# Patient Record
Sex: FEMALE | Race: WHITE | NOT HISPANIC OR LATINO | Employment: UNEMPLOYED | URBAN - METROPOLITAN AREA
[De-identification: names, ages, dates, MRNs, and addresses within clinical notes are randomized per-mention and may not be internally consistent; named-entity substitution may affect disease eponyms.]

---

## 2022-07-26 ENCOUNTER — TELEPHONE (OUTPATIENT)
Dept: HEMATOLOGY/ONCOLOGY | Facility: CLINIC | Age: 47
End: 2022-07-26

## 2022-07-26 NOTE — TELEPHONE ENCOUNTER
TEZM asking pt to return my call.     Spoke with pt.  Introduced myself to her and let her know I am always available if she needs me.  As of now, her plan is to proceed with bilateral mastectomy with Dr. Arevalo.  She saw plastic surgeon on 7/27. Planning for reconstruction.  Pt will come in to see Dr. Peralta on 8/3 as scheduled.

## 2022-08-02 NOTE — PROGRESS NOTES
INITIAL [facility] HEM/ONC CONSULTATION      Subjective:       Patient ID: Karyna Escoto is a 47 y.o. female.    6/9/2022:  Dx Mammo:  1.8cm mass in the left breast towards the left axilla.   Deep to the mass 2 lymph nodes are identified.     7/5/2022:  Left breast core biopsy:  Grade 2 IDCA with DCIS  ER: 0.03%, WV: 8.8%, HER2: 0  TRIPLE NEGATIVE    7/20/2022:  Left axilla LN needle biopsy:  Invasive ductal carcinoma  ER: 0%, WV: 40%, HER2 negative(0)    Chief Complaint: No chief complaint on file.  Breast Cancer    Ms. Escoto is a 46yo female who noticed a small bruise and lump in her upper outer right breast in April this year after working in the yard.  She watched this for a few weeks and went to PCP when it did not go away.  She ordered a mammogram which was done 6/9/2022 which showed a 1.8cm mass in the left breast towards the axilla.  She was referred to general surgeon, Dr. Arevalo in [place] who ordered a needle biopsy which shows triple negative breast cancer.      Pictures 8/3/2022                Past Medical History:   Diagnosis Date    Anxiety        Past Surgical History:   Procedure Laterality Date    eye lid surgery      NOSE SURGERY      TONSILLECTOMY         Social History     Socioeconomic History    Marital status:    Tobacco Use    Smoking status: Former Smoker   Substance and Sexual Activity    Alcohol use: Yes     Comment: occasional       Family History   Problem Relation Age of Onset    Breast cancer Maternal Grandmother     Prostate cancer Maternal Grandfather        Review of patient's allergies indicates:  No Known Allergies    Current Outpatient Medications:     hydrOXYzine HCL (ATARAX) 10 MG Tab, Take 25 mg by mouth 3 (three) times daily as needed., Disp: , Rfl:     omeprazole (PRILOSEC) 10 MG capsule, Take 10 mg by mouth every 12 (twelve) hours as needed., Disp: , Rfl:     sertraline (ZOLOFT) 50 MG tablet, Take 50 mg by mouth once daily., Disp: , Rfl:     All  medications and past history have been reviewed.    Review of Systems   Constitutional: Negative for activity change, appetite change, diaphoresis, fatigue, fever and unexpected weight change.   HENT: Negative for congestion, hearing loss and mouth sores.    Eyes: Negative for visual disturbance.   Respiratory: Negative for cough, chest tightness and shortness of breath.    Cardiovascular: Negative for chest pain and leg swelling.   Gastrointestinal: Negative for abdominal pain, blood in stool, diarrhea, nausea and vomiting.   Endocrine: Negative for cold intolerance and heat intolerance.   Genitourinary: Negative for difficulty urinating, dysuria, frequency and hematuria.   Musculoskeletal: Negative for back pain.   Skin: Negative for rash.   Neurological: Negative for dizziness and headaches.   Hematological: Negative for adenopathy. Does not bruise/bleed easily.   Psychiatric/Behavioral: Negative for behavioral problems. The patient is not nervous/anxious.        Objective:        There were no vitals taken for this visit.    Physical Exam  Constitutional:       Appearance: She is well-developed.   HENT:      Head: Normocephalic and atraumatic.      Right Ear: External ear normal.      Left Ear: External ear normal.   Eyes:      Conjunctiva/sclera: Conjunctivae normal.      Pupils: Pupils are equal, round, and reactive to light.   Neck:      Thyroid: No thyromegaly.      Trachea: No tracheal deviation.   Cardiovascular:      Rate and Rhythm: Normal rate and regular rhythm.      Heart sounds: Normal heart sounds.   Pulmonary:      Effort: Pulmonary effort is normal.      Breath sounds: Normal breath sounds.   Chest:       Abdominal:      General: Bowel sounds are normal. There is no distension.      Palpations: Abdomen is soft. There is no mass.      Tenderness: There is no abdominal tenderness.   Skin:     Findings: No rash.   Neurological:      Comments: Neuro intact througout   Psychiatric:         Behavior:  Behavior normal.         Thought Content: Thought content normal.         Judgment: Judgment normal.           Lab  No results found for this or any previous visit (from the past 336 hour(s)).  CMP  No results found for: NA, K, CL, CO2, GLU, BUN, CREATININE, CALCIUM, PROT, ALBUMIN, BILITOT, ALKPHOS, AST, ALT, ANIONGAP, ESTGFRAFRICA, EGFRNONAA      Specimen (24h ago, onward)            None                All lab results and imaging results have been reviewed and discussed with the patient.     Assessment:       1. Malignant neoplasm of upper-outer quadrant of left breast in female, estrogen receptor negative      Problem List Items Addressed This Visit     Left Breast Cancer-TRIPLE NEGATIVE     I had a long discussion with Ms. Escoto and her  today about this new and aggressive breast cancer.  Note is made that she has low-positive IA however, I feel the best approach is to treat this as triple negative breast cancer as it is certainly as aggressive as such.      She is node positive by biopsy and as such, is stage III and is a candidate for Keynote 522 neoadjuvant chemo/IO therapy.  I discussed the risks and benefits in detail with her and she is prepared to move forward understanding these risks.  She will have PORT tomorrow and I will begin therapy next week.      I will also get chemo education, MUGA and BRCA testing as well as referral to radiation oncology as she will need this therapy as well.  She is interested in bilateral mastectomy and reconstruction and I will arrange for her to see plastic surgery as well.      Lastly,  Due to the aggressive nature of this disease,  I will get a PET scan arranged as well as a breast MRI but will not wait on these to begin therapy.             Relevant Orders    NM PET CT Routine Skull to Mid Thigh    NM Cardiac Blood Pool Single study MUGA    Ambulatory referral/consult to General Surgery    Ambulatory referral/consult to Plastic Surgery    MRI Breast w/wo  Contrast, w/CAD, Bilateral        Cancer Staging  No matching staging information was found for the patient.      Plan:         No follow-ups on file.       The plan was discussed with the patient and all questions/concerns have been answered to the patient's satisfaction.

## 2022-08-03 ENCOUNTER — OFFICE VISIT (OUTPATIENT)
Dept: HEMATOLOGY/ONCOLOGY | Facility: CLINIC | Age: 47
End: 2022-08-03
Payer: COMMERCIAL

## 2022-08-03 VITALS
DIASTOLIC BLOOD PRESSURE: 81 MMHG | WEIGHT: 188 LBS | HEART RATE: 75 BPM | BODY MASS INDEX: 34.6 KG/M2 | TEMPERATURE: 98 F | HEIGHT: 62 IN | SYSTOLIC BLOOD PRESSURE: 132 MMHG

## 2022-08-03 DIAGNOSIS — C50.412 MALIGNANT NEOPLASM OF UPPER-OUTER QUADRANT OF LEFT BREAST IN FEMALE, ESTROGEN RECEPTOR NEGATIVE: ICD-10-CM

## 2022-08-03 DIAGNOSIS — Z17.1 MALIGNANT NEOPLASM OF UPPER-OUTER QUADRANT OF LEFT BREAST IN FEMALE, ESTROGEN RECEPTOR NEGATIVE: ICD-10-CM

## 2022-08-03 PROCEDURE — 3079F DIAST BP 80-89 MM HG: CPT | Mod: CPTII,S$GLB,, | Performed by: INTERNAL MEDICINE

## 2022-08-03 PROCEDURE — 3008F PR BODY MASS INDEX (BMI) DOCUMENTED: ICD-10-PCS | Mod: CPTII,S$GLB,, | Performed by: INTERNAL MEDICINE

## 2022-08-03 PROCEDURE — 1159F MED LIST DOCD IN RCRD: CPT | Mod: CPTII,S$GLB,, | Performed by: INTERNAL MEDICINE

## 2022-08-03 PROCEDURE — 3075F PR MOST RECENT SYSTOLIC BLOOD PRESS GE 130-139MM HG: ICD-10-PCS | Mod: CPTII,S$GLB,, | Performed by: INTERNAL MEDICINE

## 2022-08-03 PROCEDURE — 99205 OFFICE O/P NEW HI 60 MIN: CPT | Mod: S$GLB,,, | Performed by: INTERNAL MEDICINE

## 2022-08-03 PROCEDURE — 3008F BODY MASS INDEX DOCD: CPT | Mod: CPTII,S$GLB,, | Performed by: INTERNAL MEDICINE

## 2022-08-03 PROCEDURE — 99205 PR OFFICE/OUTPT VISIT, NEW, LEVL V, 60-74 MIN: ICD-10-PCS | Mod: S$GLB,,, | Performed by: INTERNAL MEDICINE

## 2022-08-03 PROCEDURE — 3079F PR MOST RECENT DIASTOLIC BLOOD PRESSURE 80-89 MM HG: ICD-10-PCS | Mod: CPTII,S$GLB,, | Performed by: INTERNAL MEDICINE

## 2022-08-03 PROCEDURE — 3075F SYST BP GE 130 - 139MM HG: CPT | Mod: CPTII,S$GLB,, | Performed by: INTERNAL MEDICINE

## 2022-08-03 PROCEDURE — 1159F PR MEDICATION LIST DOCUMENTED IN MEDICAL RECORD: ICD-10-PCS | Mod: CPTII,S$GLB,, | Performed by: INTERNAL MEDICINE

## 2022-08-03 RX ORDER — HYDROXYZINE HYDROCHLORIDE 10 MG/1
25 TABLET, FILM COATED ORAL 3 TIMES DAILY PRN
COMMUNITY
End: 2022-10-26

## 2022-08-03 RX ORDER — SERTRALINE HYDROCHLORIDE 50 MG/1
50 TABLET, FILM COATED ORAL DAILY
COMMUNITY
End: 2022-10-26

## 2022-08-03 RX ORDER — OMEPRAZOLE 10 MG/1
10 CAPSULE, DELAYED RELEASE ORAL EVERY 12 HOURS PRN
COMMUNITY
End: 2022-11-17 | Stop reason: ALTCHOICE

## 2022-08-03 NOTE — ASSESSMENT & PLAN NOTE
I had a long discussion with Ms. Escoto and her  today about this new and aggressive breast cancer.  Note is made that she has low-positive CT however, I feel the best approach is to treat this as triple negative breast cancer as it is certainly as aggressive as such.      She is node positive by biopsy and as such, is stage III and is a candidate for Keynote 522 neoadjuvant chemo/IO therapy.  I discussed the risks and benefits in detail with her and she is prepared to move forward understanding these risks.  She will have PORT tomorrow and I will begin therapy next week.      I will also get chemo education, MUGA and BRCA testing as well as referral to radiation oncology as she will need this therapy as well.  She is interested in bilateral mastectomy and reconstruction and I will arrange for her to see plastic surgery as well.      Lastly,  Due to the aggressive nature of this disease,  I will get a PET scan arranged as well as a breast MRI but will not wait on these to begin therapy.

## 2022-08-04 ENCOUNTER — TELEPHONE (OUTPATIENT)
Dept: SURGERY | Facility: CLINIC | Age: 47
End: 2022-08-04
Payer: COMMERCIAL

## 2022-08-05 ENCOUNTER — OFFICE VISIT (OUTPATIENT)
Dept: RADIATION ONCOLOGY | Facility: CLINIC | Age: 47
End: 2022-08-05
Payer: COMMERCIAL

## 2022-08-05 ENCOUNTER — SOCIAL WORK (OUTPATIENT)
Dept: HEMATOLOGY/ONCOLOGY | Facility: CLINIC | Age: 47
End: 2022-08-05

## 2022-08-05 VITALS
WEIGHT: 190.5 LBS | OXYGEN SATURATION: 97 % | DIASTOLIC BLOOD PRESSURE: 85 MMHG | HEART RATE: 66 BPM | RESPIRATION RATE: 16 BRPM | BODY MASS INDEX: 34.84 KG/M2 | TEMPERATURE: 98 F | SYSTOLIC BLOOD PRESSURE: 132 MMHG

## 2022-08-05 DIAGNOSIS — C50.412 MALIGNANT NEOPLASM OF UPPER-OUTER QUADRANT OF LEFT BREAST IN FEMALE, ESTROGEN RECEPTOR NEGATIVE: ICD-10-CM

## 2022-08-05 DIAGNOSIS — Z17.1 MALIGNANT NEOPLASM OF UPPER-OUTER QUADRANT OF LEFT BREAST IN FEMALE, ESTROGEN RECEPTOR NEGATIVE: ICD-10-CM

## 2022-08-05 PROCEDURE — 3075F SYST BP GE 130 - 139MM HG: CPT | Mod: CPTII,S$GLB,, | Performed by: RADIOLOGY

## 2022-08-05 PROCEDURE — 1160F RVW MEDS BY RX/DR IN RCRD: CPT | Mod: CPTII,S$GLB,, | Performed by: RADIOLOGY

## 2022-08-05 PROCEDURE — 1160F PR REVIEW ALL MEDS BY PRESCRIBER/CLIN PHARMACIST DOCUMENTED: ICD-10-PCS | Mod: CPTII,S$GLB,, | Performed by: RADIOLOGY

## 2022-08-05 PROCEDURE — 3008F PR BODY MASS INDEX (BMI) DOCUMENTED: ICD-10-PCS | Mod: CPTII,S$GLB,, | Performed by: RADIOLOGY

## 2022-08-05 PROCEDURE — 3079F DIAST BP 80-89 MM HG: CPT | Mod: CPTII,S$GLB,, | Performed by: RADIOLOGY

## 2022-08-05 PROCEDURE — 1159F MED LIST DOCD IN RCRD: CPT | Mod: CPTII,S$GLB,, | Performed by: RADIOLOGY

## 2022-08-05 PROCEDURE — 3008F BODY MASS INDEX DOCD: CPT | Mod: CPTII,S$GLB,, | Performed by: RADIOLOGY

## 2022-08-05 PROCEDURE — 1159F PR MEDICATION LIST DOCUMENTED IN MEDICAL RECORD: ICD-10-PCS | Mod: CPTII,S$GLB,, | Performed by: RADIOLOGY

## 2022-08-05 PROCEDURE — 99205 PR OFFICE/OUTPT VISIT, NEW, LEVL V, 60-74 MIN: ICD-10-PCS | Mod: S$GLB,,, | Performed by: RADIOLOGY

## 2022-08-05 PROCEDURE — 3075F PR MOST RECENT SYSTOLIC BLOOD PRESS GE 130-139MM HG: ICD-10-PCS | Mod: CPTII,S$GLB,, | Performed by: RADIOLOGY

## 2022-08-05 PROCEDURE — 99205 OFFICE O/P NEW HI 60 MIN: CPT | Mod: S$GLB,,, | Performed by: RADIOLOGY

## 2022-08-05 PROCEDURE — 3079F PR MOST RECENT DIASTOLIC BLOOD PRESSURE 80-89 MM HG: ICD-10-PCS | Mod: CPTII,S$GLB,, | Performed by: RADIOLOGY

## 2022-08-05 RX ORDER — GABAPENTIN 300 MG/1
300 CAPSULE ORAL 3 TIMES DAILY
COMMUNITY
Start: 2022-08-04 | End: 2022-10-26

## 2022-08-05 RX ORDER — HYDROXYZINE HYDROCHLORIDE 10 MG/1
10 TABLET, FILM COATED ORAL 3 TIMES DAILY PRN
COMMUNITY
Start: 2019-06-01

## 2022-08-05 RX ORDER — ACETAMINOPHEN 325 MG/1
650 TABLET ORAL
COMMUNITY
End: 2022-11-17

## 2022-08-05 RX ORDER — CHOLECALCIFEROL (VITAMIN D3) 125 MCG
CAPSULE ORAL
COMMUNITY
End: 2022-11-17

## 2022-08-05 RX ORDER — OXYCODONE HYDROCHLORIDE 5 MG/1
5 TABLET ORAL
COMMUNITY
Start: 2022-08-04 | End: 2022-08-07

## 2022-08-05 RX ORDER — SERTRALINE HYDROCHLORIDE 50 MG/1
50 TABLET, FILM COATED ORAL
COMMUNITY
Start: 2022-04-01 | End: 2024-02-01

## 2022-08-05 RX ORDER — METHOCARBAMOL 500 MG/1
1000 TABLET, FILM COATED ORAL
COMMUNITY
Start: 2022-08-04 | End: 2022-08-11

## 2022-08-05 NOTE — PROGRESS NOTES
Karyna Escoto is a 47 year old diagnosed with triple negative breast cancer.  Patient is here today, accompanied by her , for a radiation consult with Dr. Bass.  I met with patient to complete new patient orientation and the NCCN Distress Screening; patient indicated a rating of 2.  Patient was provided the the number to access the [facility] financial assistance application.  Patient was provided the [employer] and [employer] applications; she will contact me when she has completed her sections along with required documents.  Patient was provided a $25 gas card from the [employer] transportation [employer].  Patient denied needing addtional psychosocial supports at this time.  I provided patient with the [facility] community events flyer and my contact information in the event additional supportive services are needed in the future.

## 2022-08-05 NOTE — PROGRESS NOTES
Karyna Escoto  [id number]  1975  2022  Derek Patterson Md  [address]  [address]  [place] [address]    REASON FOR CONSULTATION: IIA, [id number] g2 IDC of UOQ L breast, ER(-)/KY(+)/HER2(-)    TREATMENT GOAL: adjuvant    HISTORY OF PRESENT ILLNESS:   Karyna Escoto is a 47 y.o. female with +FHx BCa (GGM) who presented with left axillary bruising and enlarging palpable nodule x 2mos with diagnostic mammogram and ultrasound noting 1.8cm hypoechoic mass in the axilla with extension to the skin and adjacent lymph nodes with small fatty rena.  Biopsy from the left breast returned grade 2 IDC, LVSI(-), PNI(-); ER(-),KY+ 8.9%, HER2(-) and from the L axilla: grade 2 IDC ER(-),KY+ 40%, HER2(-).  She was seen by Lolis Arevalo and Norma and expressed preference for bilateral mastectomy with reconstruction, recommended for tissue expanders.    Patient was seen by Dr. Patterson and is planned for neoadjuvant chemo/immunotherapy.  She was referred to Dr. Murphy for port placement and presents to discuss radiotherapeutic options. PORT placed.    BRCA  MRI  PET    Patient denies fever, chills, chest pain, shortness of breath, cough or hemoptysis.  She has tenderness at port site and reports some tightness in the left axilla the range of motion is preserved without swelling.  She denies pain or discomfort elsewhere within the breast or nipple discharge.    Breast History  Menarche                             12   Menopause                          LMP 7/15/22                  Hysterectomy no   ; 1st at 30   Birth control use                  Yes; 12yrs  Hormone therapy use          no  Family History Breast Ca    no  Prior breast biopsies           Yes (GGM)    Review of systems otherwise negative unless indicated in HPI.    Past Medical History:   Diagnosis Date    Anxiety     Breast cancer      Past Surgical History:   Procedure Laterality Date    eye lid surgery      NOSE SURGERY      PORTACATH  PLACEMENT      TONSILLECTOMY       Social History     Socioeconomic History    Marital status:    Tobacco Use    Smoking status: Former Smoker   Substance and Sexual Activity    Alcohol use: Yes     Comment: occasional    Sexual activity: Yes     Family History   Problem Relation Age of Onset    Breast cancer Maternal Grandmother     Prostate cancer Maternal Grandfather        PRIOR HISTORY OF CHEMOTHERAPY OR RADIOTHERAPY: Please see HPI for patients prior oncologic history.    Medication List with Changes/Refills   Current Medications    ACETAMINOPHEN (TYLENOL) 325 MG TABLET    Take 650 mg by mouth.    GABAPENTIN (NEURONTIN) 300 MG CAPSULE    Take 300 mg by mouth 3 (three) times daily.    HYDROXYZINE HCL (ATARAX) 10 MG TAB    Take 25 mg by mouth 3 (three) times daily as needed.    HYDROXYZINE HCL (ATARAX) 10 MG TAB    Take 10 mg by mouth.    METHOCARBAMOL (ROBAXIN) 500 MG TAB    Take 1,000 mg by mouth.    NAPROXEN SODIUM 220 MG CAP    Take by mouth.    OMEPRAZOLE (PRILOSEC) 10 MG CAPSULE    Take 10 mg by mouth every 12 (twelve) hours as needed.    OXYCODONE (ROXICODONE) 5 MG IMMEDIATE RELEASE TABLET    Take 5 mg by mouth.    SERTRALINE (ZOLOFT) 50 MG TABLET    Take 50 mg by mouth once daily.    SERTRALINE (ZOLOFT) 50 MG TABLET    Take 50 mg by mouth.     Review of patient's allergies indicates:  No Known Allergies    QUALITY OF LIFE: 100%- Normal, No Complaints, No Evidence of Disease    Vitals:    08/05/22 0914   BP: 132/85   Pulse: 66   Resp: 16   Temp: 98.4 °F (36.9 °C)   TempSrc: Oral   SpO2: 97%   Weight: 86.4 kg (190 lb 8 oz)   PainSc:   4   PainLoc: Chest     Body mass index is 34.84 kg/m².    PHYSICAL EXAM:   GENERAL: alert; in no apparent distress.   HEAD: normocephalic, atraumatic.  EYES: pupils are equal, round, reactive to light and accommodation. Sclera anicteric. Conjunctiva not injected.   NOSE/THROAT: no nasal erythema or rhinorrhea. Oropharynx pink, without erythema, ulcerations or  thrush.   NECK: no cervical motion rigidity; supple with no masses.    CHEST: Patient is speaking comfortably on room air with normal work of breathing without using accessory muscles of respiration.  CARDIOVASCULAR: regular rate and rhythm  ABDOMEN: soft, nontender, nondistended.   MUSCULOSKELETAL: no tenderness to palpation along the spine or scapulae. Normal range of motion.  NEUROLOGIC: cranial nerves II-XII intact bilaterally. Strength 5/5 in bilateral upper and lower extremities. No sensory deficits appreciated. Normal gait.  LYMPHATIC: no cervical, supraclavicular or axillary adenopathy appreciated bilaterally.   EXTREMITIES: no clubbing, cyanosis, edema.  SKIN: no erythema, rashes, ulcerations noted.   BREAST:  Palpable 2-3 cm firm nodule in left axillary tail with associated overlying skin changes/erythema; no other palpable nodules.  Mild tenderness to exam.  No cellulitis or fluctuance.    REVIEW OF IMAGING/PATHOLOGY/LABS: Please see HPI. All images reviewed personally by dictating physician.     ASSESSMENT: Karyna Escoto is a 47 y.o. female with stage IIA, [id number] g2 IDC of UOQ L breast, ER(-)/ME(+)/HER2(-).  PLAN:  Karyna Escoto presents with palpable left axillary tail nodule with overlying skin changes with diagnostic mammogram/ultrasound and ultimately biopsy confirming a grade 2 invasive ductal carcinoma with biopsy-proven axillary lymph node involvement.  There was no evidence of lymphovascular space invasion or perineural invasion on biopsy and both sites returned ER(-)/HER2(-) with differing degrees of ME positivity.  She has met with Dr. Patterson and has BRCA, PET and MRI pending.  He is planning aggressive neoadjuvant chemo/immunotherapy.  Her surgical plan is for bilateral mastectomies with reconstruction and she will be meeting with Dr. Pugh to discuss.    Today I outlined timeline of her proposed treatment regimen.  I explained that radiotherapy would follow surgery and  expander placement with saline fill, approximately 4-6 weeks pending healing.  Because of her clinical lymph node positive disease and several high risk features including young age and ER(-), I do recommend adjuvant treatment of the left chest wall and draining lymphatics to include axilla 1-3, supraclavicular fossa, internal mammary lymph node chain with demonstrated survival benefit per EBCTCG meta-analysis and EORTC 98054.  I recommend treatment done with 3D conformal techniques using a deep inspiratory breath protocol to spare the underlying lung and heart.  Pending results of final pathology, tentative prescription will be for 5040 cGy to the above volumes +/- mastectomy scar boost. Expect radiotherapy will be done with concurrent Keytruda. Pending final pathology she may be a candidate for further systemic therapy (xeloda, ie) at completion of radiotherapy.      I carefully explained the process of simulation and treatment delivery with weekly physician visits.      Consent deferred.     The patient has our contact information and understands that they are free to contact us at any time with questions or concerns regarding radiation therapy.     DISPOSITION: RTC FOR CT SIM AFTER CURRENT THERAPY     I have personally seen and evaluated this patient with a high complexity diagnosis.      Greater than 60 minutes were dedicated to reviewing/interpreting pertinent laboratory/imaging/pathology as well as prior consultations; reviewing and performing history and physical; counseling patient on oncologic recommendations; documentation in the electronic medical record including ordering of additional tests and/or radiation treatment protocol; and coordination of care with physicians with referrals placed as appropriate.     COVID-19 precautions discussed. Cancer Center policy for COVID-19 testing described.  Patient will be required to wear a mask when in the Cancer Center.    PHYSICIAN: John Bass Jr, MD    Thank  you for the opportunity to meet and consult with Karyna Escoto.   Please feel free to contact me to discuss the above recommendation further.

## 2022-08-08 ENCOUNTER — OFFICE VISIT (OUTPATIENT)
Dept: HEMATOLOGY/ONCOLOGY | Facility: CLINIC | Age: 47
End: 2022-08-08
Payer: COMMERCIAL

## 2022-08-08 VITALS
DIASTOLIC BLOOD PRESSURE: 85 MMHG | BODY MASS INDEX: 35.48 KG/M2 | WEIGHT: 194 LBS | TEMPERATURE: 98 F | SYSTOLIC BLOOD PRESSURE: 135 MMHG | HEART RATE: 70 BPM

## 2022-08-08 DIAGNOSIS — C50.412 MALIGNANT NEOPLASM OF UPPER-OUTER QUADRANT OF LEFT BREAST IN FEMALE, ESTROGEN RECEPTOR NEGATIVE: ICD-10-CM

## 2022-08-08 DIAGNOSIS — Z17.1 MALIGNANT NEOPLASM OF UPPER-OUTER QUADRANT OF LEFT BREAST IN FEMALE, ESTROGEN RECEPTOR NEGATIVE: ICD-10-CM

## 2022-08-08 DIAGNOSIS — R53.83 FATIGUE, UNSPECIFIED TYPE: Primary | ICD-10-CM

## 2022-08-08 PROCEDURE — 1160F PR REVIEW ALL MEDS BY PRESCRIBER/CLIN PHARMACIST DOCUMENTED: ICD-10-PCS | Mod: CPTII,S$GLB,, | Performed by: NURSE PRACTITIONER

## 2022-08-08 PROCEDURE — 3008F BODY MASS INDEX DOCD: CPT | Mod: CPTII,S$GLB,, | Performed by: NURSE PRACTITIONER

## 2022-08-08 PROCEDURE — 1159F MED LIST DOCD IN RCRD: CPT | Mod: CPTII,S$GLB,, | Performed by: NURSE PRACTITIONER

## 2022-08-08 PROCEDURE — 3008F PR BODY MASS INDEX (BMI) DOCUMENTED: ICD-10-PCS | Mod: CPTII,S$GLB,, | Performed by: NURSE PRACTITIONER

## 2022-08-08 PROCEDURE — 3079F PR MOST RECENT DIASTOLIC BLOOD PRESSURE 80-89 MM HG: ICD-10-PCS | Mod: CPTII,S$GLB,, | Performed by: NURSE PRACTITIONER

## 2022-08-08 PROCEDURE — 99215 PR OFFICE/OUTPT VISIT, EST, LEVL V, 40-54 MIN: ICD-10-PCS | Mod: S$GLB,,, | Performed by: NURSE PRACTITIONER

## 2022-08-08 PROCEDURE — 99215 OFFICE O/P EST HI 40 MIN: CPT | Mod: S$GLB,,, | Performed by: NURSE PRACTITIONER

## 2022-08-08 PROCEDURE — 3075F PR MOST RECENT SYSTOLIC BLOOD PRESS GE 130-139MM HG: ICD-10-PCS | Mod: CPTII,S$GLB,, | Performed by: NURSE PRACTITIONER

## 2022-08-08 PROCEDURE — 3075F SYST BP GE 130 - 139MM HG: CPT | Mod: CPTII,S$GLB,, | Performed by: NURSE PRACTITIONER

## 2022-08-08 PROCEDURE — 3079F DIAST BP 80-89 MM HG: CPT | Mod: CPTII,S$GLB,, | Performed by: NURSE PRACTITIONER

## 2022-08-08 PROCEDURE — 1160F RVW MEDS BY RX/DR IN RCRD: CPT | Mod: CPTII,S$GLB,, | Performed by: NURSE PRACTITIONER

## 2022-08-08 PROCEDURE — 1159F PR MEDICATION LIST DOCUMENTED IN MEDICAL RECORD: ICD-10-PCS | Mod: CPTII,S$GLB,, | Performed by: NURSE PRACTITIONER

## 2022-08-08 RX ORDER — PROMETHAZINE HYDROCHLORIDE 25 MG/1
25 TABLET ORAL
Qty: 30 TABLET | Refills: 5 | Status: SHIPPED | OUTPATIENT
Start: 2022-08-08 | End: 2023-03-13 | Stop reason: SDUPTHER

## 2022-08-08 RX ORDER — ONDANSETRON HYDROCHLORIDE 8 MG/1
8 TABLET, FILM COATED ORAL EVERY 8 HOURS PRN
Qty: 30 TABLET | Refills: 5 | Status: SHIPPED | OUTPATIENT
Start: 2022-08-08 | End: 2023-08-08

## 2022-08-08 NOTE — PROGRESS NOTES
FOLLOW-UP APPOINTMENT    PATIENT:   Karyna Escoto  :    1975  MR#:    [id number]  DATE OF VISIT:  2022      Chief Complaint: Chemo School/ Breast Cancer    HPI:   Ms. Karyna Escoto presents today for chemotherapy education.  She will be starting treatment with Taxol, Carboplatin, and Keytruda for the above diagnosis.     Depression Patient Health Questionnaire 2022 8/3/2022   Over the last two weeks how often have you been bothered by little interest or pleasure in doing things Not at all Not at all   Over the last two weeks how often have you been bothered by feeling down, depressed or hopeless Not at all Not at all   PHQ-2 Total Score 0 0         Review of Systems   Constitutional: Negative for appetite change and unexpected weight change.   HENT:   Negative for mouth sores.    Respiratory: Negative for cough and shortness of breath.    Cardiovascular: Negative for chest pain.   Gastrointestinal: Negative for abdominal pain and diarrhea.   Genitourinary: Negative for frequency.    Musculoskeletal: Negative for back pain.   Skin: Negative for rash.   Neurological: Negative for headaches.   Hematological: Negative for adenopathy.   Psychiatric/Behavioral: The patient is not nervous/anxious.        Oncology History   Left Breast Cancer-TRIPLE NEGATIVE   8/3/2022 Initial Diagnosis    Left Breast Cancer-TRIPLE NEGATIVE     2022 Cancer Staged    Staging form: Breast, AJCC 8th Edition  - Clinical: Stage IIA (cT1c, cN1(f), cM0, G2, ER-, MS+, HER2-)     2022 -  Chemotherapy    Treatment Summary   Plan Name: OP PEMBROLIZUMAB CARBOPLATIN (AUC 5) WITH WEEKLY PACLITAXEL FOLLOWED BY PEMBROLIZUMAB DOXORUBICIN CYCLOPHOSPHAMIDE FOLLOWED BY PEMBROLIZUMAB 200 MG Q3W  Treatment Goal: Curative  Status: Active  Start Date: 2022 (Planned)  End Date: 2023 (Planned)  Provider: Derek Peralta MD  Chemotherapy: dexAMETHasone (DECADRON) 4 MG Tab, 8 mg, Oral, Daily, 0 of 1 cycle, Start date:  --, End date: --  OLANZapine (ZYPREXA) 5 MG tablet, 5 mg, Oral, Nightly, 0 of 1 cycle, Start date: --, End date: --  DOXOrubicin chemo injection 60 mg/m2 (Treatment Plan), 60 mg/m2, Intravenous, Clinic/HOD 1 time, 0 of 4 cycles  CARBOplatin (PARAPLATIN) in sodium chloride 0.9% 250 mL chemo infusion, , Intravenous, Clinic/HOD 1 time, 0 of 4 cycles  cyclophosphamide 600 mg/m2 in sodium chloride 0.9% 250 mL chemo infusion, 600 mg/m2, Intravenous, Clinic/HOD 1 time, 0 of 4 cycles  PACLitaxeL (TAXOL) 80 mg/m2 in sodium chloride 0.9% 250 mL chemo infusion, 80 mg/m2, Intravenous, Clinic/HOD 1 time, 0 of 4 cycles  pembrolizumab (KEYTRUDA) 200 mg in sodium chloride 0.9% 108 mL infusion, 200 mg, Intravenous, Clinic/HOD 1 time, 0 of 17 cycles         Patient Active Problem List   Diagnosis    Left Breast Cancer-TRIPLE NEGATIVE       Past Medical History:   Diagnosis Date    Anxiety     Breast cancer        Past Surgical History:   Procedure Laterality Date    eye lid surgery      NOSE SURGERY      PORTACATH PLACEMENT      TONSILLECTOMY         Social History     Socioeconomic History    Marital status:    Tobacco Use    Smoking status: Former Smoker   Substance and Sexual Activity    Alcohol use: Yes     Comment: occasional    Sexual activity: Yes       Family History   Problem Relation Age of Onset    Breast cancer Maternal Grandmother     Prostate cancer Maternal Grandfather          Current Outpatient Medications:     acetaminophen (TYLENOL) 325 MG tablet, Take 650 mg by mouth., Disp: , Rfl:     gabapentin (NEURONTIN) 300 MG capsule, Take 300 mg by mouth 3 (three) times daily., Disp: , Rfl:     hydrOXYzine HCL (ATARAX) 10 MG Tab, Take 25 mg by mouth 3 (three) times daily as needed., Disp: , Rfl:     hydrOXYzine HCL (ATARAX) 10 MG Tab, Take 10 mg by mouth., Disp: , Rfl:     methocarbamoL (ROBAXIN) 500 MG Tab, Take 1,000 mg by mouth., Disp: , Rfl:     naproxen sodium 220 mg Cap, Take by mouth., Disp:  , Rfl:     omeprazole (PRILOSEC) 10 MG capsule, Take 10 mg by mouth every 12 (twelve) hours as needed., Disp: , Rfl:     ondansetron (ZOFRAN) 8 MG tablet, Take 1 tablet (8 mg total) by mouth every 8 (eight) hours as needed., Disp: 30 tablet, Rfl: 5    promethazine (PHENERGAN) 25 MG tablet, Take 1 tablet (25 mg total) by mouth every 4 to 6 hours as needed., Disp: 30 tablet, Rfl: 5    sertraline (ZOLOFT) 50 MG tablet, Take 50 mg by mouth once daily., Disp: , Rfl:     sertraline (ZOLOFT) 50 MG tablet, Take 50 mg by mouth., Disp: , Rfl:     Review of patient's allergies indicates:  No Known Allergies    Physcial Examination  VITAL SIGNS:    Body surface area is 1.96 meters squared.   Pain Assessment  Vitals:    08/08/22 1358   BP: 135/85   Pulse: 70   Temp: 98 °F (36.7 °C)   Weight: 88 kg (194 lb)   PainSc: 0-No pain          Wt Readings from Last 5 Encounters:   08/08/22 88 kg (194 lb)   08/05/22 86.4 kg (190 lb 8 oz)   08/03/22 85.3 kg (188 lb)       GENERAL:  Karyna Escoto is healthy-appearing 47 y.o. female, in no distress.   EYES:   Pupils equal, round, reactive.  Conjunctivae, sclera and lids normal.  HEENT: Head normocephalic and atraumatic, without alopecia.  Oropharynx is unremarkable.  No icterus, jaundice, stomatitis, mucositis, or ulceration is noted.  Ears are clear and unremarkable.  Nose, nares, and septum are unremarkable.    NECK:   No masses.  Thyroid and trachea are normal.    BREASTS:  Deferred.  RESPIRATORY: Clear to auscultation bilaterally.  Symmetrically effortless expansion.  No wheezing and no stridor.    CV: Heart reveals regular rate and rhythm without murmur, rub, or gallops.  ABDOMEN: Soft, non-tender.  No masses, no hernias, and no rebound or rigidity are noted.  /RECTAL:  Deferred.  LYMPHATICS: No preauricular, submandibular, cervical, supraclavicular, axillary, lymphadenopathy.  MUSCULOSKELETAL:Fair musculature, no atrophy.  No arthritic changes.  No edema or cyanosis. Back  is without gross abnormal curvature.   NEUROLOGICAL: Cranial nerves II-XII grossly intact.  Motor and sensory exam intact.  SKIN:   No lesions, bruises, petechiae or rashes.  Good turgor.    PSYCHIATRIC: Patient is alert and oriented to time, place and person.  Mood and affect are appropriate.         Laboratory and Radiology   No results found for: WBC, RBC, HGB, HCT, MCV, MCH, MCHC, RDW, PLT, MPV, GRAN, LYMPH, MONO, EOS, BASO, EOSINOPHIL, BASOPHIL  BMP  No results found for: NA, K, CL, CO2, BUN, CREATININE, CALCIUM, ANIONGAP, ESTGFRAFRICA, EGFRNONAA  No results found for: ALT, AST, GGT, ALKPHOS, BILITOT  No results found for this or any previous visit (from the past 2160 hour(s)).  No results found for this or any previous visit (from the past 2160 hour(s)).  No results found for this or any previous visit (from the past 2160 hour(s)).    Pathology  Pathology Results  (Last 10 years)    None          TITLE: PLAN OF CARE FOR THE CHEMOTHERAPY PATIENT / TEACHING PROTOCOL    PURPOSE: To involve the patient / significant other in the plan of care and to provide teaching to the significant other & patient receiving chemotherapy.    LEVEL: Independent.    CONTENT: The Plan of Care for the chemotherapy patient is individualized and appropriate to the patients needs, strengths, limitations, & goals.  Education includes information regarding chemotherapy side effects, the treatment itself, and self-care  Activities.    GOAL / OUTCOME STANDARDS    PHYSIOLOGIC: The client will remain free or experience minimal side effects or toxicities throughout the chemotherapy treatment period.     PSYCHOLOGIC: The client/significant others will demonstrate positive coping mechanisms in relation to chemotherapy and its side effects.      COGINITIVE: The client/significant others will verbalize understanding of self-care measure to avoid/minimize side effects of the chemotherapy regimen.    EVALUATION / COMMENT KEY:    RODY  = Audiovisual/Video  S = Successfully meets outcome  N = Needs further instruction  NA = Not applicable to the patient  P = Previous knowledge  U = Unable to comprehend  * = See progress notes          PLAN OF CARE  INFORMATION TO BE DELIVERED / NURSING INTERVENTIONS DATE EVALUATION   1. Assessment of client/caregiver,          knowledge of cancer diagnosis,          and chemotherapy as a treatment.  1a. Evaluate patient/caregiver learning ability     b. Plan teaching sessions with patient/caregiver according to needs and present anxiety level/ability to learn.     c. Provide Chemotherapy Education Packet,         Mouth Care Protocol,          Specific Patient Education Sheets. 08/08/2022  S   2. Individual chemotherapy treatment          plan.  2a. Review of Chemotherapy Education handout from [employer]              08/08/2022     S   3. Knowledge Deficit & Self-Management of general side effects common to all chemotherapy:  a. Nausea/Vomiting   b.   Diarrhea  c. Mouth Care  d. Dental care  e. Constipation  f. Hair Loss  g. Potential for infection  h. Fatigue   3a. Reinforce that the majority of side effects from chemotherapy are reversible and are  controlled both in the hospital and at home        (blood counts recover, hair grows back).   b.  Refer to the following for reinforcement of         information post-treatment:  1. Mouth Care Protocol.  2. Bowel Protocol for constipation or diarrhea.  3.  Drug Specific Chemotherapy Information Sheets for each medication patient receiving.    08/08/2022     S     PLAN OF CARE  INFORMATION TO BE DELIVERED / NURSING INTERVENTIONS DATE EVALUATION   h. Potential for bleeding         i. Potential anemia/fatigue         j. Potential sunburn         k. Birth control measures  l. Safety measures post treatment 4.  Chemotherapy Home Care Instruction  and Safety Information Sheet.  A. patient/caregivers to thoroughly cook shellfish (shrimp, crab, etc) to decrease the chance  of infection.    B.  Use sunscreen and protective clothing while in the sun.   08/08/2022      4. Knowledge deficit & Self Management of Drug Specific  Side Effects.    a. BLADDER EFFECTS         (Hemorrhagic Cystitis)                     Preventable with adequate hydration; occurs 2-3 days or more post treatment.     1.  Instruct patient to:   a.   Void at least every 2 hours; increase intake.   b.   DO NOT hold urine; go when urge is felt.   c.    Empty bladder at bedtime and on          awakening.   d.   Observe for color changes (red to tea            colored), amount and frequency changes.   e.   Notify oncologist of any abnormalities           in urine or voiding or if you cannot               drink adequate fluids.    08/08/2022     S    b.   CHANGES IN URINE        COLOR:         1.   Instruct patient:   a.   Most evident in first 2-3 voidings after            administration.  b. Lasts less than 24 hours.  c. If urine is discolored 2 or more days post- treatment, notify oncologist.      08/08/2022 S   c.    KIDNEY EFFECTS           (Nephrotoxicity)   1.  Instruct patient to:  a.   Drink 8-16 glasses of fluid/day the day   pre-treatment and 3-4 days post-treatment to maintain hydration; the best way to minimize kidney problems.  b.   Notify oncologist immediately if unable to drink fluids or if changes are noted in urinary elimination.      08/08/2022     S   a. PULMONARY TOXICITY    1. Instruct patient to report symptoms such as shortness of breath, chest pain, shallow breathing, or chest wall discomfort to physician.  2. Reinforce preventative measures used by the health care team.  a. Baseline and periodic PFT and chest x-ray.   08/08/2022   S     PLAN OF CARE INFORMATION TO BE DELIVERED / NURSING INTERVENTIONS DATE EVALUATION   b. NERVE & MUSCLE EFFECTS (neurotoxocity; neuropathy, possible visual/hearing changes)        3. Instruct patient to:    a. Report numbness or tingling of the hands/feet, loss of  fine motor movement (buttoning shirt, tying shoelaces), or gait changes to your oncologist.  b. If numbness/tingling are present:   protect feet with shoes at all times.   Use gloves for washing dishes/gardening & potholders in kitchen.       08/08/2022   S   c. CARDIOTOXICITY  Decreased effectiveness of             cardiac function. Effective are                  cumulative and irreversible.                                    CARDIAC ARRYTHMIAS              4   Instruct:  a. Heart function may be tested before treatment and perdiocally during treatment.  b. Notify oncologist of irregular pulse, palpitations, shortness of breath, or swelling in lower extremities/feet.          Taxol can cause arrhythmias on infusion that resolve once infusion discontinued. Instruct nurse if any irregularity felt.    08/08/2022   S   d. EXTRAVASTION  Occurs when vesicants leak outside of vein and cause damage to the skin and underlying tissues.   1. Reinforce preventive measures used to avoid complications.  a. Fresh IV site or central line monitored continuously with vesicant IVP.  b. Continuous infusion via central line site and blood return monitored periodically around the clock.  2. Instruct to:  a. Notify nurse of any discomfort, burning, stinging, etc. at IV site during chemotherapy administration.  b. Notify oncologist of any redness, pain, or swelling at IV site after discharge from hospital.   08/08/2022   S   e. HYPERSENSITIVITY can happen with any medication.   1. Instruct patient:  a. Nurse is with them during the initial part of treatment and will be close by to monitor.  b. Pre-medication ordered by the oncologist must be taken on time. If doses are missed, treatment will need to be re-scheduled.  c. Skin redness, itching, or hives appearing after discharge should be reported to oncologist. 08/08/2022   S       PLAN OF CARE INFORMATION TO BE DELIVERED / NURSING INTERVENTIONS DATE EVALUATION   f. FLU-LIKE SYNDROME       1. Instruct patient symptoms are hard to prevent and may include fever, shaking chills, muscle and body aches.  a. Taking prescribed medications from physician if needed.  b. Adequate fluids are important.    2. Reinforce the need to call if temperature is         elevated to 100.4 or more  08/08/2022   S   g. HAND-FOOT SYNDROME  causes painful, symmetric swelling and redness of palms and soles                  5. Instruct patient to report any numbness or tingling in the hands or feet.  6. Explain prevention techniques, such as     a. Use heavy moisturizers to lessen skin dryness and itching, but to avoid if skin is cracked or broken  b. Bathe in tepid water, use non-perfumed soap, and wash gently. Baths with oatmeal or diluted baking soda may be soothing.  c. Avoid tight fitting shoes and repetitive actions, such as rubbing hands or applying pressure to hands/feet.  7. Review measures to take should syndrome occur:  a. Cold compresses and elevation for          edema  b. Pain medications and other measures as ordered by oncologist.   4.   Syndrome resolves few weeks after therapy. 08/08/2022   S   5. DISCHARGE PLANNING /        EDUCATION 1.    Explain importance of compliance with follow- up  tests (CBC, CMP).  2.    Verify patient/caregiver know:  a.    Oncologists office phone number.  b.    Dates of follow-up appointments.  c.    Prescriptions given for nausea  3.   Review side effects to monitor and notify          oncologist about.  4.   Reinforce the need for patient and caregivers to:  a.    Review information given.  b.    Call oncologists office with questions          or symptoms  5.   Provide [facility] Brochure make referrals if needed for financial or .   08/08/2022   S     PROGRESS NOTES: I met with the patient and her  today for chemotherapy education. she will be starting treatment with Taxol, Carboplatin and Keytruda . We discussed the mechanism of action,  potential side effects of this treatment as well as ways she can manage them at home. Some of these side effects include but or not limited to fever, nausea, vomiting, decreased appetite, fatigue, weakness, cytopenias, myalgia/arthralgia, constipation, diarrhea, bleeding, headache, shortness of breath, nail changes, taste change, hair thinning/loss, mood disturbances, or edema. We also discussed dietary modifications she should make although this will be discussed in more detail with the dietician. she was provided with anti-emetic medication, a copy of all of the information we discussed today as well as our contact information. she will be provided a schedule on his first day of treatment. We will obtain labs on a weekly basis and the patient will follow-up with the physician for toxicity monitoring throughout treatment. All questions were answered and an informed consent was obtained. she was reminded to certainly contact us sooner if needed.  Attached to the patients folder and discussed with the patient the 24 hour/ 7 days a week after hours telephone number for the physician.  Patient notified to call anytime 24/7 because their is a physician on call for any problems that may arise.  Patient also notified to report to [facility] if they can not get in touch with a physician after hours.  Discussed the five wishes booklet with the patient and their family.           Assessment/Plan     ICD-10-CM ICD-9-CM   1. Fatigue, unspecified type  R53.83 780.79   2. Malignant neoplasm of upper-outer quadrant of left breast in female, estrogen receptor negative  C50.412 174.4    Z17.1 V86.1          Medications Ordered:  Zofran 8mg 1 tab PO Q8h prn nausea  Phenergan 25mg PO Q4-6h prn nausea    Standing Labs Ordered:  CBC weekly  CMP weekly  Mag weekly  TSH Monthly    Follow up in about 2 weeks (around 8/22/2022) for with Dr. Peralta and 5 weeks with me.    Total Face to Face Time: 60 minutes face to face with the  patient and their family discussing the chemotherapy side-effects and when to call our office.   Electronically signed by: Ana Quigley, MSN, APRN, AGNP-C, OCN      Answers for HPI/ROS submitted by the patient on 8/5/2022  visual disturbance: No

## 2022-08-10 ENCOUNTER — HOSPITAL ENCOUNTER (OUTPATIENT)
Dept: RADIOLOGY | Facility: HOSPITAL | Age: 47
Discharge: HOME | End: 2022-08-10
Attending: INTERNAL MEDICINE
Payer: COMMERCIAL

## 2022-08-10 DIAGNOSIS — C50.412 MALIGNANT NEOPLASM OF UPPER-OUTER QUADRANT OF LEFT BREAST IN FEMALE, ESTROGEN RECEPTOR NEGATIVE: ICD-10-CM

## 2022-08-10 DIAGNOSIS — C50.412 MALIGNANT NEOPLASM OF UPPER-OUTER QUADRANT OF LEFT BREAST IN FEMALE, ESTROGEN RECEPTOR NEGATIVE: Primary | ICD-10-CM

## 2022-08-10 DIAGNOSIS — Z17.1 MALIGNANT NEOPLASM OF UPPER-OUTER QUADRANT OF LEFT BREAST IN FEMALE, ESTROGEN RECEPTOR NEGATIVE: ICD-10-CM

## 2022-08-10 DIAGNOSIS — Z17.1 MALIGNANT NEOPLASM OF UPPER-OUTER QUADRANT OF LEFT BREAST IN FEMALE, ESTROGEN RECEPTOR NEGATIVE: Primary | ICD-10-CM

## 2022-08-10 PROCEDURE — A9560 TC99M LABELED RBC: HCPCS

## 2022-08-10 RX ORDER — EPINEPHRINE 0.3 MG/.3ML
0.3 INJECTION SUBCUTANEOUS ONCE AS NEEDED
Status: CANCELLED | OUTPATIENT
Start: 2022-08-10

## 2022-08-10 RX ORDER — DIPHENHYDRAMINE HYDROCHLORIDE 50 MG/ML
50 INJECTION INTRAMUSCULAR; INTRAVENOUS ONCE AS NEEDED
Status: CANCELLED | OUTPATIENT
Start: 2022-08-10

## 2022-08-10 RX ORDER — HEPARIN 100 UNIT/ML
500 SYRINGE INTRAVENOUS
Status: CANCELLED | OUTPATIENT
Start: 2022-08-10

## 2022-08-10 RX ORDER — FAMOTIDINE 10 MG/ML
20 INJECTION INTRAVENOUS
Status: CANCELLED | OUTPATIENT
Start: 2022-08-10

## 2022-08-10 RX ORDER — SODIUM CHLORIDE 0.9 % (FLUSH) 0.9 %
10 SYRINGE (ML) INJECTION
Status: CANCELLED | OUTPATIENT
Start: 2022-08-10

## 2022-08-11 ENCOUNTER — DOCUMENTATION ONLY (OUTPATIENT)
Dept: HEMATOLOGY/ONCOLOGY | Facility: CLINIC | Age: 47
End: 2022-08-11

## 2022-08-11 ENCOUNTER — INFUSION (OUTPATIENT)
Dept: INFUSION THERAPY | Facility: HOSPITAL | Age: 47
End: 2022-08-11
Attending: INTERNAL MEDICINE
Payer: COMMERCIAL

## 2022-08-11 ENCOUNTER — PATIENT MESSAGE (OUTPATIENT)
Dept: HEMATOLOGY/ONCOLOGY | Facility: CLINIC | Age: 47
End: 2022-08-11

## 2022-08-11 VITALS
BODY MASS INDEX: 35.77 KG/M2 | HEIGHT: 62 IN | WEIGHT: 194.38 LBS | HEART RATE: 66 BPM | SYSTOLIC BLOOD PRESSURE: 109 MMHG | OXYGEN SATURATION: 100 % | RESPIRATION RATE: 16 BRPM | DIASTOLIC BLOOD PRESSURE: 70 MMHG | TEMPERATURE: 98 F

## 2022-08-11 DIAGNOSIS — Z17.1 MALIGNANT NEOPLASM OF UPPER-OUTER QUADRANT OF LEFT BREAST IN FEMALE, ESTROGEN RECEPTOR NEGATIVE: Primary | ICD-10-CM

## 2022-08-11 DIAGNOSIS — C50.412 MALIGNANT NEOPLASM OF UPPER-OUTER QUADRANT OF LEFT BREAST IN FEMALE, ESTROGEN RECEPTOR NEGATIVE: Primary | ICD-10-CM

## 2022-08-11 PROCEDURE — A4216 STERILE WATER/SALINE, 10 ML: HCPCS | Performed by: NURSE PRACTITIONER

## 2022-08-11 PROCEDURE — 96375 TX/PRO/DX INJ NEW DRUG ADDON: CPT

## 2022-08-11 PROCEDURE — 63600175 PHARM REV CODE 636 W HCPCS: Mod: JG | Performed by: NURSE PRACTITIONER

## 2022-08-11 PROCEDURE — 25000003 PHARM REV CODE 250: Performed by: NURSE PRACTITIONER

## 2022-08-11 PROCEDURE — 96413 CHEMO IV INFUSION 1 HR: CPT

## 2022-08-11 PROCEDURE — 96417 CHEMO IV INFUS EACH ADDL SEQ: CPT

## 2022-08-11 PROCEDURE — 96367 TX/PROPH/DG ADDL SEQ IV INF: CPT

## 2022-08-11 PROCEDURE — 96415 CHEMO IV INFUSION ADDL HR: CPT

## 2022-08-11 RX ORDER — LIDOCAINE AND PRILOCAINE 25; 25 MG/G; MG/G
CREAM TOPICAL
Qty: 30 G | Refills: 5 | Status: SHIPPED | OUTPATIENT
Start: 2022-08-11

## 2022-08-11 RX ORDER — SODIUM CHLORIDE 0.9 % (FLUSH) 0.9 %
10 SYRINGE (ML) INJECTION
Status: DISCONTINUED | OUTPATIENT
Start: 2022-08-11 | End: 2022-08-11 | Stop reason: HOSPADM

## 2022-08-11 RX ORDER — FAMOTIDINE 10 MG/ML
20 INJECTION INTRAVENOUS
Status: COMPLETED | OUTPATIENT
Start: 2022-08-11 | End: 2022-08-11

## 2022-08-11 RX ORDER — EPINEPHRINE 0.3 MG/.3ML
0.3 INJECTION SUBCUTANEOUS ONCE AS NEEDED
Status: DISCONTINUED | OUTPATIENT
Start: 2022-08-11 | End: 2022-08-11 | Stop reason: HOSPADM

## 2022-08-11 RX ORDER — DIPHENHYDRAMINE HYDROCHLORIDE 50 MG/ML
50 INJECTION INTRAMUSCULAR; INTRAVENOUS ONCE AS NEEDED
Status: DISCONTINUED | OUTPATIENT
Start: 2022-08-11 | End: 2022-08-11 | Stop reason: HOSPADM

## 2022-08-11 RX ORDER — HEPARIN 100 UNIT/ML
500 SYRINGE INTRAVENOUS
Status: DISCONTINUED | OUTPATIENT
Start: 2022-08-11 | End: 2022-08-11 | Stop reason: HOSPADM

## 2022-08-11 RX ADMIN — SODIUM CHLORIDE 200 MG: 9 INJECTION, SOLUTION INTRAVENOUS at 08:08

## 2022-08-11 RX ADMIN — APREPITANT 130 MG: 130 INJECTION, EMULSION INTRAVENOUS at 09:08

## 2022-08-11 RX ADMIN — HEPARIN 500 UNITS: 100 SYRINGE at 12:08

## 2022-08-11 RX ADMIN — PACLITAXEL 156 MG: 6 INJECTION, SOLUTION, CONCENTRATE INTRAVENOUS at 10:08

## 2022-08-11 RX ADMIN — CARBOPLATIN 725 MG: 10 INJECTION, SOLUTION INTRAVENOUS at 11:08

## 2022-08-11 RX ADMIN — FAMOTIDINE 20 MG: 10 INJECTION INTRAVENOUS at 09:08

## 2022-08-11 RX ADMIN — SODIUM CHLORIDE, PRESERVATIVE FREE 10 ML: 5 INJECTION INTRAVENOUS at 12:08

## 2022-08-11 RX ADMIN — DEXAMETHASONE SODIUM PHOSPHATE 0.25 MG: 4 INJECTION, SOLUTION INTRA-ARTICULAR; INTRALESIONAL; INTRAMUSCULAR; INTRAVENOUS; SOFT TISSUE at 09:08

## 2022-08-11 RX ADMIN — DIPHENHYDRAMINE HYDROCHLORIDE 50 MG: 50 INJECTION INTRAMUSCULAR; INTRAVENOUS at 09:08

## 2022-08-11 NOTE — PLAN OF CARE
Problem: Fatigue  Goal: Improved Activity Tolerance  Outcome: Ongoing, Progressing  Intervention: Promote Improved Energy  Flowsheets (Taken 8/11/2022 1252)  Fatigue Management: frequent rest breaks encouraged  Sleep/Rest Enhancement:   regular sleep/rest pattern promoted   relaxation techniques promoted  Activity Management: Ambulated -L4

## 2022-08-11 NOTE — PROGRESS NOTES
CHEMO SCHOOL- NUTRITION    Karyna Escoto is a 47 y.o. female with triple negative breast cancer. Pt will be receiving carboplatin, taxol and keytruda. I met with Mrs. Escoto and her  for chemo school today. Pt reports excellent appetite and intake. She denies any recent weight changes. She reports excellent hydration via water daily. She is not currently taking any herbal or antioxidant supplements. She denies N/V/D/C. BMs normal. Denies having any nutrition related questions or concerns at the current time.     CW: 194#.     Plan:   1. Reviewed chemo school packet & provided copy to pt.   2. Discussed importance of maintaining wt & staying hydrated.   3. Reviewed food safety guidelines recommended during treatment.   4. Provided RD contact info & encouraged pt to call with any questions/concerns.   5. Will f/u as needed.       Electronically signed by: Diane Sung MBA, RDN, LDN       Island Pedicle Flap Text: The defect edges were debeveled with a #15 scalpel blade.  Given the location of the defect, shape of the defect and the proximity to free margins an island pedicle advancement flap was deemed most appropriate.  Using a sterile surgical marker, an appropriate advancement flap was drawn incorporating the defect, outlining the appropriate donor tissue and placing the expected incisions within the relaxed skin tension lines where possible.    The area thus outlined was incised deep to adipose tissue with a #15 scalpel blade.  The skin margins were undermined to an appropriate distance in all directions around the primary defect and laterally outward around the island pedicle utilizing iris scissors.  There was minimal undermining beneath the pedicle flap.

## 2022-08-16 ENCOUNTER — LAB VISIT (OUTPATIENT)
Dept: LAB | Facility: HOSPITAL | Age: 47
End: 2022-08-16
Attending: INTERNAL MEDICINE
Payer: COMMERCIAL

## 2022-08-16 ENCOUNTER — OFFICE VISIT (OUTPATIENT)
Dept: SURGERY | Facility: CLINIC | Age: 47
End: 2022-08-16
Payer: COMMERCIAL

## 2022-08-16 VITALS — SYSTOLIC BLOOD PRESSURE: 118 MMHG | HEART RATE: 80 BPM | TEMPERATURE: 97 F | DIASTOLIC BLOOD PRESSURE: 84 MMHG

## 2022-08-16 DIAGNOSIS — C50.412 MALIGNANT NEOPLASM OF UPPER-OUTER QUADRANT OF LEFT BREAST IN FEMALE, ESTROGEN RECEPTOR NEGATIVE: ICD-10-CM

## 2022-08-16 DIAGNOSIS — Z17.1 MALIGNANT NEOPLASM OF UPPER-OUTER QUADRANT OF LEFT BREAST IN FEMALE, ESTROGEN RECEPTOR NEGATIVE: ICD-10-CM

## 2022-08-16 DIAGNOSIS — R53.83 FATIGUE, UNSPECIFIED TYPE: ICD-10-CM

## 2022-08-16 LAB
ALBUMIN SERPL BCP-MCNC: 4.3 G/DL (ref 3.5–5.2)
ALP SERPL-CCNC: 65 U/L (ref 55–135)
ALT SERPL W/O P-5'-P-CCNC: 137 U/L (ref 10–44)
ANION GAP SERPL CALC-SCNC: 7 MMOL/L (ref 8–16)
AST SERPL-CCNC: 95 U/L (ref 10–40)
BASOPHILS # BLD AUTO: 0.04 K/UL (ref 0–0.2)
BASOPHILS NFR BLD: 0.8 % (ref 0–1.9)
BILIRUB SERPL-MCNC: 0.9 MG/DL (ref 0.1–1)
BUN SERPL-MCNC: 18 MG/DL (ref 6–20)
CALCIUM SERPL-MCNC: 9.2 MG/DL (ref 8.7–10.5)
CHLORIDE SERPL-SCNC: 100 MMOL/L (ref 95–110)
CO2 SERPL-SCNC: 27 MMOL/L (ref 23–29)
CREAT SERPL-MCNC: 0.9 MG/DL (ref 0.5–1.4)
DIFFERENTIAL METHOD: ABNORMAL
EOSINOPHIL # BLD AUTO: 0.1 K/UL (ref 0–0.5)
EOSINOPHIL NFR BLD: 2.2 % (ref 0–8)
ERYTHROCYTE [DISTWIDTH] IN BLOOD BY AUTOMATED COUNT: 12.3 % (ref 11.5–14.5)
EST. GFR  (NO RACE VARIABLE): >60 ML/MIN/1.73 M^2
GLUCOSE SERPL-MCNC: 88 MG/DL (ref 70–110)
HCT VFR BLD AUTO: 41.8 % (ref 37–48.5)
HGB BLD-MCNC: 14.4 G/DL (ref 12–16)
IMM GRANULOCYTES # BLD AUTO: 0.02 K/UL (ref 0–0.04)
IMM GRANULOCYTES NFR BLD AUTO: 0.4 % (ref 0–0.5)
LYMPHOCYTES # BLD AUTO: 1.2 K/UL (ref 1–4.8)
LYMPHOCYTES NFR BLD: 23.9 % (ref 18–48)
MAGNESIUM SERPL-MCNC: 2.1 MG/DL (ref 1.6–2.6)
MCH RBC QN AUTO: 32 PG (ref 27–31)
MCHC RBC AUTO-ENTMCNC: 34.4 G/DL (ref 32–36)
MCV RBC AUTO: 93 FL (ref 82–98)
MONOCYTES # BLD AUTO: 0.2 K/UL (ref 0.3–1)
MONOCYTES NFR BLD: 3.8 % (ref 4–15)
NEUTROPHILS # BLD AUTO: 3.4 K/UL (ref 1.8–7.7)
NEUTROPHILS NFR BLD: 68.9 % (ref 38–73)
NRBC BLD-RTO: 0 /100 WBC
PLATELET # BLD AUTO: 192 K/UL (ref 150–450)
PMV BLD AUTO: 10.8 FL (ref 9.2–12.9)
POTASSIUM SERPL-SCNC: 4.3 MMOL/L (ref 3.5–5.1)
PROT SERPL-MCNC: 7.4 G/DL (ref 6–8.4)
RBC # BLD AUTO: 4.5 M/UL (ref 4–5.4)
SODIUM SERPL-SCNC: 134 MMOL/L (ref 136–145)
TSH SERPL DL<=0.005 MIU/L-ACNC: 2.81 UIU/ML (ref 0.34–5.6)
WBC # BLD AUTO: 4.98 K/UL (ref 3.9–12.7)

## 2022-08-16 PROCEDURE — 3079F PR MOST RECENT DIASTOLIC BLOOD PRESSURE 80-89 MM HG: ICD-10-PCS | Mod: CPTII,S$GLB,, | Performed by: SURGERY

## 2022-08-16 PROCEDURE — 1159F MED LIST DOCD IN RCRD: CPT | Mod: CPTII,S$GLB,, | Performed by: SURGERY

## 2022-08-16 PROCEDURE — 1160F PR REVIEW ALL MEDS BY PRESCRIBER/CLIN PHARMACIST DOCUMENTED: ICD-10-PCS | Mod: CPTII,S$GLB,, | Performed by: SURGERY

## 2022-08-16 PROCEDURE — 99204 OFFICE O/P NEW MOD 45 MIN: CPT | Mod: S$GLB,,, | Performed by: SURGERY

## 2022-08-16 PROCEDURE — 80053 COMPREHEN METABOLIC PANEL: CPT | Performed by: NURSE PRACTITIONER

## 2022-08-16 PROCEDURE — 1159F PR MEDICATION LIST DOCUMENTED IN MEDICAL RECORD: ICD-10-PCS | Mod: CPTII,S$GLB,, | Performed by: SURGERY

## 2022-08-16 PROCEDURE — 83735 ASSAY OF MAGNESIUM: CPT | Performed by: INTERNAL MEDICINE

## 2022-08-16 PROCEDURE — 84443 ASSAY THYROID STIM HORMONE: CPT | Performed by: INTERNAL MEDICINE

## 2022-08-16 PROCEDURE — 1160F RVW MEDS BY RX/DR IN RCRD: CPT | Mod: CPTII,S$GLB,, | Performed by: SURGERY

## 2022-08-16 PROCEDURE — 36415 COLL VENOUS BLD VENIPUNCTURE: CPT | Performed by: INTERNAL MEDICINE

## 2022-08-16 PROCEDURE — 3074F SYST BP LT 130 MM HG: CPT | Mod: CPTII,S$GLB,, | Performed by: SURGERY

## 2022-08-16 PROCEDURE — 85025 COMPLETE CBC W/AUTO DIFF WBC: CPT | Performed by: INTERNAL MEDICINE

## 2022-08-16 PROCEDURE — 3074F PR MOST RECENT SYSTOLIC BLOOD PRESSURE < 130 MM HG: ICD-10-PCS | Mod: CPTII,S$GLB,, | Performed by: SURGERY

## 2022-08-16 PROCEDURE — 99204 PR OFFICE/OUTPT VISIT, NEW, LEVL IV, 45-59 MIN: ICD-10-PCS | Mod: S$GLB,,, | Performed by: SURGERY

## 2022-08-16 PROCEDURE — 3079F DIAST BP 80-89 MM HG: CPT | Mod: CPTII,S$GLB,, | Performed by: SURGERY

## 2022-08-17 RX ORDER — FAMOTIDINE 10 MG/ML
20 INJECTION INTRAVENOUS
Status: CANCELLED | OUTPATIENT
Start: 2022-08-17

## 2022-08-17 RX ORDER — HEPARIN 100 UNIT/ML
500 SYRINGE INTRAVENOUS
Status: CANCELLED | OUTPATIENT
Start: 2022-08-17

## 2022-08-17 RX ORDER — SODIUM CHLORIDE 0.9 % (FLUSH) 0.9 %
10 SYRINGE (ML) INJECTION
Status: CANCELLED | OUTPATIENT
Start: 2022-08-17

## 2022-08-17 RX ORDER — EPINEPHRINE 0.3 MG/.3ML
0.3 INJECTION SUBCUTANEOUS ONCE AS NEEDED
Status: CANCELLED | OUTPATIENT
Start: 2022-08-17

## 2022-08-17 RX ORDER — DIPHENHYDRAMINE HYDROCHLORIDE 50 MG/ML
50 INJECTION INTRAMUSCULAR; INTRAVENOUS ONCE AS NEEDED
Status: CANCELLED | OUTPATIENT
Start: 2022-08-17

## 2022-08-17 NOTE — PROGRESS NOTES
Subjective:       Patient ID: Karyna Escoto is a 47 y.o. female.    Chief Complaint: left breast cancer     HPI:  Patient is 47-year-old female recently found to have biopsy-proven left breast intraductal carcinoma estrogen receptor negative, HER2 negative. She presented with palpable left axillary node.  Imaging revealed 1.8 cm mass in the axilla with extension to the skin an adjacent lymph node with abnormal appearance.  Biopsy returned intraductal carcinoma ER negative, AK positive, HER2 negative.  Needle biopsy of the abnormal node was also performed.  It also returned positive for tumor ER negative, AK positive, HER2 negative.  She was recommended for neoadjuvant therapy.  She had Port-A-Cath placed.  She has had 1 round of chemo so far.  She would like for the surgical plan going forward to be bilateral mastectomies with reconstruction.  Genetic testing, MRI and PET scan are pending.     Patient denies fever, chills, chest pain, shortness of breath, cough or hemoptysis.  She has tenderness at port site and reports some tightness in the left axilla the range of motion is preserved without swelling.  She denies pain or discomfort elsewhere within the breast or nipple discharge.    She has no family history of breast cancer.      Past Medical History:   Diagnosis Date    Anxiety     Breast cancer      Past Surgical History:   Procedure Laterality Date    eye lid surgery      NOSE SURGERY      PORTACATH PLACEMENT      TONSILLECTOMY       Review of patient's allergies indicates:  No Known Allergies  Medication List with Changes/Refills   Current Medications    ACETAMINOPHEN (TYLENOL) 325 MG TABLET    Take 650 mg by mouth.    GABAPENTIN (NEURONTIN) 300 MG CAPSULE    Take 300 mg by mouth 3 (three) times daily.    HYDROXYZINE HCL (ATARAX) 10 MG TAB    Take 25 mg by mouth 3 (three) times daily as needed.    HYDROXYZINE HCL (ATARAX) 10 MG TAB    Take 10 mg by mouth.    LIDOCAINE-PRILOCAINE (EMLA) CREAM     Apply topically as needed. Apply 30 mins prior to port access    NAPROXEN SODIUM 220 MG CAP    Take by mouth.    OMEPRAZOLE (PRILOSEC) 10 MG CAPSULE    Take 10 mg by mouth every 12 (twelve) hours as needed.    ONDANSETRON (ZOFRAN) 8 MG TABLET    Take 1 tablet (8 mg total) by mouth every 8 (eight) hours as needed.    PROMETHAZINE (PHENERGAN) 25 MG TABLET    Take 1 tablet (25 mg total) by mouth every 4 to 6 hours as needed.    SERTRALINE (ZOLOFT) 50 MG TABLET    Take 50 mg by mouth once daily.    SERTRALINE (ZOLOFT) 50 MG TABLET    Take 50 mg by mouth.     Family History   Problem Relation Age of Onset    Breast cancer Maternal Grandmother     Prostate cancer Maternal Grandfather      Social History     Socioeconomic History    Marital status:    Tobacco Use    Smoking status: Former Smoker   Substance and Sexual Activity    Alcohol use: Yes     Comment: occasional    Sexual activity: Yes         Review of Systems   Constitutional: Negative for appetite change, chills, fever and unexpected weight change.   HENT: Negative for hearing loss, rhinorrhea, sore throat and voice change.    Eyes: Negative for photophobia and visual disturbance.   Respiratory: Negative for cough, choking and shortness of breath.    Cardiovascular: Negative for chest pain, palpitations and leg swelling.   Gastrointestinal: Negative for abdominal pain, blood in stool, constipation, diarrhea, nausea and vomiting.   Endocrine: Negative for cold intolerance, heat intolerance, polydipsia and polyuria.   Musculoskeletal: Negative for arthralgias, back pain, joint swelling and neck stiffness.   Skin: Negative for color change, pallor and rash.   Neurological: Negative for dizziness, seizures, syncope and headaches.   Hematological: Positive for adenopathy. Does not bruise/bleed easily.   Psychiatric/Behavioral: Negative for agitation, behavioral problems and confusion.       Objective:      Physical Exam  Constitutional:       General:  She is not in acute distress.     Appearance: Normal appearance. She is well-developed. She is not toxic-appearing.   HENT:      Head: Normocephalic and atraumatic. No abrasion or laceration.      Right Ear: External ear normal.      Left Ear: External ear normal.      Nose: Nose normal.   Eyes:      Pupils: Pupils are equal, round, and reactive to light.   Neck:      Trachea: Trachea and phonation normal. No tracheal deviation.   Cardiovascular:      Rate and Rhythm: Normal rate and regular rhythm.   Pulmonary:      Effort: Pulmonary effort is normal. No tachypnea, accessory muscle usage or respiratory distress.   Chest:   Breasts:      Right: No mass or nipple discharge.      Left: Skin change present. No mass or nipple discharge.            Comments: Mass with skin involvement in the left axilla or axillary tail.   Abdominal:      General: There is no distension.      Palpations: Abdomen is soft.      Tenderness: There is no abdominal tenderness.   Musculoskeletal:      Cervical back: Neck supple. Normal range of motion.   Lymphadenopathy:      Cervical: No cervical adenopathy.   Skin:     General: Skin is warm.   Neurological:      Mental Status: She is alert and oriented to person, place, and time.      Coordination: Coordination normal.      Gait: Gait normal.   Psychiatric:         Speech: Speech normal.         Behavior: Behavior normal.         Assessment/Plan:   Malignant neoplasm of upper-outer quadrant of left breast in female, estrogen receptor negative  -     Ambulatory referral/consult to General Surgery      Patient started on neoadjuvant therapy.  Will plan for bilateral mastectomies with reconstruction after neoadjuvant therapy is completed.  Will need to discuss with medical team about how to manage the axilla.  This may also depend on response and future imaging.  Will refer to Plastic surgery.  Will follow up with me near the end of her neoadjuvant course and after her new imaging.

## 2022-08-18 ENCOUNTER — INFUSION (OUTPATIENT)
Dept: INFUSION THERAPY | Facility: HOSPITAL | Age: 47
End: 2022-08-18
Attending: INTERNAL MEDICINE
Payer: COMMERCIAL

## 2022-08-18 VITALS
BODY MASS INDEX: 35.39 KG/M2 | WEIGHT: 192.31 LBS | TEMPERATURE: 98 F | RESPIRATION RATE: 18 BRPM | HEART RATE: 66 BPM | OXYGEN SATURATION: 100 % | HEIGHT: 62 IN | SYSTOLIC BLOOD PRESSURE: 115 MMHG | DIASTOLIC BLOOD PRESSURE: 74 MMHG

## 2022-08-18 DIAGNOSIS — C50.412 MALIGNANT NEOPLASM OF UPPER-OUTER QUADRANT OF LEFT BREAST IN FEMALE, ESTROGEN RECEPTOR NEGATIVE: Primary | ICD-10-CM

## 2022-08-18 DIAGNOSIS — Z17.1 MALIGNANT NEOPLASM OF UPPER-OUTER QUADRANT OF LEFT BREAST IN FEMALE, ESTROGEN RECEPTOR NEGATIVE: Primary | ICD-10-CM

## 2022-08-18 PROCEDURE — 63600175 PHARM REV CODE 636 W HCPCS: Performed by: NURSE PRACTITIONER

## 2022-08-18 PROCEDURE — 25000003 PHARM REV CODE 250: Performed by: NURSE PRACTITIONER

## 2022-08-18 PROCEDURE — 96375 TX/PRO/DX INJ NEW DRUG ADDON: CPT

## 2022-08-18 PROCEDURE — 96413 CHEMO IV INFUSION 1 HR: CPT

## 2022-08-18 PROCEDURE — 96367 TX/PROPH/DG ADDL SEQ IV INF: CPT

## 2022-08-18 RX ORDER — FAMOTIDINE 10 MG/ML
20 INJECTION INTRAVENOUS
Status: COMPLETED | OUTPATIENT
Start: 2022-08-18 | End: 2022-08-18

## 2022-08-18 RX ORDER — EPINEPHRINE 0.3 MG/.3ML
0.3 INJECTION SUBCUTANEOUS ONCE AS NEEDED
Status: DISCONTINUED | OUTPATIENT
Start: 2022-08-18 | End: 2022-08-18 | Stop reason: HOSPADM

## 2022-08-18 RX ORDER — SODIUM CHLORIDE 0.9 % (FLUSH) 0.9 %
10 SYRINGE (ML) INJECTION
Status: DISCONTINUED | OUTPATIENT
Start: 2022-08-18 | End: 2022-08-18 | Stop reason: HOSPADM

## 2022-08-18 RX ORDER — HEPARIN 100 UNIT/ML
500 SYRINGE INTRAVENOUS
Status: DISCONTINUED | OUTPATIENT
Start: 2022-08-18 | End: 2022-08-18 | Stop reason: HOSPADM

## 2022-08-18 RX ADMIN — DIPHENHYDRAMINE HYDROCHLORIDE 50 MG: 50 INJECTION INTRAMUSCULAR; INTRAVENOUS at 09:08

## 2022-08-18 RX ADMIN — DEXAMETHASONE SODIUM PHOSPHATE 10 MG: 4 INJECTION, SOLUTION INTRA-ARTICULAR; INTRALESIONAL; INTRAMUSCULAR; INTRAVENOUS; SOFT TISSUE at 09:08

## 2022-08-18 RX ADMIN — HEPARIN 500 UNITS: 100 SYRINGE at 11:08

## 2022-08-18 RX ADMIN — SODIUM CHLORIDE: 0.9 INJECTION, SOLUTION INTRAVENOUS at 09:08

## 2022-08-18 RX ADMIN — PACLITAXEL 156 MG: 6 INJECTION, SOLUTION, CONCENTRATE INTRAVENOUS at 09:08

## 2022-08-18 RX ADMIN — FAMOTIDINE 20 MG: 10 INJECTION INTRAVENOUS at 09:08

## 2022-08-18 NOTE — PLAN OF CARE
Problem: Fatigue  Goal: Improved Activity Tolerance  Outcome: Ongoing, Progressing  Intervention: Promote Improved Energy  Flowsheets (Taken 8/18/2022 2284)  Fatigue Management:   activity schedule adjusted   fatigue-related activity identified   paced activity encouraged  Sleep/Rest Enhancement:   relaxation techniques promoted   regular sleep/rest pattern promoted   reading promoted  Activity Management: Ambulated -L4

## 2022-08-23 ENCOUNTER — OFFICE VISIT (OUTPATIENT)
Dept: HEMATOLOGY/ONCOLOGY | Facility: CLINIC | Age: 47
End: 2022-08-23
Payer: COMMERCIAL

## 2022-08-23 ENCOUNTER — LAB VISIT (OUTPATIENT)
Dept: LAB | Facility: HOSPITAL | Age: 47
End: 2022-08-23
Attending: INTERNAL MEDICINE
Payer: COMMERCIAL

## 2022-08-23 VITALS
HEART RATE: 79 BPM | BODY MASS INDEX: 35.67 KG/M2 | TEMPERATURE: 98 F | SYSTOLIC BLOOD PRESSURE: 122 MMHG | DIASTOLIC BLOOD PRESSURE: 67 MMHG | WEIGHT: 195 LBS

## 2022-08-23 DIAGNOSIS — R53.83 FATIGUE, UNSPECIFIED TYPE: ICD-10-CM

## 2022-08-23 DIAGNOSIS — R74.01 TRANSAMINITIS: ICD-10-CM

## 2022-08-23 DIAGNOSIS — Z17.1 MALIGNANT NEOPLASM OF UPPER-OUTER QUADRANT OF LEFT BREAST IN FEMALE, ESTROGEN RECEPTOR NEGATIVE: ICD-10-CM

## 2022-08-23 DIAGNOSIS — C50.412 MALIGNANT NEOPLASM OF UPPER-OUTER QUADRANT OF LEFT BREAST IN FEMALE, ESTROGEN RECEPTOR NEGATIVE: ICD-10-CM

## 2022-08-23 LAB
ALBUMIN SERPL BCP-MCNC: 3.9 G/DL (ref 3.5–5.2)
ALP SERPL-CCNC: 73 U/L (ref 55–135)
ALT SERPL W/O P-5'-P-CCNC: 114 U/L (ref 10–44)
ANION GAP SERPL CALC-SCNC: 8 MMOL/L (ref 8–16)
AST SERPL-CCNC: 59 U/L (ref 10–40)
BASOPHILS # BLD AUTO: 0.05 K/UL (ref 0–0.2)
BASOPHILS NFR BLD: 1.5 % (ref 0–1.9)
BILIRUB SERPL-MCNC: 0.4 MG/DL (ref 0.1–1)
BUN SERPL-MCNC: 14 MG/DL (ref 6–20)
CALCIUM SERPL-MCNC: 9.2 MG/DL (ref 8.7–10.5)
CHLORIDE SERPL-SCNC: 103 MMOL/L (ref 95–110)
CO2 SERPL-SCNC: 27 MMOL/L (ref 23–29)
CREAT SERPL-MCNC: 0.8 MG/DL (ref 0.5–1.4)
DIFFERENTIAL METHOD: ABNORMAL
EOSINOPHIL # BLD AUTO: 0.1 K/UL (ref 0–0.5)
EOSINOPHIL NFR BLD: 1.5 % (ref 0–8)
ERYTHROCYTE [DISTWIDTH] IN BLOOD BY AUTOMATED COUNT: 12.3 % (ref 11.5–14.5)
EST. GFR  (NO RACE VARIABLE): >60 ML/MIN/1.73 M^2
GLUCOSE SERPL-MCNC: 101 MG/DL (ref 70–110)
HCT VFR BLD AUTO: 39.9 % (ref 37–48.5)
HGB BLD-MCNC: 13.1 G/DL (ref 12–16)
IMM GRANULOCYTES # BLD AUTO: 0.01 K/UL (ref 0–0.04)
IMM GRANULOCYTES NFR BLD AUTO: 0.3 % (ref 0–0.5)
LYMPHOCYTES # BLD AUTO: 1 K/UL (ref 1–4.8)
LYMPHOCYTES NFR BLD: 29.4 % (ref 18–48)
MAGNESIUM SERPL-MCNC: 1.9 MG/DL (ref 1.6–2.6)
MCH RBC QN AUTO: 31.4 PG (ref 27–31)
MCHC RBC AUTO-ENTMCNC: 32.8 G/DL (ref 32–36)
MCV RBC AUTO: 96 FL (ref 82–98)
MONOCYTES # BLD AUTO: 0.4 K/UL (ref 0.3–1)
MONOCYTES NFR BLD: 10.9 % (ref 4–15)
NEUTROPHILS # BLD AUTO: 1.9 K/UL (ref 1.8–7.7)
NEUTROPHILS NFR BLD: 56.4 % (ref 38–73)
NRBC BLD-RTO: 0 /100 WBC
PLATELET # BLD AUTO: 141 K/UL (ref 150–450)
PMV BLD AUTO: 10.6 FL (ref 9.2–12.9)
POTASSIUM SERPL-SCNC: 4.8 MMOL/L (ref 3.5–5.1)
PROT SERPL-MCNC: 7 G/DL (ref 6–8.4)
RBC # BLD AUTO: 4.17 M/UL (ref 4–5.4)
SODIUM SERPL-SCNC: 138 MMOL/L (ref 136–145)
TSH SERPL DL<=0.005 MIU/L-ACNC: 2.35 UIU/ML (ref 0.34–5.6)
WBC # BLD AUTO: 3.3 K/UL (ref 3.9–12.7)

## 2022-08-23 PROCEDURE — 1160F PR REVIEW ALL MEDS BY PRESCRIBER/CLIN PHARMACIST DOCUMENTED: ICD-10-PCS | Mod: CPTII,S$GLB,, | Performed by: INTERNAL MEDICINE

## 2022-08-23 PROCEDURE — 1160F RVW MEDS BY RX/DR IN RCRD: CPT | Mod: CPTII,S$GLB,, | Performed by: INTERNAL MEDICINE

## 2022-08-23 PROCEDURE — 3074F SYST BP LT 130 MM HG: CPT | Mod: CPTII,S$GLB,, | Performed by: INTERNAL MEDICINE

## 2022-08-23 PROCEDURE — 36415 COLL VENOUS BLD VENIPUNCTURE: CPT | Performed by: INTERNAL MEDICINE

## 2022-08-23 PROCEDURE — 80053 COMPREHEN METABOLIC PANEL: CPT | Performed by: INTERNAL MEDICINE

## 2022-08-23 PROCEDURE — 3078F PR MOST RECENT DIASTOLIC BLOOD PRESSURE < 80 MM HG: ICD-10-PCS | Mod: CPTII,S$GLB,, | Performed by: INTERNAL MEDICINE

## 2022-08-23 PROCEDURE — 3008F BODY MASS INDEX DOCD: CPT | Mod: CPTII,S$GLB,, | Performed by: INTERNAL MEDICINE

## 2022-08-23 PROCEDURE — 83735 ASSAY OF MAGNESIUM: CPT | Performed by: INTERNAL MEDICINE

## 2022-08-23 PROCEDURE — 3074F PR MOST RECENT SYSTOLIC BLOOD PRESSURE < 130 MM HG: ICD-10-PCS | Mod: CPTII,S$GLB,, | Performed by: INTERNAL MEDICINE

## 2022-08-23 PROCEDURE — 85025 COMPLETE CBC W/AUTO DIFF WBC: CPT | Performed by: INTERNAL MEDICINE

## 2022-08-23 PROCEDURE — 99214 PR OFFICE/OUTPT VISIT, EST, LEVL IV, 30-39 MIN: ICD-10-PCS | Mod: S$GLB,,, | Performed by: INTERNAL MEDICINE

## 2022-08-23 PROCEDURE — 1159F MED LIST DOCD IN RCRD: CPT | Mod: CPTII,S$GLB,, | Performed by: INTERNAL MEDICINE

## 2022-08-23 PROCEDURE — 99214 OFFICE O/P EST MOD 30 MIN: CPT | Mod: S$GLB,,, | Performed by: INTERNAL MEDICINE

## 2022-08-23 PROCEDURE — 1159F PR MEDICATION LIST DOCUMENTED IN MEDICAL RECORD: ICD-10-PCS | Mod: CPTII,S$GLB,, | Performed by: INTERNAL MEDICINE

## 2022-08-23 PROCEDURE — 3078F DIAST BP <80 MM HG: CPT | Mod: CPTII,S$GLB,, | Performed by: INTERNAL MEDICINE

## 2022-08-23 PROCEDURE — 3008F PR BODY MASS INDEX (BMI) DOCUMENTED: ICD-10-PCS | Mod: CPTII,S$GLB,, | Performed by: INTERNAL MEDICINE

## 2022-08-23 PROCEDURE — 84443 ASSAY THYROID STIM HORMONE: CPT | Performed by: INTERNAL MEDICINE

## 2022-08-23 NOTE — ASSESSMENT & PLAN NOTE
Patient is doing ok on the chemotherapy and she does seem to be getting a response with her first cycle.  Will continue to monitor this very closely and will have her see NP again in three weeks.        Need to watch transaminases closely.

## 2022-08-23 NOTE — PROGRESS NOTES
PROGRESS NOTE    Subjective:       Patient ID: Karyna Escoto is a 47 y.o. female.    6/9/2022:  Dx Mammo:  1.8cm mass in the left breast towards the left axilla.   Deep to the mass 2 lymph nodes are identified.     7/5/2022:  Left breast core biopsy:  Grade 2 IDCA with DCIS  ER: 0.03%, VT: 8.8%, HER2: 0  TRIPLE NEGATIVE    7/20/2022:  Left axilla LN needle biopsy:  Invasive ductal carcinoma  ER: 0%, VT: 40%, HER2 negative(0)    PLAN:  Neoadjuvant chemo/IO(Keynote 522)-surgery-radiation.     Pem/Tax/Carb:  Cycle 1: 8/11/2022  Cycle 2: 8/23/2022-due    8/3/2022 Photos:          8/23/2022 Photo:          Chief Complaint:  No chief complaint on file.  Triple negative breast cancer follow up.     History of Present Illness:   Karyna Escoto is a 47 y.o. female who presents for follow up of breast cancer.      Patient is on cycle one of chemotherapy and doing well.  Tolerating well.  No sob.  Mild fatigue noted.      Family and Social history reviewed and is unchanged from 8/3/2022      ROS:  Review of Systems   Constitutional: Negative for appetite change, fever and unexpected weight change.   HENT: Negative for mouth sores.    Eyes: Negative for visual disturbance.   Respiratory: Negative for cough and shortness of breath.    Cardiovascular: Negative for chest pain and leg swelling.   Gastrointestinal: Positive for diarrhea. Negative for abdominal pain and blood in stool.   Genitourinary: Positive for frequency. Negative for hematuria.   Musculoskeletal: Negative for back pain.   Skin: Negative for rash.   Neurological: Positive for headaches.   Hematological: Positive for adenopathy.   Psychiatric/Behavioral: The patient is not nervous/anxious.           Current Outpatient Medications:     acetaminophen (TYLENOL) 325 MG tablet, Take 650 mg by mouth., Disp: , Rfl:     gabapentin (NEURONTIN) 300 MG capsule, Take 300 mg by mouth 3 (three) times daily.,  Disp: , Rfl:     hydrOXYzine HCL (ATARAX) 10 MG Tab, Take 25 mg by mouth 3 (three) times daily as needed., Disp: , Rfl:     hydrOXYzine HCL (ATARAX) 10 MG Tab, Take 10 mg by mouth., Disp: , Rfl:     LIDOcaine-prilocaine (EMLA) cream, Apply topically as needed. Apply 30 mins prior to port access, Disp: 30 g, Rfl: 5    naproxen sodium 220 mg Cap, Take by mouth., Disp: , Rfl:     omeprazole (PRILOSEC) 10 MG capsule, Take 10 mg by mouth every 12 (twelve) hours as needed., Disp: , Rfl:     ondansetron (ZOFRAN) 8 MG tablet, Take 1 tablet (8 mg total) by mouth every 8 (eight) hours as needed., Disp: 30 tablet, Rfl: 5    promethazine (PHENERGAN) 25 MG tablet, Take 1 tablet (25 mg total) by mouth every 4 to 6 hours as needed., Disp: 30 tablet, Rfl: 5    sertraline (ZOLOFT) 50 MG tablet, Take 50 mg by mouth once daily., Disp: , Rfl:     sertraline (ZOLOFT) 50 MG tablet, Take 50 mg by mouth., Disp: , Rfl:         Objective:       Physical Examination:     /67   Pulse 79   Temp 98.3 °F (36.8 °C)   Wt 88.5 kg (195 lb)   BMI 35.67 kg/m²     Physical Exam  Constitutional:       Appearance: She is well-developed.   HENT:      Head: Normocephalic and atraumatic.      Right Ear: External ear normal.      Left Ear: External ear normal.   Eyes:      Conjunctiva/sclera: Conjunctivae normal.      Pupils: Pupils are equal, round, and reactive to light.   Neck:      Thyroid: No thyromegaly.      Trachea: No tracheal deviation.   Cardiovascular:      Rate and Rhythm: Normal rate and regular rhythm.      Heart sounds: Normal heart sounds.   Pulmonary:      Effort: Pulmonary effort is normal.      Breath sounds: Normal breath sounds.   Abdominal:      General: Bowel sounds are normal. There is no distension.      Palpations: Abdomen is soft. There is no mass.      Tenderness: There is no abdominal tenderness.   Skin:     Findings: No rash.   Neurological:      Comments: Neuro intact througout   Psychiatric:          Behavior: Behavior normal.         Thought Content: Thought content normal.         Judgment: Judgment normal.         Labs:   Recent Results (from the past 336 hour(s))   CBC Auto Differential    Collection Time: 08/23/22  7:46 AM   Result Value Ref Range    WBC 3.30 (L) 3.90 - 12.70 K/uL    Hemoglobin 13.1 12.0 - 16.0 g/dL    Hematocrit 39.9 37.0 - 48.5 %    Platelets 141 (L) 150 - 450 K/uL   CBC Auto Differential    Collection Time: 08/16/22  8:01 AM   Result Value Ref Range    WBC 4.98 3.90 - 12.70 K/uL    Hemoglobin 14.4 12.0 - 16.0 g/dL    Hematocrit 41.8 37.0 - 48.5 %    Platelets 192 150 - 450 K/uL     CMP  Sodium   Date Value Ref Range Status   08/23/2022 138 136 - 145 mmol/L Final     Potassium   Date Value Ref Range Status   08/23/2022 4.8 3.5 - 5.1 mmol/L Final     Chloride   Date Value Ref Range Status   08/23/2022 103 95 - 110 mmol/L Final     CO2   Date Value Ref Range Status   08/23/2022 27 23 - 29 mmol/L Final     Glucose   Date Value Ref Range Status   08/23/2022 101 70 - 110 mg/dL Final     BUN   Date Value Ref Range Status   08/23/2022 14 6 - 20 mg/dL Final     Creatinine   Date Value Ref Range Status   08/23/2022 0.8 0.5 - 1.4 mg/dL Final     Calcium   Date Value Ref Range Status   08/23/2022 9.2 8.7 - 10.5 mg/dL Final     Total Protein   Date Value Ref Range Status   08/23/2022 7.0 6.0 - 8.4 g/dL Final     Albumin   Date Value Ref Range Status   08/23/2022 3.9 3.5 - 5.2 g/dL Final     Total Bilirubin   Date Value Ref Range Status   08/23/2022 0.4 0.1 - 1.0 mg/dL Final     Comment:     For infants and newborns, interpretation of results should be based  on gestational age, weight and in agreement with clinical  observations.    Premature Infant recommended reference ranges:  Up to 24 hours.............<8.0 mg/dL  Up to 48 hours............<12.0 mg/dL  3-5 days..................<15.0 mg/dL  6-29 days.................<15.0 mg/dL       Alkaline Phosphatase   Date Value Ref Range Status   08/23/2022  73 55 - 135 U/L Final     AST   Date Value Ref Range Status   08/23/2022 59 (H) 10 - 40 U/L Final     ALT   Date Value Ref Range Status   08/23/2022 114 (H) 10 - 44 U/L Final     Anion Gap   Date Value Ref Range Status   08/23/2022 8 8 - 16 mmol/L Final     No results found for: CEA  No results found for: PSA        Assessment/Plan:     Problem List Items Addressed This Visit     Transaminitis     This was seen on both weeks labs since starting therapy.  Will watch this closely for possible autoimmune issues.            Left Breast Cancer-TRIPLE NEGATIVE     Patient is doing ok on the chemotherapy and she does seem to be getting a response with her first cycle.  Will continue to monitor this very closely and will have her see NP again in three weeks.        Need to watch transaminases closely.                  Discussion:     Follow up in about 3 weeks (around 9/13/2022).      Electronically signed by Derek Patterson

## 2022-08-23 NOTE — ASSESSMENT & PLAN NOTE
This was seen on both weeks labs since starting therapy.  Will watch this closely for possible autoimmune issues.

## 2022-08-24 ENCOUNTER — PATIENT MESSAGE (OUTPATIENT)
Dept: HEMATOLOGY/ONCOLOGY | Facility: CLINIC | Age: 47
End: 2022-08-24

## 2022-08-24 ENCOUNTER — TELEPHONE (OUTPATIENT)
Dept: HEMATOLOGY/ONCOLOGY | Facility: CLINIC | Age: 47
End: 2022-08-24

## 2022-08-24 DIAGNOSIS — R51.9 NONINTRACTABLE HEADACHE, UNSPECIFIED CHRONICITY PATTERN, UNSPECIFIED HEADACHE TYPE: Primary | ICD-10-CM

## 2022-08-24 DIAGNOSIS — R21 RASH: ICD-10-CM

## 2022-08-24 RX ORDER — METHYLPREDNISOLONE 4 MG/1
TABLET ORAL
Qty: 21 EACH | Refills: 0 | Status: SHIPPED | OUTPATIENT
Start: 2022-08-24 | End: 2022-09-02 | Stop reason: SDUPTHER

## 2022-08-24 RX ORDER — HYDROCODONE BITARTRATE AND ACETAMINOPHEN 5; 325 MG/1; MG/1
1 TABLET ORAL EVERY 6 HOURS PRN
Qty: 10 TABLET | Refills: 0 | Status: SHIPPED | OUTPATIENT
Start: 2022-08-24 | End: 2022-10-04 | Stop reason: SDUPTHER

## 2022-08-24 NOTE — TELEPHONE ENCOUNTER
Patient states she started having a rash all over last night. Small red bumbs. She denies any new foods, meds soaps detergent etc. She also has body aches and a headache. Denies any fevers or cough, SOB. She is taking Tylenol with no relief.     Will send Medrol dose pack and Norco for the headaches. Will start pepcid and zyrtec BID.

## 2022-08-24 NOTE — TELEPHONE ENCOUNTER
----- Message from Jocelyne Ponce RN sent at 8/24/2022  3:27 PM CDT -----    ----- Message -----  From: Jocelyne Voss  Sent: 8/24/2022   2:48 PM CDT  To: [facility] Staff    PT. CALLED HAS A RASH FROM HEAD TO TOE , HORRIBLE HEADACHE AND BODY ACHES, SAID SHE LEFT A MESSAGE THIS MORNING AND NO ONE HAS CALLED HER BACK .     #[phone]

## 2022-08-25 ENCOUNTER — OFFICE VISIT (OUTPATIENT)
Dept: HEMATOLOGY/ONCOLOGY | Facility: CLINIC | Age: 47
End: 2022-08-25
Payer: COMMERCIAL

## 2022-08-25 VITALS
DIASTOLIC BLOOD PRESSURE: 75 MMHG | BODY MASS INDEX: 35.3 KG/M2 | WEIGHT: 193 LBS | SYSTOLIC BLOOD PRESSURE: 124 MMHG | HEART RATE: 88 BPM | TEMPERATURE: 99 F

## 2022-08-25 DIAGNOSIS — C50.412 MALIGNANT NEOPLASM OF UPPER-OUTER QUADRANT OF LEFT BREAST IN FEMALE, ESTROGEN RECEPTOR NEGATIVE: Primary | ICD-10-CM

## 2022-08-25 DIAGNOSIS — Z17.1 MALIGNANT NEOPLASM OF UPPER-OUTER QUADRANT OF LEFT BREAST IN FEMALE, ESTROGEN RECEPTOR NEGATIVE: Primary | ICD-10-CM

## 2022-08-25 DIAGNOSIS — R21 RASH: ICD-10-CM

## 2022-08-25 PROCEDURE — 1159F PR MEDICATION LIST DOCUMENTED IN MEDICAL RECORD: ICD-10-PCS | Mod: CPTII,S$GLB,, | Performed by: NURSE PRACTITIONER

## 2022-08-25 PROCEDURE — 3074F PR MOST RECENT SYSTOLIC BLOOD PRESSURE < 130 MM HG: ICD-10-PCS | Mod: CPTII,S$GLB,, | Performed by: NURSE PRACTITIONER

## 2022-08-25 PROCEDURE — 99213 PR OFFICE/OUTPT VISIT, EST, LEVL III, 20-29 MIN: ICD-10-PCS | Mod: S$GLB,,, | Performed by: NURSE PRACTITIONER

## 2022-08-25 PROCEDURE — 3078F DIAST BP <80 MM HG: CPT | Mod: CPTII,S$GLB,, | Performed by: NURSE PRACTITIONER

## 2022-08-25 PROCEDURE — 99213 OFFICE O/P EST LOW 20 MIN: CPT | Mod: S$GLB,,, | Performed by: NURSE PRACTITIONER

## 2022-08-25 PROCEDURE — 3074F SYST BP LT 130 MM HG: CPT | Mod: CPTII,S$GLB,, | Performed by: NURSE PRACTITIONER

## 2022-08-25 PROCEDURE — 3078F PR MOST RECENT DIASTOLIC BLOOD PRESSURE < 80 MM HG: ICD-10-PCS | Mod: CPTII,S$GLB,, | Performed by: NURSE PRACTITIONER

## 2022-08-25 PROCEDURE — 3008F BODY MASS INDEX DOCD: CPT | Mod: CPTII,S$GLB,, | Performed by: NURSE PRACTITIONER

## 2022-08-25 PROCEDURE — 1159F MED LIST DOCD IN RCRD: CPT | Mod: CPTII,S$GLB,, | Performed by: NURSE PRACTITIONER

## 2022-08-25 PROCEDURE — 3008F PR BODY MASS INDEX (BMI) DOCUMENTED: ICD-10-PCS | Mod: CPTII,S$GLB,, | Performed by: NURSE PRACTITIONER

## 2022-08-25 NOTE — PROGRESS NOTES
PROGRESS NOTE    Subjective:       Patient ID: Karyna Escoto is a 47 y.o. female.    6/9/2022:  Dx Mammo:  1.8cm mass in the left breast towards the left axilla.   Deep to the mass 2 lymph nodes are identified.     7/5/2022:  Left breast core biopsy:  Grade 2 IDCA with DCIS  ER: 0.03%, FL: 8.8%, HER2: 0  TRIPLE NEGATIVE    7/20/2022:  Left axilla LN needle biopsy:  Invasive ductal carcinoma  ER: 0%, FL: 40%, HER2 negative(0)    PLAN:  Neoadjuvant chemo/IO(Keynote 522)-surgery-radiation.     Pem/Tax/Carb:  Cycle 1: 8/11/2022  Cycle 2: 8/23/2022-due    8/3/2022 Photos:          8/23/2022 Photo:          Chief Complaint:  Triple negative breast cancer follow up.     History of Present Illness:   Karyna Escoto is a 47 y.o. female who presents for follow up of breast cancer.      Patient is on cycle one of chemotherapy and doing well.  She developed a diffuse rash on Tuesday this week. This has since gotten darker red with some itching.    Family and Social history reviewed and is unchanged from 8/3/2022                  ROS:  Review of Systems   Constitutional: Negative for appetite change, fever and unexpected weight change.   HENT: Negative for mouth sores.    Eyes: Negative for visual disturbance.   Respiratory: Negative for cough and shortness of breath.    Cardiovascular: Negative for chest pain and leg swelling.   Gastrointestinal: Positive for diarrhea. Negative for abdominal pain and blood in stool.   Genitourinary: Positive for frequency. Negative for hematuria.   Musculoskeletal: Negative for back pain.   Skin: Positive for rash. Negative for pallor.   Neurological: Positive for headaches.   Hematological: Positive for adenopathy.   Psychiatric/Behavioral: The patient is not nervous/anxious.           Current Outpatient Medications:     acetaminophen (TYLENOL) 325 MG tablet, Take 650 mg by mouth., Disp: , Rfl:     gabapentin (NEURONTIN) 300  MG capsule, Take 300 mg by mouth 3 (three) times daily., Disp: , Rfl:     HYDROcodone-acetaminophen (NORCO) 5-325 mg per tablet, Take 1 tablet by mouth every 6 (six) hours as needed for Pain., Disp: 10 tablet, Rfl: 0    hydrOXYzine HCL (ATARAX) 10 MG Tab, Take 25 mg by mouth 3 (three) times daily as needed., Disp: , Rfl:     hydrOXYzine HCL (ATARAX) 10 MG Tab, Take 10 mg by mouth., Disp: , Rfl:     LIDOcaine-prilocaine (EMLA) cream, Apply topically as needed. Apply 30 mins prior to port access, Disp: 30 g, Rfl: 5    methylPREDNISolone (MEDROL DOSEPACK) 4 mg tablet, use as directed, Disp: 21 each, Rfl: 0    naproxen sodium 220 mg Cap, Take by mouth., Disp: , Rfl:     omeprazole (PRILOSEC) 10 MG capsule, Take 10 mg by mouth every 12 (twelve) hours as needed., Disp: , Rfl:     ondansetron (ZOFRAN) 8 MG tablet, Take 1 tablet (8 mg total) by mouth every 8 (eight) hours as needed., Disp: 30 tablet, Rfl: 5    promethazine (PHENERGAN) 25 MG tablet, Take 1 tablet (25 mg total) by mouth every 4 to 6 hours as needed., Disp: 30 tablet, Rfl: 5    sertraline (ZOLOFT) 50 MG tablet, Take 50 mg by mouth once daily., Disp: , Rfl:     sertraline (ZOLOFT) 50 MG tablet, Take 50 mg by mouth., Disp: , Rfl:         Objective:       Physical Examination:     /75   Pulse 88   Temp 98.7 °F (37.1 °C)   Wt 87.5 kg (193 lb)   BMI 35.30 kg/m²     Physical Exam  Constitutional:       Appearance: Normal appearance. She is well-developed.   HENT:      Head: Normocephalic and atraumatic.      Right Ear: External ear normal.      Left Ear: External ear normal.   Eyes:      Conjunctiva/sclera: Conjunctivae normal.      Pupils: Pupils are equal, round, and reactive to light.   Neck:      Thyroid: No thyromegaly.      Trachea: No tracheal deviation.   Cardiovascular:      Rate and Rhythm: Normal rate and regular rhythm.      Heart sounds: Normal heart sounds.   Pulmonary:      Effort: Pulmonary effort is normal.      Breath sounds:  Normal breath sounds.   Abdominal:      General: Bowel sounds are normal. There is no distension.      Palpations: Abdomen is soft. There is no mass.      Tenderness: There is no abdominal tenderness.   Skin:     Findings: Rash present.   Neurological:      Mental Status: She is alert.      Comments: Neuro intact througout   Psychiatric:         Behavior: Behavior normal.         Thought Content: Thought content normal.         Judgment: Judgment normal.         Labs:   Recent Results (from the past 336 hour(s))   CBC Auto Differential    Collection Time: 08/23/22  7:46 AM   Result Value Ref Range    WBC 3.30 (L) 3.90 - 12.70 K/uL    Hemoglobin 13.1 12.0 - 16.0 g/dL    Hematocrit 39.9 37.0 - 48.5 %    Platelets 141 (L) 150 - 450 K/uL   CBC Auto Differential    Collection Time: 08/16/22  8:01 AM   Result Value Ref Range    WBC 4.98 3.90 - 12.70 K/uL    Hemoglobin 14.4 12.0 - 16.0 g/dL    Hematocrit 41.8 37.0 - 48.5 %    Platelets 192 150 - 450 K/uL     CMP  Sodium   Date Value Ref Range Status   08/23/2022 138 136 - 145 mmol/L Final     Potassium   Date Value Ref Range Status   08/23/2022 4.8 3.5 - 5.1 mmol/L Final     Chloride   Date Value Ref Range Status   08/23/2022 103 95 - 110 mmol/L Final     CO2   Date Value Ref Range Status   08/23/2022 27 23 - 29 mmol/L Final     Glucose   Date Value Ref Range Status   08/23/2022 101 70 - 110 mg/dL Final     BUN   Date Value Ref Range Status   08/23/2022 14 6 - 20 mg/dL Final     Creatinine   Date Value Ref Range Status   08/23/2022 0.8 0.5 - 1.4 mg/dL Final     Calcium   Date Value Ref Range Status   08/23/2022 9.2 8.7 - 10.5 mg/dL Final     Total Protein   Date Value Ref Range Status   08/23/2022 7.0 6.0 - 8.4 g/dL Final     Albumin   Date Value Ref Range Status   08/23/2022 3.9 3.5 - 5.2 g/dL Final     Total Bilirubin   Date Value Ref Range Status   08/23/2022 0.4 0.1 - 1.0 mg/dL Final     Comment:     For infants and newborns, interpretation of results should be  based  on gestational age, weight and in agreement with clinical  observations.    Premature Infant recommended reference ranges:  Up to 24 hours.............<8.0 mg/dL  Up to 48 hours............<12.0 mg/dL  3-5 days..................<15.0 mg/dL  6-29 days.................<15.0 mg/dL       Alkaline Phosphatase   Date Value Ref Range Status   08/23/2022 73 55 - 135 U/L Final     AST   Date Value Ref Range Status   08/23/2022 59 (H) 10 - 40 U/L Final     ALT   Date Value Ref Range Status   08/23/2022 114 (H) 10 - 44 U/L Final     Anion Gap   Date Value Ref Range Status   08/23/2022 8 8 - 16 mmol/L Final     No results found for: CEA  No results found for: PSA        Assessment/Plan:     Problem List Items Addressed This Visit        Oncology    Left Breast Cancer-TRIPLE NEGATIVE - Primary      Other Visit Diagnoses     Rash            1. Triple Negative breast cancer- Hold treatment 1 week.  2. Rash- Medrol Dose Pack, Pepcid and Zyrtec    Discussion:     Follow up in about 5 days (around 8/30/2022).      Electronically signed by Ana Quigley, MSN, APRN, AGNP-C, OCN

## 2022-08-30 ENCOUNTER — OFFICE VISIT (OUTPATIENT)
Dept: HEMATOLOGY/ONCOLOGY | Facility: CLINIC | Age: 47
End: 2022-08-30
Payer: COMMERCIAL

## 2022-08-30 ENCOUNTER — LAB VISIT (OUTPATIENT)
Dept: LAB | Facility: HOSPITAL | Age: 47
End: 2022-08-30
Attending: INTERNAL MEDICINE
Payer: COMMERCIAL

## 2022-08-30 VITALS
DIASTOLIC BLOOD PRESSURE: 58 MMHG | TEMPERATURE: 99 F | BODY MASS INDEX: 35.81 KG/M2 | HEART RATE: 79 BPM | WEIGHT: 195.81 LBS | SYSTOLIC BLOOD PRESSURE: 123 MMHG

## 2022-08-30 DIAGNOSIS — R51.9 NONINTRACTABLE HEADACHE, UNSPECIFIED CHRONICITY PATTERN, UNSPECIFIED HEADACHE TYPE: ICD-10-CM

## 2022-08-30 DIAGNOSIS — R21 RASH: ICD-10-CM

## 2022-08-30 DIAGNOSIS — Z17.1 MALIGNANT NEOPLASM OF UPPER-OUTER QUADRANT OF LEFT BREAST IN FEMALE, ESTROGEN RECEPTOR NEGATIVE: Primary | ICD-10-CM

## 2022-08-30 DIAGNOSIS — C50.412 MALIGNANT NEOPLASM OF UPPER-OUTER QUADRANT OF LEFT BREAST IN FEMALE, ESTROGEN RECEPTOR NEGATIVE: Primary | ICD-10-CM

## 2022-08-30 DIAGNOSIS — R74.01 TRANSAMINITIS: ICD-10-CM

## 2022-08-30 DIAGNOSIS — Z17.1 MALIGNANT NEOPLASM OF UPPER-OUTER QUADRANT OF LEFT BREAST IN FEMALE, ESTROGEN RECEPTOR NEGATIVE: ICD-10-CM

## 2022-08-30 DIAGNOSIS — J30.2 SEASONAL ALLERGIC RHINITIS, UNSPECIFIED TRIGGER: ICD-10-CM

## 2022-08-30 DIAGNOSIS — R53.83 FATIGUE, UNSPECIFIED TYPE: ICD-10-CM

## 2022-08-30 DIAGNOSIS — C50.412 MALIGNANT NEOPLASM OF UPPER-OUTER QUADRANT OF LEFT BREAST IN FEMALE, ESTROGEN RECEPTOR NEGATIVE: ICD-10-CM

## 2022-08-30 LAB
ALBUMIN SERPL BCP-MCNC: 4 G/DL (ref 3.5–5.2)
ALP SERPL-CCNC: 84 U/L (ref 55–135)
ALT SERPL W/O P-5'-P-CCNC: 52 U/L (ref 10–44)
ANION GAP SERPL CALC-SCNC: 9 MMOL/L (ref 8–16)
AST SERPL-CCNC: 18 U/L (ref 10–40)
BASOPHILS # BLD AUTO: 0.04 K/UL (ref 0–0.2)
BASOPHILS NFR BLD: 0.7 % (ref 0–1.9)
BILIRUB SERPL-MCNC: 0.6 MG/DL (ref 0.1–1)
BUN SERPL-MCNC: 15 MG/DL (ref 6–20)
CALCIUM SERPL-MCNC: 8.9 MG/DL (ref 8.7–10.5)
CHLORIDE SERPL-SCNC: 102 MMOL/L (ref 95–110)
CO2 SERPL-SCNC: 26 MMOL/L (ref 23–29)
CREAT SERPL-MCNC: 0.8 MG/DL (ref 0.5–1.4)
DIFFERENTIAL METHOD: ABNORMAL
EOSINOPHIL # BLD AUTO: 0.1 K/UL (ref 0–0.5)
EOSINOPHIL NFR BLD: 0.9 % (ref 0–8)
ERYTHROCYTE [DISTWIDTH] IN BLOOD BY AUTOMATED COUNT: 13.3 % (ref 11.5–14.5)
EST. GFR  (NO RACE VARIABLE): >60 ML/MIN/1.73 M^2
GLUCOSE SERPL-MCNC: 106 MG/DL (ref 70–110)
HCT VFR BLD AUTO: 39 % (ref 37–48.5)
HGB BLD-MCNC: 13.1 G/DL (ref 12–16)
IMM GRANULOCYTES # BLD AUTO: 0.03 K/UL (ref 0–0.04)
IMM GRANULOCYTES NFR BLD AUTO: 0.5 % (ref 0–0.5)
LYMPHOCYTES # BLD AUTO: 2.3 K/UL (ref 1–4.8)
LYMPHOCYTES NFR BLD: 39.6 % (ref 18–48)
MAGNESIUM SERPL-MCNC: 2.3 MG/DL (ref 1.6–2.6)
MCH RBC QN AUTO: 31.5 PG (ref 27–31)
MCHC RBC AUTO-ENTMCNC: 33.6 G/DL (ref 32–36)
MCV RBC AUTO: 94 FL (ref 82–98)
MONOCYTES # BLD AUTO: 0.7 K/UL (ref 0.3–1)
MONOCYTES NFR BLD: 11.3 % (ref 4–15)
NEUTROPHILS # BLD AUTO: 2.7 K/UL (ref 1.8–7.7)
NEUTROPHILS NFR BLD: 47 % (ref 38–73)
NRBC BLD-RTO: 0 /100 WBC
PLATELET # BLD AUTO: 175 K/UL (ref 150–450)
PMV BLD AUTO: 9.7 FL (ref 9.2–12.9)
POTASSIUM SERPL-SCNC: 4.1 MMOL/L (ref 3.5–5.1)
PROT SERPL-MCNC: 7.4 G/DL (ref 6–8.4)
RBC # BLD AUTO: 4.16 M/UL (ref 4–5.4)
SODIUM SERPL-SCNC: 137 MMOL/L (ref 136–145)
WBC # BLD AUTO: 5.76 K/UL (ref 3.9–12.7)

## 2022-08-30 PROCEDURE — 3074F PR MOST RECENT SYSTOLIC BLOOD PRESSURE < 130 MM HG: ICD-10-PCS | Mod: CPTII,S$GLB,, | Performed by: NURSE PRACTITIONER

## 2022-08-30 PROCEDURE — 83735 ASSAY OF MAGNESIUM: CPT | Performed by: INTERNAL MEDICINE

## 2022-08-30 PROCEDURE — 3008F PR BODY MASS INDEX (BMI) DOCUMENTED: ICD-10-PCS | Mod: CPTII,S$GLB,, | Performed by: NURSE PRACTITIONER

## 2022-08-30 PROCEDURE — 3078F PR MOST RECENT DIASTOLIC BLOOD PRESSURE < 80 MM HG: ICD-10-PCS | Mod: CPTII,S$GLB,, | Performed by: NURSE PRACTITIONER

## 2022-08-30 PROCEDURE — 99214 PR OFFICE/OUTPT VISIT, EST, LEVL IV, 30-39 MIN: ICD-10-PCS | Mod: S$GLB,,, | Performed by: NURSE PRACTITIONER

## 2022-08-30 PROCEDURE — 85025 COMPLETE CBC W/AUTO DIFF WBC: CPT | Performed by: INTERNAL MEDICINE

## 2022-08-30 PROCEDURE — 80053 COMPREHEN METABOLIC PANEL: CPT | Performed by: NURSE PRACTITIONER

## 2022-08-30 PROCEDURE — 3074F SYST BP LT 130 MM HG: CPT | Mod: CPTII,S$GLB,, | Performed by: NURSE PRACTITIONER

## 2022-08-30 PROCEDURE — 3078F DIAST BP <80 MM HG: CPT | Mod: CPTII,S$GLB,, | Performed by: NURSE PRACTITIONER

## 2022-08-30 PROCEDURE — 1160F PR REVIEW ALL MEDS BY PRESCRIBER/CLIN PHARMACIST DOCUMENTED: ICD-10-PCS | Mod: CPTII,S$GLB,, | Performed by: NURSE PRACTITIONER

## 2022-08-30 PROCEDURE — 99214 OFFICE O/P EST MOD 30 MIN: CPT | Mod: S$GLB,,, | Performed by: NURSE PRACTITIONER

## 2022-08-30 PROCEDURE — 3008F BODY MASS INDEX DOCD: CPT | Mod: CPTII,S$GLB,, | Performed by: NURSE PRACTITIONER

## 2022-08-30 PROCEDURE — 36415 COLL VENOUS BLD VENIPUNCTURE: CPT | Performed by: INTERNAL MEDICINE

## 2022-08-30 PROCEDURE — 1159F MED LIST DOCD IN RCRD: CPT | Mod: CPTII,S$GLB,, | Performed by: NURSE PRACTITIONER

## 2022-08-30 PROCEDURE — 1160F RVW MEDS BY RX/DR IN RCRD: CPT | Mod: CPTII,S$GLB,, | Performed by: NURSE PRACTITIONER

## 2022-08-30 PROCEDURE — 1159F PR MEDICATION LIST DOCUMENTED IN MEDICAL RECORD: ICD-10-PCS | Mod: CPTII,S$GLB,, | Performed by: NURSE PRACTITIONER

## 2022-08-30 RX ORDER — DIPHENHYDRAMINE HYDROCHLORIDE 50 MG/ML
50 INJECTION INTRAMUSCULAR; INTRAVENOUS ONCE AS NEEDED
Status: CANCELLED | OUTPATIENT
Start: 2022-09-01

## 2022-08-30 RX ORDER — FLUTICASONE PROPIONATE 50 MCG
1 SPRAY, SUSPENSION (ML) NASAL DAILY
Qty: 15.8 ML | Refills: 5 | Status: SHIPPED | OUTPATIENT
Start: 2022-08-30 | End: 2023-12-06 | Stop reason: SDUPTHER

## 2022-08-30 RX ORDER — FAMOTIDINE 10 MG/ML
20 INJECTION INTRAVENOUS
Status: CANCELLED | OUTPATIENT
Start: 2022-09-01

## 2022-08-30 RX ORDER — SODIUM CHLORIDE 0.9 % (FLUSH) 0.9 %
10 SYRINGE (ML) INJECTION
Status: CANCELLED | OUTPATIENT
Start: 2022-09-01

## 2022-08-30 RX ORDER — EPINEPHRINE 0.3 MG/.3ML
0.3 INJECTION SUBCUTANEOUS ONCE AS NEEDED
Status: CANCELLED | OUTPATIENT
Start: 2022-09-01

## 2022-08-30 RX ORDER — HEPARIN 100 UNIT/ML
500 SYRINGE INTRAVENOUS
Status: CANCELLED | OUTPATIENT
Start: 2022-09-01

## 2022-08-30 NOTE — PROGRESS NOTES
PROGRESS NOTE    Subjective:       Patient ID: Karyna Escoto is a 47 y.o. female.    6/9/2022:  Dx Mammo:  1.8cm mass in the left breast towards the left axilla.   Deep to the mass 2 lymph nodes are identified.     7/5/2022:  Left breast core biopsy:  Grade 2 IDCA with DCIS  ER: 0.03%, MA: 8.8%, HER2: 0  TRIPLE NEGATIVE    7/20/2022:  Left axilla LN needle biopsy:  Invasive ductal carcinoma  ER: 0%, MA: 40%, HER2 negative(0)    PLAN:  Neoadjuvant chemo/IO(Keynote 522)-surgery-radiation.     Pem/Tax/Carb:  Cycle 1: 8/11/2022  Cycle 2: 8/23/2022    8/3/2022 Photos:          8/23/2022 Photo:          Chief Complaint:  Triple negative breast cancer follow up.     History of Present Illness:   Karyna Escoto is a 47 y.o. female who presents for follow up of breast cancer.      Patient is on cycle one of chemotherapy and doing well.  She developed a diffuse rash on Tuesday last week.She has completed Medrol dose pack, but continues to have headache which she contributes to allergies. The rash is almost all the way resolved no itching or discomfort.    She continues to have headaches thinks this is from her sinuses. No relief with her steroids.    Family and Social history reviewed and is unchanged from 8/3/2022    ROS:  Review of Systems   Constitutional:  Negative for appetite change, fever and unexpected weight change.   HENT:  Negative for mouth sores.    Eyes:  Negative for visual disturbance.   Respiratory:  Negative for cough and shortness of breath.    Cardiovascular:  Negative for chest pain and leg swelling.   Gastrointestinal:  Positive for diarrhea. Negative for abdominal pain and blood in stool.   Genitourinary:  Positive for frequency. Negative for hematuria.   Musculoskeletal:  Negative for back pain.   Skin:  Positive for rash. Negative for pallor.   Neurological:  Positive for headaches.   Hematological:  Positive for adenopathy.  Rox is postop day 2 s/p RLTCS and doing ok.  Vaginal bleeding is small.  Pain is controlled when using meds.      Vitals noted  Fundus: firm, mod tender, is below umbilicus  Incision clean/dry/intact  Ext: non-tender, edema 1+ bilaterally    Labs reviewed    ASSESSMENT:  Postop day 2 s/p LTCS    PLAN:  Routine care  Discharge tomorrow     Psychiatric/Behavioral:  The patient is not nervous/anxious.         Current Outpatient Medications:     acetaminophen (TYLENOL) 325 MG tablet, Take 650 mg by mouth., Disp: , Rfl:     fluticasone propionate (FLONASE) 50 mcg/actuation nasal spray, 1 spray (50 mcg total) by Each Nostril route once daily., Disp: 15.8 mL, Rfl: 5    gabapentin (NEURONTIN) 300 MG capsule, Take 300 mg by mouth 3 (three) times daily., Disp: , Rfl:     HYDROcodone-acetaminophen (NORCO) 5-325 mg per tablet, Take 1 tablet by mouth every 6 (six) hours as needed for Pain., Disp: 10 tablet, Rfl: 0    hydrOXYzine HCL (ATARAX) 10 MG Tab, Take 25 mg by mouth 3 (three) times daily as needed., Disp: , Rfl:     hydrOXYzine HCL (ATARAX) 10 MG Tab, Take 10 mg by mouth., Disp: , Rfl:     LIDOcaine-prilocaine (EMLA) cream, Apply topically as needed. Apply 30 mins prior to port access, Disp: 30 g, Rfl: 5    methylPREDNISolone (MEDROL DOSEPACK) 4 mg tablet, use as directed, Disp: 21 each, Rfl: 0    naproxen sodium 220 mg Cap, Take by mouth., Disp: , Rfl:     omeprazole (PRILOSEC) 10 MG capsule, Take 10 mg by mouth every 12 (twelve) hours as needed., Disp: , Rfl:     ondansetron (ZOFRAN) 8 MG tablet, Take 1 tablet (8 mg total) by mouth every 8 (eight) hours as needed., Disp: 30 tablet, Rfl: 5    promethazine (PHENERGAN) 25 MG tablet, Take 1 tablet (25 mg total) by mouth every 4 to 6 hours as needed., Disp: 30 tablet, Rfl: 5    sertraline (ZOLOFT) 50 MG tablet, Take 50 mg by mouth once daily., Disp: , Rfl:     sertraline (ZOLOFT) 50 MG tablet, Take 50 mg by mouth., Disp: , Rfl:         Objective:       Physical Examination:     BP (!) 123/58   Pulse 79   Temp 98.7 °F (37.1 °C)   Wt 88.8 kg (195 lb 12.8 oz)   BMI 35.81 kg/m²     Physical Exam  Constitutional:       Appearance: Normal appearance. She is well-developed.   HENT:      Head: Normocephalic and atraumatic.      Right Ear: External ear normal.      Left Ear: External ear normal.       Mouth/Throat:      Mouth: Mucous membranes are moist.   Eyes:      Conjunctiva/sclera: Conjunctivae normal.      Pupils: Pupils are equal, round, and reactive to light.   Neck:      Thyroid: No thyromegaly.      Trachea: No tracheal deviation.   Cardiovascular:      Rate and Rhythm: Normal rate and regular rhythm.      Heart sounds: Normal heart sounds.   Pulmonary:      Effort: Pulmonary effort is normal.      Breath sounds: Normal breath sounds.   Abdominal:      General: Bowel sounds are normal. There is no distension.      Palpations: Abdomen is soft. There is no mass.      Tenderness: There is no abdominal tenderness.   Musculoskeletal:         General: Normal range of motion.      Right lower leg: No edema.      Left lower leg: No edema.   Skin:     Findings: No rash.   Neurological:      General: No focal deficit present.      Mental Status: She is alert and oriented to person, place, and time.      Comments: Neuro intact througout   Psychiatric:         Mood and Affect: Mood normal.         Behavior: Behavior normal.         Thought Content: Thought content normal.         Judgment: Judgment normal.       Labs:   Recent Results (from the past 336 hour(s))   CBC Auto Differential    Collection Time: 08/30/22  7:41 AM   Result Value Ref Range    WBC 5.76 3.90 - 12.70 K/uL    Hemoglobin 13.1 12.0 - 16.0 g/dL    Hematocrit 39.0 37.0 - 48.5 %    Platelets 175 150 - 450 K/uL   CBC Auto Differential    Collection Time: 08/23/22  7:46 AM   Result Value Ref Range    WBC 3.30 (L) 3.90 - 12.70 K/uL    Hemoglobin 13.1 12.0 - 16.0 g/dL    Hematocrit 39.9 37.0 - 48.5 %    Platelets 141 (L) 150 - 450 K/uL     CMP  Sodium   Date Value Ref Range Status   08/30/2022 137 136 - 145 mmol/L Final     Potassium   Date Value Ref Range Status   08/30/2022 4.1 3.5 - 5.1 mmol/L Final     Chloride   Date Value Ref Range Status   08/30/2022 102 95 - 110 mmol/L Final     CO2   Date Value Ref Range Status   08/30/2022 26 23 - 29 mmol/L  Final     Glucose   Date Value Ref Range Status   08/30/2022 106 70 - 110 mg/dL Final     BUN   Date Value Ref Range Status   08/30/2022 15 6 - 20 mg/dL Final     Creatinine   Date Value Ref Range Status   08/30/2022 0.8 0.5 - 1.4 mg/dL Final     Calcium   Date Value Ref Range Status   08/30/2022 8.9 8.7 - 10.5 mg/dL Final     Total Protein   Date Value Ref Range Status   08/30/2022 7.4 6.0 - 8.4 g/dL Final     Albumin   Date Value Ref Range Status   08/30/2022 4.0 3.5 - 5.2 g/dL Final     Total Bilirubin   Date Value Ref Range Status   08/30/2022 0.6 0.1 - 1.0 mg/dL Final     Comment:     For infants and newborns, interpretation of results should be based  on gestational age, weight and in agreement with clinical  observations.    Premature Infant recommended reference ranges:  Up to 24 hours.............<8.0 mg/dL  Up to 48 hours............<12.0 mg/dL  3-5 days..................<15.0 mg/dL  6-29 days.................<15.0 mg/dL       Alkaline Phosphatase   Date Value Ref Range Status   08/30/2022 84 55 - 135 U/L Final     AST   Date Value Ref Range Status   08/30/2022 18 10 - 40 U/L Final     ALT   Date Value Ref Range Status   08/30/2022 52 (H) 10 - 44 U/L Final     Anion Gap   Date Value Ref Range Status   08/30/2022 9 8 - 16 mmol/L Final     No results found for: CEA  No results found for: PSA        Assessment/Plan:     Problem List Items Addressed This Visit          Oncology    Left Breast Cancer-TRIPLE NEGATIVE - Primary       GI    Transaminitis     Other Visit Diagnoses       Rash        Seasonal allergic rhinitis, unspecified trigger        Relevant Medications    fluticasone propionate (FLONASE) 50 mcg/actuation nasal spray    Nonintractable headache, unspecified chronicity pattern, unspecified headache type              1. Triple Negative breast cancer- Restart Taxol Cycle 2 Day 15 Thursday 9/1/2022  2. Rash- resolved  3. Transaminitis- Resolved; Continue weekly labs  4. Allergic Rhinitis- Zyrtec and  Flonase  5. Headaches- Zyrtec and Ibuprofen PRN    Discussion:     Follow up in about 2 weeks (around 9/13/2022) for with me and 5 weeks with Dr. Peralta.    Electronically signed by Ana Quigley, MSN, APRN, AGNP-C, OCN

## 2022-08-31 ENCOUNTER — TELEPHONE (OUTPATIENT)
Dept: HEMATOLOGY/ONCOLOGY | Facility: CLINIC | Age: 47
End: 2022-08-31

## 2022-08-31 ENCOUNTER — PATIENT MESSAGE (OUTPATIENT)
Dept: HEMATOLOGY/ONCOLOGY | Facility: CLINIC | Age: 47
End: 2022-08-31

## 2022-08-31 NOTE — TELEPHONE ENCOUNTER
Spoke with the patient and she has been accepted at the [facility]. She will be travelling there for eval and treatment recs and possible surgery. She inquired whether we would work with them for her treatment. Notified her we do work with other [facility] [employer] and would be happy to assist in any way we can. She will keep us up to date on appts etc.    This is something that her husbands job will also pay for lodging and travel expenses at well as surgeries or treatment.

## 2022-09-01 ENCOUNTER — INFUSION (OUTPATIENT)
Dept: INFUSION THERAPY | Facility: HOSPITAL | Age: 47
End: 2022-09-01
Attending: INTERNAL MEDICINE
Payer: COMMERCIAL

## 2022-09-01 VITALS
HEIGHT: 62 IN | RESPIRATION RATE: 17 BRPM | BODY MASS INDEX: 35.31 KG/M2 | TEMPERATURE: 97 F | HEART RATE: 72 BPM | OXYGEN SATURATION: 97 % | DIASTOLIC BLOOD PRESSURE: 68 MMHG | WEIGHT: 191.88 LBS | SYSTOLIC BLOOD PRESSURE: 102 MMHG

## 2022-09-01 DIAGNOSIS — Z17.1 MALIGNANT NEOPLASM OF UPPER-OUTER QUADRANT OF LEFT BREAST IN FEMALE, ESTROGEN RECEPTOR NEGATIVE: Primary | ICD-10-CM

## 2022-09-01 DIAGNOSIS — C50.412 MALIGNANT NEOPLASM OF UPPER-OUTER QUADRANT OF LEFT BREAST IN FEMALE, ESTROGEN RECEPTOR NEGATIVE: Primary | ICD-10-CM

## 2022-09-01 PROCEDURE — 96413 CHEMO IV INFUSION 1 HR: CPT

## 2022-09-01 PROCEDURE — 96367 TX/PROPH/DG ADDL SEQ IV INF: CPT

## 2022-09-01 PROCEDURE — 96375 TX/PRO/DX INJ NEW DRUG ADDON: CPT

## 2022-09-01 PROCEDURE — 63600175 PHARM REV CODE 636 W HCPCS: Performed by: NURSE PRACTITIONER

## 2022-09-01 PROCEDURE — 25000003 PHARM REV CODE 250: Performed by: NURSE PRACTITIONER

## 2022-09-01 RX ORDER — HEPARIN 100 UNIT/ML
500 SYRINGE INTRAVENOUS
Status: DISCONTINUED | OUTPATIENT
Start: 2022-09-01 | End: 2022-09-01 | Stop reason: HOSPADM

## 2022-09-01 RX ORDER — ONDANSETRON HCL IN 0.9 % NACL 8 MG/50 ML
8 INTRAVENOUS SOLUTION, PIGGYBACK (ML) INTRAVENOUS
Status: COMPLETED | OUTPATIENT
Start: 2022-09-01 | End: 2022-09-01

## 2022-09-01 RX ORDER — EPINEPHRINE 0.3 MG/.3ML
0.3 INJECTION SUBCUTANEOUS ONCE AS NEEDED
Status: DISCONTINUED | OUTPATIENT
Start: 2022-09-01 | End: 2022-09-01 | Stop reason: HOSPADM

## 2022-09-01 RX ORDER — SODIUM CHLORIDE 0.9 % (FLUSH) 0.9 %
10 SYRINGE (ML) INJECTION
Status: DISCONTINUED | OUTPATIENT
Start: 2022-09-01 | End: 2022-09-01 | Stop reason: HOSPADM

## 2022-09-01 RX ORDER — FAMOTIDINE 10 MG/ML
20 INJECTION INTRAVENOUS
Status: COMPLETED | OUTPATIENT
Start: 2022-09-01 | End: 2022-09-01

## 2022-09-01 RX ADMIN — ONDANSETRON 8 MG: 2 INJECTION INTRAMUSCULAR; INTRAVENOUS at 09:09

## 2022-09-01 RX ADMIN — SODIUM CHLORIDE: 0.9 INJECTION, SOLUTION INTRAVENOUS at 08:09

## 2022-09-01 RX ADMIN — DIPHENHYDRAMINE HYDROCHLORIDE 50 MG: 50 INJECTION INTRAMUSCULAR; INTRAVENOUS at 08:09

## 2022-09-01 RX ADMIN — FAMOTIDINE 20 MG: 10 INJECTION INTRAVENOUS at 08:09

## 2022-09-01 RX ADMIN — DEXAMETHASONE SODIUM PHOSPHATE 10 MG: 4 INJECTION, SOLUTION INTRA-ARTICULAR; INTRALESIONAL; INTRAMUSCULAR; INTRAVENOUS; SOFT TISSUE at 09:09

## 2022-09-01 RX ADMIN — PACLITAXEL 156 MG: 6 INJECTION, SOLUTION, CONCENTRATE INTRAVENOUS at 09:09

## 2022-09-01 RX ADMIN — HEPARIN 500 UNITS: 100 SYRINGE at 11:09

## 2022-09-02 ENCOUNTER — PATIENT MESSAGE (OUTPATIENT)
Dept: HEMATOLOGY/ONCOLOGY | Facility: CLINIC | Age: 47
End: 2022-09-02

## 2022-09-02 DIAGNOSIS — R51.9 NONINTRACTABLE HEADACHE, UNSPECIFIED CHRONICITY PATTERN, UNSPECIFIED HEADACHE TYPE: ICD-10-CM

## 2022-09-02 DIAGNOSIS — R21 RASH: ICD-10-CM

## 2022-09-02 RX ORDER — METHYLPREDNISOLONE 4 MG/1
TABLET ORAL
Qty: 21 EACH | Refills: 0 | Status: SHIPPED | OUTPATIENT
Start: 2022-09-02 | End: 2022-09-23

## 2022-09-06 ENCOUNTER — HOSPITAL ENCOUNTER (OUTPATIENT)
Dept: RADIOLOGY | Facility: HOSPITAL | Age: 47
Discharge: HOME | End: 2022-09-06
Attending: INTERNAL MEDICINE
Payer: COMMERCIAL

## 2022-09-06 DIAGNOSIS — C50.412 MALIGNANT NEOPLASM OF UPPER-OUTER QUADRANT OF LEFT BREAST IN FEMALE, ESTROGEN RECEPTOR NEGATIVE: ICD-10-CM

## 2022-09-06 DIAGNOSIS — Z17.1 MALIGNANT NEOPLASM OF UPPER-OUTER QUADRANT OF LEFT BREAST IN FEMALE, ESTROGEN RECEPTOR NEGATIVE: ICD-10-CM

## 2022-09-06 PROCEDURE — 77049 MRI BREAST C-+ W/CAD BI: CPT | Mod: TC,PO

## 2022-09-06 PROCEDURE — A9585 GADOBUTROL INJECTION: HCPCS | Mod: PO | Performed by: INTERNAL MEDICINE

## 2022-09-06 PROCEDURE — 25500020 PHARM REV CODE 255: Mod: PO | Performed by: INTERNAL MEDICINE

## 2022-09-06 RX ORDER — GADOBUTROL 604.72 MG/ML
10 INJECTION INTRAVENOUS
Status: COMPLETED | OUTPATIENT
Start: 2022-09-06 | End: 2022-09-06

## 2022-09-06 RX ADMIN — GADOBUTROL 10 ML: 604.72 INJECTION INTRAVENOUS at 11:09

## 2022-09-07 ENCOUNTER — PATIENT MESSAGE (OUTPATIENT)
Dept: HEMATOLOGY/ONCOLOGY | Facility: CLINIC | Age: 47
End: 2022-09-07

## 2022-09-07 RX ORDER — SODIUM CHLORIDE 0.9 % (FLUSH) 0.9 %
10 SYRINGE (ML) INJECTION
Status: CANCELLED | OUTPATIENT
Start: 2022-09-08

## 2022-09-07 RX ORDER — FAMOTIDINE 10 MG/ML
20 INJECTION INTRAVENOUS
Status: CANCELLED | OUTPATIENT
Start: 2022-09-08

## 2022-09-07 RX ORDER — DIPHENHYDRAMINE HYDROCHLORIDE 50 MG/ML
50 INJECTION INTRAMUSCULAR; INTRAVENOUS ONCE AS NEEDED
Status: CANCELLED | OUTPATIENT
Start: 2022-09-08

## 2022-09-07 RX ORDER — HEPARIN 100 UNIT/ML
500 SYRINGE INTRAVENOUS
Status: CANCELLED | OUTPATIENT
Start: 2022-09-08

## 2022-09-07 RX ORDER — EPINEPHRINE 0.3 MG/.3ML
0.3 INJECTION SUBCUTANEOUS ONCE AS NEEDED
Status: CANCELLED | OUTPATIENT
Start: 2022-09-08

## 2022-09-08 ENCOUNTER — INFUSION (OUTPATIENT)
Dept: INFUSION THERAPY | Facility: HOSPITAL | Age: 47
End: 2022-09-08
Attending: INTERNAL MEDICINE
Payer: COMMERCIAL

## 2022-09-08 VITALS
OXYGEN SATURATION: 97 % | BODY MASS INDEX: 36.22 KG/M2 | DIASTOLIC BLOOD PRESSURE: 65 MMHG | SYSTOLIC BLOOD PRESSURE: 104 MMHG | WEIGHT: 196.81 LBS | HEIGHT: 62 IN | TEMPERATURE: 97 F | RESPIRATION RATE: 17 BRPM | HEART RATE: 65 BPM

## 2022-09-08 DIAGNOSIS — Z17.1 MALIGNANT NEOPLASM OF UPPER-OUTER QUADRANT OF LEFT BREAST IN FEMALE, ESTROGEN RECEPTOR NEGATIVE: Primary | ICD-10-CM

## 2022-09-08 DIAGNOSIS — C50.412 MALIGNANT NEOPLASM OF UPPER-OUTER QUADRANT OF LEFT BREAST IN FEMALE, ESTROGEN RECEPTOR NEGATIVE: Primary | ICD-10-CM

## 2022-09-08 PROCEDURE — 63600175 PHARM REV CODE 636 W HCPCS: Performed by: NURSE PRACTITIONER

## 2022-09-08 PROCEDURE — 96375 TX/PRO/DX INJ NEW DRUG ADDON: CPT

## 2022-09-08 PROCEDURE — 96367 TX/PROPH/DG ADDL SEQ IV INF: CPT

## 2022-09-08 PROCEDURE — 25000003 PHARM REV CODE 250: Performed by: NURSE PRACTITIONER

## 2022-09-08 PROCEDURE — A4216 STERILE WATER/SALINE, 10 ML: HCPCS | Performed by: NURSE PRACTITIONER

## 2022-09-08 PROCEDURE — 96417 CHEMO IV INFUS EACH ADDL SEQ: CPT

## 2022-09-08 PROCEDURE — 96413 CHEMO IV INFUSION 1 HR: CPT

## 2022-09-08 RX ORDER — EPINEPHRINE 0.3 MG/.3ML
0.3 INJECTION SUBCUTANEOUS ONCE AS NEEDED
Status: DISCONTINUED | OUTPATIENT
Start: 2022-09-08 | End: 2022-09-08 | Stop reason: HOSPADM

## 2022-09-08 RX ORDER — HEPARIN 100 UNIT/ML
500 SYRINGE INTRAVENOUS
Status: DISCONTINUED | OUTPATIENT
Start: 2022-09-08 | End: 2022-09-08 | Stop reason: HOSPADM

## 2022-09-08 RX ORDER — SODIUM CHLORIDE 0.9 % (FLUSH) 0.9 %
10 SYRINGE (ML) INJECTION
Status: DISCONTINUED | OUTPATIENT
Start: 2022-09-08 | End: 2022-09-08 | Stop reason: HOSPADM

## 2022-09-08 RX ORDER — FAMOTIDINE 10 MG/ML
20 INJECTION INTRAVENOUS
Status: COMPLETED | OUTPATIENT
Start: 2022-09-08 | End: 2022-09-08

## 2022-09-08 RX ADMIN — FAMOTIDINE 20 MG: 10 INJECTION INTRAVENOUS at 09:09

## 2022-09-08 RX ADMIN — PACLITAXEL 200 MG: 100 INJECTION, POWDER, LYOPHILIZED, FOR SUSPENSION INTRAVENOUS at 10:09

## 2022-09-08 RX ADMIN — DIPHENHYDRAMINE HYDROCHLORIDE 50 MG: 50 INJECTION INTRAMUSCULAR; INTRAVENOUS at 10:09

## 2022-09-08 RX ADMIN — APREPITANT 130 MG: 130 INJECTION, EMULSION INTRAVENOUS at 10:09

## 2022-09-08 RX ADMIN — SODIUM CHLORIDE, PRESERVATIVE FREE 10 ML: 5 INJECTION INTRAVENOUS at 12:09

## 2022-09-08 RX ADMIN — SODIUM CHLORIDE 200 MG: 9 INJECTION, SOLUTION INTRAVENOUS at 09:09

## 2022-09-08 RX ADMIN — CARBOPLATIN 730 MG: 10 INJECTION, SOLUTION INTRAVENOUS at 11:09

## 2022-09-08 RX ADMIN — HEPARIN 500 UNITS: 100 SYRINGE at 12:09

## 2022-09-08 RX ADMIN — SODIUM CHLORIDE: 9 INJECTION, SOLUTION INTRAVENOUS at 09:09

## 2022-09-08 RX ADMIN — DEXAMETHASONE SODIUM PHOSPHATE 0.25 MG: 4 INJECTION, SOLUTION INTRA-ARTICULAR; INTRALESIONAL; INTRAMUSCULAR; INTRAVENOUS; SOFT TISSUE at 09:09

## 2022-09-13 ENCOUNTER — OFFICE VISIT (OUTPATIENT)
Dept: HEMATOLOGY/ONCOLOGY | Facility: CLINIC | Age: 47
End: 2022-09-13
Payer: COMMERCIAL

## 2022-09-13 ENCOUNTER — LAB VISIT (OUTPATIENT)
Dept: LAB | Facility: HOSPITAL | Age: 47
End: 2022-09-13
Attending: INTERNAL MEDICINE
Payer: COMMERCIAL

## 2022-09-13 VITALS
BODY MASS INDEX: 35.61 KG/M2 | SYSTOLIC BLOOD PRESSURE: 124 MMHG | TEMPERATURE: 98 F | DIASTOLIC BLOOD PRESSURE: 80 MMHG | HEART RATE: 65 BPM | WEIGHT: 194.69 LBS

## 2022-09-13 DIAGNOSIS — R92.8 ABNORMAL MRI, BREAST: ICD-10-CM

## 2022-09-13 DIAGNOSIS — R11.0 NAUSEA: ICD-10-CM

## 2022-09-13 DIAGNOSIS — R53.83 FATIGUE, UNSPECIFIED TYPE: ICD-10-CM

## 2022-09-13 DIAGNOSIS — Z17.1 MALIGNANT NEOPLASM OF UPPER-OUTER QUADRANT OF LEFT BREAST IN FEMALE, ESTROGEN RECEPTOR NEGATIVE: Primary | ICD-10-CM

## 2022-09-13 DIAGNOSIS — R51.9 NONINTRACTABLE HEADACHE, UNSPECIFIED CHRONICITY PATTERN, UNSPECIFIED HEADACHE TYPE: ICD-10-CM

## 2022-09-13 DIAGNOSIS — C50.412 MALIGNANT NEOPLASM OF UPPER-OUTER QUADRANT OF LEFT BREAST IN FEMALE, ESTROGEN RECEPTOR NEGATIVE: ICD-10-CM

## 2022-09-13 DIAGNOSIS — C50.412 MALIGNANT NEOPLASM OF UPPER-OUTER QUADRANT OF LEFT BREAST IN FEMALE, ESTROGEN RECEPTOR NEGATIVE: Primary | ICD-10-CM

## 2022-09-13 DIAGNOSIS — Z17.1 MALIGNANT NEOPLASM OF UPPER-OUTER QUADRANT OF LEFT BREAST IN FEMALE, ESTROGEN RECEPTOR NEGATIVE: ICD-10-CM

## 2022-09-13 DIAGNOSIS — J30.1 SEASONAL ALLERGIC RHINITIS DUE TO POLLEN: ICD-10-CM

## 2022-09-13 DIAGNOSIS — R74.01 TRANSAMINITIS: ICD-10-CM

## 2022-09-13 LAB
ALBUMIN SERPL BCP-MCNC: 4.2 G/DL (ref 3.5–5.2)
ALP SERPL-CCNC: 69 U/L (ref 55–135)
ALT SERPL W/O P-5'-P-CCNC: 33 U/L (ref 10–44)
ANION GAP SERPL CALC-SCNC: 7 MMOL/L (ref 8–16)
AST SERPL-CCNC: 23 U/L (ref 10–40)
BASOPHILS # BLD AUTO: 0.02 K/UL (ref 0–0.2)
BASOPHILS NFR BLD: 0.7 % (ref 0–1.9)
BILIRUB SERPL-MCNC: 1.1 MG/DL (ref 0.1–1)
BUN SERPL-MCNC: 16 MG/DL (ref 6–20)
CALCIUM SERPL-MCNC: 9.1 MG/DL (ref 8.7–10.5)
CHLORIDE SERPL-SCNC: 102 MMOL/L (ref 95–110)
CO2 SERPL-SCNC: 29 MMOL/L (ref 23–29)
CREAT SERPL-MCNC: 0.8 MG/DL (ref 0.5–1.4)
DIFFERENTIAL METHOD: ABNORMAL
EOSINOPHIL # BLD AUTO: 0.1 K/UL (ref 0–0.5)
EOSINOPHIL NFR BLD: 2.9 % (ref 0–8)
ERYTHROCYTE [DISTWIDTH] IN BLOOD BY AUTOMATED COUNT: 13 % (ref 11.5–14.5)
EST. GFR  (NO RACE VARIABLE): >60 ML/MIN/1.73 M^2
GLUCOSE SERPL-MCNC: 101 MG/DL (ref 70–110)
HCT VFR BLD AUTO: 38 % (ref 37–48.5)
HGB BLD-MCNC: 12.9 G/DL (ref 12–16)
IMM GRANULOCYTES # BLD AUTO: 0.01 K/UL (ref 0–0.04)
IMM GRANULOCYTES NFR BLD AUTO: 0.4 % (ref 0–0.5)
LYMPHOCYTES # BLD AUTO: 1 K/UL (ref 1–4.8)
LYMPHOCYTES NFR BLD: 35.5 % (ref 18–48)
MAGNESIUM SERPL-MCNC: 2.2 MG/DL (ref 1.6–2.6)
MCH RBC QN AUTO: 32.2 PG (ref 27–31)
MCHC RBC AUTO-ENTMCNC: 33.9 G/DL (ref 32–36)
MCV RBC AUTO: 95 FL (ref 82–98)
MONOCYTES # BLD AUTO: 0.1 K/UL (ref 0.3–1)
MONOCYTES NFR BLD: 1.8 % (ref 4–15)
NEUTROPHILS # BLD AUTO: 1.6 K/UL (ref 1.8–7.7)
NEUTROPHILS NFR BLD: 58.7 % (ref 38–73)
NRBC BLD-RTO: 0 /100 WBC
PLATELET # BLD AUTO: 179 K/UL (ref 150–450)
PMV BLD AUTO: 9.7 FL (ref 9.2–12.9)
POTASSIUM SERPL-SCNC: 4.6 MMOL/L (ref 3.5–5.1)
PROT SERPL-MCNC: 7.5 G/DL (ref 6–8.4)
RBC # BLD AUTO: 4.01 M/UL (ref 4–5.4)
SODIUM SERPL-SCNC: 138 MMOL/L (ref 136–145)
WBC # BLD AUTO: 2.79 K/UL (ref 3.9–12.7)

## 2022-09-13 PROCEDURE — 3079F PR MOST RECENT DIASTOLIC BLOOD PRESSURE 80-89 MM HG: ICD-10-PCS | Mod: CPTII,S$GLB,, | Performed by: NURSE PRACTITIONER

## 2022-09-13 PROCEDURE — 99214 PR OFFICE/OUTPT VISIT, EST, LEVL IV, 30-39 MIN: ICD-10-PCS | Mod: S$GLB,,, | Performed by: NURSE PRACTITIONER

## 2022-09-13 PROCEDURE — 3008F BODY MASS INDEX DOCD: CPT | Mod: CPTII,S$GLB,, | Performed by: NURSE PRACTITIONER

## 2022-09-13 PROCEDURE — 85025 COMPLETE CBC W/AUTO DIFF WBC: CPT | Performed by: INTERNAL MEDICINE

## 2022-09-13 PROCEDURE — 80053 COMPREHEN METABOLIC PANEL: CPT | Performed by: NURSE PRACTITIONER

## 2022-09-13 PROCEDURE — 1160F RVW MEDS BY RX/DR IN RCRD: CPT | Mod: CPTII,S$GLB,, | Performed by: NURSE PRACTITIONER

## 2022-09-13 PROCEDURE — 3008F PR BODY MASS INDEX (BMI) DOCUMENTED: ICD-10-PCS | Mod: CPTII,S$GLB,, | Performed by: NURSE PRACTITIONER

## 2022-09-13 PROCEDURE — 1160F PR REVIEW ALL MEDS BY PRESCRIBER/CLIN PHARMACIST DOCUMENTED: ICD-10-PCS | Mod: CPTII,S$GLB,, | Performed by: NURSE PRACTITIONER

## 2022-09-13 PROCEDURE — 3074F PR MOST RECENT SYSTOLIC BLOOD PRESSURE < 130 MM HG: ICD-10-PCS | Mod: CPTII,S$GLB,, | Performed by: NURSE PRACTITIONER

## 2022-09-13 PROCEDURE — 83735 ASSAY OF MAGNESIUM: CPT | Performed by: INTERNAL MEDICINE

## 2022-09-13 PROCEDURE — 1159F PR MEDICATION LIST DOCUMENTED IN MEDICAL RECORD: ICD-10-PCS | Mod: CPTII,S$GLB,, | Performed by: NURSE PRACTITIONER

## 2022-09-13 PROCEDURE — 99214 OFFICE O/P EST MOD 30 MIN: CPT | Mod: S$GLB,,, | Performed by: NURSE PRACTITIONER

## 2022-09-13 PROCEDURE — 36415 COLL VENOUS BLD VENIPUNCTURE: CPT | Performed by: INTERNAL MEDICINE

## 2022-09-13 PROCEDURE — 3074F SYST BP LT 130 MM HG: CPT | Mod: CPTII,S$GLB,, | Performed by: NURSE PRACTITIONER

## 2022-09-13 PROCEDURE — 3079F DIAST BP 80-89 MM HG: CPT | Mod: CPTII,S$GLB,, | Performed by: NURSE PRACTITIONER

## 2022-09-13 PROCEDURE — 1159F MED LIST DOCD IN RCRD: CPT | Mod: CPTII,S$GLB,, | Performed by: NURSE PRACTITIONER

## 2022-09-14 RX ORDER — HEPARIN 100 UNIT/ML
500 SYRINGE INTRAVENOUS
Status: CANCELLED | OUTPATIENT
Start: 2022-09-15

## 2022-09-14 RX ORDER — FAMOTIDINE 10 MG/ML
20 INJECTION INTRAVENOUS
Status: CANCELLED | OUTPATIENT
Start: 2022-09-15

## 2022-09-14 RX ORDER — ONDANSETRON HCL IN 0.9 % NACL 8 MG/50 ML
8 INTRAVENOUS SOLUTION, PIGGYBACK (ML) INTRAVENOUS
Status: CANCELLED
Start: 2022-09-15 | End: 2022-09-15

## 2022-09-14 RX ORDER — DIPHENHYDRAMINE HYDROCHLORIDE 50 MG/ML
50 INJECTION INTRAMUSCULAR; INTRAVENOUS ONCE AS NEEDED
Status: CANCELLED | OUTPATIENT
Start: 2022-09-15

## 2022-09-14 RX ORDER — SODIUM CHLORIDE 0.9 % (FLUSH) 0.9 %
10 SYRINGE (ML) INJECTION
Status: CANCELLED | OUTPATIENT
Start: 2022-09-15

## 2022-09-14 RX ORDER — EPINEPHRINE 0.3 MG/.3ML
0.3 INJECTION SUBCUTANEOUS ONCE AS NEEDED
Status: CANCELLED | OUTPATIENT
Start: 2022-09-15

## 2022-09-15 ENCOUNTER — INFUSION (OUTPATIENT)
Dept: INFUSION THERAPY | Facility: HOSPITAL | Age: 47
End: 2022-09-15
Attending: INTERNAL MEDICINE
Payer: COMMERCIAL

## 2022-09-15 VITALS
WEIGHT: 195.13 LBS | TEMPERATURE: 98 F | SYSTOLIC BLOOD PRESSURE: 116 MMHG | HEART RATE: 77 BPM | HEIGHT: 62 IN | RESPIRATION RATE: 18 BRPM | DIASTOLIC BLOOD PRESSURE: 82 MMHG | OXYGEN SATURATION: 99 % | BODY MASS INDEX: 35.91 KG/M2

## 2022-09-15 DIAGNOSIS — Z17.1 MALIGNANT NEOPLASM OF UPPER-OUTER QUADRANT OF LEFT BREAST IN FEMALE, ESTROGEN RECEPTOR NEGATIVE: Primary | ICD-10-CM

## 2022-09-15 DIAGNOSIS — C50.412 MALIGNANT NEOPLASM OF UPPER-OUTER QUADRANT OF LEFT BREAST IN FEMALE, ESTROGEN RECEPTOR NEGATIVE: Primary | ICD-10-CM

## 2022-09-15 PROCEDURE — 63600175 PHARM REV CODE 636 W HCPCS: Performed by: NURSE PRACTITIONER

## 2022-09-15 PROCEDURE — 96375 TX/PRO/DX INJ NEW DRUG ADDON: CPT

## 2022-09-15 PROCEDURE — 25000003 PHARM REV CODE 250: Performed by: NURSE PRACTITIONER

## 2022-09-15 PROCEDURE — A4216 STERILE WATER/SALINE, 10 ML: HCPCS | Performed by: NURSE PRACTITIONER

## 2022-09-15 PROCEDURE — 96413 CHEMO IV INFUSION 1 HR: CPT

## 2022-09-15 PROCEDURE — 96367 TX/PROPH/DG ADDL SEQ IV INF: CPT

## 2022-09-15 RX ORDER — HEPARIN 100 UNIT/ML
500 SYRINGE INTRAVENOUS
Status: DISCONTINUED | OUTPATIENT
Start: 2022-09-15 | End: 2022-09-15 | Stop reason: HOSPADM

## 2022-09-15 RX ORDER — SODIUM CHLORIDE 0.9 % (FLUSH) 0.9 %
10 SYRINGE (ML) INJECTION
Status: DISCONTINUED | OUTPATIENT
Start: 2022-09-15 | End: 2022-09-15 | Stop reason: HOSPADM

## 2022-09-15 RX ORDER — EPINEPHRINE 0.3 MG/.3ML
0.3 INJECTION SUBCUTANEOUS ONCE AS NEEDED
Status: DISCONTINUED | OUTPATIENT
Start: 2022-09-15 | End: 2022-09-15 | Stop reason: HOSPADM

## 2022-09-15 RX ORDER — FAMOTIDINE 10 MG/ML
20 INJECTION INTRAVENOUS
Status: COMPLETED | OUTPATIENT
Start: 2022-09-15 | End: 2022-09-15

## 2022-09-15 RX ORDER — ONDANSETRON HCL IN 0.9 % NACL 8 MG/50 ML
8 INTRAVENOUS SOLUTION, PIGGYBACK (ML) INTRAVENOUS
Status: COMPLETED | OUTPATIENT
Start: 2022-09-15 | End: 2022-09-15

## 2022-09-15 RX ADMIN — DEXAMETHASONE SODIUM PHOSPHATE 10 MG: 4 INJECTION, SOLUTION INTRA-ARTICULAR; INTRALESIONAL; INTRAMUSCULAR; INTRAVENOUS; SOFT TISSUE at 09:09

## 2022-09-15 RX ADMIN — ONDANSETRON 8 MG: 2 INJECTION INTRAMUSCULAR; INTRAVENOUS at 09:09

## 2022-09-15 RX ADMIN — SODIUM CHLORIDE, PRESERVATIVE FREE 10 ML: 5 INJECTION INTRAVENOUS at 11:09

## 2022-09-15 RX ADMIN — PACLITAXEL 200 MG: 100 INJECTION, POWDER, LYOPHILIZED, FOR SUSPENSION INTRAVENOUS at 10:09

## 2022-09-15 RX ADMIN — DIPHENHYDRAMINE HYDROCHLORIDE 50 MG: 50 INJECTION INTRAMUSCULAR; INTRAVENOUS at 10:09

## 2022-09-15 RX ADMIN — SODIUM CHLORIDE: 0.9 INJECTION, SOLUTION INTRAVENOUS at 09:09

## 2022-09-15 RX ADMIN — FAMOTIDINE 20 MG: 10 INJECTION INTRAVENOUS at 09:09

## 2022-09-15 RX ADMIN — HEPARIN 500 UNITS: 100 SYRINGE at 11:09

## 2022-09-15 NOTE — PLAN OF CARE
Problem: Activity Intolerance  Goal: Enhanced Capacity and Energy  Outcome: Ongoing, Progressing  Intervention: Optimize Activity Tolerance  Flowsheets (Taken 9/15/2022 0900)  Self-Care Promotion: independence encouraged  Activity Management:   Ambulated -L4   Up in chair - L3  Environmental Support:   calm environment promoted   rest periods encouraged

## 2022-09-20 ENCOUNTER — TELEPHONE (OUTPATIENT)
Dept: HEMATOLOGY/ONCOLOGY | Facility: CLINIC | Age: 47
End: 2022-09-20

## 2022-09-20 ENCOUNTER — LAB VISIT (OUTPATIENT)
Dept: LAB | Facility: HOSPITAL | Age: 47
End: 2022-09-20
Attending: INTERNAL MEDICINE
Payer: COMMERCIAL

## 2022-09-20 ENCOUNTER — INFUSION (OUTPATIENT)
Dept: INFUSION THERAPY | Facility: HOSPITAL | Age: 47
End: 2022-09-20
Attending: INTERNAL MEDICINE
Payer: COMMERCIAL

## 2022-09-20 VITALS
HEART RATE: 75 BPM | HEIGHT: 62 IN | BODY MASS INDEX: 36.42 KG/M2 | RESPIRATION RATE: 17 BRPM | TEMPERATURE: 98 F | SYSTOLIC BLOOD PRESSURE: 127 MMHG | WEIGHT: 197.88 LBS | OXYGEN SATURATION: 100 % | DIASTOLIC BLOOD PRESSURE: 81 MMHG

## 2022-09-20 DIAGNOSIS — T45.1X5A CHEMOTHERAPY-INDUCED NEUTROPENIA: Primary | ICD-10-CM

## 2022-09-20 DIAGNOSIS — Z17.1 MALIGNANT NEOPLASM OF UPPER-OUTER QUADRANT OF LEFT BREAST IN FEMALE, ESTROGEN RECEPTOR NEGATIVE: ICD-10-CM

## 2022-09-20 DIAGNOSIS — R53.83 FATIGUE, UNSPECIFIED TYPE: ICD-10-CM

## 2022-09-20 DIAGNOSIS — D70.1 CHEMOTHERAPY-INDUCED NEUTROPENIA: Primary | ICD-10-CM

## 2022-09-20 DIAGNOSIS — C50.412 MALIGNANT NEOPLASM OF UPPER-OUTER QUADRANT OF LEFT BREAST IN FEMALE, ESTROGEN RECEPTOR NEGATIVE: ICD-10-CM

## 2022-09-20 LAB
ALBUMIN SERPL BCP-MCNC: 3.8 G/DL (ref 3.5–5.2)
ALP SERPL-CCNC: 73 U/L (ref 55–135)
ALT SERPL W/O P-5'-P-CCNC: 36 U/L (ref 10–44)
ANION GAP SERPL CALC-SCNC: 8 MMOL/L (ref 8–16)
AST SERPL-CCNC: 22 U/L (ref 10–40)
BASOPHILS # BLD AUTO: 0.01 K/UL (ref 0–0.2)
BASOPHILS NFR BLD: 0.5 % (ref 0–1.9)
BILIRUB SERPL-MCNC: 0.4 MG/DL (ref 0.1–1)
BUN SERPL-MCNC: 12 MG/DL (ref 6–20)
CALCIUM SERPL-MCNC: 9.1 MG/DL (ref 8.7–10.5)
CHLORIDE SERPL-SCNC: 107 MMOL/L (ref 95–110)
CO2 SERPL-SCNC: 26 MMOL/L (ref 23–29)
CREAT SERPL-MCNC: 0.7 MG/DL (ref 0.5–1.4)
DIFFERENTIAL METHOD: ABNORMAL
EOSINOPHIL # BLD AUTO: 0.1 K/UL (ref 0–0.5)
EOSINOPHIL NFR BLD: 2.3 % (ref 0–8)
ERYTHROCYTE [DISTWIDTH] IN BLOOD BY AUTOMATED COUNT: 12.8 % (ref 11.5–14.5)
EST. GFR  (NO RACE VARIABLE): >60 ML/MIN/1.73 M^2
GLUCOSE SERPL-MCNC: 99 MG/DL (ref 70–110)
HCT VFR BLD AUTO: 31 % (ref 37–48.5)
HGB BLD-MCNC: 10.8 G/DL (ref 12–16)
IMM GRANULOCYTES # BLD AUTO: 0 K/UL (ref 0–0.04)
IMM GRANULOCYTES NFR BLD AUTO: 0 % (ref 0–0.5)
LYMPHOCYTES # BLD AUTO: 1.3 K/UL (ref 1–4.8)
LYMPHOCYTES NFR BLD: 57.3 % (ref 18–48)
MAGNESIUM SERPL-MCNC: 1.9 MG/DL (ref 1.6–2.6)
MCH RBC QN AUTO: 32.8 PG (ref 27–31)
MCHC RBC AUTO-ENTMCNC: 34.8 G/DL (ref 32–36)
MCV RBC AUTO: 94 FL (ref 82–98)
MONOCYTES # BLD AUTO: 0.2 K/UL (ref 0.3–1)
MONOCYTES NFR BLD: 6.9 % (ref 4–15)
NEUTROPHILS # BLD AUTO: 0.7 K/UL (ref 1.8–7.7)
NEUTROPHILS NFR BLD: 33 % (ref 38–73)
NRBC BLD-RTO: 0 /100 WBC
PLATELET # BLD AUTO: 147 K/UL (ref 150–450)
PMV BLD AUTO: 9.4 FL (ref 9.2–12.9)
POTASSIUM SERPL-SCNC: 4.2 MMOL/L (ref 3.5–5.1)
PROT SERPL-MCNC: 6.8 G/DL (ref 6–8.4)
RBC # BLD AUTO: 3.29 M/UL (ref 4–5.4)
SODIUM SERPL-SCNC: 141 MMOL/L (ref 136–145)
TSH SERPL DL<=0.005 MIU/L-ACNC: 1.39 UIU/ML (ref 0.34–5.6)
WBC # BLD AUTO: 2.18 K/UL (ref 3.9–12.7)

## 2022-09-20 PROCEDURE — 36415 COLL VENOUS BLD VENIPUNCTURE: CPT | Performed by: INTERNAL MEDICINE

## 2022-09-20 PROCEDURE — 83735 ASSAY OF MAGNESIUM: CPT | Performed by: INTERNAL MEDICINE

## 2022-09-20 PROCEDURE — 84443 ASSAY THYROID STIM HORMONE: CPT | Performed by: INTERNAL MEDICINE

## 2022-09-20 PROCEDURE — 63600175 PHARM REV CODE 636 W HCPCS: Mod: JG | Performed by: NURSE PRACTITIONER

## 2022-09-20 PROCEDURE — 85025 COMPLETE CBC W/AUTO DIFF WBC: CPT | Performed by: INTERNAL MEDICINE

## 2022-09-20 PROCEDURE — 96372 THER/PROPH/DIAG INJ SC/IM: CPT

## 2022-09-20 PROCEDURE — 80053 COMPREHEN METABOLIC PANEL: CPT | Performed by: NURSE PRACTITIONER

## 2022-09-20 RX ADMIN — FILGRASTIM-SNDZ 480 MCG: 480 INJECTION, SOLUTION INTRAVENOUS; SUBCUTANEOUS at 02:09

## 2022-09-20 NOTE — PLAN OF CARE
Problem: Fatigue  Goal: Improved Activity Tolerance  Intervention: Promote Improved Energy  Flowsheets (Taken 9/20/2022 1412)  Fatigue Management:   frequent rest breaks encouraged   paced activity encouraged

## 2022-09-20 NOTE — TELEPHONE ENCOUNTER
----- Message from JEAN PAUL Andrea sent at 9/20/2022  8:38 AM CDT -----  ANC is only 700 give Neupogen today and tomorrow and repeat cbc on Thursday prior to chemo

## 2022-09-20 NOTE — TELEPHONE ENCOUNTER
Zarxio orders x 2 placed for this pt. She is to also re-check her labs before treatment on Thursday. Scheduling will call to set up a time once we receive auth. Pt was notified and verbalized understanding.

## 2022-09-21 ENCOUNTER — INFUSION (OUTPATIENT)
Dept: INFUSION THERAPY | Facility: HOSPITAL | Age: 47
End: 2022-09-21
Attending: INTERNAL MEDICINE
Payer: COMMERCIAL

## 2022-09-21 VITALS
WEIGHT: 196.13 LBS | RESPIRATION RATE: 16 BRPM | BODY MASS INDEX: 36.09 KG/M2 | DIASTOLIC BLOOD PRESSURE: 81 MMHG | HEART RATE: 78 BPM | OXYGEN SATURATION: 98 % | HEIGHT: 62 IN | SYSTOLIC BLOOD PRESSURE: 117 MMHG | TEMPERATURE: 98 F

## 2022-09-21 DIAGNOSIS — T45.1X5A CHEMOTHERAPY-INDUCED NEUTROPENIA: Primary | ICD-10-CM

## 2022-09-21 DIAGNOSIS — D70.1 CHEMOTHERAPY-INDUCED NEUTROPENIA: Primary | ICD-10-CM

## 2022-09-21 PROCEDURE — 96372 THER/PROPH/DIAG INJ SC/IM: CPT

## 2022-09-21 PROCEDURE — 63600175 PHARM REV CODE 636 W HCPCS: Mod: JG | Performed by: NURSE PRACTITIONER

## 2022-09-21 RX ADMIN — FILGRASTIM-SNDZ 480 MCG: 480 INJECTION, SOLUTION INTRAVENOUS; SUBCUTANEOUS at 02:09

## 2022-09-21 NOTE — PLAN OF CARE
Problem: Fatigue  Goal: Improved Activity Tolerance  Outcome: Ongoing, Progressing  Intervention: Promote Improved Energy  Flowsheets (Taken 9/21/2022 3465)  Fatigue Management: frequent rest breaks encouraged  Sleep/Rest Enhancement:   regular sleep/rest pattern promoted   relaxation techniques promoted  Activity Management: Ambulated -L4

## 2022-09-22 ENCOUNTER — INFUSION (OUTPATIENT)
Dept: INFUSION THERAPY | Facility: HOSPITAL | Age: 47
End: 2022-09-22
Attending: INTERNAL MEDICINE
Payer: COMMERCIAL

## 2022-09-22 ENCOUNTER — LAB VISIT (OUTPATIENT)
Dept: LAB | Facility: HOSPITAL | Age: 47
End: 2022-09-22
Attending: INTERNAL MEDICINE
Payer: COMMERCIAL

## 2022-09-22 VITALS
RESPIRATION RATE: 18 BRPM | DIASTOLIC BLOOD PRESSURE: 62 MMHG | SYSTOLIC BLOOD PRESSURE: 110 MMHG | OXYGEN SATURATION: 100 % | HEART RATE: 54 BPM | BODY MASS INDEX: 36.37 KG/M2 | HEIGHT: 62 IN | TEMPERATURE: 98 F | WEIGHT: 197.63 LBS

## 2022-09-22 DIAGNOSIS — C50.412 MALIGNANT NEOPLASM OF UPPER-OUTER QUADRANT OF LEFT BREAST IN FEMALE, ESTROGEN RECEPTOR NEGATIVE: ICD-10-CM

## 2022-09-22 DIAGNOSIS — R53.83 FATIGUE, UNSPECIFIED TYPE: ICD-10-CM

## 2022-09-22 DIAGNOSIS — Z17.1 MALIGNANT NEOPLASM OF UPPER-OUTER QUADRANT OF LEFT BREAST IN FEMALE, ESTROGEN RECEPTOR NEGATIVE: ICD-10-CM

## 2022-09-22 DIAGNOSIS — T45.1X5A CHEMOTHERAPY-INDUCED NEUTROPENIA: Primary | ICD-10-CM

## 2022-09-22 DIAGNOSIS — D70.1 CHEMOTHERAPY-INDUCED NEUTROPENIA: Primary | ICD-10-CM

## 2022-09-22 LAB
ANISOCYTOSIS BLD QL SMEAR: SLIGHT
BASOPHILS NFR BLD: 0 % (ref 0–1.9)
DIFFERENTIAL METHOD: ABNORMAL
EOSINOPHIL NFR BLD: 2 % (ref 0–8)
ERYTHROCYTE [DISTWIDTH] IN BLOOD BY AUTOMATED COUNT: 13 % (ref 11.5–14.5)
HCT VFR BLD AUTO: 32.1 % (ref 37–48.5)
HGB BLD-MCNC: 10.9 G/DL (ref 12–16)
IMM GRANULOCYTES # BLD AUTO: ABNORMAL K/UL (ref 0–0.04)
IMM GRANULOCYTES NFR BLD AUTO: ABNORMAL % (ref 0–0.5)
LYMPHOCYTES NFR BLD: 38 % (ref 18–48)
MCH RBC QN AUTO: 32.2 PG (ref 27–31)
MCHC RBC AUTO-ENTMCNC: 34 G/DL (ref 32–36)
MCV RBC AUTO: 95 FL (ref 82–98)
MONOCYTES NFR BLD: 14 % (ref 4–15)
NEUTROPHILS NFR BLD: 46 % (ref 38–73)
NRBC BLD-RTO: 0 /100 WBC
PLATELET # BLD AUTO: 131 K/UL (ref 150–450)
PLATELET BLD QL SMEAR: ABNORMAL
PMV BLD AUTO: 9.8 FL (ref 9.2–12.9)
POIKILOCYTOSIS BLD QL SMEAR: SLIGHT
RBC # BLD AUTO: 3.39 M/UL (ref 4–5.4)
WBC # BLD AUTO: 7.18 K/UL (ref 3.9–12.7)

## 2022-09-22 PROCEDURE — 85007 BL SMEAR W/DIFF WBC COUNT: CPT | Performed by: INTERNAL MEDICINE

## 2022-09-22 PROCEDURE — 25000003 PHARM REV CODE 250: Performed by: NURSE PRACTITIONER

## 2022-09-22 PROCEDURE — 85027 COMPLETE CBC AUTOMATED: CPT | Performed by: INTERNAL MEDICINE

## 2022-09-22 PROCEDURE — 96413 CHEMO IV INFUSION 1 HR: CPT

## 2022-09-22 PROCEDURE — A4216 STERILE WATER/SALINE, 10 ML: HCPCS | Performed by: NURSE PRACTITIONER

## 2022-09-22 PROCEDURE — 96375 TX/PRO/DX INJ NEW DRUG ADDON: CPT

## 2022-09-22 PROCEDURE — 96367 TX/PROPH/DG ADDL SEQ IV INF: CPT

## 2022-09-22 PROCEDURE — 63600175 PHARM REV CODE 636 W HCPCS: Performed by: NURSE PRACTITIONER

## 2022-09-22 PROCEDURE — 36415 COLL VENOUS BLD VENIPUNCTURE: CPT | Performed by: INTERNAL MEDICINE

## 2022-09-22 RX ORDER — ONDANSETRON HCL IN 0.9 % NACL 8 MG/50 ML
8 INTRAVENOUS SOLUTION, PIGGYBACK (ML) INTRAVENOUS
Status: CANCELLED
Start: 2022-09-22 | End: 2022-09-22

## 2022-09-22 RX ORDER — DIPHENHYDRAMINE HYDROCHLORIDE 50 MG/ML
50 INJECTION INTRAMUSCULAR; INTRAVENOUS ONCE AS NEEDED
Status: CANCELLED | OUTPATIENT
Start: 2022-09-22

## 2022-09-22 RX ORDER — FAMOTIDINE 10 MG/ML
20 INJECTION INTRAVENOUS
Status: CANCELLED | OUTPATIENT
Start: 2022-09-22

## 2022-09-22 RX ORDER — SODIUM CHLORIDE 0.9 % (FLUSH) 0.9 %
10 SYRINGE (ML) INJECTION
Status: CANCELLED | OUTPATIENT
Start: 2022-09-22

## 2022-09-22 RX ORDER — HEPARIN 100 UNIT/ML
500 SYRINGE INTRAVENOUS
Status: DISCONTINUED | OUTPATIENT
Start: 2022-09-22 | End: 2022-09-22 | Stop reason: HOSPADM

## 2022-09-22 RX ORDER — ONDANSETRON HCL IN 0.9 % NACL 8 MG/50 ML
8 INTRAVENOUS SOLUTION, PIGGYBACK (ML) INTRAVENOUS
Status: COMPLETED | OUTPATIENT
Start: 2022-09-22 | End: 2022-09-22

## 2022-09-22 RX ORDER — EPINEPHRINE 0.3 MG/.3ML
0.3 INJECTION SUBCUTANEOUS ONCE AS NEEDED
Status: CANCELLED | OUTPATIENT
Start: 2022-09-22

## 2022-09-22 RX ORDER — FAMOTIDINE 10 MG/ML
20 INJECTION INTRAVENOUS
Status: COMPLETED | OUTPATIENT
Start: 2022-09-22 | End: 2022-09-22

## 2022-09-22 RX ORDER — EPINEPHRINE 0.3 MG/.3ML
0.3 INJECTION SUBCUTANEOUS ONCE AS NEEDED
Status: DISCONTINUED | OUTPATIENT
Start: 2022-09-22 | End: 2022-09-22 | Stop reason: HOSPADM

## 2022-09-22 RX ORDER — SODIUM CHLORIDE 0.9 % (FLUSH) 0.9 %
10 SYRINGE (ML) INJECTION
Status: DISCONTINUED | OUTPATIENT
Start: 2022-09-22 | End: 2022-09-22 | Stop reason: HOSPADM

## 2022-09-22 RX ORDER — HEPARIN 100 UNIT/ML
500 SYRINGE INTRAVENOUS
Status: CANCELLED | OUTPATIENT
Start: 2022-09-22

## 2022-09-22 RX ADMIN — SODIUM CHLORIDE, PRESERVATIVE FREE 10 ML: 5 INJECTION INTRAVENOUS at 11:09

## 2022-09-22 RX ADMIN — SODIUM CHLORIDE: 9 INJECTION, SOLUTION INTRAVENOUS at 09:09

## 2022-09-22 RX ADMIN — DEXAMETHASONE SODIUM PHOSPHATE 10 MG: 4 INJECTION, SOLUTION INTRA-ARTICULAR; INTRALESIONAL; INTRAMUSCULAR; INTRAVENOUS; SOFT TISSUE at 09:09

## 2022-09-22 RX ADMIN — ONDANSETRON 8 MG: 2 INJECTION INTRAMUSCULAR; INTRAVENOUS at 10:09

## 2022-09-22 RX ADMIN — FAMOTIDINE 20 MG: 10 INJECTION INTRAVENOUS at 09:09

## 2022-09-22 RX ADMIN — PACLITAXEL 200 MG: 100 INJECTION, POWDER, LYOPHILIZED, FOR SUSPENSION INTRAVENOUS at 10:09

## 2022-09-22 RX ADMIN — DIPHENHYDRAMINE HYDROCHLORIDE 50 MG: 50 INJECTION INTRAMUSCULAR; INTRAVENOUS at 09:09

## 2022-09-22 RX ADMIN — HEPARIN 500 UNITS: 100 SYRINGE at 11:09

## 2022-09-25 ENCOUNTER — HOSPITAL ENCOUNTER (EMERGENCY)
Facility: HOSPITAL | Age: 47
Discharge: HOME | End: 2022-09-25
Attending: EMERGENCY MEDICINE
Payer: COMMERCIAL

## 2022-09-25 VITALS
TEMPERATURE: 98 F | HEIGHT: 62 IN | BODY MASS INDEX: 36.07 KG/M2 | SYSTOLIC BLOOD PRESSURE: 134 MMHG | OXYGEN SATURATION: 100 % | HEART RATE: 90 BPM | DIASTOLIC BLOOD PRESSURE: 86 MMHG | RESPIRATION RATE: 16 BRPM | WEIGHT: 196 LBS

## 2022-09-25 DIAGNOSIS — L03.221 CELLULITIS OF NECK: Primary | ICD-10-CM

## 2022-09-25 LAB
ALBUMIN SERPL BCP-MCNC: 4 G/DL (ref 3.5–5.2)
ALP SERPL-CCNC: 89 U/L (ref 55–135)
ALT SERPL W/O P-5'-P-CCNC: 41 U/L (ref 10–44)
ANION GAP SERPL CALC-SCNC: 6 MMOL/L (ref 8–16)
AST SERPL-CCNC: 21 U/L (ref 10–40)
BASOPHILS # BLD AUTO: 0.02 K/UL (ref 0–0.2)
BASOPHILS NFR BLD: 1.2 % (ref 0–1.9)
BILIRUB SERPL-MCNC: 0.6 MG/DL (ref 0.1–1)
BUN SERPL-MCNC: 11 MG/DL (ref 6–20)
CALCIUM SERPL-MCNC: 9.6 MG/DL (ref 8.7–10.5)
CHLORIDE SERPL-SCNC: 102 MMOL/L (ref 95–110)
CO2 SERPL-SCNC: 29 MMOL/L (ref 23–29)
CREAT SERPL-MCNC: 0.8 MG/DL (ref 0.5–1.4)
CRP SERPL-MCNC: 0.62 MG/DL
DIFFERENTIAL METHOD: ABNORMAL
EOSINOPHIL # BLD AUTO: 0 K/UL (ref 0–0.5)
EOSINOPHIL NFR BLD: 0.6 % (ref 0–8)
ERYTHROCYTE [DISTWIDTH] IN BLOOD BY AUTOMATED COUNT: 13.2 % (ref 11.5–14.5)
ERYTHROCYTE [SEDIMENTATION RATE] IN BLOOD BY WESTERGREN METHOD: 15 MM/HR (ref 0–20)
EST. GFR  (NO RACE VARIABLE): >60 ML/MIN/1.73 M^2
GLUCOSE SERPL-MCNC: 107 MG/DL (ref 70–110)
HCT VFR BLD AUTO: 32.7 % (ref 37–48.5)
HGB BLD-MCNC: 11.2 G/DL (ref 12–16)
IMM GRANULOCYTES # BLD AUTO: 0.01 K/UL (ref 0–0.04)
IMM GRANULOCYTES NFR BLD AUTO: 0.6 % (ref 0–0.5)
LACTATE SERPL-SCNC: 1.4 MMOL/L (ref 0.5–1.9)
LYMPHOCYTES # BLD AUTO: 1.1 K/UL (ref 1–4.8)
LYMPHOCYTES NFR BLD: 65.3 % (ref 18–48)
MCH RBC QN AUTO: 32.6 PG (ref 27–31)
MCHC RBC AUTO-ENTMCNC: 34.3 G/DL (ref 32–36)
MCV RBC AUTO: 95 FL (ref 82–98)
MONOCYTES # BLD AUTO: 0.1 K/UL (ref 0.3–1)
MONOCYTES NFR BLD: 4.6 % (ref 4–15)
NEUTROPHILS # BLD AUTO: 0.5 K/UL (ref 1.8–7.7)
NEUTROPHILS NFR BLD: 27.7 % (ref 38–73)
NRBC BLD-RTO: 0 /100 WBC
PLATELET # BLD AUTO: 118 K/UL (ref 150–450)
PMV BLD AUTO: 10.9 FL (ref 9.2–12.9)
POTASSIUM SERPL-SCNC: 4.4 MMOL/L (ref 3.5–5.1)
PROT SERPL-MCNC: 7.1 G/DL (ref 6–8.4)
RBC # BLD AUTO: 3.44 M/UL (ref 4–5.4)
SODIUM SERPL-SCNC: 137 MMOL/L (ref 136–145)
WBC # BLD AUTO: 2.01 K/UL (ref 3.9–12.7)

## 2022-09-25 PROCEDURE — 80053 COMPREHEN METABOLIC PANEL: CPT | Performed by: STUDENT IN AN ORGANIZED HEALTH CARE EDUCATION/TRAINING PROGRAM

## 2022-09-25 PROCEDURE — 86140 C-REACTIVE PROTEIN: CPT | Performed by: STUDENT IN AN ORGANIZED HEALTH CARE EDUCATION/TRAINING PROGRAM

## 2022-09-25 PROCEDURE — 85651 RBC SED RATE NONAUTOMATED: CPT | Performed by: STUDENT IN AN ORGANIZED HEALTH CARE EDUCATION/TRAINING PROGRAM

## 2022-09-25 PROCEDURE — 83605 ASSAY OF LACTIC ACID: CPT | Performed by: STUDENT IN AN ORGANIZED HEALTH CARE EDUCATION/TRAINING PROGRAM

## 2022-09-25 PROCEDURE — 85025 COMPLETE CBC W/AUTO DIFF WBC: CPT | Performed by: STUDENT IN AN ORGANIZED HEALTH CARE EDUCATION/TRAINING PROGRAM

## 2022-09-25 PROCEDURE — 25000003 PHARM REV CODE 250: Performed by: EMERGENCY MEDICINE

## 2022-09-25 PROCEDURE — 87040 BLOOD CULTURE FOR BACTERIA: CPT | Mod: 59 | Performed by: STUDENT IN AN ORGANIZED HEALTH CARE EDUCATION/TRAINING PROGRAM

## 2022-09-25 PROCEDURE — 99284 EMERGENCY DEPT VISIT MOD MDM: CPT | Mod: 25

## 2022-09-25 RX ORDER — CETIRIZINE HYDROCHLORIDE 10 MG/1
1 CAPSULE, LIQUID FILLED ORAL DAILY
COMMUNITY
Start: 2022-08-24 | End: 2024-02-01

## 2022-09-25 RX ORDER — DOXYCYCLINE 100 MG/1
100 CAPSULE ORAL 2 TIMES DAILY
Qty: 20 CAPSULE | Refills: 0 | Status: SHIPPED | OUTPATIENT
Start: 2022-09-25 | End: 2022-10-05

## 2022-09-25 RX ORDER — DOXYCYCLINE 100 MG/1
100 CAPSULE ORAL
Status: COMPLETED | OUTPATIENT
Start: 2022-09-25 | End: 2022-09-25

## 2022-09-25 RX ADMIN — DOXYCYCLINE HYCLATE 100 MG: 100 CAPSULE ORAL at 10:09

## 2022-09-26 NOTE — FIRST PROVIDER EVALUATION
"Medical screening examination initiated.  I have conducted a focused provider triage encounter, findings are as follows:    Brief history of present illness:  39-year-old female history of breast cancer presents emergency room for evaluation of what she thinks is a an infected chemo port.  Last accessed 4 days ago, awoke this morning with redness, swelling and pain over the site.    Vitals:    09/25/22 2021   BP: 139/82   BP Location: Left arm   Patient Position: Sitting   Pulse: 94   Resp: 16   Temp: 97.7 °F (36.5 °C)   TempSrc: Oral   SpO2: 100%   Weight: 88.9 kg (196 lb)   Height: 5' 2" (1.575 m)       Pertinent physical exam:  Red, warm area overlying port    Brief workup plan:  Labs    Preliminary workup initiated; this workup will be continued and followed by the physician or advanced practice provider that is assigned to the patient when roomed.  "

## 2022-09-26 NOTE — ED PROVIDER NOTES
Encounter Date: 9/25/2022       History     Chief Complaint   Patient presents with    Vascular Access Problem     Pt port was placed august 2022. She woke up today noticing redness and swelling to the site along with site being very painful.      Patient presents complaining of red area to the anterior neck.  Patient has a port and is concerned about infection.  This was the area they accessed to put the port in.  Patient denies any pain redness or discomfort around the port itself.  No fever.    Review of patient's allergies indicates:   Allergen Reactions    Taxol [paclitaxel] Rash     Past Medical History:   Diagnosis Date    Anxiety     Breast cancer      Past Surgical History:   Procedure Laterality Date    BREAST BIOPSY      eye lid surgery      NOSE SURGERY      PORTACATH PLACEMENT      TONSILLECTOMY       Family History   Problem Relation Age of Onset    Breast cancer Maternal Grandmother     Prostate cancer Maternal Grandfather      Social History     Tobacco Use    Smoking status: Former   Substance Use Topics    Alcohol use: Yes     Comment: occasional     Review of Systems   All other systems reviewed and are negative.    Physical Exam     Initial Vitals [09/25/22 2021]   BP Pulse Resp Temp SpO2   139/82 94 16 97.7 °F (36.5 °C) 100 %      MAP       --         Physical Exam    Nursing note and vitals reviewed.  Constitutional: She appears well-developed and well-nourished.   Pleasant, polite   HENT:   Head: Normocephalic and atraumatic.   Patient has a small quarter-sized area of redness to the anterior neck.  The port has no erythema warmth or cellulitis around it.   Eyes: EOM are normal.   Neck: Neck supple.   Normal range of motion.  Pulmonary/Chest: No respiratory distress.   Musculoskeletal:      Cervical back: Normal range of motion and neck supple.     Neurological: She is alert and oriented to person, place, and time.   Skin: Skin is warm and dry. Capillary refill takes less than 2 seconds.    Psychiatric: She has a normal mood and affect. Her behavior is normal. Judgment and thought content normal.       ED Course   Procedures  Labs Reviewed   CBC W/ AUTO DIFFERENTIAL - Abnormal; Notable for the following components:       Result Value    WBC 2.01 (*)     RBC 3.44 (*)     Hemoglobin 11.2 (*)     Hematocrit 32.7 (*)     MCH 32.6 (*)     Platelets 118 (*)     Immature Granulocytes 0.6 (*)     Gran # (ANC) 0.5 (*)     Mono # 0.1 (*)     Gran % 27.7 (*)     Lymph % 65.3 (*)     All other components within normal limits   COMPREHENSIVE METABOLIC PANEL - Abnormal; Notable for the following components:    Anion Gap 6 (*)     All other components within normal limits   CULTURE, BLOOD   CULTURE, BLOOD   LACTIC ACID, PLASMA   SEDIMENTATION RATE   C-REACTIVE PROTEIN   URINALYSIS, REFLEX TO URINE CULTURE          Imaging Results              US Upper Extremity Veins Right (Final result)  Result time 09/25/22 22:34:44   Procedure changed from US Guided Vascular Access     Final result by Vincenzo Hyatt MD (09/25/22 22:34:44)                   Narrative:      PROCEDURE: US UPPER EXTREMITY VEINS LIMITED FOLLOW-UP UNILATERAL, 9/25/2022 9:23 PM CDT    COMPARISON: None    CLINICAL INDICATION:    pain, redness at access port.    TECHNIQUE:  Multiple gray-scale, color Doppler, and spectral waveform sonographic images were obtained of portions of right upper extremity.    FINDINGS:    No fluid collection is identified at the access site for the port. The right internal jugular vein, right innominate vein, right subclavian vein appear patent without evidence of thrombus. Remainder of the right upper extremity veins were not assessed on this examination.    IMPRESSION:  No fluid collection is identified at the access site for the port.    The right internal jugular vein, right innominate vein, right subclavian vein appear patent without evidence of thrombus.    Remainder of the right upper extremity veins were not assessed  on this examination.    Electronically signed by:  Vincenzo Hyatt MD  9/25/2022 10:34 PM CDT Workstation: [id number]                                     Medications   doxycycline capsule 100 mg (100 mg Oral Given 9/25/22 6358)     Medical Decision Making:   Initial Assessment:   No apparent distress  Differential Diagnosis:   Infection, DVT  Clinical Tests:   Lab Tests: Reviewed and Ordered  Radiological Study: Ordered and Reviewed  Medical Tests: Reviewed and Ordered  ED Management:  Ultrasound shows no evidence of DVT or thrombophlebitis.  Patient likely having some localized cellulitis to the anterior neck.  It does not cross the midline.  Patient has no distress and is protecting her airway.  Patient good candidate for outpatient therapy.  She was given doxycycline from the emergency department.  Blood work was reviewed and is within normal limits.  Patient be discharged stable condition.  Detailed return precautions discussed.                        Clinical Impression:   Final diagnoses:  [L03.221] Cellulitis of neck (Primary)        ED Disposition Condition    Discharge Stable          ED Prescriptions       Medication Sig Dispense Start Date End Date Auth. Provider    doxycycline (VIBRAMYCIN) 100 MG Cap Take 1 capsule (100 mg total) by mouth 2 (two) times daily. for 10 days 20 capsule 9/25/2022 10/5/2022 Misha Carter MD          Follow-up Information       Follow up With Specialties Details Why Contact Info    WOLF Lamb Nurse Practitioner In 1 week  [address] [facility] [address]  [phone]               Misha Carter MD  09/25/22 [id number]

## 2022-09-27 ENCOUNTER — PATIENT MESSAGE (OUTPATIENT)
Dept: HEMATOLOGY/ONCOLOGY | Facility: CLINIC | Age: 47
End: 2022-09-27

## 2022-09-27 ENCOUNTER — LAB VISIT (OUTPATIENT)
Dept: LAB | Facility: HOSPITAL | Age: 47
End: 2022-09-27
Attending: INTERNAL MEDICINE
Payer: COMMERCIAL

## 2022-09-27 ENCOUNTER — INFUSION (OUTPATIENT)
Dept: INFUSION THERAPY | Facility: HOSPITAL | Age: 47
End: 2022-09-27
Attending: INTERNAL MEDICINE
Payer: COMMERCIAL

## 2022-09-27 ENCOUNTER — TELEPHONE (OUTPATIENT)
Dept: HEMATOLOGY/ONCOLOGY | Facility: CLINIC | Age: 47
End: 2022-09-27

## 2022-09-27 VITALS
HEART RATE: 87 BPM | RESPIRATION RATE: 17 BRPM | TEMPERATURE: 97 F | DIASTOLIC BLOOD PRESSURE: 68 MMHG | SYSTOLIC BLOOD PRESSURE: 107 MMHG

## 2022-09-27 DIAGNOSIS — E86.0 DEHYDRATION: ICD-10-CM

## 2022-09-27 DIAGNOSIS — E86.0 DEHYDRATION: Primary | ICD-10-CM

## 2022-09-27 DIAGNOSIS — C50.412 MALIGNANT NEOPLASM OF UPPER-OUTER QUADRANT OF LEFT BREAST IN FEMALE, ESTROGEN RECEPTOR NEGATIVE: ICD-10-CM

## 2022-09-27 DIAGNOSIS — R53.83 FATIGUE, UNSPECIFIED TYPE: ICD-10-CM

## 2022-09-27 DIAGNOSIS — Z17.1 MALIGNANT NEOPLASM OF UPPER-OUTER QUADRANT OF LEFT BREAST IN FEMALE, ESTROGEN RECEPTOR NEGATIVE: ICD-10-CM

## 2022-09-27 LAB
ALBUMIN SERPL BCP-MCNC: 3.9 G/DL (ref 3.5–5.2)
ALP SERPL-CCNC: 94 U/L (ref 55–135)
ALT SERPL W/O P-5'-P-CCNC: 54 U/L (ref 10–44)
ANION GAP SERPL CALC-SCNC: 9 MMOL/L (ref 8–16)
ANISOCYTOSIS BLD QL SMEAR: SLIGHT
AST SERPL-CCNC: 31 U/L (ref 10–40)
BASOPHILS NFR BLD: 0 % (ref 0–1.9)
BILIRUB SERPL-MCNC: 0.9 MG/DL (ref 0.1–1)
BUN SERPL-MCNC: 15 MG/DL (ref 6–20)
CALCIUM SERPL-MCNC: 9.3 MG/DL (ref 8.7–10.5)
CHLORIDE SERPL-SCNC: 104 MMOL/L (ref 95–110)
CO2 SERPL-SCNC: 24 MMOL/L (ref 23–29)
CREAT SERPL-MCNC: 0.9 MG/DL (ref 0.5–1.4)
DIFFERENTIAL METHOD: ABNORMAL
EOSINOPHIL NFR BLD: 0 % (ref 0–8)
ERYTHROCYTE [DISTWIDTH] IN BLOOD BY AUTOMATED COUNT: 13.3 % (ref 11.5–14.5)
EST. GFR  (NO RACE VARIABLE): >60 ML/MIN/1.73 M^2
GLUCOSE SERPL-MCNC: 115 MG/DL (ref 70–110)
HCT VFR BLD AUTO: 29.4 % (ref 37–48.5)
HGB BLD-MCNC: 10.1 G/DL (ref 12–16)
IMM GRANULOCYTES # BLD AUTO: ABNORMAL K/UL (ref 0–0.04)
IMM GRANULOCYTES NFR BLD AUTO: ABNORMAL % (ref 0–0.5)
LYMPHOCYTES NFR BLD: 28 % (ref 18–48)
MAGNESIUM SERPL-MCNC: 2 MG/DL (ref 1.6–2.6)
MCH RBC QN AUTO: 32.2 PG (ref 27–31)
MCHC RBC AUTO-ENTMCNC: 34.4 G/DL (ref 32–36)
MCV RBC AUTO: 94 FL (ref 82–98)
MONOCYTES NFR BLD: 4 % (ref 4–15)
NEUTROPHILS NFR BLD: 68 % (ref 38–73)
NRBC BLD-RTO: 0 /100 WBC
PLATELET # BLD AUTO: 50 K/UL (ref 150–450)
PLATELET BLD QL SMEAR: ABNORMAL
PMV BLD AUTO: 9.3 FL (ref 9.2–12.9)
POIKILOCYTOSIS BLD QL SMEAR: SLIGHT
POTASSIUM SERPL-SCNC: 4.4 MMOL/L (ref 3.5–5.1)
PROT SERPL-MCNC: 7.2 G/DL (ref 6–8.4)
RBC # BLD AUTO: 3.14 M/UL (ref 4–5.4)
SODIUM SERPL-SCNC: 137 MMOL/L (ref 136–145)
TSH SERPL DL<=0.005 MIU/L-ACNC: 0.54 UIU/ML (ref 0.34–5.6)
WBC # BLD AUTO: 1.09 K/UL (ref 3.9–12.7)

## 2022-09-27 PROCEDURE — 96365 THER/PROPH/DIAG IV INF INIT: CPT

## 2022-09-27 PROCEDURE — 96361 HYDRATE IV INFUSION ADD-ON: CPT

## 2022-09-27 PROCEDURE — 84443 ASSAY THYROID STIM HORMONE: CPT | Performed by: INTERNAL MEDICINE

## 2022-09-27 PROCEDURE — 25000003 PHARM REV CODE 250: Performed by: NURSE PRACTITIONER

## 2022-09-27 PROCEDURE — 85027 COMPLETE CBC AUTOMATED: CPT | Performed by: INTERNAL MEDICINE

## 2022-09-27 PROCEDURE — 80053 COMPREHEN METABOLIC PANEL: CPT | Performed by: NURSE PRACTITIONER

## 2022-09-27 PROCEDURE — 96372 THER/PROPH/DIAG INJ SC/IM: CPT | Mod: 59

## 2022-09-27 PROCEDURE — 83735 ASSAY OF MAGNESIUM: CPT | Performed by: INTERNAL MEDICINE

## 2022-09-27 PROCEDURE — 36415 COLL VENOUS BLD VENIPUNCTURE: CPT | Performed by: INTERNAL MEDICINE

## 2022-09-27 PROCEDURE — 85007 BL SMEAR W/DIFF WBC COUNT: CPT | Performed by: INTERNAL MEDICINE

## 2022-09-27 PROCEDURE — 63600175 PHARM REV CODE 636 W HCPCS: Performed by: NURSE PRACTITIONER

## 2022-09-27 RX ORDER — HEPARIN 100 UNIT/ML
500 SYRINGE INTRAVENOUS
Status: CANCELLED
Start: 2022-09-27

## 2022-09-27 RX ORDER — ONDANSETRON HCL IN 0.9 % NACL 8 MG/50 ML
8 INTRAVENOUS SOLUTION, PIGGYBACK (ML) INTRAVENOUS
Status: CANCELLED
Start: 2022-09-27

## 2022-09-27 RX ORDER — ONDANSETRON HCL IN 0.9 % NACL 8 MG/50 ML
8 INTRAVENOUS SOLUTION, PIGGYBACK (ML) INTRAVENOUS
Status: COMPLETED | OUTPATIENT
Start: 2022-09-27 | End: 2022-09-27

## 2022-09-27 RX ORDER — HEPARIN 100 UNIT/ML
500 SYRINGE INTRAVENOUS
Status: COMPLETED | OUTPATIENT
Start: 2022-09-27 | End: 2022-09-27

## 2022-09-27 RX ADMIN — SODIUM CHLORIDE 1000 ML: 0.9 INJECTION, SOLUTION INTRAVENOUS at 02:09

## 2022-09-27 RX ADMIN — ONDANSETRON 8 MG: 2 INJECTION INTRAMUSCULAR; INTRAVENOUS at 03:09

## 2022-09-27 RX ADMIN — HEPARIN 500 UNITS: 100 SYRINGE at 04:09

## 2022-09-27 NOTE — PLAN OF CARE
Problem: Fatigue  Goal: Improved Activity Tolerance  Intervention: Promote Improved Energy  Flowsheets (Taken 9/27/2022 1603)  Fatigue Management: frequent rest breaks encouraged  Activity Management: Ambulated -L4

## 2022-09-27 NOTE — TELEPHONE ENCOUNTER
Critical labs  WBC 1.09    Per Aide Perez x 2 ordered. Scheduling will call pt with dates and times once we receive authorization. Re-check labs on Thursday before treatment. Pt was notified and verbalized understanding.

## 2022-09-28 ENCOUNTER — INFUSION (OUTPATIENT)
Dept: INFUSION THERAPY | Facility: HOSPITAL | Age: 47
End: 2022-09-28
Attending: INTERNAL MEDICINE
Payer: COMMERCIAL

## 2022-09-28 VITALS
SYSTOLIC BLOOD PRESSURE: 101 MMHG | TEMPERATURE: 98 F | WEIGHT: 194.81 LBS | OXYGEN SATURATION: 97 % | BODY MASS INDEX: 35.85 KG/M2 | RESPIRATION RATE: 18 BRPM | DIASTOLIC BLOOD PRESSURE: 67 MMHG | HEART RATE: 97 BPM | HEIGHT: 62 IN

## 2022-09-28 DIAGNOSIS — T45.1X5A CHEMOTHERAPY-INDUCED NEUTROPENIA: ICD-10-CM

## 2022-09-28 DIAGNOSIS — E86.0 DEHYDRATION: Primary | ICD-10-CM

## 2022-09-28 DIAGNOSIS — D70.1 CHEMOTHERAPY-INDUCED NEUTROPENIA: ICD-10-CM

## 2022-09-28 PROCEDURE — 63600175 PHARM REV CODE 636 W HCPCS: Mod: JG | Performed by: NURSE PRACTITIONER

## 2022-09-28 PROCEDURE — 96372 THER/PROPH/DIAG INJ SC/IM: CPT

## 2022-09-28 RX ADMIN — FILGRASTIM-SNDZ 480 MCG: 480 INJECTION, SOLUTION INTRAVENOUS; SUBCUTANEOUS at 11:09

## 2022-09-28 NOTE — PLAN OF CARE
Problem: Fatigue  Goal: Improved Activity Tolerance  Intervention: Promote Improved Energy  Flowsheets (Taken 9/28/2022 1106)  Fatigue Management: frequent rest breaks encouraged  Activity Management: Ambulated -L4

## 2022-09-29 ENCOUNTER — INFUSION (OUTPATIENT)
Dept: INFUSION THERAPY | Facility: HOSPITAL | Age: 47
End: 2022-09-29
Attending: INTERNAL MEDICINE
Payer: COMMERCIAL

## 2022-09-29 VITALS
SYSTOLIC BLOOD PRESSURE: 108 MMHG | HEIGHT: 62 IN | WEIGHT: 194.19 LBS | RESPIRATION RATE: 18 BRPM | OXYGEN SATURATION: 97 % | BODY MASS INDEX: 35.73 KG/M2 | DIASTOLIC BLOOD PRESSURE: 75 MMHG | HEART RATE: 112 BPM | TEMPERATURE: 98 F

## 2022-09-29 DIAGNOSIS — Z17.1 MALIGNANT NEOPLASM OF UPPER-OUTER QUADRANT OF LEFT BREAST IN FEMALE, ESTROGEN RECEPTOR NEGATIVE: ICD-10-CM

## 2022-09-29 DIAGNOSIS — D70.1 CHEMOTHERAPY-INDUCED NEUTROPENIA: ICD-10-CM

## 2022-09-29 DIAGNOSIS — C50.412 MALIGNANT NEOPLASM OF UPPER-OUTER QUADRANT OF LEFT BREAST IN FEMALE, ESTROGEN RECEPTOR NEGATIVE: Primary | ICD-10-CM

## 2022-09-29 DIAGNOSIS — T45.1X5A CHEMOTHERAPY-INDUCED NEUTROPENIA: ICD-10-CM

## 2022-09-29 DIAGNOSIS — Z17.1 MALIGNANT NEOPLASM OF UPPER-OUTER QUADRANT OF LEFT BREAST IN FEMALE, ESTROGEN RECEPTOR NEGATIVE: Primary | ICD-10-CM

## 2022-09-29 DIAGNOSIS — E86.0 DEHYDRATION: ICD-10-CM

## 2022-09-29 DIAGNOSIS — C50.412 MALIGNANT NEOPLASM OF UPPER-OUTER QUADRANT OF LEFT BREAST IN FEMALE, ESTROGEN RECEPTOR NEGATIVE: ICD-10-CM

## 2022-09-29 LAB
ANISOCYTOSIS BLD QL SMEAR: SLIGHT
BASOPHILS NFR BLD: 0 % (ref 0–1.9)
DIFFERENTIAL METHOD: ABNORMAL
EOSINOPHIL NFR BLD: 0 % (ref 0–8)
ERYTHROCYTE [DISTWIDTH] IN BLOOD BY AUTOMATED COUNT: 13.3 % (ref 11.5–14.5)
HCT VFR BLD AUTO: 25.9 % (ref 37–48.5)
HGB BLD-MCNC: 9.3 G/DL (ref 12–16)
IMM GRANULOCYTES # BLD AUTO: ABNORMAL K/UL (ref 0–0.04)
IMM GRANULOCYTES NFR BLD AUTO: ABNORMAL % (ref 0–0.5)
LYMPHOCYTES NFR BLD: 52 % (ref 18–48)
MCH RBC QN AUTO: 32.6 PG (ref 27–31)
MCHC RBC AUTO-ENTMCNC: 35.9 G/DL (ref 32–36)
MCV RBC AUTO: 91 FL (ref 82–98)
MONOCYTES NFR BLD: 16 % (ref 4–15)
NEUTROPHILS NFR BLD: 32 % (ref 38–73)
NRBC BLD-RTO: 0 /100 WBC
PLATELET # BLD AUTO: 48 K/UL (ref 150–450)
PLATELET BLD QL SMEAR: ABNORMAL
PMV BLD AUTO: 10.7 FL (ref 9.2–12.9)
POIKILOCYTOSIS BLD QL SMEAR: SLIGHT
RBC # BLD AUTO: 2.85 M/UL (ref 4–5.4)
WBC # BLD AUTO: 0.57 K/UL (ref 3.9–12.7)

## 2022-09-29 PROCEDURE — 85027 COMPLETE CBC AUTOMATED: CPT | Performed by: INTERNAL MEDICINE

## 2022-09-29 PROCEDURE — 36591 DRAW BLOOD OFF VENOUS DEVICE: CPT

## 2022-09-29 PROCEDURE — 63600175 PHARM REV CODE 636 W HCPCS: Performed by: INTERNAL MEDICINE

## 2022-09-29 PROCEDURE — 96372 THER/PROPH/DIAG INJ SC/IM: CPT

## 2022-09-29 PROCEDURE — 63600175 PHARM REV CODE 636 W HCPCS: Mod: JG | Performed by: NURSE PRACTITIONER

## 2022-09-29 PROCEDURE — 85007 BL SMEAR W/DIFF WBC COUNT: CPT | Performed by: INTERNAL MEDICINE

## 2022-09-29 RX ORDER — HEPARIN 100 UNIT/ML
500 SYRINGE INTRAVENOUS
Status: CANCELLED
Start: 2022-09-29

## 2022-09-29 RX ORDER — ONDANSETRON HCL IN 0.9 % NACL 8 MG/50 ML
8 INTRAVENOUS SOLUTION, PIGGYBACK (ML) INTRAVENOUS
Status: CANCELLED
Start: 2022-09-29

## 2022-09-29 RX ORDER — HEPARIN 100 UNIT/ML
500 SYRINGE INTRAVENOUS
Status: COMPLETED | OUTPATIENT
Start: 2022-09-29 | End: 2022-09-29

## 2022-09-29 RX ADMIN — FILGRASTIM-SNDZ 480 MCG: 480 INJECTION, SOLUTION INTRAVENOUS; SUBCUTANEOUS at 10:09

## 2022-09-29 RX ADMIN — HEPARIN 500 UNITS: 100 SYRINGE at 10:09

## 2022-09-29 NOTE — NURSING
Per Ana Quigley NP Chemotherapy treatment (cycle3 Day1) held due to low CBC, zarxio given today and scheduled for tomorrow 9/30/2022. Pt aware and verbalized understanding.

## 2022-09-30 ENCOUNTER — INFUSION (OUTPATIENT)
Dept: INFUSION THERAPY | Facility: HOSPITAL | Age: 47
End: 2022-09-30
Attending: INTERNAL MEDICINE
Payer: COMMERCIAL

## 2022-09-30 VITALS
OXYGEN SATURATION: 100 % | HEIGHT: 62 IN | BODY MASS INDEX: 36.29 KG/M2 | SYSTOLIC BLOOD PRESSURE: 101 MMHG | WEIGHT: 197.19 LBS | HEART RATE: 80 BPM | TEMPERATURE: 98 F | RESPIRATION RATE: 17 BRPM | DIASTOLIC BLOOD PRESSURE: 68 MMHG

## 2022-09-30 DIAGNOSIS — D70.1 CHEMOTHERAPY-INDUCED NEUTROPENIA: ICD-10-CM

## 2022-09-30 DIAGNOSIS — T45.1X5A CHEMOTHERAPY-INDUCED NEUTROPENIA: ICD-10-CM

## 2022-09-30 DIAGNOSIS — E86.0 DEHYDRATION: Primary | ICD-10-CM

## 2022-09-30 LAB — BACTERIA BLD CULT: NORMAL

## 2022-09-30 PROCEDURE — 96372 THER/PROPH/DIAG INJ SC/IM: CPT

## 2022-09-30 PROCEDURE — 63600175 PHARM REV CODE 636 W HCPCS: Mod: JG | Performed by: NURSE PRACTITIONER

## 2022-09-30 RX ADMIN — FILGRASTIM-SNDZ 480 MCG: 480 INJECTION, SOLUTION INTRAVENOUS; SUBCUTANEOUS at 02:09

## 2022-09-30 NOTE — PLAN OF CARE
Problem: Neutropenia  Goal: Absence of Infection  Outcome: Ongoing, Progressing  Intervention: Prevent Infection and Maximize Resistance  Flowsheets (Taken 9/30/2022 1446)  Infection Prevention:   cohorting utilized   environmental surveillance performed   equipment surfaces disinfected

## 2022-10-01 LAB — BACTERIA BLD CULT: NORMAL

## 2022-10-03 NOTE — PROGRESS NOTES
PROGRESS NOTE    Subjective:       Patient ID: Karyna Escoto is a 47 y.o. female.    6/9/2022:  Dx Mammo:  1.8cm mass in the left breast towards the left axilla.   Deep to the mass 2 lymph nodes are identified.     7/5/2022:  Left breast core biopsy:  Grade 2 IDCA with DCIS  ER: 0.03%, NE: 8.8%, HER2: 0  TRIPLE NEGATIVE    7/20/2022:  Left axilla LN needle biopsy:  Invasive ductal carcinoma  ER: 0%, NE: 40%, HER2 negative(0)    PLAN:  Neoadjuvant chemo/IO(Keynote 522)-surgery-radiation.     Pem/Tax/Carb:  Cycle 1: 8/11/2022  Cycle 2: 9/8/2022  Cycle 3: 10/6/2022-due    8/3/2022 Photos:          8/23/2022 Photo:      10/4/2022 Photo:    Lesion is no longer palpable on 10/4/2022.       Chief Complaint:  No chief complaint on file.  Triple negative breast cancer follow up.     History of Present Illness:   Karyna Escoto is a 47 y.o. female who presents for follow up of breast cancer.      Ms. Escoto is doing well today.  She is due for cycle 3 this week.      Family and Social history reviewed and is unchanged from 8/3/2022      ROS:  Review of Systems   Constitutional:  Negative for appetite change, fever and unexpected weight change.   HENT:  Negative for mouth sores.    Eyes:  Negative for visual disturbance.   Respiratory:  Negative for cough and shortness of breath.    Cardiovascular:  Negative for chest pain and leg swelling.   Gastrointestinal:  Positive for diarrhea. Negative for abdominal pain and blood in stool.   Genitourinary:  Positive for frequency. Negative for hematuria.   Musculoskeletal:  Negative for back pain.   Skin:  Negative for rash.   Neurological:  Positive for headaches.   Hematological:  Positive for adenopathy.   Psychiatric/Behavioral:  The patient is not nervous/anxious.         Current Outpatient Medications:     acetaminophen (TYLENOL) 325 MG tablet, Take 650 mg by mouth., Disp: , Rfl:     cetirizine (ZYRTEC) 10 mg Cap,  , Disp: , Rfl:     doxycycline (VIBRAMYCIN) 100 MG Cap, Take 1 capsule (100 mg total) by mouth 2 (two) times daily. for 10 days, Disp: 20 capsule, Rfl: 0    fluticasone propionate (FLONASE) 50 mcg/actuation nasal spray, 1 spray (50 mcg total) by Each Nostril route once daily., Disp: 15.8 mL, Rfl: 5    gabapentin (NEURONTIN) 300 MG capsule, Take 300 mg by mouth 3 (three) times daily., Disp: , Rfl:     HYDROcodone-acetaminophen (NORCO) 5-325 mg per tablet, Take 1 tablet by mouth every 6 (six) hours as needed for Pain., Disp: 30 tablet, Rfl: 0    hydrOXYzine HCL (ATARAX) 10 MG Tab, Take 25 mg by mouth 3 (three) times daily as needed., Disp: , Rfl:     hydrOXYzine HCL (ATARAX) 10 MG Tab, Take 10 mg by mouth., Disp: , Rfl:     LIDOcaine-prilocaine (EMLA) cream, Apply topically as needed. Apply 30 mins prior to port access, Disp: 30 g, Rfl: 5    naproxen sodium 220 mg Cap, Take by mouth., Disp: , Rfl:     omeprazole (PRILOSEC) 10 MG capsule, Take 10 mg by mouth every 12 (twelve) hours as needed., Disp: , Rfl:     ondansetron (ZOFRAN) 8 MG tablet, Take 1 tablet (8 mg total) by mouth every 8 (eight) hours as needed., Disp: 30 tablet, Rfl: 5    promethazine (PHENERGAN) 25 MG tablet, Take 1 tablet (25 mg total) by mouth every 4 to 6 hours as needed., Disp: 30 tablet, Rfl: 5    sertraline (ZOLOFT) 50 MG tablet, Take 50 mg by mouth once daily., Disp: , Rfl:     sertraline (ZOLOFT) 50 MG tablet, Take 50 mg by mouth., Disp: , Rfl:         Objective:       Physical Examination:     /83   Pulse 68   Temp 98.3 °F (36.8 °C)   Wt 89.3 kg (196 lb 14.4 oz)   BMI 36.01 kg/m²     Physical Exam  Constitutional:       Appearance: She is well-developed.   HENT:      Head: Normocephalic and atraumatic.      Right Ear: External ear normal.      Left Ear: External ear normal.   Eyes:      Conjunctiva/sclera: Conjunctivae normal.      Pupils: Pupils are equal, round, and reactive to light.   Neck:      Thyroid: No thyromegaly.       Trachea: No tracheal deviation.   Cardiovascular:      Rate and Rhythm: Normal rate and regular rhythm.      Heart sounds: Normal heart sounds.   Pulmonary:      Effort: Pulmonary effort is normal.      Breath sounds: Normal breath sounds.   Abdominal:      General: Bowel sounds are normal. There is no distension.      Palpations: Abdomen is soft. There is no mass.      Tenderness: There is no abdominal tenderness.   Skin:     Findings: No rash.   Neurological:      Comments: Neuro intact througout   Psychiatric:         Behavior: Behavior normal.         Thought Content: Thought content normal.         Judgment: Judgment normal.       Labs:   Recent Results (from the past 336 hour(s))   CBC Auto Differential    Collection Time: 10/04/22  1:16 PM   Result Value Ref Range    WBC 7.02 3.90 - 12.70 K/uL    Hemoglobin 9.8 (L) 12.0 - 16.0 g/dL    Hematocrit 28.9 (L) 37.0 - 48.5 %    Platelets 217 150 - 450 K/uL   CBC auto differential    Collection Time: 09/29/22  9:40 AM   Result Value Ref Range    WBC 0.57 (LL) 3.90 - 12.70 K/uL    Hemoglobin 9.3 (L) 12.0 - 16.0 g/dL    Hematocrit 25.9 (L) 37.0 - 48.5 %    Platelets 48 (LL) 150 - 450 K/uL   CBC Auto Differential    Collection Time: 09/27/22  8:47 AM   Result Value Ref Range    WBC 1.09 (LL) 3.90 - 12.70 K/uL    Hemoglobin 10.1 (L) 12.0 - 16.0 g/dL    Hematocrit 29.4 (L) 37.0 - 48.5 %    Platelets 50 (L) 150 - 450 K/uL     CMP  Sodium   Date Value Ref Range Status   09/27/2022 137 136 - 145 mmol/L Final     Potassium   Date Value Ref Range Status   09/27/2022 4.4 3.5 - 5.1 mmol/L Final     Chloride   Date Value Ref Range Status   09/27/2022 104 95 - 110 mmol/L Final     CO2   Date Value Ref Range Status   09/27/2022 24 23 - 29 mmol/L Final     Glucose   Date Value Ref Range Status   09/27/2022 115 (H) 70 - 110 mg/dL Final     BUN   Date Value Ref Range Status   09/27/2022 15 6 - 20 mg/dL Final     Creatinine   Date Value Ref Range Status   09/27/2022 0.9 0.5 - 1.4 mg/dL  Final     Calcium   Date Value Ref Range Status   09/27/2022 9.3 8.7 - 10.5 mg/dL Final     Total Protein   Date Value Ref Range Status   09/27/2022 7.2 6.0 - 8.4 g/dL Final     Albumin   Date Value Ref Range Status   09/27/2022 3.9 3.5 - 5.2 g/dL Final     Total Bilirubin   Date Value Ref Range Status   09/27/2022 0.9 0.1 - 1.0 mg/dL Final     Comment:     For infants and newborns, interpretation of results should be based  on gestational age, weight and in agreement with clinical  observations.    Premature Infant recommended reference ranges:  Up to 24 hours.............<8.0 mg/dL  Up to 48 hours............<12.0 mg/dL  3-5 days..................<15.0 mg/dL  6-29 days.................<15.0 mg/dL       Alkaline Phosphatase   Date Value Ref Range Status   09/27/2022 94 55 - 135 U/L Final     AST   Date Value Ref Range Status   09/27/2022 31 10 - 40 U/L Final     ALT   Date Value Ref Range Status   09/27/2022 54 (H) 10 - 44 U/L Final     Anion Gap   Date Value Ref Range Status   09/27/2022 9 8 - 16 mmol/L Final     No results found for: CEA  No results found for: PSA        Assessment/Plan:     Problem List Items Addressed This Visit       Left Breast Cancer-TRIPLE NEGATIVE     Patient is doing ok on therapy with expected toxicity.  She has had significant platelet and WBC fall but this has improved.  If CMP is good today then will proceed with cycle three on 10/6.           Cancer associated pain     Patient is doing ok.  Will continue current care approach for now.           Other Visit Diagnoses       Nonintractable headache, unspecified chronicity pattern, unspecified headache type        Relevant Medications    HYDROcodone-acetaminophen (NORCO) 5-325 mg per tablet    Rash        Relevant Medications    HYDROcodone-acetaminophen (NORCO) 5-325 mg per tablet            Discussion:     Follow up in about 3 weeks (around 10/25/2022) for for NP visit, 6 with me.      Electronically signed by Derek WILLS  Leonardo

## 2022-10-04 ENCOUNTER — OFFICE VISIT (OUTPATIENT)
Dept: HEMATOLOGY/ONCOLOGY | Facility: CLINIC | Age: 47
End: 2022-10-04
Payer: COMMERCIAL

## 2022-10-04 ENCOUNTER — LAB VISIT (OUTPATIENT)
Dept: LAB | Facility: HOSPITAL | Age: 47
End: 2022-10-04
Attending: INTERNAL MEDICINE
Payer: COMMERCIAL

## 2022-10-04 VITALS
BODY MASS INDEX: 36.01 KG/M2 | SYSTOLIC BLOOD PRESSURE: 125 MMHG | TEMPERATURE: 98 F | DIASTOLIC BLOOD PRESSURE: 83 MMHG | HEART RATE: 68 BPM | WEIGHT: 196.88 LBS

## 2022-10-04 DIAGNOSIS — C50.412 MALIGNANT NEOPLASM OF UPPER-OUTER QUADRANT OF LEFT BREAST IN FEMALE, ESTROGEN RECEPTOR NEGATIVE: ICD-10-CM

## 2022-10-04 DIAGNOSIS — Z17.1 MALIGNANT NEOPLASM OF UPPER-OUTER QUADRANT OF LEFT BREAST IN FEMALE, ESTROGEN RECEPTOR NEGATIVE: ICD-10-CM

## 2022-10-04 DIAGNOSIS — G89.3 CANCER ASSOCIATED PAIN: ICD-10-CM

## 2022-10-04 DIAGNOSIS — R53.83 FATIGUE, UNSPECIFIED TYPE: ICD-10-CM

## 2022-10-04 DIAGNOSIS — R51.9 NONINTRACTABLE HEADACHE, UNSPECIFIED CHRONICITY PATTERN, UNSPECIFIED HEADACHE TYPE: ICD-10-CM

## 2022-10-04 DIAGNOSIS — R21 RASH: ICD-10-CM

## 2022-10-04 LAB
ALBUMIN SERPL BCP-MCNC: 3.4 G/DL (ref 3.5–5.2)
ALP SERPL-CCNC: 111 U/L (ref 55–135)
ALT SERPL W/O P-5'-P-CCNC: 33 U/L (ref 10–44)
ANION GAP SERPL CALC-SCNC: 5 MMOL/L (ref 8–16)
ANISOCYTOSIS BLD QL SMEAR: SLIGHT
AST SERPL-CCNC: 29 U/L (ref 10–40)
BASOPHILS NFR BLD: 0 % (ref 0–1.9)
BILIRUB SERPL-MCNC: 0.5 MG/DL (ref 0.1–1)
BUN SERPL-MCNC: 8 MG/DL (ref 6–20)
CALCIUM SERPL-MCNC: 8.8 MG/DL (ref 8.7–10.5)
CHLORIDE SERPL-SCNC: 106 MMOL/L (ref 95–110)
CO2 SERPL-SCNC: 29 MMOL/L (ref 23–29)
CREAT SERPL-MCNC: 1.1 MG/DL (ref 0.5–1.4)
DIFFERENTIAL METHOD: ABNORMAL
EOSINOPHIL NFR BLD: 0 % (ref 0–8)
ERYTHROCYTE [DISTWIDTH] IN BLOOD BY AUTOMATED COUNT: 15.6 % (ref 11.5–14.5)
EST. GFR  (NO RACE VARIABLE): >60 ML/MIN/1.73 M^2
GLUCOSE SERPL-MCNC: 91 MG/DL (ref 70–110)
HCT VFR BLD AUTO: 28.9 % (ref 37–48.5)
HGB BLD-MCNC: 9.8 G/DL (ref 12–16)
IMM GRANULOCYTES # BLD AUTO: ABNORMAL K/UL (ref 0–0.04)
IMM GRANULOCYTES NFR BLD AUTO: ABNORMAL % (ref 0–0.5)
LYMPHOCYTES NFR BLD: 54 % (ref 18–48)
MAGNESIUM SERPL-MCNC: 2.1 MG/DL (ref 1.6–2.6)
MCH RBC QN AUTO: 32.2 PG (ref 27–31)
MCHC RBC AUTO-ENTMCNC: 33.9 G/DL (ref 32–36)
MCV RBC AUTO: 95 FL (ref 82–98)
MONOCYTES NFR BLD: 14 % (ref 4–15)
NEUTROPHILS NFR BLD: 32 % (ref 38–73)
NRBC BLD-RTO: 1 /100 WBC
PLATELET # BLD AUTO: 217 K/UL (ref 150–450)
PLATELET BLD QL SMEAR: ABNORMAL
PMV BLD AUTO: 9.7 FL (ref 9.2–12.9)
POIKILOCYTOSIS BLD QL SMEAR: SLIGHT
POLYCHROMASIA BLD QL SMEAR: ABNORMAL
POTASSIUM SERPL-SCNC: 4.2 MMOL/L (ref 3.5–5.1)
PROT SERPL-MCNC: 6.9 G/DL (ref 6–8.4)
RBC # BLD AUTO: 3.04 M/UL (ref 4–5.4)
SODIUM SERPL-SCNC: 140 MMOL/L (ref 136–145)
WBC # BLD AUTO: 7.02 K/UL (ref 3.9–12.7)

## 2022-10-04 PROCEDURE — 80053 COMPREHEN METABOLIC PANEL: CPT | Performed by: NURSE PRACTITIONER

## 2022-10-04 PROCEDURE — 3074F PR MOST RECENT SYSTOLIC BLOOD PRESSURE < 130 MM HG: ICD-10-PCS | Mod: CPTII,S$GLB,, | Performed by: INTERNAL MEDICINE

## 2022-10-04 PROCEDURE — 3008F PR BODY MASS INDEX (BMI) DOCUMENTED: ICD-10-PCS | Mod: CPTII,S$GLB,, | Performed by: INTERNAL MEDICINE

## 2022-10-04 PROCEDURE — 83735 ASSAY OF MAGNESIUM: CPT | Performed by: INTERNAL MEDICINE

## 2022-10-04 PROCEDURE — 3008F BODY MASS INDEX DOCD: CPT | Mod: CPTII,S$GLB,, | Performed by: INTERNAL MEDICINE

## 2022-10-04 PROCEDURE — 3074F SYST BP LT 130 MM HG: CPT | Mod: CPTII,S$GLB,, | Performed by: INTERNAL MEDICINE

## 2022-10-04 PROCEDURE — 3079F DIAST BP 80-89 MM HG: CPT | Mod: CPTII,S$GLB,, | Performed by: INTERNAL MEDICINE

## 2022-10-04 PROCEDURE — 36415 COLL VENOUS BLD VENIPUNCTURE: CPT | Performed by: INTERNAL MEDICINE

## 2022-10-04 PROCEDURE — 1159F MED LIST DOCD IN RCRD: CPT | Mod: CPTII,S$GLB,, | Performed by: INTERNAL MEDICINE

## 2022-10-04 PROCEDURE — 1159F PR MEDICATION LIST DOCUMENTED IN MEDICAL RECORD: ICD-10-PCS | Mod: CPTII,S$GLB,, | Performed by: INTERNAL MEDICINE

## 2022-10-04 PROCEDURE — 85007 BL SMEAR W/DIFF WBC COUNT: CPT | Performed by: INTERNAL MEDICINE

## 2022-10-04 PROCEDURE — 3079F PR MOST RECENT DIASTOLIC BLOOD PRESSURE 80-89 MM HG: ICD-10-PCS | Mod: CPTII,S$GLB,, | Performed by: INTERNAL MEDICINE

## 2022-10-04 PROCEDURE — 99214 PR OFFICE/OUTPT VISIT, EST, LEVL IV, 30-39 MIN: ICD-10-PCS | Mod: S$GLB,,, | Performed by: INTERNAL MEDICINE

## 2022-10-04 PROCEDURE — 85027 COMPLETE CBC AUTOMATED: CPT | Performed by: INTERNAL MEDICINE

## 2022-10-04 PROCEDURE — 99214 OFFICE O/P EST MOD 30 MIN: CPT | Mod: S$GLB,,, | Performed by: INTERNAL MEDICINE

## 2022-10-04 RX ORDER — SODIUM CHLORIDE 0.9 % (FLUSH) 0.9 %
10 SYRINGE (ML) INJECTION
Status: CANCELLED | OUTPATIENT
Start: 2022-10-06

## 2022-10-04 RX ORDER — EPINEPHRINE 0.3 MG/.3ML
0.3 INJECTION SUBCUTANEOUS ONCE AS NEEDED
Status: CANCELLED | OUTPATIENT
Start: 2022-10-06

## 2022-10-04 RX ORDER — HYDROCODONE BITARTRATE AND ACETAMINOPHEN 5; 325 MG/1; MG/1
1 TABLET ORAL EVERY 6 HOURS PRN
Qty: 30 TABLET | Refills: 0 | Status: SHIPPED | OUTPATIENT
Start: 2022-10-04 | End: 2022-11-21 | Stop reason: SDUPTHER

## 2022-10-04 RX ORDER — DIPHENHYDRAMINE HYDROCHLORIDE 50 MG/ML
50 INJECTION INTRAMUSCULAR; INTRAVENOUS ONCE AS NEEDED
Status: CANCELLED | OUTPATIENT
Start: 2022-10-06

## 2022-10-04 RX ORDER — FAMOTIDINE 10 MG/ML
20 INJECTION INTRAVENOUS
Status: CANCELLED | OUTPATIENT
Start: 2022-10-06

## 2022-10-04 RX ORDER — HEPARIN 100 UNIT/ML
500 SYRINGE INTRAVENOUS
Status: CANCELLED | OUTPATIENT
Start: 2022-10-06

## 2022-10-04 NOTE — ASSESSMENT & PLAN NOTE
Patient is doing ok on therapy with expected toxicity.  She has had significant platelet and WBC fall but this has improved.  If CMP is good today then will proceed with cycle three on 10/6.

## 2022-10-04 NOTE — PROGRESS NOTES
Patient neutropenia and thrombocytopenia. Dose held last week. Continue at current dose per Dr. Peralta

## 2022-10-06 ENCOUNTER — INFUSION (OUTPATIENT)
Dept: INFUSION THERAPY | Facility: HOSPITAL | Age: 47
End: 2022-10-06
Attending: INTERNAL MEDICINE
Payer: COMMERCIAL

## 2022-10-06 VITALS
DIASTOLIC BLOOD PRESSURE: 66 MMHG | HEIGHT: 62 IN | TEMPERATURE: 98 F | RESPIRATION RATE: 16 BRPM | HEART RATE: 54 BPM | SYSTOLIC BLOOD PRESSURE: 111 MMHG | WEIGHT: 195.81 LBS | OXYGEN SATURATION: 100 % | BODY MASS INDEX: 36.03 KG/M2

## 2022-10-06 DIAGNOSIS — T45.1X5A CHEMOTHERAPY-INDUCED NEUTROPENIA: Primary | ICD-10-CM

## 2022-10-06 DIAGNOSIS — Z17.1 MALIGNANT NEOPLASM OF UPPER-OUTER QUADRANT OF LEFT BREAST IN FEMALE, ESTROGEN RECEPTOR NEGATIVE: ICD-10-CM

## 2022-10-06 DIAGNOSIS — D70.1 CHEMOTHERAPY-INDUCED NEUTROPENIA: Primary | ICD-10-CM

## 2022-10-06 DIAGNOSIS — C50.412 MALIGNANT NEOPLASM OF UPPER-OUTER QUADRANT OF LEFT BREAST IN FEMALE, ESTROGEN RECEPTOR NEGATIVE: ICD-10-CM

## 2022-10-06 PROCEDURE — 96375 TX/PRO/DX INJ NEW DRUG ADDON: CPT

## 2022-10-06 PROCEDURE — 63600175 PHARM REV CODE 636 W HCPCS: Mod: JG | Performed by: NURSE PRACTITIONER

## 2022-10-06 PROCEDURE — 96417 CHEMO IV INFUS EACH ADDL SEQ: CPT

## 2022-10-06 PROCEDURE — 96367 TX/PROPH/DG ADDL SEQ IV INF: CPT

## 2022-10-06 PROCEDURE — A4216 STERILE WATER/SALINE, 10 ML: HCPCS | Performed by: NURSE PRACTITIONER

## 2022-10-06 PROCEDURE — 96413 CHEMO IV INFUSION 1 HR: CPT

## 2022-10-06 PROCEDURE — 25000003 PHARM REV CODE 250: Performed by: NURSE PRACTITIONER

## 2022-10-06 RX ORDER — EPINEPHRINE 0.3 MG/.3ML
0.3 INJECTION SUBCUTANEOUS ONCE AS NEEDED
Status: DISCONTINUED | OUTPATIENT
Start: 2022-10-06 | End: 2022-10-06 | Stop reason: HOSPADM

## 2022-10-06 RX ORDER — HEPARIN 100 UNIT/ML
500 SYRINGE INTRAVENOUS
Status: DISCONTINUED | OUTPATIENT
Start: 2022-10-06 | End: 2022-10-06 | Stop reason: HOSPADM

## 2022-10-06 RX ORDER — FAMOTIDINE 10 MG/ML
20 INJECTION INTRAVENOUS
Status: COMPLETED | OUTPATIENT
Start: 2022-10-06 | End: 2022-10-06

## 2022-10-06 RX ORDER — SODIUM CHLORIDE 0.9 % (FLUSH) 0.9 %
10 SYRINGE (ML) INJECTION
Status: DISCONTINUED | OUTPATIENT
Start: 2022-10-06 | End: 2022-10-06 | Stop reason: HOSPADM

## 2022-10-06 RX ADMIN — SODIUM CHLORIDE: 0.9 INJECTION, SOLUTION INTRAVENOUS at 09:10

## 2022-10-06 RX ADMIN — CARBOPLATIN 565 MG: 10 INJECTION, SOLUTION INTRAVENOUS at 11:10

## 2022-10-06 RX ADMIN — APREPITANT 130 MG: 130 INJECTION, EMULSION INTRAVENOUS at 09:10

## 2022-10-06 RX ADMIN — FAMOTIDINE 20 MG: 10 INJECTION INTRAVENOUS at 09:10

## 2022-10-06 RX ADMIN — HEPARIN 500 UNITS: 100 SYRINGE at 12:10

## 2022-10-06 RX ADMIN — PACLITAXEL 200 MG: 100 INJECTION, POWDER, LYOPHILIZED, FOR SUSPENSION INTRAVENOUS at 11:10

## 2022-10-06 RX ADMIN — SODIUM CHLORIDE 200 MG: 9 INJECTION, SOLUTION INTRAVENOUS at 09:10

## 2022-10-06 RX ADMIN — DIPHENHYDRAMINE HYDROCHLORIDE 50 MG: 50 INJECTION INTRAMUSCULAR; INTRAVENOUS at 10:10

## 2022-10-06 RX ADMIN — DEXAMETHASONE SODIUM PHOSPHATE 0.25 MG: 4 INJECTION, SOLUTION INTRA-ARTICULAR; INTRALESIONAL; INTRAMUSCULAR; INTRAVENOUS; SOFT TISSUE at 09:10

## 2022-10-06 RX ADMIN — SODIUM CHLORIDE, PRESERVATIVE FREE 10 ML: 5 INJECTION INTRAVENOUS at 12:10

## 2022-10-06 NOTE — PLAN OF CARE
Problem: Fatigue  Goal: Improved Activity Tolerance  Outcome: Ongoing, Progressing  Intervention: Promote Improved Energy  Flowsheets (Taken 10/6/2022 9227)  Fatigue Management: frequent rest breaks encouraged  Sleep/Rest Enhancement:   regular sleep/rest pattern promoted   relaxation techniques promoted  Activity Management: Ambulated -L4

## 2022-10-11 ENCOUNTER — LAB VISIT (OUTPATIENT)
Dept: LAB | Facility: HOSPITAL | Age: 47
End: 2022-10-11
Attending: INTERNAL MEDICINE
Payer: COMMERCIAL

## 2022-10-11 DIAGNOSIS — Z17.1 MALIGNANT NEOPLASM OF UPPER-OUTER QUADRANT OF LEFT BREAST IN FEMALE, ESTROGEN RECEPTOR NEGATIVE: ICD-10-CM

## 2022-10-11 DIAGNOSIS — C50.412 MALIGNANT NEOPLASM OF UPPER-OUTER QUADRANT OF LEFT BREAST IN FEMALE, ESTROGEN RECEPTOR NEGATIVE: ICD-10-CM

## 2022-10-11 DIAGNOSIS — R53.83 FATIGUE, UNSPECIFIED TYPE: ICD-10-CM

## 2022-10-11 LAB
ALBUMIN SERPL BCP-MCNC: 3.9 G/DL (ref 3.5–5.2)
ALP SERPL-CCNC: 85 U/L (ref 55–135)
ALT SERPL W/O P-5'-P-CCNC: 30 U/L (ref 10–44)
ANION GAP SERPL CALC-SCNC: 5 MMOL/L (ref 8–16)
AST SERPL-CCNC: 25 U/L (ref 10–40)
BASOPHILS # BLD AUTO: 0.02 K/UL (ref 0–0.2)
BASOPHILS NFR BLD: 0.6 % (ref 0–1.9)
BILIRUB SERPL-MCNC: 0.8 MG/DL (ref 0.1–1)
BUN SERPL-MCNC: 12 MG/DL (ref 6–20)
CALCIUM SERPL-MCNC: 9 MG/DL (ref 8.7–10.5)
CHLORIDE SERPL-SCNC: 102 MMOL/L (ref 95–110)
CO2 SERPL-SCNC: 28 MMOL/L (ref 23–29)
CREAT SERPL-MCNC: 0.8 MG/DL (ref 0.5–1.4)
DIFFERENTIAL METHOD: ABNORMAL
EOSINOPHIL # BLD AUTO: 0 K/UL (ref 0–0.5)
EOSINOPHIL NFR BLD: 0.9 % (ref 0–8)
ERYTHROCYTE [DISTWIDTH] IN BLOOD BY AUTOMATED COUNT: 15.5 % (ref 11.5–14.5)
EST. GFR  (NO RACE VARIABLE): >60 ML/MIN/1.73 M^2
GLUCOSE SERPL-MCNC: 95 MG/DL (ref 70–110)
HCT VFR BLD AUTO: 32.7 % (ref 37–48.5)
HGB BLD-MCNC: 10.9 G/DL (ref 12–16)
IMM GRANULOCYTES # BLD AUTO: 0.01 K/UL (ref 0–0.04)
IMM GRANULOCYTES NFR BLD AUTO: 0.3 % (ref 0–0.5)
LYMPHOCYTES # BLD AUTO: 1.1 K/UL (ref 1–4.8)
LYMPHOCYTES NFR BLD: 34.1 % (ref 18–48)
MAGNESIUM SERPL-MCNC: 2.1 MG/DL (ref 1.6–2.6)
MCH RBC QN AUTO: 32.2 PG (ref 27–31)
MCHC RBC AUTO-ENTMCNC: 33.3 G/DL (ref 32–36)
MCV RBC AUTO: 97 FL (ref 82–98)
MONOCYTES # BLD AUTO: 0.2 K/UL (ref 0.3–1)
MONOCYTES NFR BLD: 5.2 % (ref 4–15)
NEUTROPHILS # BLD AUTO: 1.9 K/UL (ref 1.8–7.7)
NEUTROPHILS NFR BLD: 58.9 % (ref 38–73)
NRBC BLD-RTO: 0 /100 WBC
PLATELET # BLD AUTO: 388 K/UL (ref 150–450)
PLATELET BLD QL SMEAR: ABNORMAL
PMV BLD AUTO: 10.2 FL (ref 9.2–12.9)
POTASSIUM SERPL-SCNC: 4.4 MMOL/L (ref 3.5–5.1)
PROT SERPL-MCNC: 7.2 G/DL (ref 6–8.4)
RBC # BLD AUTO: 3.38 M/UL (ref 4–5.4)
SODIUM SERPL-SCNC: 135 MMOL/L (ref 136–145)
WBC # BLD AUTO: 3.28 K/UL (ref 3.9–12.7)

## 2022-10-11 PROCEDURE — 80053 COMPREHEN METABOLIC PANEL: CPT | Performed by: NURSE PRACTITIONER

## 2022-10-11 PROCEDURE — 85025 COMPLETE CBC W/AUTO DIFF WBC: CPT | Performed by: INTERNAL MEDICINE

## 2022-10-11 PROCEDURE — 36415 COLL VENOUS BLD VENIPUNCTURE: CPT | Performed by: INTERNAL MEDICINE

## 2022-10-11 PROCEDURE — 83735 ASSAY OF MAGNESIUM: CPT | Performed by: INTERNAL MEDICINE

## 2022-10-12 RX ORDER — FAMOTIDINE 10 MG/ML
20 INJECTION INTRAVENOUS
Status: CANCELLED | OUTPATIENT
Start: 2022-10-13

## 2022-10-12 RX ORDER — DIPHENHYDRAMINE HYDROCHLORIDE 50 MG/ML
50 INJECTION INTRAMUSCULAR; INTRAVENOUS ONCE AS NEEDED
Status: CANCELLED | OUTPATIENT
Start: 2022-10-13

## 2022-10-12 RX ORDER — EPINEPHRINE 0.3 MG/.3ML
0.3 INJECTION SUBCUTANEOUS ONCE AS NEEDED
Status: CANCELLED | OUTPATIENT
Start: 2022-10-13

## 2022-10-12 RX ORDER — HEPARIN 100 UNIT/ML
500 SYRINGE INTRAVENOUS
Status: CANCELLED | OUTPATIENT
Start: 2022-10-13

## 2022-10-12 RX ORDER — ONDANSETRON HCL IN 0.9 % NACL 8 MG/50 ML
8 INTRAVENOUS SOLUTION, PIGGYBACK (ML) INTRAVENOUS
Status: CANCELLED
Start: 2022-10-13 | End: 2022-10-13

## 2022-10-12 RX ORDER — SODIUM CHLORIDE 0.9 % (FLUSH) 0.9 %
10 SYRINGE (ML) INJECTION
Status: CANCELLED | OUTPATIENT
Start: 2022-10-13

## 2022-10-13 ENCOUNTER — OFFICE VISIT (OUTPATIENT)
Dept: HEMATOLOGY/ONCOLOGY | Facility: CLINIC | Age: 47
End: 2022-10-13
Payer: COMMERCIAL

## 2022-10-13 ENCOUNTER — INFUSION (OUTPATIENT)
Dept: INFUSION THERAPY | Facility: HOSPITAL | Age: 47
End: 2022-10-13
Attending: INTERNAL MEDICINE
Payer: COMMERCIAL

## 2022-10-13 VITALS
WEIGHT: 193 LBS | BODY MASS INDEX: 35.51 KG/M2 | HEIGHT: 62 IN | OXYGEN SATURATION: 100 % | DIASTOLIC BLOOD PRESSURE: 69 MMHG | SYSTOLIC BLOOD PRESSURE: 109 MMHG | HEART RATE: 69 BPM | RESPIRATION RATE: 16 BRPM | TEMPERATURE: 98 F

## 2022-10-13 VITALS
BODY MASS INDEX: 35.3 KG/M2 | SYSTOLIC BLOOD PRESSURE: 124 MMHG | TEMPERATURE: 98 F | HEART RATE: 84 BPM | WEIGHT: 193 LBS | DIASTOLIC BLOOD PRESSURE: 78 MMHG

## 2022-10-13 DIAGNOSIS — C50.412 MALIGNANT NEOPLASM OF UPPER-OUTER QUADRANT OF LEFT BREAST IN FEMALE, ESTROGEN RECEPTOR NEGATIVE: ICD-10-CM

## 2022-10-13 DIAGNOSIS — L03.90 CELLULITIS, UNSPECIFIED CELLULITIS SITE: Primary | ICD-10-CM

## 2022-10-13 DIAGNOSIS — D70.1 CHEMOTHERAPY-INDUCED NEUTROPENIA: Primary | ICD-10-CM

## 2022-10-13 DIAGNOSIS — T45.1X5A CHEMOTHERAPY-INDUCED NEUTROPENIA: Primary | ICD-10-CM

## 2022-10-13 DIAGNOSIS — Z17.1 MALIGNANT NEOPLASM OF UPPER-OUTER QUADRANT OF LEFT BREAST IN FEMALE, ESTROGEN RECEPTOR NEGATIVE: ICD-10-CM

## 2022-10-13 PROCEDURE — 96375 TX/PRO/DX INJ NEW DRUG ADDON: CPT

## 2022-10-13 PROCEDURE — 3078F PR MOST RECENT DIASTOLIC BLOOD PRESSURE < 80 MM HG: ICD-10-PCS | Mod: CPTII,S$GLB,, | Performed by: NURSE PRACTITIONER

## 2022-10-13 PROCEDURE — 99213 OFFICE O/P EST LOW 20 MIN: CPT | Mod: S$GLB,,, | Performed by: NURSE PRACTITIONER

## 2022-10-13 PROCEDURE — 25000003 PHARM REV CODE 250: Performed by: NURSE PRACTITIONER

## 2022-10-13 PROCEDURE — 63600175 PHARM REV CODE 636 W HCPCS: Performed by: NURSE PRACTITIONER

## 2022-10-13 PROCEDURE — 96367 TX/PROPH/DG ADDL SEQ IV INF: CPT

## 2022-10-13 PROCEDURE — 99213 PR OFFICE/OUTPT VISIT, EST, LEVL III, 20-29 MIN: ICD-10-PCS | Mod: S$GLB,,, | Performed by: NURSE PRACTITIONER

## 2022-10-13 PROCEDURE — 3074F PR MOST RECENT SYSTOLIC BLOOD PRESSURE < 130 MM HG: ICD-10-PCS | Mod: CPTII,S$GLB,, | Performed by: NURSE PRACTITIONER

## 2022-10-13 PROCEDURE — 3078F DIAST BP <80 MM HG: CPT | Mod: CPTII,S$GLB,, | Performed by: NURSE PRACTITIONER

## 2022-10-13 PROCEDURE — 96413 CHEMO IV INFUSION 1 HR: CPT

## 2022-10-13 PROCEDURE — 3074F SYST BP LT 130 MM HG: CPT | Mod: CPTII,S$GLB,, | Performed by: NURSE PRACTITIONER

## 2022-10-13 RX ORDER — SODIUM CHLORIDE 0.9 % (FLUSH) 0.9 %
10 SYRINGE (ML) INJECTION
Status: DISCONTINUED | OUTPATIENT
Start: 2022-10-13 | End: 2022-10-13 | Stop reason: HOSPADM

## 2022-10-13 RX ORDER — AMOXICILLIN AND CLAVULANATE POTASSIUM 875; 125 MG/1; MG/1
1 TABLET, FILM COATED ORAL 2 TIMES DAILY
Qty: 20 TABLET | Refills: 0 | Status: SHIPPED | OUTPATIENT
Start: 2022-10-13 | End: 2022-10-23

## 2022-10-13 RX ORDER — FAMOTIDINE 10 MG/ML
20 INJECTION INTRAVENOUS
Status: COMPLETED | OUTPATIENT
Start: 2022-10-13 | End: 2022-10-13

## 2022-10-13 RX ORDER — EPINEPHRINE 0.3 MG/.3ML
0.3 INJECTION SUBCUTANEOUS ONCE AS NEEDED
Status: DISCONTINUED | OUTPATIENT
Start: 2022-10-13 | End: 2022-10-13 | Stop reason: HOSPADM

## 2022-10-13 RX ORDER — ONDANSETRON HCL IN 0.9 % NACL 8 MG/50 ML
8 INTRAVENOUS SOLUTION, PIGGYBACK (ML) INTRAVENOUS
Status: COMPLETED | OUTPATIENT
Start: 2022-10-13 | End: 2022-10-13

## 2022-10-13 RX ADMIN — DIPHENHYDRAMINE HYDROCHLORIDE 50 MG: 50 INJECTION INTRAMUSCULAR; INTRAVENOUS at 09:10

## 2022-10-13 RX ADMIN — DEXAMETHASONE SODIUM PHOSPHATE 10 MG: 4 INJECTION, SOLUTION INTRA-ARTICULAR; INTRALESIONAL; INTRAMUSCULAR; INTRAVENOUS; SOFT TISSUE at 09:10

## 2022-10-13 RX ADMIN — FAMOTIDINE 20 MG: 10 INJECTION INTRAVENOUS at 08:10

## 2022-10-13 RX ADMIN — SODIUM CHLORIDE: 0.9 INJECTION, SOLUTION INTRAVENOUS at 08:10

## 2022-10-13 RX ADMIN — PACLITAXEL 200 MG: 100 INJECTION, POWDER, LYOPHILIZED, FOR SUSPENSION INTRAVENOUS at 10:10

## 2022-10-13 RX ADMIN — ONDANSETRON 8 MG: 2 INJECTION INTRAMUSCULAR; INTRAVENOUS at 08:10

## 2022-10-13 NOTE — PLAN OF CARE
Problem: Fatigue  Goal: Improved Activity Tolerance  Outcome: Ongoing, Progressing  Intervention: Promote Improved Energy  Flowsheets (Taken 10/13/2022 0818)  Fatigue Management: frequent rest breaks encouraged  Sleep/Rest Enhancement:   regular sleep/rest pattern promoted   relaxation techniques promoted  Activity Management: Ambulated -L4

## 2022-10-13 NOTE — NURSING
0800 Patient arrived for C3D8 Abraxane treatment. Pt c/o port a cath site as well as insertion site being tender, swollen and warm to touch. Reports no fevers at this time. Recently treated for soft tissue/skin infection with doxycycline and completed abx course. Charge nurse SAHRA Garzon RN notified who notified MD Peralta.     0830 New orders received per MD Peralta to proceed with treatment today but insert PIV. VIC Quigley NP to come to chairside to access port.     0900 VIC Quigley NP at chairside to assess port and speak with patient. Patient to take abx per Ana.

## 2022-10-13 NOTE — PROGRESS NOTES
PROGRESS NOTE    Subjective:       Patient ID: Karyna Escoto is a 47 y.o. female.    6/9/2022:  Dx Mammo:  1.8cm mass in the left breast towards the left axilla.   Deep to the mass 2 lymph nodes are identified.     7/5/2022:  Left breast core biopsy:  Grade 2 IDCA with DCIS  ER: 0.03%, [place]: 8.8%, HER2: 0  TRIPLE NEGATIVE    7/20/2022:  Left axilla LN needle biopsy:  Invasive ductal carcinoma  ER: 0%, [place]: 40%, HER2 negative(0)    PLAN:  Neoadjuvant chemo/IO(Keynote 522)-surgery-radiation.     Pem/Tax/Carb:  Cycle 1: 8/11/2022  Cycle 2: 9/8/2022  Cycle 3: 10/6/2022-due    8/3/2022 Photos:          8/23/2022 Photo:      10/4/2022 Photo:    Lesion is no longer palpable on 10/4/2022.       Chief Complaint:  Triple negative breast cancer follow up.     History of Present Illness:   Karyna Escoto is a 47 y.o. female who presents for follow up of breast cancer.      [facility] was recently seen in the ER for right neck port site infection and treated with Doxycycline. Right chest port site warm red. She denies any fever, pain or drainage.    Family and Social history reviewed and is unchanged from 8/3/2022        ROS:  Review of Systems   Constitutional:  Negative for appetite change, fever and unexpected weight change.   HENT:  Negative for mouth sores.    Eyes:  Negative for visual disturbance.   Respiratory:  Negative for cough and shortness of breath.    Cardiovascular:  Negative for chest pain and leg swelling.   Gastrointestinal:  Positive for diarrhea. Negative for abdominal pain and blood in stool.   Genitourinary:  Positive for frequency. Negative for hematuria.   Musculoskeletal:  Negative for back pain.   Skin:  Negative for rash.   Neurological:  Positive for headaches.   Hematological:  Positive for adenopathy.   Psychiatric/Behavioral:  The patient is not nervous/anxious.         Current Outpatient Medications:      acetaminophen (TYLENOL) 325 MG tablet, Take 650 mg by mouth., Disp: , Rfl:     amoxicillin-clavulanate 875-125mg (AUGMENTIN) 875-125 mg per tablet, Take 1 tablet by mouth 2 (two) times daily. for 10 days, Disp: 20 tablet, Rfl: 0    cetirizine (ZYRTEC) 10 mg Cap, , Disp: , Rfl:     fluticasone propionate (FLONASE) 50 mcg/actuation nasal spray, 1 spray (50 mcg total) by Each Nostril route once daily., Disp: 15.8 mL, Rfl: 5    gabapentin (NEURONTIN) 300 MG capsule, Take 300 mg by mouth 3 (three) times daily., Disp: , Rfl:     HYDROcodone-acetaminophen (NORCO) 5-325 mg per tablet, Take 1 tablet by mouth every 6 (six) hours as needed for Pain., Disp: 30 tablet, Rfl: 0    hydrOXYzine HCL (ATARAX) 10 MG Tab, Take 25 mg by mouth 3 (three) times daily as needed., Disp: , Rfl:     hydrOXYzine HCL (ATARAX) 10 MG Tab, Take 10 mg by mouth., Disp: , Rfl:     LIDOcaine-prilocaine (EMLA) cream, Apply topically as needed. Apply 30 mins prior to port access, Disp: 30 g, Rfl: 5    naproxen sodium 220 mg Cap, Take by mouth., Disp: , Rfl:     omeprazole (PRILOSEC) 10 MG capsule, Take 10 mg by mouth every 12 (twelve) hours as needed., Disp: , Rfl:     ondansetron (ZOFRAN) 8 MG tablet, Take 1 tablet (8 mg total) by mouth every 8 (eight) hours as needed., Disp: 30 tablet, Rfl: 5    promethazine (PHENERGAN) 25 MG tablet, Take 1 tablet (25 mg total) by mouth every 4 to 6 hours as needed., Disp: 30 tablet, Rfl: 5    sertraline (ZOLOFT) 50 MG tablet, Take 50 mg by mouth once daily., Disp: , Rfl:     sertraline (ZOLOFT) 50 MG tablet, Take 50 mg by mouth., Disp: , Rfl:   No current facility-administered medications for this visit.        Objective:       Physical Examination:     /78   Pulse 84   Temp 98 °F (36.7 °C)   Wt 87.5 kg (193 lb)   BMI 35.30 kg/m²     Physical Exam  Constitutional:       Appearance: Normal appearance. She is well-developed.   HENT:      Head: Normocephalic and atraumatic.      Right Ear: External ear normal.       Left Ear: External ear normal.   Eyes:      Conjunctiva/sclera: Conjunctivae normal.      Pupils: Pupils are equal, round, and reactive to light.   Neck:      Thyroid: No thyromegaly.      Trachea: No tracheal deviation.   Cardiovascular:      Rate and Rhythm: Normal rate and regular rhythm.      Heart sounds: Normal heart sounds.   Pulmonary:      Effort: Pulmonary effort is normal.      Breath sounds: Normal breath sounds.   Abdominal:      General: Bowel sounds are normal. There is no distension.      Palpations: Abdomen is soft. There is no mass.      Tenderness: There is no abdominal tenderness.   Skin:     Findings: Erythema (port site) present. No rash.   Neurological:      Mental Status: She is alert.      Comments: Neuro intact througout   Psychiatric:         Mood and Affect: Mood normal.         Behavior: Behavior normal.         Thought Content: Thought content normal.         Judgment: Judgment normal.       Labs:   Recent Results (from the past 336 hour(s))   CBC Auto Differential    Collection Time: 10/11/22  9:35 AM   Result Value Ref Range    WBC 3.28 (L) 3.90 - 12.70 K/uL    Hemoglobin 10.9 (L) 12.0 - 16.0 g/dL    Hematocrit 32.7 (L) 37.0 - 48.5 %    Platelets 388 150 - 450 K/uL   CBC Auto Differential    Collection Time: 10/04/22  1:16 PM   Result Value Ref Range    WBC 7.02 3.90 - 12.70 K/uL    Hemoglobin 9.8 (L) 12.0 - 16.0 g/dL    Hematocrit 28.9 (L) 37.0 - 48.5 %    Platelets 217 150 - 450 K/uL     CMP  Sodium   Date Value Ref Range Status   10/11/2022 135 (L) 136 - 145 mmol/L Final     Potassium   Date Value Ref Range Status   10/11/2022 4.4 3.5 - 5.1 mmol/L Final     Chloride   Date Value Ref Range Status   10/11/2022 102 95 - 110 mmol/L Final     CO2   Date Value Ref Range Status   10/11/2022 28 23 - 29 mmol/L Final     Glucose   Date Value Ref Range Status   10/11/2022 95 70 - 110 mg/dL Final     BUN   Date Value Ref Range Status   10/11/2022 12 6 - 20 mg/dL Final     Creatinine    Date Value Ref Range Status   10/11/2022 0.8 0.5 - 1.4 mg/dL Final     Calcium   Date Value Ref Range Status   10/11/2022 9.0 8.7 - 10.5 mg/dL Final     Total Protein   Date Value Ref Range Status   10/11/2022 7.2 6.0 - 8.4 g/dL Final     Albumin   Date Value Ref Range Status   10/11/2022 3.9 3.5 - 5.2 g/dL Final     Total Bilirubin   Date Value Ref Range Status   10/11/2022 0.8 0.1 - 1.0 mg/dL Final     Comment:     For infants and newborns, interpretation of results should be based  on gestational age, weight and in agreement with clinical  observations.    Premature Infant recommended reference ranges:  Up to 24 hours.............<8.0 mg/dL  Up to 48 hours............<12.0 mg/dL  3-5 days..................<15.0 mg/dL  6-29 days.................<15.0 mg/dL       Alkaline Phosphatase   Date Value Ref Range Status   10/11/2022 85 55 - 135 U/L Final     AST   Date Value Ref Range Status   10/11/2022 25 10 - 40 U/L Final     ALT   Date Value Ref Range Status   10/11/2022 30 10 - 44 U/L Final     Anion Gap   Date Value Ref Range Status   10/11/2022 5 (L) 8 - 16 mmol/L Final     No results found for: CEA  No results found for: PSA        Assessment/Plan:     Problem List Items Addressed This Visit    None  Visit Diagnoses       Cellulitis, unspecified cellulitis site    -  Primary    Relevant Medications    amoxicillin-clavulanate 875-125mg (AUGMENTIN) 875-125 mg per tablet          Triple Negative breast cancer- Continue with Cycle 3 Day Abraxane Peripherally  Cellulitis- Start Augmentin BID    Discussion:     Follow up in about 12 days (around 10/25/2022) for with me and 5 weeks with Dr. Peralta.    Electronically signed by Ana Quigley, MSN, APRN, AGNP-C, OCN

## 2022-10-14 ENCOUNTER — HOSPITAL ENCOUNTER (OUTPATIENT)
Dept: RADIOLOGY | Facility: HOSPITAL | Age: 47
Discharge: HOME | End: 2022-10-14
Attending: NURSE PRACTITIONER
Payer: COMMERCIAL

## 2022-10-14 VITALS — BODY MASS INDEX: 35.51 KG/M2 | HEIGHT: 62 IN | WEIGHT: 193 LBS

## 2022-10-14 DIAGNOSIS — R92.8 ABNORMAL MRI, BREAST: ICD-10-CM

## 2022-10-14 DIAGNOSIS — Z17.1 MALIGNANT NEOPLASM OF UPPER-OUTER QUADRANT OF LEFT BREAST IN FEMALE, ESTROGEN RECEPTOR NEGATIVE: ICD-10-CM

## 2022-10-14 DIAGNOSIS — C50.412 MALIGNANT NEOPLASM OF UPPER-OUTER QUADRANT OF LEFT BREAST IN FEMALE, ESTROGEN RECEPTOR NEGATIVE: ICD-10-CM

## 2022-10-14 LAB — GLUCOSE SERPL-MCNC: 96 MG/DL (ref 70–110)

## 2022-10-14 PROCEDURE — 78815 PET IMAGE W/CT SKULL-THIGH: CPT | Mod: TC,PO

## 2022-10-14 NOTE — PROGRESS NOTES
Please call and notify patient PET result does not show any area of abnormality in the sternum. Continue current treatment plan and follow up as scheduled.

## 2022-10-18 ENCOUNTER — DOCUMENTATION ONLY (OUTPATIENT)
Dept: INFUSION THERAPY | Facility: HOSPITAL | Age: 47
End: 2022-10-18

## 2022-10-18 ENCOUNTER — PATIENT MESSAGE (OUTPATIENT)
Dept: HEMATOLOGY/ONCOLOGY | Facility: CLINIC | Age: 47
End: 2022-10-18

## 2022-10-18 ENCOUNTER — LAB VISIT (OUTPATIENT)
Dept: LAB | Facility: HOSPITAL | Age: 47
End: 2022-10-18
Attending: INTERNAL MEDICINE
Payer: COMMERCIAL

## 2022-10-18 DIAGNOSIS — Z17.1 MALIGNANT NEOPLASM OF UPPER-OUTER QUADRANT OF LEFT BREAST IN FEMALE, ESTROGEN RECEPTOR NEGATIVE: ICD-10-CM

## 2022-10-18 DIAGNOSIS — R53.83 FATIGUE, UNSPECIFIED TYPE: ICD-10-CM

## 2022-10-18 DIAGNOSIS — C50.412 MALIGNANT NEOPLASM OF UPPER-OUTER QUADRANT OF LEFT BREAST IN FEMALE, ESTROGEN RECEPTOR NEGATIVE: ICD-10-CM

## 2022-10-18 LAB
ALBUMIN SERPL BCP-MCNC: 3.8 G/DL (ref 3.5–5.2)
ALP SERPL-CCNC: 79 U/L (ref 55–135)
ALT SERPL W/O P-5'-P-CCNC: 23 U/L (ref 10–44)
ANION GAP SERPL CALC-SCNC: 6 MMOL/L (ref 8–16)
AST SERPL-CCNC: 20 U/L (ref 10–40)
BASOPHILS # BLD AUTO: 0.04 K/UL (ref 0–0.2)
BASOPHILS NFR BLD: 1.5 % (ref 0–1.9)
BILIRUB SERPL-MCNC: 0.4 MG/DL (ref 0.1–1)
BUN SERPL-MCNC: 10 MG/DL (ref 6–20)
CALCIUM SERPL-MCNC: 9.3 MG/DL (ref 8.7–10.5)
CHLORIDE SERPL-SCNC: 107 MMOL/L (ref 95–110)
CO2 SERPL-SCNC: 28 MMOL/L (ref 23–29)
CREAT SERPL-MCNC: 0.7 MG/DL (ref 0.5–1.4)
DIFFERENTIAL METHOD: ABNORMAL
EOSINOPHIL # BLD AUTO: 0 K/UL (ref 0–0.5)
EOSINOPHIL NFR BLD: 0.4 % (ref 0–8)
ERYTHROCYTE [DISTWIDTH] IN BLOOD BY AUTOMATED COUNT: 16.5 % (ref 11.5–14.5)
EST. GFR  (NO RACE VARIABLE): >60 ML/MIN/1.73 M^2
GLUCOSE SERPL-MCNC: 87 MG/DL (ref 70–110)
HCT VFR BLD AUTO: 29.8 % (ref 37–48.5)
HGB BLD-MCNC: 9.7 G/DL (ref 12–16)
IMM GRANULOCYTES # BLD AUTO: 0 K/UL (ref 0–0.04)
IMM GRANULOCYTES NFR BLD AUTO: 0 % (ref 0–0.5)
LYMPHOCYTES # BLD AUTO: 1.4 K/UL (ref 1–4.8)
LYMPHOCYTES NFR BLD: 52.9 % (ref 18–48)
MAGNESIUM SERPL-MCNC: 1.9 MG/DL (ref 1.6–2.6)
MCH RBC QN AUTO: 32.3 PG (ref 27–31)
MCHC RBC AUTO-ENTMCNC: 32.6 G/DL (ref 32–36)
MCV RBC AUTO: 99 FL (ref 82–98)
MONOCYTES # BLD AUTO: 0.2 K/UL (ref 0.3–1)
MONOCYTES NFR BLD: 9.1 % (ref 4–15)
NEUTROPHILS # BLD AUTO: 1 K/UL (ref 1.8–7.7)
NEUTROPHILS NFR BLD: 36.1 % (ref 38–73)
NRBC BLD-RTO: 0 /100 WBC
PLATELET # BLD AUTO: 230 K/UL (ref 150–450)
PLATELET BLD QL SMEAR: ABNORMAL
PMV BLD AUTO: 10.7 FL (ref 9.2–12.9)
POTASSIUM SERPL-SCNC: 4.2 MMOL/L (ref 3.5–5.1)
PROT SERPL-MCNC: 7.1 G/DL (ref 6–8.4)
RBC # BLD AUTO: 3 M/UL (ref 4–5.4)
SODIUM SERPL-SCNC: 141 MMOL/L (ref 136–145)
WBC # BLD AUTO: 2.63 K/UL (ref 3.9–12.7)

## 2022-10-18 PROCEDURE — 36415 COLL VENOUS BLD VENIPUNCTURE: CPT | Performed by: INTERNAL MEDICINE

## 2022-10-18 PROCEDURE — 83735 ASSAY OF MAGNESIUM: CPT | Performed by: INTERNAL MEDICINE

## 2022-10-18 PROCEDURE — 85025 COMPLETE CBC W/AUTO DIFF WBC: CPT | Performed by: INTERNAL MEDICINE

## 2022-10-18 PROCEDURE — 80053 COMPREHEN METABOLIC PANEL: CPT | Performed by: NURSE PRACTITIONER

## 2022-10-19 RX ORDER — HEPARIN 100 UNIT/ML
500 SYRINGE INTRAVENOUS
Status: CANCELLED | OUTPATIENT
Start: 2022-10-20

## 2022-10-19 RX ORDER — SODIUM CHLORIDE 0.9 % (FLUSH) 0.9 %
10 SYRINGE (ML) INJECTION
Status: CANCELLED | OUTPATIENT
Start: 2022-10-20

## 2022-10-19 RX ORDER — DIPHENHYDRAMINE HYDROCHLORIDE 50 MG/ML
50 INJECTION INTRAMUSCULAR; INTRAVENOUS ONCE AS NEEDED
Status: CANCELLED | OUTPATIENT
Start: 2022-10-20

## 2022-10-19 RX ORDER — ONDANSETRON HCL IN 0.9 % NACL 8 MG/50 ML
8 INTRAVENOUS SOLUTION, PIGGYBACK (ML) INTRAVENOUS
Status: CANCELLED
Start: 2022-10-20 | End: 2022-10-20

## 2022-10-19 RX ORDER — FAMOTIDINE 10 MG/ML
20 INJECTION INTRAVENOUS
Status: CANCELLED | OUTPATIENT
Start: 2022-10-20

## 2022-10-19 RX ORDER — EPINEPHRINE 0.3 MG/.3ML
0.3 INJECTION SUBCUTANEOUS ONCE AS NEEDED
Status: CANCELLED | OUTPATIENT
Start: 2022-10-20

## 2022-10-20 ENCOUNTER — INFUSION (OUTPATIENT)
Dept: INFUSION THERAPY | Facility: HOSPITAL | Age: 47
End: 2022-10-20
Attending: INTERNAL MEDICINE
Payer: COMMERCIAL

## 2022-10-20 VITALS
WEIGHT: 196.81 LBS | TEMPERATURE: 97 F | OXYGEN SATURATION: 100 % | DIASTOLIC BLOOD PRESSURE: 69 MMHG | HEIGHT: 62 IN | HEART RATE: 59 BPM | RESPIRATION RATE: 18 BRPM | BODY MASS INDEX: 36.22 KG/M2 | SYSTOLIC BLOOD PRESSURE: 97 MMHG

## 2022-10-20 DIAGNOSIS — C50.412 MALIGNANT NEOPLASM OF UPPER-OUTER QUADRANT OF LEFT BREAST IN FEMALE, ESTROGEN RECEPTOR NEGATIVE: ICD-10-CM

## 2022-10-20 DIAGNOSIS — T45.1X5A CHEMOTHERAPY-INDUCED NEUTROPENIA: Primary | ICD-10-CM

## 2022-10-20 DIAGNOSIS — D70.1 CHEMOTHERAPY-INDUCED NEUTROPENIA: Primary | ICD-10-CM

## 2022-10-20 DIAGNOSIS — Z17.1 MALIGNANT NEOPLASM OF UPPER-OUTER QUADRANT OF LEFT BREAST IN FEMALE, ESTROGEN RECEPTOR NEGATIVE: ICD-10-CM

## 2022-10-20 PROCEDURE — 96413 CHEMO IV INFUSION 1 HR: CPT

## 2022-10-20 PROCEDURE — 63600175 PHARM REV CODE 636 W HCPCS: Performed by: NURSE PRACTITIONER

## 2022-10-20 PROCEDURE — 96367 TX/PROPH/DG ADDL SEQ IV INF: CPT

## 2022-10-20 PROCEDURE — 25000003 PHARM REV CODE 250: Performed by: NURSE PRACTITIONER

## 2022-10-20 PROCEDURE — 96375 TX/PRO/DX INJ NEW DRUG ADDON: CPT

## 2022-10-20 RX ORDER — HEPARIN 100 UNIT/ML
500 SYRINGE INTRAVENOUS
Status: DISCONTINUED | OUTPATIENT
Start: 2022-10-20 | End: 2022-10-20 | Stop reason: HOSPADM

## 2022-10-20 RX ORDER — SODIUM CHLORIDE 0.9 % (FLUSH) 0.9 %
10 SYRINGE (ML) INJECTION
Status: DISCONTINUED | OUTPATIENT
Start: 2022-10-20 | End: 2022-10-20 | Stop reason: HOSPADM

## 2022-10-20 RX ORDER — EPINEPHRINE 0.3 MG/.3ML
0.3 INJECTION SUBCUTANEOUS ONCE AS NEEDED
Status: DISCONTINUED | OUTPATIENT
Start: 2022-10-20 | End: 2022-10-20 | Stop reason: HOSPADM

## 2022-10-20 RX ORDER — ONDANSETRON HCL IN 0.9 % NACL 8 MG/50 ML
8 INTRAVENOUS SOLUTION, PIGGYBACK (ML) INTRAVENOUS
Status: COMPLETED | OUTPATIENT
Start: 2022-10-20 | End: 2022-10-20

## 2022-10-20 RX ORDER — FAMOTIDINE 10 MG/ML
20 INJECTION INTRAVENOUS
Status: COMPLETED | OUTPATIENT
Start: 2022-10-20 | End: 2022-10-20

## 2022-10-20 RX ADMIN — ONDANSETRON 8 MG: 2 INJECTION INTRAMUSCULAR; INTRAVENOUS at 10:10

## 2022-10-20 RX ADMIN — PACLITAXEL 200 MG: 100 INJECTION, POWDER, LYOPHILIZED, FOR SUSPENSION INTRAVENOUS at 10:10

## 2022-10-20 RX ADMIN — SODIUM CHLORIDE: 0.9 INJECTION, SOLUTION INTRAVENOUS at 09:10

## 2022-10-20 RX ADMIN — DEXAMETHASONE SODIUM PHOSPHATE 10 MG: 4 INJECTION, SOLUTION INTRA-ARTICULAR; INTRALESIONAL; INTRAMUSCULAR; INTRAVENOUS; SOFT TISSUE at 09:10

## 2022-10-20 RX ADMIN — DIPHENHYDRAMINE HYDROCHLORIDE 50 MG: 50 INJECTION INTRAMUSCULAR; INTRAVENOUS at 09:10

## 2022-10-20 RX ADMIN — FAMOTIDINE 20 MG: 10 INJECTION INTRAVENOUS at 09:10

## 2022-10-20 NOTE — PLAN OF CARE
Problem: Infection  Goal: Absence of Infection Signs and Symptoms  Outcome: Ongoing, Progressing  Intervention: Prevent or Manage Infection  Flowsheets (Taken 10/20/2022 1528)  Isolation Precautions: precautions maintained

## 2022-10-25 ENCOUNTER — LAB VISIT (OUTPATIENT)
Dept: LAB | Facility: HOSPITAL | Age: 47
End: 2022-10-25
Attending: INTERNAL MEDICINE
Payer: COMMERCIAL

## 2022-10-25 ENCOUNTER — OFFICE VISIT (OUTPATIENT)
Dept: HEMATOLOGY/ONCOLOGY | Facility: CLINIC | Age: 47
End: 2022-10-25
Payer: COMMERCIAL

## 2022-10-25 VITALS
SYSTOLIC BLOOD PRESSURE: 125 MMHG | WEIGHT: 197.13 LBS | DIASTOLIC BLOOD PRESSURE: 84 MMHG | BODY MASS INDEX: 36.05 KG/M2 | HEART RATE: 75 BPM | TEMPERATURE: 99 F

## 2022-10-25 DIAGNOSIS — C50.412 MALIGNANT NEOPLASM OF UPPER-OUTER QUADRANT OF LEFT BREAST IN FEMALE, ESTROGEN RECEPTOR NEGATIVE: ICD-10-CM

## 2022-10-25 DIAGNOSIS — Z17.1 MALIGNANT NEOPLASM OF UPPER-OUTER QUADRANT OF LEFT BREAST IN FEMALE, ESTROGEN RECEPTOR NEGATIVE: Primary | ICD-10-CM

## 2022-10-25 DIAGNOSIS — C50.412 MALIGNANT NEOPLASM OF UPPER-OUTER QUADRANT OF LEFT BREAST IN FEMALE, ESTROGEN RECEPTOR NEGATIVE: Primary | ICD-10-CM

## 2022-10-25 DIAGNOSIS — G89.3 CANCER ASSOCIATED PAIN: ICD-10-CM

## 2022-10-25 DIAGNOSIS — R53.83 FATIGUE, UNSPECIFIED TYPE: ICD-10-CM

## 2022-10-25 DIAGNOSIS — Z17.1 MALIGNANT NEOPLASM OF UPPER-OUTER QUADRANT OF LEFT BREAST IN FEMALE, ESTROGEN RECEPTOR NEGATIVE: ICD-10-CM

## 2022-10-25 LAB
ALBUMIN SERPL BCP-MCNC: 4.1 G/DL (ref 3.5–5.2)
ALP SERPL-CCNC: 82 U/L (ref 55–135)
ALT SERPL W/O P-5'-P-CCNC: 38 U/L (ref 10–44)
ANION GAP SERPL CALC-SCNC: 6 MMOL/L (ref 8–16)
AST SERPL-CCNC: 26 U/L (ref 10–40)
BASOPHILS # BLD AUTO: 0.02 K/UL (ref 0–0.2)
BASOPHILS NFR BLD: 0.7 % (ref 0–1.9)
BILIRUB SERPL-MCNC: 0.9 MG/DL (ref 0.1–1)
BUN SERPL-MCNC: 14 MG/DL (ref 6–20)
CALCIUM SERPL-MCNC: 9.5 MG/DL (ref 8.7–10.5)
CHLORIDE SERPL-SCNC: 104 MMOL/L (ref 95–110)
CO2 SERPL-SCNC: 28 MMOL/L (ref 23–29)
CREAT SERPL-MCNC: 0.8 MG/DL (ref 0.5–1.4)
DIFFERENTIAL METHOD: ABNORMAL
EOSINOPHIL # BLD AUTO: 0.1 K/UL (ref 0–0.5)
EOSINOPHIL NFR BLD: 1.7 % (ref 0–8)
ERYTHROCYTE [DISTWIDTH] IN BLOOD BY AUTOMATED COUNT: 17.2 % (ref 11.5–14.5)
EST. GFR  (NO RACE VARIABLE): >60 ML/MIN/1.73 M^2
GLUCOSE SERPL-MCNC: 108 MG/DL (ref 70–110)
HCT VFR BLD AUTO: 33.4 % (ref 37–48.5)
HGB BLD-MCNC: 11.2 G/DL (ref 12–16)
IMM GRANULOCYTES # BLD AUTO: 0.01 K/UL (ref 0–0.04)
IMM GRANULOCYTES NFR BLD AUTO: 0.3 % (ref 0–0.5)
LYMPHOCYTES # BLD AUTO: 1.4 K/UL (ref 1–4.8)
LYMPHOCYTES NFR BLD: 48.1 % (ref 18–48)
MAGNESIUM SERPL-MCNC: 1.9 MG/DL (ref 1.6–2.6)
MCH RBC QN AUTO: 33 PG (ref 27–31)
MCHC RBC AUTO-ENTMCNC: 33.5 G/DL (ref 32–36)
MCV RBC AUTO: 99 FL (ref 82–98)
MONOCYTES # BLD AUTO: 0.2 K/UL (ref 0.3–1)
MONOCYTES NFR BLD: 6.3 % (ref 4–15)
NEUTROPHILS # BLD AUTO: 1.2 K/UL (ref 1.8–7.7)
NEUTROPHILS NFR BLD: 42.9 % (ref 38–73)
NRBC BLD-RTO: 0 /100 WBC
PLATELET # BLD AUTO: 122 K/UL (ref 150–450)
PMV BLD AUTO: 9.4 FL (ref 9.2–12.9)
POTASSIUM SERPL-SCNC: 3.9 MMOL/L (ref 3.5–5.1)
PROT SERPL-MCNC: 7.4 G/DL (ref 6–8.4)
RBC # BLD AUTO: 3.39 M/UL (ref 4–5.4)
SODIUM SERPL-SCNC: 138 MMOL/L (ref 136–145)
TSH SERPL DL<=0.005 MIU/L-ACNC: 1.72 UIU/ML (ref 0.34–5.6)
WBC # BLD AUTO: 2.87 K/UL (ref 3.9–12.7)

## 2022-10-25 PROCEDURE — 3074F SYST BP LT 130 MM HG: CPT | Mod: CPTII,S$GLB,, | Performed by: NURSE PRACTITIONER

## 2022-10-25 PROCEDURE — 3079F DIAST BP 80-89 MM HG: CPT | Mod: CPTII,S$GLB,, | Performed by: NURSE PRACTITIONER

## 2022-10-25 PROCEDURE — 84443 ASSAY THYROID STIM HORMONE: CPT | Performed by: NURSE PRACTITIONER

## 2022-10-25 PROCEDURE — 99213 PR OFFICE/OUTPT VISIT, EST, LEVL III, 20-29 MIN: ICD-10-PCS | Mod: S$GLB,,, | Performed by: NURSE PRACTITIONER

## 2022-10-25 PROCEDURE — 1159F PR MEDICATION LIST DOCUMENTED IN MEDICAL RECORD: ICD-10-PCS | Mod: CPTII,S$GLB,, | Performed by: NURSE PRACTITIONER

## 2022-10-25 PROCEDURE — 36415 COLL VENOUS BLD VENIPUNCTURE: CPT | Performed by: INTERNAL MEDICINE

## 2022-10-25 PROCEDURE — 80053 COMPREHEN METABOLIC PANEL: CPT | Performed by: NURSE PRACTITIONER

## 2022-10-25 PROCEDURE — 1159F MED LIST DOCD IN RCRD: CPT | Mod: CPTII,S$GLB,, | Performed by: NURSE PRACTITIONER

## 2022-10-25 PROCEDURE — 85025 COMPLETE CBC W/AUTO DIFF WBC: CPT | Performed by: INTERNAL MEDICINE

## 2022-10-25 PROCEDURE — 3074F PR MOST RECENT SYSTOLIC BLOOD PRESSURE < 130 MM HG: ICD-10-PCS | Mod: CPTII,S$GLB,, | Performed by: NURSE PRACTITIONER

## 2022-10-25 PROCEDURE — 3079F PR MOST RECENT DIASTOLIC BLOOD PRESSURE 80-89 MM HG: ICD-10-PCS | Mod: CPTII,S$GLB,, | Performed by: NURSE PRACTITIONER

## 2022-10-25 PROCEDURE — 83735 ASSAY OF MAGNESIUM: CPT | Performed by: INTERNAL MEDICINE

## 2022-10-25 PROCEDURE — 99213 OFFICE O/P EST LOW 20 MIN: CPT | Mod: S$GLB,,, | Performed by: NURSE PRACTITIONER

## 2022-10-25 RX ORDER — HEPARIN 100 UNIT/ML
500 SYRINGE INTRAVENOUS
Status: CANCELLED | OUTPATIENT
Start: 2022-10-27

## 2022-10-25 RX ORDER — EPINEPHRINE 0.3 MG/.3ML
0.3 INJECTION SUBCUTANEOUS ONCE AS NEEDED
Status: CANCELLED | OUTPATIENT
Start: 2022-10-27

## 2022-10-25 RX ORDER — SODIUM CHLORIDE 0.9 % (FLUSH) 0.9 %
10 SYRINGE (ML) INJECTION
Status: CANCELLED | OUTPATIENT
Start: 2022-10-27

## 2022-10-25 RX ORDER — FAMOTIDINE 10 MG/ML
20 INJECTION INTRAVENOUS
Status: CANCELLED | OUTPATIENT
Start: 2022-10-27

## 2022-10-25 RX ORDER — DIPHENHYDRAMINE HYDROCHLORIDE 50 MG/ML
50 INJECTION INTRAMUSCULAR; INTRAVENOUS ONCE AS NEEDED
Status: CANCELLED | OUTPATIENT
Start: 2022-10-27

## 2022-10-25 NOTE — PROGRESS NOTES
PROGRESS NOTE    Subjective:       Patient ID: Karyna Escoto is a 47 y.o. female.    6/9/2022:  Dx Mammo:  1.8cm mass in the left breast towards the left axilla.   Deep to the mass 2 lymph nodes are identified.     7/5/2022:  Left breast core biopsy:  Grade 2 IDCA with DCIS  ER: 0.03%, DC: 8.8%, HER2: 0  TRIPLE NEGATIVE    7/20/2022:  Left axilla LN needle biopsy:  Invasive ductal carcinoma  ER: 0%, DC: 40%, HER2 negative(0)    PLAN:  Neoadjuvant chemo/IO(Keynote 522)-surgery-radiation.     Pem/Tax/Carb:  Cycle 1: 8/11/2022  Cycle 2: 9/8/2022  Cycle 3: 10/6/2022  Cycle 4: 10/27/2022- Due    8/3/2022 Photos:          8/23/2022 Photo:      10/4/2022 Photo:    Lesion is no longer palpable on 10/4/2022.       Chief Complaint:  Triple negative breast cancer follow up.     History of Present Illness:   Karyna Escoto is a 47 y.o. female who presents for follow up of breast cancer.      Ms. Escoto is s/p Cycle Carboplatin, Keytruda and Asbraxane. She is tolerating the treatment well. Port site is well healed. Some discoloration noted.    Family and Social history reviewed and is unchanged from 8/3/2022        ROS:  Review of Systems   Constitutional:  Positive for fatigue. Negative for appetite change, fever and unexpected weight change.   HENT:  Negative for mouth sores.    Eyes:  Negative for visual disturbance.   Respiratory:  Negative for cough and shortness of breath.    Cardiovascular:  Negative for chest pain and leg swelling.   Gastrointestinal:  Positive for diarrhea. Negative for abdominal pain and blood in stool.   Genitourinary:  Positive for frequency. Negative for hematuria.   Musculoskeletal:  Negative for back pain.   Skin:  Negative for rash.   Neurological:  Positive for headaches.   Hematological:  Positive for adenopathy.   Psychiatric/Behavioral:  The patient is not nervous/anxious.         Current Outpatient Medications:      acetaminophen (TYLENOL) 325 MG tablet, Take 650 mg by mouth., Disp: , Rfl:     cetirizine (ZYRTEC) 10 mg Cap, , Disp: , Rfl:     fluticasone propionate (FLONASE) 50 mcg/actuation nasal spray, 1 spray (50 mcg total) by Each Nostril route once daily., Disp: 15.8 mL, Rfl: 5    gabapentin (NEURONTIN) 300 MG capsule, Take 300 mg by mouth 3 (three) times daily., Disp: , Rfl:     HYDROcodone-acetaminophen (NORCO) 5-325 mg per tablet, Take 1 tablet by mouth every 6 (six) hours as needed for Pain., Disp: 30 tablet, Rfl: 0    hydrOXYzine HCL (ATARAX) 10 MG Tab, Take 25 mg by mouth 3 (three) times daily as needed., Disp: , Rfl:     hydrOXYzine HCL (ATARAX) 10 MG Tab, Take 10 mg by mouth., Disp: , Rfl:     LIDOcaine-prilocaine (EMLA) cream, Apply topically as needed. Apply 30 mins prior to port access, Disp: 30 g, Rfl: 5    naproxen sodium 220 mg Cap, Take by mouth., Disp: , Rfl:     omeprazole (PRILOSEC) 10 MG capsule, Take 10 mg by mouth every 12 (twelve) hours as needed., Disp: , Rfl:     ondansetron (ZOFRAN) 8 MG tablet, Take 1 tablet (8 mg total) by mouth every 8 (eight) hours as needed., Disp: 30 tablet, Rfl: 5    promethazine (PHENERGAN) 25 MG tablet, Take 1 tablet (25 mg total) by mouth every 4 to 6 hours as needed., Disp: 30 tablet, Rfl: 5    sertraline (ZOLOFT) 50 MG tablet, Take 50 mg by mouth once daily., Disp: , Rfl:     sertraline (ZOLOFT) 50 MG tablet, Take 50 mg by mouth., Disp: , Rfl:         Objective:       Physical Examination:     /84   Pulse 75   Temp 98.7 °F (37.1 °C)   Wt 89.4 kg (197 lb 1.6 oz)   BMI 36.05 kg/m²     Physical Exam  Constitutional:       Appearance: Normal appearance. She is well-developed.   HENT:      Head: Normocephalic and atraumatic.      Right Ear: External ear normal.      Left Ear: External ear normal.   Eyes:      Conjunctiva/sclera: Conjunctivae normal.      Pupils: Pupils are equal, round, and reactive to light.   Neck:      Thyroid: No thyromegaly.      Trachea: No  tracheal deviation.   Cardiovascular:      Rate and Rhythm: Normal rate and regular rhythm.      Heart sounds: Normal heart sounds.   Pulmonary:      Effort: Pulmonary effort is normal.      Breath sounds: Normal breath sounds.   Abdominal:      General: Bowel sounds are normal. There is no distension.      Palpations: Abdomen is soft. There is no mass.      Tenderness: There is no abdominal tenderness.   Skin:     Findings: No erythema (port site) or rash.   Neurological:      Mental Status: She is alert.      Comments: Neuro intact througout   Psychiatric:         Mood and Affect: Mood normal.         Behavior: Behavior normal.         Thought Content: Thought content normal.         Judgment: Judgment normal.       Labs:   Recent Results (from the past 336 hour(s))   CBC Auto Differential    Collection Time: 10/25/22  8:37 AM   Result Value Ref Range    WBC 2.87 (L) 3.90 - 12.70 K/uL    Hemoglobin 11.2 (L) 12.0 - 16.0 g/dL    Hematocrit 33.4 (L) 37.0 - 48.5 %    Platelets 122 (L) 150 - 450 K/uL   CBC Auto Differential    Collection Time: 10/18/22  9:24 AM   Result Value Ref Range    WBC 2.63 (L) 3.90 - 12.70 K/uL    Hemoglobin 9.7 (L) 12.0 - 16.0 g/dL    Hematocrit 29.8 (L) 37.0 - 48.5 %    Platelets 230 150 - 450 K/uL     CMP  Sodium   Date Value Ref Range Status   10/25/2022 138 136 - 145 mmol/L Final     Potassium   Date Value Ref Range Status   10/25/2022 3.9 3.5 - 5.1 mmol/L Final     Chloride   Date Value Ref Range Status   10/25/2022 104 95 - 110 mmol/L Final     CO2   Date Value Ref Range Status   10/25/2022 28 23 - 29 mmol/L Final     Glucose   Date Value Ref Range Status   10/25/2022 108 70 - 110 mg/dL Final     BUN   Date Value Ref Range Status   10/25/2022 14 6 - 20 mg/dL Final     Creatinine   Date Value Ref Range Status   10/25/2022 0.8 0.5 - 1.4 mg/dL Final     Calcium   Date Value Ref Range Status   10/25/2022 9.5 8.7 - 10.5 mg/dL Final     Total Protein   Date Value Ref Range Status   10/25/2022  7.4 6.0 - 8.4 g/dL Final     Albumin   Date Value Ref Range Status   10/25/2022 4.1 3.5 - 5.2 g/dL Final     Total Bilirubin   Date Value Ref Range Status   10/25/2022 0.9 0.1 - 1.0 mg/dL Final     Comment:     For infants and newborns, interpretation of results should be based  on gestational age, weight and in agreement with clinical  observations.    Premature Infant recommended reference ranges:  Up to 24 hours.............<8.0 mg/dL  Up to 48 hours............<12.0 mg/dL  3-5 days..................<15.0 mg/dL  6-29 days.................<15.0 mg/dL       Alkaline Phosphatase   Date Value Ref Range Status   10/25/2022 82 55 - 135 U/L Final     AST   Date Value Ref Range Status   10/25/2022 26 10 - 40 U/L Final     ALT   Date Value Ref Range Status   10/25/2022 38 10 - 44 U/L Final     Anion Gap   Date Value Ref Range Status   10/25/2022 6 (L) 8 - 16 mmol/L Final     No results found for: CEA  No results found for: PSA        Assessment/Plan:     Problem List Items Addressed This Visit          Oncology    Left Breast Cancer-TRIPLE NEGATIVE - Primary    Cancer associated pain     Other Visit Diagnoses       Fatigue, unspecified type              Triple Negative breast cancer- Continue with Cycle 4 Day 1  US breast after cycle 4  Port discoloration- Ok to use port for infusion.  Fatigue- Encouraged activity  Joint and muscle aches and pains- Norco PRN    Discussion:     Follow up in about 3 weeks (around 11/15/2022) for with Dr. Peralta.    Electronically signed by Ana Quigley, MSN, APRN, AGNP-C, OCN

## 2022-10-26 ENCOUNTER — DOCUMENTATION ONLY (OUTPATIENT)
Dept: INFUSION THERAPY | Facility: HOSPITAL | Age: 47
End: 2022-10-26

## 2022-10-26 ENCOUNTER — PATIENT MESSAGE (OUTPATIENT)
Dept: HEMATOLOGY/ONCOLOGY | Facility: CLINIC | Age: 47
End: 2022-10-26

## 2022-10-27 ENCOUNTER — INFUSION (OUTPATIENT)
Dept: INFUSION THERAPY | Facility: HOSPITAL | Age: 47
End: 2022-10-27
Attending: INTERNAL MEDICINE
Payer: COMMERCIAL

## 2022-10-27 VITALS
HEART RATE: 74 BPM | OXYGEN SATURATION: 100 % | WEIGHT: 198.38 LBS | RESPIRATION RATE: 18 BRPM | DIASTOLIC BLOOD PRESSURE: 64 MMHG | BODY MASS INDEX: 36.5 KG/M2 | TEMPERATURE: 98 F | HEIGHT: 62 IN | SYSTOLIC BLOOD PRESSURE: 111 MMHG

## 2022-10-27 DIAGNOSIS — T45.1X5A CHEMOTHERAPY-INDUCED NEUTROPENIA: Primary | ICD-10-CM

## 2022-10-27 DIAGNOSIS — Z17.1 MALIGNANT NEOPLASM OF UPPER-OUTER QUADRANT OF LEFT BREAST IN FEMALE, ESTROGEN RECEPTOR NEGATIVE: ICD-10-CM

## 2022-10-27 DIAGNOSIS — D70.1 CHEMOTHERAPY-INDUCED NEUTROPENIA: Primary | ICD-10-CM

## 2022-10-27 DIAGNOSIS — C50.412 MALIGNANT NEOPLASM OF UPPER-OUTER QUADRANT OF LEFT BREAST IN FEMALE, ESTROGEN RECEPTOR NEGATIVE: ICD-10-CM

## 2022-10-27 PROCEDURE — 63600175 PHARM REV CODE 636 W HCPCS: Mod: JG | Performed by: NURSE PRACTITIONER

## 2022-10-27 PROCEDURE — 96375 TX/PRO/DX INJ NEW DRUG ADDON: CPT

## 2022-10-27 PROCEDURE — 25000003 PHARM REV CODE 250: Performed by: NURSE PRACTITIONER

## 2022-10-27 PROCEDURE — A4216 STERILE WATER/SALINE, 10 ML: HCPCS | Performed by: NURSE PRACTITIONER

## 2022-10-27 PROCEDURE — 96367 TX/PROPH/DG ADDL SEQ IV INF: CPT

## 2022-10-27 PROCEDURE — 96417 CHEMO IV INFUS EACH ADDL SEQ: CPT

## 2022-10-27 PROCEDURE — 96413 CHEMO IV INFUSION 1 HR: CPT

## 2022-10-27 RX ORDER — FAMOTIDINE 10 MG/ML
20 INJECTION INTRAVENOUS
Status: COMPLETED | OUTPATIENT
Start: 2022-10-27 | End: 2022-10-27

## 2022-10-27 RX ORDER — HEPARIN 100 UNIT/ML
500 SYRINGE INTRAVENOUS
Status: DISCONTINUED | OUTPATIENT
Start: 2022-10-27 | End: 2022-10-27 | Stop reason: HOSPADM

## 2022-10-27 RX ORDER — EPINEPHRINE 0.3 MG/.3ML
0.3 INJECTION SUBCUTANEOUS ONCE AS NEEDED
Status: DISCONTINUED | OUTPATIENT
Start: 2022-10-27 | End: 2022-10-27 | Stop reason: HOSPADM

## 2022-10-27 RX ORDER — SODIUM CHLORIDE 0.9 % (FLUSH) 0.9 %
10 SYRINGE (ML) INJECTION
Status: DISCONTINUED | OUTPATIENT
Start: 2022-10-27 | End: 2022-10-27 | Stop reason: HOSPADM

## 2022-10-27 RX ADMIN — DIPHENHYDRAMINE HYDROCHLORIDE 50 MG: 50 INJECTION INTRAMUSCULAR; INTRAVENOUS at 10:10

## 2022-10-27 RX ADMIN — DEXAMETHASONE SODIUM PHOSPHATE 0.25 MG: 4 INJECTION, SOLUTION INTRA-ARTICULAR; INTRALESIONAL; INTRAMUSCULAR; INTRAVENOUS; SOFT TISSUE at 10:10

## 2022-10-27 RX ADMIN — SODIUM CHLORIDE, PRESERVATIVE FREE 10 ML: 5 INJECTION INTRAVENOUS at 01:10

## 2022-10-27 RX ADMIN — CARBOPLATIN 725 MG: 10 INJECTION, SOLUTION INTRAVENOUS at 11:10

## 2022-10-27 RX ADMIN — FAMOTIDINE 20 MG: 10 INJECTION INTRAVENOUS at 09:10

## 2022-10-27 RX ADMIN — APREPITANT 130 MG: 130 INJECTION, EMULSION INTRAVENOUS at 09:10

## 2022-10-27 RX ADMIN — SODIUM CHLORIDE 200 MG: 9 INJECTION, SOLUTION INTRAVENOUS at 09:10

## 2022-10-27 RX ADMIN — HEPARIN 500 UNITS: 100 SYRINGE at 01:10

## 2022-10-27 RX ADMIN — PACLITAXEL 200 MG: 100 INJECTION, POWDER, LYOPHILIZED, FOR SUSPENSION INTRAVENOUS at 11:10

## 2022-10-27 NOTE — PLAN OF CARE
Problem: Fatigue  Goal: Improved Activity Tolerance  Outcome: Ongoing, Progressing  Intervention: Promote Improved Energy  Flowsheets (Taken 10/27/2022 0905)  Fatigue Management:   frequent rest breaks encouraged   fatigue-related activity identified   paced activity encouraged  Sleep/Rest Enhancement:   regular sleep/rest pattern promoted   relaxation techniques promoted

## 2022-10-31 ENCOUNTER — PATIENT MESSAGE (OUTPATIENT)
Dept: HEMATOLOGY/ONCOLOGY | Facility: CLINIC | Age: 47
End: 2022-10-31

## 2022-11-01 ENCOUNTER — LAB VISIT (OUTPATIENT)
Dept: LAB | Facility: HOSPITAL | Age: 47
End: 2022-11-01
Attending: INTERNAL MEDICINE
Payer: COMMERCIAL

## 2022-11-01 DIAGNOSIS — C50.412 MALIGNANT NEOPLASM OF UPPER-OUTER QUADRANT OF LEFT BREAST IN FEMALE, ESTROGEN RECEPTOR NEGATIVE: ICD-10-CM

## 2022-11-01 DIAGNOSIS — Z17.1 MALIGNANT NEOPLASM OF UPPER-OUTER QUADRANT OF LEFT BREAST IN FEMALE, ESTROGEN RECEPTOR NEGATIVE: ICD-10-CM

## 2022-11-01 DIAGNOSIS — L03.90 CELLULITIS, UNSPECIFIED CELLULITIS SITE: Primary | ICD-10-CM

## 2022-11-01 DIAGNOSIS — R53.83 FATIGUE, UNSPECIFIED TYPE: ICD-10-CM

## 2022-11-01 LAB
ALBUMIN SERPL BCP-MCNC: 4 G/DL (ref 3.5–5.2)
ALP SERPL-CCNC: 91 U/L (ref 55–135)
ALT SERPL W/O P-5'-P-CCNC: 30 U/L (ref 10–44)
ANION GAP SERPL CALC-SCNC: 6 MMOL/L (ref 8–16)
AST SERPL-CCNC: 20 U/L (ref 10–40)
BASOPHILS # BLD AUTO: 0.01 K/UL (ref 0–0.2)
BASOPHILS NFR BLD: 0.2 % (ref 0–1.9)
BILIRUB SERPL-MCNC: 1.1 MG/DL (ref 0.1–1)
BUN SERPL-MCNC: 12 MG/DL (ref 6–20)
CALCIUM SERPL-MCNC: 9 MG/DL (ref 8.7–10.5)
CHLORIDE SERPL-SCNC: 101 MMOL/L (ref 95–110)
CO2 SERPL-SCNC: 25 MMOL/L (ref 23–29)
CREAT SERPL-MCNC: 0.8 MG/DL (ref 0.5–1.4)
DIFFERENTIAL METHOD: ABNORMAL
EOSINOPHIL # BLD AUTO: 0 K/UL (ref 0–0.5)
EOSINOPHIL NFR BLD: 0 % (ref 0–8)
ERYTHROCYTE [DISTWIDTH] IN BLOOD BY AUTOMATED COUNT: 16.2 % (ref 11.5–14.5)
EST. GFR  (NO RACE VARIABLE): >60 ML/MIN/1.73 M^2
GLUCOSE SERPL-MCNC: 113 MG/DL (ref 70–110)
HCT VFR BLD AUTO: 28.7 % (ref 37–48.5)
HGB BLD-MCNC: 9.8 G/DL (ref 12–16)
IMM GRANULOCYTES # BLD AUTO: 0.02 K/UL (ref 0–0.04)
IMM GRANULOCYTES NFR BLD AUTO: 0.4 % (ref 0–0.5)
LYMPHOCYTES # BLD AUTO: 0.6 K/UL (ref 1–4.8)
LYMPHOCYTES NFR BLD: 12 % (ref 18–48)
MAGNESIUM SERPL-MCNC: 1.9 MG/DL (ref 1.6–2.6)
MCH RBC QN AUTO: 33.9 PG (ref 27–31)
MCHC RBC AUTO-ENTMCNC: 34.1 G/DL (ref 32–36)
MCV RBC AUTO: 99 FL (ref 82–98)
MONOCYTES # BLD AUTO: 0.1 K/UL (ref 0.3–1)
MONOCYTES NFR BLD: 2.6 % (ref 4–15)
NEUTROPHILS # BLD AUTO: 4 K/UL (ref 1.8–7.7)
NEUTROPHILS NFR BLD: 84.8 % (ref 38–73)
NRBC BLD-RTO: 0 /100 WBC
PLATELET # BLD AUTO: 134 K/UL (ref 150–450)
PMV BLD AUTO: 8.9 FL (ref 9.2–12.9)
POTASSIUM SERPL-SCNC: 4.2 MMOL/L (ref 3.5–5.1)
PROT SERPL-MCNC: 7.1 G/DL (ref 6–8.4)
RBC # BLD AUTO: 2.89 M/UL (ref 4–5.4)
SODIUM SERPL-SCNC: 132 MMOL/L (ref 136–145)
WBC # BLD AUTO: 4.67 K/UL (ref 3.9–12.7)

## 2022-11-01 PROCEDURE — 80053 COMPREHEN METABOLIC PANEL: CPT | Performed by: NURSE PRACTITIONER

## 2022-11-01 PROCEDURE — 83735 ASSAY OF MAGNESIUM: CPT | Performed by: INTERNAL MEDICINE

## 2022-11-01 PROCEDURE — 36415 COLL VENOUS BLD VENIPUNCTURE: CPT | Performed by: INTERNAL MEDICINE

## 2022-11-01 PROCEDURE — 85025 COMPLETE CBC W/AUTO DIFF WBC: CPT | Performed by: INTERNAL MEDICINE

## 2022-11-01 RX ORDER — CLINDAMYCIN HYDROCHLORIDE 300 MG/1
300 CAPSULE ORAL 3 TIMES DAILY
Qty: 30 CAPSULE | Refills: 0 | Status: SHIPPED | OUTPATIENT
Start: 2022-11-01 | End: 2022-11-11

## 2022-11-01 NOTE — PROGRESS NOTES
Patient stopped in clinic for port site eval. More redness and irritation pain, swelling since yesterday. Discussed with Dr. Peralta start Clindamycin and send to surgery for eval. Port was placed by =surgeon in [place] but she is no longer there. She was previously seen by Dr. Murphy will send her for eval.

## 2022-11-02 ENCOUNTER — PATIENT MESSAGE (OUTPATIENT)
Dept: HEMATOLOGY/ONCOLOGY | Facility: CLINIC | Age: 47
End: 2022-11-02

## 2022-11-03 ENCOUNTER — INFUSION (OUTPATIENT)
Dept: INFUSION THERAPY | Facility: HOSPITAL | Age: 47
End: 2022-11-03
Attending: INTERNAL MEDICINE
Payer: COMMERCIAL

## 2022-11-03 ENCOUNTER — PATIENT MESSAGE (OUTPATIENT)
Dept: HEMATOLOGY/ONCOLOGY | Facility: CLINIC | Age: 47
End: 2022-11-03

## 2022-11-03 VITALS
WEIGHT: 196.19 LBS | HEIGHT: 62 IN | DIASTOLIC BLOOD PRESSURE: 69 MMHG | BODY MASS INDEX: 36.1 KG/M2 | HEART RATE: 62 BPM | SYSTOLIC BLOOD PRESSURE: 105 MMHG | OXYGEN SATURATION: 98 % | RESPIRATION RATE: 17 BRPM | TEMPERATURE: 98 F

## 2022-11-03 DIAGNOSIS — T45.1X5A CHEMOTHERAPY-INDUCED NEUTROPENIA: Primary | ICD-10-CM

## 2022-11-03 DIAGNOSIS — Z17.1 MALIGNANT NEOPLASM OF UPPER-OUTER QUADRANT OF LEFT BREAST IN FEMALE, ESTROGEN RECEPTOR NEGATIVE: ICD-10-CM

## 2022-11-03 DIAGNOSIS — D70.1 CHEMOTHERAPY-INDUCED NEUTROPENIA: Primary | ICD-10-CM

## 2022-11-03 DIAGNOSIS — C50.412 MALIGNANT NEOPLASM OF UPPER-OUTER QUADRANT OF LEFT BREAST IN FEMALE, ESTROGEN RECEPTOR NEGATIVE: ICD-10-CM

## 2022-11-03 PROCEDURE — 25000003 PHARM REV CODE 250: Performed by: NURSE PRACTITIONER

## 2022-11-03 PROCEDURE — 96375 TX/PRO/DX INJ NEW DRUG ADDON: CPT

## 2022-11-03 PROCEDURE — 96367 TX/PROPH/DG ADDL SEQ IV INF: CPT

## 2022-11-03 PROCEDURE — 63600175 PHARM REV CODE 636 W HCPCS: Performed by: NURSE PRACTITIONER

## 2022-11-03 PROCEDURE — 96413 CHEMO IV INFUSION 1 HR: CPT

## 2022-11-03 RX ORDER — HEPARIN 100 UNIT/ML
500 SYRINGE INTRAVENOUS
Status: CANCELLED | OUTPATIENT
Start: 2022-11-03

## 2022-11-03 RX ORDER — ONDANSETRON HCL IN 0.9 % NACL 8 MG/50 ML
8 INTRAVENOUS SOLUTION, PIGGYBACK (ML) INTRAVENOUS
Status: COMPLETED | OUTPATIENT
Start: 2022-11-03 | End: 2022-11-03

## 2022-11-03 RX ORDER — DIPHENHYDRAMINE HYDROCHLORIDE 50 MG/ML
50 INJECTION INTRAMUSCULAR; INTRAVENOUS ONCE AS NEEDED
Status: DISCONTINUED | OUTPATIENT
Start: 2022-11-03 | End: 2022-11-03 | Stop reason: HOSPADM

## 2022-11-03 RX ORDER — ONDANSETRON HCL IN 0.9 % NACL 8 MG/50 ML
8 INTRAVENOUS SOLUTION, PIGGYBACK (ML) INTRAVENOUS
Status: CANCELLED
Start: 2022-11-03 | End: 2022-11-03

## 2022-11-03 RX ORDER — SODIUM CHLORIDE 0.9 % (FLUSH) 0.9 %
10 SYRINGE (ML) INJECTION
Status: CANCELLED | OUTPATIENT
Start: 2022-11-03

## 2022-11-03 RX ORDER — FAMOTIDINE 10 MG/ML
20 INJECTION INTRAVENOUS
Status: CANCELLED | OUTPATIENT
Start: 2022-11-03

## 2022-11-03 RX ORDER — FAMOTIDINE 10 MG/ML
20 INJECTION INTRAVENOUS
Status: COMPLETED | OUTPATIENT
Start: 2022-11-03 | End: 2022-11-03

## 2022-11-03 RX ORDER — EPINEPHRINE 0.3 MG/.3ML
0.3 INJECTION SUBCUTANEOUS ONCE AS NEEDED
Status: CANCELLED | OUTPATIENT
Start: 2022-11-03

## 2022-11-03 RX ORDER — DIPHENHYDRAMINE HYDROCHLORIDE 50 MG/ML
50 INJECTION INTRAMUSCULAR; INTRAVENOUS ONCE AS NEEDED
Status: CANCELLED | OUTPATIENT
Start: 2022-11-03

## 2022-11-03 RX ORDER — EPINEPHRINE 0.3 MG/.3ML
0.3 INJECTION SUBCUTANEOUS ONCE AS NEEDED
Status: DISCONTINUED | OUTPATIENT
Start: 2022-11-03 | End: 2022-11-03 | Stop reason: HOSPADM

## 2022-11-03 RX ORDER — SODIUM CHLORIDE 0.9 % (FLUSH) 0.9 %
10 SYRINGE (ML) INJECTION
Status: DISCONTINUED | OUTPATIENT
Start: 2022-11-03 | End: 2022-11-03 | Stop reason: HOSPADM

## 2022-11-03 RX ADMIN — ONDANSETRON 8 MG: 2 INJECTION INTRAMUSCULAR; INTRAVENOUS at 12:11

## 2022-11-03 RX ADMIN — DIPHENHYDRAMINE HYDROCHLORIDE 50 MG: 50 INJECTION INTRAMUSCULAR; INTRAVENOUS at 11:11

## 2022-11-03 RX ADMIN — SODIUM CHLORIDE: 0.9 INJECTION, SOLUTION INTRAVENOUS at 11:11

## 2022-11-03 RX ADMIN — PACLITAXEL 200 MG: 100 INJECTION, POWDER, LYOPHILIZED, FOR SUSPENSION INTRAVENOUS at 12:11

## 2022-11-03 RX ADMIN — DEXAMETHASONE SODIUM PHOSPHATE 10 MG: 4 INJECTION, SOLUTION INTRA-ARTICULAR; INTRALESIONAL; INTRAMUSCULAR; INTRAVENOUS; SOFT TISSUE at 12:11

## 2022-11-03 RX ADMIN — FAMOTIDINE 20 MG: 10 INJECTION INTRAVENOUS at 11:11

## 2022-11-03 NOTE — PLAN OF CARE
Problem: Fatigue  Goal: Improved Activity Tolerance  Outcome: Met     Problem: Nausea and Vomiting  Goal: Fluid and Electrolyte Balance  Outcome: Met     Problem: Constipation  Goal: Effective Bowel Elimination  Outcome: Met

## 2022-11-04 DIAGNOSIS — B37.0 THRUSH: Primary | ICD-10-CM

## 2022-11-04 RX ORDER — NYSTATIN 100000 [USP'U]/ML
4 SUSPENSION ORAL 4 TIMES DAILY
Qty: 160 ML | Refills: 0 | Status: SHIPPED | OUTPATIENT
Start: 2022-11-04 | End: 2022-11-14

## 2022-11-08 ENCOUNTER — HOSPITAL ENCOUNTER (OUTPATIENT)
Dept: RADIOLOGY | Facility: HOSPITAL | Age: 47
Discharge: HOME | End: 2022-11-08
Attending: NURSE PRACTITIONER
Payer: COMMERCIAL

## 2022-11-08 ENCOUNTER — TELEPHONE (OUTPATIENT)
Dept: SURGERY | Facility: CLINIC | Age: 47
End: 2022-11-08
Payer: COMMERCIAL

## 2022-11-08 ENCOUNTER — INFUSION (OUTPATIENT)
Dept: INFUSION THERAPY | Facility: HOSPITAL | Age: 47
End: 2022-11-08
Attending: INTERNAL MEDICINE
Payer: COMMERCIAL

## 2022-11-08 ENCOUNTER — TELEPHONE (OUTPATIENT)
Dept: HEMATOLOGY/ONCOLOGY | Facility: CLINIC | Age: 47
End: 2022-11-08

## 2022-11-08 VITALS
RESPIRATION RATE: 16 BRPM | HEART RATE: 60 BPM | DIASTOLIC BLOOD PRESSURE: 82 MMHG | TEMPERATURE: 98 F | WEIGHT: 200.38 LBS | OXYGEN SATURATION: 99 % | SYSTOLIC BLOOD PRESSURE: 129 MMHG | BODY MASS INDEX: 36.65 KG/M2

## 2022-11-08 DIAGNOSIS — D70.1 CHEMOTHERAPY-INDUCED NEUTROPENIA: Primary | ICD-10-CM

## 2022-11-08 DIAGNOSIS — Z17.1 MALIGNANT NEOPLASM OF UPPER-OUTER QUADRANT OF LEFT BREAST IN FEMALE, ESTROGEN RECEPTOR NEGATIVE: ICD-10-CM

## 2022-11-08 DIAGNOSIS — C50.412 MALIGNANT NEOPLASM OF UPPER-OUTER QUADRANT OF LEFT BREAST IN FEMALE, ESTROGEN RECEPTOR NEGATIVE: ICD-10-CM

## 2022-11-08 DIAGNOSIS — T45.1X5A CHEMOTHERAPY-INDUCED NEUTROPENIA: Primary | ICD-10-CM

## 2022-11-08 PROCEDURE — 63600175 PHARM REV CODE 636 W HCPCS: Mod: JG | Performed by: NURSE PRACTITIONER

## 2022-11-08 PROCEDURE — 76642 ULTRASOUND BREAST LIMITED: CPT | Mod: TC,PO,LT

## 2022-11-08 PROCEDURE — 96372 THER/PROPH/DIAG INJ SC/IM: CPT

## 2022-11-08 RX ADMIN — FILGRASTIM-SNDZ 480 MCG: 480 INJECTION, SOLUTION INTRAVENOUS; SUBCUTANEOUS at 03:11

## 2022-11-08 NOTE — TELEPHONE ENCOUNTER
----- Message from JEAN PAUL Andrea sent at 11/8/2022 11:32 AM CST -----  Please notify the patient that their WBC low. Neupogen today and tomorrow and repeat labs Thursday morning prior to treatment.

## 2022-11-08 NOTE — PROGRESS NOTES
Please call and notify patient her US shows good response to treatment. Continue current treatment plan and follow up as scheduled.

## 2022-11-08 NOTE — TELEPHONE ENCOUNTER
Dr. Murphy received a message that Ms. Escoto's port was infected and needed to be seen asap.  I contacted the patient to advise that Dr. Murphy is in surgery tomorrow but I could call her to come in between cases to be seen in the clinic to remove her port.  Patient lives in [place] and stated that she would be happy to come in.  She also indicated that she will still need a port placed on the other side as well.       I advised that we would discuss this when she came in to see Dr. Murphy.   Patient appreciated that phone call and said she was thankful that Dr. Murphy was working her in.

## 2022-11-08 NOTE — PLAN OF CARE
Problem: Fatigue  Goal: Improved Activity Tolerance  Outcome: Ongoing, Progressing  Intervention: Promote Improved Energy  Flowsheets (Taken 11/8/2022 0434)  Fatigue Management:   fatigue-related activity identified   frequent rest breaks encouraged   paced activity encouraged  Sleep/Rest Enhancement: relaxation techniques promoted  Activity Management: Ambulated -L4

## 2022-11-08 NOTE — TELEPHONE ENCOUNTER
Spoke to pt and informed her of low WBC. Per Danielle Perez x 2 ordered. Repeat labs before treatment on Thursday. Pt verbalized understanding.

## 2022-11-09 ENCOUNTER — TELEPHONE (OUTPATIENT)
Dept: HEMATOLOGY/ONCOLOGY | Facility: CLINIC | Age: 47
End: 2022-11-09

## 2022-11-09 ENCOUNTER — OFFICE VISIT (OUTPATIENT)
Dept: SURGERY | Facility: CLINIC | Age: 47
End: 2022-11-09
Payer: COMMERCIAL

## 2022-11-09 ENCOUNTER — TELEPHONE (OUTPATIENT)
Dept: SURGERY | Facility: CLINIC | Age: 47
End: 2022-11-09

## 2022-11-09 ENCOUNTER — INFUSION (OUTPATIENT)
Dept: INFUSION THERAPY | Facility: HOSPITAL | Age: 47
End: 2022-11-09
Attending: INTERNAL MEDICINE
Payer: COMMERCIAL

## 2022-11-09 VITALS
OXYGEN SATURATION: 100 % | RESPIRATION RATE: 15 BRPM | HEIGHT: 62 IN | SYSTOLIC BLOOD PRESSURE: 127 MMHG | HEART RATE: 89 BPM | BODY MASS INDEX: 36.5 KG/M2 | DIASTOLIC BLOOD PRESSURE: 80 MMHG | WEIGHT: 198.38 LBS | TEMPERATURE: 98 F

## 2022-11-09 DIAGNOSIS — D70.1 CHEMOTHERAPY-INDUCED NEUTROPENIA: Primary | ICD-10-CM

## 2022-11-09 DIAGNOSIS — T45.1X5A CHEMOTHERAPY-INDUCED NEUTROPENIA: Primary | ICD-10-CM

## 2022-11-09 DIAGNOSIS — Z17.1 MALIGNANT NEOPLASM OF UPPER-OUTER QUADRANT OF LEFT BREAST IN FEMALE, ESTROGEN RECEPTOR NEGATIVE: ICD-10-CM

## 2022-11-09 DIAGNOSIS — T80.219A INFECTION OF VENOUS ACCESS PORT, INITIAL ENCOUNTER: Primary | ICD-10-CM

## 2022-11-09 DIAGNOSIS — C50.412 MALIGNANT NEOPLASM OF UPPER-OUTER QUADRANT OF LEFT BREAST IN FEMALE, ESTROGEN RECEPTOR NEGATIVE: ICD-10-CM

## 2022-11-09 PROCEDURE — 87186 SC STD MICRODIL/AGAR DIL: CPT | Performed by: SURGERY

## 2022-11-09 PROCEDURE — 36590 REMOVAL, PORTACATH: ICD-10-PCS | Mod: S$GLB,,, | Performed by: SURGERY

## 2022-11-09 PROCEDURE — 1159F PR MEDICATION LIST DOCUMENTED IN MEDICAL RECORD: ICD-10-PCS | Mod: CPTII,S$GLB,, | Performed by: SURGERY

## 2022-11-09 PROCEDURE — 1160F PR REVIEW ALL MEDS BY PRESCRIBER/CLIN PHARMACIST DOCUMENTED: ICD-10-PCS | Mod: CPTII,S$GLB,, | Performed by: SURGERY

## 2022-11-09 PROCEDURE — 96372 THER/PROPH/DIAG INJ SC/IM: CPT

## 2022-11-09 PROCEDURE — 1159F MED LIST DOCD IN RCRD: CPT | Mod: CPTII,S$GLB,, | Performed by: SURGERY

## 2022-11-09 PROCEDURE — 63600175 PHARM REV CODE 636 W HCPCS: Mod: JG | Performed by: NURSE PRACTITIONER

## 2022-11-09 PROCEDURE — 87077 CULTURE AEROBIC IDENTIFY: CPT | Performed by: SURGERY

## 2022-11-09 PROCEDURE — 99214 PR OFFICE/OUTPT VISIT, EST, LEVL IV, 30-39 MIN: ICD-10-PCS | Mod: 25,S$GLB,, | Performed by: SURGERY

## 2022-11-09 PROCEDURE — 87070 CULTURE OTHR SPECIMN AEROBIC: CPT | Performed by: SURGERY

## 2022-11-09 PROCEDURE — 87147 CULTURE TYPE IMMUNOLOGIC: CPT | Performed by: SURGERY

## 2022-11-09 PROCEDURE — 36590 REMOVAL TUNNELED CV CATH: CPT | Mod: S$GLB,,, | Performed by: SURGERY

## 2022-11-09 PROCEDURE — 1160F RVW MEDS BY RX/DR IN RCRD: CPT | Mod: CPTII,S$GLB,, | Performed by: SURGERY

## 2022-11-09 PROCEDURE — 99214 OFFICE O/P EST MOD 30 MIN: CPT | Mod: 25,S$GLB,, | Performed by: SURGERY

## 2022-11-09 RX ADMIN — FILGRASTIM-SNDZ 480 MCG: 480 INJECTION, SOLUTION INTRAVENOUS; SUBCUTANEOUS at 10:11

## 2022-11-09 NOTE — PROCEDURES
Karyna Escoto is a 47 y.o. female patient.            Removal, Portacath    Date/Time: 11/9/2022 10:30 AM  Performed by: Oscar Murphy III, MD  Authorized by: Oscar Murphy III, MD     Prep: patient was prepped and draped in usual sterile fashion    Local anesthesia used?: Yes    Anesthesia:  Local infiltration  Local anesthetic:  Lidocaine 1% with epinephrine  Anesthetic total (ml):  10  : infected port.  Body area:  Chest (right IJ)  Laterality:  Right  Anesthesia:  Local infiltration  Local anesthetic:  Lidocaine 1% with epinephrine  Excision type:  Skin  Malignancy:  Benign  Excision size (cm):  3  Scalpel size:  15  Incision type:  Single straight  Specimens?: Yes     Hemostasis was obtained.  Estimated blood loss (cc):  5  Wound closure:  Simple  Wound repair size (cm):  3  Sutures:  Other (comments) (3-0 chromic)  Sterile dressings:  Tegaderm and gauze  Post-op diagnosis:  Same as pre-op diagnosis.   Needle, instrument, and sponge counts were correct.   Patient tolerated the procedure well with no immediate complications.   Post-operative instructions were provided for the patient.   Patient was discharged and will follow up for wound check and pathology results.  Comments:      Port removed intact. No pus encountered. The catheter was sent for culture.    11/9/2022

## 2022-11-09 NOTE — TELEPHONE ENCOUNTER
Attempted to call patient with results of her US. No answer and number provided. Left voicemail with instructions to call back.

## 2022-11-09 NOTE — TELEPHONE ENCOUNTER
Patient advised I will call her between surgical cases as to when Dr. Murphy will see her in the office.      ----- Message from Love Escoto sent at 11/9/2022  9:04 AM CST -----  Contact: Self  Type:  Patient Returning Call    Who Called:  Patient  Who Left Message for Patient:  N/A-requesting a callback from Lydia  Does the patient know what this is regarding?:  her appt today, she wasn't sure if she is supposed to come in for 10:30 or is she waiting for a phone call to say when to get there.  Best Call Back Number:  [phone]  Additional Information:  Please call pt back to advise, stated when she saw the appt for 10:30 in her chart she got confused and just needs clarification. Thank you.

## 2022-11-09 NOTE — PLAN OF CARE
Problem: Fatigue  Goal: Improved Activity Tolerance  Outcome: Ongoing, Progressing  Intervention: Promote Improved Energy  Flowsheets (Taken 11/9/2022 1026)  Fatigue Management:   fatigue-related activity identified   frequent rest breaks encouraged   paced activity encouraged  Sleep/Rest Enhancement: relaxation techniques promoted  Activity Management: Ambulated -L4

## 2022-11-09 NOTE — TELEPHONE ENCOUNTER
Called patient back to advise that I will be calling her when Dr. Murphy is ready to see her between surgical cases.

## 2022-11-09 NOTE — PROGRESS NOTES
Subjective:       Patient ID: Karyna Escoto is a 47 y.o. female.    Chief Complaint:     HPI:    Patient is 47-year-old female with left breast intraductal carcinoma estrogen receptor negative, HER2 negative. She is currently on neoadjuvant chemo. Her port a cath is now infected. She is referred to the office to have evaluation and removal. She denies has had subjective fever and chills. She has been on antibiotics for two weeks. Skin very erythematous around the port and the right IJ stick site.     She presented with palpable left axillary node.  Imaging revealed 1.8 cm mass in the axilla with extension to the skin an adjacent lymph node with abnormal appearance.  Biopsy returned intraductal carcinoma ER negative, AR positive, HER2 negative.  Needle biopsy of the abnormal node was also performed.  It also returned positive for tumor ER negative, AR positive, HER2 negative.  She was recommended for neoadjuvant therapy.        She has no family history of breast cancer.    Past Medical History:   Diagnosis Date    Anxiety     Breast cancer      Past Surgical History:   Procedure Laterality Date    BREAST BIOPSY      eye lid surgery      NOSE SURGERY      PORTACATH PLACEMENT      TONSILLECTOMY       Review of patient's allergies indicates:   Allergen Reactions    Taxol [paclitaxel] Rash     Medication List with Changes/Refills   Current Medications    ACETAMINOPHEN (TYLENOL) 325 MG TABLET    Take 650 mg by mouth.    CETIRIZINE (ZYRTEC) 10 MG CAP        CLINDAMYCIN (CLEOCIN) 300 MG CAPSULE    Take 1 capsule (300 mg total) by mouth 3 (three) times daily. for 10 days    FLUTICASONE PROPIONATE (FLONASE) 50 MCG/ACTUATION NASAL SPRAY    1 spray (50 mcg total) by Each Nostril route once daily.    HYDROCODONE-ACETAMINOPHEN (NORCO) 5-325 MG PER TABLET    Take 1 tablet by mouth every 6 (six) hours as needed for Pain.    HYDROXYZINE HCL (ATARAX) 10 MG TAB    Take 10 mg by mouth.    LIDOCAINE-PRILOCAINE (EMLA) CREAM     Apply topically as needed. Apply 30 mins prior to port access    NAPROXEN SODIUM 220 MG CAP    Take by mouth.    NYSTATIN (MYCOSTATIN) 100,000 UNIT/ML SUSPENSION    Take 4 mLs (400,000 Units total) by mouth 4 (four) times daily. for 10 days    OMEPRAZOLE (PRILOSEC) 10 MG CAPSULE    Take 10 mg by mouth every 12 (twelve) hours as needed.    ONDANSETRON (ZOFRAN) 8 MG TABLET    Take 1 tablet (8 mg total) by mouth every 8 (eight) hours as needed.    PROMETHAZINE (PHENERGAN) 25 MG TABLET    Take 1 tablet (25 mg total) by mouth every 4 to 6 hours as needed.    SERTRALINE (ZOLOFT) 50 MG TABLET    Take 50 mg by mouth.     Family History   Problem Relation Age of Onset    Breast cancer Maternal Grandmother     Prostate cancer Maternal Grandfather      Social History     Socioeconomic History    Marital status:    Tobacco Use    Smoking status: Former   Substance and Sexual Activity    Alcohol use: Yes     Comment: occasional    Sexual activity: Yes         Review of Systems   Constitutional:  Positive for chills and fever.   Skin:  Positive for color change and wound.     Objective:      Physical Exam  Constitutional:       General: She is not in acute distress.  Pulmonary:      Effort: Pulmonary effort is normal. No respiratory distress.   Chest:          Comments: Erythematous skin. No drainage. Very tender   Abdominal:      General: There is no distension.      Palpations: Abdomen is soft.      Tenderness: There is no abdominal tenderness.   Musculoskeletal:      Cervical back: Neck supple.   Neurological:      Mental Status: She is alert and oriented to person, place, and time.       Assessment/Plan:   Diagnoses and all orders for this visit:    Infection of venous access port, initial encounter  -     CULTURE, CATHETER TIP  (AEROBIC)  -     Removal, Portacath    Malignant neoplasm of upper-outer quadrant of left breast in female, estrogen receptor negative  Diagnoses and all orders for this visit:    Infection of  venous access port, initial encounter  -     CULTURE, CATHETER TIP  (AEROBIC)  -     Removal, Portacath    Malignant neoplasm of upper-outer quadrant of left breast in female, estrogen receptor negative  -     Cancel: Full code; Standing  -     Vital signs; Standing  -     Cancel: Insert peripheral IV; Standing  -     Diet NPO; Standing  -     Pulse Oximetry Q4H; Standing  -     Case Request Operating Room: [id number]  -     Place in Outpatient; Standing  -     Place sequential compression device; Standing  -     Basic metabolic panel; Future  -     CBC auto differential; Future  -     EKG 12-lead; Future  -     X-Ray Chest PA And Lateral; Future  -     Cancel: Full code  -     Cancel: Insert peripheral IV    Other orders  -     ceFAZolin (ANCEF) 2 g in dextrose 5 % 50 mL IVPB          Port-A-Cath removed at bedside today in office.  No pus encountered.  Procedure performed without any immediate complication.  Catheter sent for culture.  Will plan for Port-A-Cath replacement in 2 weeks to allow for infection to clear.    I discussed the proposed procedures with the patient including risks, benefits, indications, alternatives and special concerns.  The patient appears to understand and agrees to go ahead with surgery.  I have made no promises, warranties or verbal agreements beyond what was discussed above.    No follow-ups on file.

## 2022-11-09 NOTE — TELEPHONE ENCOUNTER
Called patient to advise  that Dr. Murphy has to move her appt to this afternoon due to his surgical schedule.  It will be most likely in the afternoon.between 2-3.  Patient appreciated the phone call.

## 2022-11-09 NOTE — H&P (VIEW-ONLY)
Subjective:       Patient ID: Karyna Escoto is a 47 y.o. female.    Chief Complaint:     HPI:    Patient is 47-year-old female with left breast intraductal carcinoma estrogen receptor negative, HER2 negative. She is currently on neoadjuvant chemo. Her port a cath is now infected. She is referred to the office to have evaluation and removal. She denies has had subjective fever and chills. She has been on antibiotics for two weeks. Skin very erythematous around the port and the right IJ stick site.     She presented with palpable left axillary node.  Imaging revealed 1.8 cm mass in the axilla with extension to the skin an adjacent lymph node with abnormal appearance.  Biopsy returned intraductal carcinoma ER negative, CA positive, HER2 negative.  Needle biopsy of the abnormal node was also performed.  It also returned positive for tumor ER negative, CA positive, HER2 negative.  She was recommended for neoadjuvant therapy.        She has no family history of breast cancer.    Past Medical History:   Diagnosis Date    Anxiety     Breast cancer      Past Surgical History:   Procedure Laterality Date    BREAST BIOPSY      eye lid surgery      NOSE SURGERY      PORTACATH PLACEMENT      TONSILLECTOMY       Review of patient's allergies indicates:   Allergen Reactions    Taxol [paclitaxel] Rash     Medication List with Changes/Refills   Current Medications    ACETAMINOPHEN (TYLENOL) 325 MG TABLET    Take 650 mg by mouth.    CETIRIZINE (ZYRTEC) 10 MG CAP        CLINDAMYCIN (CLEOCIN) 300 MG CAPSULE    Take 1 capsule (300 mg total) by mouth 3 (three) times daily. for 10 days    FLUTICASONE PROPIONATE (FLONASE) 50 MCG/ACTUATION NASAL SPRAY    1 spray (50 mcg total) by Each Nostril route once daily.    HYDROCODONE-ACETAMINOPHEN (NORCO) 5-325 MG PER TABLET    Take 1 tablet by mouth every 6 (six) hours as needed for Pain.    HYDROXYZINE HCL (ATARAX) 10 MG TAB    Take 10 mg by mouth.    LIDOCAINE-PRILOCAINE (EMLA) CREAM     Apply topically as needed. Apply 30 mins prior to port access    NAPROXEN SODIUM 220 MG CAP    Take by mouth.    NYSTATIN (MYCOSTATIN) 100,000 UNIT/ML SUSPENSION    Take 4 mLs (400,000 Units total) by mouth 4 (four) times daily. for 10 days    OMEPRAZOLE (PRILOSEC) 10 MG CAPSULE    Take 10 mg by mouth every 12 (twelve) hours as needed.    ONDANSETRON (ZOFRAN) 8 MG TABLET    Take 1 tablet (8 mg total) by mouth every 8 (eight) hours as needed.    PROMETHAZINE (PHENERGAN) 25 MG TABLET    Take 1 tablet (25 mg total) by mouth every 4 to 6 hours as needed.    SERTRALINE (ZOLOFT) 50 MG TABLET    Take 50 mg by mouth.     Family History   Problem Relation Age of Onset    Breast cancer Maternal Grandmother     Prostate cancer Maternal Grandfather      Social History     Socioeconomic History    Marital status:    Tobacco Use    Smoking status: Former   Substance and Sexual Activity    Alcohol use: Yes     Comment: occasional    Sexual activity: Yes         Review of Systems   Constitutional:  Positive for chills and fever.   Skin:  Positive for color change and wound.     Objective:      Physical Exam  Constitutional:       General: She is not in acute distress.  Pulmonary:      Effort: Pulmonary effort is normal. No respiratory distress.   Chest:          Comments: Erythematous skin. No drainage. Very tender   Abdominal:      General: There is no distension.      Palpations: Abdomen is soft.      Tenderness: There is no abdominal tenderness.   Musculoskeletal:      Cervical back: Neck supple.   Neurological:      Mental Status: She is alert and oriented to person, place, and time.       Assessment/Plan:   Diagnoses and all orders for this visit:    Infection of venous access port, initial encounter  -     CULTURE, CATHETER TIP  (AEROBIC)  -     Removal, Portacath    Malignant neoplasm of upper-outer quadrant of left breast in female, estrogen receptor negative  Diagnoses and all orders for this visit:    Infection of  venous access port, initial encounter  -     CULTURE, CATHETER TIP  (AEROBIC)  -     Removal, Portacath    Malignant neoplasm of upper-outer quadrant of left breast in female, estrogen receptor negative  -     Cancel: Full code; Standing  -     Vital signs; Standing  -     Cancel: Insert peripheral IV; Standing  -     Diet NPO; Standing  -     Pulse Oximetry Q4H; Standing  -     Case Request Operating Room: [id number]  -     Place in Outpatient; Standing  -     Place sequential compression device; Standing  -     Basic metabolic panel; Future  -     CBC auto differential; Future  -     EKG 12-lead; Future  -     X-Ray Chest PA And Lateral; Future  -     Cancel: Full code  -     Cancel: Insert peripheral IV    Other orders  -     ceFAZolin (ANCEF) 2 g in dextrose 5 % 50 mL IVPB          Port-A-Cath removed at bedside today in office.  No pus encountered.  Procedure performed without any immediate complication.  Catheter sent for culture.  Will plan for Port-A-Cath replacement in 2 weeks to allow for infection to clear.    I discussed the proposed procedures with the patient including risks, benefits, indications, alternatives and special concerns.  The patient appears to understand and agrees to go ahead with surgery.  I have made no promises, warranties or verbal agreements beyond what was discussed above.    No follow-ups on file.

## 2022-11-09 NOTE — TELEPHONE ENCOUNTER
Patient returned call  - US results reviewed with patient per MARIO Andrea. Reviewed patient's next appointments with her. Patient aware of above and verbalized understanding.

## 2022-11-10 ENCOUNTER — INFUSION (OUTPATIENT)
Dept: INFUSION THERAPY | Facility: HOSPITAL | Age: 47
End: 2022-11-10
Attending: INTERNAL MEDICINE
Payer: COMMERCIAL

## 2022-11-10 VITALS
RESPIRATION RATE: 16 BRPM | TEMPERATURE: 98 F | WEIGHT: 199.5 LBS | HEART RATE: 68 BPM | BODY MASS INDEX: 36.71 KG/M2 | OXYGEN SATURATION: 100 % | DIASTOLIC BLOOD PRESSURE: 73 MMHG | HEIGHT: 62 IN | SYSTOLIC BLOOD PRESSURE: 121 MMHG

## 2022-11-10 DIAGNOSIS — Z17.1 MALIGNANT NEOPLASM OF UPPER-OUTER QUADRANT OF LEFT BREAST IN FEMALE, ESTROGEN RECEPTOR NEGATIVE: Primary | ICD-10-CM

## 2022-11-10 DIAGNOSIS — D70.1 CHEMOTHERAPY-INDUCED NEUTROPENIA: ICD-10-CM

## 2022-11-10 DIAGNOSIS — C50.412 MALIGNANT NEOPLASM OF UPPER-OUTER QUADRANT OF LEFT BREAST IN FEMALE, ESTROGEN RECEPTOR NEGATIVE: Primary | ICD-10-CM

## 2022-11-10 DIAGNOSIS — C50.412 MALIGNANT NEOPLASM OF UPPER-OUTER QUADRANT OF LEFT BREAST IN FEMALE, ESTROGEN RECEPTOR NEGATIVE: ICD-10-CM

## 2022-11-10 DIAGNOSIS — Z17.1 MALIGNANT NEOPLASM OF UPPER-OUTER QUADRANT OF LEFT BREAST IN FEMALE, ESTROGEN RECEPTOR NEGATIVE: ICD-10-CM

## 2022-11-10 DIAGNOSIS — T45.1X5A CHEMOTHERAPY-INDUCED NEUTROPENIA: ICD-10-CM

## 2022-11-10 DIAGNOSIS — R53.83 FATIGUE, UNSPECIFIED TYPE: ICD-10-CM

## 2022-11-10 LAB
ANISOCYTOSIS BLD QL SMEAR: ABNORMAL
BASOPHILS NFR BLD: 0 % (ref 0–1.9)
DIFFERENTIAL METHOD: ABNORMAL
EOSINOPHIL NFR BLD: 0 % (ref 0–8)
ERYTHROCYTE [DISTWIDTH] IN BLOOD BY AUTOMATED COUNT: 18 % (ref 11.5–14.5)
HCT VFR BLD AUTO: 30.7 % (ref 37–48.5)
HGB BLD-MCNC: 10.3 G/DL (ref 12–16)
IMM GRANULOCYTES # BLD AUTO: ABNORMAL K/UL (ref 0–0.04)
IMM GRANULOCYTES NFR BLD AUTO: ABNORMAL % (ref 0–0.5)
LYMPHOCYTES NFR BLD: 18 % (ref 18–48)
MCH RBC QN AUTO: 34.4 PG (ref 27–31)
MCHC RBC AUTO-ENTMCNC: 33.6 G/DL (ref 32–36)
MCV RBC AUTO: 103 FL (ref 82–98)
MONOCYTES NFR BLD: 7 % (ref 4–15)
NEUTROPHILS NFR BLD: 75 % (ref 38–73)
NRBC BLD-RTO: 0 /100 WBC
PLATELET # BLD AUTO: 187 K/UL (ref 150–450)
PLATELET BLD QL SMEAR: ABNORMAL
PMV BLD AUTO: 10 FL (ref 9.2–12.9)
POIKILOCYTOSIS BLD QL SMEAR: SLIGHT
RBC # BLD AUTO: 2.99 M/UL (ref 4–5.4)
WBC # BLD AUTO: 16.67 K/UL (ref 3.9–12.7)

## 2022-11-10 PROCEDURE — A4216 STERILE WATER/SALINE, 10 ML: HCPCS | Performed by: INTERNAL MEDICINE

## 2022-11-10 PROCEDURE — 85007 BL SMEAR W/DIFF WBC COUNT: CPT | Performed by: INTERNAL MEDICINE

## 2022-11-10 PROCEDURE — 85027 COMPLETE CBC AUTOMATED: CPT | Performed by: INTERNAL MEDICINE

## 2022-11-10 PROCEDURE — 63600175 PHARM REV CODE 636 W HCPCS: Mod: JG | Performed by: INTERNAL MEDICINE

## 2022-11-10 PROCEDURE — 25000003 PHARM REV CODE 250: Performed by: INTERNAL MEDICINE

## 2022-11-10 PROCEDURE — 96367 TX/PROPH/DG ADDL SEQ IV INF: CPT

## 2022-11-10 PROCEDURE — 96413 CHEMO IV INFUSION 1 HR: CPT

## 2022-11-10 PROCEDURE — 96375 TX/PRO/DX INJ NEW DRUG ADDON: CPT

## 2022-11-10 RX ORDER — HEPARIN 100 UNIT/ML
500 SYRINGE INTRAVENOUS
Status: CANCELLED | OUTPATIENT
Start: 2022-11-10

## 2022-11-10 RX ORDER — ONDANSETRON HCL IN 0.9 % NACL 8 MG/50 ML
8 INTRAVENOUS SOLUTION, PIGGYBACK (ML) INTRAVENOUS
Status: CANCELLED
Start: 2022-11-10 | End: 2022-11-10

## 2022-11-10 RX ORDER — SODIUM CHLORIDE 0.9 % (FLUSH) 0.9 %
10 SYRINGE (ML) INJECTION
Status: DISCONTINUED | OUTPATIENT
Start: 2022-11-10 | End: 2022-11-10 | Stop reason: HOSPADM

## 2022-11-10 RX ORDER — ONDANSETRON HCL IN 0.9 % NACL 8 MG/50 ML
8 INTRAVENOUS SOLUTION, PIGGYBACK (ML) INTRAVENOUS
Status: COMPLETED | OUTPATIENT
Start: 2022-11-10 | End: 2022-11-10

## 2022-11-10 RX ORDER — SODIUM CHLORIDE 0.9 % (FLUSH) 0.9 %
10 SYRINGE (ML) INJECTION
Status: CANCELLED | OUTPATIENT
Start: 2022-11-10

## 2022-11-10 RX ORDER — FAMOTIDINE 10 MG/ML
20 INJECTION INTRAVENOUS
Status: COMPLETED | OUTPATIENT
Start: 2022-11-10 | End: 2022-11-10

## 2022-11-10 RX ORDER — EPINEPHRINE 0.3 MG/.3ML
0.3 INJECTION SUBCUTANEOUS ONCE AS NEEDED
Status: CANCELLED | OUTPATIENT
Start: 2022-11-10

## 2022-11-10 RX ORDER — DIPHENHYDRAMINE HYDROCHLORIDE 50 MG/ML
50 INJECTION INTRAMUSCULAR; INTRAVENOUS ONCE AS NEEDED
Status: CANCELLED | OUTPATIENT
Start: 2022-11-10

## 2022-11-10 RX ORDER — FAMOTIDINE 10 MG/ML
20 INJECTION INTRAVENOUS
Status: CANCELLED | OUTPATIENT
Start: 2022-11-10

## 2022-11-10 RX ORDER — EPINEPHRINE 0.3 MG/.3ML
0.3 INJECTION SUBCUTANEOUS ONCE AS NEEDED
Status: DISCONTINUED | OUTPATIENT
Start: 2022-11-10 | End: 2022-11-10 | Stop reason: HOSPADM

## 2022-11-10 RX ADMIN — FAMOTIDINE 20 MG: 10 INJECTION INTRAVENOUS at 10:11

## 2022-11-10 RX ADMIN — SODIUM CHLORIDE, PRESERVATIVE FREE 10 ML: 5 INJECTION INTRAVENOUS at 11:11

## 2022-11-10 RX ADMIN — DEXAMETHASONE SODIUM PHOSPHATE 10 MG: 4 INJECTION, SOLUTION INTRA-ARTICULAR; INTRALESIONAL; INTRAMUSCULAR; INTRAVENOUS; SOFT TISSUE at 10:11

## 2022-11-10 RX ADMIN — SODIUM CHLORIDE: 9 INJECTION, SOLUTION INTRAVENOUS at 10:11

## 2022-11-10 RX ADMIN — PACLITAXEL 200 MG: 100 INJECTION, POWDER, LYOPHILIZED, FOR SUSPENSION INTRAVENOUS at 11:11

## 2022-11-10 RX ADMIN — ONDANSETRON 8 MG: 2 INJECTION INTRAMUSCULAR; INTRAVENOUS at 09:11

## 2022-11-10 RX ADMIN — DIPHENHYDRAMINE HYDROCHLORIDE 50 MG: 50 INJECTION INTRAMUSCULAR; INTRAVENOUS at 10:11

## 2022-11-10 NOTE — PLAN OF CARE
Problem: Fatigue  Goal: Improved Activity Tolerance  Outcome: Ongoing, Progressing  Intervention: Promote Improved Energy  Flowsheets (Taken 11/10/2022 0903)  Fatigue Management: frequent rest breaks encouraged  Sleep/Rest Enhancement:   regular sleep/rest pattern promoted   relaxation techniques promoted  Activity Management: Ambulated -L4

## 2022-11-11 ENCOUNTER — TELEPHONE (OUTPATIENT)
Dept: SURGERY | Facility: CLINIC | Age: 47
End: 2022-11-11
Payer: COMMERCIAL

## 2022-11-11 NOTE — TELEPHONE ENCOUNTER
Called patient back to advise that her preadmit appt is for Thursday 11/17/22 for 8:00 a.m.   Her last CXR and EKG was back in August she said so will likely need to repeat that!

## 2022-11-13 LAB — BACTERIA CATH TIP CULT: ABNORMAL

## 2022-11-14 NOTE — PROGRESS NOTES
PROGRESS NOTE    Subjective:       Patient ID: Karyna Escoto is a 47 y.o. female.    6/9/2022:  Dx Mammo:  1.8cm mass in the left breast towards the left axilla.   Deep to the mass 2 lymph nodes are identified.     7/5/2022:  Left breast core biopsy:  Grade 2 IDCA with DCIS  ER: 0.03%, VT: 8.8%, HER2: 0  TRIPLE NEGATIVE    7/20/2022:  Left axilla LN needle biopsy:  Invasive ductal carcinoma  ER: 0%, VT: 40%, HER2 negative(0)    PLAN:  Neoadjuvant chemo/IO(Keynote 522)-surgery-radiation.     Pem/Tax/Carb:  Cycle 1: 8/11/2022  Cycle 2: 9/8/2022  Cycle 3: 10/6/2022  Cycle 4: 10/27/2022  Begin Shukri/Cytox/Pem  Cycle 5: 11/17/2022-due-----MUGA 8/10/2022-EF: 54%    8/3/2022 Photos:          8/23/2022 Photo:      10/4/2022 Photo:    Lesion is no longer palpable on 10/4/2022.     11/15/2022          Chief Complaint:  No chief complaint on file.  Triple negative breast cancer follow up.     History of Present Illness:   Karyna Escoto is a 47 y.o. female who presents for follow up of breast cancer.      Ms. Escoto is doing well today.  She is due for cycle 3 this week.      Family and Social history reviewed and is unchanged from 8/3/2022      ROS:  Review of Systems   Constitutional:  Negative for appetite change, fever and unexpected weight change.   HENT:  Negative for mouth sores.    Eyes:  Negative for visual disturbance.   Respiratory:  Negative for cough and shortness of breath.    Cardiovascular:  Negative for chest pain and leg swelling.   Gastrointestinal:  Positive for diarrhea. Negative for abdominal pain and blood in stool.   Genitourinary:  Positive for frequency. Negative for hematuria.   Musculoskeletal:  Negative for back pain.   Skin:  Negative for rash.   Neurological:  Positive for headaches.   Hematological:  Positive for adenopathy.   Psychiatric/Behavioral:  The patient is not nervous/anxious.         Current Outpatient Medications:      acetaminophen (TYLENOL) 325 MG tablet, Take 650 mg by mouth., Disp: , Rfl:     cetirizine (ZYRTEC) 10 mg Cap, , Disp: , Rfl:     fluticasone propionate (FLONASE) 50 mcg/actuation nasal spray, 1 spray (50 mcg total) by Each Nostril route once daily., Disp: 15.8 mL, Rfl: 5    HYDROcodone-acetaminophen (NORCO) 5-325 mg per tablet, Take 1 tablet by mouth every 6 (six) hours as needed for Pain., Disp: 30 tablet, Rfl: 0    hydrOXYzine HCL (ATARAX) 10 MG Tab, Take 10 mg by mouth., Disp: , Rfl:     LIDOcaine-prilocaine (EMLA) cream, Apply topically as needed. Apply 30 mins prior to port access, Disp: 30 g, Rfl: 5    naproxen sodium 220 mg Cap, Take by mouth., Disp: , Rfl:     omeprazole (PRILOSEC) 10 MG capsule, Take 10 mg by mouth every 12 (twelve) hours as needed., Disp: , Rfl:     ondansetron (ZOFRAN) 8 MG tablet, Take 1 tablet (8 mg total) by mouth every 8 (eight) hours as needed., Disp: 30 tablet, Rfl: 5    promethazine (PHENERGAN) 25 MG tablet, Take 1 tablet (25 mg total) by mouth every 4 to 6 hours as needed., Disp: 30 tablet, Rfl: 5    sertraline (ZOLOFT) 50 MG tablet, Take 50 mg by mouth., Disp: , Rfl:         Objective:       Physical Examination:     /69   Pulse 89   Temp 98.6 °F (37 °C)   Wt 91.5 kg (201 lb 12.8 oz)   BMI 36.91 kg/m²     Physical Exam  Constitutional:       Appearance: She is well-developed.   HENT:      Head: Normocephalic and atraumatic.      Right Ear: External ear normal.      Left Ear: External ear normal.   Eyes:      Conjunctiva/sclera: Conjunctivae normal.      Pupils: Pupils are equal, round, and reactive to light.   Neck:      Thyroid: No thyromegaly.      Trachea: No tracheal deviation.   Cardiovascular:      Rate and Rhythm: Normal rate and regular rhythm.      Heart sounds: Normal heart sounds.   Pulmonary:      Effort: Pulmonary effort is normal.      Breath sounds: Normal breath sounds.   Abdominal:      General: Bowel sounds are normal. There is no distension.       Palpations: Abdomen is soft. There is no mass.      Tenderness: There is no abdominal tenderness.   Skin:     Findings: No rash.   Neurological:      Comments: Neuro intact througout   Psychiatric:         Behavior: Behavior normal.         Thought Content: Thought content normal.         Judgment: Judgment normal.       Labs:   Recent Results (from the past 336 hour(s))   CBC Auto Differential    Collection Time: 11/10/22  8:57 AM   Result Value Ref Range    WBC 16.67 (H) 3.90 - 12.70 K/uL    Hemoglobin 10.3 (L) 12.0 - 16.0 g/dL    Hematocrit 30.7 (L) 37.0 - 48.5 %    Platelets 187 150 - 450 K/uL   CBC Auto Differential    Collection Time: 11/08/22  7:56 AM   Result Value Ref Range    WBC 2.69 (L) 3.90 - 12.70 K/uL    Hemoglobin 9.0 (L) 12.0 - 16.0 g/dL    Hematocrit 26.9 (L) 37.0 - 48.5 %    Platelets 163 150 - 450 K/uL     CMP  Sodium   Date Value Ref Range Status   11/01/2022 132 (L) 136 - 145 mmol/L Final     Potassium   Date Value Ref Range Status   11/01/2022 4.2 3.5 - 5.1 mmol/L Final     Chloride   Date Value Ref Range Status   11/01/2022 101 95 - 110 mmol/L Final     CO2   Date Value Ref Range Status   11/01/2022 25 23 - 29 mmol/L Final     Glucose   Date Value Ref Range Status   11/01/2022 113 (H) 70 - 110 mg/dL Final     BUN   Date Value Ref Range Status   11/01/2022 12 6 - 20 mg/dL Final     Creatinine   Date Value Ref Range Status   11/01/2022 0.8 0.5 - 1.4 mg/dL Final     Calcium   Date Value Ref Range Status   11/01/2022 9.0 8.7 - 10.5 mg/dL Final     Total Protein   Date Value Ref Range Status   11/01/2022 7.1 6.0 - 8.4 g/dL Final     Albumin   Date Value Ref Range Status   11/01/2022 4.0 3.5 - 5.2 g/dL Final     Total Bilirubin   Date Value Ref Range Status   11/01/2022 1.1 (H) 0.1 - 1.0 mg/dL Final     Comment:     For infants and newborns, interpretation of results should be based  on gestational age, weight and in agreement with clinical  observations.    Premature Infant recommended reference  ranges:  Up to 24 hours.............<8.0 mg/dL  Up to 48 hours............<12.0 mg/dL  3-5 days..................<15.0 mg/dL  6-29 days.................<15.0 mg/dL       Alkaline Phosphatase   Date Value Ref Range Status   11/01/2022 91 55 - 135 U/L Final     AST   Date Value Ref Range Status   11/01/2022 20 10 - 40 U/L Final     ALT   Date Value Ref Range Status   11/01/2022 30 10 - 44 U/L Final     Anion Gap   Date Value Ref Range Status   11/01/2022 6 (L) 8 - 16 mmol/L Final     No results found for: CEA  No results found for: PSA        Assessment/Plan:     Problem List Items Addressed This Visit       Left Breast Cancer-TRIPLE NEGATIVE     Patient is doing well and is about to switch to Pem/AC.  I discussed this today and reviewed her labs.  Will proceed with therapy, labs allowing.  She has having a very good response both on physical exam and on u/s and will continue therapy.               Cancer associated pain     She is doing ok from this standpoint.  Continue current care approach,  Intermittent pain medication.             Discussion:     Follow up in about 3 weeks (around 12/6/2022) for for NP visit, 8 with me.      Electronically signed by Derek Patterson

## 2022-11-15 ENCOUNTER — OFFICE VISIT (OUTPATIENT)
Dept: HEMATOLOGY/ONCOLOGY | Facility: CLINIC | Age: 47
End: 2022-11-15
Payer: COMMERCIAL

## 2022-11-15 VITALS
WEIGHT: 201.81 LBS | TEMPERATURE: 99 F | DIASTOLIC BLOOD PRESSURE: 69 MMHG | BODY MASS INDEX: 36.91 KG/M2 | SYSTOLIC BLOOD PRESSURE: 123 MMHG | HEART RATE: 89 BPM

## 2022-11-15 DIAGNOSIS — Z17.1 MALIGNANT NEOPLASM OF UPPER-OUTER QUADRANT OF LEFT BREAST IN FEMALE, ESTROGEN RECEPTOR NEGATIVE: ICD-10-CM

## 2022-11-15 DIAGNOSIS — C50.412 MALIGNANT NEOPLASM OF UPPER-OUTER QUADRANT OF LEFT BREAST IN FEMALE, ESTROGEN RECEPTOR NEGATIVE: ICD-10-CM

## 2022-11-15 DIAGNOSIS — G89.3 CANCER ASSOCIATED PAIN: ICD-10-CM

## 2022-11-15 PROCEDURE — 1160F RVW MEDS BY RX/DR IN RCRD: CPT | Mod: CPTII,S$GLB,, | Performed by: INTERNAL MEDICINE

## 2022-11-15 PROCEDURE — 3074F PR MOST RECENT SYSTOLIC BLOOD PRESSURE < 130 MM HG: ICD-10-PCS | Mod: CPTII,S$GLB,, | Performed by: INTERNAL MEDICINE

## 2022-11-15 PROCEDURE — 99214 PR OFFICE/OUTPT VISIT, EST, LEVL IV, 30-39 MIN: ICD-10-PCS | Mod: S$GLB,,, | Performed by: INTERNAL MEDICINE

## 2022-11-15 PROCEDURE — 1160F PR REVIEW ALL MEDS BY PRESCRIBER/CLIN PHARMACIST DOCUMENTED: ICD-10-PCS | Mod: CPTII,S$GLB,, | Performed by: INTERNAL MEDICINE

## 2022-11-15 PROCEDURE — 99214 OFFICE O/P EST MOD 30 MIN: CPT | Mod: S$GLB,,, | Performed by: INTERNAL MEDICINE

## 2022-11-15 PROCEDURE — 3008F BODY MASS INDEX DOCD: CPT | Mod: CPTII,S$GLB,, | Performed by: INTERNAL MEDICINE

## 2022-11-15 PROCEDURE — 3008F PR BODY MASS INDEX (BMI) DOCUMENTED: ICD-10-PCS | Mod: CPTII,S$GLB,, | Performed by: INTERNAL MEDICINE

## 2022-11-15 PROCEDURE — 3078F PR MOST RECENT DIASTOLIC BLOOD PRESSURE < 80 MM HG: ICD-10-PCS | Mod: CPTII,S$GLB,, | Performed by: INTERNAL MEDICINE

## 2022-11-15 PROCEDURE — 1159F PR MEDICATION LIST DOCUMENTED IN MEDICAL RECORD: ICD-10-PCS | Mod: CPTII,S$GLB,, | Performed by: INTERNAL MEDICINE

## 2022-11-15 PROCEDURE — 3078F DIAST BP <80 MM HG: CPT | Mod: CPTII,S$GLB,, | Performed by: INTERNAL MEDICINE

## 2022-11-15 PROCEDURE — 3074F SYST BP LT 130 MM HG: CPT | Mod: CPTII,S$GLB,, | Performed by: INTERNAL MEDICINE

## 2022-11-15 PROCEDURE — 1159F MED LIST DOCD IN RCRD: CPT | Mod: CPTII,S$GLB,, | Performed by: INTERNAL MEDICINE

## 2022-11-15 NOTE — ASSESSMENT & PLAN NOTE
Patient is doing well and is about to switch to Pem/AC.  I discussed this today and reviewed her labs.  Will proceed with therapy, labs allowing.  She has having a very good response both on physical exam and on u/s and will continue therapy.

## 2022-11-15 NOTE — ASSESSMENT & PLAN NOTE
She is doing ok from this standpoint.  Continue current care approach,  Intermittent pain medication.

## 2022-11-17 ENCOUNTER — HOSPITAL ENCOUNTER (OUTPATIENT)
Dept: PREADMISSION TESTING | Facility: HOSPITAL | Age: 47
Discharge: HOME | End: 2022-11-17
Attending: SURGERY
Payer: COMMERCIAL

## 2022-11-17 VITALS
HEIGHT: 62 IN | SYSTOLIC BLOOD PRESSURE: 133 MMHG | TEMPERATURE: 98 F | DIASTOLIC BLOOD PRESSURE: 91 MMHG | RESPIRATION RATE: 18 BRPM | HEART RATE: 72 BPM | BODY MASS INDEX: 36.99 KG/M2 | WEIGHT: 201 LBS | OXYGEN SATURATION: 99 %

## 2022-11-17 DIAGNOSIS — Z17.1 MALIGNANT NEOPLASM OF UPPER-OUTER QUADRANT OF LEFT BREAST IN FEMALE, ESTROGEN RECEPTOR NEGATIVE: ICD-10-CM

## 2022-11-17 DIAGNOSIS — C50.412 MALIGNANT NEOPLASM OF UPPER-OUTER QUADRANT OF LEFT BREAST IN FEMALE, ESTROGEN RECEPTOR NEGATIVE: ICD-10-CM

## 2022-11-17 LAB
ANION GAP SERPL CALC-SCNC: 6 MMOL/L (ref 8–16)
BASOPHILS # BLD AUTO: 0.04 K/UL (ref 0–0.2)
BASOPHILS NFR BLD: 1.4 % (ref 0–1.9)
BUN SERPL-MCNC: 9 MG/DL (ref 6–20)
CALCIUM SERPL-MCNC: 9.1 MG/DL (ref 8.7–10.5)
CHLORIDE SERPL-SCNC: 103 MMOL/L (ref 95–110)
CO2 SERPL-SCNC: 27 MMOL/L (ref 23–29)
CREAT SERPL-MCNC: 0.8 MG/DL (ref 0.5–1.4)
DIFFERENTIAL METHOD: ABNORMAL
EOSINOPHIL # BLD AUTO: 0 K/UL (ref 0–0.5)
EOSINOPHIL NFR BLD: 0.4 % (ref 0–8)
ERYTHROCYTE [DISTWIDTH] IN BLOOD BY AUTOMATED COUNT: 17.1 % (ref 11.5–14.5)
EST. GFR  (NO RACE VARIABLE): >60 ML/MIN/1.73 M^2
GLUCOSE SERPL-MCNC: 95 MG/DL (ref 70–110)
HCT VFR BLD AUTO: 31.7 % (ref 37–48.5)
HGB BLD-MCNC: 10.4 G/DL (ref 12–16)
IMM GRANULOCYTES # BLD AUTO: 0.01 K/UL (ref 0–0.04)
IMM GRANULOCYTES NFR BLD AUTO: 0.4 % (ref 0–0.5)
LYMPHOCYTES # BLD AUTO: 1.5 K/UL (ref 1–4.8)
LYMPHOCYTES NFR BLD: 53 % (ref 18–48)
MCH RBC QN AUTO: 35.1 PG (ref 27–31)
MCHC RBC AUTO-ENTMCNC: 32.8 G/DL (ref 32–36)
MCV RBC AUTO: 107 FL (ref 82–98)
MONOCYTES # BLD AUTO: 0.1 K/UL (ref 0.3–1)
MONOCYTES NFR BLD: 4.7 % (ref 4–15)
NEUTROPHILS # BLD AUTO: 1.1 K/UL (ref 1.8–7.7)
NEUTROPHILS NFR BLD: 40.1 % (ref 38–73)
NRBC BLD-RTO: 0 /100 WBC
PLATELET # BLD AUTO: 127 K/UL (ref 150–450)
PMV BLD AUTO: 9.8 FL (ref 9.2–12.9)
POTASSIUM SERPL-SCNC: 4.2 MMOL/L (ref 3.5–5.1)
RBC # BLD AUTO: 2.96 M/UL (ref 4–5.4)
SODIUM SERPL-SCNC: 136 MMOL/L (ref 136–145)
WBC # BLD AUTO: 2.79 K/UL (ref 3.9–12.7)

## 2022-11-17 PROCEDURE — 36415 COLL VENOUS BLD VENIPUNCTURE: CPT | Performed by: SURGERY

## 2022-11-17 PROCEDURE — 80048 BASIC METABOLIC PNL TOTAL CA: CPT | Performed by: SURGERY

## 2022-11-17 PROCEDURE — 93010 EKG 12-LEAD: ICD-10-PCS | Mod: ,,, | Performed by: INTERNAL MEDICINE

## 2022-11-17 PROCEDURE — 85025 COMPLETE CBC W/AUTO DIFF WBC: CPT | Performed by: SURGERY

## 2022-11-17 PROCEDURE — 93005 ELECTROCARDIOGRAM TRACING: CPT | Performed by: INTERNAL MEDICINE

## 2022-11-17 PROCEDURE — 93010 ELECTROCARDIOGRAM REPORT: CPT | Mod: ,,, | Performed by: INTERNAL MEDICINE

## 2022-11-17 RX ORDER — FAMOTIDINE 10 MG/1
10 TABLET ORAL 2 TIMES DAILY
COMMUNITY

## 2022-11-17 RX ORDER — BISACODYL 5 MG
5 TABLET, DELAYED RELEASE (ENTERIC COATED) ORAL DAILY PRN
COMMUNITY

## 2022-11-17 NOTE — DISCHARGE INSTRUCTIONS
To confirm, Your doctor has instructed you that surgery is scheduled for:   Monday, November 21, 2022    Pre-Op will call the afternoon prior to surgery between 4:00 and 6:00 PM with the final arrival time.    Friday, November 18, 2022    Please report to [facility] via [facility] entrance. Check in at registration desk.    Do not eat or drink anything after midnight the night before your surgery - THIS INCLUDES  WATER, GUM, MINTS AND CANDY.  YOU MAY BRUSH YOUR TEETH BUT DO NOT SWALLOW     TAKE ONLY THESE MEDICATIONS WITH A SMALL SIP OF WATER THE MORNING OF YOUR PROCEDURE:  PEPCID/OK TO TAKE PAIN PILL      PLEASE NOTE:  The surgery schedule has many variables which may affect the time of your surgery case.  Family members should be available if your surgery time changes.  Plan to be here the day of your procedure between 4-6 hours.      DO NOT TAKE THESE MEDICATIONS 5-7 DAYS PRIOR to your procedure or per your surgeon's request: ASPIRIN, ALEVE, ADVIL, IBUPROFEN,  ML SELTZER, BC , FISH OIL , VITAMIN E, HERBALS  (May take Tylenol)                                                        IMPORTANT INSTRUCTIONS    Shower the night before AND the morning of your procedure with a Chlorhexidine wash such as Hibiclens or Dial antibacterial soap from the neck down. Do not apply any deodorants, lotions or powders after each shower.  Do not get it on your face or in your eyes.  You may use your own shampoo and face wash. This helps your skin to be as bacteria free as possible.    Sleep in a bed with clean sheets.  Do not sleep with a pet in the bed.      Please leave all jewelry, piercing's and valuables at home.     Make arrangements in advance for transportation home by a responsible adult.    You must make arrangements for transportation, TAXI'S, UBER'S OR LYFTS ARE NOT ALLOWED.      If you have any questions about these instructions, call Pre-Op Admit  Nursing at [phone] or the Pre-Op Day Surgery Unit at  [phone].

## 2022-11-21 ENCOUNTER — ANESTHESIA (OUTPATIENT)
Dept: SURGERY | Facility: HOSPITAL | Age: 47
End: 2022-11-21
Payer: COMMERCIAL

## 2022-11-21 ENCOUNTER — ANESTHESIA EVENT (OUTPATIENT)
Dept: SURGERY | Facility: HOSPITAL | Age: 47
End: 2022-11-21
Payer: COMMERCIAL

## 2022-11-21 ENCOUNTER — HOSPITAL ENCOUNTER (OUTPATIENT)
Facility: HOSPITAL | Age: 47
Discharge: HOME | End: 2022-11-21
Attending: SURGERY | Admitting: SURGERY
Payer: COMMERCIAL

## 2022-11-21 VITALS
WEIGHT: 201 LBS | SYSTOLIC BLOOD PRESSURE: 120 MMHG | BODY MASS INDEX: 36.99 KG/M2 | OXYGEN SATURATION: 100 % | HEART RATE: 58 BPM | HEIGHT: 62 IN | TEMPERATURE: 98 F | DIASTOLIC BLOOD PRESSURE: 77 MMHG | RESPIRATION RATE: 15 BRPM

## 2022-11-21 DIAGNOSIS — T80.219A INFECTION OF VENOUS ACCESS PORT, INITIAL ENCOUNTER: ICD-10-CM

## 2022-11-21 DIAGNOSIS — C50.412 MALIGNANT NEOPLASM OF UPPER-OUTER QUADRANT OF LEFT BREAST IN FEMALE, ESTROGEN RECEPTOR NEGATIVE: ICD-10-CM

## 2022-11-21 DIAGNOSIS — Z17.1 MALIGNANT NEOPLASM OF UPPER-OUTER QUADRANT OF LEFT BREAST IN FEMALE, ESTROGEN RECEPTOR NEGATIVE: ICD-10-CM

## 2022-11-21 DIAGNOSIS — T80.219D INFECTION OF VENOUS ACCESS PORT, SUBSEQUENT ENCOUNTER: Primary | ICD-10-CM

## 2022-11-21 PROCEDURE — 63600175 PHARM REV CODE 636 W HCPCS: Performed by: NURSE ANESTHETIST, CERTIFIED REGISTERED

## 2022-11-21 PROCEDURE — 37000008 HC ANESTHESIA 1ST 15 MINUTES: Performed by: SURGERY

## 2022-11-21 PROCEDURE — 37000009 HC ANESTHESIA EA ADD 15 MINS: Performed by: SURGERY

## 2022-11-21 PROCEDURE — C9290 INJ, BUPIVACAINE LIPOSOME: HCPCS | Performed by: SURGERY

## 2022-11-21 PROCEDURE — 25000003 PHARM REV CODE 250: Performed by: SURGERY

## 2022-11-21 PROCEDURE — C1788 PORT, INDWELLING, IMP: HCPCS | Performed by: SURGERY

## 2022-11-21 PROCEDURE — 77001 FLUOROGUIDE FOR VEIN DEVICE: CPT | Mod: 26,,, | Performed by: SURGERY

## 2022-11-21 PROCEDURE — 77001 CHG FLUOROGUIDE CNTRL VEN ACCESS,PLACE,REPLACE,REMOVE: ICD-10-PCS | Mod: 26,,, | Performed by: SURGERY

## 2022-11-21 PROCEDURE — 71000015 HC POSTOP RECOV 1ST HR: Performed by: SURGERY

## 2022-11-21 PROCEDURE — 71000033 HC RECOVERY, INTIAL HOUR: Performed by: SURGERY

## 2022-11-21 PROCEDURE — 25000003 PHARM REV CODE 250: Performed by: NURSE ANESTHETIST, CERTIFIED REGISTERED

## 2022-11-21 PROCEDURE — 36561 INSERT TUNNELED CV CATH: CPT | Mod: LT,,, | Performed by: SURGERY

## 2022-11-21 PROCEDURE — 36000706: Performed by: SURGERY

## 2022-11-21 PROCEDURE — 36000707: Performed by: SURGERY

## 2022-11-21 PROCEDURE — 36561 PR INSERT TUNNELED CV CATH WITH PORT: ICD-10-PCS | Mod: LT,,, | Performed by: SURGERY

## 2022-11-21 PROCEDURE — 63600175 PHARM REV CODE 636 W HCPCS: Performed by: SURGERY

## 2022-11-21 PROCEDURE — 27201423 OPTIME MED/SURG SUP & DEVICES STERILE SUPPLY: Performed by: SURGERY

## 2022-11-21 DEVICE — PORT POWER MRI 8FR 1808000: Type: IMPLANTABLE DEVICE | Site: CHEST | Status: FUNCTIONAL

## 2022-11-21 RX ORDER — MEPERIDINE HYDROCHLORIDE 50 MG/ML
12.5 INJECTION INTRAMUSCULAR; INTRAVENOUS; SUBCUTANEOUS EVERY 10 MIN PRN
Status: DISCONTINUED | OUTPATIENT
Start: 2022-11-21 | End: 2022-11-21 | Stop reason: HOSPADM

## 2022-11-21 RX ORDER — ACETAMINOPHEN 10 MG/ML
INJECTION, SOLUTION INTRAVENOUS
Status: DISCONTINUED | OUTPATIENT
Start: 2022-11-21 | End: 2022-11-21

## 2022-11-21 RX ORDER — HYDROMORPHONE HYDROCHLORIDE 1 MG/ML
0.2 INJECTION, SOLUTION INTRAMUSCULAR; INTRAVENOUS; SUBCUTANEOUS EVERY 5 MIN PRN
Status: DISCONTINUED | OUTPATIENT
Start: 2022-11-21 | End: 2022-11-21 | Stop reason: HOSPADM

## 2022-11-21 RX ORDER — MIDAZOLAM HYDROCHLORIDE 1 MG/ML
INJECTION INTRAMUSCULAR; INTRAVENOUS
Status: DISCONTINUED | OUTPATIENT
Start: 2022-11-21 | End: 2022-11-21

## 2022-11-21 RX ORDER — CEFAZOLIN SODIUM 1 G/3ML
INJECTION, POWDER, FOR SOLUTION INTRAMUSCULAR; INTRAVENOUS
Status: DISCONTINUED | OUTPATIENT
Start: 2022-11-21 | End: 2022-11-21

## 2022-11-21 RX ORDER — HEPARIN SODIUM 1000 [USP'U]/ML
INJECTION, SOLUTION INTRAVENOUS; SUBCUTANEOUS
Status: DISCONTINUED | OUTPATIENT
Start: 2022-11-21 | End: 2022-11-21 | Stop reason: HOSPADM

## 2022-11-21 RX ORDER — CEFAZOLIN SODIUM 2 G/50ML
2 SOLUTION INTRAVENOUS
Status: DISCONTINUED | OUTPATIENT
Start: 2022-11-21 | End: 2022-11-21 | Stop reason: HOSPADM

## 2022-11-21 RX ORDER — FAMOTIDINE 10 MG/ML
INJECTION INTRAVENOUS
Status: DISCONTINUED | OUTPATIENT
Start: 2022-11-21 | End: 2022-11-21

## 2022-11-21 RX ORDER — PROPOFOL 10 MG/ML
VIAL (ML) INTRAVENOUS
Status: DISCONTINUED | OUTPATIENT
Start: 2022-11-21 | End: 2022-11-21

## 2022-11-21 RX ORDER — FENTANYL CITRATE 50 UG/ML
INJECTION, SOLUTION INTRAMUSCULAR; INTRAVENOUS
Status: DISCONTINUED | OUTPATIENT
Start: 2022-11-21 | End: 2022-11-21

## 2022-11-21 RX ORDER — DEXAMETHASONE SODIUM PHOSPHATE 4 MG/ML
INJECTION, SOLUTION INTRA-ARTICULAR; INTRALESIONAL; INTRAMUSCULAR; INTRAVENOUS; SOFT TISSUE
Status: DISCONTINUED | OUTPATIENT
Start: 2022-11-21 | End: 2022-11-21

## 2022-11-21 RX ORDER — LIDOCAINE HYDROCHLORIDE 20 MG/ML
INJECTION, SOLUTION EPIDURAL; INFILTRATION; INTRACAUDAL; PERINEURAL
Status: DISCONTINUED | OUTPATIENT
Start: 2022-11-21 | End: 2022-11-21

## 2022-11-21 RX ORDER — LIDOCAINE HYDROCHLORIDE 20 MG/ML
JELLY TOPICAL
Status: DISCONTINUED | OUTPATIENT
Start: 2022-11-21 | End: 2022-11-21

## 2022-11-21 RX ORDER — ONDANSETRON 2 MG/ML
4 INJECTION INTRAMUSCULAR; INTRAVENOUS DAILY PRN
Status: DISCONTINUED | OUTPATIENT
Start: 2022-11-21 | End: 2022-11-21 | Stop reason: HOSPADM

## 2022-11-21 RX ORDER — DIPHENHYDRAMINE HYDROCHLORIDE 50 MG/ML
12.5 INJECTION INTRAMUSCULAR; INTRAVENOUS
Status: DISCONTINUED | OUTPATIENT
Start: 2022-11-21 | End: 2022-11-21 | Stop reason: HOSPADM

## 2022-11-21 RX ORDER — OXYCODONE HYDROCHLORIDE 5 MG/1
5 TABLET ORAL
Status: DISCONTINUED | OUTPATIENT
Start: 2022-11-21 | End: 2022-11-21 | Stop reason: HOSPADM

## 2022-11-21 RX ORDER — BUPIVACAINE HYDROCHLORIDE AND EPINEPHRINE 2.5; 5 MG/ML; UG/ML
INJECTION, SOLUTION EPIDURAL; INFILTRATION; INTRACAUDAL; PERINEURAL
Status: DISCONTINUED | OUTPATIENT
Start: 2022-11-21 | End: 2022-11-21 | Stop reason: HOSPADM

## 2022-11-21 RX ORDER — ONDANSETRON 2 MG/ML
INJECTION INTRAMUSCULAR; INTRAVENOUS
Status: DISCONTINUED | OUTPATIENT
Start: 2022-11-21 | End: 2022-11-21

## 2022-11-21 RX ORDER — EPHEDRINE SULFATE 50 MG/ML
INJECTION, SOLUTION INTRAVENOUS
Status: DISCONTINUED | OUTPATIENT
Start: 2022-11-21 | End: 2022-11-21

## 2022-11-21 RX ADMIN — DEXAMETHASONE SODIUM PHOSPHATE 4 MG: 4 INJECTION, SOLUTION INTRAMUSCULAR; INTRAVENOUS at 07:11

## 2022-11-21 RX ADMIN — SODIUM CHLORIDE, SODIUM LACTATE, POTASSIUM CHLORIDE, AND CALCIUM CHLORIDE: .6; .31; .03; .02 INJECTION, SOLUTION INTRAVENOUS at 07:11

## 2022-11-21 RX ADMIN — LIDOCAINE HYDROCHLORIDE 50 MG: 20 INJECTION, SOLUTION EPIDURAL; INFILTRATION; INTRACAUDAL; PERINEURAL at 07:11

## 2022-11-21 RX ADMIN — PROPOFOL 200 MG: 10 INJECTION, EMULSION INTRAVENOUS at 07:11

## 2022-11-21 RX ADMIN — FENTANYL CITRATE 50 MCG: 50 INJECTION INTRAMUSCULAR; INTRAVENOUS at 07:11

## 2022-11-21 RX ADMIN — CEFAZOLIN 2 G: 330 INJECTION, POWDER, FOR SOLUTION INTRAMUSCULAR; INTRAVENOUS at 07:11

## 2022-11-21 RX ADMIN — MIDAZOLAM HYDROCHLORIDE 2 MG: 1 INJECTION, SOLUTION INTRAMUSCULAR; INTRAVENOUS at 07:11

## 2022-11-21 RX ADMIN — ACETAMINOPHEN 1000 MG: 10 INJECTION, SOLUTION INTRAVENOUS at 07:11

## 2022-11-21 RX ADMIN — FAMOTIDINE 20 MG: 10 INJECTION, SOLUTION INTRAVENOUS at 07:11

## 2022-11-21 RX ADMIN — EPHEDRINE SULFATE 10 MG: 50 INJECTION INTRAVENOUS at 07:11

## 2022-11-21 RX ADMIN — ONDANSETRON 4 MG: 2 INJECTION INTRAMUSCULAR; INTRAVENOUS at 07:11

## 2022-11-21 RX ADMIN — LIDOCAINE HYDROCHLORIDE 1 EACH: 20 JELLY TOPICAL at 07:11

## 2022-11-21 NOTE — ANESTHESIA PREPROCEDURE EVALUATION
11/21/2022  Karyna Escoto is a 47 y.o., female.      Patient Active Problem List   Diagnosis    Left Breast Cancer-TRIPLE NEGATIVE    Transaminitis    Chemotherapy-induced neutropenia    Dehydration    Cancer associated pain       Past Surgical History:   Procedure Laterality Date    BREAST BIOPSY Left 07/2022    eye lid surgery Bilateral 1990    NOSE SURGERY  1990    [facility]    TONSILLECTOMY  1990    TUBAL LIGATION  2013        Tobacco Use:  The patient  reports that she has quit smoking. She does not have any smokeless tobacco history on file.     Results for orders placed or performed during the hospital encounter of 11/17/22   EKG 12-lead    Collection Time: 11/17/22  8:51 AM    Narrative    Test Reason : C50.412,Z17.1,    Vent. Rate : 059 BPM     Atrial Rate : 059 BPM     P-R Int : 106 ms          QRS Dur : 070 ms      QT Int : 420 ms       P-R-T Axes : 054 036 042 degrees     QTc Int : 415 ms    Sinus bradycardia with sinus arrhythmia with short VT  Low voltage QRS  Borderline Abnormal ECG  No previous ECGs available  Confirmed by Sukhi Pro MD ([id number]) on 11/18/2022 5:27:52 PM    Referred By:             Confirmed By:Sukhi Pro MD             Lab Results   Component Value Date    WBC 2.79 (L) 11/17/2022    HGB 10.4 (L) 11/17/2022    HCT 31.7 (L) 11/17/2022     (H) 11/17/2022     (L) 11/17/2022     BMP  Lab Results   Component Value Date     11/17/2022    K 4.2 11/17/2022     11/17/2022    CO2 27 11/17/2022    BUN 9 11/17/2022    CREATININE 0.8 11/17/2022    CALCIUM 9.1 11/17/2022    ANIONGAP 6 (L) 11/17/2022    GLU 95 11/17/2022     (H) 11/01/2022     10/25/2022       No results found for this or any previous visit.              Pre-op Assessment    I have reviewed the Patient Summary Reports.      I have reviewed the Nursing Notes.    I have reviewed the Medications.     Review of Systems  Anesthesia Hx:  No problems with previous Anesthesia  Denies Family Hx of Anesthesia complications.   Denies Personal Hx of Anesthesia complications.   Social:  Former Smoker    Hematology/Oncology:  Hematology Normal      Current/Recent Cancer. (left breast CA) Breast left   Cardiovascular:  Cardiovascular Normal     Pulmonary:  Pulmonary Normal    Hepatic/GI:   GERD, well controlled    Musculoskeletal:  Musculoskeletal Normal    Neurological:  Neurology Normal    Endocrine:  Endocrine Normal    Psych:   anxiety          Physical Exam  General: Well nourished, Cooperative, Alert and Oriented    Airway:  Mallampati: III / II  Mouth Opening: Normal  TM Distance: Normal  Tongue: Normal  Neck ROM: Normal ROM    Dental:  Intact    Chest/Lungs:  Clear to auscultation    Heart:  Rate: Normal  Rhythm: Regular Rhythm  Sounds: Normal    Abdomen:  Normal, Soft, Nontender        Anesthesia Plan  Type of Anesthesia, risks & benefits discussed:    Anesthesia Type: Gen Supraglottic Airway  Intra-op Monitoring Plan: Standard ASA Monitors  Post Op Pain Control Plan: multimodal analgesia and IV/PO Opioids PRN  Induction:  IV  Airway Plan: Video, Post-Induction  Informed Consent: Informed consent signed with the Patient and all parties understand the risks and agree with anesthesia plan.  All questions answered.   ASA Score: 3  Anesthesia Plan Notes: General LMA OK (pt prefers General)  IV tylenol  Zofran Decadron Pepcid    Ready For Surgery From Anesthesia Perspective.     .

## 2022-11-21 NOTE — TRANSFER OF CARE
"Anesthesia Transfer of Care Note    Patient: Karyna Escoto    Procedure(s) Performed: Procedure(s) (LRB):  [id number] (Left)    Patient location: PACU    Anesthesia Type: general    Transport from OR: Transported from OR on room air with adequate spontaneous ventilation    Post pain: adequate analgesia    Post assessment: no apparent anesthetic complications    Post vital signs: stable    Level of consciousness: awake and alert    Nausea/Vomiting: no nausea/vomiting    Complications: none    Transfer of care protocol was followedComments: SV, exchanging well. VSS, to PACU.  Report to RN      Last vitals:   Visit Vitals  BP (!) 143/86 (BP Location: Right arm, Patient Position: Sitting)   Pulse 69   Temp 36.6 °C (97.9 °F) (Oral)   Resp 16   Ht 5' 2" (1.575 m)   Wt 91.2 kg (201 lb)   LMP 08/11/2022   SpO2 100%   Breastfeeding No   BMI 36.76 kg/m²     "

## 2022-11-21 NOTE — PLAN OF CARE
0920- pt is alert and oriented breathing even unlabored with no complaints of pain at this time. Lung sounds are clear and equal bilaterally. Surgical site is clean and dry with no redness or swelling noted.  is at the bedside. 1000- pt tolerated po intake with no n/v. Pt is alert and oriented breathing even unlabored. Surgical site is clean and dry with no redness or swelling noted. All discharge instructions given to pt and her . Pt wheeled to her vehicle with no incident.

## 2022-11-21 NOTE — OP NOTE
Surgery Date: 11/21/2022     Surgeon(s) and Role:     * Oscar Murphy III, MD - Primary    Assisting Surgeon: None    Pre-op Diagnosis:  Malignant neoplasm of upper-outer quadrant of left breast in female, estrogen receptor negative [C50.412, Z17.1], First port was infected and was removed.     Post-op Diagnosis:  Post-Op Diagnosis Codes:     * Malignant neoplasm of upper-outer quadrant of left breast in female, estrogen receptor negative [C50.412, Z17.1]  First port was infected and was removed.    Procedure(s) (LRB):  [id number] (Left) - left subclavian percutaneous access with intraoperative fluoroscopy to position the catheter tip into the SVC     8F power port    Anesthesia: General    Operative Findings: The patient was taken to operating room and transferred to the operating room table in the supine position.  Patient was given general anesthesia and LMA placed. Bilateral neck and chest were sterilely prepped and draped.  The patient was put in Trendelenburg position.  Large gauge needle was used to percutaneously access the left subclavian vein.   A guide wire was placed through the needle and into the venous system without any issue.  Under fluoroscopy, the guide wire was seen in the venous system.  The patient was put back in the flat position.   An incision was made at the percutaneous stick site and a subcutaneous pocket was made inferiorly.  Under Seldinger technique a venous sheath was placed in into the venous system over the guide wire.  The catheter was then placed into the venous system through the sheath.   The sheath was then peeled away and discarded.   Under fluoroscopy, the catheter tip was positioned into the superior vena cava.  The catheter was cut to size.   The port was attached to the catheter.  The port was accessed and it flushed and aspirated freely.  The port was then placed into the subcutaneous pocket and sutured to the chest wall.  The incision was then closed in 2  layers, first with a deep dermal layer of Vicryl followed by a subcuticular 4-0 Monocryl to close the skin.  After that, procedure was finished.  Patient was extubated and transferred to the recovery room in stable condition.  Chest x-ray will be performed in the recovery room.         Estimated Blood Loss:  10 mL  Estimated Blood Loss has been documented.         Specimens:   Specimen (24h ago, onward)      None            [id number]

## 2022-11-21 NOTE — TRANSFER OF CARE
"Anesthesia Transfer of Care Note    Patient: Karyna Escoto    Procedure(s) Performed: Procedure(s) (LRB):  [id number] (Left)    Anesthesia PACU Handoff    Last vitals:   Visit Vitals  BP (!) 143/86 (BP Location: Right arm, Patient Position: Sitting)   Pulse 69   Temp 36.6 °C (97.9 °F) (Oral)   Resp 16   Ht 5' 2" (1.575 m)   Wt 91.2 kg (201 lb)   LMP 08/11/2022   SpO2 100%   Breastfeeding No   BMI 36.76 kg/m²     "

## 2022-11-21 NOTE — BRIEF OP NOTE
[facility]  Brief Operative Note    SUMMARY     Surgery Date: 11/21/2022     Surgeon(s) and Role:     * Oscar Murphy III, MD - Primary    Assisting Surgeon: None    Pre-op Diagnosis:  Malignant neoplasm of upper-outer quadrant of left breast in female, estrogen receptor negative [C50.412, Z17.1], First port was infected and was removed.     Post-op Diagnosis:  Post-Op Diagnosis Codes:     * Malignant neoplasm of upper-outer quadrant of left breast in female, estrogen receptor negative [C50.412, Z17.1]  First port was infected and was removed.    Procedure(s) (LRB):  [id number] (Left) - left subclavian percutaneous access with intraoperative fluoroscopy to position the catheter tip into the SVC     8F power port    Anesthesia: General    Operative Findings: The patient was taken to operating room and transferred to the operating room table in the supine position.  Patient was given general anesthesia and LMA placed. Bilateral neck and chest were sterilely prepped and draped.  The patient was put in Trendelenburg position.  Large gauge needle was used to percutaneously access the left subclavian vein.   A guide wire was placed through the needle and into the venous system without any issue.  Under fluoroscopy, the guide wire was seen in the venous system.  The patient was put back in the flat position.   An incision was made at the percutaneous stick site and a subcutaneous pocket was made inferiorly.  Under Seldinger technique a venous sheath was placed in into the venous system over the guide wire.  The catheter was then placed into the venous system through the sheath.   The sheath was then peeled away and discarded.   Under fluoroscopy, the catheter tip was positioned into the superior vena cava.  The catheter was cut to size.   The port was attached to the catheter.  The port was accessed and it flushed and aspirated freely.  The port was then placed into the subcutaneous pocket  and sutured to the chest wall.  The incision was then closed in 2 layers, first with a deep dermal layer of Vicryl followed by a subcuticular 4-0 Monocryl to close the skin.  After that, procedure was finished.  Patient was extubated and transferred to the recovery room in stable condition.  Chest x-ray will be performed in the recovery room.         Estimated Blood Loss:  10 mL  Estimated Blood Loss has been documented.         Specimens:   Specimen (24h ago, onward)      None            [id number]

## 2022-11-21 NOTE — ANESTHESIA POSTPROCEDURE EVALUATION
Anesthesia Post Evaluation    Patient: Karyna Escoto    Procedure(s) Performed: Procedure(s) (LRB):  [id number] (Left)    Final Anesthesia Type: general      Patient location during evaluation: PACU  Patient participation: Yes- Able to Participate  Level of consciousness: awake and alert  Post-procedure vital signs: reviewed and stable  Pain management: adequate  Airway patency: patent    PONV status at discharge: No PONV  Anesthetic complications: no      Cardiovascular status: hemodynamically stable  Respiratory status: unassisted, spontaneous ventilation and room air  Hydration status: euvolemic  Follow-up not needed.          Vitals Value Taken Time   /75 11/21/22 0900   Temp 36.5 °C (97.7 °F) 11/21/22 0900   Pulse 57 11/21/22 0908   Resp 11 11/21/22 0908   SpO2 98 % 11/21/22 0908   Vitals shown include unvalidated device data.      No case tracking events are documented in the log.      Pain/Guy Score: Guy Score: 10 (11/21/2022  9:00 AM)

## 2022-11-22 ENCOUNTER — LAB VISIT (OUTPATIENT)
Dept: LAB | Facility: HOSPITAL | Age: 47
End: 2022-11-22
Attending: INTERNAL MEDICINE
Payer: COMMERCIAL

## 2022-11-22 DIAGNOSIS — Z17.1 MALIGNANT NEOPLASM OF UPPER-OUTER QUADRANT OF LEFT BREAST IN FEMALE, ESTROGEN RECEPTOR NEGATIVE: ICD-10-CM

## 2022-11-22 DIAGNOSIS — R53.83 FATIGUE, UNSPECIFIED TYPE: ICD-10-CM

## 2022-11-22 DIAGNOSIS — C50.412 MALIGNANT NEOPLASM OF UPPER-OUTER QUADRANT OF LEFT BREAST IN FEMALE, ESTROGEN RECEPTOR NEGATIVE: ICD-10-CM

## 2022-11-22 LAB
ALBUMIN SERPL BCP-MCNC: 4 G/DL (ref 3.5–5.2)
ALP SERPL-CCNC: 95 U/L (ref 55–135)
ALT SERPL W/O P-5'-P-CCNC: 43 U/L (ref 10–44)
ANION GAP SERPL CALC-SCNC: 8 MMOL/L (ref 8–16)
AST SERPL-CCNC: 25 U/L (ref 10–40)
BASOPHILS # BLD AUTO: 0.03 K/UL (ref 0–0.2)
BASOPHILS NFR BLD: 0.8 % (ref 0–1.9)
BILIRUB SERPL-MCNC: 0.6 MG/DL (ref 0.1–1)
BUN SERPL-MCNC: 14 MG/DL (ref 6–20)
CALCIUM SERPL-MCNC: 9.2 MG/DL (ref 8.7–10.5)
CHLORIDE SERPL-SCNC: 100 MMOL/L (ref 95–110)
CO2 SERPL-SCNC: 29 MMOL/L (ref 23–29)
CREAT SERPL-MCNC: 1 MG/DL (ref 0.5–1.4)
DIFFERENTIAL METHOD: ABNORMAL
EOSINOPHIL # BLD AUTO: 0 K/UL (ref 0–0.5)
EOSINOPHIL NFR BLD: 0.8 % (ref 0–8)
ERYTHROCYTE [DISTWIDTH] IN BLOOD BY AUTOMATED COUNT: 16.7 % (ref 11.5–14.5)
EST. GFR  (NO RACE VARIABLE): >60 ML/MIN/1.73 M^2
GLUCOSE SERPL-MCNC: 92 MG/DL (ref 70–110)
HCT VFR BLD AUTO: 34.1 % (ref 37–48.5)
HGB BLD-MCNC: 11.2 G/DL (ref 12–16)
IMM GRANULOCYTES # BLD AUTO: 0.01 K/UL (ref 0–0.04)
IMM GRANULOCYTES NFR BLD AUTO: 0.3 % (ref 0–0.5)
LYMPHOCYTES # BLD AUTO: 2.3 K/UL (ref 1–4.8)
LYMPHOCYTES NFR BLD: 59.2 % (ref 18–48)
MAGNESIUM SERPL-MCNC: 2 MG/DL (ref 1.6–2.6)
MCH RBC QN AUTO: 34.7 PG (ref 27–31)
MCHC RBC AUTO-ENTMCNC: 32.8 G/DL (ref 32–36)
MCV RBC AUTO: 106 FL (ref 82–98)
MONOCYTES # BLD AUTO: 0.5 K/UL (ref 0.3–1)
MONOCYTES NFR BLD: 12.3 % (ref 4–15)
NEUTROPHILS # BLD AUTO: 1 K/UL (ref 1.8–7.7)
NEUTROPHILS NFR BLD: 26.6 % (ref 38–73)
NRBC BLD-RTO: 0 /100 WBC
PLATELET # BLD AUTO: 204 K/UL (ref 150–450)
PLATELET BLD QL SMEAR: ABNORMAL
PMV BLD AUTO: 9.4 FL (ref 9.2–12.9)
POTASSIUM SERPL-SCNC: 3.8 MMOL/L (ref 3.5–5.1)
PROT SERPL-MCNC: 7.1 G/DL (ref 6–8.4)
RBC # BLD AUTO: 3.23 M/UL (ref 4–5.4)
SODIUM SERPL-SCNC: 137 MMOL/L (ref 136–145)
WBC # BLD AUTO: 3.82 K/UL (ref 3.9–12.7)

## 2022-11-22 PROCEDURE — 36415 COLL VENOUS BLD VENIPUNCTURE: CPT | Performed by: INTERNAL MEDICINE

## 2022-11-22 PROCEDURE — 80053 COMPREHEN METABOLIC PANEL: CPT | Performed by: NURSE PRACTITIONER

## 2022-11-22 PROCEDURE — 85025 COMPLETE CBC W/AUTO DIFF WBC: CPT | Performed by: INTERNAL MEDICINE

## 2022-11-22 PROCEDURE — 83735 ASSAY OF MAGNESIUM: CPT | Performed by: INTERNAL MEDICINE

## 2022-11-28 ENCOUNTER — PATIENT MESSAGE (OUTPATIENT)
Dept: SURGERY | Facility: CLINIC | Age: 47
End: 2022-11-28
Payer: COMMERCIAL

## 2022-11-29 ENCOUNTER — LAB VISIT (OUTPATIENT)
Dept: LAB | Facility: HOSPITAL | Age: 47
End: 2022-11-29
Attending: INTERNAL MEDICINE
Payer: COMMERCIAL

## 2022-11-29 DIAGNOSIS — Z17.1 MALIGNANT NEOPLASM OF UPPER-OUTER QUADRANT OF LEFT BREAST IN FEMALE, ESTROGEN RECEPTOR NEGATIVE: ICD-10-CM

## 2022-11-29 DIAGNOSIS — C50.412 MALIGNANT NEOPLASM OF UPPER-OUTER QUADRANT OF LEFT BREAST IN FEMALE, ESTROGEN RECEPTOR NEGATIVE: ICD-10-CM

## 2022-11-29 DIAGNOSIS — R53.83 FATIGUE, UNSPECIFIED TYPE: ICD-10-CM

## 2022-11-29 LAB
ALBUMIN SERPL BCP-MCNC: 4 G/DL (ref 3.5–5.2)
ALP SERPL-CCNC: 93 U/L (ref 55–135)
ALT SERPL W/O P-5'-P-CCNC: 46 U/L (ref 10–44)
ANION GAP SERPL CALC-SCNC: 5 MMOL/L (ref 8–16)
AST SERPL-CCNC: 32 U/L (ref 10–40)
BASOPHILS # BLD AUTO: 0.04 K/UL (ref 0–0.2)
BASOPHILS NFR BLD: 0.8 % (ref 0–1.9)
BILIRUB SERPL-MCNC: 0.5 MG/DL (ref 0.1–1)
BUN SERPL-MCNC: 12 MG/DL (ref 6–20)
CALCIUM SERPL-MCNC: 9.3 MG/DL (ref 8.7–10.5)
CHLORIDE SERPL-SCNC: 104 MMOL/L (ref 95–110)
CO2 SERPL-SCNC: 27 MMOL/L (ref 23–29)
CREAT SERPL-MCNC: 0.9 MG/DL (ref 0.5–1.4)
DIFFERENTIAL METHOD: ABNORMAL
EOSINOPHIL # BLD AUTO: 0.1 K/UL (ref 0–0.5)
EOSINOPHIL NFR BLD: 1.7 % (ref 0–8)
ERYTHROCYTE [DISTWIDTH] IN BLOOD BY AUTOMATED COUNT: 14.9 % (ref 11.5–14.5)
EST. GFR  (NO RACE VARIABLE): >60 ML/MIN/1.73 M^2
GLUCOSE SERPL-MCNC: 102 MG/DL (ref 70–110)
HCT VFR BLD AUTO: 36.9 % (ref 37–48.5)
HGB BLD-MCNC: 12.7 G/DL (ref 12–16)
IMM GRANULOCYTES # BLD AUTO: 0.01 K/UL (ref 0–0.04)
IMM GRANULOCYTES NFR BLD AUTO: 0.2 % (ref 0–0.5)
LYMPHOCYTES # BLD AUTO: 2.3 K/UL (ref 1–4.8)
LYMPHOCYTES NFR BLD: 48.3 % (ref 18–48)
MAGNESIUM SERPL-MCNC: 2 MG/DL (ref 1.6–2.6)
MCH RBC QN AUTO: 35.3 PG (ref 27–31)
MCHC RBC AUTO-ENTMCNC: 34.4 G/DL (ref 32–36)
MCV RBC AUTO: 103 FL (ref 82–98)
MONOCYTES # BLD AUTO: 0.5 K/UL (ref 0.3–1)
MONOCYTES NFR BLD: 11.3 % (ref 4–15)
NEUTROPHILS # BLD AUTO: 1.8 K/UL (ref 1.8–7.7)
NEUTROPHILS NFR BLD: 37.7 % (ref 38–73)
NRBC BLD-RTO: 0 /100 WBC
PLATELET # BLD AUTO: 262 K/UL (ref 150–450)
PMV BLD AUTO: 9 FL (ref 9.2–12.9)
POTASSIUM SERPL-SCNC: 4.1 MMOL/L (ref 3.5–5.1)
PROT SERPL-MCNC: 7.2 G/DL (ref 6–8.4)
RBC # BLD AUTO: 3.6 M/UL (ref 4–5.4)
SODIUM SERPL-SCNC: 136 MMOL/L (ref 136–145)
WBC # BLD AUTO: 4.8 K/UL (ref 3.9–12.7)

## 2022-11-29 PROCEDURE — 80053 COMPREHEN METABOLIC PANEL: CPT | Performed by: NURSE PRACTITIONER

## 2022-11-29 PROCEDURE — 83735 ASSAY OF MAGNESIUM: CPT | Performed by: INTERNAL MEDICINE

## 2022-11-29 PROCEDURE — 85025 COMPLETE CBC W/AUTO DIFF WBC: CPT | Performed by: INTERNAL MEDICINE

## 2022-11-29 PROCEDURE — 36415 COLL VENOUS BLD VENIPUNCTURE: CPT | Performed by: INTERNAL MEDICINE

## 2022-11-30 RX ORDER — DIPHENHYDRAMINE HYDROCHLORIDE 50 MG/ML
50 INJECTION INTRAMUSCULAR; INTRAVENOUS ONCE AS NEEDED
Status: CANCELLED | OUTPATIENT
Start: 2022-11-30

## 2022-11-30 RX ORDER — EPINEPHRINE 0.3 MG/.3ML
0.3 INJECTION SUBCUTANEOUS ONCE AS NEEDED
Status: CANCELLED | OUTPATIENT
Start: 2022-11-30

## 2022-11-30 RX ORDER — HEPARIN 100 UNIT/ML
500 SYRINGE INTRAVENOUS
Status: CANCELLED | OUTPATIENT
Start: 2022-11-30

## 2022-11-30 RX ORDER — SODIUM CHLORIDE 0.9 % (FLUSH) 0.9 %
10 SYRINGE (ML) INJECTION
Status: CANCELLED | OUTPATIENT
Start: 2022-11-30

## 2022-11-30 RX ORDER — DOXORUBICIN HYDROCHLORIDE 2 MG/ML
60 INJECTION, SOLUTION INTRAVENOUS
Status: CANCELLED | OUTPATIENT
Start: 2022-11-30

## 2022-11-30 NOTE — PROGRESS NOTES
FOLLOW-UP APPOINTMENT    PATIENT:   Karyna Escoto  :    1975  MR#:    [id number]  DATE OF VISIT:  2022      Chief Complaint: Breast Cancer/[employer]o [employer]    HPI:   Ms. Karyna Escoto presents today for chemotherapy education.  She will be starting treatment with Adriamycin, Cytoxan and Keytruda for the above diagnosis.     Port to right chest has had some serous drainage. New left chest port functioning well.     Depression Patient Health Questionnaire 11/15/2022 10/25/2022 10/4/2022 2022 2022 2022 2022   Over the last two weeks how often have you been bothered by little interest or pleasure in doing things Not at all Not at all Not at all Not at all Not at all Not at all Not at all   Over the last two weeks how often have you been bothered by feeling down, depressed or hopeless Not at all Not at all Not at all Not at all Not at all Not at all Not at all   PHQ-2 Total Score 0 0 0 0 0 0 0       Review of Systems   Constitutional:  Negative for appetite change and unexpected weight change.   HENT:   Negative for mouth sores.    Respiratory:  Negative for cough and shortness of breath.    Cardiovascular:  Negative for chest pain.   Gastrointestinal:  Negative for abdominal pain and diarrhea.   Genitourinary:  Negative for frequency.    Musculoskeletal:  Negative for back pain.   Skin:  Negative for rash.   Neurological:  Negative for headaches.   Hematological:  Negative for adenopathy.   Psychiatric/Behavioral:  The patient is not nervous/anxious.      Oncology History   Left Breast Cancer-TRIPLE NEGATIVE   8/3/2022 Initial Diagnosis    Left Breast Cancer-TRIPLE NEGATIVE     2022 Cancer Staged    Staging form: Breast, AJCC 8th Edition  - Clinical: Stage IIA (cT1c, cN1(f), cM0, G2, ER-, OK+, HER2-)       2022 -  Chemotherapy    Treatment Summary   Plan Name: OP PEMBROLIZUMAB CARBOPLATIN (AUC 5) WITH WEEKLY ABRAXANE FOLLOWED BY PEMBROLIZUMAB DOXORUBICIN CYCLOPHOSPHAMIDE  FOLLOWED BY PEMBROLIZUMAB 200 MG Q3W  Treatment Goal: Curative  Status: Active  Start Date: 8/11/2022  End Date: 7/27/2023 (Planned)  Provider: Derek Peralta MD  Chemotherapy: DOXOrubicin chemo injection 120 mg, 60 mg/m2 = 120 mg, Intravenous, Clinic/HOD 1 time, 1 of 4 cycles  Administration: 120 mg (12/1/2022)  CARBOplatin (PARAPLATIN) 725 mg in sodium chloride 0.9% 322.5 mL chemo infusion, 725 mg (100.1 % of original dose 722.5 mg), Intravenous, Clinic/HOD 1 time, 4 of 4 cycles  Dose modification:   (original dose 722.5 mg, Cycle 1, Reason: MD Discretion),   (original dose 722.5 mg, Cycle 1), 541.875 mg (75 % of original dose 722.5 mg, Cycle 3, Reason: Dose Not Tolerated), 722.5 mg (100 % of original dose 722.5 mg, Cycle 3, Reason: Other (see comments))  Administration: 725 mg (8/11/2022), 730 mg (9/8/2022), 565 mg (10/6/2022), 725 mg (10/27/2022)  cyclophosphamide 600 mg/m2 = 1,200 mg in sodium chloride 0.9% 256 mL chemo infusion, 600 mg/m2 = 1,200 mg, Intravenous, Clinic/HOD 1 time, 1 of 4 cycles  Administration: 1,200 mg (12/1/2022)  PACLitaxeL (TAXOL) 80 mg/m2 = 156 mg in sodium chloride 0.9% 276 mL chemo infusion, 80 mg/m2 = 156 mg, Intravenous, Clinic/HOD 1 time, 1 of 1 cycle  Administration: 156 mg (8/11/2022), 156 mg (8/18/2022), 156 mg (9/1/2022)           Patient Active Problem List   Diagnosis    Left Breast Cancer-TRIPLE NEGATIVE    Transaminitis    Chemotherapy-induced neutropenia    Dehydration    Cancer associated pain    Infection of venous access port       Past Medical History:   Diagnosis Date    Anxiety     Breast cancer 07/2022       Past Surgical History:   Procedure Laterality Date    BREAST BIOPSY Left 07/2022    eye lid surgery Bilateral 1990    INSERTION OF TUNNELED CENTRAL VENOUS CATHETER (CVC) WITH SUBCUTANEOUS PORT Left 11/21/2022    Procedure: [id number];  Surgeon: Oscar Murphy III, MD;  Location: [facility];  Service: General;  Laterality: Left;    NOSE SURGERY   1990    PORTACATH PLACEMENT  08/2022    [facility]    TONSILLECTOMY  1990    TUBAL LIGATION  2013       Social History     Socioeconomic History    Marital status:    Tobacco Use    Smoking status: Former    Tobacco comments:     Quit 2005-13 year history -1 daily   Substance and Sexual Activity    Alcohol use: Not Currently     Comment: occasional    Sexual activity: Yes       Family History   Problem Relation Age of Onset    Breast cancer Maternal Grandmother     Prostate cancer Maternal Grandfather          Current Outpatient Medications:     amoxicillin-clavulanate 875-125mg (AUGMENTIN) 875-125 mg per tablet, Take 1 tablet by mouth 2 (two) times daily. for 10 days, Disp: 20 tablet, Rfl: 0    bisacodyL (DULCOLAX) 5 mg EC tablet, Take 5 mg by mouth daily as needed for Constipation., Disp: , Rfl:     cetirizine (ZYRTEC) 10 mg Cap, Take 1 tablet by mouth once daily., Disp: , Rfl:     famotidine (PEPCID) 10 MG tablet, Take 10 mg by mouth 2 (two) times daily., Disp: , Rfl:     fluticasone propionate (FLONASE) 50 mcg/actuation nasal spray, 1 spray (50 mcg total) by Each Nostril route once daily., Disp: 15.8 mL, Rfl: 5    HYDROcodone-acetaminophen (NORCO) 5-325 mg per tablet, Take 1 tablet by mouth every 6 (six) hours as needed for Pain., Disp: 30 tablet, Rfl: 0    hydrOXYzine HCL (ATARAX) 10 MG Tab, Take 10 mg by mouth 3 (three) times daily as needed., Disp: , Rfl:     Lactobacillus rhamnosus GG (CULTURELLE) 10 billion cell capsule, Take 1 capsule by mouth once daily., Disp: , Rfl:     LIDOcaine-prilocaine (EMLA) cream, Apply topically as needed. Apply 30 mins prior to port access, Disp: 30 g, Rfl: 5    loperamide HCl (IMODIUM A-D ORAL), Take 1 tablet by mouth daily as needed., Disp: , Rfl:     ondansetron (ZOFRAN) 8 MG tablet, Take 1 tablet (8 mg total) by mouth every 8 (eight) hours as needed., Disp: 30 tablet, Rfl: 5    promethazine (PHENERGAN) 25 MG tablet, Take 1 tablet (25 mg total) by mouth every 4  to 6 hours as needed., Disp: 30 tablet, Rfl: 5    sertraline (ZOLOFT) 50 MG tablet, Take 50 mg by mouth., Disp: , Rfl:   No current facility-administered medications for this visit.    Review of patient's allergies indicates:   Allergen Reactions    Taxol [paclitaxel] Rash       Physcial Examination  VITAL SIGNS:    There is no height or weight on file to calculate BSA.      Wt Readings from Last 5 Encounters:   12/01/22 92.8 kg (204 lb 9.4 oz)   11/21/22 91.2 kg (201 lb)   11/17/22 91.2 kg (201 lb)   11/15/22 91.5 kg (201 lb 12.8 oz)   11/10/22 90.5 kg (199 lb 8 oz)       GENERAL:  Karyna Escoto is healthy-appearing 47 y.o. female, in no distress.   EYES:   Pupils equal, round, reactive.  Conjunctivae, sclera and lids normal.  HEENT: Head normocephalic and atraumatic, + alopecia.  Oropharynx is unremarkable.  No icterus, jaundice, stomatitis, mucositis, or ulceration is noted.  Ears are clear and unremarkable.  Nose, nares, and septum are unremarkable.    NECK:   No masses.  Thyroid and trachea are normal.    BREASTS:  Deferred.  RESPIRATORY: Clear to auscultation bilaterally.  Symmetrically effortless expansion.  No wheezing and no stridor.    CV: Heart reveals regular rate and rhythm without murmur, rub, or gallops.  ABDOMEN: Soft, non-tender.  No masses, no hernias, and no rebound or rigidity are noted.  /RECTAL:  Deferred.  LYMPHATICS: No preauricular, submandibular, cervical, supraclavicular, axillary, lymphadenopathy.  MUSCULOSKELETAL:Fair musculature, no atrophy.  No arthritic changes.  No edema or cyanosis. Back is without gross abnormal curvature.   NEUROLOGICAL: Cranial nerves II-XII grossly intact.  Motor and sensory exam intact.  SKIN:   No lesions, bruises, petechiae or rashes.  Good turgor.    PSYCHIATRIC: Patient is alert and oriented to time, place and person.  Mood and affect are appropriate.         Laboratory and Radiology   Lab Results   Component Value Date    WBC 4.80 11/29/2022    RBC  3.60 (L) 11/29/2022    HGB 12.7 11/29/2022    HCT 36.9 (L) 11/29/2022     (H) 11/29/2022    MCH 35.3 (H) 11/29/2022    MCHC 34.4 11/29/2022    RDW 14.9 (H) 11/29/2022     11/29/2022    MPV 9.0 (L) 11/29/2022    GRAN 1.8 11/29/2022    GRAN 37.7 (L) 11/29/2022    LYMPH 2.3 11/29/2022    LYMPH 48.3 (H) 11/29/2022    MONO 0.5 11/29/2022    MONO 11.3 11/29/2022    EOS 0.1 11/29/2022    BASO 0.04 11/29/2022    EOSINOPHIL 1.7 11/29/2022    BASOPHIL 0.8 11/29/2022     BMP  Lab Results   Component Value Date     11/29/2022    K 4.1 11/29/2022     11/29/2022    CO2 27 11/29/2022    BUN 12 11/29/2022    CREATININE 0.9 11/29/2022    CALCIUM 9.3 11/29/2022    ANIONGAP 5 (L) 11/29/2022     Lab Results   Component Value Date    ALT 46 (H) 11/29/2022    AST 32 11/29/2022    ALKPHOS 93 11/29/2022    BILITOT 0.5 11/29/2022     No results found for this or any previous visit (from the past 2160 hour(s)).  No results found for this or any previous visit (from the past 2160 hour(s)).  Results for orders placed or performed during the hospital encounter of 09/06/22 (from the past 2160 hour(s))   MRI Breast w/wo Contrast, w/CAD, Bilateral    Impression    1. Irregular enhancing 12 mm mass in axillary tail of left breast, in   subdermal location, has associated biopsy clip and correlates with   presumed biopsy-proven breast malignancy.  2. Abnormal left level I axillary lymph node with focal lobular cortical   thickening, suspicious for regional axillary lymph node metastasis.  3. Negative for contralateral, right, breast malignancy.  4. Nonspecific bone marrow signal alteration in sternum.  Correlation with   PET-CT or whole-body bone scan is recommended.  Recommendation: Patient is currently under surgical and medical oncology   care    BI-RADS CATEGORY 6: KNOWN MALIGNANCY.      Electronically signed by: Dre Ag MD  Date:    09/06/2022  Time:    13:05         Pathology  Pathology Results  (Last 10 years)       None            TITLE: PLAN OF CARE FOR THE CHEMOTHERAPY PATIENT / TEACHING PROTOCOL    PURPOSE: To involve the patient / significant other in the plan of care and to provide teaching to the significant other & patient receiving chemotherapy.    LEVEL: Independent.    CONTENT: The Plan of Care for the chemotherapy patient is individualized and appropriate to the patients needs, strengths, limitations, & goals.  Education includes information regarding chemotherapy side effects, the treatment itself, and self-care  Activities.    GOAL / OUTCOME STANDARDS    PHYSIOLOGIC: The client will remain free or experience minimal side effects or toxicities throughout the chemotherapy treatment period.     PSYCHOLOGIC: The client/significant others will demonstrate positive coping mechanisms in relation to chemotherapy and its side effects.      COGINITIVE: The client/significant others will verbalize understanding of self-care measure to avoid/minimize side effects of the chemotherapy regimen.    EVALUATION / COMMENT KEY:    V = Audiovisual/Video  S = Successfully meets outcome  N = Needs further instruction  NA = Not applicable to the patient  P = Previous knowledge  U = Unable to comprehend  * = See progress notes          PLAN OF CARE  INFORMATION TO BE DELIVERED / NURSING INTERVENTIONS DATE EVALUATION   Assessment of client/caregiver,         knowledge of cancer diagnosis,         and chemotherapy as a treatment. 1a. Evaluate patient/caregiver learning ability    b. Plan teaching sessions with patient/caregiver according to needs and present anxiety level/ability to learn.    c. Provide Chemotherapy Education Packet,        Mouth Care Protocol,         Specific Patient Education Sheets. 12/01/2022 S   Individual chemotherapy treatment         plan. 2a. Review of Chemotherapy Education handout from [employer]            12/01/2022   S   Knowledge Deficit & Self-Management of general side effects common to all  chemotherapy:  Nausea/Vomiting  b.   Diarrhea  Mouth Care  Dental care  Constipation  Hair Loss  Potential for infection  Fatigue   3a. Reinforce that the majority of side effects from chemotherapy are reversible and are  controlled both in the hospital and at home        (blood counts recover, hair grows back).   b.  Refer to the following for reinforcement of         information post-treatment:  Mouth Care Protocol.  Bowel Protocol for constipation or diarrhea.  3.  Drug Specific Chemotherapy Information Sheets for each medication patient receiving.    12/01/2022     S     PLAN OF CARE  INFORMATION TO BE DELIVERED / NURSING INTERVENTIONS DATE EVALUATION   h. Potential for bleeding         i. Potential anemia/fatigue         j. Potential sunburn         k. Birth control measures  l. Safety measures post treatment 4.  Chemotherapy Home Care Instruction  and Safety Information Sheet.  A. patient/caregivers to thoroughly cook shellfish (shrimp, crab, etc) to decrease the chance of infection.    B.  Use sunscreen and protective clothing while in the sun.   12/01/2022      Knowledge deficit & Self Management of Drug Specific  Side Effects.    BLADDER EFFECTS        (Hemorrhagic Cystitis)                  Preventable with adequate hydration; occurs 2-3 days or more post treatment.   1.  Instruct patient to:  a.   Void at least every 2 hours; increase intake.  b.   DO NOT hold urine; go when urge is felt.  c.    Empty bladder at bedtime and on         awakening.  d.   Observe for color changes (red to tea           colored), amount and frequency changes.  e.   Notify oncologist of any abnormalities           in urine or voiding or if you cannot               drink adequate fluids.   12/01/2022   S   b.   CHANGES IN URINE        COLOR:      1.   Instruct patient:  a.   Most evident in first 2-3 voidings after           administration.  Lasts less than 24 hours.  If urine is discolored 2 or more days post- treatment, notify  oncologist.      12/01/2022 S   c.    KIDNEY EFFECTS           (Nephrotoxicity)   1.  Instruct patient to:  a.   Drink 8-16 glasses of fluid/day the day   pre-treatment and 3-4 days post-treatment to maintain hydration; the best way to minimize kidney problems.  b.   Notify oncologist immediately if unable to drink fluids or if changes are noted in urinary elimination.     12/01/2022   S   PULMONARY TOXICITY    Instruct patient to report symptoms such as shortness of breath, chest pain, shallow breathing, or chest wall discomfort to physician.  Reinforce preventative measures used by the health care team.  Baseline and periodic PFT and chest x-ray.   12/01/2022   S     PLAN OF CARE INFORMATION TO BE DELIVERED / NURSING INTERVENTIONS DATE EVALUATION   NERVE & MUSCLE EFFECTS (neurotoxocity; neuropathy, possible visual/hearing changes)        Instruct patient to:    Report numbness or tingling of the hands/feet, loss of fine motor movement (buttoning shirt, tying shoelaces), or gait changes to your oncologist.  If numbness/tingling are present:  protect feet with shoes at all times.  Use gloves for washing dishes/gardening & potholders in kitchen.       12/01/2022   S   CARDIOTOXICITY  Decreased effectiveness of             cardiac function. Effective are                  cumulative and irreversible.                                    CARDIAC ARRYTHMIAS              4   Instruct:  Heart function may be tested before treatment and perdiocally during treatment.  Notify oncologist of irregular pulse, palpitations, shortness of breath, or swelling in lower extremities/feet.          Can cause arrhythmias on infusion that resolve once infusion discontinued. Instruct nurse if any irregularity felt.    12/01/2022   S   EXTRAVASTION  Occurs when vesicants leak outside of vein and cause damage to the skin and underlying tissues.   Reinforce preventive measures used to avoid complications.  Fresh IV site or central line  monitored continuously with vesicant IVP.  Continuous infusion via central line site and blood return monitored periodically around the clock.  Instruct to:  Notify nurse of any discomfort, burning, stinging, etc. at IV site during chemotherapy administration.  Notify oncologist of any redness, pain, or swelling at IV site after discharge from hospital.   12/01/2022   S   HYPERSENSITIVITY can happen with any medication.   Instruct patient:  Nurse is with them during the initial part of treatment and will be close by to monitor.  Pre-medication ordered by the oncologist must be taken on time. If doses are missed, treatment will need to be re-scheduled.  Skin redness, itching, or hives appearing after discharge should be reported to oncologist. 12/01/2022   S       PLAN OF CARE INFORMATION TO BE DELIVERED / NURSING INTERVENTIONS DATE EVALUATION   FLU-LIKE SYNDROME      Instruct patient symptoms are hard to prevent and may include fever, shaking chills, muscle and body aches.  Taking prescribed medications from physician if needed.  Adequate fluids are important.    Reinforce the need to call if temperature is         elevated to 100.4 or more  12/01/2022   S   HAND-FOOT SYNDROME  causes painful, symmetric swelling and redness of palms and soles                  Instruct patient to report any numbness or tingling in the hands or feet.  Explain prevention techniques, such as     Use heavy moisturizers to lessen skin dryness and itching, but to avoid if skin is cracked or broken  Bathe in tepid water, use non-perfumed soap, and wash gently. Baths with oatmeal or diluted baking soda may be soothing.  Avoid tight fitting shoes and repetitive actions, such as rubbing hands or applying pressure to hands/feet.  Review measures to take should syndrome occur:  Cold compresses and elevation for          edema  Pain medications and other measures as ordered by oncologist.   4.   Syndrome resolves few weeks after therapy.  12/01/2022   S   5. DISCHARGE PLANNING /        EDUCATION 1.    Explain importance of compliance with follow- up  tests (CBC, CMP).  2.    Verify patient/caregiver know:  a.    Oncologists office phone number.  b.    Dates of follow-up appointments.  c.    Prescriptions given for nausea  3.   Review side effects to monitor and notify          oncologist about.  4.   Reinforce the need for patient and caregivers to:  a.    Review information given.  b.    Call oncologists office with questions          or symptoms  5.   Provide [facility] [place] [employer]chure make referrals if needed for financial or .   12/01/2022   S     PROGRESS NOTES: I met with the patient and her  today for chemotherapy education. she will be starting treatment with Adriamycin, Cytoxan and Keytruda . We discussed the mechanism of action, potential side effects of this treatment as well as ways she can manage them at home. Some of these side effects include but or not limited to fever, nausea, vomiting, decreased appetite, fatigue, weakness, cytopenias, myalgia/arthralgia, constipation, diarrhea, bleeding, headache, shortness of breath, nail changes, taste change, hair thinning/loss, mood disturbances, or edema. We also discussed dietary modifications she should make although this will be discussed in more detail with the dietician. she was provided with anti-emetic medication, a copy of all of the information we discussed today as well as our contact information. she will be provided a schedule on his first day of treatment. We will obtain labs on a weekly basis and the patient will follow-up with the physician for toxicity monitoring throughout treatment. All questions were answered and an informed consent was obtained. she was reminded to certainly contact us sooner if needed.  Attached to the patients folder and discussed with the patient the 24 hour/ 7 days a week after hours telephone number for the physician.   Patient notified to call anytime 24/7 because their is a physician on call for any problems that may arise.  Patient also notified to report to [facility] if they can not get in touch with a physician after hours.  Discussed the five wishes booklet with the patient and their family.           Assessment/Plan     ICD-10-CM ICD-9-CM   1. Port or reservoir infection, sequela  T80.212S 909.3   2. Left Breast Cancer-TRIPLE NEGATIVE  C50.412 174.4    Z17.1 V86.1   3. Chemotherapy-induced neutropenia  D70.1 288.03    T45.1X5A E933.1   4. Infection of venous access port, sequela  T80.219S 909.3          Medications Ordered:  Zofran 8mg 1 tab PO Q8h prn nausea  Phenergan 25mg PO Q4-6h prn nausea  Neulasta OnPro    Standing Labs Ordered:  CBC weekly  CMP weekly    No follow-ups on file.      Total Face to Face Time: 30 minutes face to face with the patient and their family discussing the chemotherapy side-effects and when to call our office.   Electronically signed by: Ana Quigley, MSN, APRN, AGNP-C, OCN

## 2022-12-01 ENCOUNTER — OFFICE VISIT (OUTPATIENT)
Dept: HEMATOLOGY/ONCOLOGY | Facility: CLINIC | Age: 47
End: 2022-12-01
Payer: COMMERCIAL

## 2022-12-01 ENCOUNTER — INFUSION (OUTPATIENT)
Dept: INFUSION THERAPY | Facility: HOSPITAL | Age: 47
End: 2022-12-01
Attending: INTERNAL MEDICINE
Payer: COMMERCIAL

## 2022-12-01 VITALS
RESPIRATION RATE: 18 BRPM | HEART RATE: 83 BPM | DIASTOLIC BLOOD PRESSURE: 71 MMHG | BODY MASS INDEX: 37.64 KG/M2 | SYSTOLIC BLOOD PRESSURE: 104 MMHG | TEMPERATURE: 98 F | HEIGHT: 62 IN | WEIGHT: 204.56 LBS

## 2022-12-01 DIAGNOSIS — D70.1 CHEMOTHERAPY-INDUCED NEUTROPENIA: Primary | ICD-10-CM

## 2022-12-01 DIAGNOSIS — T45.1X5A CHEMOTHERAPY-INDUCED NEUTROPENIA: ICD-10-CM

## 2022-12-01 DIAGNOSIS — T45.1X5A CHEMOTHERAPY-INDUCED NEUTROPENIA: Primary | ICD-10-CM

## 2022-12-01 DIAGNOSIS — C50.412 MALIGNANT NEOPLASM OF UPPER-OUTER QUADRANT OF LEFT BREAST IN FEMALE, ESTROGEN RECEPTOR NEGATIVE: ICD-10-CM

## 2022-12-01 DIAGNOSIS — D70.1 CHEMOTHERAPY-INDUCED NEUTROPENIA: ICD-10-CM

## 2022-12-01 DIAGNOSIS — Z17.1 MALIGNANT NEOPLASM OF UPPER-OUTER QUADRANT OF LEFT BREAST IN FEMALE, ESTROGEN RECEPTOR NEGATIVE: ICD-10-CM

## 2022-12-01 DIAGNOSIS — T80.219S INFECTION OF VENOUS ACCESS PORT, SEQUELA: ICD-10-CM

## 2022-12-01 DIAGNOSIS — T80.212S PORT OR RESERVOIR INFECTION, SEQUELA: Primary | ICD-10-CM

## 2022-12-01 PROCEDURE — 96367 TX/PROPH/DG ADDL SEQ IV INF: CPT

## 2022-12-01 PROCEDURE — 96411 CHEMO IV PUSH ADDL DRUG: CPT

## 2022-12-01 PROCEDURE — 63600175 PHARM REV CODE 636 W HCPCS: Mod: JG | Performed by: NURSE PRACTITIONER

## 2022-12-01 PROCEDURE — 99214 OFFICE O/P EST MOD 30 MIN: CPT | Mod: S$GLB,,, | Performed by: NURSE PRACTITIONER

## 2022-12-01 PROCEDURE — 96413 CHEMO IV INFUSION 1 HR: CPT

## 2022-12-01 PROCEDURE — 1160F RVW MEDS BY RX/DR IN RCRD: CPT | Mod: CPTII,S$GLB,, | Performed by: NURSE PRACTITIONER

## 2022-12-01 PROCEDURE — 25000003 PHARM REV CODE 250: Performed by: NURSE PRACTITIONER

## 2022-12-01 PROCEDURE — 96375 TX/PRO/DX INJ NEW DRUG ADDON: CPT

## 2022-12-01 PROCEDURE — 1160F PR REVIEW ALL MEDS BY PRESCRIBER/CLIN PHARMACIST DOCUMENTED: ICD-10-PCS | Mod: CPTII,S$GLB,, | Performed by: NURSE PRACTITIONER

## 2022-12-01 PROCEDURE — 96377 APPLICATON ON-BODY INJECTOR: CPT

## 2022-12-01 PROCEDURE — 1159F MED LIST DOCD IN RCRD: CPT | Mod: CPTII,S$GLB,, | Performed by: NURSE PRACTITIONER

## 2022-12-01 PROCEDURE — 99214 PR OFFICE/OUTPT VISIT, EST, LEVL IV, 30-39 MIN: ICD-10-PCS | Mod: S$GLB,,, | Performed by: NURSE PRACTITIONER

## 2022-12-01 PROCEDURE — A4216 STERILE WATER/SALINE, 10 ML: HCPCS | Performed by: NURSE PRACTITIONER

## 2022-12-01 PROCEDURE — 1159F PR MEDICATION LIST DOCUMENTED IN MEDICAL RECORD: ICD-10-PCS | Mod: CPTII,S$GLB,, | Performed by: NURSE PRACTITIONER

## 2022-12-01 PROCEDURE — 96417 CHEMO IV INFUS EACH ADDL SEQ: CPT

## 2022-12-01 RX ORDER — HEPARIN 100 UNIT/ML
500 SYRINGE INTRAVENOUS
Status: DISCONTINUED | OUTPATIENT
Start: 2022-12-01 | End: 2022-12-01 | Stop reason: HOSPADM

## 2022-12-01 RX ORDER — DOXORUBICIN HYDROCHLORIDE 2 MG/ML
60 INJECTION, SOLUTION INTRAVENOUS
Status: COMPLETED | OUTPATIENT
Start: 2022-12-01 | End: 2022-12-01

## 2022-12-01 RX ORDER — EPINEPHRINE 0.3 MG/.3ML
0.3 INJECTION SUBCUTANEOUS ONCE AS NEEDED
Status: DISCONTINUED | OUTPATIENT
Start: 2022-12-01 | End: 2022-12-01 | Stop reason: HOSPADM

## 2022-12-01 RX ORDER — AMOXICILLIN AND CLAVULANATE POTASSIUM 875; 125 MG/1; MG/1
1 TABLET, FILM COATED ORAL 2 TIMES DAILY
Qty: 20 TABLET | Refills: 0 | Status: SHIPPED | OUTPATIENT
Start: 2022-12-01 | End: 2022-12-11

## 2022-12-01 RX ORDER — SODIUM CHLORIDE 0.9 % (FLUSH) 0.9 %
10 SYRINGE (ML) INJECTION
Status: DISCONTINUED | OUTPATIENT
Start: 2022-12-01 | End: 2022-12-01 | Stop reason: HOSPADM

## 2022-12-01 RX ADMIN — SODIUM CHLORIDE, PRESERVATIVE FREE 10 ML: 5 INJECTION INTRAVENOUS at 10:12

## 2022-12-01 RX ADMIN — APREPITANT 130 MG: 130 INJECTION, EMULSION INTRAVENOUS at 08:12

## 2022-12-01 RX ADMIN — CYCLOPHOSPHAMIDE 1200 MG: 200 INJECTION, SOLUTION INTRAVENOUS at 09:12

## 2022-12-01 RX ADMIN — SODIUM CHLORIDE 200 MG: 9 INJECTION, SOLUTION INTRAVENOUS at 08:12

## 2022-12-01 RX ADMIN — HEPARIN 500 UNITS: 100 SYRINGE at 10:12

## 2022-12-01 RX ADMIN — DEXAMETHASONE SODIUM PHOSPHATE 0.25 MG: 4 INJECTION, SOLUTION INTRA-ARTICULAR; INTRALESIONAL; INTRAMUSCULAR; INTRAVENOUS; SOFT TISSUE at 09:12

## 2022-12-01 RX ADMIN — PEGFILGRASTIM 6 MG: KIT SUBCUTANEOUS at 10:12

## 2022-12-01 RX ADMIN — DOXORUBICIN HYDROCHLORIDE 120 MG: 2 INJECTION, SOLUTION INTRAVENOUS at 09:12

## 2022-12-01 NOTE — PLAN OF CARE
Problem: Fatigue  Goal: Improved Activity Tolerance  12/1/2022 0952 by Lela Garzon RN  Outcome: Met  12/1/2022 0952 by Lela Garzon RN  Outcome: Ongoing, Progressing

## 2022-12-06 ENCOUNTER — LAB VISIT (OUTPATIENT)
Dept: LAB | Facility: HOSPITAL | Age: 47
End: 2022-12-06
Attending: INTERNAL MEDICINE
Payer: COMMERCIAL

## 2022-12-06 DIAGNOSIS — R53.83 FATIGUE, UNSPECIFIED TYPE: ICD-10-CM

## 2022-12-06 DIAGNOSIS — C50.412 MALIGNANT NEOPLASM OF UPPER-OUTER QUADRANT OF LEFT BREAST IN FEMALE, ESTROGEN RECEPTOR NEGATIVE: ICD-10-CM

## 2022-12-06 DIAGNOSIS — Z17.1 MALIGNANT NEOPLASM OF UPPER-OUTER QUADRANT OF LEFT BREAST IN FEMALE, ESTROGEN RECEPTOR NEGATIVE: ICD-10-CM

## 2022-12-06 LAB
ALBUMIN SERPL BCP-MCNC: 4 G/DL (ref 3.5–5.2)
ALP SERPL-CCNC: 105 U/L (ref 55–135)
ALT SERPL W/O P-5'-P-CCNC: 29 U/L (ref 10–44)
ANION GAP SERPL CALC-SCNC: 7 MMOL/L (ref 8–16)
ANISOCYTOSIS BLD QL SMEAR: SLIGHT
AST SERPL-CCNC: 15 U/L (ref 10–40)
BASOPHILS NFR BLD: 0 % (ref 0–1.9)
BILIRUB SERPL-MCNC: 0.9 MG/DL (ref 0.1–1)
BUN SERPL-MCNC: 17 MG/DL (ref 6–20)
CALCIUM SERPL-MCNC: 9.2 MG/DL (ref 8.7–10.5)
CHLORIDE SERPL-SCNC: 100 MMOL/L (ref 95–110)
CO2 SERPL-SCNC: 28 MMOL/L (ref 23–29)
CREAT SERPL-MCNC: 0.9 MG/DL (ref 0.5–1.4)
DIFFERENTIAL METHOD: ABNORMAL
EOSINOPHIL NFR BLD: 1 % (ref 0–8)
ERYTHROCYTE [DISTWIDTH] IN BLOOD BY AUTOMATED COUNT: 13.8 % (ref 11.5–14.5)
EST. GFR  (NO RACE VARIABLE): >60 ML/MIN/1.73 M^2
GLUCOSE SERPL-MCNC: 98 MG/DL (ref 70–110)
HCT VFR BLD AUTO: 35.7 % (ref 37–48.5)
HGB BLD-MCNC: 12.1 G/DL (ref 12–16)
IMM GRANULOCYTES # BLD AUTO: ABNORMAL K/UL (ref 0–0.04)
IMM GRANULOCYTES NFR BLD AUTO: ABNORMAL % (ref 0–0.5)
LYMPHOCYTES NFR BLD: 23 % (ref 18–48)
MAGNESIUM SERPL-MCNC: 2.1 MG/DL (ref 1.6–2.6)
MCH RBC QN AUTO: 34.9 PG (ref 27–31)
MCHC RBC AUTO-ENTMCNC: 33.9 G/DL (ref 32–36)
MCV RBC AUTO: 103 FL (ref 82–98)
MONOCYTES NFR BLD: 4 % (ref 4–15)
NEUTROPHILS NFR BLD: 72 % (ref 38–73)
NRBC BLD-RTO: 0 /100 WBC
PLATELET # BLD AUTO: 123 K/UL (ref 150–450)
PLATELET BLD QL SMEAR: ABNORMAL
PMV BLD AUTO: 10.7 FL (ref 9.2–12.9)
POIKILOCYTOSIS BLD QL SMEAR: SLIGHT
POTASSIUM SERPL-SCNC: 4.1 MMOL/L (ref 3.5–5.1)
PROT SERPL-MCNC: 7 G/DL (ref 6–8.4)
RBC # BLD AUTO: 3.47 M/UL (ref 4–5.4)
SODIUM SERPL-SCNC: 135 MMOL/L (ref 136–145)
WBC # BLD AUTO: 5.34 K/UL (ref 3.9–12.7)

## 2022-12-06 PROCEDURE — 36415 COLL VENOUS BLD VENIPUNCTURE: CPT | Performed by: INTERNAL MEDICINE

## 2022-12-06 PROCEDURE — 85027 COMPLETE CBC AUTOMATED: CPT | Performed by: INTERNAL MEDICINE

## 2022-12-06 PROCEDURE — 83735 ASSAY OF MAGNESIUM: CPT | Performed by: INTERNAL MEDICINE

## 2022-12-06 PROCEDURE — 85007 BL SMEAR W/DIFF WBC COUNT: CPT | Performed by: INTERNAL MEDICINE

## 2022-12-06 PROCEDURE — 80053 COMPREHEN METABOLIC PANEL: CPT | Performed by: NURSE PRACTITIONER

## 2022-12-13 ENCOUNTER — LAB VISIT (OUTPATIENT)
Dept: LAB | Facility: HOSPITAL | Age: 47
End: 2022-12-13
Attending: INTERNAL MEDICINE
Payer: COMMERCIAL

## 2022-12-13 DIAGNOSIS — Z17.1 MALIGNANT NEOPLASM OF UPPER-OUTER QUADRANT OF LEFT BREAST IN FEMALE, ESTROGEN RECEPTOR NEGATIVE: ICD-10-CM

## 2022-12-13 DIAGNOSIS — R53.83 FATIGUE, UNSPECIFIED TYPE: ICD-10-CM

## 2022-12-13 DIAGNOSIS — C50.412 MALIGNANT NEOPLASM OF UPPER-OUTER QUADRANT OF LEFT BREAST IN FEMALE, ESTROGEN RECEPTOR NEGATIVE: ICD-10-CM

## 2022-12-13 LAB
ALBUMIN SERPL BCP-MCNC: 3.7 G/DL (ref 3.5–5.2)
ALP SERPL-CCNC: 89 U/L (ref 55–135)
ALT SERPL W/O P-5'-P-CCNC: 18 U/L (ref 10–44)
ANION GAP SERPL CALC-SCNC: 7 MMOL/L (ref 8–16)
ANISOCYTOSIS BLD QL SMEAR: SLIGHT
AST SERPL-CCNC: 15 U/L (ref 10–40)
BASOPHILS NFR BLD: 0 % (ref 0–1.9)
BILIRUB SERPL-MCNC: 0.3 MG/DL (ref 0.1–1)
BUN SERPL-MCNC: 6 MG/DL (ref 6–20)
CALCIUM SERPL-MCNC: 9 MG/DL (ref 8.7–10.5)
CHLORIDE SERPL-SCNC: 104 MMOL/L (ref 95–110)
CO2 SERPL-SCNC: 27 MMOL/L (ref 23–29)
CREAT SERPL-MCNC: 0.9 MG/DL (ref 0.5–1.4)
DIFFERENTIAL METHOD: ABNORMAL
EOSINOPHIL NFR BLD: 1 % (ref 0–8)
ERYTHROCYTE [DISTWIDTH] IN BLOOD BY AUTOMATED COUNT: 13.9 % (ref 11.5–14.5)
EST. GFR  (NO RACE VARIABLE): >60 ML/MIN/1.73 M^2
GLUCOSE SERPL-MCNC: 71 MG/DL (ref 70–110)
HCT VFR BLD AUTO: 33.7 % (ref 37–48.5)
HGB BLD-MCNC: 11.3 G/DL (ref 12–16)
IMM GRANULOCYTES # BLD AUTO: ABNORMAL K/UL (ref 0–0.04)
IMM GRANULOCYTES NFR BLD AUTO: ABNORMAL % (ref 0–0.5)
LYMPHOCYTES NFR BLD: 44 % (ref 18–48)
MAGNESIUM SERPL-MCNC: 1.9 MG/DL (ref 1.6–2.6)
MCH RBC QN AUTO: 34.6 PG (ref 27–31)
MCHC RBC AUTO-ENTMCNC: 33.5 G/DL (ref 32–36)
MCV RBC AUTO: 103 FL (ref 82–98)
MONOCYTES NFR BLD: 15 % (ref 4–15)
NEUTROPHILS NFR BLD: 40 % (ref 38–73)
NRBC BLD-RTO: 1 /100 WBC
PLATELET # BLD AUTO: 201 K/UL (ref 150–450)
PLATELET BLD QL SMEAR: ABNORMAL
PMV BLD AUTO: 10.3 FL (ref 9.2–12.9)
POIKILOCYTOSIS BLD QL SMEAR: SLIGHT
POLYCHROMASIA BLD QL SMEAR: ABNORMAL
POTASSIUM SERPL-SCNC: 3.5 MMOL/L (ref 3.5–5.1)
PROT SERPL-MCNC: 6.5 G/DL (ref 6–8.4)
RBC # BLD AUTO: 3.27 M/UL (ref 4–5.4)
SODIUM SERPL-SCNC: 138 MMOL/L (ref 136–145)
WBC # BLD AUTO: 5.72 K/UL (ref 3.9–12.7)

## 2022-12-13 PROCEDURE — 85007 BL SMEAR W/DIFF WBC COUNT: CPT | Performed by: INTERNAL MEDICINE

## 2022-12-13 PROCEDURE — 36415 COLL VENOUS BLD VENIPUNCTURE: CPT | Performed by: INTERNAL MEDICINE

## 2022-12-13 PROCEDURE — 80053 COMPREHEN METABOLIC PANEL: CPT | Performed by: NURSE PRACTITIONER

## 2022-12-13 PROCEDURE — 85027 COMPLETE CBC AUTOMATED: CPT | Performed by: INTERNAL MEDICINE

## 2022-12-13 PROCEDURE — 83735 ASSAY OF MAGNESIUM: CPT | Performed by: INTERNAL MEDICINE

## 2022-12-20 ENCOUNTER — LAB VISIT (OUTPATIENT)
Dept: LAB | Facility: HOSPITAL | Age: 47
End: 2022-12-20
Attending: INTERNAL MEDICINE
Payer: COMMERCIAL

## 2022-12-20 DIAGNOSIS — Z17.1 MALIGNANT NEOPLASM OF UPPER-OUTER QUADRANT OF LEFT BREAST IN FEMALE, ESTROGEN RECEPTOR NEGATIVE: ICD-10-CM

## 2022-12-20 DIAGNOSIS — C50.412 MALIGNANT NEOPLASM OF UPPER-OUTER QUADRANT OF LEFT BREAST IN FEMALE, ESTROGEN RECEPTOR NEGATIVE: ICD-10-CM

## 2022-12-20 DIAGNOSIS — R53.83 FATIGUE, UNSPECIFIED TYPE: ICD-10-CM

## 2022-12-20 LAB
ALBUMIN SERPL BCP-MCNC: 3.7 G/DL (ref 3.5–5.2)
ALP SERPL-CCNC: 67 U/L (ref 55–135)
ALT SERPL W/O P-5'-P-CCNC: 20 U/L (ref 10–44)
ANION GAP SERPL CALC-SCNC: 6 MMOL/L (ref 8–16)
AST SERPL-CCNC: 20 U/L (ref 10–40)
BASOPHILS # BLD AUTO: 0.09 K/UL (ref 0–0.2)
BASOPHILS NFR BLD: 1.6 % (ref 0–1.9)
BILIRUB SERPL-MCNC: 0.5 MG/DL (ref 0.1–1)
BUN SERPL-MCNC: 8 MG/DL (ref 6–20)
CALCIUM SERPL-MCNC: 9.2 MG/DL (ref 8.7–10.5)
CHLORIDE SERPL-SCNC: 104 MMOL/L (ref 95–110)
CO2 SERPL-SCNC: 26 MMOL/L (ref 23–29)
CREAT SERPL-MCNC: 0.9 MG/DL (ref 0.5–1.4)
DIFFERENTIAL METHOD: ABNORMAL
EOSINOPHIL # BLD AUTO: 0.1 K/UL (ref 0–0.5)
EOSINOPHIL NFR BLD: 1.3 % (ref 0–8)
ERYTHROCYTE [DISTWIDTH] IN BLOOD BY AUTOMATED COUNT: 13.7 % (ref 11.5–14.5)
EST. GFR  (NO RACE VARIABLE): >60 ML/MIN/1.73 M^2
GLUCOSE SERPL-MCNC: 97 MG/DL (ref 70–110)
HCT VFR BLD AUTO: 36.7 % (ref 37–48.5)
HGB BLD-MCNC: 12.4 G/DL (ref 12–16)
IMM GRANULOCYTES # BLD AUTO: 0.03 K/UL (ref 0–0.04)
IMM GRANULOCYTES NFR BLD AUTO: 0.5 % (ref 0–0.5)
LYMPHOCYTES # BLD AUTO: 2.3 K/UL (ref 1–4.8)
LYMPHOCYTES NFR BLD: 41.4 % (ref 18–48)
MAGNESIUM SERPL-MCNC: 1.8 MG/DL (ref 1.6–2.6)
MCH RBC QN AUTO: 34.4 PG (ref 27–31)
MCHC RBC AUTO-ENTMCNC: 33.8 G/DL (ref 32–36)
MCV RBC AUTO: 102 FL (ref 82–98)
MONOCYTES # BLD AUTO: 0.9 K/UL (ref 0.3–1)
MONOCYTES NFR BLD: 16.3 % (ref 4–15)
NEUTROPHILS # BLD AUTO: 2.1 K/UL (ref 1.8–7.7)
NEUTROPHILS NFR BLD: 38.9 % (ref 38–73)
NRBC BLD-RTO: 0 /100 WBC
PLATELET # BLD AUTO: 316 K/UL (ref 150–450)
PLATELET BLD QL SMEAR: ABNORMAL
PMV BLD AUTO: 9.1 FL (ref 9.2–12.9)
POTASSIUM SERPL-SCNC: 4 MMOL/L (ref 3.5–5.1)
PROT SERPL-MCNC: 6.3 G/DL (ref 6–8.4)
RBC # BLD AUTO: 3.6 M/UL (ref 4–5.4)
SODIUM SERPL-SCNC: 136 MMOL/L (ref 136–145)
WBC # BLD AUTO: 5.46 K/UL (ref 3.9–12.7)

## 2022-12-20 PROCEDURE — 80053 COMPREHEN METABOLIC PANEL: CPT | Performed by: NURSE PRACTITIONER

## 2022-12-20 PROCEDURE — 36415 COLL VENOUS BLD VENIPUNCTURE: CPT | Performed by: INTERNAL MEDICINE

## 2022-12-20 PROCEDURE — 85025 COMPLETE CBC W/AUTO DIFF WBC: CPT | Performed by: INTERNAL MEDICINE

## 2022-12-20 PROCEDURE — 83735 ASSAY OF MAGNESIUM: CPT | Performed by: INTERNAL MEDICINE

## 2022-12-22 ENCOUNTER — INFUSION (OUTPATIENT)
Dept: INFUSION THERAPY | Facility: HOSPITAL | Age: 47
End: 2022-12-22
Attending: INTERNAL MEDICINE
Payer: COMMERCIAL

## 2022-12-22 VITALS
SYSTOLIC BLOOD PRESSURE: 107 MMHG | HEART RATE: 82 BPM | DIASTOLIC BLOOD PRESSURE: 73 MMHG | RESPIRATION RATE: 17 BRPM | TEMPERATURE: 98 F | WEIGHT: 205.81 LBS | OXYGEN SATURATION: 95 % | HEIGHT: 62 IN | BODY MASS INDEX: 37.87 KG/M2

## 2022-12-22 DIAGNOSIS — C50.412 MALIGNANT NEOPLASM OF UPPER-OUTER QUADRANT OF LEFT BREAST IN FEMALE, ESTROGEN RECEPTOR NEGATIVE: ICD-10-CM

## 2022-12-22 DIAGNOSIS — Z17.1 MALIGNANT NEOPLASM OF UPPER-OUTER QUADRANT OF LEFT BREAST IN FEMALE, ESTROGEN RECEPTOR NEGATIVE: ICD-10-CM

## 2022-12-22 DIAGNOSIS — D70.1 CHEMOTHERAPY-INDUCED NEUTROPENIA: Primary | ICD-10-CM

## 2022-12-22 DIAGNOSIS — T45.1X5A CHEMOTHERAPY-INDUCED NEUTROPENIA: Primary | ICD-10-CM

## 2022-12-22 PROCEDURE — 96411 CHEMO IV PUSH ADDL DRUG: CPT

## 2022-12-22 PROCEDURE — 96413 CHEMO IV INFUSION 1 HR: CPT

## 2022-12-22 PROCEDURE — 96375 TX/PRO/DX INJ NEW DRUG ADDON: CPT

## 2022-12-22 PROCEDURE — 63600175 PHARM REV CODE 636 W HCPCS: Mod: JG | Performed by: NURSE PRACTITIONER

## 2022-12-22 PROCEDURE — 96377 APPLICATON ON-BODY INJECTOR: CPT | Mod: 59

## 2022-12-22 PROCEDURE — 96367 TX/PROPH/DG ADDL SEQ IV INF: CPT

## 2022-12-22 PROCEDURE — 96417 CHEMO IV INFUS EACH ADDL SEQ: CPT

## 2022-12-22 PROCEDURE — 25000003 PHARM REV CODE 250: Performed by: NURSE PRACTITIONER

## 2022-12-22 RX ORDER — SODIUM CHLORIDE 0.9 % (FLUSH) 0.9 %
10 SYRINGE (ML) INJECTION
Status: DISCONTINUED | OUTPATIENT
Start: 2022-12-22 | End: 2022-12-22 | Stop reason: HOSPADM

## 2022-12-22 RX ORDER — DIPHENHYDRAMINE HYDROCHLORIDE 50 MG/ML
50 INJECTION INTRAMUSCULAR; INTRAVENOUS ONCE AS NEEDED
Status: CANCELLED | OUTPATIENT
Start: 2022-12-22

## 2022-12-22 RX ORDER — SODIUM CHLORIDE 0.9 % (FLUSH) 0.9 %
10 SYRINGE (ML) INJECTION
Status: CANCELLED | OUTPATIENT
Start: 2022-12-22

## 2022-12-22 RX ORDER — HEPARIN 100 UNIT/ML
500 SYRINGE INTRAVENOUS
Status: CANCELLED | OUTPATIENT
Start: 2022-12-22

## 2022-12-22 RX ORDER — DOXORUBICIN HYDROCHLORIDE 2 MG/ML
60 INJECTION, SOLUTION INTRAVENOUS
Status: CANCELLED | OUTPATIENT
Start: 2022-12-22

## 2022-12-22 RX ORDER — HEPARIN 100 UNIT/ML
500 SYRINGE INTRAVENOUS
Status: DISCONTINUED | OUTPATIENT
Start: 2022-12-22 | End: 2022-12-22 | Stop reason: HOSPADM

## 2022-12-22 RX ORDER — DOXORUBICIN HYDROCHLORIDE 2 MG/ML
60 INJECTION, SOLUTION INTRAVENOUS
Status: COMPLETED | OUTPATIENT
Start: 2022-12-22 | End: 2022-12-22

## 2022-12-22 RX ORDER — EPINEPHRINE 0.3 MG/.3ML
0.3 INJECTION SUBCUTANEOUS ONCE AS NEEDED
Status: CANCELLED | OUTPATIENT
Start: 2022-12-22

## 2022-12-22 RX ADMIN — CYCLOPHOSPHAMIDE 1220 MG: 200 INJECTION, SOLUTION INTRAVENOUS at 11:12

## 2022-12-22 RX ADMIN — APREPITANT 130 MG: 130 INJECTION, EMULSION INTRAVENOUS at 10:12

## 2022-12-22 RX ADMIN — PALONOSETRON HYDROCHLORIDE 0.25 MG: 0.25 INJECTION, SOLUTION INTRAVENOUS at 10:12

## 2022-12-22 RX ADMIN — DOXORUBICIN HYDROCHLORIDE 122 MG: 2 INJECTION, SOLUTION INTRAVENOUS at 10:12

## 2022-12-22 RX ADMIN — PEGFILGRASTIM 6 MG: KIT SUBCUTANEOUS at 11:12

## 2022-12-22 RX ADMIN — SODIUM CHLORIDE 200 MG: 9 INJECTION, SOLUTION INTRAVENOUS at 09:12

## 2022-12-22 RX ADMIN — HEPARIN 500 UNITS: 100 SYRINGE at 11:12

## 2022-12-22 NOTE — PLAN OF CARE
Problem: Fatigue  Goal: Improved Activity Tolerance  Outcome: Ongoing, Progressing  Intervention: Promote Improved Energy  Flowsheets (Taken 12/22/2022 0841)  Fatigue Management:   fatigue-related activity identified   activity assistance provided   activity schedule adjusted   frequent rest breaks encouraged   paced activity encouraged  Sleep/Rest Enhancement:   noise level reduced   relaxation techniques promoted   regular sleep/rest pattern promoted   consistent schedule promoted  Activity Management:   Ambulated -L4   Up in chair - L3

## 2022-12-28 ENCOUNTER — LAB VISIT (OUTPATIENT)
Dept: LAB | Facility: HOSPITAL | Age: 47
End: 2022-12-28
Attending: INTERNAL MEDICINE
Payer: COMMERCIAL

## 2022-12-28 DIAGNOSIS — C50.412 MALIGNANT NEOPLASM OF UPPER-OUTER QUADRANT OF LEFT BREAST IN FEMALE, ESTROGEN RECEPTOR NEGATIVE: ICD-10-CM

## 2022-12-28 DIAGNOSIS — R53.83 FATIGUE, UNSPECIFIED TYPE: ICD-10-CM

## 2022-12-28 DIAGNOSIS — Z17.1 MALIGNANT NEOPLASM OF UPPER-OUTER QUADRANT OF LEFT BREAST IN FEMALE, ESTROGEN RECEPTOR NEGATIVE: ICD-10-CM

## 2022-12-28 LAB
ALBUMIN SERPL BCP-MCNC: 3.9 G/DL (ref 3.5–5.2)
ALP SERPL-CCNC: 77 U/L (ref 55–135)
ALT SERPL W/O P-5'-P-CCNC: 16 U/L (ref 10–44)
ANION GAP SERPL CALC-SCNC: 8 MMOL/L (ref 8–16)
AST SERPL-CCNC: 17 U/L (ref 10–40)
BASOPHILS # BLD AUTO: 0.04 K/UL (ref 0–0.2)
BASOPHILS NFR BLD: 1.9 % (ref 0–1.9)
BILIRUB SERPL-MCNC: 0.9 MG/DL (ref 0.1–1)
BUN SERPL-MCNC: 13 MG/DL (ref 6–20)
CALCIUM SERPL-MCNC: 9.1 MG/DL (ref 8.7–10.5)
CHLORIDE SERPL-SCNC: 99 MMOL/L (ref 95–110)
CO2 SERPL-SCNC: 26 MMOL/L (ref 23–29)
CREAT SERPL-MCNC: 0.9 MG/DL (ref 0.5–1.4)
DIFFERENTIAL METHOD: ABNORMAL
EOSINOPHIL # BLD AUTO: 0.1 K/UL (ref 0–0.5)
EOSINOPHIL NFR BLD: 5.7 % (ref 0–8)
ERYTHROCYTE [DISTWIDTH] IN BLOOD BY AUTOMATED COUNT: 12.7 % (ref 11.5–14.5)
EST. GFR  (NO RACE VARIABLE): >60 ML/MIN/1.73 M^2
GLUCOSE SERPL-MCNC: 115 MG/DL (ref 70–110)
HCT VFR BLD AUTO: 35.8 % (ref 37–48.5)
HGB BLD-MCNC: 12.2 G/DL (ref 12–16)
IMM GRANULOCYTES # BLD AUTO: 0.02 K/UL (ref 0–0.04)
IMM GRANULOCYTES NFR BLD AUTO: 1 % (ref 0–0.5)
LYMPHOCYTES # BLD AUTO: 0.8 K/UL (ref 1–4.8)
LYMPHOCYTES NFR BLD: 39.2 % (ref 18–48)
MAGNESIUM SERPL-MCNC: 1.9 MG/DL (ref 1.6–2.6)
MCH RBC QN AUTO: 34.2 PG (ref 27–31)
MCHC RBC AUTO-ENTMCNC: 34.1 G/DL (ref 32–36)
MCV RBC AUTO: 100 FL (ref 82–98)
MONOCYTES # BLD AUTO: 0.1 K/UL (ref 0.3–1)
MONOCYTES NFR BLD: 3.8 % (ref 4–15)
NEUTROPHILS # BLD AUTO: 1 K/UL (ref 1.8–7.7)
NEUTROPHILS NFR BLD: 48.4 % (ref 38–73)
NRBC BLD-RTO: 0 /100 WBC
PLATELET # BLD AUTO: 95 K/UL (ref 150–450)
PMV BLD AUTO: 10.6 FL (ref 9.2–12.9)
POTASSIUM SERPL-SCNC: 3.6 MMOL/L (ref 3.5–5.1)
PROT SERPL-MCNC: 6.9 G/DL (ref 6–8.4)
RBC # BLD AUTO: 3.57 M/UL (ref 4–5.4)
SODIUM SERPL-SCNC: 133 MMOL/L (ref 136–145)
WBC # BLD AUTO: 2.09 K/UL (ref 3.9–12.7)

## 2022-12-28 PROCEDURE — 85025 COMPLETE CBC W/AUTO DIFF WBC: CPT | Performed by: INTERNAL MEDICINE

## 2022-12-28 PROCEDURE — 80053 COMPREHEN METABOLIC PANEL: CPT | Performed by: NURSE PRACTITIONER

## 2022-12-28 PROCEDURE — 36415 COLL VENOUS BLD VENIPUNCTURE: CPT | Performed by: INTERNAL MEDICINE

## 2022-12-28 PROCEDURE — 83735 ASSAY OF MAGNESIUM: CPT | Performed by: INTERNAL MEDICINE

## 2022-12-29 ENCOUNTER — OFFICE VISIT (OUTPATIENT)
Dept: HEMATOLOGY/ONCOLOGY | Facility: CLINIC | Age: 47
End: 2022-12-29
Payer: COMMERCIAL

## 2022-12-29 VITALS
SYSTOLIC BLOOD PRESSURE: 108 MMHG | WEIGHT: 197.13 LBS | TEMPERATURE: 98 F | BODY MASS INDEX: 36.05 KG/M2 | HEART RATE: 113 BPM | DIASTOLIC BLOOD PRESSURE: 78 MMHG

## 2022-12-29 DIAGNOSIS — Z17.1 MALIGNANT NEOPLASM OF UPPER-OUTER QUADRANT OF LEFT BREAST IN FEMALE, ESTROGEN RECEPTOR NEGATIVE: ICD-10-CM

## 2022-12-29 DIAGNOSIS — E86.0 DEHYDRATION: ICD-10-CM

## 2022-12-29 DIAGNOSIS — C50.412 MALIGNANT NEOPLASM OF UPPER-OUTER QUADRANT OF LEFT BREAST IN FEMALE, ESTROGEN RECEPTOR NEGATIVE: ICD-10-CM

## 2022-12-29 PROCEDURE — 3078F PR MOST RECENT DIASTOLIC BLOOD PRESSURE < 80 MM HG: ICD-10-PCS | Mod: CPTII,S$GLB,, | Performed by: INTERNAL MEDICINE

## 2022-12-29 PROCEDURE — 3074F PR MOST RECENT SYSTOLIC BLOOD PRESSURE < 130 MM HG: ICD-10-PCS | Mod: CPTII,S$GLB,, | Performed by: INTERNAL MEDICINE

## 2022-12-29 PROCEDURE — 3008F BODY MASS INDEX DOCD: CPT | Mod: CPTII,S$GLB,, | Performed by: INTERNAL MEDICINE

## 2022-12-29 PROCEDURE — 99214 OFFICE O/P EST MOD 30 MIN: CPT | Mod: S$GLB,,, | Performed by: INTERNAL MEDICINE

## 2022-12-29 PROCEDURE — 1159F MED LIST DOCD IN RCRD: CPT | Mod: CPTII,S$GLB,, | Performed by: INTERNAL MEDICINE

## 2022-12-29 PROCEDURE — 1159F PR MEDICATION LIST DOCUMENTED IN MEDICAL RECORD: ICD-10-PCS | Mod: CPTII,S$GLB,, | Performed by: INTERNAL MEDICINE

## 2022-12-29 PROCEDURE — 3078F DIAST BP <80 MM HG: CPT | Mod: CPTII,S$GLB,, | Performed by: INTERNAL MEDICINE

## 2022-12-29 PROCEDURE — 1160F PR REVIEW ALL MEDS BY PRESCRIBER/CLIN PHARMACIST DOCUMENTED: ICD-10-PCS | Mod: CPTII,S$GLB,, | Performed by: INTERNAL MEDICINE

## 2022-12-29 PROCEDURE — 3008F PR BODY MASS INDEX (BMI) DOCUMENTED: ICD-10-PCS | Mod: CPTII,S$GLB,, | Performed by: INTERNAL MEDICINE

## 2022-12-29 PROCEDURE — 99214 PR OFFICE/OUTPT VISIT, EST, LEVL IV, 30-39 MIN: ICD-10-PCS | Mod: S$GLB,,, | Performed by: INTERNAL MEDICINE

## 2022-12-29 PROCEDURE — 1160F RVW MEDS BY RX/DR IN RCRD: CPT | Mod: CPTII,S$GLB,, | Performed by: INTERNAL MEDICINE

## 2022-12-29 PROCEDURE — 3074F SYST BP LT 130 MM HG: CPT | Mod: CPTII,S$GLB,, | Performed by: INTERNAL MEDICINE

## 2022-12-29 NOTE — ASSESSMENT & PLAN NOTE
Patient is doing well and she is s/p 2 cycles of AC.  She is having more tox with this regimen and I discussed this with her today.  Can give IV fluids and zofran if needed and will continue phenergan and oral zofran.  Will try to continue with chemo on schedule and note is made that her tumor is nearly in CR.

## 2022-12-29 NOTE — PROGRESS NOTES
PROGRESS NOTE    Subjective:       Patient ID: Karyna Escoto is a 47 y.o. female.    6/9/2022:  Dx Mammo:  1.8cm mass in the left breast towards the left axilla.   Deep to the mass 2 lymph nodes are identified.     7/5/2022:  Left breast core biopsy:  Grade 2 IDCA with DCIS  ER: 0.03%, MA: 8.8%, HER2: 0  TRIPLE NEGATIVE    7/20/2022:  Left axilla LN needle biopsy:  Invasive ductal carcinoma  ER: 0%, MA: 40%, HER2 negative(0)    PLAN:  Neoadjuvant chemo/IO(Keynote 522)-surgery-radiation.     Pem/Tax/Carb:  Cycle 1: 8/11/2022  Cycle 2: 9/8/2022  Cycle 3: 10/6/2022  Cycle 4: 10/27/2022  Begin Shukri/Cytox/Pem  Cycle 5: 12/1/2022-----MUGA 8/10/2022-EF: 54%  Cycle 6: 12/22/2022  Cycle 7:     8/3/2022 Photos:          8/23/2022 Photo:      10/4/2022 Photo:    Lesion is no longer palpable on 10/4/2022.     11/15/2022          Chief Complaint:  No chief complaint on file.    Triple negative breast cancer follow up.     History of Present Illness:   Karyna Ecsoto is a 47 y.o. female who presents for follow up of breast cancer.      Patient had more side effects with this most recent therapy.  Has significant nausea.      Family and Social history reviewed and is unchanged from 8/3/2022      ROS:  Review of Systems   Constitutional:  Negative for appetite change, fever and unexpected weight change.   HENT:  Negative for mouth sores.    Eyes:  Negative for visual disturbance.   Respiratory:  Negative for cough and shortness of breath.    Cardiovascular:  Negative for chest pain and leg swelling.   Gastrointestinal:  Positive for diarrhea. Negative for abdominal pain and blood in stool.   Genitourinary:  Positive for frequency. Negative for hematuria.   Musculoskeletal:  Negative for back pain.   Skin:  Negative for rash.   Neurological:  Positive for headaches.   Hematological:  Positive for adenopathy.   Psychiatric/Behavioral:  The patient is not  nervous/anxious.         Current Outpatient Medications:     bisacodyL (DULCOLAX) 5 mg EC tablet, Take 5 mg by mouth daily as needed for Constipation., Disp: , Rfl:     cetirizine (ZYRTEC) 10 mg Cap, Take 1 tablet by mouth once daily., Disp: , Rfl:     famotidine (PEPCID) 10 MG tablet, Take 10 mg by mouth 2 (two) times daily., Disp: , Rfl:     fluticasone propionate (FLONASE) 50 mcg/actuation nasal spray, 1 spray (50 mcg total) by Each Nostril route once daily., Disp: 15.8 mL, Rfl: 5    HYDROcodone-acetaminophen (NORCO) 5-325 mg per tablet, Take 1 tablet by mouth every 6 (six) hours as needed for Pain., Disp: 30 tablet, Rfl: 0    hydrOXYzine HCL (ATARAX) 10 MG Tab, Take 10 mg by mouth 3 (three) times daily as needed., Disp: , Rfl:     Lactobacillus rhamnosus GG (CULTURELLE) 10 billion cell capsule, Take 1 capsule by mouth once daily., Disp: , Rfl:     LIDOcaine-prilocaine (EMLA) cream, Apply topically as needed. Apply 30 mins prior to port access, Disp: 30 g, Rfl: 5    loperamide HCl (IMODIUM A-D ORAL), Take 1 tablet by mouth daily as needed., Disp: , Rfl:     ondansetron (ZOFRAN) 8 MG tablet, Take 1 tablet (8 mg total) by mouth every 8 (eight) hours as needed., Disp: 30 tablet, Rfl: 5    promethazine (PHENERGAN) 25 MG tablet, Take 1 tablet (25 mg total) by mouth every 4 to 6 hours as needed., Disp: 30 tablet, Rfl: 5    sertraline (ZOLOFT) 50 MG tablet, Take 50 mg by mouth., Disp: , Rfl:         Objective:       Physical Examination:     /78   Pulse (!) 113   Temp 97.5 °F (36.4 °C)   Wt 89.4 kg (197 lb 1.6 oz)   BMI 36.05 kg/m²     Physical Exam  Constitutional:       Appearance: She is well-developed.   HENT:      Head: Normocephalic and atraumatic.      Right Ear: External ear normal.      Left Ear: External ear normal.   Eyes:      Conjunctiva/sclera: Conjunctivae normal.      Pupils: Pupils are equal, round, and reactive to light.   Neck:      Thyroid: No thyromegaly.      Trachea: No tracheal  deviation.   Cardiovascular:      Rate and Rhythm: Normal rate and regular rhythm.      Heart sounds: Normal heart sounds.   Pulmonary:      Effort: Pulmonary effort is normal.      Breath sounds: Normal breath sounds.   Abdominal:      General: Bowel sounds are normal. There is no distension.      Palpations: Abdomen is soft. There is no mass.      Tenderness: There is no abdominal tenderness.   Skin:     Findings: No rash.   Neurological:      Comments: Neuro intact througout   Psychiatric:         Behavior: Behavior normal.         Thought Content: Thought content normal.         Judgment: Judgment normal.       Labs:   Recent Results (from the past 336 hour(s))   CBC Auto Differential    Collection Time: 12/28/22  9:05 AM   Result Value Ref Range    WBC 2.09 (L) 3.90 - 12.70 K/uL    Hemoglobin 12.2 12.0 - 16.0 g/dL    Hematocrit 35.8 (L) 37.0 - 48.5 %    Platelets 95 (L) 150 - 450 K/uL   CBC Auto Differential    Collection Time: 12/20/22  8:28 AM   Result Value Ref Range    WBC 5.46 3.90 - 12.70 K/uL    Hemoglobin 12.4 12.0 - 16.0 g/dL    Hematocrit 36.7 (L) 37.0 - 48.5 %    Platelets 316 150 - 450 K/uL     CMP  Sodium   Date Value Ref Range Status   12/28/2022 133 (L) 136 - 145 mmol/L Final     Potassium   Date Value Ref Range Status   12/28/2022 3.6 3.5 - 5.1 mmol/L Final     Chloride   Date Value Ref Range Status   12/28/2022 99 95 - 110 mmol/L Final     CO2   Date Value Ref Range Status   12/28/2022 26 23 - 29 mmol/L Final     Glucose   Date Value Ref Range Status   12/28/2022 115 (H) 70 - 110 mg/dL Final     BUN   Date Value Ref Range Status   12/28/2022 13 6 - 20 mg/dL Final     Creatinine   Date Value Ref Range Status   12/28/2022 0.9 0.5 - 1.4 mg/dL Final     Calcium   Date Value Ref Range Status   12/28/2022 9.1 8.7 - 10.5 mg/dL Final     Total Protein   Date Value Ref Range Status   12/28/2022 6.9 6.0 - 8.4 g/dL Final     Albumin   Date Value Ref Range Status   12/28/2022 3.9 3.5 - 5.2 g/dL Final      Total Bilirubin   Date Value Ref Range Status   12/28/2022 0.9 0.1 - 1.0 mg/dL Final     Comment:     For infants and newborns, interpretation of results should be based  on gestational age, weight and in agreement with clinical  observations.    Premature Infant recommended reference ranges:  Up to 24 hours.............<8.0 mg/dL  Up to 48 hours............<12.0 mg/dL  3-5 days..................<15.0 mg/dL  6-29 days.................<15.0 mg/dL       Alkaline Phosphatase   Date Value Ref Range Status   12/28/2022 77 55 - 135 U/L Final     AST   Date Value Ref Range Status   12/28/2022 17 10 - 40 U/L Final     ALT   Date Value Ref Range Status   12/28/2022 16 10 - 44 U/L Final     Anion Gap   Date Value Ref Range Status   12/28/2022 8 8 - 16 mmol/L Final     No results found for: CEA  No results found for: PSA        Assessment/Plan:     Problem List Items Addressed This Visit       Left Breast Cancer-TRIPLE NEGATIVE     Patient is doing well and she is s/p 2 cycles of AC.  She is having more tox with this regimen and I discussed this with her today.  Can give IV fluids and zofran if needed and will continue phenergan and oral zofran.  Will try to continue with chemo on schedule and note is made that her tumor is nearly in CR.           Dehydration     Will continue to watch this closely and give IVFs if needed.               Discussion:     Follow up in about 3 weeks (around 1/19/2023).      Electronically signed by Derek Patterson

## 2023-01-03 ENCOUNTER — LAB VISIT (OUTPATIENT)
Dept: LAB | Facility: HOSPITAL | Age: 48
End: 2023-01-03
Attending: INTERNAL MEDICINE
Payer: COMMERCIAL

## 2023-01-03 DIAGNOSIS — C50.412 MALIGNANT NEOPLASM OF UPPER-OUTER QUADRANT OF LEFT BREAST IN FEMALE, ESTROGEN RECEPTOR NEGATIVE: ICD-10-CM

## 2023-01-03 DIAGNOSIS — Z17.1 MALIGNANT NEOPLASM OF UPPER-OUTER QUADRANT OF LEFT BREAST IN FEMALE, ESTROGEN RECEPTOR NEGATIVE: ICD-10-CM

## 2023-01-03 DIAGNOSIS — R53.83 FATIGUE, UNSPECIFIED TYPE: ICD-10-CM

## 2023-01-03 LAB
ALBUMIN SERPL BCP-MCNC: 3.6 G/DL (ref 3.5–5.2)
ALP SERPL-CCNC: 64 U/L (ref 55–135)
ALT SERPL W/O P-5'-P-CCNC: 23 U/L (ref 10–44)
ANION GAP SERPL CALC-SCNC: 6 MMOL/L (ref 8–16)
AST SERPL-CCNC: 22 U/L (ref 10–40)
BASOPHILS # BLD AUTO: 0.06 K/UL (ref 0–0.2)
BASOPHILS NFR BLD: 1.2 % (ref 0–1.9)
BILIRUB SERPL-MCNC: 0.6 MG/DL (ref 0.1–1)
BUN SERPL-MCNC: <5 MG/DL (ref 6–20)
CALCIUM SERPL-MCNC: 9.1 MG/DL (ref 8.7–10.5)
CHLORIDE SERPL-SCNC: 103 MMOL/L (ref 95–110)
CO2 SERPL-SCNC: 27 MMOL/L (ref 23–29)
CREAT SERPL-MCNC: 0.9 MG/DL (ref 0.5–1.4)
DIFFERENTIAL METHOD: ABNORMAL
EOSINOPHIL # BLD AUTO: 0.1 K/UL (ref 0–0.5)
EOSINOPHIL NFR BLD: 1.4 % (ref 0–8)
ERYTHROCYTE [DISTWIDTH] IN BLOOD BY AUTOMATED COUNT: 13.1 % (ref 11.5–14.5)
EST. GFR  (NO RACE VARIABLE): >60 ML/MIN/1.73 M^2
GLUCOSE SERPL-MCNC: 93 MG/DL (ref 70–110)
HCT VFR BLD AUTO: 34.2 % (ref 37–48.5)
HGB BLD-MCNC: 11.7 G/DL (ref 12–16)
IMM GRANULOCYTES # BLD AUTO: 0.16 K/UL (ref 0–0.04)
IMM GRANULOCYTES NFR BLD AUTO: 3.2 % (ref 0–0.5)
LYMPHOCYTES # BLD AUTO: 1.5 K/UL (ref 1–4.8)
LYMPHOCYTES NFR BLD: 29.1 % (ref 18–48)
MAGNESIUM SERPL-MCNC: 1.9 MG/DL (ref 1.6–2.6)
MCH RBC QN AUTO: 34.2 PG (ref 27–31)
MCHC RBC AUTO-ENTMCNC: 34.2 G/DL (ref 32–36)
MCV RBC AUTO: 100 FL (ref 82–98)
MONOCYTES # BLD AUTO: 1 K/UL (ref 0.3–1)
MONOCYTES NFR BLD: 19.6 % (ref 4–15)
NEUTROPHILS # BLD AUTO: 2.3 K/UL (ref 1.8–7.7)
NEUTROPHILS NFR BLD: 45.5 % (ref 38–73)
NRBC BLD-RTO: 0 /100 WBC
PLATELET # BLD AUTO: 132 K/UL (ref 150–450)
PLATELET BLD QL SMEAR: ABNORMAL
PMV BLD AUTO: 9.7 FL (ref 9.2–12.9)
POTASSIUM SERPL-SCNC: 3.5 MMOL/L (ref 3.5–5.1)
PROT SERPL-MCNC: 6.1 G/DL (ref 6–8.4)
RBC # BLD AUTO: 3.42 M/UL (ref 4–5.4)
SODIUM SERPL-SCNC: 136 MMOL/L (ref 136–145)
WBC # BLD AUTO: 4.99 K/UL (ref 3.9–12.7)

## 2023-01-03 PROCEDURE — 85025 COMPLETE CBC W/AUTO DIFF WBC: CPT | Performed by: INTERNAL MEDICINE

## 2023-01-03 PROCEDURE — 80053 COMPREHEN METABOLIC PANEL: CPT | Performed by: NURSE PRACTITIONER

## 2023-01-03 PROCEDURE — 83735 ASSAY OF MAGNESIUM: CPT | Performed by: INTERNAL MEDICINE

## 2023-01-03 PROCEDURE — 36415 COLL VENOUS BLD VENIPUNCTURE: CPT | Performed by: INTERNAL MEDICINE

## 2023-01-10 ENCOUNTER — INFUSION (OUTPATIENT)
Dept: INFUSION THERAPY | Facility: HOSPITAL | Age: 48
End: 2023-01-10
Attending: INTERNAL MEDICINE
Payer: COMMERCIAL

## 2023-01-10 ENCOUNTER — TELEPHONE (OUTPATIENT)
Dept: HEMATOLOGY/ONCOLOGY | Facility: CLINIC | Age: 48
End: 2023-01-10

## 2023-01-10 VITALS
HEART RATE: 108 BPM | WEIGHT: 192.88 LBS | OXYGEN SATURATION: 95 % | DIASTOLIC BLOOD PRESSURE: 64 MMHG | RESPIRATION RATE: 16 BRPM | BODY MASS INDEX: 35.49 KG/M2 | TEMPERATURE: 97 F | HEIGHT: 62 IN | SYSTOLIC BLOOD PRESSURE: 101 MMHG

## 2023-01-10 DIAGNOSIS — R11.0 NAUSEA: Primary | ICD-10-CM

## 2023-01-10 DIAGNOSIS — R53.83 FATIGUE, UNSPECIFIED TYPE: ICD-10-CM

## 2023-01-10 DIAGNOSIS — Z17.1 MALIGNANT NEOPLASM OF UPPER-OUTER QUADRANT OF LEFT BREAST IN FEMALE, ESTROGEN RECEPTOR NEGATIVE: ICD-10-CM

## 2023-01-10 DIAGNOSIS — E86.0 DEHYDRATION: ICD-10-CM

## 2023-01-10 DIAGNOSIS — C50.412 MALIGNANT NEOPLASM OF UPPER-OUTER QUADRANT OF LEFT BREAST IN FEMALE, ESTROGEN RECEPTOR NEGATIVE: ICD-10-CM

## 2023-01-10 DIAGNOSIS — R11.2 NAUSEA & VOMITING: Primary | ICD-10-CM

## 2023-01-10 LAB
ALBUMIN SERPL BCP-MCNC: 3.2 G/DL (ref 3.5–5.2)
ALP SERPL-CCNC: 50 U/L (ref 55–135)
ALT SERPL W/O P-5'-P-CCNC: 14 U/L (ref 10–44)
ANION GAP SERPL CALC-SCNC: 9 MMOL/L (ref 8–16)
AST SERPL-CCNC: 21 U/L (ref 10–40)
BASOPHILS # BLD AUTO: 0.05 K/UL (ref 0–0.2)
BASOPHILS NFR BLD: 0.5 % (ref 0–1.9)
BILIRUB SERPL-MCNC: 1.2 MG/DL (ref 0.1–1)
BUN SERPL-MCNC: 6 MG/DL (ref 6–20)
CALCIUM SERPL-MCNC: 8.3 MG/DL (ref 8.7–10.5)
CHLORIDE SERPL-SCNC: 105 MMOL/L (ref 95–110)
CO2 SERPL-SCNC: 20 MMOL/L (ref 23–29)
CREAT SERPL-MCNC: 0.7 MG/DL (ref 0.5–1.4)
DIFFERENTIAL METHOD: ABNORMAL
EOSINOPHIL # BLD AUTO: 0.1 K/UL (ref 0–0.5)
EOSINOPHIL NFR BLD: 1.2 % (ref 0–8)
ERYTHROCYTE [DISTWIDTH] IN BLOOD BY AUTOMATED COUNT: 13.7 % (ref 11.5–14.5)
EST. GFR  (NO RACE VARIABLE): >60 ML/MIN/1.73 M^2
GLUCOSE SERPL-MCNC: 70 MG/DL (ref 70–110)
HCT VFR BLD AUTO: 31.4 % (ref 37–48.5)
HGB BLD-MCNC: 10.7 G/DL (ref 12–16)
IMM GRANULOCYTES # BLD AUTO: 0.04 K/UL (ref 0–0.04)
IMM GRANULOCYTES NFR BLD AUTO: 0.4 % (ref 0–0.5)
LYMPHOCYTES # BLD AUTO: 1 K/UL (ref 1–4.8)
LYMPHOCYTES NFR BLD: 11.3 % (ref 18–48)
MAGNESIUM SERPL-MCNC: 1.6 MG/DL (ref 1.6–2.6)
MCH RBC QN AUTO: 33.6 PG (ref 27–31)
MCHC RBC AUTO-ENTMCNC: 34.1 G/DL (ref 32–36)
MCV RBC AUTO: 99 FL (ref 82–98)
MONOCYTES # BLD AUTO: 1.7 K/UL (ref 0.3–1)
MONOCYTES NFR BLD: 19 % (ref 4–15)
NEUTROPHILS # BLD AUTO: 6.1 K/UL (ref 1.8–7.7)
NEUTROPHILS NFR BLD: 67.6 % (ref 38–73)
NRBC BLD-RTO: 0 /100 WBC
PLATELET # BLD AUTO: 277 K/UL (ref 150–450)
PMV BLD AUTO: 10 FL (ref 9.2–12.9)
POTASSIUM SERPL-SCNC: 3.3 MMOL/L (ref 3.5–5.1)
PROT SERPL-MCNC: 5.7 G/DL (ref 6–8.4)
RBC # BLD AUTO: 3.18 M/UL (ref 4–5.4)
SODIUM SERPL-SCNC: 134 MMOL/L (ref 136–145)
WBC # BLD AUTO: 9.1 K/UL (ref 3.9–12.7)

## 2023-01-10 PROCEDURE — 25000003 PHARM REV CODE 250: Performed by: NURSE PRACTITIONER

## 2023-01-10 PROCEDURE — 84443 ASSAY THYROID STIM HORMONE: CPT | Performed by: INTERNAL MEDICINE

## 2023-01-10 PROCEDURE — 96361 HYDRATE IV INFUSION ADD-ON: CPT

## 2023-01-10 PROCEDURE — 96365 THER/PROPH/DIAG IV INF INIT: CPT

## 2023-01-10 PROCEDURE — 63600175 PHARM REV CODE 636 W HCPCS: Performed by: INTERNAL MEDICINE

## 2023-01-10 PROCEDURE — 80053 COMPREHEN METABOLIC PANEL: CPT | Performed by: NURSE PRACTITIONER

## 2023-01-10 PROCEDURE — 96360 HYDRATION IV INFUSION INIT: CPT | Mod: 59

## 2023-01-10 PROCEDURE — 83735 ASSAY OF MAGNESIUM: CPT | Performed by: INTERNAL MEDICINE

## 2023-01-10 PROCEDURE — 36415 COLL VENOUS BLD VENIPUNCTURE: CPT | Performed by: INTERNAL MEDICINE

## 2023-01-10 PROCEDURE — 63600175 PHARM REV CODE 636 W HCPCS: Performed by: NURSE PRACTITIONER

## 2023-01-10 PROCEDURE — 85025 COMPLETE CBC W/AUTO DIFF WBC: CPT | Performed by: INTERNAL MEDICINE

## 2023-01-10 RX ORDER — SODIUM CHLORIDE 0.9 % (FLUSH) 0.9 %
10 SYRINGE (ML) INJECTION
Status: CANCELLED | OUTPATIENT
Start: 2023-01-10

## 2023-01-10 RX ORDER — ONDANSETRON HCL IN 0.9 % NACL 8 MG/50 ML
8 INTRAVENOUS SOLUTION, PIGGYBACK (ML) INTRAVENOUS
Status: CANCELLED
Start: 2023-01-10

## 2023-01-10 RX ORDER — HEPARIN 100 UNIT/ML
500 SYRINGE INTRAVENOUS
Status: CANCELLED
Start: 2023-01-10

## 2023-01-10 RX ORDER — HEPARIN 100 UNIT/ML
500 SYRINGE INTRAVENOUS
Status: CANCELLED | OUTPATIENT
Start: 2023-01-10

## 2023-01-10 RX ORDER — HEPARIN 100 UNIT/ML
500 SYRINGE INTRAVENOUS
Status: COMPLETED | OUTPATIENT
Start: 2023-01-10 | End: 2023-01-10

## 2023-01-10 RX ORDER — PROMETHAZINE HYDROCHLORIDE 25 MG/1
25 SUPPOSITORY RECTAL EVERY 6 HOURS PRN
Qty: 12 SUPPOSITORY | Refills: 1 | Status: ON HOLD | OUTPATIENT
Start: 2023-01-10 | End: 2023-03-06 | Stop reason: HOSPADM

## 2023-01-10 RX ORDER — ONDANSETRON HCL IN 0.9 % NACL 8 MG/50 ML
8 INTRAVENOUS SOLUTION, PIGGYBACK (ML) INTRAVENOUS
Status: COMPLETED | OUTPATIENT
Start: 2023-01-10 | End: 2023-01-10

## 2023-01-10 RX ORDER — ONDANSETRON 2 MG/ML
8 INJECTION INTRAMUSCULAR; INTRAVENOUS ONCE
Status: CANCELLED
Start: 2023-01-10 | End: 2023-01-10

## 2023-01-10 RX ADMIN — SODIUM CHLORIDE 1000 ML: 0.9 INJECTION, SOLUTION INTRAVENOUS at 10:01

## 2023-01-10 RX ADMIN — HEPARIN 500 UNITS: 100 SYRINGE at 12:01

## 2023-01-10 RX ADMIN — ONDANSETRON 8 MG: 2 INJECTION INTRAMUSCULAR; INTRAVENOUS at 10:01

## 2023-01-10 NOTE — TELEPHONE ENCOUNTER
Pt is currently in waiting room with nausea and vomiting. Per Ana, IV fluid and IV nausea med orders placed. Will try to see if we can get pt in today. Pt was notified and verbalized understanding.

## 2023-01-10 NOTE — PLAN OF CARE
Problem: Fatigue  Goal: Improved Activity Tolerance  Outcome: Ongoing, Progressing  Intervention: Promote Improved Energy  Flowsheets (Taken 1/10/2023 1026)  Fatigue Management:   fatigue-related activity identified   frequent rest breaks encouraged   paced activity encouraged  Sleep/Rest Enhancement: relaxation techniques promoted  Activity Management: Ambulated -L4

## 2023-01-11 ENCOUNTER — PATIENT MESSAGE (OUTPATIENT)
Dept: HEMATOLOGY/ONCOLOGY | Facility: CLINIC | Age: 48
End: 2023-01-11

## 2023-01-11 ENCOUNTER — TELEPHONE (OUTPATIENT)
Dept: HEMATOLOGY/ONCOLOGY | Facility: CLINIC | Age: 48
End: 2023-01-11

## 2023-01-11 DIAGNOSIS — R53.83 FATIGUE, UNSPECIFIED TYPE: Primary | ICD-10-CM

## 2023-01-11 LAB — TSH SERPL DL<=0.005 MIU/L-ACNC: 2.53 UIU/ML (ref 0.34–5.6)

## 2023-01-11 RX ORDER — SODIUM CHLORIDE 0.9 % (FLUSH) 0.9 %
10 SYRINGE (ML) INJECTION
Status: CANCELLED | OUTPATIENT
Start: 2023-01-11

## 2023-01-11 RX ORDER — EPINEPHRINE 0.3 MG/.3ML
0.3 INJECTION SUBCUTANEOUS ONCE AS NEEDED
Status: CANCELLED | OUTPATIENT
Start: 2023-01-11

## 2023-01-11 RX ORDER — HEPARIN 100 UNIT/ML
500 SYRINGE INTRAVENOUS
Status: CANCELLED | OUTPATIENT
Start: 2023-01-11

## 2023-01-11 RX ORDER — DIPHENHYDRAMINE HYDROCHLORIDE 50 MG/ML
50 INJECTION INTRAMUSCULAR; INTRAVENOUS ONCE AS NEEDED
Status: CANCELLED | OUTPATIENT
Start: 2023-01-11

## 2023-01-11 RX ORDER — DOXORUBICIN HYDROCHLORIDE 2 MG/ML
60 INJECTION, SOLUTION INTRAVENOUS
Status: CANCELLED | OUTPATIENT
Start: 2023-01-11

## 2023-01-12 ENCOUNTER — INFUSION (OUTPATIENT)
Dept: INFUSION THERAPY | Facility: HOSPITAL | Age: 48
End: 2023-01-12
Attending: INTERNAL MEDICINE
Payer: COMMERCIAL

## 2023-01-12 ENCOUNTER — DOCUMENTATION ONLY (OUTPATIENT)
Dept: HEMATOLOGY/ONCOLOGY | Facility: CLINIC | Age: 48
End: 2023-01-12

## 2023-01-12 VITALS
HEIGHT: 62 IN | OXYGEN SATURATION: 100 % | TEMPERATURE: 98 F | SYSTOLIC BLOOD PRESSURE: 103 MMHG | HEART RATE: 86 BPM | RESPIRATION RATE: 17 BRPM | WEIGHT: 193.63 LBS | BODY MASS INDEX: 35.63 KG/M2 | DIASTOLIC BLOOD PRESSURE: 63 MMHG

## 2023-01-12 DIAGNOSIS — Z17.1 MALIGNANT NEOPLASM OF UPPER-OUTER QUADRANT OF LEFT BREAST IN FEMALE, ESTROGEN RECEPTOR NEGATIVE: ICD-10-CM

## 2023-01-12 DIAGNOSIS — T45.1X5A CHEMOTHERAPY-INDUCED NEUTROPENIA: Primary | ICD-10-CM

## 2023-01-12 DIAGNOSIS — C50.412 MALIGNANT NEOPLASM OF UPPER-OUTER QUADRANT OF LEFT BREAST IN FEMALE, ESTROGEN RECEPTOR NEGATIVE: ICD-10-CM

## 2023-01-12 DIAGNOSIS — D70.1 CHEMOTHERAPY-INDUCED NEUTROPENIA: Primary | ICD-10-CM

## 2023-01-12 PROCEDURE — A4216 STERILE WATER/SALINE, 10 ML: HCPCS | Performed by: NURSE PRACTITIONER

## 2023-01-12 PROCEDURE — 96367 TX/PROPH/DG ADDL SEQ IV INF: CPT

## 2023-01-12 PROCEDURE — 63600175 PHARM REV CODE 636 W HCPCS: Mod: JG | Performed by: NURSE PRACTITIONER

## 2023-01-12 PROCEDURE — 25000003 PHARM REV CODE 250: Performed by: NURSE PRACTITIONER

## 2023-01-12 PROCEDURE — 96411 CHEMO IV PUSH ADDL DRUG: CPT

## 2023-01-12 PROCEDURE — 96377 APPLICATON ON-BODY INJECTOR: CPT

## 2023-01-12 PROCEDURE — 96375 TX/PRO/DX INJ NEW DRUG ADDON: CPT

## 2023-01-12 PROCEDURE — 96417 CHEMO IV INFUS EACH ADDL SEQ: CPT

## 2023-01-12 PROCEDURE — 96413 CHEMO IV INFUSION 1 HR: CPT

## 2023-01-12 RX ORDER — DIPHENHYDRAMINE HYDROCHLORIDE 50 MG/ML
50 INJECTION INTRAMUSCULAR; INTRAVENOUS ONCE AS NEEDED
Status: DISCONTINUED | OUTPATIENT
Start: 2023-01-12 | End: 2023-01-12 | Stop reason: HOSPADM

## 2023-01-12 RX ORDER — EPINEPHRINE 0.3 MG/.3ML
0.3 INJECTION SUBCUTANEOUS ONCE AS NEEDED
Status: DISCONTINUED | OUTPATIENT
Start: 2023-01-12 | End: 2023-01-12 | Stop reason: HOSPADM

## 2023-01-12 RX ORDER — SODIUM CHLORIDE 0.9 % (FLUSH) 0.9 %
10 SYRINGE (ML) INJECTION
Status: DISCONTINUED | OUTPATIENT
Start: 2023-01-12 | End: 2023-01-12 | Stop reason: HOSPADM

## 2023-01-12 RX ORDER — DOXORUBICIN HYDROCHLORIDE 2 MG/ML
60 INJECTION, SOLUTION INTRAVENOUS
Status: COMPLETED | OUTPATIENT
Start: 2023-01-12 | End: 2023-01-12

## 2023-01-12 RX ORDER — HEPARIN 100 UNIT/ML
500 SYRINGE INTRAVENOUS
Status: DISCONTINUED | OUTPATIENT
Start: 2023-01-12 | End: 2023-01-12 | Stop reason: HOSPADM

## 2023-01-12 RX ADMIN — HEPARIN 500 UNITS: 100 SYRINGE at 11:01

## 2023-01-12 RX ADMIN — DOXORUBICIN HYDROCHLORIDE 118 MG: 2 INJECTION, SOLUTION INTRAVENOUS at 11:01

## 2023-01-12 RX ADMIN — PALONOSETRON HYDROCHLORIDE 0.25 MG: 0.25 INJECTION INTRAVENOUS at 10:01

## 2023-01-12 RX ADMIN — CYCLOPHOSPHAMIDE 1180 MG: 200 INJECTION, SOLUTION INTRAVENOUS at 11:01

## 2023-01-12 RX ADMIN — SODIUM CHLORIDE: 0.9 INJECTION, SOLUTION INTRAVENOUS at 09:01

## 2023-01-12 RX ADMIN — PEGFILGRASTIM 6 MG: KIT SUBCUTANEOUS at 09:01

## 2023-01-12 RX ADMIN — SODIUM CHLORIDE, PRESERVATIVE FREE 10 ML: 5 INJECTION INTRAVENOUS at 11:01

## 2023-01-12 RX ADMIN — SODIUM CHLORIDE 200 MG: 9 INJECTION, SOLUTION INTRAVENOUS at 09:01

## 2023-01-12 RX ADMIN — APREPITANT 130 MG: 130 INJECTION, EMULSION INTRAVENOUS at 10:01

## 2023-01-12 NOTE — PLAN OF CARE
Problem: Nausea and Vomiting  Goal: Fluid and Electrolyte Balance  Outcome: Met     Problem: Diarrhea  Goal: Fluid and Electrolyte Balance  Outcome: Met     Problem: Fatigue  Goal: Improved Activity Tolerance  Outcome: Met

## 2023-01-12 NOTE — PROGRESS NOTES
Medical Nutrition Therapy Oncology Progress Note      Patient's PCP:WOLF Iraheta  Referring Provider: No ref. provider found  Subjective:        Patient ID: Karyna Escoto is a 47 y.o. female.    Chief Complaint: N/V    Past Medical History:   Diagnosis Date    Anxiety     Breast cancer 07/2022       Past Surgical History:   Procedure Laterality Date    BREAST BIOPSY Left 07/2022    eye lid surgery Bilateral 1990    INSERTION OF TUNNELED CENTRAL VENOUS CATHETER (CVC) WITH SUBCUTANEOUS PORT Left 11/21/2022    Procedure: [id number];  Surgeon: Oscar Murphy III, MD;  Location: [facility] OR;  Service: General;  Laterality: Left;    NOSE SURGERY  1990    PORTACATH PLACEMENT  08/2022        TONSILLECTOMY  1990    TUBAL LIGATION  2013       Social History     Socioeconomic History    Marital status:    Tobacco Use    Smoking status: Former    Tobacco comments:     Quit 2005-13 year history -1 daily   Substance and Sexual Activity    Alcohol use: Not Currently     Comment: occasional    Sexual activity: Yes       Family History   Problem Relation Age of Onset    Breast cancer Maternal Grandmother     Prostate cancer Maternal Grandfather        Review of patient's allergies indicates:   Allergen Reactions    Taxol [paclitaxel] Rash       Current Outpatient Medications:     bisacodyL (DULCOLAX) 5 mg EC tablet, Take 5 mg by mouth daily as needed for Constipation., Disp: , Rfl:     cetirizine (ZYRTEC) 10 mg Cap, Take 1 tablet by mouth once daily., Disp: , Rfl:     famotidine (PEPCID) 10 MG tablet, Take 10 mg by mouth 2 (two) times daily., Disp: , Rfl:     fluticasone propionate (FLONASE) 50 mcg/actuation nasal spray, 1 spray (50 mcg total) by Each Nostril route once daily., Disp: 15.8 mL, Rfl: 5    HYDROcodone-acetaminophen (NORCO) 5-325 mg per tablet, Take 1 tablet by mouth every 6 (six) hours as needed for Pain., Disp: 30 tablet, Rfl: 0     hydrOXYzine HCL (ATARAX) 10 MG Tab, Take 10 mg by mouth 3 (three) times daily as needed., Disp: , Rfl:     Lactobacillus rhamnosus GG (CULTURELLE) 10 billion cell capsule, Take 1 capsule by mouth once daily., Disp: , Rfl:     LIDOcaine-prilocaine (EMLA) cream, Apply topically as needed. Apply 30 mins prior to port access, Disp: 30 g, Rfl: 5    loperamide HCl (IMODIUM A-D ORAL), Take 1 tablet by mouth daily as needed., Disp: , Rfl:     ondansetron (ZOFRAN) 8 MG tablet, Take 1 tablet (8 mg total) by mouth every 8 (eight) hours as needed., Disp: 30 tablet, Rfl: 5    promethazine (PHENERGAN) 25 MG suppository, Place 1 suppository (25 mg total) rectally every 6 (six) hours as needed for Nausea., Disp: 12 suppository, Rfl: 1    promethazine (PHENERGAN) 25 MG tablet, Take 1 tablet (25 mg total) by mouth every 4 to 6 hours as needed., Disp: 30 tablet, Rfl: 5    sertraline (ZOLOFT) 50 MG tablet, Take 50 mg by mouth., Disp: , Rfl:   No current facility-administered medications for this visit.    Facility-Administered Medications Ordered in Other Visits:     diphenhydrAMINE injection 50 mg, 50 mg, Intravenous, Once PRN, JEAN PAUL Andrea    EPINEPHrine (EPIPEN) 0.3 mg/0.3 mL pen injection 0.3 mg, 0.3 mg, Intramuscular, Once PRN, JEAN PAUL Andrea    heparin, porcine (PF) 100 unit/mL injection flush 500 Units, 500 Units, Intravenous, PRN, Ana Quigley NP-C, 500 Units at 01/12/23 1157    hydrocortisone sodium succinate injection 100 mg, 100 mg, Intravenous, Once PRN, JEAN PAUL Andrea    sodium chloride 0.9% flush 10 mL, 10 mL, Intravenous, PRN, Ana Quigley NP-C, 10 mL at 01/12/23 1156    All medications and past history have been reviewed.    OP PEMBROLIZUMAB CARBOPLATIN (AUC 5) WITH WEEKLY ABRAXANE FOLLOWED BY PEMBROLIZUMAB DOXORUBICIN CYCLOPHOSPHAMIDE FOLLOWED BY PEMBROLIZUMAB 200 MG Q3W      Treatment Goal:   Curative      Status:   Active      Start Date:   8/11/2022      End Date:   7/27/2023  (Planned)      Provider:   Derek Peralta MD      Chemotherapy:   DOXOrubicin chemo injection 120 mg, 60 mg/m2 = 120 mg, Intravenous, Clinic/HOD 1 time, 3 of 4 cycles    Administration: 120 mg (12/1/2022), 122 mg (12/22/2022), 118 mg (1/12/2023)        CARBOplatin (PARAPLATIN) 725 mg in sodium chloride 0.9% 322.5 mL chemo infusion, 725 mg (100.1 % of original dose 722.5 mg), Intravenous, Clinic/HOD 1 time, 4 of 4 cycles    Dose modification:   (original dose 722.5 mg, Cycle 1, Reason: MD Discretion),   (original dose 722.5 mg, Cycle 1), 541.875 mg (75 % of original dose 722.5 mg, Cycle 3, Reason: Dose Not Tolerated), 722.5 mg (100 % of original dose 722.5 mg, Cycle 3, Reason: Other (see comments))    Administration: 725 mg (8/11/2022), 730 mg (9/8/2022), 565 mg (10/6/2022), 725 mg (10/27/2022)        cyclophosphamide 600 mg/m2 = 1,200 mg in sodium chloride 0.9% 256 mL chemo infusion, 600 mg/m2 = 1,200 mg, Intravenous, Clinic/HOD 1 time, 3 of 4 cycles    Administration: 1,200 mg (12/1/2022), 1,220 mg (12/22/2022), 1,180 mg (1/12/2023)        PACLitaxeL (TAXOL) 80 mg/m2 = 156 mg in sodium chloride 0.9% 276 mL chemo infusion, 80 mg/m2 = 156 mg, Intravenous, Clinic/HOD 1 time, 1 of 1 cycle    Administration: 156 mg (8/11/2022), 156 mg (8/18/2022), 156 mg (9/1/2022)      Objective:      Wt Readings from Last 20 Encounters:   01/12/23 87.8 kg (193 lb 9.6 oz)   01/10/23 87.5 kg (192 lb 14.4 oz)   12/29/22 89.4 kg (197 lb 1.6 oz)   12/22/22 93.4 kg (205 lb 12.8 oz)   12/01/22 92.8 kg (204 lb 9.4 oz)   11/21/22 91.2 kg (201 lb)   11/17/22 91.2 kg (201 lb)   11/15/22 91.5 kg (201 lb 12.8 oz)   11/10/22 90.5 kg (199 lb 8 oz)   11/09/22 90 kg (198 lb 6.4 oz)   11/08/22 90.9 kg (200 lb 6.4 oz)   11/03/22 89 kg (196 lb 3.2 oz)   10/27/22 90 kg (198 lb 6.4 oz)   10/25/22 89.4 kg (197 lb 1.6 oz)   10/20/22 89.3 kg (196 lb 12.8 oz)   10/14/22 87.5 kg (193 lb)   10/13/22 87.5 kg (193 lb)   10/13/22 87.5 kg (193 lb)   10/06/22  88.8 kg (195 lb 12.8 oz)   10/04/22 89.3 kg (196 lb 14.4 oz)       Last Labs:  Last Labs:  Glucose   Date Value Ref Range Status   01/10/2023 70 70 - 110 mg/dL Final   01/03/2023 93 70 - 110 mg/dL Final     BUN   Date Value Ref Range Status   01/10/2023 6 6 - 20 mg/dL Final   01/03/2023 <5 (L) 6 - 20 mg/dL Final     Creatinine   Date Value Ref Range Status   01/10/2023 0.7 0.5 - 1.4 mg/dL Final   01/03/2023 0.9 0.5 - 1.4 mg/dL Final     Sodium   Date Value Ref Range Status   01/10/2023 134 (L) 136 - 145 mmol/L Final   01/03/2023 136 136 - 145 mmol/L Final     Potassium   Date Value Ref Range Status   01/10/2023 3.3 (L) 3.5 - 5.1 mmol/L Final   01/03/2023 3.5 3.5 - 5.1 mmol/L Final     No results found for: PHOS  Calcium   Date Value Ref Range Status   01/10/2023 8.3 (L) 8.7 - 10.5 mg/dL Final   01/03/2023 9.1 8.7 - 10.5 mg/dL Final     No results found for: PREALBUMIN  Total Protein   Date Value Ref Range Status   01/10/2023 5.7 (L) 6.0 - 8.4 g/dL Final   01/03/2023 6.1 6.0 - 8.4 g/dL Final     No results found for: CHOL  No results found for: HGBA1C  Hemoglobin   Date Value Ref Range Status   01/10/2023 10.7 (L) 12.0 - 16.0 g/dL Final   01/03/2023 11.7 (L) 12.0 - 16.0 g/dL Final     Hematocrit   Date Value Ref Range Status   01/10/2023 31.4 (L) 37.0 - 48.5 % Final   01/03/2023 34.2 (L) 37.0 - 48.5 % Final     No results found for: IRON  No components found for: FROLATE  No results found for: [id number]  WBC   Date Value Ref Range Status   01/10/2023 9.10 3.90 - 12.70 K/uL Final   01/03/2023 4.99 3.90 - 12.70 K/uL Final       Assessment:     Nutrition/Diet History     Patient Reported Diet/Restrictions/Preferences: bland diet  Food Allergies: NKFA  Factors Affecting Nutritional Intake: N/V    Estimated/Assessed Needs     Weight Used For Calorie Calculations: 88 kg (193 lb)  Energy Calorie Requirements (kcal): 8288-6493 kcal/day   Energy Need Method: 25-30 Kcal/kg  Protein Requirements: 106-132 g/day   Protein Need  Method: 1.2-1.5 g/kg  Fluid Requirements: 2000 ml/day  Estimated Fluid Requirement Method: 1ml/kcal      Nutrition Support  N/A    Evaluation of Received Nutrient/Fluid Intake     Calorie Intake: not meeting needs  Protein Intake: not meeting needs  Fluid Intake: meeting needs  Tolerance: tolerating  % Intake of Estimated Energy Needs: 50 %      Nutrition Diagnosis Related to (Etiology) As Evidenced By (Signs/Symptoms)   Inadequate energy intake Decreased ability to consume sufficient energy N/V, unintentional weight loss     RD Notes  Mrs. Flori Escoto is a 48y/o female with breast cancer currently receiving AC. Pt reports intractable nausea and vomiting after her last cycle resulting in minimal po intake. She reports the nausea has been better controlled over the last few days and she has been eating more bland foods to prevent nausea. She is using ginger tea with some benefit. She does have some taste changes but reports they are not effecting intake. CW: 193# (LW: 205# on 12/22)    Nutrition Intervention:      Nutrition Intervention Schedule of food/fluids   Goals/Expected Outcomes Initiate small, frequent meals and snacks with plain/bland food choices q 2-3hrs to prevent nausea with next cycle   Progress Initial     Nutrition Intervention Fluid-modified diet   Goals/Expected Outcomes Initiate aggressive hydration via water and electrolyte rich fluids to prevent dehydration   Progress Initial     Plan  Provided nausea packet and reviewed with patient  Discussed nutrition strategies to prevent and treat nausea  Recommended small meals q 2-3 hrs with plain food choices to prevent nausea  Discussed importance of aggressive hydration with next cycle to prevent dehydration  Provided RD contact info. Encouraged pt to call with questions or concerns    Monitoring/Evaluation:     Monitor: po intake, weight, BMs, N/V    Next Visit: f/u as needed      I have explained and the patient understands all of  the current  recommendation(s). I have answered all of their questions to the best of my ability and to their complete satisfaction.   The patient is to continue with the current management plan.    Electronically signed by: Diane Sung MBA, CALINN, LDN

## 2023-01-17 ENCOUNTER — LAB VISIT (OUTPATIENT)
Dept: LAB | Facility: HOSPITAL | Age: 48
End: 2023-01-17
Attending: INTERNAL MEDICINE
Payer: COMMERCIAL

## 2023-01-17 ENCOUNTER — TELEPHONE (OUTPATIENT)
Dept: HEMATOLOGY/ONCOLOGY | Facility: CLINIC | Age: 48
End: 2023-01-17

## 2023-01-17 DIAGNOSIS — R53.83 FATIGUE, UNSPECIFIED TYPE: ICD-10-CM

## 2023-01-17 DIAGNOSIS — Z17.1 MALIGNANT NEOPLASM OF UPPER-OUTER QUADRANT OF LEFT BREAST IN FEMALE, ESTROGEN RECEPTOR NEGATIVE: Primary | ICD-10-CM

## 2023-01-17 DIAGNOSIS — C50.412 MALIGNANT NEOPLASM OF UPPER-OUTER QUADRANT OF LEFT BREAST IN FEMALE, ESTROGEN RECEPTOR NEGATIVE: Primary | ICD-10-CM

## 2023-01-17 DIAGNOSIS — Z17.1 MALIGNANT NEOPLASM OF UPPER-OUTER QUADRANT OF LEFT BREAST IN FEMALE, ESTROGEN RECEPTOR NEGATIVE: ICD-10-CM

## 2023-01-17 DIAGNOSIS — C50.412 MALIGNANT NEOPLASM OF UPPER-OUTER QUADRANT OF LEFT BREAST IN FEMALE, ESTROGEN RECEPTOR NEGATIVE: ICD-10-CM

## 2023-01-17 LAB
ALBUMIN SERPL BCP-MCNC: 3.5 G/DL (ref 3.5–5.2)
ALP SERPL-CCNC: 74 U/L (ref 55–135)
ALT SERPL W/O P-5'-P-CCNC: 13 U/L (ref 10–44)
ANION GAP SERPL CALC-SCNC: 2 MMOL/L (ref 8–16)
ANISOCYTOSIS BLD QL SMEAR: SLIGHT
AST SERPL-CCNC: 18 U/L (ref 10–40)
BASOPHILS NFR BLD: 0 % (ref 0–1.9)
BILIRUB SERPL-MCNC: 0.8 MG/DL (ref 0.1–1)
BUN SERPL-MCNC: 9 MG/DL (ref 6–20)
CALCIUM SERPL-MCNC: 9 MG/DL (ref 8.7–10.5)
CHLORIDE SERPL-SCNC: 104 MMOL/L (ref 95–110)
CO2 SERPL-SCNC: 30 MMOL/L (ref 23–29)
CREAT SERPL-MCNC: 0.8 MG/DL (ref 0.5–1.4)
DIFFERENTIAL METHOD: ABNORMAL
EOSINOPHIL NFR BLD: 0 % (ref 0–8)
ERYTHROCYTE [DISTWIDTH] IN BLOOD BY AUTOMATED COUNT: 13.3 % (ref 11.5–14.5)
EST. GFR  (NO RACE VARIABLE): >60 ML/MIN/1.73 M^2
GLUCOSE SERPL-MCNC: 101 MG/DL (ref 70–110)
HCT VFR BLD AUTO: 30.6 % (ref 37–48.5)
HGB BLD-MCNC: 10.5 G/DL (ref 12–16)
IMM GRANULOCYTES # BLD AUTO: ABNORMAL K/UL (ref 0–0.04)
IMM GRANULOCYTES NFR BLD AUTO: ABNORMAL % (ref 0–0.5)
LYMPHOCYTES NFR BLD: 28 % (ref 18–48)
MAGNESIUM SERPL-MCNC: 1.9 MG/DL (ref 1.6–2.6)
MCH RBC QN AUTO: 34.1 PG (ref 27–31)
MCHC RBC AUTO-ENTMCNC: 34.3 G/DL (ref 32–36)
MCV RBC AUTO: 99 FL (ref 82–98)
MONOCYTES NFR BLD: 2 % (ref 4–15)
NEUTROPHILS NFR BLD: 70 % (ref 38–73)
NRBC BLD-RTO: 0 /100 WBC
PLATELET # BLD AUTO: 77 K/UL (ref 150–450)
PLATELET BLD QL SMEAR: ABNORMAL
PMV BLD AUTO: 11.3 FL (ref 9.2–12.9)
POIKILOCYTOSIS BLD QL SMEAR: SLIGHT
POTASSIUM SERPL-SCNC: 3.6 MMOL/L (ref 3.5–5.1)
PROT SERPL-MCNC: 6 G/DL (ref 6–8.4)
RBC # BLD AUTO: 3.08 M/UL (ref 4–5.4)
SODIUM SERPL-SCNC: 136 MMOL/L (ref 136–145)
WBC # BLD AUTO: 3.42 K/UL (ref 3.9–12.7)

## 2023-01-17 PROCEDURE — 83735 ASSAY OF MAGNESIUM: CPT | Performed by: INTERNAL MEDICINE

## 2023-01-17 PROCEDURE — 85027 COMPLETE CBC AUTOMATED: CPT | Performed by: INTERNAL MEDICINE

## 2023-01-17 PROCEDURE — 85007 BL SMEAR W/DIFF WBC COUNT: CPT | Performed by: INTERNAL MEDICINE

## 2023-01-17 PROCEDURE — 80053 COMPREHEN METABOLIC PANEL: CPT | Performed by: NURSE PRACTITIONER

## 2023-01-17 PROCEDURE — 36415 COLL VENOUS BLD VENIPUNCTURE: CPT | Performed by: INTERNAL MEDICINE

## 2023-01-19 ENCOUNTER — INFUSION (OUTPATIENT)
Dept: INFUSION THERAPY | Facility: HOSPITAL | Age: 48
End: 2023-01-19
Attending: INTERNAL MEDICINE
Payer: COMMERCIAL

## 2023-01-19 ENCOUNTER — TELEPHONE (OUTPATIENT)
Dept: HEMATOLOGY/ONCOLOGY | Facility: CLINIC | Age: 48
End: 2023-01-19

## 2023-01-19 ENCOUNTER — PATIENT MESSAGE (OUTPATIENT)
Dept: HEMATOLOGY/ONCOLOGY | Facility: CLINIC | Age: 48
End: 2023-01-19

## 2023-01-19 VITALS
BODY MASS INDEX: 35.41 KG/M2 | RESPIRATION RATE: 18 BRPM | HEART RATE: 133 BPM | TEMPERATURE: 99 F | HEIGHT: 62 IN | OXYGEN SATURATION: 97 % | DIASTOLIC BLOOD PRESSURE: 74 MMHG | SYSTOLIC BLOOD PRESSURE: 133 MMHG

## 2023-01-19 DIAGNOSIS — R11.2 NAUSEA AND VOMITING, UNSPECIFIED VOMITING TYPE: ICD-10-CM

## 2023-01-19 DIAGNOSIS — E86.0 DEHYDRATION: ICD-10-CM

## 2023-01-19 DIAGNOSIS — R11.2 NAUSEA & VOMITING: Primary | ICD-10-CM

## 2023-01-19 DIAGNOSIS — C50.412 MALIGNANT NEOPLASM OF UPPER-OUTER QUADRANT OF LEFT BREAST IN FEMALE, ESTROGEN RECEPTOR NEGATIVE: ICD-10-CM

## 2023-01-19 DIAGNOSIS — Z17.1 MALIGNANT NEOPLASM OF UPPER-OUTER QUADRANT OF LEFT BREAST IN FEMALE, ESTROGEN RECEPTOR NEGATIVE: ICD-10-CM

## 2023-01-19 PROCEDURE — 96361 HYDRATE IV INFUSION ADD-ON: CPT

## 2023-01-19 PROCEDURE — 25000003 PHARM REV CODE 250: Performed by: NURSE PRACTITIONER

## 2023-01-19 PROCEDURE — 96367 TX/PROPH/DG ADDL SEQ IV INF: CPT

## 2023-01-19 PROCEDURE — 96365 THER/PROPH/DIAG IV INF INIT: CPT

## 2023-01-19 PROCEDURE — 63600175 PHARM REV CODE 636 W HCPCS: Performed by: NURSE PRACTITIONER

## 2023-01-19 PROCEDURE — 63600175 PHARM REV CODE 636 W HCPCS: Performed by: INTERNAL MEDICINE

## 2023-01-19 RX ORDER — ONDANSETRON HCL IN 0.9 % NACL 8 MG/50 ML
8 INTRAVENOUS SOLUTION, PIGGYBACK (ML) INTRAVENOUS
Status: COMPLETED | OUTPATIENT
Start: 2023-01-19 | End: 2023-01-19

## 2023-01-19 RX ORDER — SODIUM CHLORIDE 0.9 % (FLUSH) 0.9 %
10 SYRINGE (ML) INJECTION
Status: CANCELLED | OUTPATIENT
Start: 2023-01-19

## 2023-01-19 RX ORDER — ONDANSETRON HCL IN 0.9 % NACL 8 MG/50 ML
8 INTRAVENOUS SOLUTION, PIGGYBACK (ML) INTRAVENOUS
Status: CANCELLED
Start: 2023-01-19

## 2023-01-19 RX ORDER — HEPARIN 100 UNIT/ML
500 SYRINGE INTRAVENOUS
Status: COMPLETED | OUTPATIENT
Start: 2023-01-19 | End: 2023-01-19

## 2023-01-19 RX ORDER — ONDANSETRON 2 MG/ML
8 INJECTION INTRAMUSCULAR; INTRAVENOUS ONCE
Status: CANCELLED
Start: 2023-01-19 | End: 2023-01-19

## 2023-01-19 RX ORDER — HEPARIN 100 UNIT/ML
500 SYRINGE INTRAVENOUS
Status: CANCELLED
Start: 2023-01-19

## 2023-01-19 RX ORDER — HEPARIN 100 UNIT/ML
500 SYRINGE INTRAVENOUS
Status: CANCELLED | OUTPATIENT
Start: 2023-01-19

## 2023-01-19 RX ADMIN — ONDANSETRON 8 MG: 2 INJECTION INTRAMUSCULAR; INTRAVENOUS at 10:01

## 2023-01-19 RX ADMIN — HEPARIN 500 UNITS: 100 SYRINGE at 12:01

## 2023-01-19 RX ADMIN — SODIUM CHLORIDE 1000 ML: 0.9 INJECTION, SOLUTION INTRAVENOUS at 10:01

## 2023-01-19 RX ADMIN — PROMETHAZINE HYDROCHLORIDE 25 MG: 25 INJECTION INTRAMUSCULAR; INTRAVENOUS at 10:01

## 2023-01-19 NOTE — TELEPHONE ENCOUNTER
Pt messaged complaining of severe nausea and vomiting this morning. Per Josselin, orders placed for IV fluids and IV nausea meds. Pt was notified and verbalized understanding.

## 2023-01-21 ENCOUNTER — PATIENT MESSAGE (OUTPATIENT)
Dept: HEMATOLOGY/ONCOLOGY | Facility: CLINIC | Age: 48
End: 2023-01-21

## 2023-01-22 ENCOUNTER — PATIENT MESSAGE (OUTPATIENT)
Dept: HEMATOLOGY/ONCOLOGY | Facility: CLINIC | Age: 48
End: 2023-01-22

## 2023-01-24 ENCOUNTER — LAB VISIT (OUTPATIENT)
Dept: LAB | Facility: HOSPITAL | Age: 48
End: 2023-01-24
Attending: INTERNAL MEDICINE
Payer: COMMERCIAL

## 2023-01-24 DIAGNOSIS — Z17.1 MALIGNANT NEOPLASM OF UPPER-OUTER QUADRANT OF LEFT BREAST IN FEMALE, ESTROGEN RECEPTOR NEGATIVE: ICD-10-CM

## 2023-01-24 DIAGNOSIS — C50.412 MALIGNANT NEOPLASM OF UPPER-OUTER QUADRANT OF LEFT BREAST IN FEMALE, ESTROGEN RECEPTOR NEGATIVE: ICD-10-CM

## 2023-01-24 DIAGNOSIS — R53.83 FATIGUE, UNSPECIFIED TYPE: ICD-10-CM

## 2023-01-24 LAB
ALBUMIN SERPL BCP-MCNC: 3.4 G/DL (ref 3.5–5.2)
ALP SERPL-CCNC: 56 U/L (ref 55–135)
ALT SERPL W/O P-5'-P-CCNC: 17 U/L (ref 10–44)
ANION GAP SERPL CALC-SCNC: 16 MMOL/L (ref 8–16)
AST SERPL-CCNC: 23 U/L (ref 10–40)
BASOPHILS # BLD AUTO: 0.04 K/UL (ref 0–0.2)
BASOPHILS NFR BLD: 1.3 % (ref 0–1.9)
BILIRUB SERPL-MCNC: 1.1 MG/DL (ref 0.1–1)
BUN SERPL-MCNC: 5 MG/DL (ref 6–20)
CALCIUM SERPL-MCNC: 8.8 MG/DL (ref 8.7–10.5)
CHLORIDE SERPL-SCNC: 94 MMOL/L (ref 95–110)
CO2 SERPL-SCNC: 22 MMOL/L (ref 23–29)
CREAT SERPL-MCNC: 0.7 MG/DL (ref 0.5–1.4)
DIFFERENTIAL METHOD: ABNORMAL
EOSINOPHIL # BLD AUTO: 0.2 K/UL (ref 0–0.5)
EOSINOPHIL NFR BLD: 7 % (ref 0–8)
ERYTHROCYTE [DISTWIDTH] IN BLOOD BY AUTOMATED COUNT: 13.5 % (ref 11.5–14.5)
EST. GFR  (NO RACE VARIABLE): >60 ML/MIN/1.73 M^2
GLUCOSE SERPL-MCNC: 117 MG/DL (ref 70–110)
HCT VFR BLD AUTO: 29.3 % (ref 37–48.5)
HGB BLD-MCNC: 10.2 G/DL (ref 12–16)
IMM GRANULOCYTES # BLD AUTO: 0.03 K/UL (ref 0–0.04)
IMM GRANULOCYTES NFR BLD AUTO: 1 % (ref 0–0.5)
LYMPHOCYTES # BLD AUTO: 0.7 K/UL (ref 1–4.8)
LYMPHOCYTES NFR BLD: 23.6 % (ref 18–48)
MAGNESIUM SERPL-MCNC: 1.8 MG/DL (ref 1.6–2.6)
MCH RBC QN AUTO: 32.9 PG (ref 27–31)
MCHC RBC AUTO-ENTMCNC: 34.8 G/DL (ref 32–36)
MCV RBC AUTO: 95 FL (ref 82–98)
MONOCYTES # BLD AUTO: 0.5 K/UL (ref 0.3–1)
MONOCYTES NFR BLD: 15 % (ref 4–15)
NEUTROPHILS # BLD AUTO: 1.6 K/UL (ref 1.8–7.7)
NEUTROPHILS NFR BLD: 52.1 % (ref 38–73)
NRBC BLD-RTO: 0 /100 WBC
PLATELET # BLD AUTO: 65 K/UL (ref 150–450)
PMV BLD AUTO: 12.9 FL (ref 9.2–12.9)
POTASSIUM SERPL-SCNC: 3 MMOL/L (ref 3.5–5.1)
PROT SERPL-MCNC: 6.6 G/DL (ref 6–8.4)
RBC # BLD AUTO: 3.1 M/UL (ref 4–5.4)
SODIUM SERPL-SCNC: 132 MMOL/L (ref 136–145)
TSH SERPL DL<=0.005 MIU/L-ACNC: 2.05 UIU/ML (ref 0.34–5.6)
WBC # BLD AUTO: 3.01 K/UL (ref 3.9–12.7)

## 2023-01-24 PROCEDURE — 80053 COMPREHEN METABOLIC PANEL: CPT | Performed by: INTERNAL MEDICINE

## 2023-01-24 PROCEDURE — 85025 COMPLETE CBC W/AUTO DIFF WBC: CPT | Performed by: INTERNAL MEDICINE

## 2023-01-24 PROCEDURE — 36415 COLL VENOUS BLD VENIPUNCTURE: CPT | Performed by: INTERNAL MEDICINE

## 2023-01-24 PROCEDURE — 83735 ASSAY OF MAGNESIUM: CPT | Performed by: INTERNAL MEDICINE

## 2023-01-24 PROCEDURE — 84443 ASSAY THYROID STIM HORMONE: CPT | Performed by: INTERNAL MEDICINE

## 2023-01-27 ENCOUNTER — PATIENT MESSAGE (OUTPATIENT)
Dept: HEMATOLOGY/ONCOLOGY | Facility: CLINIC | Age: 48
End: 2023-01-27

## 2023-01-27 ENCOUNTER — OFFICE VISIT (OUTPATIENT)
Dept: HEMATOLOGY/ONCOLOGY | Facility: CLINIC | Age: 48
End: 2023-01-27
Payer: COMMERCIAL

## 2023-01-27 VITALS
WEIGHT: 178 LBS | SYSTOLIC BLOOD PRESSURE: 107 MMHG | BODY MASS INDEX: 32.76 KG/M2 | RESPIRATION RATE: 18 BRPM | HEIGHT: 62 IN | HEART RATE: 125 BPM | TEMPERATURE: 97 F | DIASTOLIC BLOOD PRESSURE: 64 MMHG

## 2023-01-27 DIAGNOSIS — R11.0 NAUSEA: Primary | ICD-10-CM

## 2023-01-27 DIAGNOSIS — R11.2 NAUSEA AND VOMITING, UNSPECIFIED VOMITING TYPE: ICD-10-CM

## 2023-01-27 DIAGNOSIS — Z17.1 MALIGNANT NEOPLASM OF UPPER-OUTER QUADRANT OF LEFT BREAST IN FEMALE, ESTROGEN RECEPTOR NEGATIVE: Primary | ICD-10-CM

## 2023-01-27 DIAGNOSIS — R05.9 COUGH, UNSPECIFIED TYPE: ICD-10-CM

## 2023-01-27 DIAGNOSIS — R51.9 NONINTRACTABLE HEADACHE, UNSPECIFIED CHRONICITY PATTERN, UNSPECIFIED HEADACHE TYPE: ICD-10-CM

## 2023-01-27 DIAGNOSIS — R21 RASH: ICD-10-CM

## 2023-01-27 DIAGNOSIS — C50.412 MALIGNANT NEOPLASM OF UPPER-OUTER QUADRANT OF LEFT BREAST IN FEMALE, ESTROGEN RECEPTOR NEGATIVE: Primary | ICD-10-CM

## 2023-01-27 DIAGNOSIS — G89.3 CANCER ASSOCIATED PAIN: ICD-10-CM

## 2023-01-27 PROCEDURE — 1159F MED LIST DOCD IN RCRD: CPT | Mod: CPTII,S$GLB,, | Performed by: NURSE PRACTITIONER

## 2023-01-27 PROCEDURE — 99214 PR OFFICE/OUTPT VISIT, EST, LEVL IV, 30-39 MIN: ICD-10-PCS | Mod: S$GLB,,, | Performed by: NURSE PRACTITIONER

## 2023-01-27 PROCEDURE — 3008F BODY MASS INDEX DOCD: CPT | Mod: CPTII,S$GLB,, | Performed by: NURSE PRACTITIONER

## 2023-01-27 PROCEDURE — 1160F RVW MEDS BY RX/DR IN RCRD: CPT | Mod: CPTII,S$GLB,, | Performed by: NURSE PRACTITIONER

## 2023-01-27 PROCEDURE — 3008F PR BODY MASS INDEX (BMI) DOCUMENTED: ICD-10-PCS | Mod: CPTII,S$GLB,, | Performed by: NURSE PRACTITIONER

## 2023-01-27 PROCEDURE — 3078F PR MOST RECENT DIASTOLIC BLOOD PRESSURE < 80 MM HG: ICD-10-PCS | Mod: CPTII,S$GLB,, | Performed by: NURSE PRACTITIONER

## 2023-01-27 PROCEDURE — 3074F SYST BP LT 130 MM HG: CPT | Mod: CPTII,S$GLB,, | Performed by: NURSE PRACTITIONER

## 2023-01-27 PROCEDURE — 1160F PR REVIEW ALL MEDS BY PRESCRIBER/CLIN PHARMACIST DOCUMENTED: ICD-10-PCS | Mod: CPTII,S$GLB,, | Performed by: NURSE PRACTITIONER

## 2023-01-27 PROCEDURE — 3074F PR MOST RECENT SYSTOLIC BLOOD PRESSURE < 130 MM HG: ICD-10-PCS | Mod: CPTII,S$GLB,, | Performed by: NURSE PRACTITIONER

## 2023-01-27 PROCEDURE — 1159F PR MEDICATION LIST DOCUMENTED IN MEDICAL RECORD: ICD-10-PCS | Mod: CPTII,S$GLB,, | Performed by: NURSE PRACTITIONER

## 2023-01-27 PROCEDURE — 99214 OFFICE O/P EST MOD 30 MIN: CPT | Mod: S$GLB,,, | Performed by: NURSE PRACTITIONER

## 2023-01-27 PROCEDURE — 3078F DIAST BP <80 MM HG: CPT | Mod: CPTII,S$GLB,, | Performed by: NURSE PRACTITIONER

## 2023-01-27 RX ORDER — HYDROCODONE BITARTRATE AND ACETAMINOPHEN 5; 325 MG/1; MG/1
1 TABLET ORAL EVERY 6 HOURS PRN
Qty: 30 TABLET | Refills: 0 | Status: SHIPPED | OUTPATIENT
Start: 2023-01-27 | End: 2023-03-09

## 2023-01-27 RX ORDER — CODEINE PHOSPHATE AND GUAIFENESIN 10; 100 MG/5ML; MG/5ML
5 SOLUTION ORAL 3 TIMES DAILY PRN
Qty: 120 ML | Refills: 0 | Status: SHIPPED | OUTPATIENT
Start: 2023-01-27 | End: 2023-02-06

## 2023-01-27 RX ORDER — BENZONATATE 100 MG/1
100 CAPSULE ORAL 3 TIMES DAILY PRN
Qty: 30 CAPSULE | Refills: 1 | Status: SHIPPED | OUTPATIENT
Start: 2023-01-27 | End: 2023-02-06

## 2023-01-27 NOTE — PROGRESS NOTES
PROGRESS NOTE    Subjective:       Patient ID: Karyna Escoto is a 47 y.o. female.    6/9/2022:  Dx Mammo:  1.8cm mass in the left breast towards the left axilla.   Deep to the mass 2 lymph nodes are identified.     7/5/2022:  Left breast core biopsy:  Grade 2 IDCA with DCIS  ER: 0.03%, OR: 8.8%, HER2: 0  TRIPLE NEGATIVE    7/20/2022:  Left axilla LN needle biopsy:  Invasive ductal carcinoma  ER: 0%, OR: 40%, HER2 negative(0)    PLAN:  Neoadjuvant chemo/IO(Keynote 522)-surgery-radiation.     Pem/Tax/Carb:  Cycle 1: 8/11/2022  Cycle 2: 9/8/2022  Cycle 3: 10/6/2022  Cycle 4: 10/27/2022  Begin Shukri/Cytox/Pem  Cycle 5: 12/1/2022-----MUGA 8/10/2022-EF: 54%  Cycle 6: 12/22/2022  Cycle 7:     8/3/2022 Photos:          8/23/2022 Photo:      10/4/2022 Photo:    Lesion is no longer palpable on 10/4/2022.     11/15/2022          Chief Complaint:  No chief complaint on file.    Triple negative breast cancer follow up.     History of Present Illness:   Karyna Escoto is a 47 y.o. female who presents for follow up of breast cancer.      Patient had more side effects with this most recent therapy.  Has significant nausea and vomiting. Patient has decided she does not want to proceed with Cycle 8 AC/Keytruda. Notified her we do not recommend she stop treatment.   Tentatively scheduled for bilateral mast on 3/17/23 at [place].     Dr. Jonatan Raza at [place] breast surgeon at [facility] Dr. Brandon Barrios- Plastic Surgeon [facility]    Family and Social history reviewed and is unchanged from 8/3/2022      ROS:  Review of Systems   Constitutional:  Positive for fatigue. Negative for appetite change, fever and unexpected weight change.   HENT:  Negative for mouth sores.    Eyes:  Negative for visual disturbance.   Respiratory:  Positive for cough. Negative for shortness of breath.    Cardiovascular:  Negative for chest pain and leg swelling.   Gastrointestinal:  Positive for  diarrhea and nausea. Negative for abdominal pain and blood in stool.   Genitourinary:  Negative for frequency and hematuria.   Musculoskeletal:  Negative for back pain.   Skin:  Negative for rash.   Neurological:  Positive for headaches.   Hematological:  Negative for adenopathy.   Psychiatric/Behavioral:  The patient is not nervous/anxious.         Current Outpatient Medications:     bisacodyL (DULCOLAX) 5 mg EC tablet, Take 5 mg by mouth daily as needed for Constipation., Disp: , Rfl:     cetirizine (ZYRTEC) 10 mg Cap, Take 1 tablet by mouth once daily., Disp: , Rfl:     famotidine (PEPCID) 10 MG tablet, Take 10 mg by mouth 2 (two) times daily., Disp: , Rfl:     fluticasone propionate (FLONASE) 50 mcg/actuation nasal spray, 1 spray (50 mcg total) by Each Nostril route once daily., Disp: 15.8 mL, Rfl: 5    hydrOXYzine HCL (ATARAX) 10 MG Tab, Take 10 mg by mouth 3 (three) times daily as needed., Disp: , Rfl:     Lactobacillus rhamnosus GG (CULTURELLE) 10 billion cell capsule, Take 1 capsule by mouth once daily., Disp: , Rfl:     LIDOcaine-prilocaine (EMLA) cream, Apply topically as needed. Apply 30 mins prior to port access, Disp: 30 g, Rfl: 5    loperamide HCl (IMODIUM A-D ORAL), Take 1 tablet by mouth daily as needed., Disp: , Rfl:     ondansetron (ZOFRAN) 8 MG tablet, Take 1 tablet (8 mg total) by mouth every 8 (eight) hours as needed., Disp: 30 tablet, Rfl: 5    promethazine (PHENERGAN) 25 MG suppository, Place 1 suppository (25 mg total) rectally every 6 (six) hours as needed for Nausea., Disp: 12 suppository, Rfl: 1    promethazine (PHENERGAN) 25 MG tablet, Take 1 tablet (25 mg total) by mouth every 4 to 6 hours as needed., Disp: 30 tablet, Rfl: 5    sertraline (ZOLOFT) 50 MG tablet, Take 50 mg by mouth., Disp: , Rfl:     benzonatate (TESSALON) 100 MG capsule, Take 1 capsule (100 mg total) by mouth 3 (three) times daily as needed for Cough., Disp: 30 capsule, Rfl: 1    granisetron (SANCUSO) 3.1 mg/24 hour,  "Apply patch to skin 24hours prior to chemotherapy and wear for 7 days, Disp: 2 patch, Rfl: 0    guaiFENesin-codeine 100-10 mg/5 ml (TUSSI-ORGANIDIN NR)  mg/5 mL syrup, Take 5 mLs by mouth 3 (three) times daily as needed for Cough., Disp: 120 mL, Rfl: 0    HYDROcodone-acetaminophen (NORCO) 5-325 mg per tablet, Take 1 tablet by mouth every 6 (six) hours as needed for Pain., Disp: 30 tablet, Rfl: 0        Objective:       Physical Examination:     /64   Pulse (!) 125   Temp 97.3 °F (36.3 °C)   Resp 18   Ht 5' 2" (1.575 m)   Wt 80.7 kg (178 lb)   BMI 32.56 kg/m²     Physical Exam  Constitutional:       Appearance: Normal appearance. She is well-developed.   HENT:      Head: Normocephalic and atraumatic.      Right Ear: External ear normal.      Left Ear: External ear normal.   Eyes:      Conjunctiva/sclera: Conjunctivae normal.      Pupils: Pupils are equal, round, and reactive to light.   Neck:      Thyroid: No thyromegaly.      Trachea: No tracheal deviation.   Cardiovascular:      Rate and Rhythm: Normal rate and regular rhythm.      Heart sounds: Normal heart sounds.   Pulmonary:      Effort: Pulmonary effort is normal.      Breath sounds: Normal breath sounds.   Abdominal:      General: Bowel sounds are normal. There is no distension.      Palpations: Abdomen is soft. There is no mass.      Tenderness: There is no abdominal tenderness.   Musculoskeletal:      Right lower leg: No edema.      Left lower leg: No edema.   Skin:     General: Skin is warm and dry.      Findings: No rash.   Neurological:      General: No focal deficit present.      Mental Status: She is alert and oriented to person, place, and time.      Comments: Neuro intact througout   Psychiatric:         Mood and Affect: Mood normal.         Behavior: Behavior normal.         Thought Content: Thought content normal.         Judgment: Judgment normal.       Labs:   Recent Results (from the past 336 hour(s))   CBC Auto Differential    " Collection Time: 01/24/23  1:17 PM   Result Value Ref Range    WBC 3.01 (L) 3.90 - 12.70 K/uL    Hemoglobin 10.2 (L) 12.0 - 16.0 g/dL    Hematocrit 29.3 (L) 37.0 - 48.5 %    Platelets 65 (L) 150 - 450 K/uL   CBC Auto Differential    Collection Time: 01/17/23  9:32 AM   Result Value Ref Range    WBC 3.42 (L) 3.90 - 12.70 K/uL    Hemoglobin 10.5 (L) 12.0 - 16.0 g/dL    Hematocrit 30.6 (L) 37.0 - 48.5 %    Platelets 77 (L) 150 - 450 K/uL     CMP  Sodium   Date Value Ref Range Status   01/24/2023 132 (L) 136 - 145 mmol/L Final     Potassium   Date Value Ref Range Status   01/24/2023 3.0 (L) 3.5 - 5.1 mmol/L Final     Chloride   Date Value Ref Range Status   01/24/2023 94 (L) 95 - 110 mmol/L Final     CO2   Date Value Ref Range Status   01/24/2023 22 (L) 23 - 29 mmol/L Final     Glucose   Date Value Ref Range Status   01/24/2023 117 (H) 70 - 110 mg/dL Final     BUN   Date Value Ref Range Status   01/24/2023 5 (L) 6 - 20 mg/dL Final     Creatinine   Date Value Ref Range Status   01/24/2023 0.7 0.5 - 1.4 mg/dL Final     Calcium   Date Value Ref Range Status   01/24/2023 8.8 8.7 - 10.5 mg/dL Final     Total Protein   Date Value Ref Range Status   01/24/2023 6.6 6.0 - 8.4 g/dL Final     Albumin   Date Value Ref Range Status   01/24/2023 3.4 (L) 3.5 - 5.2 g/dL Final     Total Bilirubin   Date Value Ref Range Status   01/24/2023 1.1 (H) 0.1 - 1.0 mg/dL Final     Comment:     For infants and newborns, interpretation of results should be based  on gestational age, weight and in agreement with clinical  observations.    Premature Infant recommended reference ranges:  Up to 24 hours.............<8.0 mg/dL  Up to 48 hours............<12.0 mg/dL  3-5 days..................<15.0 mg/dL  6-29 days.................<15.0 mg/dL       Alkaline Phosphatase   Date Value Ref Range Status   01/24/2023 56 55 - 135 U/L Final     AST   Date Value Ref Range Status   01/24/2023 23 10 - 40 U/L Final     ALT   Date Value Ref Range Status   01/24/2023  17 10 - 44 U/L Final     Anion Gap   Date Value Ref Range Status   01/24/2023 16 8 - 16 mmol/L Final     No results found for: CEA  No results found for: PSA        Assessment/Plan:     Problem List Items Addressed This Visit          Oncology    Left Breast Cancer-TRIPLE NEGATIVE - Primary    Cancer associated pain       GI    Nausea & vomiting    Relevant Medications    granisetron (SANCUSO) 3.1 mg/24 hour     Other Visit Diagnoses       Cough, unspecified type        Relevant Medications    benzonatate (TESSALON) 100 MG capsule    guaiFENesin-codeine 100-10 mg/5 ml (TUSSI-ORGANIDIN NR)  mg/5 mL syrup    Other Relevant Orders    MRI Breast w/wo Contrast, w/CAD, Bilateral    Nonintractable headache, unspecified chronicity pattern, unspecified headache type        Relevant Medications    HYDROcodone-acetaminophen (NORCO) 5-325 mg per tablet    Rash        Relevant Medications    HYDROcodone-acetaminophen (NORCO) 5-325 mg per tablet          ADDENDUM:    Triple Negative breast cancer- Patient has decided to proceed with adriamycin and Cytoxan from Cycle 8 25% dose reduction; Bilateral Breast MRI prior to surgery  Cancer related pain- Refill Norco today  Neuropathy- Continue to monitor  Nausea and vomiting- Will add sancuso; Continue Zofran and Phenergan PRN Phenergan Suppositories  5. Cough- Tessalon Perles and Chertussin PRN  6. Diarrhea- Imodium PRN    Discussion:     Follow up in about 24 days (around 2/20/2023) for with Dr. Peralta.      Electronically signed by Ana Quigley, MSN, APRN, AGNP-C, OCN

## 2023-01-30 ENCOUNTER — PATIENT MESSAGE (OUTPATIENT)
Dept: HEMATOLOGY/ONCOLOGY | Facility: CLINIC | Age: 48
End: 2023-01-30

## 2023-01-30 RX ORDER — GRANISETRON 3.1 MG/24H
PATCH TRANSDERMAL
Qty: 2 PATCH | Refills: 0 | Status: SHIPPED | OUTPATIENT
Start: 2023-01-30 | End: 2023-02-01 | Stop reason: SDUPTHER

## 2023-01-31 ENCOUNTER — LAB VISIT (OUTPATIENT)
Dept: LAB | Facility: HOSPITAL | Age: 48
End: 2023-01-31
Attending: INTERNAL MEDICINE
Payer: COMMERCIAL

## 2023-01-31 DIAGNOSIS — C50.412 MALIGNANT NEOPLASM OF UPPER-OUTER QUADRANT OF LEFT BREAST IN FEMALE, ESTROGEN RECEPTOR NEGATIVE: ICD-10-CM

## 2023-01-31 DIAGNOSIS — Z17.1 MALIGNANT NEOPLASM OF UPPER-OUTER QUADRANT OF LEFT BREAST IN FEMALE, ESTROGEN RECEPTOR NEGATIVE: ICD-10-CM

## 2023-01-31 LAB
ALBUMIN SERPL BCP-MCNC: 3.8 G/DL (ref 3.5–5.2)
ALP SERPL-CCNC: 51 U/L (ref 55–135)
ALT SERPL W/O P-5'-P-CCNC: 17 U/L (ref 10–44)
ANION GAP SERPL CALC-SCNC: 16 MMOL/L (ref 8–16)
AST SERPL-CCNC: 30 U/L (ref 10–40)
BASOPHILS # BLD AUTO: 0.05 K/UL (ref 0–0.2)
BASOPHILS NFR BLD: 0.9 % (ref 0–1.9)
BILIRUB SERPL-MCNC: 1.1 MG/DL (ref 0.1–1)
BUN SERPL-MCNC: 6 MG/DL (ref 6–20)
CALCIUM SERPL-MCNC: 9.4 MG/DL (ref 8.7–10.5)
CHLORIDE SERPL-SCNC: 97 MMOL/L (ref 95–110)
CO2 SERPL-SCNC: 21 MMOL/L (ref 23–29)
CREAT SERPL-MCNC: 1 MG/DL (ref 0.5–1.4)
DIFFERENTIAL METHOD: ABNORMAL
EOSINOPHIL # BLD AUTO: 0 K/UL (ref 0–0.5)
EOSINOPHIL NFR BLD: 0.2 % (ref 0–8)
ERYTHROCYTE [DISTWIDTH] IN BLOOD BY AUTOMATED COUNT: 14.6 % (ref 11.5–14.5)
EST. GFR  (NO RACE VARIABLE): >60 ML/MIN/1.73 M^2
GLUCOSE SERPL-MCNC: 113 MG/DL (ref 70–110)
HCT VFR BLD AUTO: 34.6 % (ref 37–48.5)
HGB BLD-MCNC: 11.9 G/DL (ref 12–16)
IMM GRANULOCYTES # BLD AUTO: 0.05 K/UL (ref 0–0.04)
IMM GRANULOCYTES NFR BLD AUTO: 0.9 % (ref 0–0.5)
LYMPHOCYTES # BLD AUTO: 1.2 K/UL (ref 1–4.8)
LYMPHOCYTES NFR BLD: 21.8 % (ref 18–48)
MAGNESIUM SERPL-MCNC: 1.7 MG/DL (ref 1.6–2.6)
MCH RBC QN AUTO: 32.8 PG (ref 27–31)
MCHC RBC AUTO-ENTMCNC: 34.4 G/DL (ref 32–36)
MCV RBC AUTO: 95 FL (ref 82–98)
MONOCYTES # BLD AUTO: 1.4 K/UL (ref 0.3–1)
MONOCYTES NFR BLD: 26.6 % (ref 4–15)
NEUTROPHILS # BLD AUTO: 2.7 K/UL (ref 1.8–7.7)
NEUTROPHILS NFR BLD: 49.6 % (ref 38–73)
NRBC BLD-RTO: 0 /100 WBC
PLATELET # BLD AUTO: 367 K/UL (ref 150–450)
PLATELET BLD QL SMEAR: ABNORMAL
PMV BLD AUTO: 9.7 FL (ref 9.2–12.9)
POTASSIUM SERPL-SCNC: 3.1 MMOL/L (ref 3.5–5.1)
PROT SERPL-MCNC: 6.9 G/DL (ref 6–8.4)
RBC # BLD AUTO: 3.63 M/UL (ref 4–5.4)
SODIUM SERPL-SCNC: 134 MMOL/L (ref 136–145)
WBC # BLD AUTO: 5.37 K/UL (ref 3.9–12.7)

## 2023-01-31 PROCEDURE — 36415 COLL VENOUS BLD VENIPUNCTURE: CPT | Performed by: INTERNAL MEDICINE

## 2023-01-31 PROCEDURE — 80053 COMPREHEN METABOLIC PANEL: CPT | Performed by: INTERNAL MEDICINE

## 2023-01-31 PROCEDURE — 85025 COMPLETE CBC W/AUTO DIFF WBC: CPT | Performed by: INTERNAL MEDICINE

## 2023-01-31 PROCEDURE — 83735 ASSAY OF MAGNESIUM: CPT | Performed by: INTERNAL MEDICINE

## 2023-02-01 ENCOUNTER — PATIENT MESSAGE (OUTPATIENT)
Dept: HEMATOLOGY/ONCOLOGY | Facility: CLINIC | Age: 48
End: 2023-02-01

## 2023-02-01 DIAGNOSIS — R11.2 NAUSEA AND VOMITING, UNSPECIFIED VOMITING TYPE: ICD-10-CM

## 2023-02-01 RX ORDER — HEPARIN 100 UNIT/ML
500 SYRINGE INTRAVENOUS
Status: CANCELLED | OUTPATIENT
Start: 2023-02-01

## 2023-02-01 RX ORDER — DIPHENHYDRAMINE HYDROCHLORIDE 50 MG/ML
50 INJECTION INTRAMUSCULAR; INTRAVENOUS ONCE AS NEEDED
Status: CANCELLED | OUTPATIENT
Start: 2023-02-01

## 2023-02-01 RX ORDER — SODIUM CHLORIDE 0.9 % (FLUSH) 0.9 %
10 SYRINGE (ML) INJECTION
Status: CANCELLED | OUTPATIENT
Start: 2023-02-01

## 2023-02-01 RX ORDER — GRANISETRON 3.1 MG/24H
PATCH TRANSDERMAL
Qty: 2 PATCH | Refills: 0 | Status: SHIPPED | OUTPATIENT
Start: 2023-02-01 | End: 2023-02-20

## 2023-02-01 RX ORDER — DOXORUBICIN HYDROCHLORIDE 2 MG/ML
45 INJECTION, SOLUTION INTRAVENOUS
Status: CANCELLED | OUTPATIENT
Start: 2023-02-01

## 2023-02-01 RX ORDER — EPINEPHRINE 0.3 MG/.3ML
0.3 INJECTION SUBCUTANEOUS ONCE AS NEEDED
Status: CANCELLED | OUTPATIENT
Start: 2023-02-01

## 2023-02-01 RX ORDER — ONDANSETRON 2 MG/ML
8 INJECTION INTRAMUSCULAR; INTRAVENOUS ONCE
Status: CANCELLED
Start: 2023-02-06 | End: 2023-02-06

## 2023-02-02 ENCOUNTER — INFUSION (OUTPATIENT)
Dept: INFUSION THERAPY | Facility: HOSPITAL | Age: 48
End: 2023-02-02
Attending: INTERNAL MEDICINE
Payer: COMMERCIAL

## 2023-02-02 VITALS
DIASTOLIC BLOOD PRESSURE: 63 MMHG | BODY MASS INDEX: 32.17 KG/M2 | RESPIRATION RATE: 18 BRPM | HEIGHT: 62 IN | SYSTOLIC BLOOD PRESSURE: 93 MMHG | HEART RATE: 112 BPM | WEIGHT: 174.81 LBS | TEMPERATURE: 98 F

## 2023-02-02 DIAGNOSIS — C50.412 MALIGNANT NEOPLASM OF UPPER-OUTER QUADRANT OF LEFT BREAST IN FEMALE, ESTROGEN RECEPTOR NEGATIVE: ICD-10-CM

## 2023-02-02 DIAGNOSIS — T45.1X5A CHEMOTHERAPY-INDUCED NEUTROPENIA: Primary | ICD-10-CM

## 2023-02-02 DIAGNOSIS — Z17.1 MALIGNANT NEOPLASM OF UPPER-OUTER QUADRANT OF LEFT BREAST IN FEMALE, ESTROGEN RECEPTOR NEGATIVE: ICD-10-CM

## 2023-02-02 DIAGNOSIS — D70.1 CHEMOTHERAPY-INDUCED NEUTROPENIA: Primary | ICD-10-CM

## 2023-02-02 PROCEDURE — 96375 TX/PRO/DX INJ NEW DRUG ADDON: CPT

## 2023-02-02 PROCEDURE — 96367 TX/PROPH/DG ADDL SEQ IV INF: CPT

## 2023-02-02 PROCEDURE — 25000003 PHARM REV CODE 250: Performed by: NURSE PRACTITIONER

## 2023-02-02 PROCEDURE — 96411 CHEMO IV PUSH ADDL DRUG: CPT

## 2023-02-02 PROCEDURE — 63600175 PHARM REV CODE 636 W HCPCS: Mod: JG | Performed by: NURSE PRACTITIONER

## 2023-02-02 PROCEDURE — 96377 APPLICATON ON-BODY INJECTOR: CPT | Mod: 59

## 2023-02-02 PROCEDURE — A4216 STERILE WATER/SALINE, 10 ML: HCPCS | Performed by: NURSE PRACTITIONER

## 2023-02-02 PROCEDURE — 96413 CHEMO IV INFUSION 1 HR: CPT

## 2023-02-02 RX ORDER — SODIUM CHLORIDE 0.9 % (FLUSH) 0.9 %
10 SYRINGE (ML) INJECTION
Status: DISCONTINUED | OUTPATIENT
Start: 2023-02-02 | End: 2023-02-02 | Stop reason: HOSPADM

## 2023-02-02 RX ORDER — HEPARIN 100 UNIT/ML
500 SYRINGE INTRAVENOUS
Status: DISCONTINUED | OUTPATIENT
Start: 2023-02-02 | End: 2023-02-02 | Stop reason: HOSPADM

## 2023-02-02 RX ORDER — DOXORUBICIN HYDROCHLORIDE 2 MG/ML
45 INJECTION, SOLUTION INTRAVENOUS
Status: COMPLETED | OUTPATIENT
Start: 2023-02-02 | End: 2023-02-02

## 2023-02-02 RX ORDER — DIPHENHYDRAMINE HYDROCHLORIDE 50 MG/ML
50 INJECTION INTRAMUSCULAR; INTRAVENOUS ONCE AS NEEDED
Status: DISCONTINUED | OUTPATIENT
Start: 2023-02-02 | End: 2023-02-02 | Stop reason: HOSPADM

## 2023-02-02 RX ORDER — EPINEPHRINE 0.3 MG/.3ML
0.3 INJECTION SUBCUTANEOUS ONCE AS NEEDED
Status: DISCONTINUED | OUTPATIENT
Start: 2023-02-02 | End: 2023-02-02 | Stop reason: HOSPADM

## 2023-02-02 RX ADMIN — CYCLOPHOSPHAMIDE 840 MG: 200 INJECTION, SOLUTION INTRAVENOUS at 10:02

## 2023-02-02 RX ADMIN — SODIUM CHLORIDE, PRESERVATIVE FREE 10 ML: 5 INJECTION INTRAVENOUS at 11:02

## 2023-02-02 RX ADMIN — PALONOSETRON HYDROCHLORIDE 0.25 MG: 0.25 INJECTION INTRAVENOUS at 09:02

## 2023-02-02 RX ADMIN — DOXORUBICIN HYDROCHLORIDE 84 MG: 2 INJECTION, SOLUTION INTRAVENOUS at 09:02

## 2023-02-02 RX ADMIN — HEPARIN 500 UNITS: 100 SYRINGE at 11:02

## 2023-02-02 RX ADMIN — APREPITANT 130 MG: 130 INJECTION, EMULSION INTRAVENOUS at 09:02

## 2023-02-02 RX ADMIN — PEGFILGRASTIM 6 MG: KIT SUBCUTANEOUS at 11:02

## 2023-02-02 NOTE — PLAN OF CARE
Problem: Nausea and Vomiting  Goal: Fluid and Electrolyte Balance  2/2/2023 0951 by Lela Garzon RN  Outcome: Met  2/2/2023 0951 by Lela Garzon RN  Outcome: Ongoing, Progressing

## 2023-02-03 ENCOUNTER — PATIENT MESSAGE (OUTPATIENT)
Dept: HEMATOLOGY/ONCOLOGY | Facility: CLINIC | Age: 48
End: 2023-02-03

## 2023-02-06 ENCOUNTER — TELEPHONE (OUTPATIENT)
Dept: PHARMACY | Facility: CLINIC | Age: 48
End: 2023-02-06
Payer: COMMERCIAL

## 2023-02-06 ENCOUNTER — INFUSION (OUTPATIENT)
Dept: INFUSION THERAPY | Facility: HOSPITAL | Age: 48
End: 2023-02-06
Attending: INTERNAL MEDICINE
Payer: COMMERCIAL

## 2023-02-06 VITALS
WEIGHT: 181.69 LBS | DIASTOLIC BLOOD PRESSURE: 62 MMHG | SYSTOLIC BLOOD PRESSURE: 96 MMHG | OXYGEN SATURATION: 99 % | HEIGHT: 62 IN | RESPIRATION RATE: 18 BRPM | HEART RATE: 97 BPM | TEMPERATURE: 98 F | BODY MASS INDEX: 33.43 KG/M2

## 2023-02-06 DIAGNOSIS — E86.0 DEHYDRATION: ICD-10-CM

## 2023-02-06 DIAGNOSIS — C50.412 MALIGNANT NEOPLASM OF UPPER-OUTER QUADRANT OF LEFT BREAST IN FEMALE, ESTROGEN RECEPTOR NEGATIVE: ICD-10-CM

## 2023-02-06 DIAGNOSIS — Z17.1 MALIGNANT NEOPLASM OF UPPER-OUTER QUADRANT OF LEFT BREAST IN FEMALE, ESTROGEN RECEPTOR NEGATIVE: ICD-10-CM

## 2023-02-06 DIAGNOSIS — R11.2 NAUSEA & VOMITING: Primary | ICD-10-CM

## 2023-02-06 LAB
ALBUMIN SERPL BCP-MCNC: 3.1 G/DL (ref 3.5–5.2)
ALP SERPL-CCNC: 57 U/L (ref 55–135)
ALT SERPL W/O P-5'-P-CCNC: 12 U/L (ref 10–44)
ANION GAP SERPL CALC-SCNC: 6 MMOL/L (ref 8–16)
ANISOCYTOSIS BLD QL SMEAR: SLIGHT
AST SERPL-CCNC: 17 U/L (ref 10–40)
BASOPHILS NFR BLD: 0 % (ref 0–1.9)
BILIRUB SERPL-MCNC: 0.7 MG/DL (ref 0.1–1)
BUN SERPL-MCNC: 12 MG/DL (ref 6–20)
CALCIUM SERPL-MCNC: 8.5 MG/DL (ref 8.7–10.5)
CHLORIDE SERPL-SCNC: 101 MMOL/L (ref 95–110)
CO2 SERPL-SCNC: 28 MMOL/L (ref 23–29)
CREAT SERPL-MCNC: 0.7 MG/DL (ref 0.5–1.4)
DIFFERENTIAL METHOD: ABNORMAL
EOSINOPHIL NFR BLD: 0 % (ref 0–8)
ERYTHROCYTE [DISTWIDTH] IN BLOOD BY AUTOMATED COUNT: 14.4 % (ref 11.5–14.5)
EST. GFR  (NO RACE VARIABLE): >60 ML/MIN/1.73 M^2
GLUCOSE SERPL-MCNC: 102 MG/DL (ref 70–110)
HCT VFR BLD AUTO: 27.6 % (ref 37–48.5)
HGB BLD-MCNC: 9.1 G/DL (ref 12–16)
HYPOCHROMIA BLD QL SMEAR: ABNORMAL
IMM GRANULOCYTES # BLD AUTO: ABNORMAL K/UL (ref 0–0.04)
IMM GRANULOCYTES NFR BLD AUTO: ABNORMAL % (ref 0–0.5)
LYMPHOCYTES NFR BLD: 6 % (ref 18–48)
MCH RBC QN AUTO: 32.6 PG (ref 27–31)
MCHC RBC AUTO-ENTMCNC: 33 G/DL (ref 32–36)
MCV RBC AUTO: 99 FL (ref 82–98)
MONOCYTES NFR BLD: 0 % (ref 4–15)
NEUTROPHILS NFR BLD: 94 % (ref 38–73)
NRBC BLD-RTO: 0 /100 WBC
PLATELET # BLD AUTO: 86 K/UL (ref 150–450)
PLATELET BLD QL SMEAR: ABNORMAL
PMV BLD AUTO: 12.3 FL (ref 9.2–12.9)
POIKILOCYTOSIS BLD QL SMEAR: SLIGHT
POTASSIUM SERPL-SCNC: 3.3 MMOL/L (ref 3.5–5.1)
PROT SERPL-MCNC: 5.7 G/DL (ref 6–8.4)
RBC # BLD AUTO: 2.79 M/UL (ref 4–5.4)
SODIUM SERPL-SCNC: 135 MMOL/L (ref 136–145)
WBC # BLD AUTO: 14.54 K/UL (ref 3.9–12.7)

## 2023-02-06 PROCEDURE — 63600175 PHARM REV CODE 636 W HCPCS: Performed by: NURSE PRACTITIONER

## 2023-02-06 PROCEDURE — 96365 THER/PROPH/DIAG IV INF INIT: CPT

## 2023-02-06 PROCEDURE — 25000003 PHARM REV CODE 250: Performed by: NURSE PRACTITIONER

## 2023-02-06 PROCEDURE — 63600175 PHARM REV CODE 636 W HCPCS: Performed by: INTERNAL MEDICINE

## 2023-02-06 PROCEDURE — 96361 HYDRATE IV INFUSION ADD-ON: CPT

## 2023-02-06 PROCEDURE — 80053 COMPREHEN METABOLIC PANEL: CPT | Performed by: INTERNAL MEDICINE

## 2023-02-06 PROCEDURE — 85027 COMPLETE CBC AUTOMATED: CPT | Performed by: INTERNAL MEDICINE

## 2023-02-06 PROCEDURE — 85007 BL SMEAR W/DIFF WBC COUNT: CPT | Performed by: INTERNAL MEDICINE

## 2023-02-06 RX ORDER — HEPARIN 100 UNIT/ML
500 SYRINGE INTRAVENOUS
Status: COMPLETED | OUTPATIENT
Start: 2023-02-06 | End: 2023-02-06

## 2023-02-06 RX ORDER — HEPARIN 100 UNIT/ML
500 SYRINGE INTRAVENOUS
Status: CANCELLED
Start: 2023-02-06

## 2023-02-06 RX ORDER — ONDANSETRON HCL IN 0.9 % NACL 8 MG/50 ML
8 INTRAVENOUS SOLUTION, PIGGYBACK (ML) INTRAVENOUS
Status: CANCELLED
Start: 2023-02-06

## 2023-02-06 RX ORDER — ONDANSETRON HCL IN 0.9 % NACL 8 MG/50 ML
8 INTRAVENOUS SOLUTION, PIGGYBACK (ML) INTRAVENOUS
Status: COMPLETED | OUTPATIENT
Start: 2023-02-06 | End: 2023-02-06

## 2023-02-06 RX ADMIN — HEPARIN 500 UNITS: 100 SYRINGE at 10:02

## 2023-02-06 RX ADMIN — SODIUM CHLORIDE 1000 ML: 0.9 INJECTION, SOLUTION INTRAVENOUS at 08:02

## 2023-02-06 RX ADMIN — ONDANSETRON 8 MG: 2 INJECTION INTRAMUSCULAR; INTRAVENOUS at 08:02

## 2023-02-06 NOTE — PLAN OF CARE
Problem: Nausea and Vomiting  Goal: Fluid and Electrolyte Balance  Outcome: Met     Problem: Fatigue  Goal: Improved Activity Tolerance  Outcome: Met

## 2023-02-07 ENCOUNTER — PATIENT MESSAGE (OUTPATIENT)
Dept: HEMATOLOGY/ONCOLOGY | Facility: CLINIC | Age: 48
End: 2023-02-07

## 2023-02-08 ENCOUNTER — TELEPHONE (OUTPATIENT)
Dept: HEMATOLOGY/ONCOLOGY | Facility: CLINIC | Age: 48
End: 2023-02-08

## 2023-02-08 ENCOUNTER — HOSPITAL ENCOUNTER (OUTPATIENT)
Dept: RADIOLOGY | Facility: HOSPITAL | Age: 48
Discharge: HOME | End: 2023-02-08
Attending: NURSE PRACTITIONER
Payer: COMMERCIAL

## 2023-02-08 DIAGNOSIS — R05.9 COUGH, UNSPECIFIED TYPE: ICD-10-CM

## 2023-02-08 PROCEDURE — A9585 GADOBUTROL INJECTION: HCPCS | Mod: PO | Performed by: NURSE PRACTITIONER

## 2023-02-08 PROCEDURE — 77049 MRI BREAST C-+ W/CAD BI: CPT | Mod: TC,PO

## 2023-02-08 PROCEDURE — 25500020 PHARM REV CODE 255: Mod: PO | Performed by: NURSE PRACTITIONER

## 2023-02-08 RX ORDER — GADOBUTROL 604.72 MG/ML
8.5 INJECTION INTRAVENOUS
Status: COMPLETED | OUTPATIENT
Start: 2023-02-08 | End: 2023-02-08

## 2023-02-08 RX ADMIN — GADOBUTROL 8.5 ML: 604.72 INJECTION INTRAVENOUS at 01:02

## 2023-02-14 ENCOUNTER — LAB VISIT (OUTPATIENT)
Dept: LAB | Facility: HOSPITAL | Age: 48
End: 2023-02-14
Attending: INTERNAL MEDICINE
Payer: COMMERCIAL

## 2023-02-14 DIAGNOSIS — C50.412 MALIGNANT NEOPLASM OF UPPER-OUTER QUADRANT OF LEFT BREAST IN FEMALE, ESTROGEN RECEPTOR NEGATIVE: ICD-10-CM

## 2023-02-14 DIAGNOSIS — Z17.1 MALIGNANT NEOPLASM OF UPPER-OUTER QUADRANT OF LEFT BREAST IN FEMALE, ESTROGEN RECEPTOR NEGATIVE: ICD-10-CM

## 2023-02-14 LAB
ALBUMIN SERPL BCP-MCNC: 3.6 G/DL (ref 3.5–5.2)
ALP SERPL-CCNC: 49 U/L (ref 55–135)
ALT SERPL W/O P-5'-P-CCNC: 10 U/L (ref 10–44)
ANION GAP SERPL CALC-SCNC: 11 MMOL/L (ref 8–16)
AST SERPL-CCNC: 19 U/L (ref 10–40)
BASOPHILS # BLD AUTO: 0.06 K/UL (ref 0–0.2)
BASOPHILS NFR BLD: 2.7 % (ref 0–1.9)
BILIRUB SERPL-MCNC: 0.5 MG/DL (ref 0.1–1)
BUN SERPL-MCNC: <5 MG/DL (ref 6–20)
CALCIUM SERPL-MCNC: 9.2 MG/DL (ref 8.7–10.5)
CHLORIDE SERPL-SCNC: 103 MMOL/L (ref 95–110)
CO2 SERPL-SCNC: 21 MMOL/L (ref 23–29)
CREAT SERPL-MCNC: 0.7 MG/DL (ref 0.5–1.4)
DIFFERENTIAL METHOD: ABNORMAL
EOSINOPHIL # BLD AUTO: 0 K/UL (ref 0–0.5)
EOSINOPHIL NFR BLD: 1.4 % (ref 0–8)
ERYTHROCYTE [DISTWIDTH] IN BLOOD BY AUTOMATED COUNT: 13.2 % (ref 11.5–14.5)
EST. GFR  (NO RACE VARIABLE): >60 ML/MIN/1.73 M^2
GLUCOSE SERPL-MCNC: 105 MG/DL (ref 70–110)
HCT VFR BLD AUTO: 27.5 % (ref 37–48.5)
HGB BLD-MCNC: 9.3 G/DL (ref 12–16)
IMM GRANULOCYTES # BLD AUTO: 0.01 K/UL (ref 0–0.04)
IMM GRANULOCYTES NFR BLD AUTO: 0.5 % (ref 0–0.5)
LYMPHOCYTES # BLD AUTO: 0.8 K/UL (ref 1–4.8)
LYMPHOCYTES NFR BLD: 38.2 % (ref 18–48)
MAGNESIUM SERPL-MCNC: 1.6 MG/DL (ref 1.6–2.6)
MCH RBC QN AUTO: 32.5 PG (ref 27–31)
MCHC RBC AUTO-ENTMCNC: 33.8 G/DL (ref 32–36)
MCV RBC AUTO: 96 FL (ref 82–98)
MONOCYTES # BLD AUTO: 0.3 K/UL (ref 0.3–1)
MONOCYTES NFR BLD: 14.1 % (ref 4–15)
NEUTROPHILS # BLD AUTO: 1 K/UL (ref 1.8–7.7)
NEUTROPHILS NFR BLD: 43.1 % (ref 38–73)
NRBC BLD-RTO: 0 /100 WBC
PLATELET # BLD AUTO: 74 K/UL (ref 150–450)
PMV BLD AUTO: 12.3 FL (ref 9.2–12.9)
POTASSIUM SERPL-SCNC: 3.4 MMOL/L (ref 3.5–5.1)
PROT SERPL-MCNC: 6.6 G/DL (ref 6–8.4)
RBC # BLD AUTO: 2.86 M/UL (ref 4–5.4)
SODIUM SERPL-SCNC: 135 MMOL/L (ref 136–145)
WBC # BLD AUTO: 2.22 K/UL (ref 3.9–12.7)

## 2023-02-14 PROCEDURE — 85025 COMPLETE CBC W/AUTO DIFF WBC: CPT | Performed by: INTERNAL MEDICINE

## 2023-02-14 PROCEDURE — 36415 COLL VENOUS BLD VENIPUNCTURE: CPT | Performed by: INTERNAL MEDICINE

## 2023-02-14 PROCEDURE — 80053 COMPREHEN METABOLIC PANEL: CPT | Performed by: INTERNAL MEDICINE

## 2023-02-14 PROCEDURE — 83735 ASSAY OF MAGNESIUM: CPT | Performed by: INTERNAL MEDICINE

## 2023-02-19 NOTE — PROGRESS NOTES
PROGRESS NOTE    Subjective:       Patient ID: Karyna Escoto is a 47 y.o. female.    6/9/2022:  Dx Mammo:  1.8cm mass in the left breast towards the left axilla.   Deep to the mass 2 lymph nodes are identified.     7/5/2022:  Left breast core biopsy:  Grade 2 IDCA with DCIS  ER: 0.03%, WA: 8.8%, HER2: 0  TRIPLE NEGATIVE    7/20/2022:  Left axilla LN needle biopsy:  Invasive ductal carcinoma  ER: 0%, WA: 40%, HER2 negative(0)    PLAN:  Neoadjuvant chemo/IO(Keynote 522)-surgery-radiation.     Pem/Tax/Carb:  Cycle 1: 8/11/2022  Cycle 2: 9/8/2022  Cycle 3: 10/6/2022  Cycle 4: 10/27/2022  Begin Shukri/Cytox/Pem  Cycle 5: 12/1/2022-----MUGA 8/10/2022-EF: 54%  Cycle 6: 12/22/2022  Cycle 7: 1/12/2023  Cycle 8: 2/2/2023 2/8/2023:  MRI:   Resolution of previous left breast mass and previous enlarged left axillary lymph nodes, compatible with favorable response to therapy.  No new disease.    Surgery planned 3/17/2023      8/3/2022 Photos:          8/23/2022 Photo:      10/4/2022 Photo:    Lesion is no longer palpable on 10/4/2022.     11/15/2022          Chief Complaint:  No chief complaint on file.    Triple negative breast cancer follow up.     History of Present Illness:   Karyna Escoto is a 47 y.o. female who presents for follow up of breast cancer.      Patient has had severe nausea/vomiting over the last week and appears dehydrated currently.  Last chemo was 18 days ago. No fever or sick contacts.  She does have diarrhea.      Family and Social history reviewed and is unchanged from 8/3/2022      ROS:  Review of Systems   Constitutional:  Negative for appetite change, fever and unexpected weight change.   HENT:  Negative for mouth sores.    Eyes:  Negative for visual disturbance.   Respiratory:  Negative for cough and shortness of breath.    Cardiovascular:  Negative for chest pain and leg swelling.   Gastrointestinal:  Positive for diarrhea. Negative  for abdominal pain and blood in stool.   Genitourinary:  Positive for frequency. Negative for hematuria.   Musculoskeletal:  Negative for back pain.   Skin:  Negative for rash.   Neurological:  Positive for headaches.   Hematological:  Positive for adenopathy.   Psychiatric/Behavioral:  The patient is not nervous/anxious.         Current Outpatient Medications:     bisacodyL (DULCOLAX) 5 mg EC tablet, Take 5 mg by mouth daily as needed for Constipation., Disp: , Rfl:     cetirizine (ZYRTEC) 10 mg Cap, Take 1 tablet by mouth once daily., Disp: , Rfl:     famotidine (PEPCID) 10 MG tablet, Take 10 mg by mouth 2 (two) times daily., Disp: , Rfl:     fluticasone propionate (FLONASE) 50 mcg/actuation nasal spray, 1 spray (50 mcg total) by Each Nostril route once daily., Disp: 15.8 mL, Rfl: 5    HYDROcodone-acetaminophen (NORCO) 5-325 mg per tablet, Take 1 tablet by mouth every 6 (six) hours as needed for Pain., Disp: 30 tablet, Rfl: 0    hydrOXYzine HCL (ATARAX) 10 MG Tab, Take 10 mg by mouth 3 (three) times daily as needed., Disp: , Rfl:     Lactobacillus rhamnosus GG (CULTURELLE) 10 billion cell capsule, Take 1 capsule by mouth once daily., Disp: , Rfl:     LIDOcaine-prilocaine (EMLA) cream, Apply topically as needed. Apply 30 mins prior to port access, Disp: 30 g, Rfl: 5    loperamide HCl (IMODIUM A-D ORAL), Take 1 tablet by mouth daily as needed., Disp: , Rfl:     ondansetron (ZOFRAN) 8 MG tablet, Take 1 tablet (8 mg total) by mouth every 8 (eight) hours as needed., Disp: 30 tablet, Rfl: 5    promethazine (PHENERGAN) 25 MG suppository, Place 1 suppository (25 mg total) rectally every 6 (six) hours as needed for Nausea., Disp: 12 suppository, Rfl: 1    promethazine (PHENERGAN) 25 MG tablet, Take 1 tablet (25 mg total) by mouth every 4 to 6 hours as needed., Disp: 30 tablet, Rfl: 5    sertraline (ZOLOFT) 50 MG tablet, Take 50 mg by mouth., Disp: , Rfl:         Objective:       Physical Examination:     BP (!) 83/62    "Pulse (!) 137   Temp 97.7 °F (36.5 °C)   Resp 18   Ht 5' 2" (1.575 m)   Wt 74.4 kg (164 lb)   SpO2 100%   BMI 30.00 kg/m²     Physical Exam  Constitutional:       Appearance: She is well-developed.   HENT:      Head: Normocephalic and atraumatic.      Right Ear: External ear normal.      Left Ear: External ear normal.   Eyes:      Conjunctiva/sclera: Conjunctivae normal.      Pupils: Pupils are equal, round, and reactive to light.   Neck:      Thyroid: No thyromegaly.      Trachea: No tracheal deviation.   Cardiovascular:      Rate and Rhythm: Normal rate and regular rhythm.      Heart sounds: Normal heart sounds.   Pulmonary:      Effort: Pulmonary effort is normal.      Breath sounds: Normal breath sounds.   Abdominal:      General: Bowel sounds are normal. There is no distension.      Palpations: Abdomen is soft. There is no mass.      Tenderness: There is no abdominal tenderness.   Skin:     Findings: No rash.   Neurological:      Comments: Neuro intact througout   Psychiatric:         Behavior: Behavior normal.         Thought Content: Thought content normal.         Judgment: Judgment normal.       Labs:   Recent Results (from the past 336 hour(s))   CBC Auto Differential    Collection Time: 02/14/23 10:29 AM   Result Value Ref Range    WBC 2.22 (L) 3.90 - 12.70 K/uL    Hemoglobin 9.3 (L) 12.0 - 16.0 g/dL    Hematocrit 27.5 (L) 37.0 - 48.5 %    Platelets 74 (L) 150 - 450 K/uL     CMP  Sodium   Date Value Ref Range Status   02/14/2023 135 (L) 136 - 145 mmol/L Final     Potassium   Date Value Ref Range Status   02/14/2023 3.4 (L) 3.5 - 5.1 mmol/L Final     Chloride   Date Value Ref Range Status   02/14/2023 103 95 - 110 mmol/L Final     CO2   Date Value Ref Range Status   02/14/2023 21 (L) 23 - 29 mmol/L Final     Glucose   Date Value Ref Range Status   02/14/2023 105 70 - 110 mg/dL Final     BUN   Date Value Ref Range Status   02/14/2023 <5 (L) 6 - 20 mg/dL Final     Creatinine   Date Value Ref Range " Status   02/14/2023 0.7 0.5 - 1.4 mg/dL Final     Calcium   Date Value Ref Range Status   02/14/2023 9.2 8.7 - 10.5 mg/dL Final     Total Protein   Date Value Ref Range Status   02/14/2023 6.6 6.0 - 8.4 g/dL Final     Albumin   Date Value Ref Range Status   02/14/2023 3.6 3.5 - 5.2 g/dL Final     Total Bilirubin   Date Value Ref Range Status   02/14/2023 0.5 0.1 - 1.0 mg/dL Final     Comment:     For infants and newborns, interpretation of results should be based  on gestational age, weight and in agreement with clinical  observations.    Premature Infant recommended reference ranges:  Up to 24 hours.............<8.0 mg/dL  Up to 48 hours............<12.0 mg/dL  3-5 days..................<15.0 mg/dL  6-29 days.................<15.0 mg/dL       Alkaline Phosphatase   Date Value Ref Range Status   02/14/2023 49 (L) 55 - 135 U/L Final     AST   Date Value Ref Range Status   02/14/2023 19 10 - 40 U/L Final     ALT   Date Value Ref Range Status   02/14/2023 10 10 - 44 U/L Final     Anion Gap   Date Value Ref Range Status   02/14/2023 11 8 - 16 mmol/L Final     No results found for: CEA  No results found for: PSA        Assessment/Plan:     Problem List Items Addressed This Visit       Nausea & vomiting     Patient is quite dehydrated and nauseous.  Will arrange fluids for today and add zofran to this.  Hopefully this will break this cycle.           Left Breast Cancer-TRIPLE NEGATIVE     MRI done shows CR and I discussed this today.  Await path on surgery which will be done 3/17.  Will have her back with me after this is complete.             Discussion:     Follow up in about 6 weeks (around 4/4/2023).      Electronically signed by Derek Patterson

## 2023-02-20 ENCOUNTER — INFUSION (OUTPATIENT)
Dept: INFUSION THERAPY | Facility: HOSPITAL | Age: 48
End: 2023-02-20
Attending: INTERNAL MEDICINE
Payer: COMMERCIAL

## 2023-02-20 ENCOUNTER — LAB VISIT (OUTPATIENT)
Dept: LAB | Facility: HOSPITAL | Age: 48
End: 2023-02-20
Attending: INTERNAL MEDICINE
Payer: COMMERCIAL

## 2023-02-20 ENCOUNTER — OFFICE VISIT (OUTPATIENT)
Dept: HEMATOLOGY/ONCOLOGY | Facility: CLINIC | Age: 48
End: 2023-02-20
Payer: COMMERCIAL

## 2023-02-20 VITALS
OXYGEN SATURATION: 100 % | RESPIRATION RATE: 18 BRPM | SYSTOLIC BLOOD PRESSURE: 123 MMHG | HEART RATE: 111 BPM | TEMPERATURE: 98 F | DIASTOLIC BLOOD PRESSURE: 64 MMHG

## 2023-02-20 VITALS
SYSTOLIC BLOOD PRESSURE: 83 MMHG | DIASTOLIC BLOOD PRESSURE: 62 MMHG | WEIGHT: 164 LBS | HEART RATE: 137 BPM | HEIGHT: 62 IN | OXYGEN SATURATION: 100 % | RESPIRATION RATE: 18 BRPM | BODY MASS INDEX: 30.18 KG/M2 | TEMPERATURE: 98 F

## 2023-02-20 DIAGNOSIS — Z17.1 MALIGNANT NEOPLASM OF UPPER-OUTER QUADRANT OF LEFT BREAST IN FEMALE, ESTROGEN RECEPTOR NEGATIVE: ICD-10-CM

## 2023-02-20 DIAGNOSIS — R11.2 NAUSEA AND VOMITING, UNSPECIFIED VOMITING TYPE: ICD-10-CM

## 2023-02-20 DIAGNOSIS — C50.412 MALIGNANT NEOPLASM OF UPPER-OUTER QUADRANT OF LEFT BREAST IN FEMALE, ESTROGEN RECEPTOR NEGATIVE: ICD-10-CM

## 2023-02-20 DIAGNOSIS — E86.0 DEHYDRATION: ICD-10-CM

## 2023-02-20 DIAGNOSIS — R53.83 FATIGUE, UNSPECIFIED TYPE: ICD-10-CM

## 2023-02-20 DIAGNOSIS — R11.2 NAUSEA AND VOMITING, UNSPECIFIED VOMITING TYPE: Primary | ICD-10-CM

## 2023-02-20 LAB
ALBUMIN SERPL BCP-MCNC: 3.7 G/DL (ref 3.5–5.2)
ALP SERPL-CCNC: 45 U/L (ref 55–135)
ALT SERPL W/O P-5'-P-CCNC: 12 U/L (ref 10–44)
ANION GAP SERPL CALC-SCNC: 19 MMOL/L (ref 8–16)
AST SERPL-CCNC: 24 U/L (ref 10–40)
BASOPHILS # BLD AUTO: 0.02 K/UL (ref 0–0.2)
BASOPHILS NFR BLD: 0.5 % (ref 0–1.9)
BILIRUB SERPL-MCNC: 0.8 MG/DL (ref 0.1–1)
BUN SERPL-MCNC: <5 MG/DL (ref 6–20)
CALCIUM SERPL-MCNC: 9.3 MG/DL (ref 8.7–10.5)
CHLORIDE SERPL-SCNC: 102 MMOL/L (ref 95–110)
CO2 SERPL-SCNC: 14 MMOL/L (ref 23–29)
CREAT SERPL-MCNC: 0.7 MG/DL (ref 0.5–1.4)
DIFFERENTIAL METHOD: ABNORMAL
EOSINOPHIL # BLD AUTO: 0 K/UL (ref 0–0.5)
EOSINOPHIL NFR BLD: 0.5 % (ref 0–8)
ERYTHROCYTE [DISTWIDTH] IN BLOOD BY AUTOMATED COUNT: 16.6 % (ref 11.5–14.5)
EST. GFR  (NO RACE VARIABLE): >60 ML/MIN/1.73 M^2
GLUCOSE SERPL-MCNC: 98 MG/DL (ref 70–110)
HCT VFR BLD AUTO: 29 % (ref 37–48.5)
HGB BLD-MCNC: 9.9 G/DL (ref 12–16)
IMM GRANULOCYTES # BLD AUTO: 0.02 K/UL (ref 0–0.04)
IMM GRANULOCYTES NFR BLD AUTO: 0.5 % (ref 0–0.5)
LYMPHOCYTES # BLD AUTO: 1 K/UL (ref 1–4.8)
LYMPHOCYTES NFR BLD: 24.6 % (ref 18–48)
MAGNESIUM SERPL-MCNC: 1.6 MG/DL (ref 1.6–2.6)
MCH RBC QN AUTO: 33 PG (ref 27–31)
MCHC RBC AUTO-ENTMCNC: 34.1 G/DL (ref 32–36)
MCV RBC AUTO: 97 FL (ref 82–98)
MONOCYTES # BLD AUTO: 0.8 K/UL (ref 0.3–1)
MONOCYTES NFR BLD: 21.1 % (ref 4–15)
NEUTROPHILS # BLD AUTO: 2.1 K/UL (ref 1.8–7.7)
NEUTROPHILS NFR BLD: 52.8 % (ref 38–73)
NRBC BLD-RTO: 0 /100 WBC
PLATELET # BLD AUTO: 285 K/UL (ref 150–450)
PLATELET BLD QL SMEAR: ABNORMAL
PMV BLD AUTO: 10.7 FL (ref 9.2–12.9)
POTASSIUM SERPL-SCNC: 3.1 MMOL/L (ref 3.5–5.1)
PROT SERPL-MCNC: 6.4 G/DL (ref 6–8.4)
RBC # BLD AUTO: 3 M/UL (ref 4–5.4)
SODIUM SERPL-SCNC: 135 MMOL/L (ref 136–145)
TSH SERPL DL<=0.005 MIU/L-ACNC: 3.25 UIU/ML (ref 0.34–5.6)
WBC # BLD AUTO: 3.99 K/UL (ref 3.9–12.7)

## 2023-02-20 PROCEDURE — 96361 HYDRATE IV INFUSION ADD-ON: CPT

## 2023-02-20 PROCEDURE — 3074F PR MOST RECENT SYSTOLIC BLOOD PRESSURE < 130 MM HG: ICD-10-PCS | Mod: CPTII,S$GLB,, | Performed by: INTERNAL MEDICINE

## 2023-02-20 PROCEDURE — 85025 COMPLETE CBC W/AUTO DIFF WBC: CPT | Performed by: INTERNAL MEDICINE

## 2023-02-20 PROCEDURE — 96374 THER/PROPH/DIAG INJ IV PUSH: CPT | Mod: 59

## 2023-02-20 PROCEDURE — 96360 HYDRATION IV INFUSION INIT: CPT

## 2023-02-20 PROCEDURE — 25000003 PHARM REV CODE 250: Performed by: INTERNAL MEDICINE

## 2023-02-20 PROCEDURE — 99214 OFFICE O/P EST MOD 30 MIN: CPT | Mod: S$GLB,,, | Performed by: INTERNAL MEDICINE

## 2023-02-20 PROCEDURE — 36415 COLL VENOUS BLD VENIPUNCTURE: CPT | Performed by: INTERNAL MEDICINE

## 2023-02-20 PROCEDURE — 80053 COMPREHEN METABOLIC PANEL: CPT | Performed by: INTERNAL MEDICINE

## 2023-02-20 PROCEDURE — 99214 PR OFFICE/OUTPT VISIT, EST, LEVL IV, 30-39 MIN: ICD-10-PCS | Mod: S$GLB,,, | Performed by: INTERNAL MEDICINE

## 2023-02-20 PROCEDURE — 83735 ASSAY OF MAGNESIUM: CPT | Performed by: INTERNAL MEDICINE

## 2023-02-20 PROCEDURE — 3008F BODY MASS INDEX DOCD: CPT | Mod: CPTII,S$GLB,, | Performed by: INTERNAL MEDICINE

## 2023-02-20 PROCEDURE — 3078F DIAST BP <80 MM HG: CPT | Mod: CPTII,S$GLB,, | Performed by: INTERNAL MEDICINE

## 2023-02-20 PROCEDURE — 3074F SYST BP LT 130 MM HG: CPT | Mod: CPTII,S$GLB,, | Performed by: INTERNAL MEDICINE

## 2023-02-20 PROCEDURE — 84443 ASSAY THYROID STIM HORMONE: CPT | Performed by: INTERNAL MEDICINE

## 2023-02-20 PROCEDURE — 3078F PR MOST RECENT DIASTOLIC BLOOD PRESSURE < 80 MM HG: ICD-10-PCS | Mod: CPTII,S$GLB,, | Performed by: INTERNAL MEDICINE

## 2023-02-20 PROCEDURE — 63600175 PHARM REV CODE 636 W HCPCS: Performed by: INTERNAL MEDICINE

## 2023-02-20 PROCEDURE — 3008F PR BODY MASS INDEX (BMI) DOCUMENTED: ICD-10-PCS | Mod: CPTII,S$GLB,, | Performed by: INTERNAL MEDICINE

## 2023-02-20 RX ORDER — HEPARIN 100 UNIT/ML
500 SYRINGE INTRAVENOUS
Status: DISCONTINUED | OUTPATIENT
Start: 2023-02-20 | End: 2023-02-20 | Stop reason: HOSPADM

## 2023-02-20 RX ORDER — ONDANSETRON 2 MG/ML
8 INJECTION INTRAMUSCULAR; INTRAVENOUS ONCE
Status: CANCELLED
Start: 2023-02-20 | End: 2023-02-20

## 2023-02-20 RX ORDER — ONDANSETRON 2 MG/ML
16 INJECTION INTRAMUSCULAR; INTRAVENOUS ONCE
Status: COMPLETED | OUTPATIENT
Start: 2023-02-20 | End: 2023-02-20

## 2023-02-20 RX ORDER — SODIUM CHLORIDE 0.9 % (FLUSH) 0.9 %
10 SYRINGE (ML) INJECTION
Status: DISCONTINUED | OUTPATIENT
Start: 2023-02-20 | End: 2023-02-20 | Stop reason: HOSPADM

## 2023-02-20 RX ORDER — SODIUM CHLORIDE 0.9 % (FLUSH) 0.9 %
10 SYRINGE (ML) INJECTION
Status: CANCELLED | OUTPATIENT
Start: 2023-02-20

## 2023-02-20 RX ORDER — HEPARIN 100 UNIT/ML
500 SYRINGE INTRAVENOUS
Status: CANCELLED | OUTPATIENT
Start: 2023-02-20

## 2023-02-20 RX ORDER — ONDANSETRON 2 MG/ML
16 INJECTION INTRAMUSCULAR; INTRAVENOUS ONCE
Status: CANCELLED
Start: 2023-02-20 | End: 2023-02-20

## 2023-02-20 RX ADMIN — ONDANSETRON 16 MG: 2 INJECTION INTRAMUSCULAR; INTRAVENOUS at 10:02

## 2023-02-20 RX ADMIN — SODIUM CHLORIDE 1000 ML: 0.9 INJECTION, SOLUTION INTRAVENOUS at 10:02

## 2023-02-20 NOTE — ASSESSMENT & PLAN NOTE
Patient is quite dehydrated and nauseous.  Will arrange fluids for today and add zofran to this.  Hopefully this will break this cycle.

## 2023-02-20 NOTE — ASSESSMENT & PLAN NOTE
MRI done shows CR and I discussed this today.  Await path on surgery which will be done 3/17.  Will have her back with me after this is complete.

## 2023-03-02 ENCOUNTER — HOSPITAL ENCOUNTER (INPATIENT)
Facility: HOSPITAL | Age: 48
LOS: 3 days | Discharge: HOME | DRG: 641 | End: 2023-03-07
Attending: EMERGENCY MEDICINE | Admitting: STUDENT IN AN ORGANIZED HEALTH CARE EDUCATION/TRAINING PROGRAM
Payer: COMMERCIAL

## 2023-03-02 ENCOUNTER — TELEPHONE (OUTPATIENT)
Dept: HEMATOLOGY/ONCOLOGY | Facility: CLINIC | Age: 48
End: 2023-03-02

## 2023-03-02 DIAGNOSIS — R11.10 VOMITING: ICD-10-CM

## 2023-03-02 DIAGNOSIS — T45.1X5A CHEMOTHERAPY-INDUCED NEUTROPENIA: ICD-10-CM

## 2023-03-02 DIAGNOSIS — C50.919 PRIMARY MALIGNANT NEOPLASM OF BREAST, UNSPECIFIED LATERALITY: ICD-10-CM

## 2023-03-02 DIAGNOSIS — R11.2 INTRACTABLE VOMITING WITH NAUSEA: Primary | ICD-10-CM

## 2023-03-02 DIAGNOSIS — E86.0 DEHYDRATION: ICD-10-CM

## 2023-03-02 DIAGNOSIS — R11.2 INTRACTABLE NAUSEA AND VOMITING: ICD-10-CM

## 2023-03-02 DIAGNOSIS — D70.1 CHEMOTHERAPY-INDUCED NEUTROPENIA: ICD-10-CM

## 2023-03-02 LAB
ALBUMIN SERPL BCP-MCNC: 3.6 G/DL (ref 3.5–5.2)
ALP SERPL-CCNC: 44 U/L (ref 55–135)
ALT SERPL W/O P-5'-P-CCNC: 13 U/L (ref 10–44)
AMPHET+METHAMPHET UR QL: NEGATIVE
ANION GAP SERPL CALC-SCNC: 17 MMOL/L (ref 8–16)
AST SERPL-CCNC: 33 U/L (ref 10–40)
BACTERIA #/AREA URNS HPF: NEGATIVE /HPF
BARBITURATES UR QL SCN>200 NG/ML: NEGATIVE
BASOPHILS # BLD AUTO: 0.07 K/UL (ref 0–0.2)
BASOPHILS NFR BLD: 1.9 % (ref 0–1.9)
BENZODIAZ UR QL SCN>200 NG/ML: NEGATIVE
BILIRUB SERPL-MCNC: 1 MG/DL (ref 0.1–1)
BILIRUB UR QL STRIP: ABNORMAL
BNP SERPL-MCNC: 8 PG/ML (ref 0–99)
BUN SERPL-MCNC: <5 MG/DL (ref 6–20)
BZE UR QL SCN: NEGATIVE
CALCIUM SERPL-MCNC: 9.5 MG/DL (ref 8.7–10.5)
CANNABINOIDS UR QL SCN: NEGATIVE
CHLORIDE SERPL-SCNC: 100 MMOL/L (ref 95–110)
CK SERPL-CCNC: 49 U/L (ref 20–180)
CLARITY UR: CLEAR
CO2 SERPL-SCNC: 17 MMOL/L (ref 23–29)
COLOR UR: YELLOW
CREAT SERPL-MCNC: 0.7 MG/DL (ref 0.5–1.4)
CREAT UR-MCNC: 145 MG/DL (ref 15–325)
DIFFERENTIAL METHOD: ABNORMAL
EOSINOPHIL # BLD AUTO: 0.1 K/UL (ref 0–0.5)
EOSINOPHIL NFR BLD: 1.3 % (ref 0–8)
ERYTHROCYTE [DISTWIDTH] IN BLOOD BY AUTOMATED COUNT: 16 % (ref 11.5–14.5)
EST. GFR  (NO RACE VARIABLE): >60 ML/MIN/1.73 M^2
GLUCOSE SERPL-MCNC: 81 MG/DL (ref 70–110)
GLUCOSE UR QL STRIP: NEGATIVE
HCG INTACT+B SERPL-ACNC: 0.7 MIU/ML
HCT VFR BLD AUTO: 30.7 % (ref 37–48.5)
HGB BLD-MCNC: 10.4 G/DL (ref 12–16)
HGB UR QL STRIP: NEGATIVE
HYALINE CASTS #/AREA URNS LPF: 2 /LPF
IMM GRANULOCYTES # BLD AUTO: 0.01 K/UL (ref 0–0.04)
IMM GRANULOCYTES NFR BLD AUTO: 0.3 % (ref 0–0.5)
KETONES UR QL STRIP: ABNORMAL
LACTATE SERPL-SCNC: 1.5 MMOL/L (ref 0.5–1.9)
LEUKOCYTE ESTERASE UR QL STRIP: NEGATIVE
LIPASE SERPL-CCNC: 30 U/L (ref 4–60)
LYMPHOCYTES # BLD AUTO: 0.8 K/UL (ref 1–4.8)
LYMPHOCYTES NFR BLD: 20.9 % (ref 18–48)
MAGNESIUM SERPL-MCNC: 1.4 MG/DL (ref 1.6–2.6)
MCH RBC QN AUTO: 33.5 PG (ref 27–31)
MCHC RBC AUTO-ENTMCNC: 33.9 G/DL (ref 32–36)
MCV RBC AUTO: 99 FL (ref 82–98)
MICROSCOPIC COMMENT: ABNORMAL
MONOCYTES # BLD AUTO: 0.9 K/UL (ref 0.3–1)
MONOCYTES NFR BLD: 23 % (ref 4–15)
NEUTROPHILS # BLD AUTO: 2 K/UL (ref 1.8–7.7)
NEUTROPHILS NFR BLD: 52.6 % (ref 38–73)
NITRITE UR QL STRIP: NEGATIVE
NRBC BLD-RTO: 0 /100 WBC
OPIATES UR QL SCN: NEGATIVE
PCP UR QL SCN>25 NG/ML: NEGATIVE
PH UR STRIP: 6 [PH] (ref 5–8)
PLATELET # BLD AUTO: 191 K/UL (ref 150–450)
PMV BLD AUTO: 11.2 FL (ref 9.2–12.9)
POTASSIUM SERPL-SCNC: 3.1 MMOL/L (ref 3.5–5.1)
PROT SERPL-MCNC: 6.3 G/DL (ref 6–8.4)
PROT UR QL STRIP: ABNORMAL
RBC # BLD AUTO: 3.1 M/UL (ref 4–5.4)
RBC #/AREA URNS HPF: 2 /HPF (ref 0–4)
SODIUM SERPL-SCNC: 134 MMOL/L (ref 136–145)
SP GR UR STRIP: 1.02 (ref 1–1.03)
SQUAMOUS #/AREA URNS HPF: 2 /HPF
TOXICOLOGY INFORMATION: NORMAL
TROPONIN I SERPL HS-MCNC: 29.6 PG/ML (ref 0–14.9)
TROPONIN I SERPL HS-MCNC: 30.5 PG/ML (ref 0–14.9)
TSH SERPL DL<=0.005 MIU/L-ACNC: 3.12 UIU/ML (ref 0.34–5.6)
URN SPEC COLLECT METH UR: ABNORMAL
UROBILINOGEN UR STRIP-ACNC: ABNORMAL EU/DL
WBC # BLD AUTO: 3.78 K/UL (ref 3.9–12.7)
WBC #/AREA URNS HPF: 2 /HPF (ref 0–5)

## 2023-03-02 PROCEDURE — 82550 ASSAY OF CK (CPK): CPT | Performed by: EMERGENCY MEDICINE

## 2023-03-02 PROCEDURE — 51701 INSERT BLADDER CATHETER: CPT

## 2023-03-02 PROCEDURE — 36415 COLL VENOUS BLD VENIPUNCTURE: CPT | Performed by: EMERGENCY MEDICINE

## 2023-03-02 PROCEDURE — 96366 THER/PROPH/DIAG IV INF ADDON: CPT

## 2023-03-02 PROCEDURE — 83605 ASSAY OF LACTIC ACID: CPT | Performed by: EMERGENCY MEDICINE

## 2023-03-02 PROCEDURE — 85025 COMPLETE CBC W/AUTO DIFF WBC: CPT | Performed by: EMERGENCY MEDICINE

## 2023-03-02 PROCEDURE — G0378 HOSPITAL OBSERVATION PER HR: HCPCS

## 2023-03-02 PROCEDURE — 93010 EKG 12-LEAD: ICD-10-PCS | Mod: ,,, | Performed by: INTERNAL MEDICINE

## 2023-03-02 PROCEDURE — 87040 BLOOD CULTURE FOR BACTERIA: CPT | Performed by: EMERGENCY MEDICINE

## 2023-03-02 PROCEDURE — 93010 ELECTROCARDIOGRAM REPORT: CPT | Mod: ,,, | Performed by: INTERNAL MEDICINE

## 2023-03-02 PROCEDURE — 83735 ASSAY OF MAGNESIUM: CPT | Performed by: EMERGENCY MEDICINE

## 2023-03-02 PROCEDURE — 63600175 PHARM REV CODE 636 W HCPCS

## 2023-03-02 PROCEDURE — 25000003 PHARM REV CODE 250: Performed by: EMERGENCY MEDICINE

## 2023-03-02 PROCEDURE — 84702 CHORIONIC GONADOTROPIN TEST: CPT | Performed by: EMERGENCY MEDICINE

## 2023-03-02 PROCEDURE — 81001 URINALYSIS AUTO W/SCOPE: CPT | Performed by: EMERGENCY MEDICINE

## 2023-03-02 PROCEDURE — 84484 ASSAY OF TROPONIN QUANT: CPT | Performed by: EMERGENCY MEDICINE

## 2023-03-02 PROCEDURE — 96361 HYDRATE IV INFUSION ADD-ON: CPT

## 2023-03-02 PROCEDURE — 25500020 PHARM REV CODE 255: Performed by: EMERGENCY MEDICINE

## 2023-03-02 PROCEDURE — 63600175 PHARM REV CODE 636 W HCPCS: Performed by: EMERGENCY MEDICINE

## 2023-03-02 PROCEDURE — 84443 ASSAY THYROID STIM HORMONE: CPT | Performed by: EMERGENCY MEDICINE

## 2023-03-02 PROCEDURE — 93005 ELECTROCARDIOGRAM TRACING: CPT | Performed by: INTERNAL MEDICINE

## 2023-03-02 PROCEDURE — 96365 THER/PROPH/DIAG IV INF INIT: CPT

## 2023-03-02 PROCEDURE — 99285 EMERGENCY DEPT VISIT HI MDM: CPT | Mod: 25

## 2023-03-02 PROCEDURE — 83880 ASSAY OF NATRIURETIC PEPTIDE: CPT | Performed by: EMERGENCY MEDICINE

## 2023-03-02 PROCEDURE — 80307 DRUG TEST PRSMV CHEM ANLYZR: CPT | Performed by: EMERGENCY MEDICINE

## 2023-03-02 PROCEDURE — 83690 ASSAY OF LIPASE: CPT | Performed by: EMERGENCY MEDICINE

## 2023-03-02 PROCEDURE — 80053 COMPREHEN METABOLIC PANEL: CPT | Performed by: EMERGENCY MEDICINE

## 2023-03-02 RX ORDER — PANTOPRAZOLE SODIUM 40 MG/10ML
40 INJECTION, POWDER, LYOPHILIZED, FOR SOLUTION INTRAVENOUS DAILY
Status: DISCONTINUED | OUTPATIENT
Start: 2023-03-03 | End: 2023-03-07 | Stop reason: HOSPADM

## 2023-03-02 RX ORDER — ENOXAPARIN SODIUM 100 MG/ML
40 INJECTION SUBCUTANEOUS EVERY 24 HOURS
Status: DISCONTINUED | OUTPATIENT
Start: 2023-03-03 | End: 2023-03-07 | Stop reason: HOSPADM

## 2023-03-02 RX ORDER — ONDANSETRON 4 MG/1
8 TABLET, ORALLY DISINTEGRATING ORAL EVERY 6 HOURS PRN
Status: DISCONTINUED | OUTPATIENT
Start: 2023-03-03 | End: 2023-03-03

## 2023-03-02 RX ORDER — SODIUM CHLORIDE, SODIUM LACTATE, POTASSIUM CHLORIDE, CALCIUM CHLORIDE 600; 310; 30; 20 MG/100ML; MG/100ML; MG/100ML; MG/100ML
INJECTION, SOLUTION INTRAVENOUS CONTINUOUS
Status: DISCONTINUED | OUTPATIENT
Start: 2023-03-02 | End: 2023-03-04

## 2023-03-02 RX ORDER — TALC
6 POWDER (GRAM) TOPICAL NIGHTLY PRN
Status: DISCONTINUED | OUTPATIENT
Start: 2023-03-02 | End: 2023-03-07 | Stop reason: HOSPADM

## 2023-03-02 RX ORDER — SODIUM CHLORIDE 0.9 % (FLUSH) 0.9 %
10 SYRINGE (ML) INJECTION
Status: DISCONTINUED | OUTPATIENT
Start: 2023-03-02 | End: 2023-03-07 | Stop reason: HOSPADM

## 2023-03-02 RX ORDER — FAMOTIDINE 10 MG/1
10 TABLET ORAL 2 TIMES DAILY
Status: DISCONTINUED | OUTPATIENT
Start: 2023-03-03 | End: 2023-03-02

## 2023-03-02 RX ORDER — HYDROXYZINE HYDROCHLORIDE 10 MG/1
10 TABLET, FILM COATED ORAL 3 TIMES DAILY PRN
Status: DISCONTINUED | OUTPATIENT
Start: 2023-03-02 | End: 2023-03-07 | Stop reason: HOSPADM

## 2023-03-02 RX ORDER — ONDANSETRON 2 MG/ML
8 INJECTION INTRAMUSCULAR; INTRAVENOUS EVERY 8 HOURS PRN
Status: DISCONTINUED | OUTPATIENT
Start: 2023-03-03 | End: 2023-03-03

## 2023-03-02 RX ORDER — SERTRALINE HYDROCHLORIDE 50 MG/1
50 TABLET, FILM COATED ORAL DAILY
Status: DISCONTINUED | OUTPATIENT
Start: 2023-03-03 | End: 2023-03-07 | Stop reason: HOSPADM

## 2023-03-02 RX ORDER — ACETAMINOPHEN 325 MG/1
650 TABLET ORAL EVERY 8 HOURS PRN
Status: DISCONTINUED | OUTPATIENT
Start: 2023-03-03 | End: 2023-03-07 | Stop reason: HOSPADM

## 2023-03-02 RX ORDER — MAGNESIUM SULFATE HEPTAHYDRATE 40 MG/ML
2 INJECTION, SOLUTION INTRAVENOUS ONCE
Status: COMPLETED | OUTPATIENT
Start: 2023-03-02 | End: 2023-03-02

## 2023-03-02 RX ORDER — FLUTICASONE PROPIONATE 50 MCG
1 SPRAY, SUSPENSION (ML) NASAL DAILY
Status: DISCONTINUED | OUTPATIENT
Start: 2023-03-03 | End: 2023-03-07 | Stop reason: HOSPADM

## 2023-03-02 RX ORDER — HYDROCODONE BITARTRATE AND ACETAMINOPHEN 5; 325 MG/1; MG/1
1 TABLET ORAL EVERY 6 HOURS PRN
Status: DISCONTINUED | OUTPATIENT
Start: 2023-03-02 | End: 2023-03-07 | Stop reason: HOSPADM

## 2023-03-02 RX ORDER — LOPERAMIDE HYDROCHLORIDE 2 MG/1
2 CAPSULE ORAL DAILY PRN
Status: DISCONTINUED | OUTPATIENT
Start: 2023-03-02 | End: 2023-03-07 | Stop reason: HOSPADM

## 2023-03-02 RX ORDER — CETIRIZINE HYDROCHLORIDE 10 MG/1
10 TABLET ORAL DAILY
Status: DISCONTINUED | OUTPATIENT
Start: 2023-03-03 | End: 2023-03-07 | Stop reason: HOSPADM

## 2023-03-02 RX ORDER — ONDANSETRON 2 MG/ML
INJECTION INTRAMUSCULAR; INTRAVENOUS
Status: COMPLETED
Start: 2023-03-02 | End: 2023-03-02

## 2023-03-02 RX ADMIN — SODIUM CHLORIDE, SODIUM LACTATE, POTASSIUM CHLORIDE, AND CALCIUM CHLORIDE: .6; .31; .03; .02 INJECTION, SOLUTION INTRAVENOUS at 08:03

## 2023-03-02 RX ADMIN — IOHEXOL 100 ML: 350 INJECTION, SOLUTION INTRAVENOUS at 07:03

## 2023-03-02 RX ADMIN — SODIUM CHLORIDE, SODIUM LACTATE, POTASSIUM CHLORIDE, AND CALCIUM CHLORIDE 2200 ML: .6; .31; .03; .02 INJECTION, SOLUTION INTRAVENOUS at 03:03

## 2023-03-02 RX ADMIN — MAGNESIUM SULFATE 2 G: 2 INJECTION INTRAVENOUS at 06:03

## 2023-03-02 RX ADMIN — ONDANSETRON 4 MG: 2 INJECTION INTRAMUSCULAR; INTRAVENOUS at 06:03

## 2023-03-02 RX ADMIN — POTASSIUM BICARBONATE 50 MEQ: 977.5 TABLET, EFFERVESCENT ORAL at 06:03

## 2023-03-02 NOTE — TELEPHONE ENCOUNTER
----- Message from Jocelyne Ponce RN sent at 3/2/2023  9:25 AM CST -----    ----- Message -----  From: Ann Colvin  Sent: 3/2/2023   9:17 AM CST  To: [facility] Staff    Pts  Chi is calling out of concern that he cannot get her to eat anything. It has been a month since her last trtmt. What do you suggest?     [phone]

## 2023-03-02 NOTE — ED PROVIDER NOTES
Encounter Date: 3/2/2023       History     Chief Complaint   Patient presents with    Vomiting    Nausea     N/V x 4 weeks unrelieved with IV fluids and antiemetics at the infusion center. Breast cancer stage 3 on chemo last feb 2.      47-year-old female presents with intractable nausea and vomiting with associated generalized weakness fatigue and lightheadedness.  The patient received her last dose of chemotherapy about a month ago.  She has had daily multiple episodes of emesis since then.  She is been receiving outpatient IV fluids several times weekly for the symptoms.  The symptoms have not improved and have worsened.  She states that she is occasionally able to keep food down for 1 or 2 small meals and then she will start vomiting again.  Any and all foods can induce the nausea and vomiting.  She is also having intermittent loose watery stool which is partially controlled with Imodium.  She denies fever.  She has had weight loss.  She denies chest pain or shortness of breath.  She has felt lightheaded but has not had any syncope.  She denies any focal weakness numbness tingling or paresthesias.  She denies any gastrointestinal bleeding.  Symptoms are moderate to severe in intensity with no exacerbating or alleviating factors.  She called to discuss this with her physician was told to come to the emergency room.  She denies any other problems or complaints.      Review of patient's allergies indicates:   Allergen Reactions    Taxol [paclitaxel] Rash     Past Medical History:   Diagnosis Date    Anxiety     Breast cancer 07/2022     Past Surgical History:   Procedure Laterality Date    BREAST BIOPSY Left 07/2022    eye lid surgery Bilateral 1990    INSERTION OF TUNNELED CENTRAL VENOUS CATHETER (CVC) WITH SUBCUTANEOUS PORT Left 11/21/2022    Procedure: [id number];  Surgeon: Oscar Murphy III, MD;  Location: [facility];  Service: General;  Laterality: Left;    NOSE SURGERY  1990    PORTACAT  PLACEMENT  08/2022    [facility]    TONSILLECTOMY  1990    TUBAL LIGATION  2013     Family History   Problem Relation Age of Onset    Breast cancer Maternal Grandmother     Prostate cancer Maternal Grandfather      Social History     Tobacco Use    Smoking status: Former    Tobacco comments:     Quit 2005-13 year history -1 daily   Substance Use Topics    Alcohol use: Not Currently     Comment: occasional     Review of Systems   Constitutional:  Positive for activity change, appetite change and fatigue. Negative for chills and fever.   HENT: Negative.  Negative for congestion, ear pain, rhinorrhea, sore throat and trouble swallowing.    Eyes: Negative.  Negative for photophobia, pain, redness and visual disturbance.   Respiratory: Negative.  Negative for cough, chest tightness, shortness of breath and wheezing.    Cardiovascular: Negative.  Negative for chest pain, palpitations and leg swelling.   Gastrointestinal:  Positive for diarrhea, nausea and vomiting. Negative for abdominal distention, abdominal pain, blood in stool and constipation.   Endocrine: Negative.    Genitourinary: Negative.  Negative for decreased urine volume, difficulty urinating, dysuria, flank pain, frequency, pelvic pain and urgency.   Musculoskeletal: Negative.  Negative for arthralgias, back pain, gait problem, myalgias, neck pain and neck stiffness.   Skin: Negative.  Negative for rash.   Neurological:  Positive for light-headedness. Negative for dizziness, syncope, facial asymmetry, speech difficulty, weakness, numbness and headaches.   Hematological:  Does not bruise/bleed easily.   Psychiatric/Behavioral: Negative.  Negative for confusion.    All other systems reviewed and are negative.    Physical Exam     Initial Vitals [03/02/23 1354]   BP Pulse Resp Temp SpO2   (!) 82/72 (!) 145 20 98.1 °F (36.7 °C) 98 %      MAP       --         Physical Exam    Nursing note and vitals reviewed.  Constitutional: She is active and cooperative.   Non-toxic appearance. She has a sickly appearance.   HENT:   Head: Normocephalic and atraumatic.   Right Ear: Tympanic membrane normal.   Left Ear: Tympanic membrane normal.   Nose: Nose normal.   Mouth/Throat: Uvula is midline and oropharynx is clear and moist. Mucous membranes are dry. No oral lesions. No uvula swelling. No oropharyngeal exudate, posterior oropharyngeal edema or posterior oropharyngeal erythema.   Eyes: Conjunctivae, EOM and lids are normal. Pupils are equal, round, and reactive to light. No scleral icterus.   Neck: Trachea normal and phonation normal. Neck supple. No thyroid mass and no thyromegaly present. No stridor present. No JVD present.   Normal range of motion.   Full passive range of motion without pain.     Cardiovascular:  Normal rate, regular rhythm, normal heart sounds, intact distal pulses and normal pulses.     Exam reveals no gallop, no distant heart sounds and no friction rub.       No murmur heard.  Pulmonary/Chest: Effort normal and breath sounds normal. No accessory muscle usage or stridor. No tachypnea. No respiratory distress. She has no wheezes. She has no rhonchi. She has no rales.   Abdominal: Abdomen is soft. Bowel sounds are normal. She exhibits no distension, no pulsatile midline mass and no mass. There is no abdominal tenderness. There is no rigidity and no guarding.   Musculoskeletal:         General: No tenderness or edema. Normal range of motion.      Right hand: Normal. Normal range of motion. Normal strength. Normal sensation. Normal capillary refill. Normal pulse.      Left hand: Normal. Normal range of motion. Normal strength. Normal sensation. Normal capillary refill. Normal pulse.      Cervical back: Normal, full passive range of motion without pain, normal range of motion and neck supple. No edema, erythema, rigidity or bony tenderness. No spinous process tenderness or muscular tenderness. Normal range of motion.      Thoracic back: Normal. No bony  tenderness. Normal range of motion.      Lumbar back: Normal. No bony tenderness. Normal range of motion.      Right foot: Normal. Normal range of motion and normal capillary refill. No tenderness or bony tenderness. Normal pulse.      Left foot: Normal. Normal range of motion and normal capillary refill. No tenderness or bony tenderness. Normal pulse.      Comments: Pulses are 2+ throughout, cap refill is less than 2 sec throughout, extremities are nontender throughout with full range of motion. There is no spinal tenderness to palpation.     Neurological: She is alert and oriented to person, place, and time. She has normal strength. She displays normal reflexes. No cranial nerve deficit or sensory deficit. Gait normal.   No focal deficits.   Skin: Skin is warm, dry and intact. Capillary refill takes 2 to 3 seconds. No ecchymosis, no petechiae and no rash noted. No erythema. No pallor.   Poor skin turgor.   Psychiatric: She has a normal mood and affect. Her speech is normal and behavior is normal. Judgment and thought content normal. Cognition and memory are normal.       ED Course   Procedures  Labs Reviewed   URINALYSIS, REFLEX TO URINE CULTURE - Abnormal; Notable for the following components:       Result Value    Protein, UA 1+ (*)     Ketones, UA 3+ (*)     Bilirubin (UA) 1+ (*)     Urobilinogen, UA 2.0-3.0 (*)     All other components within normal limits    Narrative:     Specimen Source->Urine   MAGNESIUM - Abnormal; Notable for the following components:    Magnesium 1.4 (*)     All other components within normal limits   CBC W/ AUTO DIFFERENTIAL - Abnormal; Notable for the following components:    WBC 3.78 (*)     RBC 3.10 (*)     Hemoglobin 10.4 (*)     Hematocrit 30.7 (*)     MCV 99 (*)     MCH 33.5 (*)     RDW 16.0 (*)     Lymph # 0.8 (*)     Mono % 23.0 (*)     All other components within normal limits   COMPREHENSIVE METABOLIC PANEL - Abnormal; Notable for the following components:    Sodium 134 (*)      Potassium 3.1 (*)     CO2 17 (*)     BUN <5 (*)     Alkaline Phosphatase 44 (*)     Anion Gap 17 (*)     All other components within normal limits   TROPONIN I HIGH SENSITIVITY - Abnormal; Notable for the following components:    Troponin I High Sensitivity 30.5 (*)     All other components within normal limits   TROPONIN I HIGH SENSITIVITY - Abnormal; Notable for the following components:    Troponin I High Sensitivity 29.6 (*)     All other components within normal limits   URINALYSIS MICROSCOPIC - Abnormal; Notable for the following components:    Hyaline Casts, UA 2 (*)     All other components within normal limits    Narrative:     Specimen Source->Urine   CULTURE, BLOOD   CULTURE, BLOOD   LIPASE   DRUG SCREEN PANEL, URINE EMERGENCY    Narrative:     Specimen Source->Urine   CK   TSH   B-TYPE NATRIURETIC PEPTIDE   LACTIC ACID, PLASMA   HCG, QUANTITATIVE   HCG, QUANTITATIVE        ECG Results              EKG 12-lead (Final result)  Result time 03/02/23 17:07:17      Final result by Interface, Lab In [facility] (03/02/23 17:07:17)                   Narrative:    Test Reason : R11.10,    Vent. Rate : 118 BPM     Atrial Rate : 118 BPM     P-R Int : 112 ms          QRS Dur : 072 ms      QT Int : 382 ms       P-R-T Axes : 058 057 049 degrees     QTc Int : 535 ms    Sinus tachycardia  Prolonged QT  Abnormal ECG  When compared with ECG of 17-NOV-2022 08:51,  Vent. rate has increased BY  59 BPM  Confirmed by Sukhi Pro MD ([id number]) on 3/2/2023 5:07:07 PM    Referred By: [email or web address]   SELF           Confirmed By:Sukhi Pro MD                                  Imaging Results              CT Abdomen Pelvis With Contrast (Final result)  Result time 03/02/23 20:58:43      Final result by Saskia Coffey MD (03/02/23 20:58:43)                   Narrative:    EXAM:  CT Abdomen and Pelvis With Intravenous Contrast    CLINICAL HISTORY:  The patient is 47 years old and is Female; Bowel obstruction  suspected    TECHNIQUE:  Axial computed tomography images of the abdomen and pelvis with intravenous contrast.  Sagittal and coronal reformatted images were created and reviewed.  This CT exam was performed using one or more of the following dose reduction techniques:  automated exposure control, adjustment of the mA and/or kV according to patient size, and/or use of iterative reconstruction technique.    COMPARISON:  No relevant prior studies available.    FINDINGS:  LUNG BASES:  Unremarkable.  No mass.  No consolidation.    ABDOMEN:  LIVER:  Low attenuation at the level of the falciform ligament is present suggesting fatty infiltration. A small cyst within the left hepatic lobe measuring approximately 1.1 cm is present. The liver is otherwise homogeneous.  GALLBLADDER AND BILE DUCTS:  The gallbladder is distended. No calcified gallstones or ductal dilatation is seen.  PANCREAS:  No ductal dilation.  No mass.  SPLEEN:  Unremarkable.  ADRENALS:  Unremarkable.  No mass.  KIDNEYS AND URETERS:  Unremarkable. The kidneys enhance symmetrically. No obstructing renal or ureteral calculus is seen. No hydronephrosis or hydroureter. No perinephric fluid or stranding.  STOMACH AND BOWEL:  The stomach is distended with fluid and food contents. The small bowel is relatively normal in caliber. Stool is present throughout colon. A few scattered colonic diverticula are noted without surrounding inflammation. There is no mucosal thickening or evidence of obstruction.    PELVIS:  APPENDIX:  The appendix is normal in caliber without surrounding inflammation.  BLADDER:  The bladder is well distended.  REPRODUCTIVE:  Suggestion of uterine fibroids are noted. The ovaries are grossly unremarkable.    ABDOMEN and PELVIS:  INTRAPERITONEAL SPACE:  Unremarkable.  No free air.  No significant fluid collection.  BONES/JOINTS:  No acute fracture.  SOFT TISSUES:  The soft tissues are normal.  VASCULATURE:  Unremarkable.  No abdominal aortic  aneurysm.  LYMPH NODES:  Unremarkable. No enlarged lymph nodes.    IMPRESSION:  1.  No evidence of bowel obstruction.  2.  Mild colonic diverticulosis.    Electronically signed by:  Saskia Coffey MD  3/2/2023 8:58 PM CST Workstation: [id number]                                     CTA Chest Non-Coronary (PE Studies) (Final result)  Result time 03/02/23 20:35:09      Final result by Cyrus Chamorro MD (03/02/23 20:35:09)                   Narrative:    EXAM:  CT Angiography Chest With Intravenous Contrast    CLINICAL HISTORY:  The patient is 47 years old and is Female; Pulmonary embolism (PE) suspected, high prob    TECHNIQUE:  Axial computed tomographic angiography images of the chest with intravenous contrast.  Sagittal and coronal reformatted images were created and reviewed.  This CT exam was performed using one or more of the following dose reduction techniques:  automated exposure control, adjustment of the mA and/or kV according to patient size, and/or use of iterative reconstruction technique.  MIP reconstructed images were created and reviewed.    COMPARISON:  No relevant prior studies available.    FINDINGS:  PULMONARY ARTERIES:  No pulmonary embolism.  AORTA:  No acute findings.  No thoracic aortic aneurysm.  LUNGS:  Unremarkable.  No mass.  No consolidation.  PLEURAL SPACE:  Unremarkable.  No significant effusion.  No pneumothorax.  HEART:  Unremarkable.  No cardiomegaly.  No significant pericardial effusion.  No evidence of RV dysfunction.  BONES/JOINTS:  No acute fracture.  No dislocation.  SOFT TISSUES:  Unremarkable.  LYMPH NODES:  Unremarkable.  No enlarged lymph nodes.    IMPRESSION:  No pulmonary embolism or other acute finding in the chest.    Electronically signed by:  Cyrus Chamorro MD  3/2/2023 8:35 PM CST Workstation: [id number]                                     X-Ray Chest AP Portable (Final result)  Result time 03/02/23 15:48:27      Final result by Cyrus Larios MD (03/02/23  15:48:27)                   Narrative:    Reason: Vomiting    FINDINGS:    Portable chest with comparison chest x-ray November 21, 2022 show normal cardiomediastinal silhouette. Left subclavian Port-A-Cath noted.  Lungs are clear. Pulmonary vasculature is normal. No acute osseous abnormality.    IMPRESSION:    No acute cardiopulmonary abnormality.    Electronically signed by:  Cyrus Larios DO  3/2/2023 3:48 PM CST Workstation: [id number]                                     Medications   lactated ringers infusion ( Intravenous New Bag 3/2/23 2030)   lactated ringers bolus 2,200 mL (0 mLs Intravenous Stopped 3/2/23 1830)   potassium bicarbonate disintegrating tablet 50 mEq (50 mEq Oral Given 3/2/23 1829)   magnesium sulfate 2g in water 50mL IVPB (premix) (0 g Intravenous Stopped 3/2/23 2026)   ondansetron 4 mg/2 mL injection (4 mg  Given 3/2/23 1824)   iohexoL (OMNIPAQUE 350) injection 100 mL (100 mLs Intravenous Given 3/2/23 1912)     Medical Decision Making:   Clinical Tests:   Lab Tests: Ordered and Reviewed  Radiological Study: Ordered and Reviewed  Medical Tests: Ordered and Reviewed  ED Management:  Emergent evaluation of a patient with intractable nausea and vomiting.  Has required several doses of antiemetics in the emergency room.  Has still had vomiting after antiemetics as well as IV fluids.  She is not exhibiting evidence of shock or hypoperfusion.  She has been mildly hypotensive, tachycardia has improved with IV fluids however she still does have baseline mild tachycardia.  She is not febrile.  Symptoms have been going on over the past month.  CT scan with no evidence of colitis.  Pulmonary embolism ruled out by CT scan.  Patient is still dehydrated, requiring further IV fluids and symptomatic supportive care, will discuss with hospitalist for admission.                        Clinical Impression:   Final diagnoses:  [R11.10] Vomiting  [R11.2] Intractable vomiting with nausea (Primary)  [E86.0]  Dehydration  [C50.919] Primary malignant neoplasm of breast, unspecified laterality        ED Disposition Condition    Admit Stable                Kelin Umaña MD  03/02/23 2122       Kelin Umaña MD  03/02/23 2203

## 2023-03-03 PROBLEM — C50.919 PRIMARY MALIGNANT NEOPLASM OF BREAST: Status: ACTIVE | Noted: 2023-03-03

## 2023-03-03 PROBLEM — E87.6 HYPOKALEMIA: Status: ACTIVE | Noted: 2023-03-03

## 2023-03-03 LAB
ALBUMIN SERPL BCP-MCNC: 2.9 G/DL (ref 3.5–5.2)
ALP SERPL-CCNC: 35 U/L (ref 55–135)
ALT SERPL W/O P-5'-P-CCNC: 12 U/L (ref 10–44)
ANION GAP SERPL CALC-SCNC: 8 MMOL/L (ref 8–16)
ANION GAP SERPL CALC-SCNC: 9 MMOL/L (ref 8–16)
AST SERPL-CCNC: 21 U/L (ref 10–40)
BILIRUB SERPL-MCNC: 1.3 MG/DL (ref 0.1–1)
BUN SERPL-MCNC: 7 MG/DL (ref 6–20)
BUN SERPL-MCNC: <5 MG/DL (ref 6–20)
CALCIUM SERPL-MCNC: 8.4 MG/DL (ref 8.7–10.5)
CALCIUM SERPL-MCNC: 8.5 MG/DL (ref 8.7–10.5)
CHLORIDE SERPL-SCNC: 103 MMOL/L (ref 95–110)
CHLORIDE SERPL-SCNC: 99 MMOL/L (ref 95–110)
CO2 SERPL-SCNC: 22 MMOL/L (ref 23–29)
CO2 SERPL-SCNC: 23 MMOL/L (ref 23–29)
CREAT SERPL-MCNC: 0.6 MG/DL (ref 0.5–1.4)
CREAT SERPL-MCNC: 0.6 MG/DL (ref 0.5–1.4)
EST. GFR  (NO RACE VARIABLE): >60 ML/MIN/1.73 M^2
EST. GFR  (NO RACE VARIABLE): >60 ML/MIN/1.73 M^2
GLUCOSE SERPL-MCNC: 119 MG/DL (ref 70–110)
GLUCOSE SERPL-MCNC: 126 MG/DL (ref 70–110)
MAGNESIUM SERPL-MCNC: 1.3 MG/DL (ref 1.6–2.6)
MAGNESIUM SERPL-MCNC: 1.5 MG/DL (ref 1.6–2.6)
PHOSPHATE SERPL-MCNC: 3.2 MG/DL (ref 2.7–4.5)
POTASSIUM SERPL-SCNC: 2.7 MMOL/L (ref 3.5–5.1)
POTASSIUM SERPL-SCNC: 2.9 MMOL/L (ref 3.5–5.1)
PREALB SERPL-MCNC: 3 MG/DL (ref 20–43)
PROT SERPL-MCNC: 5.1 G/DL (ref 6–8.4)
SODIUM SERPL-SCNC: 131 MMOL/L (ref 136–145)
SODIUM SERPL-SCNC: 133 MMOL/L (ref 136–145)

## 2023-03-03 PROCEDURE — 84100 ASSAY OF PHOSPHORUS: CPT | Performed by: STUDENT IN AN ORGANIZED HEALTH CARE EDUCATION/TRAINING PROGRAM

## 2023-03-03 PROCEDURE — 96361 HYDRATE IV INFUSION ADD-ON: CPT

## 2023-03-03 PROCEDURE — 99222 PR INITIAL HOSPITAL CARE,LEVL II: ICD-10-PCS | Mod: ,,, | Performed by: INTERNAL MEDICINE

## 2023-03-03 PROCEDURE — 63600175 PHARM REV CODE 636 W HCPCS: Performed by: INTERNAL MEDICINE

## 2023-03-03 PROCEDURE — 63600175 PHARM REV CODE 636 W HCPCS: Performed by: STUDENT IN AN ORGANIZED HEALTH CARE EDUCATION/TRAINING PROGRAM

## 2023-03-03 PROCEDURE — 83735 ASSAY OF MAGNESIUM: CPT | Mod: 91 | Performed by: STUDENT IN AN ORGANIZED HEALTH CARE EDUCATION/TRAINING PROGRAM

## 2023-03-03 PROCEDURE — 94761 N-INVAS EAR/PLS OXIMETRY MLT: CPT

## 2023-03-03 PROCEDURE — 83735 ASSAY OF MAGNESIUM: CPT | Performed by: STUDENT IN AN ORGANIZED HEALTH CARE EDUCATION/TRAINING PROGRAM

## 2023-03-03 PROCEDURE — 80048 BASIC METABOLIC PNL TOTAL CA: CPT | Performed by: STUDENT IN AN ORGANIZED HEALTH CARE EDUCATION/TRAINING PROGRAM

## 2023-03-03 PROCEDURE — 63600175 PHARM REV CODE 636 W HCPCS: Performed by: NURSE PRACTITIONER

## 2023-03-03 PROCEDURE — 96376 TX/PRO/DX INJ SAME DRUG ADON: CPT

## 2023-03-03 PROCEDURE — 84134 ASSAY OF PREALBUMIN: CPT | Performed by: STUDENT IN AN ORGANIZED HEALTH CARE EDUCATION/TRAINING PROGRAM

## 2023-03-03 PROCEDURE — 36415 COLL VENOUS BLD VENIPUNCTURE: CPT | Performed by: STUDENT IN AN ORGANIZED HEALTH CARE EDUCATION/TRAINING PROGRAM

## 2023-03-03 PROCEDURE — 25000003 PHARM REV CODE 250: Performed by: STUDENT IN AN ORGANIZED HEALTH CARE EDUCATION/TRAINING PROGRAM

## 2023-03-03 PROCEDURE — 99222 1ST HOSP IP/OBS MODERATE 55: CPT | Mod: ,,, | Performed by: INTERNAL MEDICINE

## 2023-03-03 PROCEDURE — G0378 HOSPITAL OBSERVATION PER HR: HCPCS

## 2023-03-03 PROCEDURE — 96375 TX/PRO/DX INJ NEW DRUG ADDON: CPT

## 2023-03-03 PROCEDURE — 96372 THER/PROPH/DIAG INJ SC/IM: CPT | Performed by: STUDENT IN AN ORGANIZED HEALTH CARE EDUCATION/TRAINING PROGRAM

## 2023-03-03 PROCEDURE — 25000003 PHARM REV CODE 250: Performed by: INTERNAL MEDICINE

## 2023-03-03 PROCEDURE — 96366 THER/PROPH/DIAG IV INF ADDON: CPT

## 2023-03-03 PROCEDURE — C9113 INJ PANTOPRAZOLE SODIUM, VIA: HCPCS | Performed by: STUDENT IN AN ORGANIZED HEALTH CARE EDUCATION/TRAINING PROGRAM

## 2023-03-03 PROCEDURE — 80053 COMPREHEN METABOLIC PANEL: CPT | Performed by: STUDENT IN AN ORGANIZED HEALTH CARE EDUCATION/TRAINING PROGRAM

## 2023-03-03 PROCEDURE — 25000242 PHARM REV CODE 250 ALT 637 W/ HCPCS: Performed by: STUDENT IN AN ORGANIZED HEALTH CARE EDUCATION/TRAINING PROGRAM

## 2023-03-03 PROCEDURE — 96367 TX/PROPH/DG ADDL SEQ IV INF: CPT

## 2023-03-03 RX ORDER — POTASSIUM CHLORIDE 7.45 MG/ML
20 INJECTION INTRAVENOUS
Status: DISPENSED | OUTPATIENT
Start: 2023-03-03 | End: 2023-03-03

## 2023-03-03 RX ORDER — POTASSIUM CHLORIDE 7.45 MG/ML
40 INJECTION INTRAVENOUS
Status: DISCONTINUED | OUTPATIENT
Start: 2023-03-03 | End: 2023-03-07 | Stop reason: HOSPADM

## 2023-03-03 RX ORDER — PROCHLORPERAZINE EDISYLATE 5 MG/ML
5 INJECTION INTRAMUSCULAR; INTRAVENOUS EVERY 4 HOURS PRN
Status: DISCONTINUED | OUTPATIENT
Start: 2023-03-03 | End: 2023-03-07 | Stop reason: HOSPADM

## 2023-03-03 RX ORDER — METOCLOPRAMIDE HYDROCHLORIDE 5 MG/ML
5 INJECTION INTRAMUSCULAR; INTRAVENOUS EVERY 8 HOURS
Status: COMPLETED | OUTPATIENT
Start: 2023-03-03 | End: 2023-03-05

## 2023-03-03 RX ORDER — POTASSIUM CHLORIDE 7.45 MG/ML
80 INJECTION INTRAVENOUS
Status: DISCONTINUED | OUTPATIENT
Start: 2023-03-03 | End: 2023-03-07 | Stop reason: HOSPADM

## 2023-03-03 RX ORDER — CALCIUM GLUCONATE 20 MG/ML
1 INJECTION, SOLUTION INTRAVENOUS
Status: DISCONTINUED | OUTPATIENT
Start: 2023-03-03 | End: 2023-03-07 | Stop reason: HOSPADM

## 2023-03-03 RX ORDER — MAGNESIUM SULFATE HEPTAHYDRATE 40 MG/ML
2 INJECTION, SOLUTION INTRAVENOUS ONCE
Status: COMPLETED | OUTPATIENT
Start: 2023-03-03 | End: 2023-03-03

## 2023-03-03 RX ORDER — MAGNESIUM SULFATE HEPTAHYDRATE 40 MG/ML
2 INJECTION, SOLUTION INTRAVENOUS
Status: DISCONTINUED | OUTPATIENT
Start: 2023-03-03 | End: 2023-03-07 | Stop reason: HOSPADM

## 2023-03-03 RX ORDER — ONDANSETRON 2 MG/ML
16 INJECTION INTRAMUSCULAR; INTRAVENOUS EVERY 12 HOURS
Status: DISCONTINUED | OUTPATIENT
Start: 2023-03-03 | End: 2023-03-03

## 2023-03-03 RX ORDER — CALCIUM GLUCONATE 20 MG/ML
2 INJECTION, SOLUTION INTRAVENOUS
Status: DISCONTINUED | OUTPATIENT
Start: 2023-03-03 | End: 2023-03-07 | Stop reason: HOSPADM

## 2023-03-03 RX ORDER — POTASSIUM CHLORIDE 7.45 MG/ML
10 INJECTION INTRAVENOUS
Status: DISPENSED | OUTPATIENT
Start: 2023-03-03 | End: 2023-03-04

## 2023-03-03 RX ORDER — POTASSIUM CHLORIDE 7.45 MG/ML
60 INJECTION INTRAVENOUS
Status: DISCONTINUED | OUTPATIENT
Start: 2023-03-03 | End: 2023-03-07 | Stop reason: HOSPADM

## 2023-03-03 RX ORDER — MAGNESIUM SULFATE HEPTAHYDRATE 40 MG/ML
4 INJECTION, SOLUTION INTRAVENOUS
Status: DISCONTINUED | OUTPATIENT
Start: 2023-03-03 | End: 2023-03-07 | Stop reason: HOSPADM

## 2023-03-03 RX ORDER — CALCIUM GLUCONATE 20 MG/ML
3 INJECTION, SOLUTION INTRAVENOUS
Status: DISCONTINUED | OUTPATIENT
Start: 2023-03-03 | End: 2023-03-07 | Stop reason: HOSPADM

## 2023-03-03 RX ORDER — DEXAMETHASONE SODIUM PHOSPHATE 100 MG/10ML
10 INJECTION INTRAMUSCULAR; INTRAVENOUS EVERY 12 HOURS
Status: DISCONTINUED | OUTPATIENT
Start: 2023-03-03 | End: 2023-03-07 | Stop reason: HOSPADM

## 2023-03-03 RX ADMIN — SODIUM CHLORIDE, SODIUM LACTATE, POTASSIUM CHLORIDE, AND CALCIUM CHLORIDE: .6; .31; .03; .02 INJECTION, SOLUTION INTRAVENOUS at 11:03

## 2023-03-03 RX ADMIN — ONDANSETRON 8 MG: 2 INJECTION INTRAMUSCULAR; INTRAVENOUS at 01:03

## 2023-03-03 RX ADMIN — SERTRALINE HYDROCHLORIDE 50 MG: 50 TABLET ORAL at 08:03

## 2023-03-03 RX ADMIN — POTASSIUM CHLORIDE 10 MEQ: 10 INJECTION, SOLUTION INTRAVENOUS at 10:03

## 2023-03-03 RX ADMIN — LEUCINE, PHENYLALANINE, LYSINE, METHIONINE, ISOLEUCINE, VALINE, HISTIDINE, THREONINE, TRYPTOPHAN, ALANINE, GLYCINE, ARGININE, PROLINE, SERINE, TYROSINE, DEXTROSE 2000 ML: 311; 238; 247; 170; 255; 247; 204; 179; 77; 880; 438; 489; 289; 213; 17; 5 INJECTION INTRAVENOUS at 01:03

## 2023-03-03 RX ADMIN — PROMETHAZINE HYDROCHLORIDE 6.25 MG: 25 INJECTION INTRAMUSCULAR; INTRAVENOUS at 08:03

## 2023-03-03 RX ADMIN — CETIRIZINE HYDROCHLORIDE 10 MG: 10 TABLET, FILM COATED ORAL at 08:03

## 2023-03-03 RX ADMIN — FLUTICASONE PROPIONATE 50 MCG: 50 SPRAY, METERED NASAL at 08:03

## 2023-03-03 RX ADMIN — ONDANSETRON 16 MG: 2 INJECTION INTRAMUSCULAR; INTRAVENOUS at 05:03

## 2023-03-03 RX ADMIN — LEUCINE, PHENYLALANINE, LYSINE, METHIONINE, ISOLEUCINE, VALINE, HISTIDINE, THREONINE, TRYPTOPHAN, ALANINE, GLYCINE, ARGININE, PROLINE, SERINE, TYROSINE, DEXTROSE 1000 ML: 311; 238; 247; 170; 255; 247; 204; 179; 77; 880; 438; 489; 289; 213; 17; 5 INJECTION INTRAVENOUS at 12:03

## 2023-03-03 RX ADMIN — METOCLOPRAMIDE 5 MG: 5 INJECTION, SOLUTION INTRAMUSCULAR; INTRAVENOUS at 10:03

## 2023-03-03 RX ADMIN — ENOXAPARIN SODIUM 40 MG: 100 INJECTION SUBCUTANEOUS at 05:03

## 2023-03-03 RX ADMIN — PANTOPRAZOLE SODIUM 40 MG: 40 INJECTION, POWDER, LYOPHILIZED, FOR SOLUTION INTRAVENOUS at 08:03

## 2023-03-03 RX ADMIN — ONDANSETRON 8 MG: 4 TABLET, ORALLY DISINTEGRATING ORAL at 06:03

## 2023-03-03 RX ADMIN — POTASSIUM CHLORIDE 10 MEQ: 10 INJECTION, SOLUTION INTRAVENOUS at 11:03

## 2023-03-03 RX ADMIN — MAGNESIUM SULFATE HEPTAHYDRATE 2 G: 40 INJECTION, SOLUTION INTRAVENOUS at 08:03

## 2023-03-03 RX ADMIN — DEXAMETHASONE SODIUM PHOSPHATE 10 MG: 10 INJECTION INTRAMUSCULAR; INTRAVENOUS at 10:03

## 2023-03-03 RX ADMIN — POTASSIUM CHLORIDE 20 MEQ: 7.46 INJECTION, SOLUTION INTRAVENOUS at 05:03

## 2023-03-03 NOTE — HPI
Ms. Escoto is a 46yo female with a PMH of left triple negative breast cancer who completed neoadjuvant chemotherapy on 2/2/2023 with very good local response.  Through the last several weeks she has developed worsening and intractable nausea and vomiting and has not been able to eat a full meal in several days.  She has been refractory to oral antiemetics and was sent to the ED for worsening malnutrition and dehydration.  She has no abdominal pain, diarrhea, constipation.  No fever or night sweats.

## 2023-03-03 NOTE — SUBJECTIVE & OBJECTIVE
Interval History:   03/03/23:  Still complains of nausea but improved seen by hematologist currently on Clindamax will consult nutritionist for further recommendations.  Afebrile potassium replaced via K rider.  Tolerate liquids NPO after midnight for possible GI procedure in a.m.  Review of Systems  Objective:     Vital Signs (Most Recent):  Temp: 98.3 °F (36.8 °C) (03/03/23 1200)  Pulse: (!) 122 (03/03/23 1200)  Resp: 18 (03/03/23 1200)  BP: 99/68 (03/03/23 1200)  SpO2: 98 % (03/03/23 1200)   Vital Signs (24h Range):  Temp:  [98.3 °F (36.8 °C)-98.7 °F (37.1 °C)] 98.3 °F (36.8 °C)  Pulse:  [109-122] 122  Resp:  [17-18] 18  SpO2:  [97 %-98 %] 98 %  BP: ()/(58-69) 99/68     Weight: 72.6 kg (160 lb)  Body mass index is 29.26 kg/m².    Intake/Output Summary (Last 24 hours) at 3/3/2023 1626  Last data filed at 3/2/2023 2026  Gross per 24 hour   Intake 2250 ml   Output --   Net 2250 ml      Physical Exam  Constitutional:       Appearance: She is ill-appearing.   HENT:      Head: Normocephalic and atraumatic.      Comments: Alopecia     Nose: Nose normal.      Mouth/Throat:      Mouth: Mucous membranes are dry.   Eyes:      Pupils: Pupils are equal, round, and reactive to light.   Cardiovascular:      Rate and Rhythm: Normal rate and regular rhythm.      Pulses: Normal pulses.      Heart sounds: Normal heart sounds.   Pulmonary:      Effort: Pulmonary effort is normal.      Breath sounds: Normal breath sounds.   Abdominal:      General: Abdomen is flat. Bowel sounds are normal.      Palpations: Abdomen is soft.   Musculoskeletal:         General: Normal range of motion.      Cervical back: Neck supple.   Skin:     General: Skin is dry.   Neurological:      General: No focal deficit present.      Mental Status: She is alert and oriented to person, place, and time.   Psychiatric:         Mood and Affect: Mood normal.       Significant Labs: All pertinent labs within the past 24 hours have been reviewed.  A1C: No  results for input(s): HGBA1C in the last 4320 hours.  ABGs: No results for input(s): PH, PCO2, HCO3, POCSATURATED, BE, TOTALHB, COHB, METHB, O2HB, POCFIO2, PO2 in the last 48 hours.  Bilirubin:   Recent Labs   Lab 02/06/23  0830 02/14/23  1029 02/20/23  0923 03/02/23  1528 03/03/23  0525   BILITOT 0.7 0.5 0.8 1.0 1.3*     Blood Culture:   Recent Labs   Lab 03/02/23  1527 03/02/23  1709   LABBLOO No Growth to date No Growth to date     BMP:   Recent Labs   Lab 03/03/23  0525   *   *   K 2.9*      CO2 22*   BUN <5*   CREATININE 0.6   CALCIUM 8.5*   MG 1.5*     CBC:   Recent Labs   Lab 03/02/23  1528   WBC 3.78*   HGB 10.4*   HCT 30.7*        CMP:   Recent Labs   Lab 03/02/23  1528 03/03/23  0525   * 133*   K 3.1* 2.9*    103   CO2 17* 22*   GLU 81 119*   BUN <5* <5*   CREATININE 0.7 0.6   CALCIUM 9.5 8.5*   PROT 6.3 5.1*   ALBUMIN 3.6 2.9*   BILITOT 1.0 1.3*   ALKPHOS 44* 35*   AST 33 21   ALT 13 12   ANIONGAP 17* 8     Cardiac Markers:   Recent Labs   Lab 03/02/23  1528   BNP 8     Coagulation: No results for input(s): PT, INR, APTT in the last 48 hours.  Lactic Acid:   Recent Labs   Lab 03/02/23  1528   LACTATE 1.5     Lipase:   Recent Labs   Lab 03/02/23  1528   LIPASE 30     Lipid Panel: No results for input(s): CHOL, HDL, LDLCALC, TRIG, CHOLHDL in the last 48 hours.  Magnesium:   Recent Labs   Lab 03/02/23  1528 03/03/23  0525   MG 1.4* 1.5*     Pathology Results  (Last 10 years)      None          POCT Glucose: No results for input(s): POCTGLUCOSE in the last 48 hours.  Prealbumin:   Recent Labs   Lab 03/03/23  0525   PREALBUMIN 3*     Respiratory Culture: No results for input(s): GSRESP, RESPIRATORYC in the last 48 hours.  Troponin:   Recent Labs   Lab 03/02/23  1528 03/02/23  1709   TROPONINIHS 30.5* 29.6*     TSH:   Recent Labs   Lab 03/02/23  1528   TSH 3.120     Urine Culture: No results for input(s): LABURIN in the last 48 hours.  Urine Studies:   Recent Labs   Lab  03/02/23  1835   COLORU Yellow   APPEARANCEUA Clear   PHUR 6.0   SPECGRAV 1.020   PROTEINUA 1+*   GLUCUA Negative   KETONESU 3+*   BILIRUBINUA 1+*   OCCULTUA Negative   NITRITE Negative   UROBILINOGEN 2.0-3.0*   LEUKOCYTESUR Negative   RBCUA 2   WBCUA 2   BACTERIA Negative   SQUAMEPITHEL 2   HYALINECASTS 2*   CTA Chest Non-Coronary (PE Studies)    Result Date: 3/2/2023  EXAM: CT Angiography Chest With Intravenous Contrast CLINICAL HISTORY: The patient is 47 years old and is Female; Pulmonary embolism (PE) suspected, high prob TECHNIQUE: Axial computed tomographic angiography images of the chest with intravenous contrast.  Sagittal and coronal reformatted images were created and reviewed.  This CT exam was performed using one or more of the following dose reduction techniques:  automated exposure control, adjustment of the mA and/or kV according to patient size, and/or use of iterative reconstruction technique. MIP reconstructed images were created and reviewed. COMPARISON: No relevant prior studies available. FINDINGS: PULMONARY ARTERIES:  No pulmonary embolism. AORTA:  No acute findings.  No thoracic aortic aneurysm. LUNGS:  Unremarkable.  No mass.  No consolidation. PLEURAL SPACE:  Unremarkable.  No significant effusion.  No pneumothorax. HEART:  Unremarkable.  No cardiomegaly.  No significant pericardial effusion.  No evidence of RV dysfunction. BONES/JOINTS:  No acute fracture.  No dislocation. SOFT TISSUES:  Unremarkable. LYMPH NODES:  Unremarkable.  No enlarged lymph nodes. IMPRESSION: No pulmonary embolism or other acute finding in the chest. Electronically signed by:  Cyrus Chamorro MD  3/2/2023 8:35 PM CST Workstation: [id number]    CT Abdomen Pelvis With Contrast    Result Date: 3/2/2023  EXAM: CT Abdomen and Pelvis With Intravenous Contrast CLINICAL HISTORY: The patient is 47 years old and is Female; Bowel obstruction suspected TECHNIQUE: Axial computed tomography images of the abdomen and pelvis  with intravenous contrast.  Sagittal and coronal reformatted images were created and reviewed.  This CT exam was performed using one or more of the following dose reduction techniques:  automated exposure control, adjustment of the mA and/or kV according to patient size, and/or use of iterative reconstruction technique. COMPARISON: No relevant prior studies available. FINDINGS: LUNG BASES:  Unremarkable.  No mass.  No consolidation. ABDOMEN: LIVER:  Low attenuation at the level of the falciform ligament is present suggesting fatty infiltration. A small cyst within the left hepatic lobe measuring approximately 1.1 cm is present. The liver is otherwise homogeneous. GALLBLADDER AND BILE DUCTS:  The gallbladder is distended. No calcified gallstones or ductal dilatation is seen. PANCREAS:  No ductal dilation.  No mass. SPLEEN:  Unremarkable. ADRENALS:  Unremarkable.  No mass. KIDNEYS AND URETERS:  Unremarkable. The kidneys enhance symmetrically. No obstructing renal or ureteral calculus is seen. No hydronephrosis or hydroureter. No perinephric fluid or stranding. STOMACH AND BOWEL:  The stomach is distended with fluid and food contents. The small bowel is relatively normal in caliber. Stool is present throughout colon. A few scattered colonic diverticula are noted without surrounding inflammation. There is no mucosal thickening or evidence of obstruction. PELVIS: APPENDIX:  The appendix is normal in caliber without surrounding inflammation. BLADDER:  The bladder is well distended. REPRODUCTIVE:  Suggestion of uterine fibroids are noted. The ovaries are grossly unremarkable. ABDOMEN and PELVIS: INTRAPERITONEAL SPACE:  Unremarkable.  No free air.  No significant fluid collection. BONES/JOINTS:  No acute fracture. SOFT TISSUES:  The soft tissues are normal. VASCULATURE:  Unremarkable.  No abdominal aortic aneurysm. LYMPH NODES:  Unremarkable. No enlarged lymph nodes. IMPRESSION: 1.  No evidence of bowel obstruction. 2.   Mild colonic diverticulosis. Electronically signed by:  Saskia Coffey MD  3/2/2023 8:58 PM CST Workstation: [id number]    X-Ray Chest AP Portable    Result Date: 3/2/2023  Reason: Vomiting FINDINGS: Portable chest with comparison chest x-ray November 21, 2022 show normal cardiomediastinal silhouette. Left subclavian Port-A-Cath noted. Lungs are clear. Pulmonary vasculature is normal. No acute osseous abnormality. IMPRESSION: No acute cardiopulmonary abnormality. Electronically signed by:  Cyrus Larios DO  3/2/2023 3:48 PM CST Workstation: [id number]    MRI Breast w/wo Contrast, w/CAD, Bilateral    Result Date: 2/8/2023  EXAMINATION: MRI BREAST W/WO CONTRAST, W/CAD, BILATERAL CLINICAL HISTORY: Left breast cancer, left axillary tail mass, evaluate post chemotherapy. TECHNIQUE: CMS MANDATED QUALITY DATA - MAMMOGRAPHY - 225 Bilateral breast MRI was performed with a dedicated breast coil. The patient was placed prone in the breast immobilizer with light compression. The following localization sequences were obtained: Axial inversion recovery, axial 3-D non-fat-suppressed, axial 3-D fat suppressed pre-contrast, followed by axial 3-D fat-suppressed post-contrast dynamic images with 8.5 mL Gadavist IV contrast. Post-processing including subtraction imaging, reconstruction in the sagittal and coronal planes, and MIP of both breasts was performed on an independent workstation. The interpretation was assisted by Computer Aided Detection. FINDINGS: Comparison to prior exams, including ultrasound of 11/08/2022 and MRI of 09/06/2022.  The breasts are composed of heterogeneous fibroglandular tissue.  Background parenchymal enhancement is mild. There are no suspicious enhancing masses or areas of suspicious non masslike enhancement in either breast.  Previous enhancing subdermal mass in the left breast axillary tail region has resolved, with resolution of previous enlarged left axillary lymph nodes.  No enlarged axillary lymph  nodes or enlarged internal mammary chain lymph nodes. There is no breast skin thickening.  No focal signal abnormality or abnormal enhancement of the chest wall.     Resolution of previous left breast mass and previous enlarged left axillary lymph nodes, compatible with favorable response to therapy.  No new disease. BI-RADS CATEGORY 6: KNOWN MALIGNANCY. [facility] utilizes a reminder system to ensure that patients receive reminder letters for appointments. This includes reminders for routine screening mammograms, diagnostic mammograms, or other breast imaging interventions as appropriate. This patient will be placed in the appropriate reminder system. Electronically signed by: Franklin Rachel MD Date:    02/08/2023 Time:    14:03       Significant Imaging: I have reviewed all pertinent imaging results/findings within the past 24 hours.  I have reviewed and interpreted all pertinent imaging results/findings within the past 24 hours.

## 2023-03-03 NOTE — ASSESSMENT & PLAN NOTE
- Follow Dr. Peralta Hem/Onc  - plans is for surgery in around 2 weeks for breast mastectomy on March 17, 2023 follows Dr. Jonatan Raza at [place] breast surgeon at [facility] Dr. Brandon Barrios- Plastic Surgeon [place]

## 2023-03-03 NOTE — H&P
[facility] History & Physical Examination   Patient Name: Karyna Escoto  MRN: [id number]  Patient Class: OP- Observation   Admission Date: 3/2/2023  1:50 PM  Length of Stay: 0  Attending Physician: Rc Woods MD  Primary Care Provider: WOLF Iraheta  Face-to-Face encounter date: 03/02/2023  Code Status: Full Code  MPOA:  Chief Complaint: Vomiting and Nausea (N/V x 4 weeks unrelieved with IV fluids and antiemetics at the infusion center. Breast cancer stage 3 on chemo last feb 2. )        HISTORY OF PRESENT ILLNESS:   Karyna Escoto is a 47 y.o. female with known history of triple negative breast cancer who is receiving chemotherapy, last dose was about a month ago.  Since that time she has had intractable nausea and vomiting.  She has been able to keep down very little PO.  She denies any syncopal episodes, but she is very weak and becomes short of breath with minimal exertion.  She denies any lower extremity edema or orthopnea.  Denies chest pain.  She has been taking zofran and phenergan at home with no relief. History was obtained from the patient and collateral obtained from the family present at the bedside and ER physician Sign-out. Patient denies change in vision, hearing, headache, fever, cough, congestion, runny nose, chest pain,  palpitations, constipation, dysuria, hematuria, joint pain and back pain.     REVIEW OF SYSTEMS:   10 Point Review of System was performed and was found to be negative except for that mentioned already in the HPI above.     PAST MEDICAL HISTORY:     Past Medical History:   Diagnosis Date    Anxiety     Breast cancer 07/2022       PAST SURGICAL HISTORY:     Past Surgical History:   Procedure Laterality Date    BREAST BIOPSY Left 07/2022    eye lid surgery Bilateral 1990    INSERTION OF TUNNELED CENTRAL VENOUS CATHETER (CVC) WITH SUBCUTANEOUS PORT Left 11/21/2022    Procedure: [id number];  Surgeon: Oscar OH  Katherine NGUYEN MD;  Location: [facility] OR;  Service: General;  Laterality: Left;    NOSE SURGERY  1990    PORTACATH PLACEMENT  08/2022    [facility]    TONSILLECTOMY  1990    TUBAL LIGATION  2013       ALLERGIES:   Taxol [paclitaxel]    FAMILY HISTORY:     Family History   Problem Relation Age of Onset    Breast cancer Maternal Grandmother     Prostate cancer Maternal Grandfather        SOCIAL HISTORY:     Social History     Tobacco Use    Smoking status: Former    Smokeless tobacco: Not on file    Tobacco comments:     Quit 2005-13 year history -1 daily   Substance Use Topics    Alcohol use: Not Currently     Comment: occasional        Social History     Substance and Sexual Activity   Sexual Activity Yes        HOME MEDICATIONS:     Prior to Admission medications    Medication Sig Start Date End Date Taking? Authorizing Provider   famotidine (PEPCID) 10 MG tablet Take 10 mg by mouth 2 (two) times daily.   Yes Historical Provider   HYDROcodone-acetaminophen (NORCO) 5-325 mg per tablet Take 1 tablet by mouth every 6 (six) hours as needed for Pain. 1/27/23  Yes JEAN PAUL Andrea   loperamide HCl (IMODIUM A-D ORAL) Take 1 tablet by mouth daily as needed.   Yes Historical Provider   promethazine (PHENERGAN) 25 MG tablet Take 1 tablet (25 mg total) by mouth every 4 to 6 hours as needed. 8/8/22  Yes Derek Peralta MD   bisacodyL (DULCOLAX) 5 mg EC tablet Take 5 mg by mouth daily as needed for Constipation.    Historical Provider   cetirizine (ZYRTEC) 10 mg Cap Take 1 tablet by mouth once daily. 8/24/22   Historical Provider   fluticasone propionate (FLONASE) 50 mcg/actuation nasal spray 1 spray (50 mcg total) by Each Nostril route once daily. 8/30/22   JEAN PAUL Andrea   hydrOXYzine HCL (ATARAX) 10 MG Tab Take 10 mg by mouth 3 (three) times daily as needed. 6/1/19   Historical Provider   Lactobacillus rhamnosus GG (CULTURELLE) 10 billion cell capsule Take 1 capsule by mouth once daily.    Historical  "Provider   LIDOcaine-prilocaine (EMLA) cream Apply topically as needed. Apply 30 mins prior to port access 8/11/22   JEAN PAUL Andrea   ondansetron (ZOFRAN) 8 MG tablet Take 1 tablet (8 mg total) by mouth every 8 (eight) hours as needed. 8/8/22 8/8/23  Derek Peralta MD   promethazine (PHENERGAN) 25 MG suppository Place 1 suppository (25 mg total) rectally every 6 (six) hours as needed for Nausea. 1/10/23 1/10/24  JEAN PAUL Andrea   sertraline (ZOLOFT) 50 MG tablet Take 50 mg by mouth. 4/1/22   Historical Provider         PHYSICAL EXAM:   /76   Pulse 103   Temp 98.1 °F (36.7 °C) (Oral)   Resp 20   Ht 5' 2" (1.575 m)   Wt 72.6 kg (160 lb)   SpO2 100%   BMI 29.26 kg/m²   Vitals Reviewed  General appearance: no apparent distress.  Skin: No Rash.   Neuro: Motor and sensory exams grossly intact. Good tone. Power in all 4 extremities 5/5.   HENT: Atraumatic head. Dry mucous membranes of oral cavity.  Eyes: Normal extraocular movements.   Lungs: Clear to auscultation bilaterally. No wheezing present.   Heart: Regular rate and rhythm. S1 and S2 present with no murmurs/gallop/rub. No pedal edema. No JVD present.   Abdomen: Soft, non-distended, non-tender. No rebound tenderness/guarding. No masses or organomegaly. Bowel sounds are normal. Bladder is not palpable.   Extremities: No cyanosis, clubbing.  Psych/mental status: Alert and oriented. Cooperative. Responds appropriately to questions.       EMERGENCY DEPARTMENT LABS AND IMAGING:     Labs Reviewed   URINALYSIS, REFLEX TO URINE CULTURE - Abnormal; Notable for the following components:       Result Value    Protein, UA 1+ (*)     Ketones, UA 3+ (*)     Bilirubin (UA) 1+ (*)     Urobilinogen, UA 2.0-3.0 (*)     All other components within normal limits    Narrative:     Specimen Source->Urine   MAGNESIUM - Abnormal; Notable for the following components:    Magnesium 1.4 (*)     All other components within normal limits   CBC W/ AUTO DIFFERENTIAL - " Abnormal; Notable for the following components:    WBC 3.78 (*)     RBC 3.10 (*)     Hemoglobin 10.4 (*)     Hematocrit 30.7 (*)     MCV 99 (*)     MCH 33.5 (*)     RDW 16.0 (*)     Lymph # 0.8 (*)     Mono % 23.0 (*)     All other components within normal limits   COMPREHENSIVE METABOLIC PANEL - Abnormal; Notable for the following components:    Sodium 134 (*)     Potassium 3.1 (*)     CO2 17 (*)     BUN <5 (*)     Alkaline Phosphatase 44 (*)     Anion Gap 17 (*)     All other components within normal limits   TROPONIN I HIGH SENSITIVITY - Abnormal; Notable for the following components:    Troponin I High Sensitivity 30.5 (*)     All other components within normal limits   TROPONIN I HIGH SENSITIVITY - Abnormal; Notable for the following components:    Troponin I High Sensitivity 29.6 (*)     All other components within normal limits   URINALYSIS MICROSCOPIC - Abnormal; Notable for the following components:    Hyaline Casts, UA 2 (*)     All other components within normal limits    Narrative:     Specimen Source->Urine   CULTURE, BLOOD   CULTURE, BLOOD   LIPASE   DRUG SCREEN PANEL, URINE EMERGENCY    Narrative:     Specimen Source->Urine   CK   TSH   B-TYPE NATRIURETIC PEPTIDE   LACTIC ACID, PLASMA   HCG, QUANTITATIVE   HCG, QUANTITATIVE       CT Abdomen Pelvis With Contrast   Final Result      CTA Chest Non-Coronary (PE Studies)   Final Result      X-Ray Chest AP Portable   Final Result          ASSESSMENT & PLAN:   Karyna Escoto is a 47 y.o. female admitted for intractable nausea and vomiting    Active Hospital Problems    Diagnosis    *Intractable nausea and vomiting        Plan    Nausea and vomiting  On arrival her heart rate is 140 and sbp 80s  Improved IVF boluses  She is feeling better after zofran IV  Admit to med-surg  Continue IVF and start clinimax  Nutrition consult  Full liquid diet and advance as tolerated  Replace electrolytes as needed  Cmp, mag, phos, prealbumin, tsh in am  For her next prn  dose of Zofran we will try disintegrating tab  Seems that she may have been vomiting up her home zofran tablets before they were able to absorb  Zofran disintegrating tabs at discharge if effective        Diet: Full Liquid    DVT Prophylaxis: low molecular weight heparin and Encourage ambulation. OOB as tolerated.     Discharge Planning and Disposition:  Home with assistance from family    Expected LOS: 1    ________________________________________________________________________________    INPATIENT LIST OF MEDICATIONS     Current Facility-Administered Medications:     [START ON 3/3/2023] acetaminophen tablet 650 mg, 650 mg, Oral, Q8H PRN, Luciano Woods MD    [START ON 3/3/2023] acetaminophen tablet 650 mg, 650 mg, Oral, Q8H PRN, Luciano Woods MD    [START ON 3/3/2023] Amino acid 4.25% - dextrose 5% (CLINIMIX) solution, 2,000 mL, Intravenous, Continuous, Luciano Woods MD    [START ON 3/3/2023] cetirizine tablet 10 mg, 10 mg, Oral, Daily, Luciano Woods MD    [START ON 3/3/2023] enoxaparin injection 40 mg, 40 mg, Subcutaneous, Daily, Luciano Woods MD    [START ON 3/3/2023] fluticasone propionate 50 mcg/actuation nasal spray 50 mcg, 1 spray, Each Nostril, Daily, Luciano Woods MD    HYDROcodone-acetaminophen 5-325 mg per tablet 1 tablet, 1 tablet, Oral, Q6H PRN, Luciano Woods MD    hydrOXYzine HCL tablet 10 mg, 10 mg, Oral, TID PRN, Luciano Woods MD    lactated ringers infusion, , Intravenous, Continuous, Kelin Umaña MD, Last Rate: 125 mL/hr at 03/02/23 2030, New Bag at 03/02/23 2030    loperamide capsule 2 mg, 2 mg, Oral, Daily PRN, Luciano Woods MD    melatonin tablet 6 mg, 6 mg, Oral, Nightly PRN, Luciano Woods MD    [START ON 3/3/2023] ondansetron disintegrating tablet 8 mg, 8 mg, Oral, Q6H PRN, Luciano Woods MD    [START ON 3/3/2023] ondansetron injection 8 mg, 8 mg, Intravenous, Q8H PRN, Luciano  TOMÁS Woods MD    [START ON 3/3/2023] pantoprazole injection 40 mg, 40 mg, Intravenous, Daily, Luciano Woods MD    [START ON 3/3/2023] promethazine (PHENERGAN) 6.25 mg in dextrose 5 % (D5W) 50 mL IVPB, 6.25 mg, Intravenous, Q6H PRN, Luciano Woods MD    [START ON 3/3/2023] sertraline tablet 50 mg, 50 mg, Oral, Daily, Luciano Woods MD    sodium chloride 0.9% flush 10 mL, 10 mL, Intravenous, PRN, Luciano Woods MD    Current Outpatient Medications:     famotidine (PEPCID) 10 MG tablet, Take 10 mg by mouth 2 (two) times daily., Disp: , Rfl:     HYDROcodone-acetaminophen (NORCO) 5-325 mg per tablet, Take 1 tablet by mouth every 6 (six) hours as needed for Pain., Disp: 30 tablet, Rfl: 0    loperamide HCl (IMODIUM A-D ORAL), Take 1 tablet by mouth daily as needed., Disp: , Rfl:     promethazine (PHENERGAN) 25 MG tablet, Take 1 tablet (25 mg total) by mouth every 4 to 6 hours as needed., Disp: 30 tablet, Rfl: 5    bisacodyL (DULCOLAX) 5 mg EC tablet, Take 5 mg by mouth daily as needed for Constipation., Disp: , Rfl:     cetirizine (ZYRTEC) 10 mg Cap, Take 1 tablet by mouth once daily., Disp: , Rfl:     fluticasone propionate (FLONASE) 50 mcg/actuation nasal spray, 1 spray (50 mcg total) by Each Nostril route once daily., Disp: 15.8 mL, Rfl: 5    hydrOXYzine HCL (ATARAX) 10 MG Tab, Take 10 mg by mouth 3 (three) times daily as needed., Disp: , Rfl:     Lactobacillus rhamnosus GG (CULTURELLE) 10 billion cell capsule, Take 1 capsule by mouth once daily., Disp: , Rfl:     LIDOcaine-prilocaine (EMLA) cream, Apply topically as needed. Apply 30 mins prior to port access, Disp: 30 g, Rfl: 5    ondansetron (ZOFRAN) 8 MG tablet, Take 1 tablet (8 mg total) by mouth every 8 (eight) hours as needed., Disp: 30 tablet, Rfl: 5    promethazine (PHENERGAN) 25 MG suppository, Place 1 suppository (25 mg total) rectally every 6 (six) hours as needed for Nausea., Disp: 12 suppository, Rfl: 1    sertraline  (ZOLOFT) 50 MG tablet, Take 50 mg by mouth., Disp: , Rfl:       Scheduled Meds:   [START ON 3/3/2023] cetirizine  10 mg Oral Daily    [START ON 3/3/2023] enoxaparin  40 mg Subcutaneous Daily    [START ON 3/3/2023] fluticasone propionate  1 spray Each Nostril Daily    [START ON 3/3/2023] pantoprazole  40 mg Intravenous Daily    [START ON 3/3/2023] sertraline  50 mg Oral Daily     Continuous Infusions:   [START ON 3/3/2023] amino acid 4.25 % in D5W      lactated ringers 125 mL/hr at 03/02/23 2030     PRN Meds:.[START ON 3/3/2023] acetaminophen, [START ON 3/3/2023] acetaminophen, HYDROcodone-acetaminophen, hydrOXYzine HCL, loperamide, melatonin, [START ON 3/3/2023] ondansetron, [START ON 3/3/2023] ondansetron, [START ON 3/3/2023] promethazine (PHENERGAN) IVPB, sodium chloride 0.9%      Luciano Woods  [facility] Hospitalist  03/02/2023

## 2023-03-03 NOTE — PROGRESS NOTES
[facility] Medicine  Progress Note    Patient Name: Karyna Escoto  MRN: [id number]  Patient Class: OP- Observation   Admission Date: 3/2/2023  Length of Stay: 0 days  Attending Physician: Robert Jaime MD  Primary Care Provider: WOLF Iraheta        Subjective:     Principal Problem:Intractable nausea and vomiting        HPI:  Karyna Escoto is a 47 y.o. female with known history of triple negative breast cancer who is receiving chemotherapy, last dose was about a month ago.  Since that time she has had intractable nausea and vomiting.  She has been able to keep down very little PO.  She denies any syncopal episodes, but she is very weak and becomes short of breath with minimal exertion.  She denies any lower extremity edema or orthopnea.  Denies chest pain.  She has been taking zofran and phenergan at home with no relief. History was obtained from the patient and collateral obtained from the family present at the bedside and ER physician Sign-out. Patient denies change in vision, hearing, headache, fever, cough, congestion, runny nose, chest pain,  palpitations, constipation, dysuria, hematuria, joint pain and back pain.       Overview/Hospital Course:  No notes on file    Interval History:   03/03/23:  Still complains of nausea but improved seen by hematologist currently on Clindamax will consult nutritionist for further recommendations.  Afebrile potassium replaced via K rider.  Tolerate liquids NPO after midnight for possible GI procedure in a.m.  Review of Systems  Objective:     Vital Signs (Most Recent):  Temp: 98.3 °F (36.8 °C) (03/03/23 1200)  Pulse: (!) 122 (03/03/23 1200)  Resp: 18 (03/03/23 1200)  BP: 99/68 (03/03/23 1200)  SpO2: 98 % (03/03/23 1200)   Vital Signs (24h Range):  Temp:  [98.3 °F (36.8 °C)-98.7 °F (37.1 °C)] 98.3 °F (36.8 °C)  Pulse:  [109-122] 122  Resp:  [17-18] 18  SpO2:  [97 %-98 %] 98 %  BP: ()/(58-69) 99/68     Weight: 72.6 kg  (160 lb)  Body mass index is 29.26 kg/m².    Intake/Output Summary (Last 24 hours) at 3/3/2023 1626  Last data filed at 3/2/2023 2026  Gross per 24 hour   Intake 2250 ml   Output --   Net 2250 ml      Physical Exam  Constitutional:       Appearance: She is ill-appearing.   HENT:      Head: Normocephalic and atraumatic.      Comments: Alopecia     Nose: Nose normal.      Mouth/Throat:      Mouth: Mucous membranes are dry.   Eyes:      Pupils: Pupils are equal, round, and reactive to light.   Cardiovascular:      Rate and Rhythm: Normal rate and regular rhythm.      Pulses: Normal pulses.      Heart sounds: Normal heart sounds.   Pulmonary:      Effort: Pulmonary effort is normal.      Breath sounds: Normal breath sounds.   Abdominal:      General: Abdomen is flat. Bowel sounds are normal.      Palpations: Abdomen is soft.   Musculoskeletal:         General: Normal range of motion.      Cervical back: Neck supple.   Skin:     General: Skin is dry.   Neurological:      General: No focal deficit present.      Mental Status: She is alert and oriented to person, place, and time.   Psychiatric:         Mood and Affect: Mood normal.       Significant Labs: All pertinent labs within the past 24 hours have been reviewed.  A1C: No results for input(s): HGBA1C in the last 4320 hours.  ABGs: No results for input(s): PH, PCO2, HCO3, POCSATURATED, BE, TOTALHB, COHB, METHB, O2HB, POCFIO2, PO2 in the last 48 hours.  Bilirubin:   Recent Labs   Lab 02/06/23  0830 02/14/23  1029 02/20/23  0923 03/02/23  1528 03/03/23  0525   BILITOT 0.7 0.5 0.8 1.0 1.3*     Blood Culture:   Recent Labs   Lab 03/02/23  1527 03/02/23  1709   LABBLOO No Growth to date No Growth to date     BMP:   Recent Labs   Lab 03/03/23  0525   *   *   K 2.9*      CO2 22*   BUN <5*   CREATININE 0.6   CALCIUM 8.5*   MG 1.5*     CBC:   Recent Labs   Lab 03/02/23  1528   WBC 3.78*   HGB 10.4*   HCT 30.7*        CMP:   Recent Labs   Lab  03/02/23  1528 03/03/23  0525   * 133*   K 3.1* 2.9*    103   CO2 17* 22*   GLU 81 119*   BUN <5* <5*   CREATININE 0.7 0.6   CALCIUM 9.5 8.5*   PROT 6.3 5.1*   ALBUMIN 3.6 2.9*   BILITOT 1.0 1.3*   ALKPHOS 44* 35*   AST 33 21   ALT 13 12   ANIONGAP 17* 8     Cardiac Markers:   Recent Labs   Lab 03/02/23  1528   BNP 8     Coagulation: No results for input(s): PT, INR, APTT in the last 48 hours.  Lactic Acid:   Recent Labs   Lab 03/02/23  1528   LACTATE 1.5     Lipase:   Recent Labs   Lab 03/02/23  1528   LIPASE 30     Lipid Panel: No results for input(s): CHOL, HDL, LDLCALC, TRIG, CHOLHDL in the last 48 hours.  Magnesium:   Recent Labs   Lab 03/02/23  1528 03/03/23  0525   MG 1.4* 1.5*     Pathology Results  (Last 10 years)      None          POCT Glucose: No results for input(s): POCTGLUCOSE in the last 48 hours.  Prealbumin:   Recent Labs   Lab 03/03/23  0525   PREALBUMIN 3*     Respiratory Culture: No results for input(s): GSRESP, RESPIRATORYC in the last 48 hours.  Troponin:   Recent Labs   Lab 03/02/23  1528 03/02/23  1709   TROPONINIHS 30.5* 29.6*     TSH:   Recent Labs   Lab 03/02/23  1528   TSH 3.120     Urine Culture: No results for input(s): LABURIN in the last 48 hours.  Urine Studies:   Recent Labs   Lab 03/02/23  1835   COLORU Yellow   APPEARANCEUA Clear   PHUR 6.0   SPECGRAV 1.020   PROTEINUA 1+*   GLUCUA Negative   KETONESU 3+*   BILIRUBINUA 1+*   OCCULTUA Negative   NITRITE Negative   UROBILINOGEN 2.0-3.0*   LEUKOCYTESUR Negative   RBCUA 2   WBCUA 2   BACTERIA Negative   SQUAMEPITHEL 2   HYALINECASTS 2*   CTA Chest Non-Coronary (PE Studies)    Result Date: 3/2/2023  EXAM: CT Angiography Chest With Intravenous Contrast CLINICAL HISTORY: The patient is 47 years old and is Female; Pulmonary embolism (PE) suspected, high prob TECHNIQUE: Axial computed tomographic angiography images of the chest with intravenous contrast.  Sagittal and coronal reformatted images were created and reviewed.  This  CT exam was performed using one or more of the following dose reduction techniques:  automated exposure control, adjustment of the mA and/or kV according to patient size, and/or use of iterative reconstruction technique. MIP reconstructed images were created and reviewed. COMPARISON: No relevant prior studies available. FINDINGS: PULMONARY ARTERIES:  No pulmonary embolism. AORTA:  No acute findings.  No thoracic aortic aneurysm. LUNGS:  Unremarkable.  No mass.  No consolidation. PLEURAL SPACE:  Unremarkable.  No significant effusion.  No pneumothorax. HEART:  Unremarkable.  No cardiomegaly.  No significant pericardial effusion.  No evidence of RV dysfunction. BONES/JOINTS:  No acute fracture.  No dislocation. SOFT TISSUES:  Unremarkable. LYMPH NODES:  Unremarkable.  No enlarged lymph nodes. IMPRESSION: No pulmonary embolism or other acute finding in the chest. Electronically signed by:  Cyrus Chamorro MD  3/2/2023 8:35 PM CST Workstation: [id number]    CT Abdomen Pelvis With Contrast    Result Date: 3/2/2023  EXAM: CT Abdomen and Pelvis With Intravenous Contrast CLINICAL HISTORY: The patient is 47 years old and is Female; Bowel obstruction suspected TECHNIQUE: Axial computed tomography images of the abdomen and pelvis with intravenous contrast.  Sagittal and coronal reformatted images were created and reviewed.  This CT exam was performed using one or more of the following dose reduction techniques:  automated exposure control, adjustment of the mA and/or kV according to patient size, and/or use of iterative reconstruction technique. COMPARISON: No relevant prior studies available. FINDINGS: LUNG BASES:  Unremarkable.  No mass.  No consolidation. ABDOMEN: LIVER:  Low attenuation at the level of the falciform ligament is present suggesting fatty infiltration. A small cyst within the left hepatic lobe measuring approximately 1.1 cm is present. The liver is otherwise homogeneous. GALLBLADDER AND BILE DUCTS:  The  gallbladder is distended. No calcified gallstones or ductal dilatation is seen. PANCREAS:  No ductal dilation.  No mass. SPLEEN:  Unremarkable. ADRENALS:  Unremarkable.  No mass. KIDNEYS AND URETERS:  Unremarkable. The kidneys enhance symmetrically. No obstructing renal or ureteral calculus is seen. No hydronephrosis or hydroureter. No perinephric fluid or stranding. STOMACH AND BOWEL:  The stomach is distended with fluid and food contents. The small bowel is relatively normal in caliber. Stool is present throughout colon. A few scattered colonic diverticula are noted without surrounding inflammation. There is no mucosal thickening or evidence of obstruction. PELVIS: APPENDIX:  The appendix is normal in caliber without surrounding inflammation. BLADDER:  The bladder is well distended. REPRODUCTIVE:  Suggestion of uterine fibroids are noted. The ovaries are grossly unremarkable. ABDOMEN and PELVIS: INTRAPERITONEAL SPACE:  Unremarkable.  No free air.  No significant fluid collection. BONES/JOINTS:  No acute fracture. SOFT TISSUES:  The soft tissues are normal. VASCULATURE:  Unremarkable.  No abdominal aortic aneurysm. LYMPH NODES:  Unremarkable. No enlarged lymph nodes. IMPRESSION: 1.  No evidence of bowel obstruction. 2.  Mild colonic diverticulosis. Electronically signed by:  Saskia Coffey MD  3/2/2023 8:58 PM CST Workstation: [id number]    X-Ray Chest AP Portable    Result Date: 3/2/2023  Reason: Vomiting FINDINGS: Portable chest with comparison chest x-ray November 21, 2022 show normal cardiomediastinal silhouette. Left subclavian Port-A-Cath noted. Lungs are clear. Pulmonary vasculature is normal. No acute osseous abnormality. IMPRESSION: No acute cardiopulmonary abnormality. Electronically signed by:  Cyrus Larios DO  3/2/2023 3:48 PM CST Workstation: [id number]    MRI Breast w/wo Contrast, w/CAD, Bilateral    Result Date: 2/8/2023  EXAMINATION: MRI BREAST W/WO CONTRAST, W/CAD, BILATERAL CLINICAL HISTORY:  Left breast cancer, left axillary tail mass, evaluate post chemotherapy. TECHNIQUE: CMS MANDATED QUALITY DATA - MAMMOGRAPHY - 225 Bilateral breast MRI was performed with a dedicated breast coil. The patient was placed prone in the breast immobilizer with light compression. The following localization sequences were obtained: Axial inversion recovery, axial 3-D non-fat-suppressed, axial 3-D fat suppressed pre-contrast, followed by axial 3-D fat-suppressed post-contrast dynamic images with 8.5 mL Gadavist IV contrast. Post-processing including subtraction imaging, reconstruction in the sagittal and coronal planes, and MIP of both breasts was performed on an independent workstation. The interpretation was assisted by Computer Aided Detection. FINDINGS: Comparison to prior exams, including ultrasound of 11/08/2022 and MRI of 09/06/2022.  The breasts are composed of heterogeneous fibroglandular tissue.  Background parenchymal enhancement is mild. There are no suspicious enhancing masses or areas of suspicious non masslike enhancement in either breast.  Previous enhancing subdermal mass in the left breast axillary tail region has resolved, with resolution of previous enlarged left axillary lymph nodes.  No enlarged axillary lymph nodes or enlarged internal mammary chain lymph nodes. There is no breast skin thickening.  No focal signal abnormality or abnormal enhancement of the chest wall.     Resolution of previous left breast mass and previous enlarged left axillary lymph nodes, compatible with favorable response to therapy.  No new disease. BI-RADS CATEGORY 6: KNOWN MALIGNANCY. [facility] utilizes a reminder system to ensure that patients receive reminder letters for appointments. This includes reminders for routine screening mammograms, diagnostic mammograms, or other breast imaging interventions as appropriate. This patient will be placed in the appropriate reminder system. Electronically signed by:  Franklin Rachel MD Date:    02/08/2023 Time:    14:03       Significant Imaging: I have reviewed all pertinent imaging results/findings within the past 24 hours.  I have reviewed and interpreted all pertinent imaging results/findings within the past 24 hours.      Assessment/Plan:      * Intractable nausea and vomiting  Most likely from chemo   Seen by Oncologist  Recommends GI consult for possible upper endoscopy   Recommends possible TPN Nutritionist consult placed/ Started on [employer] Today  Continue current antiemetic Rx      Primary malignant neoplasm of breast  - Follow Dr. Peralta Hem/Onc  - plans is for surgery in around 2 weeks for breast mastectomy on March 17, 2023      Hypokalemia  20 meq k rider given today  To infuse 10meqs per hour  Monitor E-lyte  CMP in am      Left Breast Cancer-TRIPLE NEGATIVE          VTE Risk Mitigation (From admission, onward)         Ordered     enoxaparin injection 40 mg  Daily         03/02/23 2307     IP VTE HIGH RISK PATIENT  Once         03/02/23 2307     Place sequential compression device  Until discontinued         03/02/23 2307                Discharge Planning   PAUL: 3/6/2023     Code Status: Full Code   Is the patient medically ready for discharge?:     Reason for patient still in hospital (select all that apply): Patient trending condition, Treatment and Consult recommendations  Discharge Plan A: Home with family                  Kiara Walden NP  Department of Hospital Medicine   [facility]

## 2023-03-03 NOTE — ASSESSMENT & PLAN NOTE
Most likely from chemo   Seen by Oncologist  Recommends GI consult for possible upper endoscopy   Recommends possible TPN Nutritionist consult placed/ Started on Clinimix Today  Continue current antiemetic Rx

## 2023-03-03 NOTE — ASSESSMENT & PLAN NOTE
- Follow Dr. Peralta Hem/Onc  - plans is for surgery in around 2 weeks for breast mastectomy on March 17, 2023

## 2023-03-03 NOTE — PLAN OF CARE
[facility]  Initial Discharge Assessment       Primary Care Provider: WOLF Iraheta    Admission Diagnosis: Intractable vomiting with nausea [R11.2]    Admission Date: 3/2/2023  Expected Discharge Date:     Discharge Barriers Identified: None    CM met with Pt at bedside to complete discharge assessment. Info verified on facesheet as correct. Pt denies DME, HH, HD, and coumadin. Pt plan is to return home upon discharge with spouse providing transport. No needs identified at this time. CM to follow.     Payor: [employer] [facility] / Plan: BCBS ALL OUT OF STATE / Product Type: PPO /     Extended Emergency Contact Information  Primary Emergency Contact: SANDOVAL KING  Mobile Phone: [phone]  Relation: Spouse  Secondary Emergency Contact: Felicity Edward  Address: [address] of [facility]  Home Phone: [phone]  Work Phone: [phone]  Mobile Phone: [phone]  Relation: Mother    Discharge Plan A: Home with family  Discharge Plan B: Home with family      [facility] - [place] - [address]  [address]  [place] [address]  Phone: [phone] Fax: [phone]    [facility] - [place] - [address] [facility] [address] [address] [facility] [address]  [place] [address]  Phone: [phone] Fax: [phone]    [facility]  [address] [address]  [facility] [address]  Phone: [phone] Fax: [phone]      Initial Assessment (most recent)       Adult Discharge Assessment - 03/03/23 1104          Discharge Assessment    Assessment Type Discharge Planning Assessment     Confirmed/corrected address, phone number and insurance Yes     Confirmed Demographics Correct on Facesheet     Source of Information patient     Communicated PAUL with patient/caregiver Date not available/Unable to determine     Reason For Admission Intractable nausea and vomiting     People in Home child(magdalene), dependent;spouse      Facility Arrived From: home     Do you expect to return to your current living situation? Yes     Do you have help at home or someone to help you manage your care at home? Yes     Who are your caregiver(s) and their phone number(s)? Chi Escoto [phone]     Prior to hospitilization cognitive status: Alert/Oriented     Current cognitive status: Alert/Oriented     Equipment Currently Used at Home none     Readmission within 30 days? No     Patient currently being followed by outpatient case management? No     Do you currently have service(s) that help you manage your care at home? No     Do you take prescription medications? Yes     Do you have prescription coverage? Yes     Coverage BCBS     Do you have any problems affording any of your prescribed medications? No     Is the patient taking medications as prescribed? yes     Who is going to help you get home at discharge? spouse     How do you get to doctors appointments? family or friend will provide     Are you on dialysis? No     Do you take coumadin? No     Discharge Plan A Home with family     Discharge Plan B Home with family     DME Needed Upon Discharge  none     Discharge Plan discussed with: Patient     Discharge Barriers Identified None        Physical Activity    On average, how many days per week do you engage in moderate to strenuous exercise (like a brisk walk)? 3 days     On average, how many minutes do you engage in exercise at this level? 30 min        Financial Resource Strain    How hard is it for you to pay for the very basics like food, housing, medical care, and heating? Not hard at all        Housing Stability    In the last 12 months, was there a time when you were not able to pay the mortgage or rent on time? No     In the last 12 months, how many places have you lived? 1     In the last 12 months, was there a time when you did not have a steady place to sleep or slept in a shelter (including now)? No        Transportation Needs    In  the past 12 months, has lack of transportation kept you from medical appointments or from getting medications? No     In the past 12 months, has lack of transportation kept you from meetings, work, or from getting things needed for daily living? No        Food Insecurity    Within the past 12 months, you worried that your food would run out before you got the money to buy more. Never true     Within the past 12 months, the food you bought just didn't last and you didn't have money to get more. Never true        Stress    Do you feel stress - tense, restless, nervous, or anxious, or unable to sleep at night because your mind is troubled all the time - these days? To some extent        Social Connections    In a typical week, how many times do you talk on the phone with family, friends, or neighbors? More than three times a week     How often do you get together with friends or relatives? More than three times a week     How often do you attend Mormon or Restorationism services? Never     Do you belong to any clubs or organizations such as Mormon groups, unions, fraternal or athletic groups, or school groups? No     How often do you attend meetings of the clubs or organizations you belong to? Never     Are you , , , , never , or living with a partner?         Alcohol Use    Q1: How often do you have a drink containing alcohol? Never     Q2: How many drinks containing alcohol do you have on a typical day when you are drinking? Patient does not drink     Q3: How often do you have six or more drinks on one occasion? Never        OTHER    Name(s) of People in Home Chi Escoto

## 2023-03-03 NOTE — HPI
Karyna Escoto is a 47 y.o. female with known history of triple negative breast cancer who is receiving chemotherapy, last dose was about a month ago.  Since that time she has had intractable nausea and vomiting.  She has been able to keep down very little PO.  She denies any syncopal episodes, but she is very weak and becomes short of breath with minimal exertion.  She denies any lower extremity edema or orthopnea.  Denies chest pain.  She has been taking zofran and phenergan at home with no relief. History was obtained from the patient and collateral obtained from the family present at the bedside and ER physician Sign-out. Patient denies change in vision, hearing, headache, fever, cough, congestion, runny nose, chest pain,  palpitations, constipation, dysuria, hematuria, joint pain and back pain.

## 2023-03-03 NOTE — ASSESSMENT & PLAN NOTE
Patient has failed to thrive post-chemotherapy due to intractable nausea and vomiting.  This was likely caused by her chemotherapy but it is unusual to continue this long.  Would like to consult GI medicine for this as she may need upper endoscopy.  I will start her on a standing antiemetic regimen of decacron/zofran/reglan to see if this will help break this cycle.  Would also ask nutrition to see her for TPN as I think this would help her break this cycle.  She is to get surgery in about 2 weeks and my hope is that she can build enough strength to keep this on schedule.

## 2023-03-03 NOTE — SUBJECTIVE & OBJECTIVE
Oncology Treatment Plan:   OP PEMBROLIZUMAB CARBOPLATIN (AUC 5) WITH WEEKLY ABRAXANE FOLLOWED BY PEMBROLIZUMAB DOXORUBICIN CYCLOPHOSPHAMIDE FOLLOWED BY PEMBROLIZUMAB 200 MG Q3W    Medications:  Continuous Infusions:   amino acid 4.25 % in D5W 2,000 mL (03/03/23 1315)    lactated ringers 125 mL/hr at 03/03/23 1156     Scheduled Meds:   cetirizine  10 mg Oral Daily    enoxaparin  40 mg Subcutaneous Daily    fluticasone propionate  1 spray Each Nostril Daily    pantoprazole  40 mg Intravenous Daily    sertraline  50 mg Oral Daily     PRN Meds:acetaminophen, acetaminophen, HYDROcodone-acetaminophen, hydrOXYzine HCL, loperamide, melatonin, ondansetron, ondansetron, promethazine (PHENERGAN) IVPB, sodium chloride 0.9%     Review of patient's allergies indicates:   Allergen Reactions    Taxol [paclitaxel] Rash        Past Medical History:   Diagnosis Date    Anxiety     Breast cancer 07/2022     Past Surgical History:   Procedure Laterality Date    BREAST BIOPSY Left 07/2022    eye lid surgery Bilateral 1990    INSERTION OF TUNNELED CENTRAL VENOUS CATHETER (CVC) WITH SUBCUTANEOUS PORT Left 11/21/2022    Procedure: [id number];  Surgeon: Oscar Murphy III, MD;  Location: [facility] OR;  Service: General;  Laterality: Left;    NOSE SURGERY  1990    PORTACATH PLACEMENT  08/2022    [facility]    TONSILLECTOMY  1990    TUBAL LIGATION  2013     Family History       Problem Relation (Age of Onset)    Breast cancer Maternal Grandmother    Prostate cancer Maternal Grandfather          Tobacco Use    Smoking status: Former    Smokeless tobacco: Not on file    Tobacco comments:     Quit 2005-13 year history -1 daily   Substance and Sexual Activity    Alcohol use: Not Currently     Comment: occasional    Drug use: Not on file    Sexual activity: Yes       Review of Systems   Constitutional:  Negative for activity change, appetite change, diaphoresis, fatigue, fever and unexpected weight change.   HENT:  Negative for  congestion and hearing loss.    Eyes:  Negative for visual disturbance.   Respiratory:  Negative for cough, chest tightness and shortness of breath.    Cardiovascular:  Negative for chest pain and leg swelling.   Gastrointestinal:  Negative for abdominal pain, blood in stool, diarrhea, nausea and vomiting.   Endocrine: Negative for cold intolerance and heat intolerance.   Genitourinary:  Negative for difficulty urinating, dysuria and hematuria.   Neurological:  Negative for dizziness and headaches.   Hematological:  Negative for adenopathy. Does not bruise/bleed easily.   Psychiatric/Behavioral:  Negative for behavioral problems.    Objective:     Vital Signs (Most Recent):  Temp: 98.3 °F (36.8 °C) (03/03/23 1200)  Pulse: (!) 122 (03/03/23 1200)  Resp: 18 (03/03/23 1200)  BP: 99/68 (03/03/23 1200)  SpO2: 98 % (03/03/23 1200)   Vital Signs (24h Range):  Temp:  [98.3 °F (36.8 °C)-98.7 °F (37.1 °C)] 98.3 °F (36.8 °C)  Pulse:  [103-122] 122  Resp:  [17-18] 18  SpO2:  [97 %-100 %] 98 %  BP: ()/(58-76) 99/68     Weight: 72.6 kg (160 lb)  Body mass index is 29.26 kg/m².  Body surface area is 1.78 meters squared.      Intake/Output Summary (Last 24 hours) at 3/3/2023 1426  Last data filed at 3/2/2023 2026  Gross per 24 hour   Intake 2250 ml   Output --   Net 2250 ml       Physical Exam  Constitutional:       Appearance: She is well-developed.   HENT:      Head: Normocephalic and atraumatic.      Right Ear: External ear normal.      Left Ear: External ear normal.   Eyes:      Conjunctiva/sclera: Conjunctivae normal.      Pupils: Pupils are equal, round, and reactive to light.   Neck:      Thyroid: No thyromegaly.      Trachea: No tracheal deviation.   Cardiovascular:      Rate and Rhythm: Normal rate and regular rhythm.      Heart sounds: Normal heart sounds.   Pulmonary:      Effort: Pulmonary effort is normal.      Breath sounds: Normal breath sounds.   Abdominal:      General: Bowel sounds are normal. There is no  distension.      Palpations: Abdomen is soft. There is no mass.      Tenderness: There is no abdominal tenderness.   Skin:     Findings: No rash.   Neurological:      Comments: Neuro intact througout   Psychiatric:         Behavior: Behavior normal.         Thought Content: Thought content normal.         Judgment: Judgment normal.       Significant Labs:   CBC:   Recent Labs   Lab 03/02/23  1528   WBC 3.78*   HGB 10.4*   HCT 30.7*       and CMP:   Recent Labs   Lab 03/02/23  1528 03/03/23  0525   * 133*   K 3.1* 2.9*    103   CO2 17* 22*   GLU 81 119*   BUN <5* <5*   CREATININE 0.7 0.6   CALCIUM 9.5 8.5*   PROT 6.3 5.1*   ALBUMIN 3.6 2.9*   BILITOT 1.0 1.3*   ALKPHOS 44* 35*   AST 33 21   ALT 13 12   ANIONGAP 17* 8       Diagnostic Results:  None

## 2023-03-03 NOTE — CONSULTS
[facility]  Hematology/Oncology  Consult Note    Patient Name: Karyna Escoto  MRN: [id number]  Admission Date: 3/2/2023  Hospital Length of Stay: 0 days  Code Status: Full Code   Attending Provider: Robert Jaime MD  Consulting Provider: Derek Patterson MD  Primary Care Physician: WOLF Iraheta  Principal Problem:Intractable nausea and vomiting    Consults  Subjective:     HPI:  Ms. Escoto is a 46yo female with a PMH of left triple negative breast cancer who completed neoadjuvant chemotherapy on 2/2/2023 with very good local response.  Through the last several weeks she has developed worsening and intractable nausea and vomiting and has not been able to eat a full meal in several days.  She has been refractory to oral antiemetics and was sent to the ED for worsening malnutrition and dehydration.  She has no abdominal pain, diarrhea, constipation.  No fever or night sweats.        Oncology Treatment Plan:   OP PEMBROLIZUMAB CARBOPLATIN (AUC 5) WITH WEEKLY ABRAXANE FOLLOWED BY PEMBROLIZUMAB DOXORUBICIN CYCLOPHOSPHAMIDE FOLLOWED BY PEMBROLIZUMAB 200 MG Q3W    Medications:  Continuous Infusions:   amino acid 4.25 % in D5W 2,000 mL (03/03/23 1315)    lactated ringers 125 mL/hr at 03/03/23 1156     Scheduled Meds:   cetirizine  10 mg Oral Daily    enoxaparin  40 mg Subcutaneous Daily    fluticasone propionate  1 spray Each Nostril Daily    pantoprazole  40 mg Intravenous Daily    sertraline  50 mg Oral Daily     PRN Meds:acetaminophen, acetaminophen, HYDROcodone-acetaminophen, hydrOXYzine HCL, loperamide, melatonin, ondansetron, ondansetron, promethazine (PHENERGAN) IVPB, sodium chloride 0.9%     Review of patient's allergies indicates:   Allergen Reactions    Taxol [paclitaxel] Rash        Past Medical History:   Diagnosis Date    Anxiety     Breast cancer 07/2022     Past Surgical History:   Procedure Laterality Date    BREAST BIOPSY Left 07/2022    eye lid surgery  Bilateral 1990    INSERTION OF TUNNELED CENTRAL VENOUS CATHETER (CVC) WITH SUBCUTANEOUS PORT Left 11/21/2022    Procedure: [id number];  Surgeon: Oscar Murphy III, MD;  Location: [facility] OR;  Service: General;  Laterality: Left;    NOSE SURGERY  1990    [facility]    TONSILLECTOMY  1990    TUBAL LIGATION  2013     Family History       Problem Relation (Age of Onset)    Breast cancer Maternal Grandmother    Prostate cancer Maternal Grandfather          Tobacco Use    Smoking status: Former    Smokeless tobacco: Not on file    Tobacco comments:     Quit 2005-13 year history -1 daily   Substance and Sexual Activity    Alcohol use: Not Currently     Comment: occasional    Drug use: Not on file    Sexual activity: Yes       Review of Systems   Constitutional:  Negative for activity change, appetite change, diaphoresis, fatigue, fever and unexpected weight change.   HENT:  Negative for congestion and hearing loss.    Eyes:  Negative for visual disturbance.   Respiratory:  Negative for cough, chest tightness and shortness of breath.    Cardiovascular:  Negative for chest pain and leg swelling.   Gastrointestinal:  Negative for abdominal pain, blood in stool, diarrhea, nausea and vomiting.   Endocrine: Negative for cold intolerance and heat intolerance.   Genitourinary:  Negative for difficulty urinating, dysuria and hematuria.   Neurological:  Negative for dizziness and headaches.   Hematological:  Negative for adenopathy. Does not bruise/bleed easily.   Psychiatric/Behavioral:  Negative for behavioral problems.    Objective:     Vital Signs (Most Recent):  Temp: 98.3 °F (36.8 °C) (03/03/23 1200)  Pulse: (!) 122 (03/03/23 1200)  Resp: 18 (03/03/23 1200)  BP: 99/68 (03/03/23 1200)  SpO2: 98 % (03/03/23 1200)   Vital Signs (24h Range):  Temp:  [98.3 °F (36.8 °C)-98.7 °F (37.1 °C)] 98.3 °F (36.8 °C)  Pulse:  [103-122] 122  Resp:  [17-18] 18  SpO2:  [97 %-100 %] 98  %  BP: ()/(58-76) 99/68     Weight: 72.6 kg (160 lb)  Body mass index is 29.26 kg/m².  Body surface area is 1.78 meters squared.      Intake/Output Summary (Last 24 hours) at 3/3/2023 1426  Last data filed at 3/2/2023 2026  Gross per 24 hour   Intake 2250 ml   Output --   Net 2250 ml       Physical Exam  Constitutional:       Appearance: She is well-developed.   HENT:      Head: Normocephalic and atraumatic.      Right Ear: External ear normal.      Left Ear: External ear normal.   Eyes:      Conjunctiva/sclera: Conjunctivae normal.      Pupils: Pupils are equal, round, and reactive to light.   Neck:      Thyroid: No thyromegaly.      Trachea: No tracheal deviation.   Cardiovascular:      Rate and Rhythm: Normal rate and regular rhythm.      Heart sounds: Normal heart sounds.   Pulmonary:      Effort: Pulmonary effort is normal.      Breath sounds: Normal breath sounds.   Abdominal:      General: Bowel sounds are normal. There is no distension.      Palpations: Abdomen is soft. There is no mass.      Tenderness: There is no abdominal tenderness.   Skin:     Findings: No rash.   Neurological:      Comments: Neuro intact througout   Psychiatric:         Behavior: Behavior normal.         Thought Content: Thought content normal.         Judgment: Judgment normal.       Significant Labs:   CBC:   Recent Labs   Lab 03/02/23  1528   WBC 3.78*   HGB 10.4*   HCT 30.7*       and CMP:   Recent Labs   Lab 03/02/23  1528 03/03/23  0525   * 133*   K 3.1* 2.9*    103   CO2 17* 22*   GLU 81 119*   BUN <5* <5*   CREATININE 0.7 0.6   CALCIUM 9.5 8.5*   PROT 6.3 5.1*   ALBUMIN 3.6 2.9*   BILITOT 1.0 1.3*   ALKPHOS 44* 35*   AST 33 21   ALT 13 12   ANIONGAP 17* 8       Diagnostic Results:  None    Assessment/Plan:     * Intractable nausea and vomiting  Patient has failed to thrive post-chemotherapy due to intractable nausea and vomiting.  This was likely caused by her chemotherapy but it is unusual to continue  this long.  Would like to consult GI medicine for this as she may need upper endoscopy.  I will start her on a standing antiemetic regimen of decacron/zofran/reglan to see if this will help break this cycle.  Would also ask nutrition to see her for TPN as I think this would help her break this cycle.  She is to get surgery in about 2 weeks and my hope is that she can build enough strength to keep this on schedule.          Thank you for your consult. I will follow-up with patient. Please contact us if you have any additional questions.    Derek Patterson MD  Hematology/Oncology  [facility]

## 2023-03-03 NOTE — PROGRESS NOTES
"[facility]   Hematology/Oncology  Inpatient Progress Note          Patient Name: Karyna Escoto  MRN: [id number]  Admission Date: 3/2/2023  Hospital Length of Stay: 0 days  Code Status: Full Code   Attending Provider: Robert Jaime MD  Consulting Provider: Kiran Allred MD  Primary Care Physician: WOLF Iraheta  Principal Problem:Intractable nausea and vomiting        Coverage for Dr Ney Peralta            Subjective:       Patient ID: Karyna Escoto is a 47 y.o. female.    Chief Complaint: Vomiting and Nausea (N/V x 4 weeks unrelieved with IV fluids and antiemetics at the infusion center. Breast cancer stage 3 on chemo last feb 2. )        History Present Illness:    Patient laying in bed; her N/V is much better and she is hungry and would like to eat; she is currently NPO awaiting GI evaluation; she denies any current CP, SOB, HA's; her WBC has declined this am, so will start her on neupogen; her  is at bedside; I discussed with floor nursing staff in person      Review of Systems:  GEN: general malaise, weakness but improved  HEENT: normal with no HA's, sore throat, stiff neck, changes in vision  CV: normal with no CP, SOB, PND, COHN or orthopnea  PULM: normal with no SOB, cough, hemoptysis, sputum or pleuritic pain  GI: N/V has much improved and she is now hungry  : normal with no hematuria, dysuria  BREAST: normal with no mass, discharge, pain  SKIN: normal with no rash, erythema, bruising, or swelling      Objective:     Vitals:  Blood pressure 99/68, pulse (!) 122, temperature 98.3 °F (36.8 °C), temperature source Oral, resp. rate 18, height 5' 2" (1.575 m), weight 72.6 kg (160 lb), SpO2 98 %, not currently breastfeeding.    Physical Exam:  GEN: no apparent distress, comfortable; AAOx3  HEAD: atraumatic and normocephalic; alopecia from chemotx  EYES: no pallor, no icterus, PERRLA  ENT: OMM, no pharyngeal erythema, external ears WNL; no nasal discharge; no " thrush  NECK: no masses, thyroid normal, trachea midline, no LAD/LN's, supple  CV: RRR with no murmur; normal pulse; normal S1 and S2; no pedal edema; left sided portacath  CHEST: Normal respiratory effort; CTAB; normal breath sounds; no wheeze or crackles  ABDOM: nontender and nondistended; soft; normal bowel sounds; no rebound/guarding  MUSC/Skeletal: ROM normal; no crepitus; joints normal; no deformities or arthropathy  EXTREM: no clubbing, cyanosis, inflammation or swelling; IV  SKIN: no rashes, lesions, ulcers, petechiae or subcutaneous nodules  : no steven  NEURO: grossly intact; motor/sensory WNL; AAOx3; no tremors; generalized weakness  PSYCH: normal mood, affect and behavior  LYMPH: normal cervical, supraclavicular, axillary and groin LN's            Lab Review:        Lab Results   Component Value Date    WBC 1.63 (LL) 03/04/2023    HGB 10.0 (L) 03/04/2023    HCT 29.8 (L) 03/04/2023    MCV 97 03/04/2023     03/04/2023         Lab Results   Component Value Date    WBC 3.78 (L) 03/02/2023    HGB 10.4 (L) 03/02/2023    HCT 30.7 (L) 03/02/2023    MCV 99 (H) 03/02/2023     03/02/2023      CMP  Sodium   Date Value Ref Range Status   03/04/2023 134 (L) 136 - 145 mmol/L Final     Potassium   Date Value Ref Range Status   03/04/2023 3.3 (L) 3.5 - 5.1 mmol/L Final     Chloride   Date Value Ref Range Status   03/04/2023 98 95 - 110 mmol/L Final     CO2   Date Value Ref Range Status   03/04/2023 25 23 - 29 mmol/L Final     Glucose   Date Value Ref Range Status   03/04/2023 190 (H) 70 - 110 mg/dL Final     BUN   Date Value Ref Range Status   03/04/2023 7 6 - 20 mg/dL Final     Creatinine   Date Value Ref Range Status   03/04/2023 0.7 0.5 - 1.4 mg/dL Final     Calcium   Date Value Ref Range Status   03/04/2023 8.7 8.7 - 10.5 mg/dL Final     Total Protein   Date Value Ref Range Status   03/04/2023 6.0 6.0 - 8.4 g/dL Final     Albumin   Date Value Ref Range Status   03/04/2023 3.4 (L) 3.5 - 5.2 g/dL Final      Total Bilirubin   Date Value Ref Range Status   03/04/2023 0.5 0.1 - 1.0 mg/dL Final     Comment:     For infants and newborns, interpretation of results should be based  on gestational age, weight and in agreement with clinical  observations.    Premature Infant recommended reference ranges:  Up to 24 hours.............<8.0 mg/dL  Up to 48 hours............<12.0 mg/dL  3-5 days..................<15.0 mg/dL  6-29 days.................<15.0 mg/dL       Alkaline Phosphatase   Date Value Ref Range Status   03/04/2023 36 (L) 55 - 135 U/L Final     AST   Date Value Ref Range Status   03/04/2023 20 10 - 40 U/L Final     ALT   Date Value Ref Range Status   03/04/2023 12 10 - 44 U/L Final     Anion Gap   Date Value Ref Range Status   03/04/2023 11 8 - 16 mmol/L Final     eGFR   Date Value Ref Range Status   03/04/2023 >60.0 >60 mL/min/1.73 m^2 Final       CMP  Sodium   Date Value Ref Range Status   03/03/2023 131 (L) 136 - 145 mmol/L Final     Potassium   Date Value Ref Range Status   03/03/2023 2.7 (LL) 3.5 - 5.1 mmol/L Final     Comment:     Results confirmed, test repeated  Potassium critical result(s) repeated. Called and verbal readback   obtained from Adrianne Schaffer, [address], RN by [email or web address] 03/03/2023 16:57       Chloride   Date Value Ref Range Status   03/03/2023 99 95 - 110 mmol/L Final     CO2   Date Value Ref Range Status   03/03/2023 23 23 - 29 mmol/L Final     Glucose   Date Value Ref Range Status   03/03/2023 126 (H) 70 - 110 mg/dL Final     BUN   Date Value Ref Range Status   03/03/2023 7 6 - 20 mg/dL Final     Creatinine   Date Value Ref Range Status   03/03/2023 0.6 0.5 - 1.4 mg/dL Final     Calcium   Date Value Ref Range Status   03/03/2023 8.4 (L) 8.7 - 10.5 mg/dL Final     Total Protein   Date Value Ref Range Status   03/03/2023 5.1 (L) 6.0 - 8.4 g/dL Final     Albumin   Date Value Ref Range Status   03/03/2023 2.9 (L) 3.5 - 5.2 g/dL Final     Total Bilirubin   Date Value Ref Range Status   03/03/2023 1.3  (H) 0.1 - 1.0 mg/dL Final     Comment:     For infants and newborns, interpretation of results should be based  on gestational age, weight and in agreement with clinical  observations.    Premature Infant recommended reference ranges:  Up to 24 hours.............<8.0 mg/dL  Up to 48 hours............<12.0 mg/dL  3-5 days..................<15.0 mg/dL  6-29 days.................<15.0 mg/dL       Alkaline Phosphatase   Date Value Ref Range Status   03/03/2023 35 (L) 55 - 135 U/L Final     AST   Date Value Ref Range Status   03/03/2023 21 10 - 40 U/L Final     ALT   Date Value Ref Range Status   03/03/2023 12 10 - 44 U/L Final     Anion Gap   Date Value Ref Range Status   03/03/2023 9 8 - 16 mmol/L Final     eGFR   Date Value Ref Range Status   03/03/2023 >60.0 >60 mL/min/1.73 m^2 Final           Radiology Diagnostic Studies:     CT Abdomen Pelvis With Contrast [[id number]] Collected: 03/02/23 1902   Order Status: Completed Updated: 03/02/23 2101   Narrative:     EXAM:   CT Abdomen and Pelvis With Intravenous Contrast     CLINICAL HISTORY:   The patient is 47 years old and is Female; Bowel obstruction suspected     TECHNIQUE:   Axial computed tomography images of the abdomen and pelvis with intravenous contrast.  Sagittal and coronal reformatted images were created and reviewed.  This CT exam was performed using one or more of the following dose reduction techniques:  automated exposure control, adjustment of the mA and/or kV according to patient size, and/or use of iterative reconstruction technique.     COMPARISON:   No relevant prior studies available.     FINDINGS:   LUNG BASES:  Unremarkable.  No mass.  No consolidation.     ABDOMEN:   LIVER:  Low attenuation at the level of the falciform ligament is present suggesting fatty infiltration. A small cyst within the left hepatic lobe measuring approximately 1.1 cm is present. The liver is otherwise homogeneous.   GALLBLADDER AND BILE DUCTS:  The gallbladder is  distended. No calcified gallstones or ductal dilatation is seen.   PANCREAS:  No ductal dilation.  No mass.   SPLEEN:  Unremarkable.   ADRENALS:  Unremarkable.  No mass.   KIDNEYS AND URETERS:  Unremarkable. The kidneys enhance symmetrically. No obstructing renal or ureteral calculus is seen. No hydronephrosis or hydroureter. No perinephric fluid or stranding.   STOMACH AND BOWEL:  The stomach is distended with fluid and food contents. The small bowel is relatively normal in caliber. Stool is present throughout colon. A few scattered colonic diverticula are noted without surrounding inflammation. There is no mucosal thickening or evidence of obstruction.     PELVIS:   APPENDIX:  The appendix is normal in caliber without surrounding inflammation.   BLADDER:  The bladder is well distended.   REPRODUCTIVE:  Suggestion of uterine fibroids are noted. The ovaries are grossly unremarkable.     ABDOMEN and PELVIS:   INTRAPERITONEAL SPACE:  Unremarkable.  No free air.  No significant fluid collection.   BONES/JOINTS:  No acute fracture.   SOFT TISSUES:  The soft tissues are normal.   VASCULATURE:  Unremarkable.  No abdominal aortic aneurysm.   LYMPH NODES:  Unremarkable. No enlarged lymph nodes.     IMPRESSION:   1.  No evidence of bowel obstruction.   2.  Mild colonic diverticulosis.     Electronically signed by:  Saskia Coffey MD  3/2/2023 8:58 PM CST Workstation: [id number]   CTA Chest Non-Coronary (PE Studies) [[id number]] Collected: 03/02/23 1901   Order Status: Completed Updated: 03/02/23 2037   Narrative:     EXAM:   CT Angiography Chest With Intravenous Contrast     CLINICAL HISTORY:   The patient is 47 years old and is Female; Pulmonary embolism (PE) suspected, high prob     TECHNIQUE:   Axial computed tomographic angiography images of the chest with intravenous contrast.  Sagittal and coronal reformatted images were created and reviewed.  This CT exam was performed using one or more of the following dose reduction  techniques:  automated exposure control, adjustment of the mA and/or kV according to patient size, and/or use of iterative reconstruction technique.   MIP reconstructed images were created and reviewed.     COMPARISON:   No relevant prior studies available.     FINDINGS:   PULMONARY ARTERIES:  No pulmonary embolism.   AORTA:  No acute findings.  No thoracic aortic aneurysm.   LUNGS:  Unremarkable.  No mass.  No consolidation.   PLEURAL SPACE:  Unremarkable.  No significant effusion.  No pneumothorax.   HEART:  Unremarkable.  No cardiomegaly.  No significant pericardial effusion.  No evidence of RV dysfunction.   BONES/JOINTS:  No acute fracture.  No dislocation.   SOFT TISSUES:  Unremarkable.   LYMPH NODES:  Unremarkable.  No enlarged lymph nodes.     IMPRESSION:   No pulmonary embolism or other acute finding in the chest.     Electronically signed by:  Cyrus Chamorro MD  3/2/2023 8:35 PM CST Workstation: [id number]   X-Ray Chest AP Portable [[id number]] Collected: 03/02/23 1519   Order Status: Completed Updated: 03/02/23 1550   Narrative:     Reason: Vomiting     FINDINGS:     Portable chest with comparison chest x-ray November 21, 2022 show normal cardiomediastinal silhouette. Left subclavian Port-A-Cath noted.   Lungs are clear. Pulmonary vasculature is normal. No acute osseous abnormality.     IMPRESSION:     No acute cardiopulmonary abnormality.          Assessment:     IMPRESSION:    (1) 47 y.o. female known to the oncology service of my associate Dr Ney Peralta with diagnosis of Triple Negative Breast cancer who presented to [facility] with intractable N/V after undergoing neoadjuvant chemotherapy.     3/4/2023:  - Admitted to hospitalist service with malnutrition and dehydration.  - Nutrition consulted for TPN  - continued on antiemetics  - wbc dropped to 1.63 - will start her on neupogen and neutropenic precautions    (2) Mild neutropenia secondary to marrow suppression from chemotherapy    (3) Mild anemia  secondary to marrow suppression from chemotherapy    (4) Anxiety    (5) Former smoker (quit 2005)              1. Intractable vomiting with nausea    2. Vomiting    3. Dehydration    4. Primary malignant neoplasm of breast, unspecified laterality    5. Intractable nausea and vomiting [R11.2 (ICD-10-CM)]           Plan:     PLAN:          Monitor labs - start neupogen today for next 3 days or until ANC >1000  Judicious use of antiemetics  IVF's as per primary team  Nutrition consulted for TPN evaluation  GI consulted - she is NPO at this time awaiting their evaluation  Will follow with you through the weekend - Dr Peralta returns on Monday 3/6               Kiran Allred MD  Hematology/Oncology  Former[facility], later [facility]

## 2023-03-04 LAB
ALBUMIN SERPL BCP-MCNC: 3.4 G/DL (ref 3.5–5.2)
ALP SERPL-CCNC: 36 U/L (ref 55–135)
ALT SERPL W/O P-5'-P-CCNC: 12 U/L (ref 10–44)
ANION GAP SERPL CALC-SCNC: 11 MMOL/L (ref 8–16)
AST SERPL-CCNC: 20 U/L (ref 10–40)
BILIRUB SERPL-MCNC: 0.5 MG/DL (ref 0.1–1)
BUN SERPL-MCNC: 7 MG/DL (ref 6–20)
CALCIUM SERPL-MCNC: 8.7 MG/DL (ref 8.7–10.5)
CHLORIDE SERPL-SCNC: 98 MMOL/L (ref 95–110)
CO2 SERPL-SCNC: 25 MMOL/L (ref 23–29)
CREAT SERPL-MCNC: 0.7 MG/DL (ref 0.5–1.4)
ERYTHROCYTE [DISTWIDTH] IN BLOOD BY AUTOMATED COUNT: 15.3 % (ref 11.5–14.5)
EST. GFR  (NO RACE VARIABLE): >60 ML/MIN/1.73 M^2
GLUCOSE SERPL-MCNC: 190 MG/DL (ref 70–110)
HCT VFR BLD AUTO: 29.8 % (ref 37–48.5)
HGB BLD-MCNC: 10 G/DL (ref 12–16)
MAGNESIUM SERPL-MCNC: 1.9 MG/DL (ref 1.6–2.6)
MCH RBC QN AUTO: 32.7 PG (ref 27–31)
MCHC RBC AUTO-ENTMCNC: 33.6 G/DL (ref 32–36)
MCV RBC AUTO: 97 FL (ref 82–98)
PHOSPHATE SERPL-MCNC: 2 MG/DL (ref 2.7–4.5)
PLATELET # BLD AUTO: 154 K/UL (ref 150–450)
PMV BLD AUTO: 10.8 FL (ref 9.2–12.9)
POTASSIUM SERPL-SCNC: 3.3 MMOL/L (ref 3.5–5.1)
PROT SERPL-MCNC: 6 G/DL (ref 6–8.4)
RBC # BLD AUTO: 3.06 M/UL (ref 4–5.4)
SODIUM SERPL-SCNC: 134 MMOL/L (ref 136–145)
WBC # BLD AUTO: 1.63 K/UL (ref 3.9–12.7)

## 2023-03-04 PROCEDURE — B4185 PARENTERAL SOL 10 GM LIPIDS: HCPCS | Performed by: STUDENT IN AN ORGANIZED HEALTH CARE EDUCATION/TRAINING PROGRAM

## 2023-03-04 PROCEDURE — 63600175 PHARM REV CODE 636 W HCPCS: Performed by: INTERNAL MEDICINE

## 2023-03-04 PROCEDURE — A4217 STERILE WATER/SALINE, 500 ML: HCPCS | Performed by: STUDENT IN AN ORGANIZED HEALTH CARE EDUCATION/TRAINING PROGRAM

## 2023-03-04 PROCEDURE — 96361 HYDRATE IV INFUSION ADD-ON: CPT

## 2023-03-04 PROCEDURE — 25000003 PHARM REV CODE 250: Performed by: STUDENT IN AN ORGANIZED HEALTH CARE EDUCATION/TRAINING PROGRAM

## 2023-03-04 PROCEDURE — 85027 COMPLETE CBC AUTOMATED: CPT | Performed by: STUDENT IN AN ORGANIZED HEALTH CARE EDUCATION/TRAINING PROGRAM

## 2023-03-04 PROCEDURE — C9113 INJ PANTOPRAZOLE SODIUM, VIA: HCPCS | Performed by: STUDENT IN AN ORGANIZED HEALTH CARE EDUCATION/TRAINING PROGRAM

## 2023-03-04 PROCEDURE — 25000003 PHARM REV CODE 250: Performed by: INTERNAL MEDICINE

## 2023-03-04 PROCEDURE — 36415 COLL VENOUS BLD VENIPUNCTURE: CPT | Performed by: STUDENT IN AN ORGANIZED HEALTH CARE EDUCATION/TRAINING PROGRAM

## 2023-03-04 PROCEDURE — 80053 COMPREHEN METABOLIC PANEL: CPT | Performed by: STUDENT IN AN ORGANIZED HEALTH CARE EDUCATION/TRAINING PROGRAM

## 2023-03-04 PROCEDURE — 63600175 PHARM REV CODE 636 W HCPCS: Performed by: STUDENT IN AN ORGANIZED HEALTH CARE EDUCATION/TRAINING PROGRAM

## 2023-03-04 PROCEDURE — 84100 ASSAY OF PHOSPHORUS: CPT | Performed by: STUDENT IN AN ORGANIZED HEALTH CARE EDUCATION/TRAINING PROGRAM

## 2023-03-04 PROCEDURE — 83735 ASSAY OF MAGNESIUM: CPT | Performed by: STUDENT IN AN ORGANIZED HEALTH CARE EDUCATION/TRAINING PROGRAM

## 2023-03-04 PROCEDURE — 96376 TX/PRO/DX INJ SAME DRUG ADON: CPT

## 2023-03-04 PROCEDURE — 12000002 HC ACUTE/MED SURGE SEMI-PRIVATE ROOM

## 2023-03-04 RX ADMIN — SERTRALINE HYDROCHLORIDE 50 MG: 50 TABLET ORAL at 09:03

## 2023-03-04 RX ADMIN — ONDANSETRON 16 MG: 2 INJECTION INTRAMUSCULAR; INTRAVENOUS at 06:03

## 2023-03-04 RX ADMIN — FILGRASTIM-SNDZ 300 MCG: 300 INJECTION, SOLUTION INTRAVENOUS; SUBCUTANEOUS at 12:03

## 2023-03-04 RX ADMIN — ONDANSETRON 16 MG: 2 INJECTION INTRAMUSCULAR; INTRAVENOUS at 03:03

## 2023-03-04 RX ADMIN — DEXAMETHASONE SODIUM PHOSPHATE 10 MG: 10 INJECTION INTRAMUSCULAR; INTRAVENOUS at 09:03

## 2023-03-04 RX ADMIN — SMOFLIPID: 6; 6; 5; 3 INJECTION, EMULSION INTRAVENOUS at 05:03

## 2023-03-04 RX ADMIN — LEUCINE, PHENYLALANINE, LYSINE, METHIONINE, ISOLEUCINE, VALINE, HISTIDINE, THREONINE, TRYPTOPHAN, ALANINE, GLYCINE, ARGININE, PROLINE, SERINE, TYROSINE, DEXTROSE 2000 ML: 311; 238; 247; 170; 255; 247; 204; 179; 77; 880; 438; 489; 289; 213; 17; 5 INJECTION INTRAVENOUS at 09:03

## 2023-03-04 RX ADMIN — ENOXAPARIN SODIUM 40 MG: 100 INJECTION SUBCUTANEOUS at 05:03

## 2023-03-04 RX ADMIN — METOCLOPRAMIDE 5 MG: 5 INJECTION, SOLUTION INTRAMUSCULAR; INTRAVENOUS at 03:03

## 2023-03-04 RX ADMIN — SODIUM PHOSPHATE, MONOBASIC, MONOHYDRATE AND SODIUM PHOSPHATE, DIBASIC, ANHYDROUS 20.01 MMOL: 142; 276 INJECTION, SOLUTION INTRAVENOUS at 12:03

## 2023-03-04 RX ADMIN — METOCLOPRAMIDE 5 MG: 5 INJECTION, SOLUTION INTRAMUSCULAR; INTRAVENOUS at 06:03

## 2023-03-04 RX ADMIN — CETIRIZINE HYDROCHLORIDE 10 MG: 10 TABLET, FILM COATED ORAL at 09:03

## 2023-03-04 RX ADMIN — METOCLOPRAMIDE 5 MG: 5 INJECTION, SOLUTION INTRAMUSCULAR; INTRAVENOUS at 10:03

## 2023-03-04 RX ADMIN — PANTOPRAZOLE SODIUM 40 MG: 40 INJECTION, POWDER, LYOPHILIZED, FOR SOLUTION INTRAVENOUS at 09:03

## 2023-03-04 RX ADMIN — LOPERAMIDE HYDROCHLORIDE 2 MG: 2 CAPSULE ORAL at 10:03

## 2023-03-04 RX ADMIN — FLUTICASONE PROPIONATE 50 MCG: 50 SPRAY, METERED NASAL at 09:03

## 2023-03-04 NOTE — ASSESSMENT & PLAN NOTE
20 meq k rider given today  To infuse 10meqs per hour  Monitor E-lyte  CMP in am  3.3  (Today) 3.3 2.7 2.9      Continue with K+ replacement

## 2023-03-04 NOTE — CONSULTS
"[facility]  Adult Nutrition   Consult Note (Nutrition Support Management)    SUMMARY     Recommendations  Recommendation/Intervention: 1. Continue Clinimix @ 100 per MD pending custom TPN per pharmacy. 2. Continue full liquid diet as tolerated. 3. RD/Pharm to monitor TPN daily, making adjustments as needed.  Goals: Patient will meet >/= 75% EEN and EPN in 24-48 hrs.  Nutrition Goal Status: new  Communication of RD Recs: other (comment) (reviewed with pharmacist)    Dietitian Rounds Brief  Consult for TPN.   Clinimix started yesterday; TPN management per pharmacist over the weekend and NS RD on Monday.   RD calculated EEN/EPN.     47 y.o. female with known history of triple negative breast cancer who is receiving chemotherapy, last dose was about a month ago. Pt admited with intractable N/V since last chemo tx.   NFPE not performed as pt was sleeping. No overt signs of malnutrition. Last BM 3/03/23.     Diet order:   Current Diet Order: Full Liquid diet      Current Nutrition Support Formula Ordered: Clinimix 4.25/5  Current Nutrition Support Rate Ordered: 100 (ml)  Current Nutrition Support Frequency Ordered: continuous    Evaluation of Received Nutrient/Fluid Intake  Parenteral Calories (kcal): 816  Parenteral Protein (gm): 102  Parenteral Fluid (mL): 2400  Energy Calories Required: not meeting needs  Protein Required: not meeting needs  Fluid Required: meeting needs  Tolerance: tolerating     % Intake of Estimated Energy Needs: 0 - 25 %  % Meal Intake: 0 - 25 %      Intake/Output Summary (Last 24 hours) at 3/4/2023 1240  Last data filed at 3/4/2023 0926  Gross per 24 hour   Intake 2478.33 ml   Output --   Net 2478.33 ml        Anthropometrics  Temp: 98.2 °F (36.8 °C)  Height Method: Stated  Height: 5' 2" (157.5 cm)  Height (inches): 62 in  Weight Method: Standard Scale  Weight: 72.6 kg (160 lb)  Weight (lb): 160 lb  Ideal Body Weight (IBW), Female: 110 lb  % Ideal Body Weight, Female (lb): 145.45 " %  BMI (Calculated): 29.3       Estimated/Assessed Needs  Weight Used For Calorie Calculations: 72.6 kg (160 lb 0.9 oz)  Energy Calorie Requirements (kcal): 9518-5041 (25-30 kcal/kg)  Energy Need Method: Kcal/kg  Protein Requirements: 109-145 gm/day (1.5-2.0 gm/kg)  Weight Used For Protein Calculations: 72.6 kg (160 lb 0.9 oz)     Estimated Fluid Requirement Method: RDA Method  RDA Method (mL): 2179       Reason for Assessment  Reason For Assessment: consult, new TPN  Diagnosis: cancer diagnosis/related complications  Relevant Medical History: breast cancer s/p chemo  Interdisciplinary Rounds: did not attend    Nutrition/Diet History  Food Allergies: NKFA  Factors Affecting Nutritional Intake: nausea/vomiting    Nutrition Risk Screen  Nutrition Risk Screen: tube feeding or parenteral nutrition     MST Score: 3  Have you recently lost weight without trying?: Unsure  Weight loss score: 2  Have you been eating poorly because of a decreased appetite?: Yes  Appetite score: 1       Weight History:  Wt Readings from Last 5 Encounters:   03/02/23 72.6 kg (160 lb)   02/20/23 74.4 kg (164 lb)   02/06/23 82.4 kg (181 lb 11.2 oz)   02/02/23 79.3 kg (174 lb 13.2 oz)   01/27/23 80.7 kg (178 lb)        Lab/Procedures/Meds: Pertinent Labs/Meds Reviewed    Medications:Pertinent Medications Reviewed  Scheduled Meds:   cetirizine  10 mg Oral Daily    dexAMETHasone  10 mg Intravenous Q12H    enoxaparin  40 mg Subcutaneous Daily    filgrastim-sndz  300 mcg Subcutaneous Daily    fluticasone propionate  1 spray Each Nostril Daily    metoclopramide HCl  5 mg Intravenous Q8H    ondansetron (ZOFRAN) IVPB  16 mg Intravenous Q12H    pantoprazole  40 mg Intravenous Daily    sertraline  50 mg Oral Daily     Continuous Infusions:   amino acid 4.25 % in D5W 2,000 mL (03/04/23 0926)    lactated ringers 50 mL/hr at 03/03/23 1523     PRN Meds:.acetaminophen, acetaminophen, calcium gluconate IVPB, calcium gluconate IVPB, calcium gluconate IVPB,  HYDROcodone-acetaminophen, hydrOXYzine HCL, loperamide, magnesium sulfate IVPB, magnesium sulfate IVPB, melatonin, potassium chloride **AND** potassium chloride **AND** potassium chloride, prochlorperazine, sodium chloride 0.9%, sodium phosphate IVPB, sodium phosphate IVPB, sodium phosphate IVPB    Labs: Pertinent Labs Reviewed  Clinical Chemistry:  Recent Labs   Lab 03/02/23  1528 03/03/23  0525 03/03/23  1550 03/04/23  0746   * 133*   < > 134*   K 3.1* 2.9*   < > 3.3*    103   < > 98   CO2 17* 22*   < > 25   GLU 81 119*   < > 190*   BUN <5* <5*   < > 7   CREATININE 0.7 0.6   < > 0.7   CALCIUM 9.5 8.5*   < > 8.7   PROT 6.3 5.1*  --  6.0   ALBUMIN 3.6 2.9*  --  3.4*   BILITOT 1.0 1.3*  --  0.5   ALKPHOS 44* 35*  --  36*   AST 33 21  --  20   ALT 13 12  --  12   ANIONGAP 17* 8   < > 11   MG 1.4* 1.5*   < > 1.9   PHOS  --  3.2  --  2.0*   LIPASE 30  --   --   --     < > = values in this interval not displayed.     CBC:   Recent Labs   Lab 03/04/23  0745   WBC 1.63*   RBC 3.06*   HGB 10.0*   HCT 29.8*      MCV 97   MCH 32.7*   MCHC 33.6     Lipid Panel:  No results for input(s): CHOL, HDL, LDLCALC, TRIG, CHOLHDL in the last 168 hours.  Cardiac Profile:  Recent Labs   Lab 03/02/23  1528   BNP 8   CPK 49     Inflammatory Labs:  No results for input(s): CRP in the last 168 hours.  Diabetes:  No results for input(s): HGBA1C, POCTGLUCOSE in the last 168 hours.  Thyroid & Parathyroid:  Recent Labs   Lab 03/02/23  1528   TSH 3.120       Monitor and Evaluation  Food and Nutrient Intake: energy intake, food and beverage intake, parenteral nutrition intake  Food and Nutrient Adminstration: diet order, enteral and parenteral nutrition administration  Knowledge/Beliefs/Attitudes: food and nutrition knowledge/skill  Physical Activity and Function: nutrition-related ADLs and IADLs  Anthropometric Measurements: weight, weight change, body mass index  Biochemical Data, Medical Tests and Procedures: electrolyte and  renal panel, gastrointestinal profile, glucose/endocrine profile, inflammatory profile, lipid profile  Nutrition-Focused Physical Findings: overall appearance     Nutrition Risk  Level of Risk/Frequency of Follow-up: high     Nutrition Follow-Up  RD Follow-up?: Yes      Virginia Verdin RD, LDN 03/04/2023 12:40 PM

## 2023-03-04 NOTE — SUBJECTIVE & OBJECTIVE
Interval History:   03/04/2: feeling much better. Nausea improved. Requesting diet. GI consult still pending. Placed on Neupogen by Hem/onc for pancytopenia. Afebrile. Continue K+ replacement  03/03/23:  Still complains of nausea but improved seen by hematologist currently on Clindamax will consult nutritionist for further recommendations.  Afebrile potassium replaced via K rider.  Tolerate liquids NPO after midnight for possible GI procedure in a.m.  Review of Systems  Objective:     Vital Signs (Most Recent):  Temp: 98.2 °F (36.8 °C) (03/04/23 1044)  Pulse: 95 (03/04/23 1044)  Resp: 18 (03/04/23 1044)  BP: 110/82 (03/04/23 1157)  SpO2: 95 % (03/04/23 1044)   Vital Signs (24h Range):  Temp:  [98 °F (36.7 °C)-98.7 °F (37.1 °C)] 98.2 °F (36.8 °C)  Pulse:  [] 95  Resp:  [18-20] 18  SpO2:  [93 %-100 %] 95 %  BP: ()/(56-82) 110/82     Weight: 72.6 kg (160 lb)  Body mass index is 29.26 kg/m².    Intake/Output Summary (Last 24 hours) at 3/4/2023 1422  Last data filed at 3/4/2023 1330  Gross per 24 hour   Intake 2478.33 ml   Output --   Net 2478.33 ml        Physical Exam  Constitutional:       Appearance: Normal appearance.   HENT:      Head: Normocephalic and atraumatic.      Comments: Alopecia     Nose: Nose normal.      Mouth/Throat:      Mouth: Mucous membranes are dry.   Eyes:      Pupils: Pupils are equal, round, and reactive to light.   Cardiovascular:      Rate and Rhythm: Normal rate and regular rhythm.      Pulses: Normal pulses.      Heart sounds: Normal heart sounds.   Pulmonary:      Effort: Pulmonary effort is normal.      Breath sounds: Normal breath sounds.   Abdominal:      General: Abdomen is flat. Bowel sounds are normal.      Palpations: Abdomen is soft.   Musculoskeletal:         General: Normal range of motion.      Cervical back: Neck supple.   Skin:     General: Skin is dry.   Neurological:      General: No focal deficit present.      Mental Status: She is alert and oriented to person,  place, and time.   Psychiatric:         Mood and Affect: Mood normal.       Significant Labs: All pertinent labs within the past 24 hours have been reviewed.  A1C: No results for input(s): HGBA1C in the last 4320 hours.  ABGs: No results for input(s): PH, PCO2, HCO3, POCSATURATED, BE, TOTALHB, COHB, METHB, O2HB, POCFIO2, PO2 in the last 48 hours.  Bilirubin:   Recent Labs   Lab 02/14/23  1029 02/20/23  0923 03/02/23  1528 03/03/23  0525 03/04/23  0746   BILITOT 0.5 0.8 1.0 1.3* 0.5       Blood Culture:   Recent Labs   Lab 03/02/23  1527 03/02/23  1709   LABBLOO No Growth to date  No Growth to date No Growth to date  No Growth to date       BMP:   Recent Labs   Lab 03/04/23  0746   *   *   K 3.3*   CL 98   CO2 25   BUN 7   CREATININE 0.7   CALCIUM 8.7   MG 1.9       CBC:   Recent Labs   Lab 03/02/23  1528 03/04/23  0745   WBC 3.78* 1.63*   HGB 10.4* 10.0*   HCT 30.7* 29.8*    154       CMP:   Recent Labs   Lab 03/02/23  1528 03/03/23  0525 03/03/23  1550 03/04/23  0746   * 133* 131* 134*   K 3.1* 2.9* 2.7* 3.3*    103 99 98   CO2 17* 22* 23 25   GLU 81 119* 126* 190*   BUN <5* <5* 7 7   CREATININE 0.7 0.6 0.6 0.7   CALCIUM 9.5 8.5* 8.4* 8.7   PROT 6.3 5.1*  --  6.0   ALBUMIN 3.6 2.9*  --  3.4*   BILITOT 1.0 1.3*  --  0.5   ALKPHOS 44* 35*  --  36*   AST 33 21  --  20   ALT 13 12  --  12   ANIONGAP 17* 8 9 11       Cardiac Markers:   Recent Labs   Lab 03/02/23  1528   BNP 8       Coagulation: No results for input(s): PT, INR, APTT in the last 48 hours.  Lactic Acid:   Recent Labs   Lab 03/02/23  1528   LACTATE 1.5       Lipase:   Recent Labs   Lab 03/02/23  1528   LIPASE 30       Lipid Panel: No results for input(s): CHOL, HDL, LDLCALC, TRIG, CHOLHDL in the last 48 hours.  Magnesium:   Recent Labs   Lab 03/03/23  0525 03/03/23  1550 03/04/23  0746   MG 1.5* 1.3* 1.9       Pathology Results  (Last 10 years)      None          POCT Glucose: No results for input(s): POCTGLUCOSE in the  last 48 hours.  Prealbumin:   Recent Labs   Lab 03/03/23  0525   PREALBUMIN 3*       Respiratory Culture: No results for input(s): GSRESP, RESPIRATORYC in the last 48 hours.  Troponin:   Recent Labs   Lab 03/02/23  1528 03/02/23  1709   TROPONINIHS 30.5* 29.6*       TSH:   Recent Labs   Lab 03/02/23  1528   TSH 3.120       Urine Culture: No results for input(s): LABURIN in the last 48 hours.  Urine Studies:   Recent Labs   Lab 03/02/23  1835   COLORU Yellow   APPEARANCEUA Clear   PHUR 6.0   SPECGRAV 1.020   PROTEINUA 1+*   GLUCUA Negative   KETONESU 3+*   BILIRUBINUA 1+*   OCCULTUA Negative   NITRITE Negative   UROBILINOGEN 2.0-3.0*   LEUKOCYTESUR Negative   RBCUA 2   WBCUA 2   BACTERIA Negative   SQUAMEPITHEL 2   HYALINECASTS 2*     CTA Chest Non-Coronary (PE Studies)    Result Date: 3/2/2023  EXAM: CT Angiography Chest With Intravenous Contrast CLINICAL HISTORY: The patient is 47 years old and is Female; Pulmonary embolism (PE) suspected, high prob TECHNIQUE: Axial computed tomographic angiography images of the chest with intravenous contrast.  Sagittal and coronal reformatted images were created and reviewed.  This CT exam was performed using one or more of the following dose reduction techniques:  automated exposure control, adjustment of the mA and/or kV according to patient size, and/or use of iterative reconstruction technique. MIP reconstructed images were created and reviewed. COMPARISON: No relevant prior studies available. FINDINGS: PULMONARY ARTERIES:  No pulmonary embolism. AORTA:  No acute findings.  No thoracic aortic aneurysm. LUNGS:  Unremarkable.  No mass.  No consolidation. PLEURAL SPACE:  Unremarkable.  No significant effusion.  No pneumothorax. HEART:  Unremarkable.  No cardiomegaly.  No significant pericardial effusion.  No evidence of RV dysfunction. BONES/JOINTS:  No acute fracture.  No dislocation. SOFT TISSUES:  Unremarkable. LYMPH NODES:  Unremarkable.  No enlarged lymph nodes. IMPRESSION:  No pulmonary embolism or other acute finding in the chest. Electronically signed by:  yCrus Chamorro MD  3/2/2023 8:35 PM CST Workstation: [id number]    CT Abdomen Pelvis With Contrast    Result Date: 3/2/2023  EXAM: CT Abdomen and Pelvis With Intravenous Contrast CLINICAL HISTORY: The patient is 47 years old and is Female; Bowel obstruction suspected TECHNIQUE: Axial computed tomography images of the abdomen and pelvis with intravenous contrast.  Sagittal and coronal reformatted images were created and reviewed.  This CT exam was performed using one or more of the following dose reduction techniques:  automated exposure control, adjustment of the mA and/or kV according to patient size, and/or use of iterative reconstruction technique. COMPARISON: No relevant prior studies available. FINDINGS: LUNG BASES:  Unremarkable.  No mass.  No consolidation. ABDOMEN: LIVER:  Low attenuation at the level of the falciform ligament is present suggesting fatty infiltration. A small cyst within the left hepatic lobe measuring approximately 1.1 cm is present. The liver is otherwise homogeneous. GALLBLADDER AND BILE DUCTS:  The gallbladder is distended. No calcified gallstones or ductal dilatation is seen. PANCREAS:  No ductal dilation.  No mass. SPLEEN:  Unremarkable. ADRENALS:  Unremarkable.  No mass. KIDNEYS AND URETERS:  Unremarkable. The kidneys enhance symmetrically. No obstructing renal or ureteral calculus is seen. No hydronephrosis or hydroureter. No perinephric fluid or stranding. STOMACH AND BOWEL:  The stomach is distended with fluid and food contents. The small bowel is relatively normal in caliber. Stool is present throughout colon. A few scattered colonic diverticula are noted without surrounding inflammation. There is no mucosal thickening or evidence of obstruction. PELVIS: APPENDIX:  The appendix is normal in caliber without surrounding inflammation. BLADDER:  The bladder is well distended. REPRODUCTIVE:   Suggestion of uterine fibroids are noted. The ovaries are grossly unremarkable. ABDOMEN and PELVIS: INTRAPERITONEAL SPACE:  Unremarkable.  No free air.  No significant fluid collection. BONES/JOINTS:  No acute fracture. SOFT TISSUES:  The soft tissues are normal. VASCULATURE:  Unremarkable.  No abdominal aortic aneurysm. LYMPH NODES:  Unremarkable. No enlarged lymph nodes. IMPRESSION: 1.  No evidence of bowel obstruction. 2.  Mild colonic diverticulosis. Electronically signed by:  Saskia Coffey MD  3/2/2023 8:58 PM CST Workstation: [id number]    X-Ray Chest AP Portable    Result Date: 3/2/2023  Reason: Vomiting FINDINGS: Portable chest with comparison chest x-ray November 21, 2022 show normal cardiomediastinal silhouette. Left subclavian Port-A-Cath noted. Lungs are clear. Pulmonary vasculature is normal. No acute osseous abnormality. IMPRESSION: No acute cardiopulmonary abnormality. Electronically signed by:  Cyrus Larios DO  3/2/2023 3:48 PM CST Workstation: [id number]    MRI Breast w/wo Contrast, w/CAD, Bilateral    Result Date: 2/8/2023  EXAMINATION: MRI BREAST W/WO CONTRAST, W/CAD, BILATERAL CLINICAL HISTORY: Left breast cancer, left axillary tail mass, evaluate post chemotherapy. TECHNIQUE: CMS MANDATED QUALITY DATA - MAMMOGRAPHY - 225 Bilateral breast MRI was performed with a dedicated breast coil. The patient was placed prone in the breast immobilizer with light compression. The following localization sequences were obtained: Axial inversion recovery, axial 3-D non-fat-suppressed, axial 3-D fat suppressed pre-contrast, followed by axial 3-D fat-suppressed post-contrast dynamic images with 8.5 mL Gadavist IV contrast. Post-processing including subtraction imaging, reconstruction in the sagittal and coronal planes, and MIP of both breasts was performed on an independent workstation. The interpretation was assisted by Computer Aided Detection. FINDINGS: Comparison to prior exams, including ultrasound of  11/08/2022 and MRI of 09/06/2022.  The breasts are composed of heterogeneous fibroglandular tissue.  Background parenchymal enhancement is mild. There are no suspicious enhancing masses or areas of suspicious non masslike enhancement in either breast.  Previous enhancing subdermal mass in the left breast axillary tail region has resolved, with resolution of previous enlarged left axillary lymph nodes.  No enlarged axillary lymph nodes or enlarged internal mammary chain lymph nodes. There is no breast skin thickening.  No focal signal abnormality or abnormal enhancement of the chest wall.     Resolution of previous left breast mass and previous enlarged left axillary lymph nodes, compatible with favorable response to therapy.  No new disease. BI-RADS CATEGORY 6: KNOWN MALIGNANCY. This [facility] utilizes a reminder system to ensure that patients receive reminder letters for appointments. This includes reminders for routine screening mammograms, diagnostic mammograms, or other breast imaging interventions as appropriate. This patient will be placed in the appropriate reminder system. Electronically signed by: Franklin Rachel MD Date:    02/08/2023 Time:    14:03       Significant Imaging: I have reviewed all pertinent imaging results/findings within the past 24 hours.  I have reviewed and interpreted all pertinent imaging results/findings within the past 24 hours.

## 2023-03-04 NOTE — ASSESSMENT & PLAN NOTE
Most likely from chemo   Seen by Oncologist  Recommends GI consult for possible upper endoscopy   Recommends possible TPN Nutritionist consult placed/ Started on Clinimix Today  Continue current antiemetic Rx  Improving

## 2023-03-04 NOTE — CONSULTS
Consult Note  Gastroenterology    Consult Requested By: Robert Jaime MD    Reason for Consult: N/V    SUBJECTIVE:     History of Present Illness:  Patient is a 47 y.o. female with breast cancer on chemotherapy presents with moderate chronic persistent N/V for the past month.  N/V started after getting chemotherapy during the first week of Feb.  No blood in stool or vomit.  She hasn't been able to keep food or water down and clinimix was started.  Over the past 24 hours she has been doing much better.  She is tolerating clears.      Past Medical History:   Diagnosis Date    Anxiety     Breast cancer 07/2022     Past Surgical History:   Procedure Laterality Date    BREAST BIOPSY Left 07/2022    eye lid surgery Bilateral 1990    INSERTION OF TUNNELED CENTRAL VENOUS CATHETER (CVC) WITH SUBCUTANEOUS PORT Left 11/21/2022    Procedure: [id number]-CATH;  Surgeon: Oscar Murphy III, MD;  Location: [facility];  Service: General;  Laterality: Left;    NOSE SURGERY  1990    PORTACATH PLACEMENT  08/2022    [facility]    TONSILLECTOMY  1990    TUBAL LIGATION  2013     Family History   Problem Relation Age of Onset    Breast cancer Maternal Grandmother     Prostate cancer Maternal Grandfather      Social History     Tobacco Use    Smoking status: Former    Tobacco comments:     Quit 2005-13 year history -1 daily   Substance Use Topics    Alcohol use: Not Currently     Comment: occasional       Review of patient's allergies indicates:   Allergen Reactions    Taxol [paclitaxel] Rash        Review of Systems:  General: No fever, no chills  HEENT: No nose bleed, no headache  CV: No CP, No leg swelling  Pulmonary: No cough, No SOB, No hemoptysis  GI: No Abdominal pain, + nausea, + vomiting, no hematemesis, no dysphagia, no odynophagia, no melena, no hematochezia, no bright red blood per rectum, No constipation, no diarrhea  : No dysuria, no hematuria  Neuro: No dizziness, no syncope  Skin: No rash, no ulcer  MSK:  No backpain, no neck pain        OBJECTIVE:     Vital Signs (Most Recent)  Temp: 98.2 °F (36.8 °C) (03/04/23 1044)  Pulse: 95 (03/04/23 1044)  Resp: 18 (03/04/23 1044)  BP: 110/82 (taken manually) (03/04/23 1157)  SpO2: 95 % (03/04/23 1044)    Vital Signs Range (Last 24H):  Temp:  [98.1 °F (36.7 °C)-98.7 °F (37.1 °C)]   Pulse:  []   Resp:  [18-20]   BP: ()/(56-82)   SpO2:  [93 %-100 %]     Physical Exam:  NAD  Anicteric, EOMI  MMM, NC  RRR; no stalin  No wheezes, no rhonchi  Soft, nondistended, nontender, +ABS  No c/c  No rash, no ulcer  Afocal, moves all ext.  AAOx 4, affect wnl    Laboratory:  Reviewed in Epic  Significant Labs: Bilirubin:   Recent Labs   Lab 02/14/23  1029 02/20/23  0923 03/02/23  1528 03/03/23  0525 03/04/23  0746   BILITOT 0.5 0.8 1.0 1.3* 0.5     Blood Culture:   Recent Labs   Lab 03/02/23  1709   LABBLOO No Growth to date  No Growth to date     CBC:   Recent Labs   Lab 03/04/23  0745   WBC 1.63*   HGB 10.0*   HCT 29.8*        CMP:   Recent Labs   Lab 03/03/23  0525 03/03/23  1550 03/04/23  0746   * 131* 134*   K 2.9* 2.7* 3.3*    99 98   CO2 22* 23 25   * 126* 190*   BUN <5* 7 7   CREATININE 0.6 0.6 0.7   CALCIUM 8.5* 8.4* 8.7   PROT 5.1*  --  6.0   ALBUMIN 2.9*  --  3.4*   BILITOT 1.3*  --  0.5   ALKPHOS 35*  --  36*   AST 21  --  20   ALT 12  --  12   ANIONGAP 8 9 11     Coagulation: No results for input(s): PT, INR, APTT in the last 48 hours.  Lipase: No results for input(s): LIPASE in the last 48 hours.      Diagnostic Results:  Reviewed in Epic    ASSESSMENT/PLAN:     Active Hospital Problems    Diagnosis  POA    *Intractable nausea and vomiting [R11.2]  Yes    Primary malignant neoplasm of breast [C50.919]  Yes    Hypokalemia [E87.6]  Yes    Chemotherapy-induced neutropenia [D70.1, T45.1X5A]  Yes      Resolved Hospital Problems   No resolved problems to display.       A/P:     Persistent N/V - likely due to chemotherapy.  Symptoms have gotten much  better with antiemetics over the past 24 hours.  She is now tolerating clears.  We will advance to full liquids.  If she can't tolerate this proceed to EGD on Monday

## 2023-03-04 NOTE — PROGRESS NOTES
PARENTERAL NUTRITION PROGRESS NOTE:      Recommendations  Recommendation/Intervention:   1) Initiate TPN. Ordered new TPN to start today @ 17:00. Run @ 100 mL/hr.   2) Discontinue Clinimix when TPN starts at 17:00.  3) Encourage PO intake of meals and oral supplements.   4) Continue TPN until patient is able to meet >60% of estimated energy and protein needs.   5) Hypophosphatemia noted. Administer Na Phos 20 mmol per electrolyte sliding scale orders.  6) Recommend continued monitoring and management of blood glucose and electrolytes.     Goals:   1) Patient to meet >/= 75% EEN and EPN in 24-48 hrs.  2) Pt to transition to oral diet as medically able.   3) BG and electrolytes to trend towards normal limits/target ranges.  Nutrition Goal Status: progressing towards goal    Estimated/Assessed Needs  Weight Used For Calorie Calculations: 72.6 kg (160 lb 0.9 oz)  Energy Calorie Requirements (kcal): 6061-0885 (25-30 kcal/kg)  Energy Need Method: Kcal/kg  Protein Requirements: 109-145 gm/day (1.5-2.0 gm/kg)  Weight Used For Protein Calculations: 72.6 kg (160 lb 0.9 oz)  Estimated Fluid Requirement Method: RDA Method  RDA Method (mL): 2179    Parenteral Nutrition Day # 1  Diagnosis/Indication for PN: Intractable nausea and vomiting for about 1 month  IV Access: Port-a-cath  Diet/Tube Feeding: Full Liquid         Admixture Type: Central 3-in-1   Infusion Rate and Frequency: 100 ml/hr continuous     PN Composition:   110 grams Amino Acid, 136 grams Dextrose, and 50 grams Lipid.    Today's TPN provides 1402 kcal.  *Dextrose is started at less than full dextrose goal to reduce risk for Refeeding Syndrome. Dextrose content will be advanced as appropriate over the next few days.    Please see order for electrolytes and additives content and adjustments.   *The Nutrition Support Team will continue to monitor electrolytes daily and adjust parenteral nutrition as warranted.

## 2023-03-04 NOTE — PROGRESS NOTES
[facility] Medicine  Progress Note    Patient Name: Karyna Escoto  MRN: [id number]  Patient Class: IP- Inpatient   Admission Date: 3/2/2023  Length of Stay: 0 days  Attending Physician: Robert Jaime MD  Primary Care Provider: WOLF Iraheta        Subjective:     Principal Problem:Intractable nausea and vomiting        HPI:  Karyna Escoto is a 47 y.o. female with known history of triple negative breast cancer who is receiving chemotherapy, last dose was about a month ago.  Since that time she has had intractable nausea and vomiting.  She has been able to keep down very little PO.  She denies any syncopal episodes, but she is very weak and becomes short of breath with minimal exertion.  She denies any lower extremity edema or orthopnea.  Denies chest pain.  She has been taking zofran and phenergan at home with no relief. History was obtained from the patient and collateral obtained from the family present at the bedside and ER physician Sign-out. Patient denies change in vision, hearing, headache, fever, cough, congestion, runny nose, chest pain,  palpitations, constipation, dysuria, hematuria, joint pain and back pain.       Overview/Hospital Course:    Interval History:   03/04/2: feeling much better. Nausea improved. Requesting diet. GI consult still pending. Placed on Neupogen by Hem/onc for pancytopenia. Afebrile. Continue K+ replacement  03/03/23:  Still complains of nausea but improved seen by hematologist currently on Clindamax will consult nutritionist for further recommendations.  Afebrile potassium replaced via K rider.  Tolerate liquids NPO after midnight for possible GI procedure in a.m.  Review of Systems  Objective:     Vital Signs (Most Recent):  Temp: 98.2 °F (36.8 °C) (03/04/23 1044)  Pulse: 95 (03/04/23 1044)  Resp: 18 (03/04/23 1044)  BP: 110/82 (03/04/23 1157)  SpO2: 95 % (03/04/23 1044)   Vital Signs (24h Range):  Temp:  [98 °F (36.7 °C)-98.7 °F  (37.1 °C)] 98.2 °F (36.8 °C)  Pulse:  [] 95  Resp:  [18-20] 18  SpO2:  [93 %-100 %] 95 %  BP: ()/(56-82) 110/82     Weight: 72.6 kg (160 lb)  Body mass index is 29.26 kg/m².    Intake/Output Summary (Last 24 hours) at 3/4/2023 1422  Last data filed at 3/4/2023 1330  Gross per 24 hour   Intake 2478.33 ml   Output --   Net 2478.33 ml        Physical Exam  Constitutional:       Appearance: Normal appearance.   HENT:      Head: Normocephalic and atraumatic.      Comments: Alopecia     Nose: Nose normal.      Mouth/Throat:      Mouth: Mucous membranes are dry.   Eyes:      Pupils: Pupils are equal, round, and reactive to light.   Cardiovascular:      Rate and Rhythm: Normal rate and regular rhythm.      Pulses: Normal pulses.      Heart sounds: Normal heart sounds.   Pulmonary:      Effort: Pulmonary effort is normal.      Breath sounds: Normal breath sounds.   Abdominal:      General: Abdomen is flat. Bowel sounds are normal.      Palpations: Abdomen is soft.   Musculoskeletal:         General: Normal range of motion.      Cervical back: Neck supple.   Skin:     General: Skin is dry.   Neurological:      General: No focal deficit present.      Mental Status: She is alert and oriented to person, place, and time.   Psychiatric:         Mood and Affect: Mood normal.       Significant Labs: All pertinent labs within the past 24 hours have been reviewed.  A1C: No results for input(s): HGBA1C in the last 4320 hours.  ABGs: No results for input(s): PH, PCO2, HCO3, POCSATURATED, BE, TOTALHB, COHB, METHB, O2HB, POCFIO2, PO2 in the last 48 hours.  Bilirubin:   Recent Labs   Lab 02/14/23  1029 02/20/23  0923 03/02/23  1528 03/03/23  0525 03/04/23  0746   BILITOT 0.5 0.8 1.0 1.3* 0.5       Blood Culture:   Recent Labs   Lab 03/02/23  1527 03/02/23  1709   LABBLOO No Growth to date  No Growth to date No Growth to date  No Growth to date       BMP:   Recent Labs   Lab 03/04/23  0746   *   *   K 3.3*   CL 98    CO2 25   BUN 7   CREATININE 0.7   CALCIUM 8.7   MG 1.9       CBC:   Recent Labs   Lab 03/02/23  1528 03/04/23  0745   WBC 3.78* 1.63*   HGB 10.4* 10.0*   HCT 30.7* 29.8*    154       CMP:   Recent Labs   Lab 03/02/23  1528 03/03/23  0525 03/03/23  1550 03/04/23  0746   * 133* 131* 134*   K 3.1* 2.9* 2.7* 3.3*    103 99 98   CO2 17* 22* 23 25   GLU 81 119* 126* 190*   BUN <5* <5* 7 7   CREATININE 0.7 0.6 0.6 0.7   CALCIUM 9.5 8.5* 8.4* 8.7   PROT 6.3 5.1*  --  6.0   ALBUMIN 3.6 2.9*  --  3.4*   BILITOT 1.0 1.3*  --  0.5   ALKPHOS 44* 35*  --  36*   AST 33 21  --  20   ALT 13 12  --  12   ANIONGAP 17* 8 9 11       Cardiac Markers:   Recent Labs   Lab 03/02/23  1528   BNP 8       Coagulation: No results for input(s): PT, INR, APTT in the last 48 hours.  Lactic Acid:   Recent Labs   Lab 03/02/23  1528   LACTATE 1.5       Lipase:   Recent Labs   Lab 03/02/23  1528   LIPASE 30       Lipid Panel: No results for input(s): CHOL, HDL, LDLCALC, TRIG, CHOLHDL in the last 48 hours.  Magnesium:   Recent Labs   Lab 03/03/23  0525 03/03/23  1550 03/04/23  0746   MG 1.5* 1.3* 1.9       Pathology Results  (Last 10 years)      None          POCT Glucose: No results for input(s): POCTGLUCOSE in the last 48 hours.  Prealbumin:   Recent Labs   Lab 03/03/23  0525   PREALBUMIN 3*       Respiratory Culture: No results for input(s): GSRESP, RESPIRATORYC in the last 48 hours.  Troponin:   Recent Labs   Lab 03/02/23  1528 03/02/23  1709   TROPONINIHS 30.5* 29.6*       TSH:   Recent Labs   Lab 03/02/23  1528   TSH 3.120       Urine Culture: No results for input(s): LABURIN in the last 48 hours.  Urine Studies:   Recent Labs   Lab 03/02/23  1835   COLORU Yellow   APPEARANCEUA Clear   PHUR 6.0   SPECGRAV 1.020   PROTEINUA 1+*   GLUCUA Negative   KETONESU 3+*   BILIRUBINUA 1+*   OCCULTUA Negative   NITRITE Negative   UROBILINOGEN 2.0-3.0*   LEUKOCYTESUR Negative   RBCUA 2   WBCUA 2   BACTERIA Negative   SQUAMEPITHEL 2    HYALINECASTS 2*     CTA Chest Non-Coronary (PE Studies)    Result Date: 3/2/2023  EXAM: CT Angiography Chest With Intravenous Contrast CLINICAL HISTORY: The patient is 47 years old and is Female; Pulmonary embolism (PE) suspected, high prob TECHNIQUE: Axial computed tomographic angiography images of the chest with intravenous contrast.  Sagittal and coronal reformatted images were created and reviewed.  This CT exam was performed using one or more of the following dose reduction techniques:  automated exposure control, adjustment of the mA and/or kV according to patient size, and/or use of iterative reconstruction technique. MIP reconstructed images were created and reviewed. COMPARISON: No relevant prior studies available. FINDINGS: PULMONARY ARTERIES:  No pulmonary embolism. AORTA:  No acute findings.  No thoracic aortic aneurysm. LUNGS:  Unremarkable.  No mass.  No consolidation. PLEURAL SPACE:  Unremarkable.  No significant effusion.  No pneumothorax. HEART:  Unremarkable.  No cardiomegaly.  No significant pericardial effusion.  No evidence of RV dysfunction. BONES/JOINTS:  No acute fracture.  No dislocation. SOFT TISSUES:  Unremarkable. LYMPH NODES:  Unremarkable.  No enlarged lymph nodes. IMPRESSION: No pulmonary embolism or other acute finding in the chest. Electronically signed by:  Cyrus Chamorro MD  3/2/2023 8:35 PM CST Workstation: [id number]    CT Abdomen Pelvis With Contrast    Result Date: 3/2/2023  EXAM: CT Abdomen and Pelvis With Intravenous Contrast CLINICAL HISTORY: The patient is 47 years old and is Female; Bowel obstruction suspected TECHNIQUE: Axial computed tomography images of the abdomen and pelvis with intravenous contrast.  Sagittal and coronal reformatted images were created and reviewed.  This CT exam was performed using one or more of the following dose reduction techniques:  automated exposure control, adjustment of the mA and/or kV according to patient size, and/or use of  iterative reconstruction technique. COMPARISON: No relevant prior studies available. FINDINGS: LUNG BASES:  Unremarkable.  No mass.  No consolidation. ABDOMEN: LIVER:  Low attenuation at the level of the falciform ligament is present suggesting fatty infiltration. A small cyst within the left hepatic lobe measuring approximately 1.1 cm is present. The liver is otherwise homogeneous. GALLBLADDER AND BILE DUCTS:  The gallbladder is distended. No calcified gallstones or ductal dilatation is seen. PANCREAS:  No ductal dilation.  No mass. SPLEEN:  Unremarkable. ADRENALS:  Unremarkable.  No mass. KIDNEYS AND URETERS:  Unremarkable. The kidneys enhance symmetrically. No obstructing renal or ureteral calculus is seen. No hydronephrosis or hydroureter. No perinephric fluid or stranding. STOMACH AND BOWEL:  The stomach is distended with fluid and food contents. The small bowel is relatively normal in caliber. Stool is present throughout colon. A few scattered colonic diverticula are noted without surrounding inflammation. There is no mucosal thickening or evidence of obstruction. PELVIS: APPENDIX:  The appendix is normal in caliber without surrounding inflammation. BLADDER:  The bladder is well distended. REPRODUCTIVE:  Suggestion of uterine fibroids are noted. The ovaries are grossly unremarkable. ABDOMEN and PELVIS: INTRAPERITONEAL SPACE:  Unremarkable.  No free air.  No significant fluid collection. BONES/JOINTS:  No acute fracture. SOFT TISSUES:  The soft tissues are normal. VASCULATURE:  Unremarkable.  No abdominal aortic aneurysm. LYMPH NODES:  Unremarkable. No enlarged lymph nodes. IMPRESSION: 1.  No evidence of bowel obstruction. 2.  Mild colonic diverticulosis. Electronically signed by:  Saskia Coffey MD  3/2/2023 8:58 PM CST Workstation: [id number]    X-Ray Chest AP Portable    Result Date: 3/2/2023  Reason: Vomiting FINDINGS: Portable chest with comparison chest x-ray November 21, 2022 show normal  cardiomediastinal silhouette. Left subclavian Port-A-Cath noted. Lungs are clear. Pulmonary vasculature is normal. No acute osseous abnormality. IMPRESSION: No acute cardiopulmonary abnormality. Electronically signed by:  Cyrus Larios DO  3/2/2023 3:48 PM CST Workstation: [id number]    MRI Breast w/wo Contrast, w/CAD, Bilateral    Result Date: 2/8/2023  EXAMINATION: MRI BREAST W/WO CONTRAST, W/CAD, BILATERAL CLINICAL HISTORY: Left breast cancer, left axillary tail mass, evaluate post chemotherapy. TECHNIQUE: CMS MANDATED QUALITY DATA - MAMMOGRAPHY - 225 Bilateral breast MRI was performed with a dedicated breast coil. The patient was placed prone in the breast immobilizer with light compression. The following localization sequences were obtained: Axial inversion recovery, axial 3-D non-fat-suppressed, axial 3-D fat suppressed pre-contrast, followed by axial 3-D fat-suppressed post-contrast dynamic images with 8.5 mL Gadavist IV contrast. Post-processing including subtraction imaging, reconstruction in the sagittal and coronal planes, and MIP of both breasts was performed on an independent workstation. The interpretation was assisted by Computer Aided Detection. FINDINGS: Comparison to prior exams, including ultrasound of 11/08/2022 and MRI of 09/06/2022.  The breasts are composed of heterogeneous fibroglandular tissue.  Background parenchymal enhancement is mild. There are no suspicious enhancing masses or areas of suspicious non masslike enhancement in either breast.  Previous enhancing subdermal mass in the left breast axillary tail region has resolved, with resolution of previous enlarged left axillary lymph nodes.  No enlarged axillary lymph nodes or enlarged internal mammary chain lymph nodes. There is no breast skin thickening.  No focal signal abnormality or abnormal enhancement of the chest wall.     Resolution of previous left breast mass and previous enlarged left axillary lymph nodes, compatible with  favorable response to therapy.  No new disease. BI-RADS CATEGORY 6: KNOWN MALIGNANCY. [facility] utilizes a reminder system to ensure that patients receive reminder letters for appointments. This includes reminders for routine screening mammograms, diagnostic mammograms, or other breast imaging interventions as appropriate. This patient will be placed in the appropriate reminder system. Electronically signed by: Franklin Rachel MD Date:    02/08/2023 Time:    14:03       Significant Imaging: I have reviewed all pertinent imaging results/findings within the past 24 hours.  I have reviewed and interpreted all pertinent imaging results/findings within the past 24 hours.      Assessment/Plan:      * Intractable nausea and vomiting  Most likely from chemo   Seen by Oncologist  Recommends GI consult for possible upper endoscopy   Recommends possible TPN Nutritionist consult placed/ Started on Clinimix Today  Continue current antiemetic Rx  Improving        Primary malignant neoplasm of breast  - Follow Dr. Peralta Hem/Onc  - plans is for surgery in around 2 weeks for breast mastectomy on March 17, 2023 follows Dr. Jonatan Raza at [place] [facility] at [facility] Dr. Brandon Barrios- Plastic Surgeon [place]       Chemotherapy-induced neutropenia  Started on Neupogen by Oncologist  WBC today   1.63  (Today) 1.63 3.78 3.99 2.22         Hypokalemia  20 meq k rider given today  To infuse 10meqs per hour  Monitor E-lyte  CMP in am  3.3  (Today) 3.3 2.7 2.9      Continue with K+ replacement    Left Breast Cancer-TRIPLE NEGATIVE          VTE Risk Mitigation (From admission, onward)         Ordered     enoxaparin injection 40 mg  Daily         03/02/23 2307     IP VTE HIGH RISK PATIENT  Once         03/02/23 2307     Place sequential compression device  Until discontinued         03/02/23 2307                Discharge Planning   PAUL: 3/6/2023     Code Status: Full Code   Is the patient medically ready for discharge?:      Reason for patient still in hospital (select all that apply): Patient trending condition, Treatment and Consult recommendations  Discharge Plan A: Home with family                  Kiara Walden NP  Department of Hospital Medicine   [facility]

## 2023-03-05 LAB
ALBUMIN SERPL BCP-MCNC: 3.3 G/DL (ref 3.5–5.2)
ALP SERPL-CCNC: 34 U/L (ref 55–135)
ALT SERPL W/O P-5'-P-CCNC: 10 U/L (ref 10–44)
ANION GAP SERPL CALC-SCNC: 4 MMOL/L (ref 8–16)
AST SERPL-CCNC: 17 U/L (ref 10–40)
BILIRUB SERPL-MCNC: 0.4 MG/DL (ref 0.1–1)
BUN SERPL-MCNC: 11 MG/DL (ref 6–20)
CALCIUM SERPL-MCNC: 8.6 MG/DL (ref 8.7–10.5)
CHLORIDE SERPL-SCNC: 108 MMOL/L (ref 95–110)
CO2 SERPL-SCNC: 27 MMOL/L (ref 23–29)
CREAT SERPL-MCNC: 0.7 MG/DL (ref 0.5–1.4)
ERYTHROCYTE [DISTWIDTH] IN BLOOD BY AUTOMATED COUNT: 15.7 % (ref 11.5–14.5)
EST. GFR  (NO RACE VARIABLE): >60 ML/MIN/1.73 M^2
GLUCOSE SERPL-MCNC: 186 MG/DL (ref 70–110)
HCT VFR BLD AUTO: 27 % (ref 37–48.5)
HGB BLD-MCNC: 9.1 G/DL (ref 12–16)
MAGNESIUM SERPL-MCNC: 1.9 MG/DL (ref 1.6–2.6)
MCH RBC QN AUTO: 32.7 PG (ref 27–31)
MCHC RBC AUTO-ENTMCNC: 33.7 G/DL (ref 32–36)
MCV RBC AUTO: 97 FL (ref 82–98)
PHOSPHATE SERPL-MCNC: <1 MG/DL (ref 2.7–4.5)
PLATELET # BLD AUTO: 167 K/UL (ref 150–450)
PMV BLD AUTO: 11.6 FL (ref 9.2–12.9)
POTASSIUM SERPL-SCNC: 3.9 MMOL/L (ref 3.5–5.1)
PROT SERPL-MCNC: 5.8 G/DL (ref 6–8.4)
RBC # BLD AUTO: 2.78 M/UL (ref 4–5.4)
SODIUM SERPL-SCNC: 139 MMOL/L (ref 136–145)
TRIGL SERPL-MCNC: 121 MG/DL (ref 30–150)
WBC # BLD AUTO: 23.86 K/UL (ref 3.9–12.7)

## 2023-03-05 PROCEDURE — 12000002 HC ACUTE/MED SURGE SEMI-PRIVATE ROOM

## 2023-03-05 PROCEDURE — 25000003 PHARM REV CODE 250: Performed by: STUDENT IN AN ORGANIZED HEALTH CARE EDUCATION/TRAINING PROGRAM

## 2023-03-05 PROCEDURE — 63600175 PHARM REV CODE 636 W HCPCS: Performed by: INTERNAL MEDICINE

## 2023-03-05 PROCEDURE — C9113 INJ PANTOPRAZOLE SODIUM, VIA: HCPCS | Performed by: STUDENT IN AN ORGANIZED HEALTH CARE EDUCATION/TRAINING PROGRAM

## 2023-03-05 PROCEDURE — 85027 COMPLETE CBC AUTOMATED: CPT | Performed by: STUDENT IN AN ORGANIZED HEALTH CARE EDUCATION/TRAINING PROGRAM

## 2023-03-05 PROCEDURE — 63600175 PHARM REV CODE 636 W HCPCS: Performed by: STUDENT IN AN ORGANIZED HEALTH CARE EDUCATION/TRAINING PROGRAM

## 2023-03-05 PROCEDURE — 84478 ASSAY OF TRIGLYCERIDES: CPT | Performed by: STUDENT IN AN ORGANIZED HEALTH CARE EDUCATION/TRAINING PROGRAM

## 2023-03-05 PROCEDURE — 80053 COMPREHEN METABOLIC PANEL: CPT | Performed by: STUDENT IN AN ORGANIZED HEALTH CARE EDUCATION/TRAINING PROGRAM

## 2023-03-05 PROCEDURE — 83735 ASSAY OF MAGNESIUM: CPT | Performed by: STUDENT IN AN ORGANIZED HEALTH CARE EDUCATION/TRAINING PROGRAM

## 2023-03-05 PROCEDURE — 25000003 PHARM REV CODE 250: Performed by: INTERNAL MEDICINE

## 2023-03-05 PROCEDURE — 84100 ASSAY OF PHOSPHORUS: CPT | Performed by: STUDENT IN AN ORGANIZED HEALTH CARE EDUCATION/TRAINING PROGRAM

## 2023-03-05 RX ADMIN — DEXAMETHASONE SODIUM PHOSPHATE 10 MG: 10 INJECTION INTRAMUSCULAR; INTRAVENOUS at 09:03

## 2023-03-05 RX ADMIN — METOCLOPRAMIDE 5 MG: 5 INJECTION, SOLUTION INTRAMUSCULAR; INTRAVENOUS at 05:03

## 2023-03-05 RX ADMIN — PANTOPRAZOLE SODIUM 40 MG: 40 INJECTION, POWDER, LYOPHILIZED, FOR SOLUTION INTRAVENOUS at 08:03

## 2023-03-05 RX ADMIN — SERTRALINE HYDROCHLORIDE 50 MG: 50 TABLET ORAL at 08:03

## 2023-03-05 RX ADMIN — FLUTICASONE PROPIONATE 50 MCG: 50 SPRAY, METERED NASAL at 08:03

## 2023-03-05 RX ADMIN — METOCLOPRAMIDE 5 MG: 5 INJECTION, SOLUTION INTRAMUSCULAR; INTRAVENOUS at 01:03

## 2023-03-05 RX ADMIN — ENOXAPARIN SODIUM 40 MG: 100 INJECTION SUBCUTANEOUS at 05:03

## 2023-03-05 RX ADMIN — SODIUM PHOSPHATE, MONOBASIC, MONOHYDRATE AND SODIUM PHOSPHATE, DIBASIC, ANHYDROUS 30 MMOL: 276; 142 INJECTION, SOLUTION INTRAVENOUS at 10:03

## 2023-03-05 RX ADMIN — ONDANSETRON 16 MG: 2 INJECTION INTRAMUSCULAR; INTRAVENOUS at 03:03

## 2023-03-05 RX ADMIN — ONDANSETRON 16 MG: 2 INJECTION INTRAMUSCULAR; INTRAVENOUS at 05:03

## 2023-03-05 RX ADMIN — CETIRIZINE HYDROCHLORIDE 10 MG: 10 TABLET, FILM COATED ORAL at 08:03

## 2023-03-05 NOTE — ASSESSMENT & PLAN NOTE
20 meq k rider given today  To infuse 10meqs per hour  Monitor E-lyte  CMP in am  3.3  (Today) 3.3 2.7 2.9      Continue with K+ replacement  -improved

## 2023-03-05 NOTE — ASSESSMENT & PLAN NOTE
Started on Neupogen by Oncologist  WBC today   1.63  (Today) 1.63 3.78 3.99 2.22     Now with Leukocytosis most likely from Neupogen afebrile  23.86 High   1.63 Low Panic  CM  3.78 Low

## 2023-03-05 NOTE — PROGRESS NOTES
[facility] Medicine  Progress Note    Patient Name: Karyna Escoto  MRN: [id number]  Patient Class: IP- Inpatient   Admission Date: 3/2/2023  Length of Stay: 1 days  Attending Physician: Munir Ellis MD  Primary Care Provider: WOLF Iraheta        Subjective:     Principal Problem:Intractable nausea and vomiting        HPI:  Karyna Escoto is a 47 y.o. female with known history of triple negative breast cancer who is receiving chemotherapy, last dose was about a month ago.  Since that time she has had intractable nausea and vomiting.  She has been able to keep down very little PO.  She denies any syncopal episodes, but she is very weak and becomes short of breath with minimal exertion.  She denies any lower extremity edema or orthopnea.  Denies chest pain.  She has been taking zofran and phenergan at home with no relief. History was obtained from the patient and collateral obtained from the family present at the bedside and ER physician Sign-out. Patient denies change in vision, hearing, headache, fever, cough, congestion, runny nose, chest pain,  palpitations, constipation, dysuria, hematuria, joint pain and back pain.       Overview/Hospital Course:    Interval History:     03/05/23: No nausea or vomiting in the past 24hr. Tolerating full liquids will advance for regular diet. NPO after midnight. If now problems over night resume regular diet. Hold TPN while eating.Afebrile. + Leukocytosis most likely from Neupogen  03/04/23: feeling much better. Nausea improved. Requesting diet. GI consult still pending. Placed on Neupogen by Hem/onc for pancytopenia. Afebrile. Continue K+ replacement  03/03/23:  Still complains of nausea but improved seen by hematologist currently on Clindamax will consult nutritionist for further recommendations.  Afebrile potassium replaced via K rider.  Tolerate liquids NPO after midnight for possible GI procedure in a.m.    Objective:     Vital  Signs (Most Recent):  Temp: 97.9 °F (36.6 °C) (03/05/23 1100)  Pulse: 93 (03/05/23 1100)  Resp: 18 (03/05/23 1100)  BP: 109/64 (03/05/23 1100)  SpO2: (!) 94 % (03/05/23 1100)   Vital Signs (24h Range):  Temp:  [97.8 °F (36.6 °C)-98.2 °F (36.8 °C)] 97.9 °F (36.6 °C)  Pulse:  [] 93  Resp:  [18] 18  SpO2:  [94 %-99 %] 94 %  BP: ()/(64-71) 109/64     Weight: 72.6 kg (160 lb)  Body mass index is 29.26 kg/m².    Intake/Output Summary (Last 24 hours) at 3/5/2023 1231  Last data filed at 3/5/2023 0536  Gross per 24 hour   Intake 2706.67 ml   Output --   Net 2706.67 ml        Physical Exam  Constitutional:       Appearance: Normal appearance.   HENT:      Head: Normocephalic and atraumatic.      Comments: Alopecia     Nose: Nose normal.      Mouth/Throat:      Mouth: Mucous membranes are dry.   Eyes:      Pupils: Pupils are equal, round, and reactive to light.   Cardiovascular:      Rate and Rhythm: Normal rate and regular rhythm.      Pulses: Normal pulses.      Heart sounds: Normal heart sounds.   Pulmonary:      Effort: Pulmonary effort is normal.      Breath sounds: Normal breath sounds.   Abdominal:      General: Abdomen is flat. Bowel sounds are normal.      Palpations: Abdomen is soft.   Musculoskeletal:         General: Normal range of motion.      Cervical back: Neck supple.   Skin:     General: Skin is dry.   Neurological:      General: No focal deficit present.      Mental Status: She is alert and oriented to person, place, and time.   Psychiatric:         Mood and Affect: Mood normal.       Significant Labs: All pertinent labs within the past 24 hours have been reviewed.  A1C: No results for input(s): HGBA1C in the last 4320 hours.  ABGs: No results for input(s): PH, PCO2, HCO3, POCSATURATED, BE, TOTALHB, COHB, METHB, O2HB, POCFIO2, PO2 in the last 48 hours.  Bilirubin:   Recent Labs   Lab 02/20/23  0923 03/02/23  1528 03/03/23  0525 03/04/23  0746 03/05/23  0529   BILITOT 0.8 1.0 1.3* 0.5 0.4        Blood Culture:   No results for input(s): LABBLOO in the last 48 hours.    BMP:   Recent Labs   Lab 03/05/23  0529   *      K 3.9      CO2 27   BUN 11   CREATININE 0.7   CALCIUM 8.6*   MG 1.9       CBC:   Recent Labs   Lab 03/04/23  0745 03/05/23  0529   WBC 1.63* 23.86*   HGB 10.0* 9.1*   HCT 29.8* 27.0*    167       CMP:   Recent Labs   Lab 03/03/23  1550 03/04/23  0746 03/05/23  0529   * 134* 139   K 2.7* 3.3* 3.9   CL 99 98 108   CO2 23 25 27   * 190* 186*   BUN 7 7 11   CREATININE 0.6 0.7 0.7   CALCIUM 8.4* 8.7 8.6*   PROT  --  6.0 5.8*   ALBUMIN  --  3.4* 3.3*   BILITOT  --  0.5 0.4   ALKPHOS  --  36* 34*   AST  --  20 17   ALT  --  12 10   ANIONGAP 9 11 4*       Cardiac Markers:   No results for input(s): CKMB, MYOGLOBIN, BNP, TROPISTAT in the last 48 hours.    Coagulation: No results for input(s): PT, INR, APTT in the last 48 hours.  Lactic Acid:   No results for input(s): LACTATE in the last 48 hours.    Lipase:   No results for input(s): LIPASE in the last 48 hours.    Lipid Panel:   Recent Labs   Lab 03/05/23  0529   TRIG 121     Magnesium:   Recent Labs   Lab 03/03/23  1550 03/04/23  0746 03/05/23  0529   MG 1.3* 1.9 1.9       Pathology Results  (Last 10 years)      None          POCT Glucose: No results for input(s): POCTGLUCOSE in the last 48 hours.  Prealbumin:   No results for input(s): PREALBUMIN in the last 48 hours.    Respiratory Culture: No results for input(s): GSRESP, RESPIRATORYC in the last 48 hours.  Troponin:   No results for input(s): TROPONINI, TROPONINIHS in the last 48 hours.    TSH:   Recent Labs   Lab 03/02/23  1528   TSH 3.120       Urine Culture: No results for input(s): LABURIN in the last 48 hours.  Urine Studies:   No results for input(s): COLORU, APPEARANCEUA, PHUR, SPECGRAV, PROTEINUA, GLUCUA, KETONESU, BILIRUBINUA, OCCULTUA, NITRITE, UROBILINOGEN, LEUKOCYTESUR, RBCUA, WBCUA, BACTERIA, SQUAMEPITHEL, HYALINECASTS in the last 48  hours.    Invalid input(s): [employer]  CTA Chest Non-Coronary (PE Studies)    Result Date: 3/2/2023  EXAM: CT Angiography Chest With Intravenous Contrast CLINICAL HISTORY: The patient is 47 years old and is Female; Pulmonary embolism (PE) suspected, high prob TECHNIQUE: Axial computed tomographic angiography images of the chest with intravenous contrast.  Sagittal and coronal reformatted images were created and reviewed.  This CT exam was performed using one or more of the following dose reduction techniques:  automated exposure control, adjustment of the mA and/or kV according to patient size, and/or use of iterative reconstruction technique. MIP reconstructed images were created and reviewed. COMPARISON: No relevant prior studies available. FINDINGS: PULMONARY ARTERIES:  No pulmonary embolism. AORTA:  No acute findings.  No thoracic aortic aneurysm. LUNGS:  Unremarkable.  No mass.  No consolidation. PLEURAL SPACE:  Unremarkable.  No significant effusion.  No pneumothorax. HEART:  Unremarkable.  No cardiomegaly.  No significant pericardial effusion.  No evidence of RV dysfunction. BONES/JOINTS:  No acute fracture.  No dislocation. SOFT TISSUES:  Unremarkable. LYMPH NODES:  Unremarkable.  No enlarged lymph nodes. IMPRESSION: No pulmonary embolism or other acute finding in the chest. Electronically signed by:  Cyrus Chamorro MD  3/2/2023 8:35 PM CST Workstation: [id number]    CT Abdomen Pelvis With Contrast    Result Date: 3/2/2023  EXAM: CT Abdomen and Pelvis With Intravenous Contrast CLINICAL HISTORY: The patient is 47 years old and is Female; Bowel obstruction suspected TECHNIQUE: Axial computed tomography images of the abdomen and pelvis with intravenous contrast.  Sagittal and coronal reformatted images were created and reviewed.  This CT exam was performed using one or more of the following dose reduction techniques:  automated exposure control, adjustment of the mA and/or kV according to patient size,  and/or use of iterative reconstruction technique. COMPARISON: No relevant prior studies available. FINDINGS: LUNG BASES:  Unremarkable.  No mass.  No consolidation. ABDOMEN: LIVER:  Low attenuation at the level of the falciform ligament is present suggesting fatty infiltration. A small cyst within the left hepatic lobe measuring approximately 1.1 cm is present. The liver is otherwise homogeneous. GALLBLADDER AND BILE DUCTS:  The gallbladder is distended. No calcified gallstones or ductal dilatation is seen. PANCREAS:  No ductal dilation.  No mass. SPLEEN:  Unremarkable. ADRENALS:  Unremarkable.  No mass. KIDNEYS AND URETERS:  Unremarkable. The kidneys enhance symmetrically. No obstructing renal or ureteral calculus is seen. No hydronephrosis or hydroureter. No perinephric fluid or stranding. STOMACH AND BOWEL:  The stomach is distended with fluid and food contents. The small bowel is relatively normal in caliber. Stool is present throughout colon. A few scattered colonic diverticula are noted without surrounding inflammation. There is no mucosal thickening or evidence of obstruction. PELVIS: APPENDIX:  The appendix is normal in caliber without surrounding inflammation. BLADDER:  The bladder is well distended. REPRODUCTIVE:  Suggestion of uterine fibroids are noted. The ovaries are grossly unremarkable. ABDOMEN and PELVIS: INTRAPERITONEAL SPACE:  Unremarkable.  No free air.  No significant fluid collection. BONES/JOINTS:  No acute fracture. SOFT TISSUES:  The soft tissues are normal. VASCULATURE:  Unremarkable.  No abdominal aortic aneurysm. LYMPH NODES:  Unremarkable. No enlarged lymph nodes. IMPRESSION: 1.  No evidence of bowel obstruction. 2.  Mild colonic diverticulosis. Electronically signed by:  Saskia Coffey MD  3/2/2023 8:58 PM CST Workstation: [id number]    X-Ray Chest AP Portable    Result Date: 3/2/2023  Reason: Vomiting FINDINGS: Portable chest with comparison chest x-ray November 21, 2022 show normal  cardiomediastinal silhouette. Left subclavian Port-A-Cath noted. Lungs are clear. Pulmonary vasculature is normal. No acute osseous abnormality. IMPRESSION: No acute cardiopulmonary abnormality. Electronically signed by:  Cyrus Larios DO  3/2/2023 3:48 PM CST Workstation: [id number]    MRI Breast w/wo Contrast, w/CAD, Bilateral    Result Date: 2/8/2023  EXAMINATION: MRI BREAST W/WO CONTRAST, W/CAD, BILATERAL CLINICAL HISTORY: Left breast cancer, left axillary tail mass, evaluate post chemotherapy. TECHNIQUE: CMS MANDATED QUALITY DATA - MAMMOGRAPHY - 225 Bilateral breast MRI was performed with a dedicated breast coil. The patient was placed prone in the breast immobilizer with light compression. The following localization sequences were obtained: Axial inversion recovery, axial 3-D non-fat-suppressed, axial 3-D fat suppressed pre-contrast, followed by axial 3-D fat-suppressed post-contrast dynamic images with 8.5 mL Gadavist IV contrast. Post-processing including subtraction imaging, reconstruction in the sagittal and coronal planes, and MIP of both breasts was performed on an independent workstation. The interpretation was assisted by Computer Aided Detection. FINDINGS: Comparison to prior exams, including ultrasound of 11/08/2022 and MRI of 09/06/2022.  The breasts are composed of heterogeneous fibroglandular tissue.  Background parenchymal enhancement is mild. There are no suspicious enhancing masses or areas of suspicious non masslike enhancement in either breast.  Previous enhancing subdermal mass in the left breast axillary tail region has resolved, with resolution of previous enlarged left axillary lymph nodes.  No enlarged axillary lymph nodes or enlarged internal mammary chain lymph nodes. There is no breast skin thickening.  No focal signal abnormality or abnormal enhancement of the chest wall.     Resolution of previous left breast mass and previous enlarged left axillary lymph nodes, compatible with  favorable response to therapy.  No new disease. BI-RADS CATEGORY 6: KNOWN MALIGNANCY. [facility] utilizes a reminder system to ensure that patients receive reminder letters for appointments. This includes reminders for routine screening mammograms, diagnostic mammograms, or other breast imaging interventions as appropriate. This patient will be placed in the appropriate reminder system. Electronically signed by: Franklin Rachel MD Date:    02/08/2023 Time:    14:03       Significant Imaging: I have reviewed all pertinent imaging results/findings within the past 24 hours.  I have reviewed and interpreted all pertinent imaging results/findings within the past 24 hours.      Assessment/Plan:      * Intractable nausea and vomiting  Most likely from chemo   Seen by Oncologist  Recommends GI consult for possible upper endoscopy   Recommends possible TPN Nutritionist consult placed/ Started on Clinimix Today  Continue current antiemetic Rx  Improving    Tolerated full liquids will advance to solid food.   Hold on TPN for now      Primary malignant neoplasm of breast  - Follow Dr. Peralta Hem/Onc  - plans is for surgery in around 2 weeks for breast mastectomy on March 17, 2023 follows Dr. Jonatan Raza at [place] breast surgeon at [facility] Dr. Brandon Barrios- Plastic Surgeon [place]       Chemotherapy-induced neutropenia  Started on Neupogen by Oncologist  WBC today   1.63  (Today) 1.63 3.78 3.99 2.22     Now with Leukocytosis most likely from Neupogen afebrile  23.86 High   1.63 Low Panic  CM  3.78 Low          Hypokalemia  20 meq k rider given today  To infuse 10meqs per hour  Monitor E-lyte  CMP in am  3.3  (Today) 3.3 2.7 2.9      Continue with K+ replacement  -improved    Left Breast Cancer-TRIPLE NEGATIVE          VTE Risk Mitigation (From admission, onward)         Ordered     enoxaparin injection 40 mg  Daily         03/02/23 2307     IP VTE HIGH RISK PATIENT  Once         03/02/23 2307     Place  sequential compression device  Until discontinued         03/02/23 2780                Discharge Planning   PAUL: 3/6/2023     Code Status: Full Code   Is the patient medically ready for discharge?:     Reason for patient still in hospital (select all that apply): Patient trending condition and Consult recommendations  Discharge Plan A: Home with family                  Kiara Walden NP  Department of Hospital Medicine   [facility]

## 2023-03-05 NOTE — PROGRESS NOTES
[facility]  Gastroenterology  Progress Note    Patient Name: Karyna Escoto  MRN: [id number]  Admission Date: 3/2/2023  Hospital Length of Stay: 1 days  Code Status: Full Code   Attending Provider: Munir Ellis MD  Consulting Provider: ABILIO Lanza MD  Primary Care Physician: WOLF Iraheta  Principal Problem: Intractable nausea and vomiting    Subjective:     Chief Complaint: N/V    Interval History: No further N/V.  Tolerating full liquids    Review of Systems:  No F/C  No CP  No SOB  No Abd pain    Objective:     Vital Signs (Most Recent):  Temp: 97.9 °F (36.6 °C) (03/05/23 1100)  Pulse: 93 (03/05/23 1100)  Resp: 18 (03/05/23 1100)  BP: 109/64 (03/05/23 1100)  SpO2: (!) 94 % (03/05/23 1100)   Vital Signs (24h Range):  Temp:  [97.8 °F (36.6 °C)-98.2 °F (36.8 °C)] 97.9 °F (36.6 °C)  Pulse:  [] 93  Resp:  [18] 18  SpO2:  [94 %-99 %] 94 %  BP: ()/(64-71) 109/64     Weight: 72.6 kg (160 lb) (03/02/23 1354)      Intake/Output Summary (Last 24 hours) at 3/5/2023 1242  Last data filed at 3/5/2023 0536  Gross per 24 hour   Intake 2706.67 ml   Output --   Net 2706.67 ml       Lines/Drains/Airways       Central Venous Catheter Line  Duration             Port A Cath Single Lumen 03/04/23 1049 Atrial Left 1 day              Peripheral Intravenous Line  Duration                  Peripheral IV - Single Lumen 03/02/23 1525 20 G Right Antecubital 2 days                    Physical Exam:  NAD  Anicteric, EOMI  MMM, NC  RRR; no stalin  No wheezes, no rhonchi  Soft, nondistended, nontender, +ABS  No c/c/e  No rash, no ulcer  Afocal, moves all ext.  AAOx 4, affect wnl      Significant Labs:  CBC:   Recent Labs   Lab 03/04/23  0745 03/05/23  0529   WBC 1.63* 23.86*   HGB 10.0* 9.1*   HCT 29.8* 27.0*    167     CMP:   Recent Labs   Lab 03/05/23  0529   *   CALCIUM 8.6*   ALBUMIN 3.3*   PROT 5.8*      K 3.9   CO2 27      BUN 11   CREATININE 0.7   ALKPHOS 34*    ALT 10   AST 17   BILITOT 0.4     Coagulation: No results for input(s): PT, INR, APTT in the last 48 hours.      Significant Imaging:  Imaging reviewed in Epic.      Assessment/Plan:     Persistent N/V - likely due to chemotherapy.  Symptoms have resolved with antiemetics. She is tolerating a full liquid diet without an issues. No need for an EGD at this time.  Slowly advance diet as tolerated and continue PRN antiemetics.  Call if her symptoms change and she needs an EGD.  GI to sign off.          ABILIO Lanza MD  Gastroenterology  [facility]

## 2023-03-05 NOTE — ASSESSMENT & PLAN NOTE
Most likely from chemo   Seen by Oncologist  Recommends GI consult for possible upper endoscopy   Recommends possible TPN Nutritionist consult placed/ Started on Clinimix Today  Continue current antiemetic Rx  Improving    Tolerated full liquids will advance to solid food.   Hold on TPN for now

## 2023-03-05 NOTE — PROGRESS NOTES
"[facility]   Hematology/Oncology  Inpatient Progress Note          Patient Name: Karyna Escoto  MRN: [id number]  Admission Date: 3/2/2023  Hospital Length of Stay: 1 days  Code Status: Full Code   Attending Provider: Robert Jaime MD  Consulting Provider: Kiran Allred MD  Primary Care Physician: WOLF Iraheta  Principal Problem:Intractable nausea and vomiting        Coverage for Dr Ney Peralta            Subjective:       Patient ID: Karyna Escoto is a 47 y.o. female.    Chief Complaint: Vomiting and Nausea (N/V x 4 weeks unrelieved with IV fluids and antiemetics at the infusion center. Breast cancer stage 3 on chemo last feb 2. )        History Present Illness:    Patient sitting up in bed; she is feeling much better; no current N/V and she is advancing diet today; she was seen by Dr camejo with GI and no current plans for intervention; no CP, SOB, HA's; no family currently at bedside; discussed with floor staff      Review of Systems:  GEN: general malaise, weakness but improved  HEENT: normal with no HA's, sore throat, stiff neck, changes in vision  CV: normal with no CP, SOB, PND, COHN or orthopnea  PULM: normal with no SOB, cough, hemoptysis, sputum or pleuritic pain  GI: N/V resolved  : normal with no hematuria, dysuria  BREAST: normal with no mass, discharge, pain  SKIN: normal with no rash, erythema, bruising, or swelling      Objective:     Vitals:  Blood pressure 110/71, pulse (!) 129, temperature 97.8 °F (36.6 °C), resp. rate 18, height 5' 2" (1.575 m), weight 72.6 kg (160 lb), SpO2 99 %, not currently breastfeeding.    Physical Exam:  GEN: no apparent distress, comfortable; AAOx3  HEAD: atraumatic and normocephalic; alopecia from chemotx  EYES: no pallor, no icterus, PERRLA  ENT: OMM, no pharyngeal erythema, external ears WNL; no nasal discharge; no thrush  NECK: no masses, thyroid normal, trachea midline, no LAD/LN's, supple  CV: RRR with no murmur; " normal pulse; normal S1 and S2; no pedal edema; left sided portacath  CHEST: Normal respiratory effort; CTAB; normal breath sounds; no wheeze or crackles  ABDOM: nontender and nondistended; soft; normal bowel sounds; no rebound/guarding  MUSC/Skeletal: ROM normal; no crepitus; joints normal; no deformities or arthropathy  EXTREM: no clubbing, cyanosis, inflammation or swelling; IV  SKIN: no rashes, lesions, ulcers, petechiae or subcutaneous nodules  : no steven  NEURO: grossly intact; motor/sensory WNL; AAOx3; no tremors; generalized weakness  PSYCH: normal mood, affect and behavior  LYMPH: normal cervical, supraclavicular, axillary and groin LN's            Lab Review:        Lab Results   Component Value Date    WBC 23.86 (H) 03/05/2023    HGB 9.1 (L) 03/05/2023    HCT 27.0 (L) 03/05/2023    MCV 97 03/05/2023     03/05/2023         Lab Results   Component Value Date    WBC 23.86 (H) 03/05/2023    HGB 9.1 (L) 03/05/2023    HCT 27.0 (L) 03/05/2023    MCV 97 03/05/2023     03/05/2023      CMP  Sodium   Date Value Ref Range Status   03/05/2023 139 136 - 145 mmol/L Final     Potassium   Date Value Ref Range Status   03/05/2023 3.9 3.5 - 5.1 mmol/L Final     Chloride   Date Value Ref Range Status   03/05/2023 108 95 - 110 mmol/L Final     CO2   Date Value Ref Range Status   03/05/2023 27 23 - 29 mmol/L Final     Glucose   Date Value Ref Range Status   03/05/2023 186 (H) 70 - 110 mg/dL Final     BUN   Date Value Ref Range Status   03/05/2023 11 6 - 20 mg/dL Final     Creatinine   Date Value Ref Range Status   03/05/2023 0.7 0.5 - 1.4 mg/dL Final     Calcium   Date Value Ref Range Status   03/05/2023 8.6 (L) 8.7 - 10.5 mg/dL Final     Total Protein   Date Value Ref Range Status   03/05/2023 5.8 (L) 6.0 - 8.4 g/dL Final     Albumin   Date Value Ref Range Status   03/05/2023 3.3 (L) 3.5 - 5.2 g/dL Final     Total Bilirubin   Date Value Ref Range Status   03/05/2023 0.4 0.1 - 1.0 mg/dL Final     Comment:      For infants and newborns, interpretation of results should be based  on gestational age, weight and in agreement with clinical  observations.    Premature Infant recommended reference ranges:  Up to 24 hours.............<8.0 mg/dL  Up to 48 hours............<12.0 mg/dL  3-5 days..................<15.0 mg/dL  6-29 days.................<15.0 mg/dL       Alkaline Phosphatase   Date Value Ref Range Status   03/05/2023 34 (L) 55 - 135 U/L Final     AST   Date Value Ref Range Status   03/05/2023 17 10 - 40 U/L Final     ALT   Date Value Ref Range Status   03/05/2023 10 10 - 44 U/L Final     Anion Gap   Date Value Ref Range Status   03/05/2023 4 (L) 8 - 16 mmol/L Final     eGFR   Date Value Ref Range Status   03/05/2023 >60.0 >60 mL/min/1.73 m^2 Final       CMP  Sodium   Date Value Ref Range Status   03/05/2023 139 136 - 145 mmol/L Final     Potassium   Date Value Ref Range Status   03/05/2023 3.9 3.5 - 5.1 mmol/L Final     Chloride   Date Value Ref Range Status   03/05/2023 108 95 - 110 mmol/L Final     CO2   Date Value Ref Range Status   03/05/2023 27 23 - 29 mmol/L Final     Glucose   Date Value Ref Range Status   03/05/2023 186 (H) 70 - 110 mg/dL Final     BUN   Date Value Ref Range Status   03/05/2023 11 6 - 20 mg/dL Final     Creatinine   Date Value Ref Range Status   03/05/2023 0.7 0.5 - 1.4 mg/dL Final     Calcium   Date Value Ref Range Status   03/05/2023 8.6 (L) 8.7 - 10.5 mg/dL Final     Total Protein   Date Value Ref Range Status   03/05/2023 5.8 (L) 6.0 - 8.4 g/dL Final     Albumin   Date Value Ref Range Status   03/05/2023 3.3 (L) 3.5 - 5.2 g/dL Final     Total Bilirubin   Date Value Ref Range Status   03/05/2023 0.4 0.1 - 1.0 mg/dL Final     Comment:     For infants and newborns, interpretation of results should be based  on gestational age, weight and in agreement with clinical  observations.    Premature Infant recommended reference ranges:  Up to 24 hours.............<8.0 mg/dL  Up to 48  hours............<12.0 mg/dL  3-5 days..................<15.0 mg/dL  6-29 days.................<15.0 mg/dL       Alkaline Phosphatase   Date Value Ref Range Status   03/05/2023 34 (L) 55 - 135 U/L Final     AST   Date Value Ref Range Status   03/05/2023 17 10 - 40 U/L Final     ALT   Date Value Ref Range Status   03/05/2023 10 10 - 44 U/L Final     Anion Gap   Date Value Ref Range Status   03/05/2023 4 (L) 8 - 16 mmol/L Final     eGFR   Date Value Ref Range Status   03/05/2023 >60.0 >60 mL/min/1.73 m^2 Final           Radiology Diagnostic Studies:     CT Abdomen Pelvis With Contrast [[id number]] Collected: 03/02/23 1902   Order Status: Completed Updated: 03/02/23 2101   Narrative:     EXAM:   CT Abdomen and Pelvis With Intravenous Contrast     CLINICAL HISTORY:   The patient is 47 years old and is Female; Bowel obstruction suspected     TECHNIQUE:   Axial computed tomography images of the abdomen and pelvis with intravenous contrast.  Sagittal and coronal reformatted images were created and reviewed.  This CT exam was performed using one or more of the following dose reduction techniques:  automated exposure control, adjustment of the mA and/or kV according to patient size, and/or use of iterative reconstruction technique.     COMPARISON:   No relevant prior studies available.     FINDINGS:   LUNG BASES:  Unremarkable.  No mass.  No consolidation.     ABDOMEN:   LIVER:  Low attenuation at the level of the falciform ligament is present suggesting fatty infiltration. A small cyst within the left hepatic lobe measuring approximately 1.1 cm is present. The liver is otherwise homogeneous.   GALLBLADDER AND BILE DUCTS:  The gallbladder is distended. No calcified gallstones or ductal dilatation is seen.   PANCREAS:  No ductal dilation.  No mass.   SPLEEN:  Unremarkable.   ADRENALS:  Unremarkable.  No mass.   KIDNEYS AND URETERS:  Unremarkable. The kidneys enhance symmetrically. No obstructing renal or ureteral calculus is  seen. No hydronephrosis or hydroureter. No perinephric fluid or stranding.   STOMACH AND BOWEL:  The stomach is distended with fluid and food contents. The small bowel is relatively normal in caliber. Stool is present throughout colon. A few scattered colonic diverticula are noted without surrounding inflammation. There is no mucosal thickening or evidence of obstruction.     PELVIS:   APPENDIX:  The appendix is normal in caliber without surrounding inflammation.   BLADDER:  The bladder is well distended.   REPRODUCTIVE:  Suggestion of uterine fibroids are noted. The ovaries are grossly unremarkable.     ABDOMEN and PELVIS:   INTRAPERITONEAL SPACE:  Unremarkable.  No free air.  No significant fluid collection.   BONES/JOINTS:  No acute fracture.   SOFT TISSUES:  The soft tissues are normal.   VASCULATURE:  Unremarkable.  No abdominal aortic aneurysm.   LYMPH NODES:  Unremarkable. No enlarged lymph nodes.     IMPRESSION:   1.  No evidence of bowel obstruction.   2.  Mild colonic diverticulosis.     Electronically signed by:  Saskia Coffey MD  3/2/2023 8:58 PM CST Workstation: [id number]   CTA Chest Non-Coronary (PE Studies) [[id number]] Collected: 03/02/23 1901   Order Status: Completed Updated: 03/02/23 2037   Narrative:     EXAM:   CT Angiography Chest With Intravenous Contrast     CLINICAL HISTORY:   The patient is 47 years old and is Female; Pulmonary embolism (PE) suspected, high prob     TECHNIQUE:   Axial computed tomographic angiography images of the chest with intravenous contrast.  Sagittal and coronal reformatted images were created and reviewed.  This CT exam was performed using one or more of the following dose reduction techniques:  automated exposure control, adjustment of the mA and/or kV according to patient size, and/or use of iterative reconstruction technique.   MIP reconstructed images were created and reviewed.     COMPARISON:   No relevant prior studies available.     FINDINGS:   PULMONARY  ARTERIES:  No pulmonary embolism.   AORTA:  No acute findings.  No thoracic aortic aneurysm.   LUNGS:  Unremarkable.  No mass.  No consolidation.   PLEURAL SPACE:  Unremarkable.  No significant effusion.  No pneumothorax.   HEART:  Unremarkable.  No cardiomegaly.  No significant pericardial effusion.  No evidence of RV dysfunction.   BONES/JOINTS:  No acute fracture.  No dislocation.   SOFT TISSUES:  Unremarkable.   LYMPH NODES:  Unremarkable.  No enlarged lymph nodes.     IMPRESSION:   No pulmonary embolism or other acute finding in the chest.     Electronically signed by:  Cyrus Chamorro MD  3/2/2023 8:35 PM CST Workstation: [id number]   X-Ray Chest AP Portable [[id number]] Collected: 03/02/23 1519   Order Status: Completed Updated: 03/02/23 1550   Narrative:     Reason: Vomiting     FINDINGS:     Portable chest with comparison chest x-ray November 21, 2022 show normal cardiomediastinal silhouette. Left subclavian Port-A-Cath noted.   Lungs are clear. Pulmonary vasculature is normal. No acute osseous abnormality.     IMPRESSION:     No acute cardiopulmonary abnormality.          Assessment:     IMPRESSION:    (1) 47 y.o. female known to the oncology service of my associate Dr Ney Peralta with diagnosis of Triple Negative Breast cancer who presented to [facility] with intractable N/V after undergoing neoadjuvant chemotherapy.     3/4/2023:  - Admitted to hospitalist service with malnutrition and dehydration.  - Nutrition consulted for TPN  - continued on antiemetics  - wbc dropped to 1.63 - will start her on neupogen and neutropenic precautions    3/5/2023:  - patient seen by Dr Lanza with GI - no plans for any interventions currently  - wbc at 23.8 - will stop the GCSF  - hgb at 9.1  - advancing diet    (2) Mild neutropenia secondary to marrow suppression from chemotherapy    (3) Mild anemia secondary to marrow suppression from chemotherapy    (4) Anxiety    (5) Former smoker (quit 2005)              1.  Intractable vomiting with nausea    2. Vomiting    3. Dehydration    4. Primary malignant neoplasm of breast, unspecified laterality    5. Intractable nausea and vomiting [R11.2 (ICD-10-CM)]           Plan:     PLAN:          Monitor labs - discontinue the GCSF  Judicious use of antiemetics  IVF's as per primary team  Nutrition consulted   GI as per their directives - advancing diet today  Will follow with you through the weekend - Dr Peralta returns on Monday 3/6               Kiran Allred MD  Hematology/Oncology  [facility]

## 2023-03-05 NOTE — SUBJECTIVE & OBJECTIVE
Interval History:     03/05/23: No nausea or vomiting in the past 24hr. Tolerating full liquids will advance for regular diet. NPO after midnight. If now problems over night resume regular diet. Hold TPN while eating.Afebrile. + Leukocytosis most likely from Neupogen  03/04/23: feeling much better. Nausea improved. Requesting diet. GI consult still pending. Placed on Neupogen by Hem/onc for pancytopenia. Afebrile. Continue K+ replacement  03/03/23:  Still complains of nausea but improved seen by hematologist currently on Clindamax will consult nutritionist for further recommendations.  Afebrile potassium replaced via K rider.  Tolerate liquids NPO after midnight for possible GI procedure in a.m.    Objective:     Vital Signs (Most Recent):  Temp: 97.9 °F (36.6 °C) (03/05/23 1100)  Pulse: 93 (03/05/23 1100)  Resp: 18 (03/05/23 1100)  BP: 109/64 (03/05/23 1100)  SpO2: (!) 94 % (03/05/23 1100)   Vital Signs (24h Range):  Temp:  [97.8 °F (36.6 °C)-98.2 °F (36.8 °C)] 97.9 °F (36.6 °C)  Pulse:  [] 93  Resp:  [18] 18  SpO2:  [94 %-99 %] 94 %  BP: ()/(64-71) 109/64     Weight: 72.6 kg (160 lb)  Body mass index is 29.26 kg/m².    Intake/Output Summary (Last 24 hours) at 3/5/2023 1231  Last data filed at 3/5/2023 0536  Gross per 24 hour   Intake 2706.67 ml   Output --   Net 2706.67 ml        Physical Exam  Constitutional:       Appearance: Normal appearance.   HENT:      Head: Normocephalic and atraumatic.      Comments: Alopecia     Nose: Nose normal.      Mouth/Throat:      Mouth: Mucous membranes are dry.   Eyes:      Pupils: Pupils are equal, round, and reactive to light.   Cardiovascular:      Rate and Rhythm: Normal rate and regular rhythm.      Pulses: Normal pulses.      Heart sounds: Normal heart sounds.   Pulmonary:      Effort: Pulmonary effort is normal.      Breath sounds: Normal breath sounds.   Abdominal:      General: Abdomen is flat. Bowel sounds are normal.      Palpations: Abdomen is soft.    Musculoskeletal:         General: Normal range of motion.      Cervical back: Neck supple.   Skin:     General: Skin is dry.   Neurological:      General: No focal deficit present.      Mental Status: She is alert and oriented to person, place, and time.   Psychiatric:         Mood and Affect: Mood normal.       Significant Labs: All pertinent labs within the past 24 hours have been reviewed.  A1C: No results for input(s): HGBA1C in the last 4320 hours.  ABGs: No results for input(s): PH, PCO2, HCO3, POCSATURATED, BE, TOTALHB, COHB, METHB, O2HB, POCFIO2, PO2 in the last 48 hours.  Bilirubin:   Recent Labs   Lab 02/20/23  0923 03/02/23  1528 03/03/23  0525 03/04/23  0746 03/05/23  0529   BILITOT 0.8 1.0 1.3* 0.5 0.4       Blood Culture:   No results for input(s): LABBLOO in the last 48 hours.    BMP:   Recent Labs   Lab 03/05/23  0529   *      K 3.9      CO2 27   BUN 11   CREATININE 0.7   CALCIUM 8.6*   MG 1.9       CBC:   Recent Labs   Lab 03/04/23  0745 03/05/23  0529   WBC 1.63* 23.86*   HGB 10.0* 9.1*   HCT 29.8* 27.0*    167       CMP:   Recent Labs   Lab 03/03/23  1550 03/04/23  0746 03/05/23  0529   * 134* 139   K 2.7* 3.3* 3.9   CL 99 98 108   CO2 23 25 27   * 190* 186*   BUN 7 7 11   CREATININE 0.6 0.7 0.7   CALCIUM 8.4* 8.7 8.6*   PROT  --  6.0 5.8*   ALBUMIN  --  3.4* 3.3*   BILITOT  --  0.5 0.4   ALKPHOS  --  36* 34*   AST  --  20 17   ALT  --  12 10   ANIONGAP 9 11 4*       Cardiac Markers:   No results for input(s): CKMB, MYOGLOBIN, BNP, TROPISTAT in the last 48 hours.    Coagulation: No results for input(s): PT, INR, APTT in the last 48 hours.  Lactic Acid:   No results for input(s): LACTATE in the last 48 hours.    Lipase:   No results for input(s): LIPASE in the last 48 hours.    Lipid Panel:   Recent Labs   Lab 03/05/23  0529   TRIG 121     Magnesium:   Recent Labs   Lab 03/03/23  1550 03/04/23  0746 03/05/23  0529   MG 1.3* 1.9 1.9       Pathology Results   (Last 10 years)      None          POCT Glucose: No results for input(s): POCTGLUCOSE in the last 48 hours.  Prealbumin:   No results for input(s): PREALBUMIN in the last 48 hours.    Respiratory Culture: No results for input(s): GSRESP, RESPIRATORYC in the last 48 hours.  Troponin:   No results for input(s): TROPONINI, TROPONINIHS in the last 48 hours.    TSH:   Recent Labs   Lab 03/02/23  1528   TSH 3.120       Urine Culture: No results for input(s): LABURIN in the last 48 hours.  Urine Studies:   No results for input(s): COLORU, APPEARANCEUA, PHUR, SPECGRAV, PROTEINUA, GLUCUA, KETONESU, BILIRUBINUA, OCCULTUA, NITRITE, UROBILINOGEN, LEUKOCYTESUR, RBCUA, WBCUA, BACTERIA, SQUAMEPITHEL, HYALINECASTS in the last 48 hours.    Invalid input(s): WRIGHTSUR  CTA Chest Non-Coronary (PE Studies)    Result Date: 3/2/2023  EXAM: CT Angiography Chest With Intravenous Contrast CLINICAL HISTORY: The patient is 47 years old and is Female; Pulmonary embolism (PE) suspected, high prob TECHNIQUE: Axial computed tomographic angiography images of the chest with intravenous contrast.  Sagittal and coronal reformatted images were created and reviewed.  This CT exam was performed using one or more of the following dose reduction techniques:  automated exposure control, adjustment of the mA and/or kV according to patient size, and/or use of iterative reconstruction technique. MIP reconstructed images were created and reviewed. COMPARISON: No relevant prior studies available. FINDINGS: PULMONARY ARTERIES:  No pulmonary embolism. AORTA:  No acute findings.  No thoracic aortic aneurysm. LUNGS:  Unremarkable.  No mass.  No consolidation. PLEURAL SPACE:  Unremarkable.  No significant effusion.  No pneumothorax. HEART:  Unremarkable.  No cardiomegaly.  No significant pericardial effusion.  No evidence of RV dysfunction. BONES/JOINTS:  No acute fracture.  No dislocation. SOFT TISSUES:  Unremarkable. LYMPH NODES:  Unremarkable.  No enlarged lymph  nodes. IMPRESSION: No pulmonary embolism or other acute finding in the chest. Electronically signed by:  Cyrus Chamorro MD  3/2/2023 8:35 PM CST Workstation: [id number]    CT Abdomen Pelvis With Contrast    Result Date: 3/2/2023  EXAM: CT Abdomen and Pelvis With Intravenous Contrast CLINICAL HISTORY: The patient is 47 years old and is Female; Bowel obstruction suspected TECHNIQUE: Axial computed tomography images of the abdomen and pelvis with intravenous contrast.  Sagittal and coronal reformatted images were created and reviewed.  This CT exam was performed using one or more of the following dose reduction techniques:  automated exposure control, adjustment of the mA and/or kV according to patient size, and/or use of iterative reconstruction technique. COMPARISON: No relevant prior studies available. FINDINGS: LUNG BASES:  Unremarkable.  No mass.  No consolidation. ABDOMEN: LIVER:  Low attenuation at the level of the falciform ligament is present suggesting fatty infiltration. A small cyst within the left hepatic lobe measuring approximately 1.1 cm is present. The liver is otherwise homogeneous. GALLBLADDER AND BILE DUCTS:  The gallbladder is distended. No calcified gallstones or ductal dilatation is seen. PANCREAS:  No ductal dilation.  No mass. SPLEEN:  Unremarkable. ADRENALS:  Unremarkable.  No mass. KIDNEYS AND URETERS:  Unremarkable. The kidneys enhance symmetrically. No obstructing renal or ureteral calculus is seen. No hydronephrosis or hydroureter. No perinephric fluid or stranding. STOMACH AND BOWEL:  The stomach is distended with fluid and food contents. The small bowel is relatively normal in caliber. Stool is present throughout colon. A few scattered colonic diverticula are noted without surrounding inflammation. There is no mucosal thickening or evidence of obstruction. PELVIS: APPENDIX:  The appendix is normal in caliber without surrounding inflammation. BLADDER:  The bladder is well distended.  REPRODUCTIVE:  Suggestion of uterine fibroids are noted. The ovaries are grossly unremarkable. ABDOMEN and PELVIS: INTRAPERITONEAL SPACE:  Unremarkable.  No free air.  No significant fluid collection. BONES/JOINTS:  No acute fracture. SOFT TISSUES:  The soft tissues are normal. VASCULATURE:  Unremarkable.  No abdominal aortic aneurysm. LYMPH NODES:  Unremarkable. No enlarged lymph nodes. IMPRESSION: 1.  No evidence of bowel obstruction. 2.  Mild colonic diverticulosis. Electronically signed by:  Saskia Coffey MD  3/2/2023 8:58 PM CST Workstation: [id number]    X-Ray Chest AP Portable    Result Date: 3/2/2023  Reason: Vomiting FINDINGS: Portable chest with comparison chest x-ray November 21, 2022 show normal cardiomediastinal silhouette. Left subclavian Port-A-Cath noted. Lungs are clear. Pulmonary vasculature is normal. No acute osseous abnormality. IMPRESSION: No acute cardiopulmonary abnormality. Electronically signed by:  Cyrus Larios DO  3/2/2023 3:48 PM CST Workstation: [id number]    MRI Breast w/wo Contrast, w/CAD, Bilateral    Result Date: 2/8/2023  EXAMINATION: MRI BREAST W/WO CONTRAST, W/CAD, BILATERAL CLINICAL HISTORY: Left breast cancer, left axillary tail mass, evaluate post chemotherapy. TECHNIQUE: CMS MANDATED QUALITY DATA - MAMMOGRAPHY - 225 Bilateral breast MRI was performed with a dedicated breast coil. The patient was placed prone in the breast immobilizer with light compression. The following localization sequences were obtained: Axial inversion recovery, axial 3-D non-fat-suppressed, axial 3-D fat suppressed pre-contrast, followed by axial 3-D fat-suppressed post-contrast dynamic images with 8.5 mL Gadavist IV contrast. Post-processing including subtraction imaging, reconstruction in the sagittal and coronal planes, and MIP of both breasts was performed on an independent workstation. The interpretation was assisted by Computer Aided Detection. FINDINGS: Comparison to prior exams, including  ultrasound of 11/08/2022 and MRI of 09/06/2022.  The breasts are composed of heterogeneous fibroglandular tissue.  Background parenchymal enhancement is mild. There are no suspicious enhancing masses or areas of suspicious non masslike enhancement in either breast.  Previous enhancing subdermal mass in the left breast axillary tail region has resolved, with resolution of previous enlarged left axillary lymph nodes.  No enlarged axillary lymph nodes or enlarged internal mammary chain lymph nodes. There is no breast skin thickening.  No focal signal abnormality or abnormal enhancement of the chest wall.     Resolution of previous left breast mass and previous enlarged left axillary lymph nodes, compatible with favorable response to therapy.  No new disease. BI-RADS CATEGORY 6: KNOWN MALIGNANCY. [facility] utilizes a reminder system to ensure that patients receive reminder letters for appointments. This includes reminders for routine screening mammograms, diagnostic mammograms, or other breast imaging interventions as appropriate. This patient will be placed in the appropriate reminder system. Electronically signed by: Franklin Rachel MD Date:    02/08/2023 Time:    14:03       Significant Imaging: I have reviewed all pertinent imaging results/findings within the past 24 hours.  I have reviewed and interpreted all pertinent imaging results/findings within the past 24 hours.

## 2023-03-06 VITALS
TEMPERATURE: 98 F | RESPIRATION RATE: 18 BRPM | WEIGHT: 160 LBS | SYSTOLIC BLOOD PRESSURE: 97 MMHG | BODY MASS INDEX: 29.44 KG/M2 | OXYGEN SATURATION: 97 % | HEART RATE: 90 BPM | HEIGHT: 62 IN | DIASTOLIC BLOOD PRESSURE: 69 MMHG

## 2023-03-06 PROBLEM — D64.81 ANEMIA DUE TO AND NOT CONCURRENT WITH CHEMOTHERAPY: Status: ACTIVE | Noted: 2023-03-06

## 2023-03-06 LAB
ALBUMIN SERPL BCP-MCNC: 2.9 G/DL (ref 3.5–5.2)
ALP SERPL-CCNC: 30 U/L (ref 55–135)
ALT SERPL W/O P-5'-P-CCNC: 10 U/L (ref 10–44)
ANION GAP SERPL CALC-SCNC: 7 MMOL/L (ref 8–16)
AST SERPL-CCNC: 17 U/L (ref 10–40)
BILIRUB SERPL-MCNC: 0.3 MG/DL (ref 0.1–1)
BUN SERPL-MCNC: 9 MG/DL (ref 6–20)
CALCIUM SERPL-MCNC: 8.1 MG/DL (ref 8.7–10.5)
CHLORIDE SERPL-SCNC: 106 MMOL/L (ref 95–110)
CO2 SERPL-SCNC: 27 MMOL/L (ref 23–29)
CREAT SERPL-MCNC: 0.6 MG/DL (ref 0.5–1.4)
ERYTHROCYTE [DISTWIDTH] IN BLOOD BY AUTOMATED COUNT: 16.1 % (ref 11.5–14.5)
EST. GFR  (NO RACE VARIABLE): >60 ML/MIN/1.73 M^2
GLUCOSE SERPL-MCNC: 140 MG/DL (ref 70–110)
HCT VFR BLD AUTO: 22.4 % (ref 37–48.5)
HGB BLD-MCNC: 7.5 G/DL (ref 12–16)
MAGNESIUM SERPL-MCNC: 1.7 MG/DL (ref 1.6–2.6)
MCH RBC QN AUTO: 33.8 PG (ref 27–31)
MCHC RBC AUTO-ENTMCNC: 33.5 G/DL (ref 32–36)
MCV RBC AUTO: 101 FL (ref 82–98)
PHOSPHATE SERPL-MCNC: 3.9 MG/DL (ref 2.7–4.5)
PHOSPHATE SERPL-MCNC: 3.9 MG/DL (ref 2.7–4.5)
PLATELET # BLD AUTO: 143 K/UL (ref 150–450)
PMV BLD AUTO: 11.4 FL (ref 9.2–12.9)
POTASSIUM SERPL-SCNC: 3.6 MMOL/L (ref 3.5–5.1)
PROT SERPL-MCNC: 5 G/DL (ref 6–8.4)
RBC # BLD AUTO: 2.22 M/UL (ref 4–5.4)
SODIUM SERPL-SCNC: 140 MMOL/L (ref 136–145)
WBC # BLD AUTO: 16.16 K/UL (ref 3.9–12.7)

## 2023-03-06 PROCEDURE — 25000003 PHARM REV CODE 250: Performed by: INTERNAL MEDICINE

## 2023-03-06 PROCEDURE — 63600175 PHARM REV CODE 636 W HCPCS: Performed by: NURSE PRACTITIONER

## 2023-03-06 PROCEDURE — 63600175 PHARM REV CODE 636 W HCPCS: Performed by: STUDENT IN AN ORGANIZED HEALTH CARE EDUCATION/TRAINING PROGRAM

## 2023-03-06 PROCEDURE — C9113 INJ PANTOPRAZOLE SODIUM, VIA: HCPCS | Performed by: STUDENT IN AN ORGANIZED HEALTH CARE EDUCATION/TRAINING PROGRAM

## 2023-03-06 PROCEDURE — 12000002 HC ACUTE/MED SURGE SEMI-PRIVATE ROOM

## 2023-03-06 PROCEDURE — 80053 COMPREHEN METABOLIC PANEL: CPT | Performed by: STUDENT IN AN ORGANIZED HEALTH CARE EDUCATION/TRAINING PROGRAM

## 2023-03-06 PROCEDURE — 84100 ASSAY OF PHOSPHORUS: CPT | Performed by: STUDENT IN AN ORGANIZED HEALTH CARE EDUCATION/TRAINING PROGRAM

## 2023-03-06 PROCEDURE — 85027 COMPLETE CBC AUTOMATED: CPT | Performed by: STUDENT IN AN ORGANIZED HEALTH CARE EDUCATION/TRAINING PROGRAM

## 2023-03-06 PROCEDURE — 83735 ASSAY OF MAGNESIUM: CPT | Performed by: STUDENT IN AN ORGANIZED HEALTH CARE EDUCATION/TRAINING PROGRAM

## 2023-03-06 PROCEDURE — 63600175 PHARM REV CODE 636 W HCPCS: Performed by: INTERNAL MEDICINE

## 2023-03-06 PROCEDURE — 25000003 PHARM REV CODE 250: Performed by: STUDENT IN AN ORGANIZED HEALTH CARE EDUCATION/TRAINING PROGRAM

## 2023-03-06 RX ORDER — HEPARIN 100 UNIT/ML
5 SYRINGE INTRAVENOUS ONCE
Status: COMPLETED | OUTPATIENT
Start: 2023-03-06 | End: 2023-03-06

## 2023-03-06 RX ADMIN — FLUTICASONE PROPIONATE 50 MCG: 50 SPRAY, METERED NASAL at 08:03

## 2023-03-06 RX ADMIN — CETIRIZINE HYDROCHLORIDE 10 MG: 10 TABLET, FILM COATED ORAL at 08:03

## 2023-03-06 RX ADMIN — ONDANSETRON 16 MG: 2 INJECTION INTRAMUSCULAR; INTRAVENOUS at 04:03

## 2023-03-06 RX ADMIN — HEPARIN 500 UNITS: 100 SYRINGE at 06:03

## 2023-03-06 RX ADMIN — PANTOPRAZOLE SODIUM 40 MG: 40 INJECTION, POWDER, LYOPHILIZED, FOR SOLUTION INTRAVENOUS at 08:03

## 2023-03-06 RX ADMIN — DEXAMETHASONE SODIUM PHOSPHATE 10 MG: 10 INJECTION INTRAMUSCULAR; INTRAVENOUS at 08:03

## 2023-03-06 RX ADMIN — SERTRALINE HYDROCHLORIDE 50 MG: 50 TABLET ORAL at 08:03

## 2023-03-06 NOTE — PT/OT/SLP PROGRESS
Physical Therapy      Patient Name:  Karyna Escoto   MRN:  [id number]    Patient not seen today secondary to Patient unwilling to participate, Other (Comment) (Pt reported feeling well and is independently moving around the room and to bathroom, so she is not interested in PT.). Will not follow-up per pt request. Will Discontinue PT order.

## 2023-03-06 NOTE — ASSESSMENT & PLAN NOTE
Started on Neupogen by Oncologist  WBC today   1.63  (Today) 1.63 3.78 3.99 2.22     Now with Leukocytosis most likely from Neupogen afebrile  23.86 High   1.63 Low Panic  CM  3.78 Low      03/06:  Improving    Latest Reference Range & Units 03/04/23 07:45 03/05/23 05:29 03/06/23 06:33   WBC 3.90 - 12.70 K/uL 1.63 (LL) 23.86 (H) 16.16 (H)

## 2023-03-06 NOTE — ASSESSMENT & PLAN NOTE
Most likely from chemo   Seen by Oncologist  Recommends GI consult for possible upper endoscopy   Recommends possible TPN Nutritionist consult placed/ Started on Clinimix Today  Continue current antiemetic Rx  Improving    Tolerated full liquids will advance to solid food.   Hold on TPN for now  Tolerating solid food w/o Nausea or vomiting

## 2023-03-06 NOTE — PLAN OF CARE
Patient discharging after hours.  Patient cleared to discharge by case management.  Patient discharging home self care no needs.       03/06/23 1756   Final Note   Assessment Type Final Discharge Note   Anticipated Discharge Disposition Home   Post-Acute Status   Discharge Delays None known at this time

## 2023-03-06 NOTE — PT/OT/SLP PROGRESS
Occupational Therapy  Discharge    Patient Name:  Karyna Escoto   MRN:  [id number]    Patient not seen today secondary to  (Patient back to baseline, independent for ambualtion and ADLs. Acute OT signing off.).     3/6/2023

## 2023-03-06 NOTE — PROGRESS NOTES
[facility]  Adult Nutrition   Progress Note (Follow-Up)    SUMMARY      Recommendations:   1. Continue current diet  2. Recommend supplemental Clinimix @ 100 ml/hr if patient not consuming > 75% EEN /EPN by NPO day 5 (3/7/23).  3. Will add glucerna with meals     Goals:   Goals: Patient will meet >/= 75% EEN and EPN in 24-48 hrs.    Dietitian Rounds Brief  Nurse states patient tolerates regular diet but not documented. Discussed with nursing and MD via secure chat. Patient to be NPO x 5 days tomorrow. Recommend re-start PPN @ 100 ml/hr for supplemental. Pt had 1 day of custom TPN on Saturday then dc'd?    Diet order: Diabetic 2000 kcal diet    % Intake of Estimated Energy Needs: 0 - 25 %  % Meal Intake: 0 - 25 %    Estimated/Assessed Needs  Weight Used For Calorie Calculations: 72.6 kg (160 lb 0.9 oz)  Energy Calorie Requirements (kcal): 3288-2375 (25-30 kcal/kg)  Energy Need Method: Kcal/kg  Protein Requirements: 109-145 gm/day (1.5-2.0 gm/kg)  Weight Used For Protein Calculations: 72.6 kg (160 lb 0.9 oz)     Estimated Fluid Requirement Method: RDA Method  RDA Method (mL): 2179       Weight History:  Wt Readings from Last 5 Encounters:   03/02/23 72.6 kg (160 lb)   02/20/23 74.4 kg (164 lb)   02/06/23 82.4 kg (181 lb 11.2 oz)   02/02/23 79.3 kg (174 lb 13.2 oz)   01/27/23 80.7 kg (178 lb)        Reason for Assessment  Reason For Assessment: consult, new TPN  Diagnosis: cancer diagnosis/related complications  Relevant Medical History: breast cancer s/p chemo  Interdisciplinary Rounds: did not attend    Medications:Pertinent Medications Reviewed  Scheduled Meds:   cetirizine  10 mg Oral Daily    dexAMETHasone  10 mg Intravenous Q12H    enoxaparin  40 mg Subcutaneous Daily    fluticasone propionate  1 spray Each Nostril Daily    ondansetron (ZOFRAN) IVPB  16 mg Intravenous Q12H    pantoprazole  40 mg Intravenous Daily    sertraline  50 mg Oral Daily     Continuous Infusions:  PRN Meds:.acetaminophen,  acetaminophen, calcium gluconate IVPB, calcium gluconate IVPB, calcium gluconate IVPB, HYDROcodone-acetaminophen, hydrOXYzine HCL, loperamide, magnesium sulfate IVPB, magnesium sulfate IVPB, melatonin, potassium chloride **AND** potassium chloride **AND** potassium chloride, prochlorperazine, sodium chloride 0.9%, sodium phosphate IVPB, sodium phosphate IVPB, sodium phosphate IVPB    Labs: Pertinent Labs Reviewed  Clinical Chemistry:  Recent Labs   Lab 03/02/23  1528 03/03/23  0525 03/04/23  0746 03/05/23  0529 03/06/23  0633   *   < > 134* 139 140   K 3.1*   < > 3.3* 3.9 3.6      < > 98 108 106   CO2 17*   < > 25 27 27   GLU 81   < > 190* 186* 140*   BUN <5*   < > 7 11 9   CREATININE 0.7   < > 0.7 0.7 0.6   CALCIUM 9.5   < > 8.7 8.6* 8.1*   PROT 6.3   < > 6.0 5.8* 5.0*   ALBUMIN 3.6   < > 3.4* 3.3* 2.9*   BILITOT 1.0   < > 0.5 0.4 0.3   ALKPHOS 44*   < > 36* 34* 30*   AST 33   < > 20 17 17   ALT 13   < > 12 10 10   ANIONGAP 17*   < > 11 4* 7*   MG 1.4*   < > 1.9 1.9 1.7   PHOS  --    < > 2.0* <1.0* 3.9  3.9   LIPASE 30  --   --   --   --     < > = values in this interval not displayed.     CBC:   Recent Labs   Lab 03/06/23  0633   WBC 16.16*   RBC 2.22*   HGB 7.5*   HCT 22.4*   *   *   MCH 33.8*   MCHC 33.5     Lipid Panel:  Recent Labs   Lab 03/05/23  0529   TRIG 121     Cardiac Profile:  Recent Labs   Lab 03/02/23  1528   BNP 8   CPK 49     Inflammatory Labs:  No results for input(s): CRP in the last 168 hours.  Diabetes:  No results for input(s): HGBA1C, POCTGLUCOSE in the last 168 hours.  Thyroid & Parathyroid:  Recent Labs   Lab 03/02/23  1528   TSH 3.120     Monitor and Evaluation  Food and Nutrient Intake: energy intake, food and beverage intake, parenteral nutrition intake  Food and Nutrient Adminstration: diet order, enteral and parenteral nutrition administration  Knowledge/Beliefs/Attitudes: food and nutrition knowledge/skill  Physical Activity and Function: nutrition-related  ADLs and IADLs  Anthropometric Measurements: weight, weight change, body mass index  Biochemical Data, Medical Tests and Procedures: electrolyte and renal panel, gastrointestinal profile, glucose/endocrine profile, inflammatory profile, lipid profile  Nutrition-Focused Physical Findings: overall appearance     Nutrition Risk  Level of Risk/Frequency of Follow-up: high     Nutrition Follow-Up  RD Follow-up?: Yes    Virginia Verdin RD 03/06/2023 5:22 PM

## 2023-03-06 NOTE — SUBJECTIVE & OBJECTIVE
Interval History:     03/06/23: doing well tolerating solid foods w/o nausea or vomiting. WBC trending downward. H&H dropped.    Latest Reference Range & Units 03/04/23 07:45 03/05/23 05:29 03/06/23 06:33   Hemoglobin 12.0 - 16.0 g/dL 10.0 (L) 9.1 (L) 7.5 (L)     Continue to monitor. Afebrile.     03/05/23: No nausea or vomiting in the past 24hr. Tolerating full liquids will advance for regular diet. NPO after midnight. If now problems over night resume regular diet. Hold TPN while eating.Afebrile. + Leukocytosis most likely from Neupogen    03/04/23: feeling much better. Nausea improved. Requesting diet. GI consult still pending. Placed on Neupogen by Hem/onc for pancytopenia. Afebrile. Continue K+ replacement    03/03/23:  Still complains of nausea but improved seen by hematologist currently on Clindamax will consult nutritionist for further recommendations.  Afebrile potassium replaced via K rider.  Tolerate liquids NPO after midnight for possible GI procedure in a.m.    Objective:     Vital Signs (Most Recent):  Temp: 98.9 °F (37.2 °C) (03/06/23 1111)  Pulse: 98 (03/06/23 1111)  Resp: 18 (03/06/23 1111)  BP: 95/63 (03/06/23 1111)  SpO2: 95 % (03/06/23 1111)   Vital Signs (24h Range):  Temp:  [97.5 °F (36.4 °C)-98.9 °F (37.2 °C)] 98.9 °F (37.2 °C)  Pulse:  [79-98] 98  Resp:  [18] 18  SpO2:  [92 %-97 %] 95 %  BP: ()/(57-74) 95/63     Weight: 72.6 kg (160 lb)  Body mass index is 29.26 kg/m².    Intake/Output Summary (Last 24 hours) at 3/6/2023 1423  Last data filed at 3/6/2023 0455  Gross per 24 hour   Intake 750 ml   Output --   Net 750 ml        Physical Exam  Constitutional:       Appearance: Normal appearance.   HENT:      Head: Normocephalic and atraumatic.      Comments: Alopecia     Nose: Nose normal.      Mouth/Throat:      Mouth: Mucous membranes are dry.   Eyes:      Pupils: Pupils are equal, round, and reactive to light.   Cardiovascular:      Rate and Rhythm: Normal rate and regular rhythm.       Pulses: Normal pulses.      Heart sounds: Normal heart sounds.   Pulmonary:      Effort: Pulmonary effort is normal.      Breath sounds: Normal breath sounds.   Abdominal:      General: Abdomen is flat. Bowel sounds are normal.      Palpations: Abdomen is soft.   Musculoskeletal:         General: Normal range of motion.      Cervical back: Neck supple.   Skin:     General: Skin is dry.   Neurological:      General: No focal deficit present.      Mental Status: She is alert and oriented to person, place, and time.   Psychiatric:         Mood and Affect: Mood normal.       Significant Labs: All pertinent labs within the past 24 hours have been reviewed.  A1C: No results for input(s): HGBA1C in the last 4320 hours.  ABGs: No results for input(s): PH, PCO2, HCO3, POCSATURATED, BE, TOTALHB, COHB, METHB, O2HB, POCFIO2, PO2 in the last 48 hours.  Bilirubin:   Recent Labs   Lab 03/02/23  1528 03/03/23  0525 03/04/23  0746 03/05/23  0529 03/06/23  0633   BILITOT 1.0 1.3* 0.5 0.4 0.3       Blood Culture:   No results for input(s): LABBLOO in the last 48 hours.    BMP:   Recent Labs   Lab 03/06/23  0633   *      K 3.6      CO2 27   BUN 9   CREATININE 0.6   CALCIUM 8.1*   MG 1.7       CBC:   Recent Labs   Lab 03/05/23  0529 03/06/23  0633   WBC 23.86* 16.16*   HGB 9.1* 7.5*   HCT 27.0* 22.4*    143*       CMP:   Recent Labs   Lab 03/05/23  0529 03/06/23  0633    140   K 3.9 3.6    106   CO2 27 27   * 140*   BUN 11 9   CREATININE 0.7 0.6   CALCIUM 8.6* 8.1*   PROT 5.8* 5.0*   ALBUMIN 3.3* 2.9*   BILITOT 0.4 0.3   ALKPHOS 34* 30*   AST 17 17   ALT 10 10   ANIONGAP 4* 7*       Cardiac Markers:   No results for input(s): CKMB, MYOGLOBIN, BNP, TROPISTAT in the last 48 hours.    Coagulation: No results for input(s): PT, INR, APTT in the last 48 hours.  Lactic Acid:   No results for input(s): LACTATE in the last 48 hours.    Lipase:   No results for input(s): LIPASE in the last 48  hours.    Lipid Panel:   Recent Labs   Lab 03/05/23  0529   TRIG 121       Magnesium:   Recent Labs   Lab 03/05/23  0529 03/06/23  0633   MG 1.9 1.7       Pathology Results  (Last 10 years)      None          POCT Glucose: No results for input(s): POCTGLUCOSE in the last 48 hours.  Prealbumin:   No results for input(s): PREALBUMIN in the last 48 hours.    Respiratory Culture: No results for input(s): GSRESP, RESPIRATORYC in the last 48 hours.  Troponin:   No results for input(s): TROPONINI, TROPONINIHS in the last 48 hours.    TSH:   Recent Labs   Lab 03/02/23  1528   TSH 3.120       Urine Culture: No results for input(s): LABURIN in the last 48 hours.  Urine Studies:   No results for input(s): COLORU, APPEARANCEUA, PHUR, SPECGRAV, PROTEINUA, GLUCUA, KETONESU, BILIRUBINUA, OCCULTUA, NITRITE, UROBILINOGEN, LEUKOCYTESUR, RBCUA, WBCUA, BACTERIA, SQUAMEPITHEL, HYALINECASTS in the last 48 hours.    Invalid input(s): IgeaSUR  CTA Chest Non-Coronary (PE Studies)    Result Date: 3/2/2023  EXAM: CT Angiography Chest With Intravenous Contrast CLINICAL HISTORY: The patient is 47 years old and is Female; Pulmonary embolism (PE) suspected, high prob TECHNIQUE: Axial computed tomographic angiography images of the chest with intravenous contrast.  Sagittal and coronal reformatted images were created and reviewed.  This CT exam was performed using one or more of the following dose reduction techniques:  automated exposure control, adjustment of the mA and/or kV according to patient size, and/or use of iterative reconstruction technique. MIP reconstructed images were created and reviewed. COMPARISON: No relevant prior studies available. FINDINGS: PULMONARY ARTERIES:  No pulmonary embolism. AORTA:  No acute findings.  No thoracic aortic aneurysm. LUNGS:  Unremarkable.  No mass.  No consolidation. PLEURAL SPACE:  Unremarkable.  No significant effusion.  No pneumothorax. HEART:  Unremarkable.  No cardiomegaly.  No significant  pericardial effusion.  No evidence of RV dysfunction. BONES/JOINTS:  No acute fracture.  No dislocation. SOFT TISSUES:  Unremarkable. LYMPH NODES:  Unremarkable.  No enlarged lymph nodes. IMPRESSION: No pulmonary embolism or other acute finding in the chest. Electronically signed by:  Cyrus Chamorro MD  3/2/2023 8:35 PM CST Workstation: [id number]    CT Abdomen Pelvis With Contrast    Result Date: 3/2/2023  EXAM: CT Abdomen and Pelvis With Intravenous Contrast CLINICAL HISTORY: The patient is 47 years old and is Female; Bowel obstruction suspected TECHNIQUE: Axial computed tomography images of the abdomen and pelvis with intravenous contrast.  Sagittal and coronal reformatted images were created and reviewed.  This CT exam was performed using one or more of the following dose reduction techniques:  automated exposure control, adjustment of the mA and/or kV according to patient size, and/or use of iterative reconstruction technique. COMPARISON: No relevant prior studies available. FINDINGS: LUNG BASES:  Unremarkable.  No mass.  No consolidation. ABDOMEN: LIVER:  Low attenuation at the level of the falciform ligament is present suggesting fatty infiltration. A small cyst within the left hepatic lobe measuring approximately 1.1 cm is present. The liver is otherwise homogeneous. GALLBLADDER AND BILE DUCTS:  The gallbladder is distended. No calcified gallstones or ductal dilatation is seen. PANCREAS:  No ductal dilation.  No mass. SPLEEN:  Unremarkable. ADRENALS:  Unremarkable.  No mass. KIDNEYS AND URETERS:  Unremarkable. The kidneys enhance symmetrically. No obstructing renal or ureteral calculus is seen. No hydronephrosis or hydroureter. No perinephric fluid or stranding. STOMACH AND BOWEL:  The stomach is distended with fluid and food contents. The small bowel is relatively normal in caliber. Stool is present throughout colon. A few scattered colonic diverticula are noted without surrounding inflammation. There  is no mucosal thickening or evidence of obstruction. PELVIS: APPENDIX:  The appendix is normal in caliber without surrounding inflammation. BLADDER:  The bladder is well distended. REPRODUCTIVE:  Suggestion of uterine fibroids are noted. The ovaries are grossly unremarkable. ABDOMEN and PELVIS: INTRAPERITONEAL SPACE:  Unremarkable.  No free air.  No significant fluid collection. BONES/JOINTS:  No acute fracture. SOFT TISSUES:  The soft tissues are normal. VASCULATURE:  Unremarkable.  No abdominal aortic aneurysm. LYMPH NODES:  Unremarkable. No enlarged lymph nodes. IMPRESSION: 1.  No evidence of bowel obstruction. 2.  Mild colonic diverticulosis. Electronically signed by:  Saskia Coffey MD  3/2/2023 8:58 PM CST Workstation: [id number]    X-Ray Chest AP Portable    Result Date: 3/2/2023  Reason: Vomiting FINDINGS: Portable chest with comparison chest x-ray November 21, 2022 show normal cardiomediastinal silhouette. Left subclavian Port-A-Cath noted. Lungs are clear. Pulmonary vasculature is normal. No acute osseous abnormality. IMPRESSION: No acute cardiopulmonary abnormality. Electronically signed by:  Cyrus Larios DO  3/2/2023 3:48 PM CST Workstation: [id number]    MRI Breast w/wo Contrast, w/CAD, Bilateral    Result Date: 2/8/2023  EXAMINATION: MRI BREAST W/WO CONTRAST, W/CAD, BILATERAL CLINICAL HISTORY: Left breast cancer, left axillary tail mass, evaluate post chemotherapy. TECHNIQUE: CMS MANDATED QUALITY DATA - MAMMOGRAPHY - 225 Bilateral breast MRI was performed with a dedicated breast coil. The patient was placed prone in the breast immobilizer with light compression. The following localization sequences were obtained: Axial inversion recovery, axial 3-D non-fat-suppressed, axial 3-D fat suppressed pre-contrast, followed by axial 3-D fat-suppressed post-contrast dynamic images with 8.5 mL Gadavist IV contrast. Post-processing including subtraction imaging, reconstruction in the sagittal and coronal  planes, and MIP of both breasts was performed on an independent workstation. The interpretation was assisted by Computer Aided Detection. FINDINGS: Comparison to prior exams, including ultrasound of 11/08/2022 and MRI of 09/06/2022.  The breasts are composed of heterogeneous fibroglandular tissue.  Background parenchymal enhancement is mild. There are no suspicious enhancing masses or areas of suspicious non masslike enhancement in either breast.  Previous enhancing subdermal mass in the left breast axillary tail region has resolved, with resolution of previous enlarged left axillary lymph nodes.  No enlarged axillary lymph nodes or enlarged internal mammary chain lymph nodes. There is no breast skin thickening.  No focal signal abnormality or abnormal enhancement of the chest wall.     Resolution of previous left breast mass and previous enlarged left axillary lymph nodes, compatible with favorable response to therapy.  No new disease. BI-RADS CATEGORY 6: KNOWN MALIGNANCY. [facility] utilizes a reminder system to ensure that patients receive reminder letters for appointments. This includes reminders for routine screening mammograms, diagnostic mammograms, or other breast imaging interventions as appropriate. This patient will be placed in the appropriate reminder system. Electronically signed by: Franklin Rachel MD Date:    02/08/2023 Time:    14:03       Significant Imaging: I have reviewed all pertinent imaging results/findings within the past 24 hours.  I have reviewed and interpreted all pertinent imaging results/findings within the past 24 hours.  Review of Systems

## 2023-03-06 NOTE — ASSESSMENT & PLAN NOTE
20 meq k rider given today  To infuse 10meqs per hour  Monitor E-lyte  CMP in am  3.3  (Today) 3.3 2.7 2.9      Continue with K+ replacement  -improved (3/6)

## 2023-03-06 NOTE — PROGRESS NOTES
[facility] Medicine  Progress Note    Patient Name: Karyna Escoto  MRN: [id number]  Patient Class: IP- Inpatient   Admission Date: 3/2/2023  Length of Stay: 2 days  Attending Physician: Kiran May MD  Primary Care Provider: WOLF Iraheta        Subjective:     Principal Problem:Intractable nausea and vomiting        HPI:  Karyna Escoto is a 47 y.o. female with known history of triple negative breast cancer who is receiving chemotherapy, last dose was about a month ago.  Since that time she has had intractable nausea and vomiting.  She has been able to keep down very little PO.  She denies any syncopal episodes, but she is very weak and becomes short of breath with minimal exertion.  She denies any lower extremity edema or orthopnea.  Denies chest pain.  She has been taking zofran and phenergan at home with no relief. History was obtained from the patient and collateral obtained from the family present at the bedside and ER physician Sign-out. Patient denies change in vision, hearing, headache, fever, cough, congestion, runny nose, chest pain,  palpitations, constipation, dysuria, hematuria, joint pain and back pain.       Overview/Hospital Course:  No notes on file    Interval History:     03/06/23: doing well tolerating solid foods w/o nausea or vomiting. WBC trending downward. H&H dropped.    Latest Reference Range & Units 03/04/23 07:45 03/05/23 05:29 03/06/23 06:33   Hemoglobin 12.0 - 16.0 g/dL 10.0 (L) 9.1 (L) 7.5 (L)     Continue to monitor. Afebrile.     03/05/23: No nausea or vomiting in the past 24hr. Tolerating full liquids will advance for regular diet. NPO after midnight. If now problems over night resume regular diet. Hold TPN while eating.Afebrile. + Leukocytosis most likely from Neupogen    03/04/23: feeling much better. Nausea improved. Requesting diet. GI consult still pending. Placed on Neupogen by Hem/onc for pancytopenia. Afebrile. Continue K+  replacement    03/03/23:  Still complains of nausea but improved seen by hematologist currently on Clindamax will consult nutritionist for further recommendations.  Afebrile potassium replaced via K rider.  Tolerate liquids NPO after midnight for possible GI procedure in a.m.    Objective:     Vital Signs (Most Recent):  Temp: 98.9 °F (37.2 °C) (03/06/23 1111)  Pulse: 98 (03/06/23 1111)  Resp: 18 (03/06/23 1111)  BP: 95/63 (03/06/23 1111)  SpO2: 95 % (03/06/23 1111)   Vital Signs (24h Range):  Temp:  [97.5 °F (36.4 °C)-98.9 °F (37.2 °C)] 98.9 °F (37.2 °C)  Pulse:  [79-98] 98  Resp:  [18] 18  SpO2:  [92 %-97 %] 95 %  BP: ()/(57-74) 95/63     Weight: 72.6 kg (160 lb)  Body mass index is 29.26 kg/m².    Intake/Output Summary (Last 24 hours) at 3/6/2023 1423  Last data filed at 3/6/2023 0455  Gross per 24 hour   Intake 750 ml   Output --   Net 750 ml        Physical Exam  Constitutional:       Appearance: Normal appearance.   HENT:      Head: Normocephalic and atraumatic.      Comments: Alopecia     Nose: Nose normal.      Mouth/Throat:      Mouth: Mucous membranes are dry.   Eyes:      Pupils: Pupils are equal, round, and reactive to light.   Cardiovascular:      Rate and Rhythm: Normal rate and regular rhythm.      Pulses: Normal pulses.      Heart sounds: Normal heart sounds.   Pulmonary:      Effort: Pulmonary effort is normal.      Breath sounds: Normal breath sounds.   Abdominal:      General: Abdomen is flat. Bowel sounds are normal.      Palpations: Abdomen is soft.   Musculoskeletal:         General: Normal range of motion.      Cervical back: Neck supple.   Skin:     General: Skin is dry.   Neurological:      General: No focal deficit present.      Mental Status: She is alert and oriented to person, place, and time.   Psychiatric:         Mood and Affect: Mood normal.       Significant Labs: All pertinent labs within the past 24 hours have been reviewed.  A1C: No results for input(s): HGBA1C in the last  4320 hours.  ABGs: No results for input(s): PH, PCO2, HCO3, POCSATURATED, BE, TOTALHB, COHB, METHB, O2HB, POCFIO2, PO2 in the last 48 hours.  Bilirubin:   Recent Labs   Lab 03/02/23  1528 03/03/23  0525 03/04/23  0746 03/05/23  0529 03/06/23  0633   BILITOT 1.0 1.3* 0.5 0.4 0.3       Blood Culture:   No results for input(s): LABBLOO in the last 48 hours.    BMP:   Recent Labs   Lab 03/06/23 0633   *      K 3.6      CO2 27   BUN 9   CREATININE 0.6   CALCIUM 8.1*   MG 1.7       CBC:   Recent Labs   Lab 03/05/23 0529 03/06/23  0633   WBC 23.86* 16.16*   HGB 9.1* 7.5*   HCT 27.0* 22.4*    143*       CMP:   Recent Labs   Lab 03/05/23 0529 03/06/23  0633    140   K 3.9 3.6    106   CO2 27 27   * 140*   BUN 11 9   CREATININE 0.7 0.6   CALCIUM 8.6* 8.1*   PROT 5.8* 5.0*   ALBUMIN 3.3* 2.9*   BILITOT 0.4 0.3   ALKPHOS 34* 30*   AST 17 17   ALT 10 10   ANIONGAP 4* 7*       Cardiac Markers:   No results for input(s): CKMB, MYOGLOBIN, BNP, TROPISTAT in the last 48 hours.    Coagulation: No results for input(s): PT, INR, APTT in the last 48 hours.  Lactic Acid:   No results for input(s): LACTATE in the last 48 hours.    Lipase:   No results for input(s): LIPASE in the last 48 hours.    Lipid Panel:   Recent Labs   Lab 03/05/23  0529   TRIG 121       Magnesium:   Recent Labs   Lab 03/05/23 0529 03/06/23  0633   MG 1.9 1.7       Pathology Results  (Last 10 years)      None          POCT Glucose: No results for input(s): POCTGLUCOSE in the last 48 hours.  Prealbumin:   No results for input(s): PREALBUMIN in the last 48 hours.    Respiratory Culture: No results for input(s): GSRESP, RESPIRATORYC in the last 48 hours.  Troponin:   No results for input(s): TROPONINI, TROPONINIHS in the last 48 hours.    TSH:   Recent Labs   Lab 03/02/23  1528   TSH 3.120       Urine Culture: No results for input(s): LABURIN in the last 48 hours.  Urine Studies:   No results for input(s): COLORU,  APPEARANCEUA, PHUR, SPECGRAV, PROTEINUA, GLUCUA, KETONESU, BILIRUBINUA, OCCULTUA, NITRITE, UROBILINOGEN, LEUKOCYTESUR, RBCUA, WBCUA, BACTERIA, SQUAMEPITHEL, HYALINECASTS in the last 48 hours.    Invalid input(s): [employer]  CTA Chest Non-Coronary (PE Studies)    Result Date: 3/2/2023  EXAM: CT Angiography Chest With Intravenous Contrast CLINICAL HISTORY: The patient is 47 years old and is Female; Pulmonary embolism (PE) suspected, high prob TECHNIQUE: Axial computed tomographic angiography images of the chest with intravenous contrast.  Sagittal and coronal reformatted images were created and reviewed.  This CT exam was performed using one or more of the following dose reduction techniques:  automated exposure control, adjustment of the mA and/or kV according to patient size, and/or use of iterative reconstruction technique. MIP reconstructed images were created and reviewed. COMPARISON: No relevant prior studies available. FINDINGS: PULMONARY ARTERIES:  No pulmonary embolism. AORTA:  No acute findings.  No thoracic aortic aneurysm. LUNGS:  Unremarkable.  No mass.  No consolidation. PLEURAL SPACE:  Unremarkable.  No significant effusion.  No pneumothorax. HEART:  Unremarkable.  No cardiomegaly.  No significant pericardial effusion.  No evidence of RV dysfunction. BONES/JOINTS:  No acute fracture.  No dislocation. SOFT TISSUES:  Unremarkable. LYMPH NODES:  Unremarkable.  No enlarged lymph nodes. IMPRESSION: No pulmonary embolism or other acute finding in the chest. Electronically signed by:  Cyrus Chamorro MD  3/2/2023 8:35 PM CST Workstation: [id number]    CT Abdomen Pelvis With Contrast    Result Date: 3/2/2023  EXAM: CT Abdomen and Pelvis With Intravenous Contrast CLINICAL HISTORY: The patient is 47 years old and is Female; Bowel obstruction suspected TECHNIQUE: Axial computed tomography images of the abdomen and pelvis with intravenous contrast.  Sagittal and coronal reformatted images were created and  reviewed.  This CT exam was performed using one or more of the following dose reduction techniques:  automated exposure control, adjustment of the mA and/or kV according to patient size, and/or use of iterative reconstruction technique. COMPARISON: No relevant prior studies available. FINDINGS: LUNG BASES:  Unremarkable.  No mass.  No consolidation. ABDOMEN: LIVER:  Low attenuation at the level of the falciform ligament is present suggesting fatty infiltration. A small cyst within the left hepatic lobe measuring approximately 1.1 cm is present. The liver is otherwise homogeneous. GALLBLADDER AND BILE DUCTS:  The gallbladder is distended. No calcified gallstones or ductal dilatation is seen. PANCREAS:  No ductal dilation.  No mass. SPLEEN:  Unremarkable. ADRENALS:  Unremarkable.  No mass. KIDNEYS AND URETERS:  Unremarkable. The kidneys enhance symmetrically. No obstructing renal or ureteral calculus is seen. No hydronephrosis or hydroureter. No perinephric fluid or stranding. STOMACH AND BOWEL:  The stomach is distended with fluid and food contents. The small bowel is relatively normal in caliber. Stool is present throughout colon. A few scattered colonic diverticula are noted without surrounding inflammation. There is no mucosal thickening or evidence of obstruction. PELVIS: APPENDIX:  The appendix is normal in caliber without surrounding inflammation. BLADDER:  The bladder is well distended. REPRODUCTIVE:  Suggestion of uterine fibroids are noted. The ovaries are grossly unremarkable. ABDOMEN and PELVIS: INTRAPERITONEAL SPACE:  Unremarkable.  No free air.  No significant fluid collection. BONES/JOINTS:  No acute fracture. SOFT TISSUES:  The soft tissues are normal. VASCULATURE:  Unremarkable.  No abdominal aortic aneurysm. LYMPH NODES:  Unremarkable. No enlarged lymph nodes. IMPRESSION: 1.  No evidence of bowel obstruction. 2.  Mild colonic diverticulosis. Electronically signed by:  Saskia Coffey MD  3/2/2023 8:58  PM CST Workstation: [id number]    X-Ray Chest AP Portable    Result Date: 3/2/2023  Reason: Vomiting FINDINGS: Portable chest with comparison chest x-ray November 21, 2022 show normal cardiomediastinal silhouette. Left subclavian Port-A-Cath noted. Lungs are clear. Pulmonary vasculature is normal. No acute osseous abnormality. IMPRESSION: No acute cardiopulmonary abnormality. Electronically signed by:  Cyrus Larios DO  3/2/2023 3:48 PM CST Workstation: [id number]    MRI Breast w/wo Contrast, w/CAD, Bilateral    Result Date: 2/8/2023  EXAMINATION: MRI BREAST W/WO CONTRAST, W/CAD, BILATERAL CLINICAL HISTORY: Left breast cancer, left axillary tail mass, evaluate post chemotherapy. TECHNIQUE: CMS MANDATED QUALITY DATA - MAMMOGRAPHY - 225 Bilateral breast MRI was performed with a dedicated breast coil. The patient was placed prone in the breast immobilizer with light compression. The following localization sequences were obtained: Axial inversion recovery, axial 3-D non-fat-suppressed, axial 3-D fat suppressed pre-contrast, followed by axial 3-D fat-suppressed post-contrast dynamic images with 8.5 mL Gadavist IV contrast. Post-processing including subtraction imaging, reconstruction in the sagittal and coronal planes, and MIP of both breasts was performed on an independent workstation. The interpretation was assisted by Computer Aided Detection. FINDINGS: Comparison to prior exams, including ultrasound of 11/08/2022 and MRI of 09/06/2022.  The breasts are composed of heterogeneous fibroglandular tissue.  Background parenchymal enhancement is mild. There are no suspicious enhancing masses or areas of suspicious non masslike enhancement in either breast.  Previous enhancing subdermal mass in the left breast axillary tail region has resolved, with resolution of previous enlarged left axillary lymph nodes.  No enlarged axillary lymph nodes or enlarged internal mammary chain lymph nodes. There is no breast skin  thickening.  No focal signal abnormality or abnormal enhancement of the chest wall.     Resolution of previous left breast mass and previous enlarged left axillary lymph nodes, compatible with favorable response to therapy.  No new disease. BI-RADS CATEGORY 6: KNOWN MALIGNANCY. [facility] utilizes a reminder system to ensure that patients receive reminder letters for appointments. This includes reminders for routine screening mammograms, diagnostic mammograms, or other breast imaging interventions as appropriate. This patient will be placed in the appropriate reminder system. Electronically signed by: Franklin Rachel MD Date:    02/08/2023 Time:    14:03       Significant Imaging: I have reviewed all pertinent imaging results/findings within the past 24 hours.  I have reviewed and interpreted all pertinent imaging results/findings within the past 24 hours.  Review of Systems      Assessment/Plan:      * Intractable nausea and vomiting  Most likely from chemo   Seen by Oncologist  Recommends GI consult for possible upper endoscopy   Recommends possible TPN Nutritionist consult placed/ Started on Clinimix Today  Continue current antiemetic Rx  Improving    Tolerated full liquids will advance to solid food.   Hold on TPN for now  Tolerating solid food w/o Nausea or vomiting      Anemia due to and not concurrent with chemotherapy  H&H trending downward, no active bleeding,continue to monitor        Hemoglobin 7.5  (Today)  9.1 10.0 10.4    Hematocrit 22.4  (Today)  27.0 29.8 30.7         Chemotherapy-induced neutropenia  Started on Neupogen by Oncologist  WBC today   1.63  (Today) 1.63 3.78 3.99 2.22     Now with Leukocytosis most likely from Neupogen afebrile  23.86 High   1.63 Low Panic  CM  3.78 Low      03/06:  Improving    Latest Reference Range & Units 03/04/23 07:45 03/05/23 05:29 03/06/23 06:33   WBC 3.90 - 12.70 K/uL 1.63 (LL) 23.86 (H) 16.16 (H)       Hypokalemia  20 meq k rider given today  To  infuse 10meqs per hour  Monitor E-lyte  CMP in am  3.3  (Today) 3.3 2.7 2.9      Continue with K+ replacement  -improved (3/6)    Primary malignant neoplasm of breast  - Follow Dr. Peralta Hem/Onc  - plans is for surgery in around 2 weeks for breast mastectomy on March 17, 2023 follows Dr. Jonatan Raza at [place] [facility] at [facility] Dr. Brandon Barrios- Plastic Surgeon [place]       Left Breast Cancer-TRIPLE NEGATIVE          VTE Risk Mitigation (From admission, onward)         Ordered     enoxaparin injection 40 mg  Daily         03/02/23 2307     IP VTE HIGH RISK PATIENT  Once         03/02/23 2307     Place sequential compression device  Until discontinued         03/02/23 2307                Discharge Planning   PAUL: 3/6/2023     Code Status: Full Code   Is the patient medically ready for discharge?:     Reason for patient still in hospital (select all that apply): Patient trending condition and Laboratory test  Discharge Plan A: Home with family                  Kiara Walden NP  Department of Hospital Medicine   [facility]

## 2023-03-06 NOTE — DISCHARGE SUMMARY
Hospital  Discharge Summary  Patient Name: Karyna Escoto MRN: [id number]   Patient Class: IP- Inpatient  Length of Stay: 2   Admission Date: 3/2/2023  1:50 PM Attending Physician:Nnamdi May   Primary Care Provider: WOLF Iraheta Face-to-Face encounter date: 03/06/2023   Chief Complaint: Vomiting and Nausea (N/V x 4 weeks unrelieved with IV fluids and antiemetics at the infusion center. Breast cancer stage 3 on chemo last feb 2. )    Date of Discharge: 3/6/2023  Discharge Disposition:Home or Self Care  Home or Self Care  Condition: Stable       Reason for Hospitalization     Chemo induced neutropenia  Intractable Nausea with vomiting/ Chemo induced  Hypokalemia    Patient Active Problem List   Diagnosis    Left Breast Cancer-TRIPLE NEGATIVE    Transaminitis    Chemotherapy-induced neutropenia    Dehydration    Cancer associated pain    Infection of venous access port    Intractable nausea and vomiting    Primary malignant neoplasm of breast    Hypokalemia    Anemia due to and not concurrent with chemotherapy       Brief History of Present Illness    Karyna Escoto is a 47 y.o. yo female who  has a past medical history of Anxiety and Breast cancer (07/2022).. The patient presented to [facility] on 3/2/2023 with a primary complaint of Vomiting and Nausea (N/V x 4 weeks unrelieved with IV fluids and antiemetics at the infusion center. Breast cancer stage 3 on chemo last feb 2. )  .     For the full HPI please refer to the History & Physical from this admission.    Hospital Course By Problem with Pertinent Findings     48-year-old  female presents to the emergency room with complaints of intractable nausea and vomiting.  She was found to be anemic and neutropenic with severe hypokalemia.  She was admitted given antiemetics and broad-spectrum antibiotics.  GI was consulted but there was no need for EGD at the time of her evaluation as her symptoms improve with medications.   "She was given PPI and a nutritional consult was performed.  At the time of discharge the patient was tolerating solid food.  Her potassium had corrected her renal and hepatic functions were preserved.  She was able to walk around without any symptoms of weakness or dizziness.  She had no chest pain no black or bloody stools.  She was discharged in stable condition with her  and instructed to follow without hematologist as soon as possible.  She has an appointment already she was also instructed to follow with the primary care    Patient was seen and examined on the date of discharge and determined to be suitable for discharge.    Physical Exam  BP 97/69   Pulse 90   Temp 98.2 °F (36.8 °C)   Resp 18   Ht 5' 2" (1.575 m)   Wt 72.6 kg (160 lb)   SpO2 97%   Breastfeeding No   BMI 29.26 kg/m²   Vitals reviewed.    Constitutional: No distress.   HENT: Atraumatic.   Cardiovascular: Normal rate, regular rhythm and normal heart sounds.   Pulmonary/Chest: Effort normal. Clear to auscultation bilaterally. No wheezes.   Abdominal: Soft. Bowel sounds are normal. Exhibits no distension and no mass. No tenderness  Neurological: Alert.   Skin: Skin is warm and dry.     Following labs were Reviewed   Recent Labs   Lab 03/06/23  0633   WBC 16.16*   HGB 7.5*   HCT 22.4*   *   CALCIUM 8.1*   ALBUMIN 2.9*   PROT 5.0*      K 3.6   CO2 27      BUN 9   CREATININE 0.6   ALKPHOS 30*   ALT 10   AST 17   BILITOT 0.3     No results found for: POCTGLUCOSE     BMP: No results for input(s): GLU, NA, K, CL, CO2, BUN, CREATININE, CALCIUM, MG in the last 48 hours., CMP No results for input(s): NA, K, CL, CO2, GLU, BUN, CREATININE, CALCIUM, PROT, ALBUMIN, BILITOT, ALKPHOS, AST, ALT, ANIONGAP, ESTGFRAFRICA, EGFRNONAA in the last 48 hours., CBC No results for input(s): WBC, HGB, HCT, PLT in the last 48 hours., INR No results found for: INR, PROTIME, Lipid Panel   Lab Results   Component Value Date    TRIG 121 03/05/2023 "   , Troponin No results for input(s): TROPONINI in the last 168 hours., A1C: No results for input(s): HGBA1C in the last 4320 hours., and All labs within the past 24 hours have been reviewed    Microbiology Results (last 7 days)       Procedure Component Value Units Date/Time    Blood culture #1 **CANNOT BE ORDERED STAT** [[id number]] Collected: 03/02/23 1527    Order Status: Completed Specimen: Blood from Peripheral, Antecubital, Right Updated: 03/06/23 1632     Blood Culture, Routine No Growth to date      No Growth to date      No Growth to date      No Growth to date      No Growth to date    Blood culture #2 **CANNOT BE ORDERED STAT** [[id number]] Collected: 03/02/23 1709    Order Status: Completed Specimen: Blood from Peripheral, Forearm, Right Updated: 03/05/23 1832     Blood Culture, Routine No Growth to date      No Growth to date      No Growth to date      No Growth to date          CT Abdomen Pelvis With Contrast   Final Result      CTA Chest Non-Coronary (PE Studies)   Final Result      X-Ray Chest AP Portable   Final Result          No results found for this or any previous visit.      Consultants and Procedures   Consultants:  Consults (From admission, onward)          Status Ordering Provider     Inpatient consult to Gastroenterology  Once        Provider:  SAHRA Lanza MD    Completed NICK ALVAREZ     Inpatient consult to Hematology Oncology  Once        Provider:  Kiran Allred MD    Acknowledged MYRNA HAMPTON     Inpatient consult to Registered Dietitian/Nutritionist  Once        Provider:  (Not yet assigned)    DA Mcneal     Inpatient consult to Hospitalist  Once        Provider:  Jovita Resendiz MD    Acknowledged JOVITA RESENDIZ            Procedures: CTA Chest Non-Coronary (PE Studies)    Result Date: 3/2/2023  EXAM: CT Angiography Chest With Intravenous Contrast CLINICAL HISTORY: The patient is 47 years old and is Female; Pulmonary  embolism (PE) suspected, high prob TECHNIQUE: Axial computed tomographic angiography images of the chest with intravenous contrast.  Sagittal and coronal reformatted images were created and reviewed.  This CT exam was performed using one or more of the following dose reduction techniques:  automated exposure control, adjustment of the mA and/or kV according to patient size, and/or use of iterative reconstruction technique. MIP reconstructed images were created and reviewed. COMPARISON: No relevant prior studies available. FINDINGS: PULMONARY ARTERIES:  No pulmonary embolism. AORTA:  No acute findings.  No thoracic aortic aneurysm. LUNGS:  Unremarkable.  No mass.  No consolidation. PLEURAL SPACE:  Unremarkable.  No significant effusion.  No pneumothorax. HEART:  Unremarkable.  No cardiomegaly.  No significant pericardial effusion.  No evidence of RV dysfunction. BONES/JOINTS:  No acute fracture.  No dislocation. SOFT TISSUES:  Unremarkable. LYMPH NODES:  Unremarkable.  No enlarged lymph nodes. IMPRESSION: No pulmonary embolism or other acute finding in the chest. Electronically signed by:  Cyrus Chamorro MD  3/2/2023 8:35 PM CST Workstation: [id number]    CT Abdomen Pelvis With Contrast    Result Date: 3/2/2023  EXAM: CT Abdomen and Pelvis With Intravenous Contrast CLINICAL HISTORY: The patient is 47 years old and is Female; Bowel obstruction suspected TECHNIQUE: Axial computed tomography images of the abdomen and pelvis with intravenous contrast.  Sagittal and coronal reformatted images were created and reviewed.  This CT exam was performed using one or more of the following dose reduction techniques:  automated exposure control, adjustment of the mA and/or kV according to patient size, and/or use of iterative reconstruction technique. COMPARISON: No relevant prior studies available. FINDINGS: LUNG BASES:  Unremarkable.  No mass.  No consolidation. ABDOMEN: LIVER:  Low attenuation at the level of the falciform  ligament is present suggesting fatty infiltration. A small cyst within the left hepatic lobe measuring approximately 1.1 cm is present. The liver is otherwise homogeneous. GALLBLADDER AND BILE DUCTS:  The gallbladder is distended. No calcified gallstones or ductal dilatation is seen. PANCREAS:  No ductal dilation.  No mass. SPLEEN:  Unremarkable. ADRENALS:  Unremarkable.  No mass. KIDNEYS AND URETERS:  Unremarkable. The kidneys enhance symmetrically. No obstructing renal or ureteral calculus is seen. No hydronephrosis or hydroureter. No perinephric fluid or stranding. STOMACH AND BOWEL:  The stomach is distended with fluid and food contents. The small bowel is relatively normal in caliber. Stool is present throughout colon. A few scattered colonic diverticula are noted without surrounding inflammation. There is no mucosal thickening or evidence of obstruction. PELVIS: APPENDIX:  The appendix is normal in caliber without surrounding inflammation. BLADDER:  The bladder is well distended. REPRODUCTIVE:  Suggestion of uterine fibroids are noted. The ovaries are grossly unremarkable. ABDOMEN and PELVIS: INTRAPERITONEAL SPACE:  Unremarkable.  No free air.  No significant fluid collection. BONES/JOINTS:  No acute fracture. SOFT TISSUES:  The soft tissues are normal. VASCULATURE:  Unremarkable.  No abdominal aortic aneurysm. LYMPH NODES:  Unremarkable. No enlarged lymph nodes. IMPRESSION: 1.  No evidence of bowel obstruction. 2.  Mild colonic diverticulosis. Electronically signed by:  Saskia Coffey MD  3/2/2023 8:58 PM CST Workstation: [id number]    X-Ray Chest AP Portable    Result Date: 3/2/2023  Reason: Vomiting FINDINGS: Portable chest with comparison chest x-ray November 21, 2022 show normal cardiomediastinal silhouette. Left subclavian Port-A-Cath noted. Lungs are clear. Pulmonary vasculature is normal. No acute osseous abnormality. IMPRESSION: No acute cardiopulmonary abnormality. Electronically signed by:  Cyrus  Harriet DO  3/2/2023 3:48 PM [facility] Workstation: [id number]      Discharge Information:   Diet:  Resume regular diet/Cardiac diet/Diabetic Diet    Physical Activity:  Activity as tolerated    Instructions:  1. Take all medications as prescribed  2. Keep all follow-up appointments  3. Return to the hospital or call your primary care physicians if any worsening symptoms such as shortness of breath, chest pain intractable nausea with vomiting occur.      Follow-Up Appointments:  Please call your primary care physician to schedule an appointment in 1 week time.       Follow-up Information       Derek Patterson MD Follow up in 3 day(s).    Specialties: Hematology, Oncology, Hematology and Oncology  Why: call him tomorrow to confirm appointment time and date  Contact information:  [address]  [address]  [facility] [address]  [phone]               WOLF Iraheta. Schedule an appointment as soon as possible for a visit in 1 week(s).    Specialty: Nurse Practitioner  Contact information:  [address] [place] [address]  [place] [address] [phone]                               Pending laboratory work/Tests to be performed/followed by the Primary care Physician:    The patient was discharged in the care of her parents//wife/family/caregiver, with discharge instructions were reviewed in written and verbal form. All pertinent questions were discussed and prescriptions were provided. The importance of making follow up appointments and compliance of medications has been stressed repeatedly. The patient will follow up in 1 week or sooner as needed with the PCP, and the patient is on board with the plan. Upon discharge, patient needs to be on following medications.    Discharge Medications:     Medication List        CHANGE how you take these medications      promethazine 25 MG tablet  Commonly known as: PHENERGAN  Take 1 tablet (25 mg total) by mouth every 4 to 6 hours as needed.  What changed: Another  medication with the same name was removed. Continue taking this medication, and follow the directions you see here.            CONTINUE taking these medications      bisacodyL 5 mg EC tablet  Commonly known as: DULCOLAX     famotidine 10 MG tablet  Commonly known as: PEPCID     fluticasone propionate 50 mcg/actuation nasal spray  Commonly known as: FLONASE  1 spray (50 mcg total) by Each Nostril route once daily.     HYDROcodone-acetaminophen 5-325 mg per tablet  Commonly known as: NORCO  Take 1 tablet by mouth every 6 (six) hours as needed for Pain.     hydrOXYzine HCL 10 MG Tab  Commonly known as: ATARAX     IMODIUM A-D ORAL     Lactobacillus rhamnosus GG 10 billion cell capsule  Commonly known as: CULTURELLE     LIDOcaine-prilocaine cream  Commonly known as: EMLA  Apply topically as needed. Apply 30 mins prior to port access     ondansetron 8 MG tablet  Commonly known as: ZOFRAN  Take 1 tablet (8 mg total) by mouth every 8 (eight) hours as needed.     sertraline 50 MG tablet  Commonly known as: ZOLOFT     ZyrTEC 10 mg Cap  Generic drug: cetirizine                I spent 40 minutes preparing the discharge including reviewing records from previous encounters, preparation of discharge summary, assessing and final examination of the patient, discharge medicine reconciliation, discussing plan of care, follow up and education and prescriptions.       [facility] NP  03/06/2023

## 2023-03-06 NOTE — PLAN OF CARE
Problem: Adult Inpatient Plan of Care  Goal: Plan of Care Review  Outcome: Met  Goal: Patient-Specific Goal (Individualized)  Outcome: Met  Goal: Absence of Hospital-Acquired Illness or Injury  Outcome: Met  Goal: Optimal Comfort and Wellbeing  Outcome: Met  Goal: Readiness for Transition of Care  Outcome: Met     Problem: Infection  Goal: Absence of Infection Signs and Symptoms  Outcome: Met     Problem: Oral Intake Inadequate  Goal: Improved Oral Intake  Outcome: Met     Problem: Fall Injury Risk  Goal: Absence of Fall and Fall-Related Injury  Outcome: Met     Problem: Skin Injury Risk Increased  Goal: Skin Health and Integrity  Outcome: Met

## 2023-03-06 NOTE — ASSESSMENT & PLAN NOTE
H&H trending downward, no active bleeding,continue to monitor        Hemoglobin 7.5  (Today)  9.1 10.0 10.4    Hematocrit 22.4  (Today)  27.0 29.8 30.7

## 2023-03-07 LAB
BACTERIA BLD CULT: NORMAL
BACTERIA BLD CULT: NORMAL

## 2023-03-07 NOTE — PLAN OF CARE
03/07/23 1027   Final Note   Assessment Type Final Discharge Note   Anticipated Discharge Disposition Home   What phone number can be called within the next 1-3 days to see how you are doing after discharge? [phone]   Post-Acute Status   Coverage [employer]BS   Discharge Delays None known at this time     Discharge orders and chart reviewed with no further post-acute discharge needs identified at this time.  At this time, patient is cleared for discharge from Case Management standpoint.

## 2023-03-08 NOTE — PROGRESS NOTES
PROGRESS NOTE    Subjective:       Patient ID: Karyna Escoto is a 47 y.o. female.    6/9/2022:  Dx Mammo:  1.8cm mass in the left breast towards the left axilla.   Deep to the mass 2 lymph nodes are identified.     7/5/2022:  Left breast core biopsy:  Grade 2 IDCA with DCIS  ER: 0.03%, WI: 8.8%, HER2: 0  TRIPLE NEGATIVE    7/20/2022:  Left axilla LN needle biopsy:  Invasive ductal carcinoma  ER: 0%, WI: 40%, HER2 negative(0)    PLAN:  Neoadjuvant chemo/IO(Keynote 522)-surgery-radiation.     Pem/Tax/Carb:  Cycle 1: 8/11/2022  Cycle 2: 9/8/2022  Cycle 3: 10/6/2022  Cycle 4: 10/27/2022  Begin Shukri/Cytox/Pem  Cycle 5: 12/1/2022-----MUGA 8/10/2022-EF: 54%  Cycle 6: 12/22/2022  Cycle 7: 1/12/2023  Cycle 8: 2/2/2023 2/8/2023:  MRI:   Resolution of previous left breast mass and previous enlarged left axillary lymph nodes, compatible with favorable response to therapy.  No new disease.    Surgery planned 3/17/2023      8/3/2022 Photos:          8/23/2022 Photo:      10/4/2022 Photo:    Lesion is no longer palpable on 10/4/2022.     11/15/2022          Chief Complaint:  Triple negative breast cancer follow up.     History of Present Illness:   Karyna Escoto is a 47 y.o. female who presents for follow up of breast cancer.      Patient was in the hospital for nausea and vomiting and failure to thrive. She was placed on TPN temporarily. She is feeling much better today denies any nausea, vomiting, diarrhea, CP or SOB, She does complain of generalized pain and states her Norco is not helping. Cannot use Ultram due to her surgery on 3/17/2023.      She also complains of ble weakness and will not be able to self propel with a wheelchair after her bilateral mastectomy next week.      Family and Social history reviewed and is unchanged from 8/3/2022      ROS:  Review of Systems   Constitutional:  Positive for fatigue. Negative for appetite change, fever and  unexpected weight change.   HENT:  Negative for mouth sores.    Eyes:  Negative for visual disturbance.   Respiratory:  Negative for cough and shortness of breath.    Cardiovascular:  Negative for chest pain and leg swelling.   Gastrointestinal:  Negative for abdominal pain, blood in stool and diarrhea.   Genitourinary:  Positive for frequency. Negative for hematuria.   Musculoskeletal:  Negative for back pain.   Skin:  Negative for rash.   Neurological:  Positive for weakness and headaches.   Hematological:  Positive for adenopathy.   Psychiatric/Behavioral:  The patient is not nervous/anxious.         Current Outpatient Medications:     bisacodyL (DULCOLAX) 5 mg EC tablet, Take 5 mg by mouth daily as needed for Constipation., Disp: , Rfl:     cetirizine (ZYRTEC) 10 mg Cap, Take 1 tablet by mouth once daily., Disp: , Rfl:     famotidine (PEPCID) 10 MG tablet, Take 10 mg by mouth 2 (two) times daily., Disp: , Rfl:     fluticasone propionate (FLONASE) 50 mcg/actuation nasal spray, 1 spray (50 mcg total) by Each Nostril route once daily., Disp: 15.8 mL, Rfl: 5    hydrOXYzine HCL (ATARAX) 10 MG Tab, Take 10 mg by mouth 3 (three) times daily as needed., Disp: , Rfl:     Lactobacillus rhamnosus GG (CULTURELLE) 10 billion cell capsule, Take 1 capsule by mouth once daily., Disp: , Rfl:     LIDOcaine-prilocaine (EMLA) cream, Apply topically as needed. Apply 30 mins prior to port access, Disp: 30 g, Rfl: 5    loperamide HCl (IMODIUM A-D ORAL), Take 1 tablet by mouth daily as needed., Disp: , Rfl:     ondansetron (ZOFRAN) 8 MG tablet, Take 1 tablet (8 mg total) by mouth every 8 (eight) hours as needed., Disp: 30 tablet, Rfl: 5    promethazine (PHENERGAN) 25 MG tablet, Take 1 tablet (25 mg total) by mouth every 4 to 6 hours as needed., Disp: 30 tablet, Rfl: 5    sertraline (ZOLOFT) 50 MG tablet, Take 50 mg by mouth., Disp: , Rfl:     oxyCODONE-acetaminophen (PERCOCET)  mg per tablet, Take 1 tablet by mouth every 6 (six)  "hours as needed for Pain., Disp: 40 tablet, Rfl: 0        Objective:       Physical Examination:     /68   Pulse 98   Temp 97.9 °F (36.6 °C)   Resp 18   Ht 5' 2" (1.575 m)   Wt 80.3 kg (177 lb)   BMI 32.37 kg/m²     Physical Exam  Constitutional:       Appearance: Normal appearance. She is well-developed.   HENT:      Head: Normocephalic and atraumatic.      Right Ear: External ear normal.      Left Ear: External ear normal.      Nose: Nose normal.      Mouth/Throat:      Mouth: Mucous membranes are moist.   Eyes:      Conjunctiva/sclera: Conjunctivae normal.      Pupils: Pupils are equal, round, and reactive to light.   Neck:      Thyroid: No thyromegaly.      Trachea: No tracheal deviation.   Cardiovascular:      Rate and Rhythm: Normal rate and regular rhythm.      Heart sounds: Normal heart sounds.   Pulmonary:      Effort: Pulmonary effort is normal.      Breath sounds: Normal breath sounds.   Abdominal:      General: Bowel sounds are normal. There is no distension.      Palpations: Abdomen is soft. There is no mass.      Tenderness: There is no abdominal tenderness.   Musculoskeletal:      Right lower leg: No edema.      Left lower leg: No edema.   Skin:     General: Skin is warm and dry.      Findings: No rash.   Neurological:      General: No focal deficit present.      Mental Status: She is alert and oriented to person, place, and time.      Comments: Neuro intact througout   Psychiatric:         Mood and Affect: Mood normal.         Behavior: Behavior normal.         Thought Content: Thought content normal.         Judgment: Judgment normal.       Labs:   Recent Results (from the past 336 hour(s))   CBC Auto Differential    Collection Time: 03/09/23  9:52 AM   Result Value Ref Range    WBC 5.18 3.90 - 12.70 K/uL    Hemoglobin 9.5 (L) 12.0 - 16.0 g/dL    Hematocrit 29.0 (L) 37.0 - 48.5 %    Platelets 220 150 - 450 K/uL   CBC auto differential    Collection Time: 03/02/23  3:28 PM   Result Value " Ref Range    WBC 3.78 (L) 3.90 - 12.70 K/uL    Hemoglobin 10.4 (L) 12.0 - 16.0 g/dL    Hematocrit 30.7 (L) 37.0 - 48.5 %    Platelets 191 150 - 450 K/uL     CMP  Sodium   Date Value Ref Range Status   03/09/2023 136 136 - 145 mmol/L Final     Potassium   Date Value Ref Range Status   03/09/2023 3.3 (L) 3.5 - 5.1 mmol/L Final     Chloride   Date Value Ref Range Status   03/09/2023 100 95 - 110 mmol/L Final     CO2   Date Value Ref Range Status   03/09/2023 30 (H) 23 - 29 mmol/L Final     Glucose   Date Value Ref Range Status   03/09/2023 89 70 - 110 mg/dL Final     BUN   Date Value Ref Range Status   03/09/2023 10 6 - 20 mg/dL Final     Creatinine   Date Value Ref Range Status   03/09/2023 0.8 0.5 - 1.4 mg/dL Final     Calcium   Date Value Ref Range Status   03/09/2023 8.4 (L) 8.7 - 10.5 mg/dL Final     Total Protein   Date Value Ref Range Status   03/09/2023 5.4 (L) 6.0 - 8.4 g/dL Final     Albumin   Date Value Ref Range Status   03/09/2023 3.2 (L) 3.5 - 5.2 g/dL Final     Total Bilirubin   Date Value Ref Range Status   03/09/2023 0.3 0.1 - 1.0 mg/dL Final     Comment:     For infants and newborns, interpretation of results should be based  on gestational age, weight and in agreement with clinical  observations.    Premature Infant recommended reference ranges:  Up to 24 hours.............<8.0 mg/dL  Up to 48 hours............<12.0 mg/dL  3-5 days..................<15.0 mg/dL  6-29 days.................<15.0 mg/dL       Alkaline Phosphatase   Date Value Ref Range Status   03/09/2023 46 (L) 55 - 135 U/L Final     AST   Date Value Ref Range Status   03/09/2023 40 10 - 40 U/L Final     ALT   Date Value Ref Range Status   03/09/2023 28 10 - 44 U/L Final     Anion Gap   Date Value Ref Range Status   03/09/2023 6 (L) 8 - 16 mmol/L Final     No results found for: CEA  No results found for: PSA        Assessment/Plan:     Problem List Items Addressed This Visit          Oncology    Left Breast Cancer-TRIPLE NEGATIVE -  Primary    Relevant Medications    oxyCODONE-acetaminophen (PERCOCET)  mg per tablet    Other Relevant Orders    CBC Auto Differential (Completed)    CMP (Completed)    Ambulatory referral/consult to Radiation Oncology     Other Visit Diagnoses       Acute pain        Relevant Medications    oxyCODONE-acetaminophen (PERCOCET)  mg per tablet    Weakness of both lower extremities        Relevant Orders    WHEELCHAIR FOR HOME USE    Anemia in neoplastic disease              Triple Negative breast cancer- Hold treatment and proceed with Surgery 3/17 at [facility]; Return to [facility] 4 weeks after sx.  2. Cancer related acute pain- Start Percocet PRN  3. BLE weakness- Light weight Wheelchair for home use  4. Anemia- repeat labs today    Discussion:     Follow up in about 4 weeks (around 4/6/2023) for with Dr. Peralta.      Electronically signed by Ana Quigley, MSN, APRN, AGNP-C, OCN

## 2023-03-09 ENCOUNTER — LAB VISIT (OUTPATIENT)
Dept: LAB | Facility: HOSPITAL | Age: 48
End: 2023-03-09
Attending: NURSE PRACTITIONER
Payer: COMMERCIAL

## 2023-03-09 ENCOUNTER — OFFICE VISIT (OUTPATIENT)
Dept: HEMATOLOGY/ONCOLOGY | Facility: CLINIC | Age: 48
End: 2023-03-09
Payer: COMMERCIAL

## 2023-03-09 VITALS
SYSTOLIC BLOOD PRESSURE: 104 MMHG | BODY MASS INDEX: 32.57 KG/M2 | HEART RATE: 98 BPM | RESPIRATION RATE: 18 BRPM | TEMPERATURE: 98 F | HEIGHT: 62 IN | WEIGHT: 177 LBS | DIASTOLIC BLOOD PRESSURE: 68 MMHG

## 2023-03-09 DIAGNOSIS — C50.412 MALIGNANT NEOPLASM OF UPPER-OUTER QUADRANT OF LEFT BREAST IN FEMALE, ESTROGEN RECEPTOR NEGATIVE: ICD-10-CM

## 2023-03-09 DIAGNOSIS — R52 ACUTE PAIN: ICD-10-CM

## 2023-03-09 DIAGNOSIS — R29.898 WEAKNESS OF BOTH LOWER EXTREMITIES: ICD-10-CM

## 2023-03-09 DIAGNOSIS — D63.0 ANEMIA IN NEOPLASTIC DISEASE: ICD-10-CM

## 2023-03-09 DIAGNOSIS — C50.412 MALIGNANT NEOPLASM OF UPPER-OUTER QUADRANT OF LEFT BREAST IN FEMALE, ESTROGEN RECEPTOR NEGATIVE: Primary | ICD-10-CM

## 2023-03-09 DIAGNOSIS — Z17.1 MALIGNANT NEOPLASM OF UPPER-OUTER QUADRANT OF LEFT BREAST IN FEMALE, ESTROGEN RECEPTOR NEGATIVE: ICD-10-CM

## 2023-03-09 DIAGNOSIS — Z17.1 MALIGNANT NEOPLASM OF UPPER-OUTER QUADRANT OF LEFT BREAST IN FEMALE, ESTROGEN RECEPTOR NEGATIVE: Primary | ICD-10-CM

## 2023-03-09 LAB
ALBUMIN SERPL BCP-MCNC: 3.2 G/DL (ref 3.5–5.2)
ALP SERPL-CCNC: 46 U/L (ref 55–135)
ALT SERPL W/O P-5'-P-CCNC: 28 U/L (ref 10–44)
ANION GAP SERPL CALC-SCNC: 6 MMOL/L (ref 8–16)
AST SERPL-CCNC: 40 U/L (ref 10–40)
BASOPHILS # BLD AUTO: 0.02 K/UL (ref 0–0.2)
BASOPHILS NFR BLD: 0.4 % (ref 0–1.9)
BILIRUB SERPL-MCNC: 0.3 MG/DL (ref 0.1–1)
BUN SERPL-MCNC: 10 MG/DL (ref 6–20)
CALCIUM SERPL-MCNC: 8.4 MG/DL (ref 8.7–10.5)
CHLORIDE SERPL-SCNC: 100 MMOL/L (ref 95–110)
CO2 SERPL-SCNC: 30 MMOL/L (ref 23–29)
CREAT SERPL-MCNC: 0.8 MG/DL (ref 0.5–1.4)
DIFFERENTIAL METHOD: ABNORMAL
EOSINOPHIL # BLD AUTO: 0.1 K/UL (ref 0–0.5)
EOSINOPHIL NFR BLD: 2.7 % (ref 0–8)
ERYTHROCYTE [DISTWIDTH] IN BLOOD BY AUTOMATED COUNT: 16.6 % (ref 11.5–14.5)
EST. GFR  (NO RACE VARIABLE): >60 ML/MIN/1.73 M^2
GLUCOSE SERPL-MCNC: 89 MG/DL (ref 70–110)
HCT VFR BLD AUTO: 29 % (ref 37–48.5)
HGB BLD-MCNC: 9.5 G/DL (ref 12–16)
IMM GRANULOCYTES # BLD AUTO: 0.25 K/UL (ref 0–0.04)
IMM GRANULOCYTES NFR BLD AUTO: 4.8 % (ref 0–0.5)
LYMPHOCYTES # BLD AUTO: 1 K/UL (ref 1–4.8)
LYMPHOCYTES NFR BLD: 19.9 % (ref 18–48)
MCH RBC QN AUTO: 33.6 PG (ref 27–31)
MCHC RBC AUTO-ENTMCNC: 32.8 G/DL (ref 32–36)
MCV RBC AUTO: 103 FL (ref 82–98)
MONOCYTES # BLD AUTO: 0.9 K/UL (ref 0.3–1)
MONOCYTES NFR BLD: 17.4 % (ref 4–15)
NEUTROPHILS # BLD AUTO: 2.8 K/UL (ref 1.8–7.7)
NEUTROPHILS NFR BLD: 54.8 % (ref 38–73)
NRBC BLD-RTO: 0 /100 WBC
PLATELET # BLD AUTO: 220 K/UL (ref 150–450)
PMV BLD AUTO: 10.4 FL (ref 9.2–12.9)
POTASSIUM SERPL-SCNC: 3.3 MMOL/L (ref 3.5–5.1)
PROT SERPL-MCNC: 5.4 G/DL (ref 6–8.4)
RBC # BLD AUTO: 2.83 M/UL (ref 4–5.4)
SODIUM SERPL-SCNC: 136 MMOL/L (ref 136–145)
WBC # BLD AUTO: 5.18 K/UL (ref 3.9–12.7)

## 2023-03-09 PROCEDURE — 99214 PR OFFICE/OUTPT VISIT, EST, LEVL IV, 30-39 MIN: ICD-10-PCS | Mod: S$GLB,,, | Performed by: NURSE PRACTITIONER

## 2023-03-09 PROCEDURE — 1111F PR DISCHARGE MEDS RECONCILED W/ CURRENT OUTPATIENT MED LIST: ICD-10-PCS | Mod: CPTII,S$GLB,, | Performed by: NURSE PRACTITIONER

## 2023-03-09 PROCEDURE — 85025 COMPLETE CBC W/AUTO DIFF WBC: CPT | Performed by: NURSE PRACTITIONER

## 2023-03-09 PROCEDURE — 3078F PR MOST RECENT DIASTOLIC BLOOD PRESSURE < 80 MM HG: ICD-10-PCS | Mod: CPTII,S$GLB,, | Performed by: NURSE PRACTITIONER

## 2023-03-09 PROCEDURE — 3008F BODY MASS INDEX DOCD: CPT | Mod: CPTII,S$GLB,, | Performed by: NURSE PRACTITIONER

## 2023-03-09 PROCEDURE — 1159F PR MEDICATION LIST DOCUMENTED IN MEDICAL RECORD: ICD-10-PCS | Mod: CPTII,S$GLB,, | Performed by: NURSE PRACTITIONER

## 2023-03-09 PROCEDURE — 80053 COMPREHEN METABOLIC PANEL: CPT | Performed by: NURSE PRACTITIONER

## 2023-03-09 PROCEDURE — 1159F MED LIST DOCD IN RCRD: CPT | Mod: CPTII,S$GLB,, | Performed by: NURSE PRACTITIONER

## 2023-03-09 PROCEDURE — 1160F PR REVIEW ALL MEDS BY PRESCRIBER/CLIN PHARMACIST DOCUMENTED: ICD-10-PCS | Mod: CPTII,S$GLB,, | Performed by: NURSE PRACTITIONER

## 2023-03-09 PROCEDURE — 3008F PR BODY MASS INDEX (BMI) DOCUMENTED: ICD-10-PCS | Mod: CPTII,S$GLB,, | Performed by: NURSE PRACTITIONER

## 2023-03-09 PROCEDURE — 36415 COLL VENOUS BLD VENIPUNCTURE: CPT | Performed by: NURSE PRACTITIONER

## 2023-03-09 PROCEDURE — 3074F PR MOST RECENT SYSTOLIC BLOOD PRESSURE < 130 MM HG: ICD-10-PCS | Mod: CPTII,S$GLB,, | Performed by: NURSE PRACTITIONER

## 2023-03-09 PROCEDURE — 99214 OFFICE O/P EST MOD 30 MIN: CPT | Mod: S$GLB,,, | Performed by: NURSE PRACTITIONER

## 2023-03-09 PROCEDURE — 1160F RVW MEDS BY RX/DR IN RCRD: CPT | Mod: CPTII,S$GLB,, | Performed by: NURSE PRACTITIONER

## 2023-03-09 PROCEDURE — 3078F DIAST BP <80 MM HG: CPT | Mod: CPTII,S$GLB,, | Performed by: NURSE PRACTITIONER

## 2023-03-09 PROCEDURE — 1111F DSCHRG MED/CURRENT MED MERGE: CPT | Mod: CPTII,S$GLB,, | Performed by: NURSE PRACTITIONER

## 2023-03-09 PROCEDURE — 3074F SYST BP LT 130 MM HG: CPT | Mod: CPTII,S$GLB,, | Performed by: NURSE PRACTITIONER

## 2023-03-09 RX ORDER — OXYCODONE AND ACETAMINOPHEN 10; 325 MG/1; MG/1
1 TABLET ORAL EVERY 6 HOURS PRN
Qty: 40 TABLET | Refills: 0 | Status: SHIPPED | OUTPATIENT
Start: 2023-03-09 | End: 2023-04-04 | Stop reason: SDUPTHER

## 2023-03-13 ENCOUNTER — PATIENT MESSAGE (OUTPATIENT)
Dept: HEMATOLOGY/ONCOLOGY | Facility: CLINIC | Age: 48
End: 2023-03-13

## 2023-04-02 NOTE — PROGRESS NOTES
PROGRESS NOTE    Subjective:       Patient ID: Karyna Escoto is a 48 y.o. female.    6/9/2022:  Dx Mammo:  1.8cm mass in the left breast towards the left axilla.   Deep to the mass 2 lymph nodes are identified.     7/5/2022:  Left breast core biopsy:  Grade 2 IDCA with DCIS  ER: 0.03%, MA: 8.8%, HER2: 0  TRIPLE NEGATIVE    7/20/2022:  Left axilla LN needle biopsy:  Invasive ductal carcinoma  ER: 0%, MA: 40%, HER2 negative(0)    PLAN:  Neoadjuvant chemo/IO(Keynote 522)-surgery-radiation.     Pem/Tax/Carb:  Cycle 1: 8/11/2022  Cycle 2: 9/8/2022  Cycle 3: 10/6/2022  Cycle 4: 10/27/2022  Begin Shukri/Cytox/Pem  Cycle 5: 12/1/2022-----MUGA 8/10/2022-EF: 54%  Cycle 6: 12/22/2022  Cycle 7: 1/12/2023  Cycle 8: 2/2/2023 2/8/2023:  MRI:   Resolution of previous left breast mass and previous enlarged left axillary lymph nodes, compatible with favorable response to therapy.  No new disease.    Surgery planned 3/17/2023      8/3/2022 Photos:          8/23/2022 Photo:      10/4/2022 Photo:    Lesion is no longer palpable on 10/4/2022.     11/15/2022          Chief Complaint:  No chief complaint on file.    Triple negative breast cancer follow up.     History of Present Illness:   Karyna Escoto is a 48 y.o. female who presents for follow up of breast cancer.      Patient had surgery 3/17/2023 at [facility] and I was able to view the report on her phone which showed complete response.      Family and Social history reviewed and is unchanged from 8/3/2022      ROS:  Review of Systems   Constitutional:  Negative for appetite change, fever and unexpected weight change.   HENT:  Negative for mouth sores.    Eyes:  Negative for visual disturbance.   Respiratory:  Negative for cough and shortness of breath.    Cardiovascular:  Negative for chest pain and leg swelling.   Gastrointestinal:  Positive for diarrhea. Negative for abdominal pain and blood in stool.    Genitourinary:  Positive for frequency. Negative for hematuria.   Musculoskeletal:  Negative for back pain.   Skin:  Negative for rash.   Neurological:  Positive for headaches.   Hematological:  Positive for adenopathy.   Psychiatric/Behavioral:  The patient is not nervous/anxious.         Current Outpatient Medications:     bisacodyL (DULCOLAX) 5 mg EC tablet, Take 5 mg by mouth daily as needed for Constipation., Disp: , Rfl:     cetirizine (ZYRTEC) 10 mg Cap, Take 1 tablet by mouth once daily., Disp: , Rfl:     famotidine (PEPCID) 10 MG tablet, Take 10 mg by mouth 2 (two) times daily., Disp: , Rfl:     fluticasone propionate (FLONASE) 50 mcg/actuation nasal spray, 1 spray (50 mcg total) by Each Nostril route once daily., Disp: 15.8 mL, Rfl: 5    hydrOXYzine HCL (ATARAX) 10 MG Tab, Take 10 mg by mouth 3 (three) times daily as needed., Disp: , Rfl:     Lactobacillus rhamnosus GG (CULTURELLE) 10 billion cell capsule, Take 1 capsule by mouth once daily., Disp: , Rfl:     LIDOcaine-prilocaine (EMLA) cream, Apply topically as needed. Apply 30 mins prior to port access, Disp: 30 g, Rfl: 5    loperamide HCl (IMODIUM A-D ORAL), Take 1 tablet by mouth daily as needed., Disp: , Rfl:     methocarbamoL (ROBAXIN) 500 MG Tab, Take 1,000 mg by mouth 2 (two) times daily., Disp: , Rfl:     mupirocin (BACTROBAN) 2 % ointment, SMARTSI Application Topical 2-3 Times Daily, Disp: , Rfl:     ondansetron (ZOFRAN) 8 MG tablet, Take 1 tablet (8 mg total) by mouth every 8 (eight) hours as needed., Disp: 30 tablet, Rfl: 5    oxyCODONE-acetaminophen (PERCOCET)  mg per tablet, Take 1 tablet by mouth every 6 (six) hours as needed for Pain., Disp: 40 tablet, Rfl: 0    promethazine (PHENERGAN) 25 MG tablet, Take 1 tablet (25 mg total) by mouth every 4 to 6 hours as needed., Disp: 30 tablet, Rfl: 5    sertraline (ZOLOFT) 50 MG tablet, Take 50 mg by mouth., Disp: , Rfl:         Objective:       Physical Examination:     BP 93/63   Pulse  "101   Temp 97.3 °F (36.3 °C)   Resp 18   Ht 5' 2" (1.575 m)   Wt 76.2 kg (168 lb)   SpO2 98%   BMI 30.73 kg/m²     Physical Exam  Constitutional:       Appearance: She is well-developed.   HENT:      Head: Normocephalic and atraumatic.      Right Ear: External ear normal.      Left Ear: External ear normal.   Eyes:      Conjunctiva/sclera: Conjunctivae normal.      Pupils: Pupils are equal, round, and reactive to light.   Neck:      Thyroid: No thyromegaly.      Trachea: No tracheal deviation.   Cardiovascular:      Rate and Rhythm: Normal rate and regular rhythm.      Heart sounds: Normal heart sounds.   Pulmonary:      Effort: Pulmonary effort is normal.      Breath sounds: Normal breath sounds.   Abdominal:      General: Bowel sounds are normal. There is no distension.      Palpations: Abdomen is soft. There is no mass.      Tenderness: There is no abdominal tenderness.   Skin:     Findings: No rash.   Neurological:      Comments: Neuro intact througout   Psychiatric:         Behavior: Behavior normal.         Thought Content: Thought content normal.         Judgment: Judgment normal.       Labs:   No results found for this or any previous visit (from the past 336 hour(s)).    CMP  Sodium   Date Value Ref Range Status   03/09/2023 136 136 - 145 mmol/L Final     Potassium   Date Value Ref Range Status   03/09/2023 3.3 (L) 3.5 - 5.1 mmol/L Final     Chloride   Date Value Ref Range Status   03/09/2023 100 95 - 110 mmol/L Final     CO2   Date Value Ref Range Status   03/09/2023 30 (H) 23 - 29 mmol/L Final     Glucose   Date Value Ref Range Status   03/09/2023 89 70 - 110 mg/dL Final     BUN   Date Value Ref Range Status   03/09/2023 10 6 - 20 mg/dL Final     Creatinine   Date Value Ref Range Status   03/09/2023 0.8 0.5 - 1.4 mg/dL Final     Calcium   Date Value Ref Range Status   03/09/2023 8.4 (L) 8.7 - 10.5 mg/dL Final     Total Protein   Date Value Ref Range Status   03/09/2023 5.4 (L) 6.0 - 8.4 g/dL Final "     Albumin   Date Value Ref Range Status   03/09/2023 3.2 (L) 3.5 - 5.2 g/dL Final     Total Bilirubin   Date Value Ref Range Status   03/09/2023 0.3 0.1 - 1.0 mg/dL Final     Comment:     For infants and newborns, interpretation of results should be based  on gestational age, weight and in agreement with clinical  observations.    Premature Infant recommended reference ranges:  Up to 24 hours.............<8.0 mg/dL  Up to 48 hours............<12.0 mg/dL  3-5 days..................<15.0 mg/dL  6-29 days.................<15.0 mg/dL       Alkaline Phosphatase   Date Value Ref Range Status   03/09/2023 46 (L) 55 - 135 U/L Final     AST   Date Value Ref Range Status   03/09/2023 40 10 - 40 U/L Final     ALT   Date Value Ref Range Status   03/09/2023 28 10 - 44 U/L Final     Anion Gap   Date Value Ref Range Status   03/09/2023 6 (L) 8 - 16 mmol/L Final     No results found for: CEA  No results found for: PSA        Assessment/Plan:     Problem List Items Addressed This Visit       Left Breast Cancer-TRIPLE NEGATIVE     Patient has had a CR to therapy and as such, will only need pembro to complete a year.  She is BRCA negative and will not need parp or xeloda therapy and reviewed this as well.  Will try to begin 4/24 but she will check for clearance with her surgeon at [facility].              Discussion:     Follow up in about 6 weeks (around 5/15/2023).      Electronically signed by Derek Patterson

## 2023-04-03 ENCOUNTER — OFFICE VISIT (OUTPATIENT)
Dept: HEMATOLOGY/ONCOLOGY | Facility: CLINIC | Age: 48
End: 2023-04-03
Payer: COMMERCIAL

## 2023-04-03 ENCOUNTER — OFFICE VISIT (OUTPATIENT)
Dept: RADIATION ONCOLOGY | Facility: CLINIC | Age: 48
End: 2023-04-03
Payer: COMMERCIAL

## 2023-04-03 VITALS
TEMPERATURE: 97 F | OXYGEN SATURATION: 98 % | HEIGHT: 62 IN | RESPIRATION RATE: 18 BRPM | SYSTOLIC BLOOD PRESSURE: 93 MMHG | DIASTOLIC BLOOD PRESSURE: 63 MMHG | BODY MASS INDEX: 30.91 KG/M2 | WEIGHT: 168 LBS | HEART RATE: 101 BPM

## 2023-04-03 DIAGNOSIS — C50.412 MALIGNANT NEOPLASM OF UPPER-OUTER QUADRANT OF LEFT BREAST IN FEMALE, ESTROGEN RECEPTOR NEGATIVE: ICD-10-CM

## 2023-04-03 DIAGNOSIS — Z17.1 MALIGNANT NEOPLASM OF UPPER-OUTER QUADRANT OF LEFT BREAST IN FEMALE, ESTROGEN RECEPTOR NEGATIVE: ICD-10-CM

## 2023-04-03 PROCEDURE — 1159F PR MEDICATION LIST DOCUMENTED IN MEDICAL RECORD: ICD-10-PCS | Mod: CPTII,S$GLB,, | Performed by: INTERNAL MEDICINE

## 2023-04-03 PROCEDURE — 99215 PR OFFICE/OUTPT VISIT, EST, LEVL V, 40-54 MIN: ICD-10-PCS | Mod: S$GLB,,, | Performed by: RADIOLOGY

## 2023-04-03 PROCEDURE — 1159F PR MEDICATION LIST DOCUMENTED IN MEDICAL RECORD: ICD-10-PCS | Mod: CPTII,S$GLB,, | Performed by: RADIOLOGY

## 2023-04-03 PROCEDURE — 3074F PR MOST RECENT SYSTOLIC BLOOD PRESSURE < 130 MM HG: ICD-10-PCS | Mod: CPTII,S$GLB,, | Performed by: INTERNAL MEDICINE

## 2023-04-03 PROCEDURE — 99215 OFFICE O/P EST HI 40 MIN: CPT | Mod: S$GLB,,, | Performed by: RADIOLOGY

## 2023-04-03 PROCEDURE — 3074F SYST BP LT 130 MM HG: CPT | Mod: CPTII,S$GLB,, | Performed by: INTERNAL MEDICINE

## 2023-04-03 PROCEDURE — 99213 OFFICE O/P EST LOW 20 MIN: CPT | Mod: S$GLB,,, | Performed by: INTERNAL MEDICINE

## 2023-04-03 PROCEDURE — 1159F MED LIST DOCD IN RCRD: CPT | Mod: CPTII,S$GLB,, | Performed by: RADIOLOGY

## 2023-04-03 PROCEDURE — 1111F DSCHRG MED/CURRENT MED MERGE: CPT | Mod: CPTII,S$GLB,, | Performed by: INTERNAL MEDICINE

## 2023-04-03 PROCEDURE — 3078F PR MOST RECENT DIASTOLIC BLOOD PRESSURE < 80 MM HG: ICD-10-PCS | Mod: CPTII,S$GLB,, | Performed by: INTERNAL MEDICINE

## 2023-04-03 PROCEDURE — 1160F PR REVIEW ALL MEDS BY PRESCRIBER/CLIN PHARMACIST DOCUMENTED: ICD-10-PCS | Mod: CPTII,S$GLB,, | Performed by: RADIOLOGY

## 2023-04-03 PROCEDURE — 1159F MED LIST DOCD IN RCRD: CPT | Mod: CPTII,S$GLB,, | Performed by: INTERNAL MEDICINE

## 2023-04-03 PROCEDURE — 1111F DSCHRG MED/CURRENT MED MERGE: CPT | Mod: CPTII,S$GLB,, | Performed by: RADIOLOGY

## 2023-04-03 PROCEDURE — 1160F RVW MEDS BY RX/DR IN RCRD: CPT | Mod: CPTII,S$GLB,, | Performed by: RADIOLOGY

## 2023-04-03 PROCEDURE — 3078F DIAST BP <80 MM HG: CPT | Mod: CPTII,S$GLB,, | Performed by: INTERNAL MEDICINE

## 2023-04-03 PROCEDURE — 99213 PR OFFICE/OUTPT VISIT, EST, LEVL III, 20-29 MIN: ICD-10-PCS | Mod: S$GLB,,, | Performed by: INTERNAL MEDICINE

## 2023-04-03 PROCEDURE — 1111F PR DISCHARGE MEDS RECONCILED W/ CURRENT OUTPATIENT MED LIST: ICD-10-PCS | Mod: CPTII,S$GLB,, | Performed by: RADIOLOGY

## 2023-04-03 PROCEDURE — 3008F PR BODY MASS INDEX (BMI) DOCUMENTED: ICD-10-PCS | Mod: CPTII,S$GLB,, | Performed by: INTERNAL MEDICINE

## 2023-04-03 PROCEDURE — 3008F BODY MASS INDEX DOCD: CPT | Mod: CPTII,S$GLB,, | Performed by: INTERNAL MEDICINE

## 2023-04-03 PROCEDURE — 1111F PR DISCHARGE MEDS RECONCILED W/ CURRENT OUTPATIENT MED LIST: ICD-10-PCS | Mod: CPTII,S$GLB,, | Performed by: INTERNAL MEDICINE

## 2023-04-03 RX ORDER — MUPIROCIN 20 MG/G
OINTMENT TOPICAL
COMMUNITY
Start: 2023-03-16

## 2023-04-03 RX ORDER — METHOCARBAMOL 500 MG/1
1000 TABLET, FILM COATED ORAL 2 TIMES DAILY
COMMUNITY
Start: 2023-03-15 | End: 2024-02-01

## 2023-04-03 NOTE — ASSESSMENT & PLAN NOTE
Patient has had a CR to therapy and as such, will only need pembro to complete a year.  She is BRCA negative and will not need parp or xeloda therapy and reviewed this as well.  Will try to begin 4/24 but she will check for clearance [facility].

## 2023-04-03 NOTE — PROGRESS NOTES
Karyna Escoto  [id number]  1975  4/3/2023  Ana Quigley, Lexie-c  [address]  [address]  [place] [address]    REASON FOR CONSULTATION: IIA, [id number] g2 IDC of UOQ L breast, ER(-)/NH(+)/HER2(-) converted to ypT0N0(sn)    TREATMENT GOAL: adjuvant    HISTORY OF PRESENT ILLNESS:   Karyna Escoto is a 48 y.o. female who presented with left axillary bruising with diagnostic mammogram and ultrasound noting 1.8cm hypoechoic mass in the axilla with extension to the skin and adjacent lymph nodes with small fatty rena.  Biopsy from the left breast returned grade 2 IDC, LVSI(-), PNI(-); ER(-),NH+ 8.9%, HER2(-) and from the L axilla: grade 2 IDC ER(-),NH+ 40%, HER2(-).  She was seen by Lolis Arevalo and Norma and expressed preference for bilateral mastectomy with reconstruction, recommended for tissue expanders.    Patient was seen by Dr. Patterson and is planned for neoadjuvant chemo/immunotherapy.  She was referred to Dr. Murphy for port placement and presented to discuss radiotherapeutic options 8/5/22:    Because of her clinical lymph node positive disease and several high risk features including young age and ER(-), I do recommend adjuvant treatment of the left chest wall and draining lymphatics to include axilla 1-3, supraclavicular fossa, internal mammary lymph node chain with demonstrated survival benefit per EBCTCG meta-analysis and EORTC 47008.    BRCA: (-) with VUS detected  MRI:  1.2 cm mass in left axillary tail with abnormal left level 1 lymphadenopathy with questionable marrow changes in sternum, ultimately negative on PET-CT.     She was placed on chemo regimen and completed keytruda + carbotaxol x 4c followed by AC x 4c with post treatment MRI consistent with CR.  She was recently hospitalized for nausea and vomiting with failure to thrive and was placed on TPN briefly.    MRI breast interpretation at [place] noted CR with resolution of left level 1 axillary lymph node.    She underwent bilateral  mastectomy with left sentinel lymph node biopsy with Dr. Raza at [facility], 03/16/2023:   - L breast: ALH   - R breast: ALH   - 0/5 LNs   - TE's placed by Dr. Barrios    She returns to discuss adjuvant radiotherapy (concurrent Keytruda planned).       Patient reports being very fatigued.  She denies fever, chills, chest pain, shortness of breath, cough hemoptysis.  She endorses numbness of the bilateral chest wall and denies swelling of the left upper extremity.    Review of systems otherwise negative unless indicated in HPI.    Past Medical History:   Diagnosis Date    Anxiety     Breast cancer 07/2022     Past Surgical History:   Procedure Laterality Date    BREAST BIOPSY Left 07/2022    eye lid surgery Bilateral 1990    INSERTION OF TUNNELED CENTRAL VENOUS CATHETER (CVC) WITH SUBCUTANEOUS PORT Left 11/21/2022    Procedure: [id number];  Surgeon: Oscar Murphy III, MD;  Location: [facility];  Service: General;  Laterality: Left;    NOSE SURGERY  1990    PORTAC[facility]    TONSILLECTOMY  1990    TUBAL LIGATION  2013     Social History     Socioeconomic History    Marital status:    Tobacco Use    Smoking status: Former    Tobacco comments:     Quit 2005-13 year history -1 daily   Substance and Sexual Activity    Alcohol use: Not Currently     Comment: occasional    Sexual activity: Yes     Social Determinants of Health     Financial Resource Strain: Low Risk     Difficulty of Paying Living Expenses: Not hard at all   Food Insecurity: No Food Insecurity    Worried About Running Out of Food in the Last Year: Never true    Ran Out of Food in the Last Year: Never true   Transportation Needs: No Transportation Needs    Lack of Transportation (Medical): No    Lack of Transportation (Non-Medical): No   Physical Activity: Insufficiently Active    Days of Exercise per Week: 3 days    Minutes of Exercise per Session: 30 min   Stress: Stress Concern Present    Feeling of Stress  : To some extent   Social Connections: Moderately Isolated    Frequency of Communication with Friends and Family: More than three times a week    Frequency of Social Gatherings with Friends and Family: More than three times a week    Attends Buddhism Services: Never    Active Member of Clubs or Organizations: No    Attends Club or Organization Meetings: Never    Marital Status:    Housing Stability: Low Risk     Unable to Pay for Housing in the Last Year: No    Number of Places Lived in the Last Year: 1    Unstable Housing in the Last Year: No     Family History   Problem Relation Age of Onset    Breast cancer Maternal Grandmother     Prostate cancer Maternal Grandfather        PRIOR HISTORY OF CHEMOTHERAPY OR RADIOTHERAPY: Please see HPI for patients prior oncologic history.    Medication List with Changes/Refills   Current Medications    BISACODYL (DULCOLAX) 5 MG EC TABLET    Take 5 mg by mouth daily as needed for Constipation.    CETIRIZINE (ZYRTEC) 10 MG CAP    Take 1 tablet by mouth once daily.    FAMOTIDINE (PEPCID) 10 MG TABLET    Take 10 mg by mouth 2 (two) times daily.    FLUTICASONE PROPIONATE (FLONASE) 50 MCG/ACTUATION NASAL SPRAY    1 spray (50 mcg total) by Each Nostril route once daily.    HYDROXYZINE HCL (ATARAX) 10 MG TAB    Take 10 mg by mouth 3 (three) times daily as needed.    LACTOBACILLUS RHAMNOSUS GG (CULTURELLE) 10 BILLION CELL CAPSULE    Take 1 capsule by mouth once daily.    LIDOCAINE-PRILOCAINE (EMLA) CREAM    Apply topically as needed. Apply 30 mins prior to port access    LOPERAMIDE HCL (IMODIUM A-D ORAL)    Take 1 tablet by mouth daily as needed.    ONDANSETRON (ZOFRAN) 8 MG TABLET    Take 1 tablet (8 mg total) by mouth every 8 (eight) hours as needed.    OXYCODONE-ACETAMINOPHEN (PERCOCET)  MG PER TABLET    Take 1 tablet by mouth every 6 (six) hours as needed for Pain.    PROMETHAZINE (PHENERGAN) 25 MG TABLET    Take 1 tablet (25 mg total) by mouth every 4 to 6 hours as  needed.    SERTRALINE (ZOLOFT) 50 MG TABLET    Take 50 mg by mouth.     Review of patient's allergies indicates:   Allergen Reactions    Taxol [paclitaxel] Rash       QUALITY OF LIFE: 90%- Able to Carry on Normal Activity: Minor Symptoms of Disease    There were no vitals filed for this visit.  There is no height or weight on file to calculate BMI.    PHYSICAL EXAM:   GENERAL: alert; in no apparent distress.   HEAD: normocephalic, atraumatic. Alopecia  EYES: pupils are equal, round, reactive to light and accommodation. Sclera anicteric. Conjunctiva not injected.   NOSE/THROAT: no nasal erythema or rhinorrhea. Oropharynx pink, without erythema, ulcerations or thrush.   NECK: no cervical motion rigidity; supple with no masses.    CHEST: Patient is speaking comfortably on room air with normal work of breathing without using accessory muscles of respiration.  CARDIOVASCULAR: regular rate and rhythm  ABDOMEN: soft, nontender, nondistended.   MUSCULOSKELETAL: no tenderness to palpation along the spine or scapulae. Normal range of motion.  NEUROLOGIC: cranial nerves II-XII intact bilaterally. Strength 5/5 in bilateral upper and lower extremities. No sensory deficits appreciated. Normal gait.  LYMPHATIC: no axillary adenopathy appreciated bilaterally.   EXTREMITIES: no clubbing, cyanosis, edema.  SKIN: no erythema, rashes, ulcerations noted.   CW:   Incisions with active scabbing, no cellulitis, fluctuance or seroma.  Bilateral drains in place with minimal output.  Nontender exam.  No nodularity or signs of residual disease.    REVIEW OF IMAGING/PATHOLOGY/LABS: Please see HPI. All images reviewed personally by dictating physician.     ASSESSMENT: Karyna Escoto is a 48 y.o. female with stage IIA, [id number] g2 IDC of UOQ L breast, ER(-)/CA(+)/HER2(-) converted to ypT0N0(sn)  PLAN:  Karyna Escoto  returns after completion of neoadjuvant chemo regimen resulting in complete response at bilateral mastectomy with tissue  expanders in place.   She has plans to return to [facility] for serial saline expansions.  Medical oncology will be resuming her Keytruda regimen. Today I reiterated my recommendation for adjuvant radiotherapy  based on her clinical N+ presentation and high risk features.  My recommendation is 5040 cGy to the left chest wall and draining lymphatics including axilla 1-3, supraclavicular fossa, internal mammary lymph node chain using 3D conformal techniques with deep inspiratory breath protocol to spare the underlying lung and heart.  Treatment will be done concurrent with Keytruda.    I carefully explained the process of simulation and treatment delivery with weekly physician visits. Patient wishes to proceed.     We discussed the risks and benefits of the above treatment and have gone over in detail the acute and late toxicities of radiation therapy to the L CW + RNI. The patient expressed  understanding and has signed a consent form which is included in the patients chart.      The patient has our contact information and understands that they are free to contact us at any time with questions or concerns regarding radiation therapy.     DISPOSITION: RTC FOR CT SIM after TE expansion     I have personally seen and evaluated this patient with a high complexity diagnosis.      Greater than 60 minutes were dedicated to reviewing/interpreting pertinent laboratory/imaging/pathology as well as prior consultations; reviewing and performing history and physical; counseling patient on oncologic recommendations; documentation in the electronic medical record including ordering of additional tests and/or radiation treatment protocol; and coordination of care with physicians with referrals placed as appropriate.     COVID-19 precautions and Cancer Center policy discussed.      PHYSICIAN: John Bass Jr, MD    Thank you for the opportunity to meet and consult with Karyna Escoto.   Please feel free to contact me to  discuss the above recommendation further.

## 2023-04-03 NOTE — Clinical Note
Ney- Still needs TE expansion prior to RT, but will notify us once expanded to start RT. Concurrent keytruda ok. Thanks!

## 2023-04-04 DIAGNOSIS — Z17.1 MALIGNANT NEOPLASM OF UPPER-OUTER QUADRANT OF LEFT BREAST IN FEMALE, ESTROGEN RECEPTOR NEGATIVE: ICD-10-CM

## 2023-04-04 DIAGNOSIS — R52 ACUTE PAIN: ICD-10-CM

## 2023-04-04 DIAGNOSIS — C50.412 MALIGNANT NEOPLASM OF UPPER-OUTER QUADRANT OF LEFT BREAST IN FEMALE, ESTROGEN RECEPTOR NEGATIVE: ICD-10-CM

## 2023-04-04 RX ORDER — OXYCODONE AND ACETAMINOPHEN 10; 325 MG/1; MG/1
1 TABLET ORAL EVERY 6 HOURS PRN
Qty: 40 TABLET | Refills: 0 | Status: SHIPPED | OUTPATIENT
Start: 2023-04-04 | End: 2023-06-14 | Stop reason: SDUPTHER

## 2023-04-10 ENCOUNTER — PATIENT MESSAGE (OUTPATIENT)
Dept: HEMATOLOGY/ONCOLOGY | Facility: CLINIC | Age: 48
End: 2023-04-10

## 2023-04-12 ENCOUNTER — TELEPHONE (OUTPATIENT)
Dept: HEMATOLOGY/ONCOLOGY | Facility: CLINIC | Age: 48
End: 2023-04-12

## 2023-04-12 RX ORDER — ONDANSETRON 2 MG/ML
8 INJECTION INTRAMUSCULAR; INTRAVENOUS ONCE
Status: CANCELLED
Start: 2023-04-12 | End: 2023-04-12

## 2023-04-12 RX ORDER — HEPARIN 100 UNIT/ML
500 SYRINGE INTRAVENOUS
Status: CANCELLED | OUTPATIENT
Start: 2023-04-12

## 2023-04-12 RX ORDER — SODIUM CHLORIDE 0.9 % (FLUSH) 0.9 %
10 SYRINGE (ML) INJECTION
Status: CANCELLED | OUTPATIENT
Start: 2023-04-12

## 2023-04-12 NOTE — TELEPHONE ENCOUNTER
IVF and nausea meds ordered to be infused at [facility] today. Orders have been faxed over. Pt is aware and is heading over there now.

## 2023-04-21 ENCOUNTER — PATIENT MESSAGE (OUTPATIENT)
Dept: RADIATION ONCOLOGY | Facility: CLINIC | Age: 48
End: 2023-04-21

## 2023-05-12 ENCOUNTER — PATIENT MESSAGE (OUTPATIENT)
Dept: HEMATOLOGY/ONCOLOGY | Facility: CLINIC | Age: 48
End: 2023-05-12

## 2023-05-12 ENCOUNTER — TELEPHONE (OUTPATIENT)
Dept: HEMATOLOGY/ONCOLOGY | Facility: CLINIC | Age: 48
End: 2023-05-12

## 2023-05-12 DIAGNOSIS — Z90.13 STATUS POST MASTECTOMY, BILATERAL: ICD-10-CM

## 2023-05-12 DIAGNOSIS — F32.9 REACTIVE DEPRESSION: ICD-10-CM

## 2023-05-12 DIAGNOSIS — R63.0 DECREASE IN APPETITE: ICD-10-CM

## 2023-05-12 DIAGNOSIS — R29.898 WEAKNESS OF BOTH LEGS: ICD-10-CM

## 2023-05-12 DIAGNOSIS — C50.919 PRIMARY MALIGNANT NEOPLASM OF BREAST, UNSPECIFIED LATERALITY: Primary | ICD-10-CM

## 2023-05-12 RX ORDER — DEXAMETHASONE 2 MG/1
2 TABLET ORAL
Qty: 30 TABLET | Refills: 1 | Status: SHIPPED | OUTPATIENT
Start: 2023-05-12 | End: 2024-02-01

## 2023-05-12 NOTE — TELEPHONE ENCOUNTER
----- Message from Virginia Rico sent at 5/12/2023 11:01 AM CDT -----  The patient's  Chi called and said the patient was sick about a week ago. He said she has no energy, sits in her chair all day, does not do anything. He said he would like to discuss possibly getting a referral for psychiatric care for her. # [phone]

## 2023-05-12 NOTE — TELEPHONE ENCOUNTER
Spoke with the patient and her . She continue to have nausea and is not eating well. She is also being sedentary and patients  concerned regarding her not moving much. Discussed a referral to PT and starting low dose Dex to help with appetite, nausea and fatigue. Will also send a referral for psychology to help with coping/possible depression.

## 2023-05-24 ENCOUNTER — OFFICE VISIT (OUTPATIENT)
Dept: HEMATOLOGY/ONCOLOGY | Facility: CLINIC | Age: 48
End: 2023-05-24
Payer: COMMERCIAL

## 2023-05-24 VITALS
SYSTOLIC BLOOD PRESSURE: 126 MMHG | DIASTOLIC BLOOD PRESSURE: 67 MMHG | HEART RATE: 74 BPM | HEIGHT: 62 IN | RESPIRATION RATE: 16 BRPM | WEIGHT: 165 LBS | BODY MASS INDEX: 30.36 KG/M2 | TEMPERATURE: 98 F

## 2023-05-24 DIAGNOSIS — C50.412 MALIGNANT NEOPLASM OF UPPER-OUTER QUADRANT OF LEFT BREAST IN FEMALE, ESTROGEN RECEPTOR NEGATIVE: ICD-10-CM

## 2023-05-24 DIAGNOSIS — R63.0 POOR APPETITE: ICD-10-CM

## 2023-05-24 DIAGNOSIS — Z17.1 MALIGNANT NEOPLASM OF UPPER-OUTER QUADRANT OF LEFT BREAST IN FEMALE, ESTROGEN RECEPTOR NEGATIVE: ICD-10-CM

## 2023-05-24 DIAGNOSIS — G89.3 CANCER ASSOCIATED PAIN: ICD-10-CM

## 2023-05-24 PROCEDURE — 99214 PR OFFICE/OUTPT VISIT, EST, LEVL IV, 30-39 MIN: ICD-10-PCS | Mod: S$GLB,,, | Performed by: INTERNAL MEDICINE

## 2023-05-24 PROCEDURE — 3078F DIAST BP <80 MM HG: CPT | Mod: CPTII,S$GLB,, | Performed by: INTERNAL MEDICINE

## 2023-05-24 PROCEDURE — 1159F PR MEDICATION LIST DOCUMENTED IN MEDICAL RECORD: ICD-10-PCS | Mod: CPTII,S$GLB,, | Performed by: INTERNAL MEDICINE

## 2023-05-24 PROCEDURE — 3078F PR MOST RECENT DIASTOLIC BLOOD PRESSURE < 80 MM HG: ICD-10-PCS | Mod: CPTII,S$GLB,, | Performed by: INTERNAL MEDICINE

## 2023-05-24 PROCEDURE — 3008F PR BODY MASS INDEX (BMI) DOCUMENTED: ICD-10-PCS | Mod: CPTII,S$GLB,, | Performed by: INTERNAL MEDICINE

## 2023-05-24 PROCEDURE — 3008F BODY MASS INDEX DOCD: CPT | Mod: CPTII,S$GLB,, | Performed by: INTERNAL MEDICINE

## 2023-05-24 PROCEDURE — 1159F MED LIST DOCD IN RCRD: CPT | Mod: CPTII,S$GLB,, | Performed by: INTERNAL MEDICINE

## 2023-05-24 PROCEDURE — 3074F PR MOST RECENT SYSTOLIC BLOOD PRESSURE < 130 MM HG: ICD-10-PCS | Mod: CPTII,S$GLB,, | Performed by: INTERNAL MEDICINE

## 2023-05-24 PROCEDURE — 99214 OFFICE O/P EST MOD 30 MIN: CPT | Mod: S$GLB,,, | Performed by: INTERNAL MEDICINE

## 2023-05-24 PROCEDURE — 3074F SYST BP LT 130 MM HG: CPT | Mod: CPTII,S$GLB,, | Performed by: INTERNAL MEDICINE

## 2023-05-24 RX ORDER — HYDROCODONE BITARTRATE AND ACETAMINOPHEN 5; 325 MG/1; MG/1
1 TABLET ORAL EVERY 6 HOURS PRN
COMMUNITY
Start: 2023-01-27 | End: 2023-06-14

## 2023-05-24 NOTE — ASSESSMENT & PLAN NOTE
Patient is doing much better at this time.  Is on Dex and has gained some wt but feeling much stronger.  Will continue this for now but consider wean after another few weeks of this.

## 2023-05-24 NOTE — ASSESSMENT & PLAN NOTE
Patient is doing much better and I feel she can resume the pembrolizumab.  Labs look ok and will begin this next week.  Will have her back with NP in 3 weeks and me in 6.

## 2023-05-24 NOTE — PROGRESS NOTES
PROGRESS NOTE    Subjective:       Patient ID: Karyna Escoto is a 48 y.o. female.    6/9/2022:  Dx Mammo:  1.8cm mass in the left breast towards the left axilla.   Deep to the mass 2 lymph nodes are identified.     7/5/2022:  Left breast core biopsy:  Grade 2 IDCA with DCIS  ER: 0.03%, AK: 8.8%, HER2: 0  TRIPLE NEGATIVE    7/20/2022:  Left axilla LN needle biopsy:  Invasive ductal carcinoma  ER: 0%, AK: 40%, HER2 negative(0)    PLAN:  Neoadjuvant chemo/IO(Keynote 522)-surgery-radiation.     Pem/Tax/Carb:  Cycle 1: 8/11/2022  Cycle 2: 9/8/2022  Cycle 3: 10/6/2022  Cycle 4: 10/27/2022  Begin Shukri/Cytox/Pem  Cycle 5: 12/1/2022-----MUGA 8/10/2022-EF: 54%  Cycle 6: 12/22/2022  Cycle 7: 1/12/2023  Cycle 8: 2/2/2023    Surgery 3/17/2023-CR!!  Bilateral nipple sparing mastectomy  Right: atypical lobular hyperplasia, no carcinoma  Left:  no residual carcinoma or dcis seen, SLN neg x 5,   pT0N0    Cycle 9: 4/24/2023-due    2/8/2023:  MRI:   Resolution of previous left breast mass and previous enlarged left axillary lymph nodes, compatible with favorable response to therapy.  No new disease.    Surgery  3/17/2023 at [facility]: complete response to chemotherapy      8/3/2022 Photos:          8/23/2022 Photo:      10/4/2022 Photo:    Lesion is no longer palpable on 10/4/2022.     11/15/2022          Chief Complaint:  No chief complaint on file.    Triple negative breast cancer follow up.     History of Present Illness:   Karyna Escoto is a 48 y.o. female who presents for follow up of breast cancer.      Ms. Escoto is doing well now that she is on steroids.  Eating much better and has gained some wt.  She feels ready to begin IO therapy again.     Family and Social history reviewed and is unchanged from 8/3/2022      ROS:  Review of Systems   Constitutional:  Negative for appetite change, fever and unexpected weight change.   HENT:  Negative for mouth sores.     Eyes:  Negative for visual disturbance.   Respiratory:  Negative for cough and shortness of breath.    Cardiovascular:  Negative for chest pain and leg swelling.   Gastrointestinal:  Positive for diarrhea. Negative for abdominal pain and blood in stool.   Genitourinary:  Positive for frequency. Negative for hematuria.   Musculoskeletal:  Negative for back pain.   Skin:  Negative for rash.   Neurological:  Positive for headaches.   Hematological:  Positive for adenopathy.   Psychiatric/Behavioral:  The patient is not nervous/anxious.         Current Outpatient Medications:     bisacodyL (DULCOLAX) 5 mg EC tablet, Take 5 mg by mouth daily as needed for Constipation., Disp: , Rfl:     cetirizine (ZYRTEC) 10 mg Cap, Take 1 tablet by mouth once daily., Disp: , Rfl:     dexAMETHasone (DECADRON) 2 MG tablet, Take 1 tablet (2 mg total) by mouth daily with breakfast., Disp: 30 tablet, Rfl: 1    famotidine (PEPCID) 10 MG tablet, Take 10 mg by mouth 2 (two) times daily., Disp: , Rfl:     fluticasone propionate (FLONASE) 50 mcg/actuation nasal spray, 1 spray (50 mcg total) by Each Nostril route once daily., Disp: 15.8 mL, Rfl: 5    HYDROcodone-acetaminophen (NORCO) 5-325 mg per tablet, Take 1 tablet by mouth every 6 (six) hours as needed., Disp: , Rfl:     hydrOXYzine HCL (ATARAX) 10 MG Tab, Take 10 mg by mouth 3 (three) times daily as needed., Disp: , Rfl:     Lactobacillus rhamnosus GG (CULTURELLE) 10 billion cell capsule, Take 1 capsule by mouth once daily., Disp: , Rfl:     LIDOcaine-prilocaine (EMLA) cream, Apply topically as needed. Apply 30 mins prior to port access, Disp: 30 g, Rfl: 5    loperamide HCl (IMODIUM A-D ORAL), Take 1 tablet by mouth daily as needed., Disp: , Rfl:     methocarbamoL (ROBAXIN) 500 MG Tab, Take 1,000 mg by mouth 2 (two) times daily., Disp: , Rfl:     mupirocin (BACTROBAN) 2 % ointment, SMARTSI Application Topical 2-3 Times Daily, Disp: , Rfl:     ondansetron (ZOFRAN) 8 MG tablet, Take 1  "tablet (8 mg total) by mouth every 8 (eight) hours as needed., Disp: 30 tablet, Rfl: 5    oxyCODONE-acetaminophen (PERCOCET)  mg per tablet, Take 1 tablet by mouth every 6 (six) hours as needed for Pain., Disp: 40 tablet, Rfl: 0    promethazine (PHENERGAN) 25 MG tablet, Take 1 tablet (25 mg total) by mouth every 4 to 6 hours as needed., Disp: 30 tablet, Rfl: 5    sertraline (ZOLOFT) 50 MG tablet, Take 50 mg by mouth., Disp: , Rfl:         Objective:       Physical Examination:     /67   Pulse 74   Temp 98.4 °F (36.9 °C)   Resp 16   Ht 5' 2" (1.575 m)   Wt 74.8 kg (165 lb)   BMI 30.18 kg/m²     Physical Exam  Constitutional:       Appearance: She is well-developed.   HENT:      Head: Normocephalic and atraumatic.      Right Ear: External ear normal.      Left Ear: External ear normal.   Eyes:      Conjunctiva/sclera: Conjunctivae normal.      Pupils: Pupils are equal, round, and reactive to light.   Neck:      Thyroid: No thyromegaly.      Trachea: No tracheal deviation.   Cardiovascular:      Rate and Rhythm: Normal rate and regular rhythm.      Heart sounds: Normal heart sounds.   Pulmonary:      Effort: Pulmonary effort is normal.      Breath sounds: Normal breath sounds.   Abdominal:      General: Bowel sounds are normal. There is no distension.      Palpations: Abdomen is soft. There is no mass.      Tenderness: There is no abdominal tenderness.   Skin:     Findings: No rash.   Neurological:      Comments: Neuro intact througout   Psychiatric:         Behavior: Behavior normal.         Thought Content: Thought content normal.         Judgment: Judgment normal.       Labs:   No results found for this or any previous visit (from the past 336 hour(s)).    CMP  Sodium   Date Value Ref Range Status   03/09/2023 136 136 - 145 mmol/L Final     Potassium   Date Value Ref Range Status   03/09/2023 3.3 (L) 3.5 - 5.1 mmol/L Final     Chloride   Date Value Ref Range Status   03/09/2023 100 95 - 110 mmol/L " Final     CO2   Date Value Ref Range Status   03/09/2023 30 (H) 23 - 29 mmol/L Final     Glucose   Date Value Ref Range Status   03/09/2023 89 70 - 110 mg/dL Final     BUN   Date Value Ref Range Status   03/09/2023 10 6 - 20 mg/dL Final     Creatinine   Date Value Ref Range Status   03/09/2023 0.8 0.5 - 1.4 mg/dL Final     Calcium   Date Value Ref Range Status   03/09/2023 8.4 (L) 8.7 - 10.5 mg/dL Final     Total Protein   Date Value Ref Range Status   03/09/2023 5.4 (L) 6.0 - 8.4 g/dL Final     Albumin   Date Value Ref Range Status   03/09/2023 3.2 (L) 3.5 - 5.2 g/dL Final     Total Bilirubin   Date Value Ref Range Status   03/09/2023 0.3 0.1 - 1.0 mg/dL Final     Comment:     For infants and newborns, interpretation of results should be based  on gestational age, weight and in agreement with clinical  observations.    Premature Infant recommended reference ranges:  Up to 24 hours.............<8.0 mg/dL  Up to 48 hours............<12.0 mg/dL  3-5 days..................<15.0 mg/dL  6-29 days.................<15.0 mg/dL       Alkaline Phosphatase   Date Value Ref Range Status   03/09/2023 46 (L) 55 - 135 U/L Final     AST   Date Value Ref Range Status   03/09/2023 40 10 - 40 U/L Final     ALT   Date Value Ref Range Status   03/09/2023 28 10 - 44 U/L Final     Anion Gap   Date Value Ref Range Status   03/09/2023 6 (L) 8 - 16 mmol/L Final     No results found for: CEA  No results found for: PSA        Assessment/Plan:     Problem List Items Addressed This Visit       Poor appetite     Patient is doing much better at this time.  Is on Dex and has gained some wt but feeling much stronger.  Will continue this for now but consider wean after another few weeks of this.            Left Breast Cancer-TRIPLE NEGATIVE     Patient is doing much better and I feel she can resume the pembrolizumab.  Labs look ok and will begin this next week.  Will have her back with NP in 3 weeks and me in 6.            Cancer associated pain      Patient is doing ok.  Intermittent pain episodes noted.  Continue current care approach.             Discussion:     Follow up in about 3 weeks (around 6/14/2023) for NP visit and 6 with me.      Electronically signed by Derek aPtterson

## 2023-05-29 ENCOUNTER — OFFICE VISIT (OUTPATIENT)
Dept: RADIATION ONCOLOGY | Facility: CLINIC | Age: 48
End: 2023-05-29
Payer: COMMERCIAL

## 2023-05-29 DIAGNOSIS — C50.412 MALIGNANT NEOPLASM OF UPPER-OUTER QUADRANT OF LEFT BREAST IN FEMALE, ESTROGEN RECEPTOR NEGATIVE: Primary | ICD-10-CM

## 2023-05-29 DIAGNOSIS — Z17.1 MALIGNANT NEOPLASM OF UPPER-OUTER QUADRANT OF LEFT BREAST IN FEMALE, ESTROGEN RECEPTOR NEGATIVE: Primary | ICD-10-CM

## 2023-05-29 PROCEDURE — 1159F PR MEDICATION LIST DOCUMENTED IN MEDICAL RECORD: ICD-10-PCS | Mod: CPTII,S$GLB,, | Performed by: RADIOLOGY

## 2023-05-29 PROCEDURE — 1160F PR REVIEW ALL MEDS BY PRESCRIBER/CLIN PHARMACIST DOCUMENTED: ICD-10-PCS | Mod: CPTII,S$GLB,, | Performed by: RADIOLOGY

## 2023-05-29 PROCEDURE — 1159F MED LIST DOCD IN RCRD: CPT | Mod: CPTII,S$GLB,, | Performed by: RADIOLOGY

## 2023-05-29 PROCEDURE — 99215 OFFICE O/P EST HI 40 MIN: CPT | Mod: 25,S$GLB,, | Performed by: RADIOLOGY

## 2023-05-29 PROCEDURE — 99215 PR OFFICE/OUTPT VISIT, EST, LEVL V, 40-54 MIN: ICD-10-PCS | Mod: 25,S$GLB,, | Performed by: RADIOLOGY

## 2023-05-29 PROCEDURE — 1160F RVW MEDS BY RX/DR IN RCRD: CPT | Mod: CPTII,S$GLB,, | Performed by: RADIOLOGY

## 2023-05-29 NOTE — PROGRESS NOTES
Karyna Escoto  [id number]  1975  5/29/2023  No referring provider defined for this encounter.    REASON FOR CONSULTATION: IIA, [id number] g2 IDC of UOQ L breast, ER(-)/MT(+)/HER2(-) converted to ypT0N0(sn)    TREATMENT GOAL: adjuvant    HISTORY OF PRESENT ILLNESS:   Karyna Escoto is a 48 y.o. female who presented with left axillary bruising with diagnostic mammogram and ultrasound noting 1.8cm hypoechoic mass in the axilla with extension to the skin and adjacent lymph nodes with small fatty rena.  Biopsy from the left breast returned grade 2 IDC, LVSI(-), PNI(-); ER(-),MT+ 8.9%, HER2(-) and from the L axilla: grade 2 IDC ER(-),MT+ 40%, HER2(-).  She was seen by Lolis Arevalo and Norma and expressed preference for bilateral mastectomy with reconstruction, recommended for tissue expanders.    Patient was seen by Dr. Patterson and is planned for neoadjuvant chemo/immunotherapy.  She was referred to Dr. Murphy for port placement and presented to discuss radiotherapeutic options 8/5/22:    Because of her clinical lymph node positive disease and several high risk features including young age and ER(-), I do recommend adjuvant treatment of the left chest wall and draining lymphatics to include axilla 1-3, supraclavicular fossa, internal mammary lymph node chain with demonstrated survival benefit per EBCTCG meta-analysis and EORTC 67365.    BRCA: (-) with VUS detected  MRI:  1.2 cm mass in left axillary tail with abnormal left level 1 lymphadenopathy with questionable marrow changes in sternum, ultimately negative on PET-CT.     She was placed on chemo regimen and completed keytruda + carbotaxol x 4c followed by AC x 4c with post treatment MRI consistent with CR.  She was recently hospitalized for nausea and vomiting with failure to thrive and was placed on TPN briefly.    MRI breast interpretation at [place] noted CR with resolution of left level 1 axillary lymph node.    She underwent bilateral mastectomy with left  sentinel lymph node biopsy with Dr. Raza at [place], 03/16/2023:   - L breast: ALH   - R breast: ALH   - 0/5 LNs   - TE's placed by Dr. Barrios    She returned to discuss adjuvant radiotherapy with concurrent Keytruda planned on April 3, 2023.  I recommended continued serial saline expansions followed by adjuvant radiotherapy encompassing the lymphatic distribution due to her clinical N+ disease.  We discussed DIBH.  She continues to follow with Dr. Patterson and recently was placed on Decadron for poor appetite and energy.    Reports energy and appetite are improving on steroids.  Unfortunately she has developed some insomnia.  She denies fever, chills, chest pain, shortness of breath or cough.  Reports good range of motion without swelling of the left upper extremity.  Tissue expanders are tight.    Review of systems otherwise negative unless indicated in HPI.    Past Medical History:   Diagnosis Date    Anxiety     Breast cancer 07/2022     Past Surgical History:   Procedure Laterality Date    BREAST BIOPSY Left 07/2022    eye lid surgery Bilateral 1990    INSERTION OF TUNNELED CENTRAL VENOUS CATHETER (CVC) WITH SUBCUTANEOUS PORT Left 11/21/2022    Procedure: [id number];  Surgeon: Oscar Murphy III, MD;  Location: [facility];  Service: General;  Laterality: Left;    NOSE SURGERY  1990    PORTACATH PLACEMENT  08/2022    [facility]    TONSILLECTOMY  1990    TUBAL LIGATION  2013     Social History     Socioeconomic History    Marital status:    Tobacco Use    Smoking status: Former    Tobacco comments:     Quit 2005-13 year history -1 daily   Substance and Sexual Activity    Alcohol use: Not Currently     Comment: occasional    Sexual activity: Yes     Social Determinants of Health     Financial Resource Strain: Low Risk     Difficulty of Paying Living Expenses: Not hard at all   Food Insecurity: No Food Insecurity    Worried About Running Out of Food in the Last Year: Never true    Ran Out  of Food in the Last Year: Never true   Transportation Needs: No Transportation Needs    Lack of Transportation (Medical): No    Lack of Transportation (Non-Medical): No   Physical Activity: Insufficiently Active    Days of Exercise per Week: 3 days    Minutes of Exercise per Session: 30 min   Stress: Stress Concern Present    Feeling of Stress : To some extent   Social Connections: Moderately Isolated    Frequency of Communication with Friends and Family: More than three times a week    Frequency of Social Gatherings with Friends and Family: More than three times a week    Attends Congregational Services: Never    Active Member of Clubs or Organizations: No    Attends Club or Organization Meetings: Never    Marital Status:    Housing Stability: Low Risk     Unable to Pay for Housing in the Last Year: No    Number of Places Lived in the Last Year: 1    Unstable Housing in the Last Year: No     Family History   Problem Relation Age of Onset    Breast cancer Maternal Grandmother     Prostate cancer Maternal Grandfather        PRIOR HISTORY OF CHEMOTHERAPY OR RADIOTHERAPY: Please see HPI for patients prior oncologic history.    Medication List with Changes/Refills   Current Medications    BISACODYL (DULCOLAX) 5 MG EC TABLET    Take 5 mg by mouth daily as needed for Constipation.    CETIRIZINE (ZYRTEC) 10 MG CAP    Take 1 tablet by mouth once daily.    DEXAMETHASONE (DECADRON) 2 MG TABLET    Take 1 tablet (2 mg total) by mouth daily with breakfast.    FAMOTIDINE (PEPCID) 10 MG TABLET    Take 10 mg by mouth 2 (two) times daily.    FLUTICASONE PROPIONATE (FLONASE) 50 MCG/ACTUATION NASAL SPRAY    1 spray (50 mcg total) by Each Nostril route once daily.    HYDROCODONE-ACETAMINOPHEN (NORCO) 5-325 MG PER TABLET    Take 1 tablet by mouth every 6 (six) hours as needed.    HYDROXYZINE HCL (ATARAX) 10 MG TAB    Take 10 mg by mouth 3 (three) times daily as needed.    LACTOBACILLUS RHAMNOSUS GG (CULTURELLE) 10 BILLION CELL  CAPSULE    Take 1 capsule by mouth once daily.    LIDOCAINE-PRILOCAINE (EMLA) CREAM    Apply topically as needed. Apply 30 mins prior to port access    LOPERAMIDE HCL (IMODIUM A-D ORAL)    Take 1 tablet by mouth daily as needed.    METHOCARBAMOL (ROBAXIN) 500 MG TAB    Take 1,000 mg by mouth 2 (two) times daily.    MUPIROCIN (BACTROBAN) 2 % OINTMENT    SMARTSI Application Topical 2-3 Times Daily    ONDANSETRON (ZOFRAN) 8 MG TABLET    Take 1 tablet (8 mg total) by mouth every 8 (eight) hours as needed.    OXYCODONE-ACETAMINOPHEN (PERCOCET)  MG PER TABLET    Take 1 tablet by mouth every 6 (six) hours as needed for Pain.    PROMETHAZINE (PHENERGAN) 25 MG TABLET    Take 1 tablet (25 mg total) by mouth every 4 to 6 hours as needed.    SERTRALINE (ZOLOFT) 50 MG TABLET    Take 50 mg by mouth.     Review of patient's allergies indicates:   Allergen Reactions    Taxol [paclitaxel] Rash       QUALITY OF LIFE: 90%- Able to Carry on Normal Activity: Minor Symptoms of Disease    There were no vitals filed for this visit.  There is no height or weight on file to calculate BMI.    PHYSICAL EXAM:   GENERAL: alert; in no apparent distress.   HEAD: normocephalic, atraumatic.  EYES: pupils are equal, round, reactive to light and accommodation. Sclera anicteric. Conjunctiva not injected.   NOSE/THROAT: no nasal erythema or rhinorrhea. Oropharynx pink, without erythema, ulcerations or thrush.   NECK: no cervical motion rigidity; supple with no masses.    CHEST: Patient is speaking comfortably on room air with normal work of breathing without using accessory muscles of respiration.  CARDIOVASCULAR: regular rate and rhythm  ABDOMEN: soft, nontender, nondistended.   MUSCULOSKELETAL: no tenderness to palpation along the spine or scapulae. Normal range of motion.  NEUROLOGIC: cranial nerves II-XII intact bilaterally. Strength 5/5 in bilateral upper and lower extremities. No sensory deficits appreciated. Normal gait.  LYMPHATIC: no  left axillary adenopathy appreciated.    EXTREMITIES: no clubbing, cyanosis, edema.  SKIN: no erythema, rashes, ulcerations noted.   CW: B CW incisions clean dry and intact with minimal fibrosis.  No scabbing.  No cellulitis or fluctuance.  Nontender exam.  Saline expansion complete.    REVIEW OF IMAGING/PATHOLOGY/LABS: Please see HPI. All images reviewed personally by dictating physician.     ASSESSMENT: Karyna Escoto is a 48 y.o. female with stage IIA, [id number] g2 IDC of UOQ L breast, ER(-)/IA(+)/HER2(-) converted to ypT0N0(sn)  PLAN:  Karyna Escoto returns after completion of saline expansion.  She is planned to resume Keytruda with Dr. Patterson.  We again discussed recommendation for adjuvant radiotherapy encompassing the left chest wall and draining lymphatics using 3D conformal techniques and deep inspiratory breath protocol to a total dose of 5040 cGy.  Plan to use custom bolus to bring the dose to the skin surface.    Consider 1 mg Decadron to minimize insomnia.    I carefully explained the process of simulation and treatment delivery with weekly physician visits. Patient wishes to proceed.     We discussed the risks and benefits of the above treatment and have gone over in detail the acute and late toxicities of radiation therapy to the L CW + RNI. The patient expressed  understanding and has signed a consent form which is included in the patients chart.      The patient has our contact information and understands that they are free to contact us at any time with questions or concerns regarding radiation therapy.     DISPOSITION: RTC FOR CT SIM     I have personally seen and evaluated this patient with a high complexity diagnosis.      Greater than 60 minutes were dedicated to reviewing/interpreting pertinent laboratory/imaging/pathology as well as prior consultations; reviewing and performing history and physical; counseling patient on oncologic recommendations; documentation in the electronic  medical record including ordering of additional tests and/or radiation treatment protocol; and coordination of care with physicians with referrals placed as appropriate.    PHYSICIAN: John Bass Jr, MD    Thank you for the opportunity to meet and consult with Karyna Escoto.   Please feel free to contact me to discuss the above recommendation further.

## 2023-06-06 ENCOUNTER — TREATMENT (OUTPATIENT)
Dept: RADIATION ONCOLOGY | Facility: CLINIC | Age: 48
End: 2023-06-06
Payer: COMMERCIAL

## 2023-06-06 PROCEDURE — G6013 PR RADN TX DELIVERY, 11-19 MEV, >= 3 TX AREAS: ICD-10-PCS | Mod: S$GLB,,, | Performed by: RADIOLOGY

## 2023-06-06 PROCEDURE — 77427 PR CHG RADIATION,MANGEMENT,5 TX'S: ICD-10-PCS | Mod: S$GLB,,, | Performed by: RADIOLOGY

## 2023-06-06 PROCEDURE — G6017 INTRAFRACTION TRACK MOTION: HCPCS | Mod: S$GLB,,, | Performed by: RADIOLOGY

## 2023-06-06 PROCEDURE — 77427 RADIATION TX MANAGEMENT X5: CPT | Mod: S$GLB,,, | Performed by: RADIOLOGY

## 2023-06-06 PROCEDURE — 77417 THER RADIOLOGY PORT IMAGE(S): CPT | Mod: S$GLB,,, | Performed by: RADIOLOGY

## 2023-06-06 PROCEDURE — 77417 PR 77417 - TX RADIOLOGY PORT IMAGE(S): ICD-10-PCS | Mod: S$GLB,,, | Performed by: RADIOLOGY

## 2023-06-06 PROCEDURE — G6013 RADIATION TREATMENT DELIVERY: HCPCS | Mod: S$GLB,,, | Performed by: RADIOLOGY

## 2023-06-06 PROCEDURE — G6017 PR INTRAFRACTION TRACK MOTION, EACH FRACTION OF TX: ICD-10-PCS | Mod: S$GLB,,, | Performed by: RADIOLOGY

## 2023-06-13 ENCOUNTER — TREATMENT (OUTPATIENT)
Dept: RADIATION ONCOLOGY | Facility: CLINIC | Age: 48
End: 2023-06-13
Payer: COMMERCIAL

## 2023-06-13 PROCEDURE — 77417 PR 77417 - TX RADIOLOGY PORT IMAGE(S): ICD-10-PCS | Mod: S$GLB,,, | Performed by: RADIOLOGY

## 2023-06-13 PROCEDURE — G6013 PR RADN TX DELIVERY, 11-19 MEV, >= 3 TX AREAS: ICD-10-PCS | Mod: S$GLB,,, | Performed by: RADIOLOGY

## 2023-06-13 PROCEDURE — G6013 RADIATION TREATMENT DELIVERY: HCPCS | Mod: S$GLB,,, | Performed by: RADIOLOGY

## 2023-06-13 PROCEDURE — 77417 THER RADIOLOGY PORT IMAGE(S): CPT | Mod: S$GLB,,, | Performed by: RADIOLOGY

## 2023-06-13 PROCEDURE — 77427 PR CHG RADIATION,MANGEMENT,5 TX'S: ICD-10-PCS | Mod: S$GLB,,, | Performed by: RADIOLOGY

## 2023-06-13 PROCEDURE — G6017 INTRAFRACTION TRACK MOTION: HCPCS | Mod: S$GLB,,, | Performed by: RADIOLOGY

## 2023-06-13 PROCEDURE — G6017 PR INTRAFRACTION TRACK MOTION, EACH FRACTION OF TX: ICD-10-PCS | Mod: S$GLB,,, | Performed by: RADIOLOGY

## 2023-06-13 PROCEDURE — 77427 RADIATION TX MANAGEMENT X5: CPT | Mod: S$GLB,,, | Performed by: RADIOLOGY

## 2023-06-13 NOTE — PROGRESS NOTES
PROGRESS NOTE    Subjective:       Patient ID: Karyna Escoto is a 48 y.o. female.    6/9/2022:  Dx Mammo:  1.8cm mass in the left breast towards the left axilla.   Deep to the mass 2 lymph nodes are identified.     7/5/2022:  Left breast core biopsy:  Grade 2 IDCA with DCIS  ER: 0.03%, IA: 8.8%, HER2: 0  TRIPLE NEGATIVE    7/20/2022:  Left axilla LN needle biopsy:  Invasive ductal carcinoma  ER: 0%, IA: 40%, HER2 negative(0)    PLAN:  Neoadjuvant chemo/IO(Keynote 522)-surgery-radiation.     Pem/Tax/Carb:  Cycle 1: 8/11/2022  Cycle 2: 9/8/2022  Cycle 3: 10/6/2022  Cycle 4: 10/27/2022  Begin Shukri/Cytox/Pem  Cycle 5: 12/1/2022-----MUGA 8/10/2022-EF: 54%  Cycle 6: 12/22/2022  Cycle 7: 1/12/2023  Cycle 8: 2/2/2023    Surgery 3/17/2023-CR!!  Bilateral nipple sparing mastectomy  Right: atypical lobular hyperplasia, no carcinoma  Left:  no residual carcinoma or dcis seen, SLN neg x 5,   pT0N0    Cycle 9: 4/24/2023-due    2/8/2023:  MRI:   Resolution of previous left breast mass and previous enlarged left axillary lymph nodes, compatible with favorable response to therapy.  No new disease.    Surgery  3/17/2023 at [facility]: complete response to chemotherapy      8/3/2022 Photos:          8/23/2022 Photo:      10/4/2022 Photo:    Lesion is no longer palpable on 10/4/2022.     11/15/2022          Chief Complaint:  Triple negative breast cancer follow up.     History of Present Illness:   Karyna Escoto is a 48 y.o. female who presents for follow up of breast cancer.      Ms. Escoto is doing well and continues to have an increased appetite on the steroids. Eating much better and has gained wt.  She feels ready to begin IO therapy again. She has been having difficulty sleeping will start to titrate her off the Dexamethasone.    She has started radiation and is tolerated it well. Some mild redness at the site of her previous drain.    Family and Social history  reviewed and is unchanged from 8/3/2022      ROS:  Review of Systems   Constitutional:  Negative for appetite change, fever and unexpected weight change.   HENT:  Negative for mouth sores.    Eyes:  Negative for visual disturbance.   Respiratory:  Negative for cough and shortness of breath.    Cardiovascular:  Negative for chest pain and leg swelling.   Gastrointestinal:  Positive for diarrhea. Negative for abdominal pain and blood in stool.   Genitourinary:  Positive for frequency. Negative for hematuria.   Musculoskeletal:  Negative for back pain.   Skin:  Negative for rash.   Neurological:  Positive for headaches.   Hematological:  Positive for adenopathy.   Psychiatric/Behavioral:  The patient is not nervous/anxious.         Current Outpatient Medications:     bisacodyL (DULCOLAX) 5 mg EC tablet, Take 5 mg by mouth daily as needed for Constipation., Disp: , Rfl:     cetirizine (ZYRTEC) 10 mg Cap, Take 1 tablet by mouth once daily., Disp: , Rfl:     dexAMETHasone (DECADRON) 2 MG tablet, Take 1 tablet (2 mg total) by mouth daily with breakfast., Disp: 30 tablet, Rfl: 1    famotidine (PEPCID) 10 MG tablet, Take 10 mg by mouth 2 (two) times daily., Disp: , Rfl:     fluticasone propionate (FLONASE) 50 mcg/actuation nasal spray, 1 spray (50 mcg total) by Each Nostril route once daily., Disp: 15.8 mL, Rfl: 5    hydrOXYzine HCL (ATARAX) 10 MG Tab, Take 10 mg by mouth 3 (three) times daily as needed., Disp: , Rfl:     Lactobacillus rhamnosus GG (CULTURELLE) 10 billion cell capsule, Take 1 capsule by mouth once daily., Disp: , Rfl:     LIDOcaine-prilocaine (EMLA) cream, Apply topically as needed. Apply 30 mins prior to port access, Disp: 30 g, Rfl: 5    loperamide HCl (IMODIUM A-D ORAL), Take 1 tablet by mouth daily as needed., Disp: , Rfl:     methocarbamoL (ROBAXIN) 500 MG Tab, Take 1,000 mg by mouth 2 (two) times daily., Disp: , Rfl:     mupirocin (BACTROBAN) 2 % ointment, SMARTSI Application Topical 2-3 Times  "Daily, Disp: , Rfl:     ondansetron (ZOFRAN) 8 MG tablet, Take 1 tablet (8 mg total) by mouth every 8 (eight) hours as needed., Disp: 30 tablet, Rfl: 5    oxyCODONE-acetaminophen (PERCOCET)  mg per tablet, Take 1 tablet by mouth every 6 (six) hours as needed for Pain., Disp: 40 tablet, Rfl: 0    promethazine (PHENERGAN) 25 MG tablet, Take 1 tablet (25 mg total) by mouth every 4 to 6 hours as needed., Disp: 30 tablet, Rfl: 5    sertraline (ZOLOFT) 50 MG tablet, Take 50 mg by mouth., Disp: , Rfl:         Objective:       Physical Examination:     /73   Pulse 86   Temp 98.1 °F (36.7 °C)   Resp 18   Ht 5' 4" (1.626 m)   Wt 80.7 kg (178 lb)   BMI 30.55 kg/m²     Physical Exam  Constitutional:       Appearance: Normal appearance. She is well-developed.   HENT:      Head: Normocephalic and atraumatic.      Right Ear: External ear normal.      Left Ear: External ear normal.      Nose: Nose normal.      Mouth/Throat:      Mouth: Mucous membranes are moist.   Eyes:      Conjunctiva/sclera: Conjunctivae normal.      Pupils: Pupils are equal, round, and reactive to light.   Neck:      Thyroid: No thyromegaly.      Trachea: No tracheal deviation.   Cardiovascular:      Rate and Rhythm: Normal rate and regular rhythm.      Heart sounds: Normal heart sounds.   Pulmonary:      Effort: Pulmonary effort is normal.      Breath sounds: Normal breath sounds.   Abdominal:      General: Bowel sounds are normal. There is no distension.      Palpations: Abdomen is soft. There is no mass.      Tenderness: There is no abdominal tenderness.   Skin:     General: Skin is warm and dry.      Findings: No rash.   Neurological:      General: No focal deficit present.      Mental Status: She is alert and oriented to person, place, and time.      Comments: Neuro intact througout   Psychiatric:         Mood and Affect: Mood normal.         Behavior: Behavior normal.         Thought Content: Thought content normal.         Judgment: " Judgment normal.       Labs:   Recent Results (from the past 336 hour(s))   CBC Auto Differential    Collection Time: 06/14/23 10:02 AM   Result Value Ref Range    WBC 7.49 3.90 - 12.70 K/uL    Hemoglobin 13.6 12.0 - 16.0 g/dL    Hematocrit 40.7 37.0 - 48.5 %    Platelets 238 150 - 450 K/uL       CMP  Sodium   Date Value Ref Range Status   03/09/2023 136 136 - 145 mmol/L Final     Potassium   Date Value Ref Range Status   03/09/2023 3.3 (L) 3.5 - 5.1 mmol/L Final     Chloride   Date Value Ref Range Status   03/09/2023 100 95 - 110 mmol/L Final     CO2   Date Value Ref Range Status   03/09/2023 30 (H) 23 - 29 mmol/L Final     Glucose   Date Value Ref Range Status   03/09/2023 89 70 - 110 mg/dL Final     BUN   Date Value Ref Range Status   03/09/2023 10 6 - 20 mg/dL Final     Creatinine   Date Value Ref Range Status   03/09/2023 0.8 0.5 - 1.4 mg/dL Final     Calcium   Date Value Ref Range Status   03/09/2023 8.4 (L) 8.7 - 10.5 mg/dL Final     Total Protein   Date Value Ref Range Status   03/09/2023 5.4 (L) 6.0 - 8.4 g/dL Final     Albumin   Date Value Ref Range Status   03/09/2023 3.2 (L) 3.5 - 5.2 g/dL Final     Total Bilirubin   Date Value Ref Range Status   03/09/2023 0.3 0.1 - 1.0 mg/dL Final     Comment:     For infants and newborns, interpretation of results should be based  on gestational age, weight and in agreement with clinical  observations.    Premature Infant recommended reference ranges:  Up to 24 hours.............<8.0 mg/dL  Up to 48 hours............<12.0 mg/dL  3-5 days..................<15.0 mg/dL  6-29 days.................<15.0 mg/dL       Alkaline Phosphatase   Date Value Ref Range Status   03/09/2023 46 (L) 55 - 135 U/L Final     AST   Date Value Ref Range Status   03/09/2023 40 10 - 40 U/L Final     ALT   Date Value Ref Range Status   03/09/2023 28 10 - 44 U/L Final     Anion Gap   Date Value Ref Range Status   03/09/2023 6 (L) 8 - 16 mmol/L Final     No results found for: CEA  No results found  for: PSA        Assessment/Plan:     Problem List Items Addressed This Visit          Oncology    Left Breast Cancer-TRIPLE NEGATIVE    Relevant Medications    oxyCODONE-acetaminophen (PERCOCET)  mg per tablet    Cancer associated pain - Primary     Other Visit Diagnoses       Acute pain        Relevant Medications    oxyCODONE-acetaminophen (PERCOCET)  mg per tablet    Insomnia, unspecified type              Triple Negative Breast cancer- Restart Keytruda this week  Cancer related pain- refill percocet today  3.  Insomnia- Titrate off Dexamethasone    Discussion:     Follow up in about 3 weeks (around 7/5/2023) for with Dr. Peralta and 6 weeks with me.      Electronically signed by Ana Quigley, MSN, APRN, AGNP-C, OCN

## 2023-06-14 ENCOUNTER — LAB VISIT (OUTPATIENT)
Dept: LAB | Facility: HOSPITAL | Age: 48
End: 2023-06-14
Attending: INTERNAL MEDICINE
Payer: COMMERCIAL

## 2023-06-14 ENCOUNTER — OFFICE VISIT (OUTPATIENT)
Dept: HEMATOLOGY/ONCOLOGY | Facility: CLINIC | Age: 48
End: 2023-06-14
Payer: COMMERCIAL

## 2023-06-14 VITALS
TEMPERATURE: 98 F | WEIGHT: 178 LBS | RESPIRATION RATE: 18 BRPM | HEART RATE: 86 BPM | BODY MASS INDEX: 30.39 KG/M2 | HEIGHT: 64 IN | DIASTOLIC BLOOD PRESSURE: 73 MMHG | SYSTOLIC BLOOD PRESSURE: 118 MMHG

## 2023-06-14 DIAGNOSIS — R53.83 FATIGUE, UNSPECIFIED TYPE: ICD-10-CM

## 2023-06-14 DIAGNOSIS — G47.00 INSOMNIA, UNSPECIFIED TYPE: ICD-10-CM

## 2023-06-14 DIAGNOSIS — C50.412 MALIGNANT NEOPLASM OF UPPER-OUTER QUADRANT OF LEFT BREAST IN FEMALE, ESTROGEN RECEPTOR NEGATIVE: ICD-10-CM

## 2023-06-14 DIAGNOSIS — G89.3 CANCER ASSOCIATED PAIN: Primary | ICD-10-CM

## 2023-06-14 DIAGNOSIS — Z17.1 MALIGNANT NEOPLASM OF UPPER-OUTER QUADRANT OF LEFT BREAST IN FEMALE, ESTROGEN RECEPTOR NEGATIVE: ICD-10-CM

## 2023-06-14 DIAGNOSIS — R52 ACUTE PAIN: ICD-10-CM

## 2023-06-14 LAB
ALBUMIN SERPL BCP-MCNC: 4 G/DL (ref 3.5–5.2)
ALP SERPL-CCNC: 107 U/L (ref 55–135)
ALT SERPL W/O P-5'-P-CCNC: 38 U/L (ref 10–44)
ANION GAP SERPL CALC-SCNC: 8 MMOL/L (ref 8–16)
AST SERPL-CCNC: 27 U/L (ref 10–40)
BASOPHILS # BLD AUTO: 0.04 K/UL (ref 0–0.2)
BASOPHILS NFR BLD: 0.5 % (ref 0–1.9)
BILIRUB SERPL-MCNC: 0.7 MG/DL (ref 0.1–1)
BUN SERPL-MCNC: 16 MG/DL (ref 6–20)
CALCIUM SERPL-MCNC: 9.5 MG/DL (ref 8.7–10.5)
CHLORIDE SERPL-SCNC: 102 MMOL/L (ref 95–110)
CO2 SERPL-SCNC: 27 MMOL/L (ref 23–29)
CREAT SERPL-MCNC: 0.7 MG/DL (ref 0.5–1.4)
DIFFERENTIAL METHOD: ABNORMAL
EOSINOPHIL # BLD AUTO: 0.2 K/UL (ref 0–0.5)
EOSINOPHIL NFR BLD: 2.8 % (ref 0–8)
ERYTHROCYTE [DISTWIDTH] IN BLOOD BY AUTOMATED COUNT: 16.4 % (ref 11.5–14.5)
EST. GFR  (NO RACE VARIABLE): >60 ML/MIN/1.73 M^2
GLUCOSE SERPL-MCNC: 98 MG/DL (ref 70–110)
HCT VFR BLD AUTO: 40.7 % (ref 37–48.5)
HGB BLD-MCNC: 13.6 G/DL (ref 12–16)
IMM GRANULOCYTES # BLD AUTO: 0.03 K/UL (ref 0–0.04)
IMM GRANULOCYTES NFR BLD AUTO: 0.4 % (ref 0–0.5)
LYMPHOCYTES # BLD AUTO: 0.7 K/UL (ref 1–4.8)
LYMPHOCYTES NFR BLD: 9.7 % (ref 18–48)
MAGNESIUM SERPL-MCNC: 2 MG/DL (ref 1.6–2.6)
MCH RBC QN AUTO: 31.6 PG (ref 27–31)
MCHC RBC AUTO-ENTMCNC: 33.4 G/DL (ref 32–36)
MCV RBC AUTO: 95 FL (ref 82–98)
MONOCYTES # BLD AUTO: 0.6 K/UL (ref 0.3–1)
MONOCYTES NFR BLD: 7.9 % (ref 4–15)
NEUTROPHILS # BLD AUTO: 5.9 K/UL (ref 1.8–7.7)
NEUTROPHILS NFR BLD: 78.7 % (ref 38–73)
NRBC BLD-RTO: 0 /100 WBC
PLATELET # BLD AUTO: 238 K/UL (ref 150–450)
PMV BLD AUTO: 9.1 FL (ref 9.2–12.9)
POTASSIUM SERPL-SCNC: 4.1 MMOL/L (ref 3.5–5.1)
PROT SERPL-MCNC: 7.2 G/DL (ref 6–8.4)
RBC # BLD AUTO: 4.3 M/UL (ref 4–5.4)
SODIUM SERPL-SCNC: 137 MMOL/L (ref 136–145)
TSH SERPL DL<=0.005 MIU/L-ACNC: 3.44 UIU/ML (ref 0.34–5.6)
WBC # BLD AUTO: 7.49 K/UL (ref 3.9–12.7)

## 2023-06-14 PROCEDURE — 80053 COMPREHEN METABOLIC PANEL: CPT | Performed by: INTERNAL MEDICINE

## 2023-06-14 PROCEDURE — 3008F PR BODY MASS INDEX (BMI) DOCUMENTED: ICD-10-PCS | Mod: CPTII,S$GLB,, | Performed by: NURSE PRACTITIONER

## 2023-06-14 PROCEDURE — 84443 ASSAY THYROID STIM HORMONE: CPT | Performed by: INTERNAL MEDICINE

## 2023-06-14 PROCEDURE — 83735 ASSAY OF MAGNESIUM: CPT | Performed by: INTERNAL MEDICINE

## 2023-06-14 PROCEDURE — 99214 OFFICE O/P EST MOD 30 MIN: CPT | Mod: S$GLB,,, | Performed by: NURSE PRACTITIONER

## 2023-06-14 PROCEDURE — 99214 PR OFFICE/OUTPT VISIT, EST, LEVL IV, 30-39 MIN: ICD-10-PCS | Mod: S$GLB,,, | Performed by: NURSE PRACTITIONER

## 2023-06-14 PROCEDURE — 1159F PR MEDICATION LIST DOCUMENTED IN MEDICAL RECORD: ICD-10-PCS | Mod: CPTII,S$GLB,, | Performed by: NURSE PRACTITIONER

## 2023-06-14 PROCEDURE — 3008F BODY MASS INDEX DOCD: CPT | Mod: CPTII,S$GLB,, | Performed by: NURSE PRACTITIONER

## 2023-06-14 PROCEDURE — 1159F MED LIST DOCD IN RCRD: CPT | Mod: CPTII,S$GLB,, | Performed by: NURSE PRACTITIONER

## 2023-06-14 PROCEDURE — 3078F DIAST BP <80 MM HG: CPT | Mod: CPTII,S$GLB,, | Performed by: NURSE PRACTITIONER

## 2023-06-14 PROCEDURE — 1160F PR REVIEW ALL MEDS BY PRESCRIBER/CLIN PHARMACIST DOCUMENTED: ICD-10-PCS | Mod: CPTII,S$GLB,, | Performed by: NURSE PRACTITIONER

## 2023-06-14 PROCEDURE — 85025 COMPLETE CBC W/AUTO DIFF WBC: CPT | Performed by: INTERNAL MEDICINE

## 2023-06-14 PROCEDURE — 1160F RVW MEDS BY RX/DR IN RCRD: CPT | Mod: CPTII,S$GLB,, | Performed by: NURSE PRACTITIONER

## 2023-06-14 PROCEDURE — 3074F PR MOST RECENT SYSTOLIC BLOOD PRESSURE < 130 MM HG: ICD-10-PCS | Mod: CPTII,S$GLB,, | Performed by: NURSE PRACTITIONER

## 2023-06-14 PROCEDURE — 3078F PR MOST RECENT DIASTOLIC BLOOD PRESSURE < 80 MM HG: ICD-10-PCS | Mod: CPTII,S$GLB,, | Performed by: NURSE PRACTITIONER

## 2023-06-14 PROCEDURE — 36415 COLL VENOUS BLD VENIPUNCTURE: CPT | Performed by: INTERNAL MEDICINE

## 2023-06-14 PROCEDURE — 3074F SYST BP LT 130 MM HG: CPT | Mod: CPTII,S$GLB,, | Performed by: NURSE PRACTITIONER

## 2023-06-14 RX ORDER — OXYCODONE AND ACETAMINOPHEN 10; 325 MG/1; MG/1
1 TABLET ORAL EVERY 6 HOURS PRN
Qty: 40 TABLET | Refills: 0 | Status: SHIPPED | OUTPATIENT
Start: 2023-06-14 | End: 2023-07-26 | Stop reason: SDUPTHER

## 2023-06-20 ENCOUNTER — TREATMENT (OUTPATIENT)
Dept: RADIATION ONCOLOGY | Facility: CLINIC | Age: 48
End: 2023-06-20
Payer: COMMERCIAL

## 2023-06-20 PROCEDURE — 77417 PR 77417 - TX RADIOLOGY PORT IMAGE(S): ICD-10-PCS | Mod: S$GLB,,, | Performed by: RADIOLOGY

## 2023-06-20 PROCEDURE — G6017 INTRAFRACTION TRACK MOTION: HCPCS | Mod: S$GLB,,, | Performed by: RADIOLOGY

## 2023-06-20 PROCEDURE — G6017 PR INTRAFRACTION TRACK MOTION, EACH FRACTION OF TX: ICD-10-PCS | Mod: S$GLB,,, | Performed by: RADIOLOGY

## 2023-06-20 PROCEDURE — G6013 RADIATION TREATMENT DELIVERY: HCPCS | Mod: S$GLB,,, | Performed by: RADIOLOGY

## 2023-06-20 PROCEDURE — 77427 RADIATION TX MANAGEMENT X5: CPT | Mod: S$GLB,,, | Performed by: RADIOLOGY

## 2023-06-20 PROCEDURE — 77417 THER RADIOLOGY PORT IMAGE(S): CPT | Mod: S$GLB,,, | Performed by: RADIOLOGY

## 2023-06-20 PROCEDURE — 77427 PR CHG RADIATION,MANGEMENT,5 TX'S: ICD-10-PCS | Mod: S$GLB,,, | Performed by: RADIOLOGY

## 2023-06-20 PROCEDURE — G6013 PR RADN TX DELIVERY, 11-19 MEV, >= 3 TX AREAS: ICD-10-PCS | Mod: S$GLB,,, | Performed by: RADIOLOGY

## 2023-06-20 RX ORDER — EPINEPHRINE 0.3 MG/.3ML
0.3 INJECTION SUBCUTANEOUS ONCE AS NEEDED
Status: CANCELLED | OUTPATIENT
Start: 2023-06-21

## 2023-06-20 RX ORDER — HEPARIN 100 UNIT/ML
500 SYRINGE INTRAVENOUS
Status: CANCELLED | OUTPATIENT
Start: 2023-06-21

## 2023-06-20 RX ORDER — SODIUM CHLORIDE 0.9 % (FLUSH) 0.9 %
10 SYRINGE (ML) INJECTION
Status: CANCELLED | OUTPATIENT
Start: 2023-06-21

## 2023-06-20 RX ORDER — DIPHENHYDRAMINE HYDROCHLORIDE 50 MG/ML
50 INJECTION INTRAMUSCULAR; INTRAVENOUS ONCE AS NEEDED
Status: CANCELLED | OUTPATIENT
Start: 2023-06-21

## 2023-06-21 ENCOUNTER — INFUSION (OUTPATIENT)
Dept: INFUSION THERAPY | Facility: HOSPITAL | Age: 48
End: 2023-06-21
Attending: INTERNAL MEDICINE
Payer: COMMERCIAL

## 2023-06-21 VITALS
WEIGHT: 181 LBS | HEIGHT: 64 IN | DIASTOLIC BLOOD PRESSURE: 66 MMHG | SYSTOLIC BLOOD PRESSURE: 104 MMHG | HEART RATE: 74 BPM | RESPIRATION RATE: 18 BRPM | BODY MASS INDEX: 30.9 KG/M2 | TEMPERATURE: 98 F

## 2023-06-21 DIAGNOSIS — D70.1 CHEMOTHERAPY-INDUCED NEUTROPENIA: Primary | ICD-10-CM

## 2023-06-21 DIAGNOSIS — T45.1X5A CHEMOTHERAPY-INDUCED NEUTROPENIA: Primary | ICD-10-CM

## 2023-06-21 DIAGNOSIS — C50.412 MALIGNANT NEOPLASM OF UPPER-OUTER QUADRANT OF LEFT BREAST IN FEMALE, ESTROGEN RECEPTOR NEGATIVE: ICD-10-CM

## 2023-06-21 DIAGNOSIS — Z17.1 MALIGNANT NEOPLASM OF UPPER-OUTER QUADRANT OF LEFT BREAST IN FEMALE, ESTROGEN RECEPTOR NEGATIVE: ICD-10-CM

## 2023-06-21 PROCEDURE — 25000003 PHARM REV CODE 250: Performed by: NURSE PRACTITIONER

## 2023-06-21 PROCEDURE — A4216 STERILE WATER/SALINE, 10 ML: HCPCS | Performed by: NURSE PRACTITIONER

## 2023-06-21 PROCEDURE — 63600175 PHARM REV CODE 636 W HCPCS: Mod: JZ,JG | Performed by: NURSE PRACTITIONER

## 2023-06-21 PROCEDURE — 96413 CHEMO IV INFUSION 1 HR: CPT

## 2023-06-21 RX ORDER — SODIUM CHLORIDE 0.9 % (FLUSH) 0.9 %
10 SYRINGE (ML) INJECTION
Status: DISCONTINUED | OUTPATIENT
Start: 2023-06-21 | End: 2023-06-21 | Stop reason: HOSPADM

## 2023-06-21 RX ORDER — DIPHENHYDRAMINE HYDROCHLORIDE 50 MG/ML
50 INJECTION INTRAMUSCULAR; INTRAVENOUS ONCE AS NEEDED
Status: DISCONTINUED | OUTPATIENT
Start: 2023-06-21 | End: 2023-06-21 | Stop reason: HOSPADM

## 2023-06-21 RX ORDER — HEPARIN 100 UNIT/ML
500 SYRINGE INTRAVENOUS
Status: DISCONTINUED | OUTPATIENT
Start: 2023-06-21 | End: 2023-06-21 | Stop reason: HOSPADM

## 2023-06-21 RX ORDER — EPINEPHRINE 0.3 MG/.3ML
0.3 INJECTION SUBCUTANEOUS ONCE AS NEEDED
Status: DISCONTINUED | OUTPATIENT
Start: 2023-06-21 | End: 2023-06-21 | Stop reason: HOSPADM

## 2023-06-21 RX ADMIN — HEPARIN 500 UNITS: 100 SYRINGE at 08:06

## 2023-06-21 RX ADMIN — SODIUM CHLORIDE, PRESERVATIVE FREE 10 ML: 5 INJECTION INTRAVENOUS at 08:06

## 2023-06-21 RX ADMIN — SODIUM CHLORIDE 200 MG: 9 INJECTION, SOLUTION INTRAVENOUS at 08:06

## 2023-06-21 NOTE — PLAN OF CARE
Problem: Fatigue  Goal: Improved Activity Tolerance  6/21/2023 0808 by Lela Garzon RN  Outcome: Met  6/21/2023 0808 by Lela Garzon RN  Outcome: Ongoing, Progressing

## 2023-06-26 ENCOUNTER — LAB VISIT (OUTPATIENT)
Dept: LAB | Facility: HOSPITAL | Age: 48
End: 2023-06-26
Attending: INTERNAL MEDICINE
Payer: COMMERCIAL

## 2023-06-26 DIAGNOSIS — C50.412 MALIGNANT NEOPLASM OF UPPER-OUTER QUADRANT OF LEFT BREAST IN FEMALE, ESTROGEN RECEPTOR NEGATIVE: ICD-10-CM

## 2023-06-26 DIAGNOSIS — R53.83 FATIGUE, UNSPECIFIED TYPE: ICD-10-CM

## 2023-06-26 DIAGNOSIS — Z17.1 MALIGNANT NEOPLASM OF UPPER-OUTER QUADRANT OF LEFT BREAST IN FEMALE, ESTROGEN RECEPTOR NEGATIVE: ICD-10-CM

## 2023-06-26 LAB
ALBUMIN SERPL BCP-MCNC: 3.7 G/DL (ref 3.5–5.2)
ALP SERPL-CCNC: 77 U/L (ref 55–135)
ALT SERPL W/O P-5'-P-CCNC: 24 U/L (ref 10–44)
ANION GAP SERPL CALC-SCNC: 9 MMOL/L (ref 8–16)
AST SERPL-CCNC: 22 U/L (ref 10–40)
BASOPHILS # BLD AUTO: 0.03 K/UL (ref 0–0.2)
BASOPHILS NFR BLD: 0.7 % (ref 0–1.9)
BILIRUB SERPL-MCNC: 0.5 MG/DL (ref 0.1–1)
BUN SERPL-MCNC: 11 MG/DL (ref 6–20)
CALCIUM SERPL-MCNC: 9 MG/DL (ref 8.7–10.5)
CHLORIDE SERPL-SCNC: 106 MMOL/L (ref 95–110)
CO2 SERPL-SCNC: 24 MMOL/L (ref 23–29)
CREAT SERPL-MCNC: 0.8 MG/DL (ref 0.5–1.4)
DIFFERENTIAL METHOD: ABNORMAL
EOSINOPHIL # BLD AUTO: 0.1 K/UL (ref 0–0.5)
EOSINOPHIL NFR BLD: 2.9 % (ref 0–8)
ERYTHROCYTE [DISTWIDTH] IN BLOOD BY AUTOMATED COUNT: 14.8 % (ref 11.5–14.5)
EST. GFR  (NO RACE VARIABLE): >60 ML/MIN/1.73 M^2
GLUCOSE SERPL-MCNC: 77 MG/DL (ref 70–110)
HCT VFR BLD AUTO: 37.1 % (ref 37–48.5)
HGB BLD-MCNC: 12.2 G/DL (ref 12–16)
IMM GRANULOCYTES # BLD AUTO: 0.01 K/UL (ref 0–0.04)
IMM GRANULOCYTES NFR BLD AUTO: 0.2 % (ref 0–0.5)
LYMPHOCYTES # BLD AUTO: 1 K/UL (ref 1–4.8)
LYMPHOCYTES NFR BLD: 23.1 % (ref 18–48)
MAGNESIUM SERPL-MCNC: 1.8 MG/DL (ref 1.6–2.6)
MCH RBC QN AUTO: 31.6 PG (ref 27–31)
MCHC RBC AUTO-ENTMCNC: 32.9 G/DL (ref 32–36)
MCV RBC AUTO: 96 FL (ref 82–98)
MONOCYTES # BLD AUTO: 0.7 K/UL (ref 0.3–1)
MONOCYTES NFR BLD: 17.8 % (ref 4–15)
NEUTROPHILS # BLD AUTO: 2.3 K/UL (ref 1.8–7.7)
NEUTROPHILS NFR BLD: 55.3 % (ref 38–73)
NRBC BLD-RTO: 0 /100 WBC
PLATELET # BLD AUTO: 166 K/UL (ref 150–450)
PMV BLD AUTO: 8.5 FL (ref 9.2–12.9)
POTASSIUM SERPL-SCNC: 3.6 MMOL/L (ref 3.5–5.1)
PROT SERPL-MCNC: 6.5 G/DL (ref 6–8.4)
RBC # BLD AUTO: 3.86 M/UL (ref 4–5.4)
SODIUM SERPL-SCNC: 139 MMOL/L (ref 136–145)
TSH SERPL DL<=0.005 MIU/L-ACNC: 3.62 UIU/ML (ref 0.34–5.6)
WBC # BLD AUTO: 4.15 K/UL (ref 3.9–12.7)

## 2023-06-26 PROCEDURE — 36415 COLL VENOUS BLD VENIPUNCTURE: CPT | Performed by: INTERNAL MEDICINE

## 2023-06-26 PROCEDURE — 85025 COMPLETE CBC W/AUTO DIFF WBC: CPT | Performed by: INTERNAL MEDICINE

## 2023-06-26 PROCEDURE — 80053 COMPREHEN METABOLIC PANEL: CPT | Performed by: INTERNAL MEDICINE

## 2023-06-26 PROCEDURE — 84443 ASSAY THYROID STIM HORMONE: CPT | Performed by: INTERNAL MEDICINE

## 2023-06-26 PROCEDURE — 83735 ASSAY OF MAGNESIUM: CPT | Performed by: INTERNAL MEDICINE

## 2023-07-03 ENCOUNTER — LAB VISIT (OUTPATIENT)
Dept: LAB | Facility: HOSPITAL | Age: 48
End: 2023-07-03
Attending: INTERNAL MEDICINE
Payer: COMMERCIAL

## 2023-07-03 DIAGNOSIS — Z17.1 MALIGNANT NEOPLASM OF UPPER-OUTER QUADRANT OF LEFT BREAST IN FEMALE, ESTROGEN RECEPTOR NEGATIVE: ICD-10-CM

## 2023-07-03 DIAGNOSIS — C50.412 MALIGNANT NEOPLASM OF UPPER-OUTER QUADRANT OF LEFT BREAST IN FEMALE, ESTROGEN RECEPTOR NEGATIVE: ICD-10-CM

## 2023-07-03 LAB
ALBUMIN SERPL BCP-MCNC: 3.9 G/DL (ref 3.5–5.2)
ALP SERPL-CCNC: 84 U/L (ref 55–135)
ALT SERPL W/O P-5'-P-CCNC: 22 U/L (ref 10–44)
ANION GAP SERPL CALC-SCNC: 4 MMOL/L (ref 8–16)
AST SERPL-CCNC: 23 U/L (ref 10–40)
BASOPHILS # BLD AUTO: 0.03 K/UL (ref 0–0.2)
BASOPHILS NFR BLD: 0.6 % (ref 0–1.9)
BILIRUB SERPL-MCNC: 0.7 MG/DL (ref 0.1–1)
BUN SERPL-MCNC: 9 MG/DL (ref 6–20)
CALCIUM SERPL-MCNC: 9.3 MG/DL (ref 8.7–10.5)
CHLORIDE SERPL-SCNC: 105 MMOL/L (ref 95–110)
CO2 SERPL-SCNC: 29 MMOL/L (ref 23–29)
CREAT SERPL-MCNC: 0.9 MG/DL (ref 0.5–1.4)
DIFFERENTIAL METHOD: ABNORMAL
EOSINOPHIL # BLD AUTO: 0.2 K/UL (ref 0–0.5)
EOSINOPHIL NFR BLD: 3.2 % (ref 0–8)
ERYTHROCYTE [DISTWIDTH] IN BLOOD BY AUTOMATED COUNT: 13.9 % (ref 11.5–14.5)
EST. GFR  (NO RACE VARIABLE): >60 ML/MIN/1.73 M^2
GLUCOSE SERPL-MCNC: 97 MG/DL (ref 70–110)
HCT VFR BLD AUTO: 38.4 % (ref 37–48.5)
HGB BLD-MCNC: 13.2 G/DL (ref 12–16)
IMM GRANULOCYTES # BLD AUTO: 0.01 K/UL (ref 0–0.04)
IMM GRANULOCYTES NFR BLD AUTO: 0.2 % (ref 0–0.5)
LYMPHOCYTES # BLD AUTO: 0.6 K/UL (ref 1–4.8)
LYMPHOCYTES NFR BLD: 11.5 % (ref 18–48)
MAGNESIUM SERPL-MCNC: 1.9 MG/DL (ref 1.6–2.6)
MCH RBC QN AUTO: 32.3 PG (ref 27–31)
MCHC RBC AUTO-ENTMCNC: 34.4 G/DL (ref 32–36)
MCV RBC AUTO: 94 FL (ref 82–98)
MONOCYTES # BLD AUTO: 0.8 K/UL (ref 0.3–1)
MONOCYTES NFR BLD: 15.8 % (ref 4–15)
NEUTROPHILS # BLD AUTO: 3.5 K/UL (ref 1.8–7.7)
NEUTROPHILS NFR BLD: 68.7 % (ref 38–73)
NRBC BLD-RTO: 0 /100 WBC
PLATELET # BLD AUTO: 183 K/UL (ref 150–450)
PMV BLD AUTO: 9.1 FL (ref 9.2–12.9)
POTASSIUM SERPL-SCNC: 3.6 MMOL/L (ref 3.5–5.1)
PROT SERPL-MCNC: 6.5 G/DL (ref 6–8.4)
RBC # BLD AUTO: 4.09 M/UL (ref 4–5.4)
SODIUM SERPL-SCNC: 138 MMOL/L (ref 136–145)
WBC # BLD AUTO: 5.05 K/UL (ref 3.9–12.7)

## 2023-07-03 PROCEDURE — 83735 ASSAY OF MAGNESIUM: CPT | Performed by: INTERNAL MEDICINE

## 2023-07-03 PROCEDURE — 36415 COLL VENOUS BLD VENIPUNCTURE: CPT | Performed by: INTERNAL MEDICINE

## 2023-07-03 PROCEDURE — 85025 COMPLETE CBC W/AUTO DIFF WBC: CPT | Performed by: INTERNAL MEDICINE

## 2023-07-03 PROCEDURE — 80053 COMPREHEN METABOLIC PANEL: CPT | Performed by: INTERNAL MEDICINE

## 2023-07-05 ENCOUNTER — OFFICE VISIT (OUTPATIENT)
Dept: HEMATOLOGY/ONCOLOGY | Facility: CLINIC | Age: 48
End: 2023-07-05
Payer: COMMERCIAL

## 2023-07-05 VITALS
TEMPERATURE: 98 F | HEART RATE: 81 BPM | BODY MASS INDEX: 30.73 KG/M2 | DIASTOLIC BLOOD PRESSURE: 63 MMHG | RESPIRATION RATE: 18 BRPM | HEIGHT: 64 IN | WEIGHT: 180 LBS | SYSTOLIC BLOOD PRESSURE: 104 MMHG

## 2023-07-05 DIAGNOSIS — C50.412 MALIGNANT NEOPLASM OF UPPER-OUTER QUADRANT OF LEFT BREAST IN FEMALE, ESTROGEN RECEPTOR NEGATIVE: ICD-10-CM

## 2023-07-05 DIAGNOSIS — Z17.1 MALIGNANT NEOPLASM OF UPPER-OUTER QUADRANT OF LEFT BREAST IN FEMALE, ESTROGEN RECEPTOR NEGATIVE: ICD-10-CM

## 2023-07-05 DIAGNOSIS — G89.3 CANCER ASSOCIATED PAIN: ICD-10-CM

## 2023-07-05 PROCEDURE — 99214 OFFICE O/P EST MOD 30 MIN: CPT | Mod: S$GLB,,, | Performed by: INTERNAL MEDICINE

## 2023-07-05 PROCEDURE — 3074F PR MOST RECENT SYSTOLIC BLOOD PRESSURE < 130 MM HG: ICD-10-PCS | Mod: CPTII,S$GLB,, | Performed by: INTERNAL MEDICINE

## 2023-07-05 PROCEDURE — 99214 PR OFFICE/OUTPT VISIT, EST, LEVL IV, 30-39 MIN: ICD-10-PCS | Mod: S$GLB,,, | Performed by: INTERNAL MEDICINE

## 2023-07-05 PROCEDURE — 1159F MED LIST DOCD IN RCRD: CPT | Mod: CPTII,S$GLB,, | Performed by: INTERNAL MEDICINE

## 2023-07-05 PROCEDURE — 3008F PR BODY MASS INDEX (BMI) DOCUMENTED: ICD-10-PCS | Mod: CPTII,S$GLB,, | Performed by: INTERNAL MEDICINE

## 2023-07-05 PROCEDURE — 1159F PR MEDICATION LIST DOCUMENTED IN MEDICAL RECORD: ICD-10-PCS | Mod: CPTII,S$GLB,, | Performed by: INTERNAL MEDICINE

## 2023-07-05 PROCEDURE — 3078F DIAST BP <80 MM HG: CPT | Mod: CPTII,S$GLB,, | Performed by: INTERNAL MEDICINE

## 2023-07-05 PROCEDURE — 3008F BODY MASS INDEX DOCD: CPT | Mod: CPTII,S$GLB,, | Performed by: INTERNAL MEDICINE

## 2023-07-05 PROCEDURE — 3078F PR MOST RECENT DIASTOLIC BLOOD PRESSURE < 80 MM HG: ICD-10-PCS | Mod: CPTII,S$GLB,, | Performed by: INTERNAL MEDICINE

## 2023-07-05 PROCEDURE — 3074F SYST BP LT 130 MM HG: CPT | Mod: CPTII,S$GLB,, | Performed by: INTERNAL MEDICINE

## 2023-07-05 NOTE — PROGRESS NOTES
PROGRESS NOTE    Subjective:       Patient ID: Karyna Escoto is a 48 y.o. female.    6/9/2022:  Dx Mammo:  1.8cm mass in the left breast towards the left axilla.   Deep to the mass 2 lymph nodes are identified.     7/5/2022:  Left breast core biopsy:  Grade 2 IDCA with DCIS  ER: 0.03%, UT: 8.8%, HER2: 0  TRIPLE NEGATIVE    7/20/2022:  Left axilla LN needle biopsy:  Invasive ductal carcinoma  ER: 0%, UT: 40%, HER2 negative(0)    PLAN:  Neoadjuvant chemo/IO(Keynote 522)-surgery-radiation.     Pem/Tax/Carb:  Cycle 1: 8/11/2022  Cycle 2: 9/8/2022  Cycle 3: 10/6/2022  Cycle 4: 10/27/2022  Begin Shukri/Cytox/Pem  Cycle 5: 12/1/2022-----MUGA 8/10/2022-EF: 54%  Cycle 6: 12/22/2022  Cycle 7: 1/12/2023  Cycle 8: 2/2/2023    Cycle 9: 6/21/2023  Cycle 10: 7/10/2023-due    Surgery 3/17/2023-CR!!  Bilateral nipple sparing mastectomy  Right: atypical lobular hyperplasia, no carcinoma  Left:  no residual carcinoma or dcis seen, SLN neg x 5,   pT0N0    Cycle 9: 5/30/2023  Cycle 10: 7/12/2023-due    2/8/2023:  MRI:   Resolution of previous left breast mass and previous enlarged left axillary lymph nodes, compatible with favorable response to therapy.  No new disease.    Surgery  3/17/2023 at [facility]: complete response to chemotherapy      8/3/2022 Photos:          8/23/2022 Photo:      10/4/2022 Photo:    Lesion is no longer palpable on 10/4/2022.     11/15/2022          Chief Complaint:  No chief complaint on file.    Triple negative breast cancer follow up.     History of Present Illness:   Karyna Escoto is a 48 y.o. female who presents for follow up of breast cancer.      Ms. Escoto resumed pembro on 5/30 and tolerating this well.  She is weaning from steroids and fatigued today.       Family and Social history reviewed and is unchanged from 8/3/2022      ROS:  Review of Systems   Constitutional:  Negative for appetite change, fever and unexpected weight change.    HENT:  Negative for mouth sores.    Eyes:  Negative for visual disturbance.   Respiratory:  Negative for cough and shortness of breath.    Cardiovascular:  Negative for chest pain and leg swelling.   Gastrointestinal:  Positive for diarrhea. Negative for abdominal pain and blood in stool.   Genitourinary:  Positive for frequency. Negative for hematuria.   Musculoskeletal:  Negative for back pain.   Skin:  Negative for rash.   Neurological:  Positive for headaches.   Hematological:  Positive for adenopathy.   Psychiatric/Behavioral:  The patient is not nervous/anxious.         Current Outpatient Medications:     bisacodyL (DULCOLAX) 5 mg EC tablet, Take 5 mg by mouth daily as needed for Constipation., Disp: , Rfl:     cetirizine (ZYRTEC) 10 mg Cap, Take 1 tablet by mouth once daily., Disp: , Rfl:     dexAMETHasone (DECADRON) 2 MG tablet, Take 1 tablet (2 mg total) by mouth daily with breakfast., Disp: 30 tablet, Rfl: 1    famotidine (PEPCID) 10 MG tablet, Take 10 mg by mouth 2 (two) times daily., Disp: , Rfl:     fluticasone propionate (FLONASE) 50 mcg/actuation nasal spray, 1 spray (50 mcg total) by Each Nostril route once daily., Disp: 15.8 mL, Rfl: 5    hydrOXYzine HCL (ATARAX) 10 MG Tab, Take 10 mg by mouth 3 (three) times daily as needed., Disp: , Rfl:     Lactobacillus rhamnosus GG (CULTURELLE) 10 billion cell capsule, Take 1 capsule by mouth once daily., Disp: , Rfl:     LIDOcaine-prilocaine (EMLA) cream, Apply topically as needed. Apply 30 mins prior to port access, Disp: 30 g, Rfl: 5    loperamide HCl (IMODIUM A-D ORAL), Take 1 tablet by mouth daily as needed., Disp: , Rfl:     methocarbamoL (ROBAXIN) 500 MG Tab, Take 1,000 mg by mouth 2 (two) times daily., Disp: , Rfl:     mupirocin (BACTROBAN) 2 % ointment, SMARTSI Application Topical 2-3 Times Daily, Disp: , Rfl:     ondansetron (ZOFRAN) 8 MG tablet, Take 1 tablet (8 mg total) by mouth every 8 (eight) hours as needed., Disp: 30 tablet, Rfl: 5     "oxyCODONE-acetaminophen (PERCOCET)  mg per tablet, Take 1 tablet by mouth every 6 (six) hours as needed for Pain., Disp: 40 tablet, Rfl: 0    promethazine (PHENERGAN) 25 MG tablet, Take 1 tablet (25 mg total) by mouth every 4 to 6 hours as needed., Disp: 30 tablet, Rfl: 5    sertraline (ZOLOFT) 50 MG tablet, Take 50 mg by mouth., Disp: , Rfl:         Objective:       Physical Examination:     /63   Pulse 81   Temp 98.2 °F (36.8 °C)   Resp 18   Ht 5' 4" (1.626 m)   Wt 81.6 kg (180 lb)   BMI 30.90 kg/m²     Physical Exam  Constitutional:       Appearance: She is well-developed.   HENT:      Head: Normocephalic and atraumatic.      Right Ear: External ear normal.      Left Ear: External ear normal.   Eyes:      Conjunctiva/sclera: Conjunctivae normal.      Pupils: Pupils are equal, round, and reactive to light.   Neck:      Thyroid: No thyromegaly.      Trachea: No tracheal deviation.   Cardiovascular:      Rate and Rhythm: Normal rate and regular rhythm.      Heart sounds: Normal heart sounds.   Pulmonary:      Effort: Pulmonary effort is normal.      Breath sounds: Normal breath sounds.   Abdominal:      General: Bowel sounds are normal. There is no distension.      Palpations: Abdomen is soft. There is no mass.      Tenderness: There is no abdominal tenderness.   Skin:     Findings: No rash.   Neurological:      Comments: Neuro intact througout   Psychiatric:         Behavior: Behavior normal.         Thought Content: Thought content normal.         Judgment: Judgment normal.       Labs:   Recent Results (from the past 336 hour(s))   CBC Auto Differential    Collection Time: 07/03/23  9:46 AM   Result Value Ref Range    WBC 5.05 3.90 - 12.70 K/uL    Hemoglobin 13.2 12.0 - 16.0 g/dL    Hematocrit 38.4 37.0 - 48.5 %    Platelets 183 150 - 450 K/uL   CBC Auto Differential    Collection Time: 06/26/23  9:20 AM   Result Value Ref Range    WBC 4.15 3.90 - 12.70 K/uL    Hemoglobin 12.2 12.0 - 16.0 g/dL    " Hematocrit 37.1 37.0 - 48.5 %    Platelets 166 150 - 450 K/uL       CMP  Sodium   Date Value Ref Range Status   07/03/2023 138 136 - 145 mmol/L Final     Potassium   Date Value Ref Range Status   07/03/2023 3.6 3.5 - 5.1 mmol/L Final     Chloride   Date Value Ref Range Status   07/03/2023 105 95 - 110 mmol/L Final     CO2   Date Value Ref Range Status   07/03/2023 29 23 - 29 mmol/L Final     Glucose   Date Value Ref Range Status   07/03/2023 97 70 - 110 mg/dL Final     BUN   Date Value Ref Range Status   07/03/2023 9 6 - 20 mg/dL Final     Creatinine   Date Value Ref Range Status   07/03/2023 0.9 0.5 - 1.4 mg/dL Final     Calcium   Date Value Ref Range Status   07/03/2023 9.3 8.7 - 10.5 mg/dL Final     Total Protein   Date Value Ref Range Status   07/03/2023 6.5 6.0 - 8.4 g/dL Final     Albumin   Date Value Ref Range Status   07/03/2023 3.9 3.5 - 5.2 g/dL Final     Total Bilirubin   Date Value Ref Range Status   07/03/2023 0.7 0.1 - 1.0 mg/dL Final     Comment:     For infants and newborns, interpretation of results should be based  on gestational age, weight and in agreement with clinical  observations.    Premature Infant recommended reference ranges:  Up to 24 hours.............<8.0 mg/dL  Up to 48 hours............<12.0 mg/dL  3-5 days..................<15.0 mg/dL  6-29 days.................<15.0 mg/dL       Alkaline Phosphatase   Date Value Ref Range Status   07/03/2023 84 55 - 135 U/L Final     AST   Date Value Ref Range Status   07/03/2023 23 10 - 40 U/L Final     ALT   Date Value Ref Range Status   07/03/2023 22 10 - 44 U/L Final     Anion Gap   Date Value Ref Range Status   07/03/2023 4 (L) 8 - 16 mmol/L Final     No results found for: CEA  No results found for: PSA        Assessment/Plan:     Problem List Items Addressed This Visit       Left Breast Cancer-TRIPLE NEGATIVE     Patient has resumed the pembro therapy and is doing ok with this.  No sign of AI disease.  Labs are unremarkable.  Will continue  therapy and continue to monitor very closely now.        Back with LISANDRO Quigley in 3 weeks and myself in 6.          Cancer associated pain     No significant changes here currently.  Continue current care approach.           Discussion:     Follow up in about 3 weeks (around 7/26/2023).      Electronically signed by Derek Patterson

## 2023-07-05 NOTE — ASSESSMENT & PLAN NOTE
Patient has resumed the pembro therapy and is doing ok with this.  No sign of AI disease.  Labs are unremarkable.  Will continue therapy and continue to monitor very closely now.        Back with LISANDRO Quigley in 3 weeks and myself in 6.

## 2023-07-07 DIAGNOSIS — Z17.1 MALIGNANT NEOPLASM OF UPPER-OUTER QUADRANT OF LEFT BREAST IN FEMALE, ESTROGEN RECEPTOR NEGATIVE: ICD-10-CM

## 2023-07-07 DIAGNOSIS — C50.412 MALIGNANT NEOPLASM OF UPPER-OUTER QUADRANT OF LEFT BREAST IN FEMALE, ESTROGEN RECEPTOR NEGATIVE: ICD-10-CM

## 2023-07-07 DIAGNOSIS — L58.9 RADIATION DERMATITIS: Primary | ICD-10-CM

## 2023-07-07 RX ORDER — SILVER SULFADIAZINE 10 G/1000G
CREAM TOPICAL 2 TIMES DAILY
Qty: 400 G | Refills: 2 | Status: SHIPPED | OUTPATIENT
Start: 2023-07-07

## 2023-07-10 ENCOUNTER — LAB VISIT (OUTPATIENT)
Dept: LAB | Facility: HOSPITAL | Age: 48
End: 2023-07-10
Attending: INTERNAL MEDICINE
Payer: COMMERCIAL

## 2023-07-10 DIAGNOSIS — Z17.1 MALIGNANT NEOPLASM OF UPPER-OUTER QUADRANT OF LEFT BREAST IN FEMALE, ESTROGEN RECEPTOR NEGATIVE: ICD-10-CM

## 2023-07-10 DIAGNOSIS — C50.412 MALIGNANT NEOPLASM OF UPPER-OUTER QUADRANT OF LEFT BREAST IN FEMALE, ESTROGEN RECEPTOR NEGATIVE: ICD-10-CM

## 2023-07-10 LAB
ALBUMIN SERPL BCP-MCNC: 3.9 G/DL (ref 3.5–5.2)
ALP SERPL-CCNC: 72 U/L (ref 55–135)
ALT SERPL W/O P-5'-P-CCNC: 21 U/L (ref 10–44)
ANION GAP SERPL CALC-SCNC: 6 MMOL/L (ref 8–16)
AST SERPL-CCNC: 27 U/L (ref 10–40)
BASOPHILS # BLD AUTO: 0.04 K/UL (ref 0–0.2)
BASOPHILS NFR BLD: 1.1 % (ref 0–1.9)
BILIRUB SERPL-MCNC: 0.7 MG/DL (ref 0.1–1)
BUN SERPL-MCNC: 10 MG/DL (ref 6–20)
CALCIUM SERPL-MCNC: 9.6 MG/DL (ref 8.7–10.5)
CHLORIDE SERPL-SCNC: 105 MMOL/L (ref 95–110)
CO2 SERPL-SCNC: 27 MMOL/L (ref 23–29)
CREAT SERPL-MCNC: 0.8 MG/DL (ref 0.5–1.4)
DIFFERENTIAL METHOD: ABNORMAL
EOSINOPHIL # BLD AUTO: 0.3 K/UL (ref 0–0.5)
EOSINOPHIL NFR BLD: 7.1 % (ref 0–8)
ERYTHROCYTE [DISTWIDTH] IN BLOOD BY AUTOMATED COUNT: 13.3 % (ref 11.5–14.5)
EST. GFR  (NO RACE VARIABLE): >60 ML/MIN/1.73 M^2
GLUCOSE SERPL-MCNC: 83 MG/DL (ref 70–110)
HCT VFR BLD AUTO: 39.8 % (ref 37–48.5)
HGB BLD-MCNC: 13.5 G/DL (ref 12–16)
IMM GRANULOCYTES # BLD AUTO: 0.01 K/UL (ref 0–0.04)
IMM GRANULOCYTES NFR BLD AUTO: 0.3 % (ref 0–0.5)
LYMPHOCYTES # BLD AUTO: 0.6 K/UL (ref 1–4.8)
LYMPHOCYTES NFR BLD: 16.6 % (ref 18–48)
MAGNESIUM SERPL-MCNC: 1.9 MG/DL (ref 1.6–2.6)
MCH RBC QN AUTO: 32 PG (ref 27–31)
MCHC RBC AUTO-ENTMCNC: 33.9 G/DL (ref 32–36)
MCV RBC AUTO: 94 FL (ref 82–98)
MONOCYTES # BLD AUTO: 0.7 K/UL (ref 0.3–1)
MONOCYTES NFR BLD: 18.9 % (ref 4–15)
NEUTROPHILS # BLD AUTO: 2.1 K/UL (ref 1.8–7.7)
NEUTROPHILS NFR BLD: 56 % (ref 38–73)
NRBC BLD-RTO: 0 /100 WBC
PLATELET # BLD AUTO: 216 K/UL (ref 150–450)
PMV BLD AUTO: 9.2 FL (ref 9.2–12.9)
POTASSIUM SERPL-SCNC: 4.2 MMOL/L (ref 3.5–5.1)
PROT SERPL-MCNC: 6.8 G/DL (ref 6–8.4)
RBC # BLD AUTO: 4.22 M/UL (ref 4–5.4)
SODIUM SERPL-SCNC: 138 MMOL/L (ref 136–145)
WBC # BLD AUTO: 3.8 K/UL (ref 3.9–12.7)

## 2023-07-10 PROCEDURE — 83735 ASSAY OF MAGNESIUM: CPT | Performed by: INTERNAL MEDICINE

## 2023-07-10 PROCEDURE — 36415 COLL VENOUS BLD VENIPUNCTURE: CPT | Performed by: INTERNAL MEDICINE

## 2023-07-10 PROCEDURE — 80053 COMPREHEN METABOLIC PANEL: CPT | Performed by: INTERNAL MEDICINE

## 2023-07-10 PROCEDURE — 85025 COMPLETE CBC W/AUTO DIFF WBC: CPT | Performed by: INTERNAL MEDICINE

## 2023-07-10 RX ORDER — DIPHENHYDRAMINE HYDROCHLORIDE 50 MG/ML
50 INJECTION INTRAMUSCULAR; INTRAVENOUS ONCE AS NEEDED
Status: CANCELLED | OUTPATIENT
Start: 2023-07-12

## 2023-07-10 RX ORDER — HEPARIN 100 UNIT/ML
500 SYRINGE INTRAVENOUS
Status: CANCELLED | OUTPATIENT
Start: 2023-07-12

## 2023-07-10 RX ORDER — EPINEPHRINE 0.3 MG/.3ML
0.3 INJECTION SUBCUTANEOUS ONCE AS NEEDED
Status: CANCELLED | OUTPATIENT
Start: 2023-07-12

## 2023-07-10 RX ORDER — SODIUM CHLORIDE 0.9 % (FLUSH) 0.9 %
10 SYRINGE (ML) INJECTION
Status: CANCELLED | OUTPATIENT
Start: 2023-07-12

## 2023-07-12 ENCOUNTER — INFUSION (OUTPATIENT)
Dept: INFUSION THERAPY | Facility: HOSPITAL | Age: 48
End: 2023-07-12
Attending: INTERNAL MEDICINE
Payer: COMMERCIAL

## 2023-07-12 VITALS
WEIGHT: 176.5 LBS | HEIGHT: 64 IN | SYSTOLIC BLOOD PRESSURE: 107 MMHG | HEART RATE: 75 BPM | DIASTOLIC BLOOD PRESSURE: 73 MMHG | BODY MASS INDEX: 30.13 KG/M2 | RESPIRATION RATE: 15 BRPM | OXYGEN SATURATION: 100 % | TEMPERATURE: 98 F

## 2023-07-12 DIAGNOSIS — C50.412 MALIGNANT NEOPLASM OF UPPER-OUTER QUADRANT OF LEFT BREAST IN FEMALE, ESTROGEN RECEPTOR NEGATIVE: ICD-10-CM

## 2023-07-12 DIAGNOSIS — T45.1X5A CHEMOTHERAPY-INDUCED NEUTROPENIA: Primary | ICD-10-CM

## 2023-07-12 DIAGNOSIS — Z17.1 MALIGNANT NEOPLASM OF UPPER-OUTER QUADRANT OF LEFT BREAST IN FEMALE, ESTROGEN RECEPTOR NEGATIVE: ICD-10-CM

## 2023-07-12 DIAGNOSIS — D70.1 CHEMOTHERAPY-INDUCED NEUTROPENIA: Primary | ICD-10-CM

## 2023-07-12 PROCEDURE — 96413 CHEMO IV INFUSION 1 HR: CPT

## 2023-07-12 PROCEDURE — 25000003 PHARM REV CODE 250: Performed by: NURSE PRACTITIONER

## 2023-07-12 PROCEDURE — 63600175 PHARM REV CODE 636 W HCPCS: Performed by: NURSE PRACTITIONER

## 2023-07-12 PROCEDURE — 77427 PR CHG RADIATION,MANGEMENT,5 TX'S: ICD-10-PCS | Mod: S$GLB,,, | Performed by: RADIOLOGY

## 2023-07-12 PROCEDURE — 77427 RADIATION TX MANAGEMENT X5: CPT | Mod: S$GLB,,, | Performed by: RADIOLOGY

## 2023-07-12 RX ORDER — EPINEPHRINE 0.3 MG/.3ML
0.3 INJECTION SUBCUTANEOUS ONCE AS NEEDED
Status: DISCONTINUED | OUTPATIENT
Start: 2023-07-12 | End: 2023-07-12 | Stop reason: HOSPADM

## 2023-07-12 RX ORDER — HEPARIN 100 UNIT/ML
500 SYRINGE INTRAVENOUS
Status: DISCONTINUED | OUTPATIENT
Start: 2023-07-12 | End: 2023-07-12 | Stop reason: HOSPADM

## 2023-07-12 RX ORDER — DIPHENHYDRAMINE HYDROCHLORIDE 50 MG/ML
50 INJECTION INTRAMUSCULAR; INTRAVENOUS ONCE AS NEEDED
Status: DISCONTINUED | OUTPATIENT
Start: 2023-07-12 | End: 2023-07-12 | Stop reason: HOSPADM

## 2023-07-12 RX ORDER — SODIUM CHLORIDE 0.9 % (FLUSH) 0.9 %
10 SYRINGE (ML) INJECTION
Status: DISCONTINUED | OUTPATIENT
Start: 2023-07-12 | End: 2023-07-12 | Stop reason: HOSPADM

## 2023-07-12 RX ADMIN — HEPARIN 500 UNITS: 100 SYRINGE at 11:07

## 2023-07-12 RX ADMIN — SODIUM CHLORIDE 200 MG: 9 INJECTION, SOLUTION INTRAVENOUS at 10:07

## 2023-07-14 ENCOUNTER — TREATMENT (OUTPATIENT)
Dept: RADIATION ONCOLOGY | Facility: CLINIC | Age: 48
End: 2023-07-14
Payer: COMMERCIAL

## 2023-07-14 PROCEDURE — G6017 INTRAFRACTION TRACK MOTION: HCPCS | Mod: S$GLB,,, | Performed by: RADIOLOGY

## 2023-07-14 PROCEDURE — 77336 RADIATION PHYSICS CONSULT: CPT | Mod: S$GLB,,, | Performed by: RADIOLOGY

## 2023-07-14 PROCEDURE — G6013 PR RADN TX DELIVERY, 11-19 MEV, >= 3 TX AREAS: ICD-10-PCS | Mod: S$GLB,,, | Performed by: RADIOLOGY

## 2023-07-14 PROCEDURE — G6013 RADIATION TREATMENT DELIVERY: HCPCS | Mod: S$GLB,,, | Performed by: RADIOLOGY

## 2023-07-14 PROCEDURE — G6017 PR INTRAFRACTION TRACK MOTION, EACH FRACTION OF TX: ICD-10-PCS | Mod: S$GLB,,, | Performed by: RADIOLOGY

## 2023-07-14 PROCEDURE — 77336 PR  RADN PHYSICS CONSULT CONTINUING: ICD-10-PCS | Mod: S$GLB,,, | Performed by: RADIOLOGY

## 2023-07-17 ENCOUNTER — LAB VISIT (OUTPATIENT)
Dept: LAB | Facility: HOSPITAL | Age: 48
End: 2023-07-17
Attending: INTERNAL MEDICINE
Payer: COMMERCIAL

## 2023-07-17 DIAGNOSIS — C50.412 MALIGNANT NEOPLASM OF UPPER-OUTER QUADRANT OF LEFT BREAST IN FEMALE, ESTROGEN RECEPTOR NEGATIVE: ICD-10-CM

## 2023-07-17 DIAGNOSIS — Z17.1 MALIGNANT NEOPLASM OF UPPER-OUTER QUADRANT OF LEFT BREAST IN FEMALE, ESTROGEN RECEPTOR NEGATIVE: ICD-10-CM

## 2023-07-17 LAB
ALBUMIN SERPL BCP-MCNC: 3.7 G/DL (ref 3.5–5.2)
ALP SERPL-CCNC: 67 U/L (ref 55–135)
ALT SERPL W/O P-5'-P-CCNC: 20 U/L (ref 10–44)
ANION GAP SERPL CALC-SCNC: 1 MMOL/L (ref 8–16)
AST SERPL-CCNC: 27 U/L (ref 10–40)
BASOPHILS # BLD AUTO: 0.03 K/UL (ref 0–0.2)
BASOPHILS NFR BLD: 0.7 % (ref 0–1.9)
BILIRUB SERPL-MCNC: 0.6 MG/DL (ref 0.1–1)
BUN SERPL-MCNC: 8 MG/DL (ref 6–20)
CALCIUM SERPL-MCNC: 9.3 MG/DL (ref 8.7–10.5)
CHLORIDE SERPL-SCNC: 106 MMOL/L (ref 95–110)
CO2 SERPL-SCNC: 28 MMOL/L (ref 23–29)
CREAT SERPL-MCNC: 0.7 MG/DL (ref 0.5–1.4)
DIFFERENTIAL METHOD: ABNORMAL
EOSINOPHIL # BLD AUTO: 0.2 K/UL (ref 0–0.5)
EOSINOPHIL NFR BLD: 4.5 % (ref 0–8)
ERYTHROCYTE [DISTWIDTH] IN BLOOD BY AUTOMATED COUNT: 12.8 % (ref 11.5–14.5)
EST. GFR  (NO RACE VARIABLE): >60 ML/MIN/1.73 M^2
GLUCOSE SERPL-MCNC: 119 MG/DL (ref 70–110)
HCT VFR BLD AUTO: 38.2 % (ref 37–48.5)
HGB BLD-MCNC: 12.9 G/DL (ref 12–16)
IMM GRANULOCYTES # BLD AUTO: 0 K/UL (ref 0–0.04)
IMM GRANULOCYTES NFR BLD AUTO: 0 % (ref 0–0.5)
LYMPHOCYTES # BLD AUTO: 0.6 K/UL (ref 1–4.8)
LYMPHOCYTES NFR BLD: 14.1 % (ref 18–48)
MAGNESIUM SERPL-MCNC: 1.7 MG/DL (ref 1.6–2.6)
MCH RBC QN AUTO: 31.7 PG (ref 27–31)
MCHC RBC AUTO-ENTMCNC: 33.8 G/DL (ref 32–36)
MCV RBC AUTO: 94 FL (ref 82–98)
MONOCYTES # BLD AUTO: 0.7 K/UL (ref 0.3–1)
MONOCYTES NFR BLD: 15.2 % (ref 4–15)
NEUTROPHILS # BLD AUTO: 2.9 K/UL (ref 1.8–7.7)
NEUTROPHILS NFR BLD: 65.5 % (ref 38–73)
NRBC BLD-RTO: 0 /100 WBC
PLATELET # BLD AUTO: 169 K/UL (ref 150–450)
PMV BLD AUTO: 8.8 FL (ref 9.2–12.9)
POTASSIUM SERPL-SCNC: 3.6 MMOL/L (ref 3.5–5.1)
PROT SERPL-MCNC: 6.4 G/DL (ref 6–8.4)
RBC # BLD AUTO: 4.07 M/UL (ref 4–5.4)
SODIUM SERPL-SCNC: 135 MMOL/L (ref 136–145)
WBC # BLD AUTO: 4.41 K/UL (ref 3.9–12.7)

## 2023-07-17 PROCEDURE — 80053 COMPREHEN METABOLIC PANEL: CPT | Performed by: INTERNAL MEDICINE

## 2023-07-17 PROCEDURE — 36415 COLL VENOUS BLD VENIPUNCTURE: CPT | Performed by: INTERNAL MEDICINE

## 2023-07-17 PROCEDURE — 85025 COMPLETE CBC W/AUTO DIFF WBC: CPT | Performed by: INTERNAL MEDICINE

## 2023-07-17 PROCEDURE — 83735 ASSAY OF MAGNESIUM: CPT | Performed by: INTERNAL MEDICINE

## 2023-07-25 ENCOUNTER — LAB VISIT (OUTPATIENT)
Dept: LAB | Facility: HOSPITAL | Age: 48
End: 2023-07-25
Attending: INTERNAL MEDICINE
Payer: COMMERCIAL

## 2023-07-25 DIAGNOSIS — C50.412 MALIGNANT NEOPLASM OF UPPER-OUTER QUADRANT OF LEFT BREAST IN FEMALE, ESTROGEN RECEPTOR NEGATIVE: ICD-10-CM

## 2023-07-25 DIAGNOSIS — Z17.1 MALIGNANT NEOPLASM OF UPPER-OUTER QUADRANT OF LEFT BREAST IN FEMALE, ESTROGEN RECEPTOR NEGATIVE: ICD-10-CM

## 2023-07-25 DIAGNOSIS — R53.83 FATIGUE, UNSPECIFIED TYPE: ICD-10-CM

## 2023-07-25 LAB
ALBUMIN SERPL BCP-MCNC: 3.6 G/DL (ref 3.5–5.2)
ALP SERPL-CCNC: 62 U/L (ref 55–135)
ALT SERPL W/O P-5'-P-CCNC: 20 U/L (ref 10–44)
ANION GAP SERPL CALC-SCNC: 4 MMOL/L (ref 8–16)
AST SERPL-CCNC: 24 U/L (ref 10–40)
BASOPHILS # BLD AUTO: 0.04 K/UL (ref 0–0.2)
BASOPHILS NFR BLD: 1 % (ref 0–1.9)
BILIRUB SERPL-MCNC: 0.5 MG/DL (ref 0.1–1)
BUN SERPL-MCNC: 11 MG/DL (ref 6–20)
CALCIUM SERPL-MCNC: 9.3 MG/DL (ref 8.7–10.5)
CHLORIDE SERPL-SCNC: 108 MMOL/L (ref 95–110)
CO2 SERPL-SCNC: 28 MMOL/L (ref 23–29)
CREAT SERPL-MCNC: 0.6 MG/DL (ref 0.5–1.4)
DIFFERENTIAL METHOD: ABNORMAL
EOSINOPHIL # BLD AUTO: 0.3 K/UL (ref 0–0.5)
EOSINOPHIL NFR BLD: 7.3 % (ref 0–8)
ERYTHROCYTE [DISTWIDTH] IN BLOOD BY AUTOMATED COUNT: 12.6 % (ref 11.5–14.5)
EST. GFR  (NO RACE VARIABLE): >60 ML/MIN/1.73 M^2
GLUCOSE SERPL-MCNC: 75 MG/DL (ref 70–110)
HCT VFR BLD AUTO: 38.1 % (ref 37–48.5)
HGB BLD-MCNC: 12.8 G/DL (ref 12–16)
IMM GRANULOCYTES # BLD AUTO: 0.01 K/UL (ref 0–0.04)
IMM GRANULOCYTES NFR BLD AUTO: 0.3 % (ref 0–0.5)
LYMPHOCYTES # BLD AUTO: 0.8 K/UL (ref 1–4.8)
LYMPHOCYTES NFR BLD: 19.7 % (ref 18–48)
MAGNESIUM SERPL-MCNC: 1.8 MG/DL (ref 1.6–2.6)
MCH RBC QN AUTO: 31.6 PG (ref 27–31)
MCHC RBC AUTO-ENTMCNC: 33.6 G/DL (ref 32–36)
MCV RBC AUTO: 94 FL (ref 82–98)
MONOCYTES # BLD AUTO: 0.6 K/UL (ref 0.3–1)
MONOCYTES NFR BLD: 15.9 % (ref 4–15)
NEUTROPHILS # BLD AUTO: 2.2 K/UL (ref 1.8–7.7)
NEUTROPHILS NFR BLD: 55.8 % (ref 38–73)
NRBC BLD-RTO: 0 /100 WBC
PLATELET # BLD AUTO: 210 K/UL (ref 150–450)
PMV BLD AUTO: 9 FL (ref 9.2–12.9)
POTASSIUM SERPL-SCNC: 3.9 MMOL/L (ref 3.5–5.1)
PROT SERPL-MCNC: 6.2 G/DL (ref 6–8.4)
RBC # BLD AUTO: 4.05 M/UL (ref 4–5.4)
SODIUM SERPL-SCNC: 140 MMOL/L (ref 136–145)
TSH SERPL DL<=0.005 MIU/L-ACNC: 3.91 UIU/ML (ref 0.34–5.6)
WBC # BLD AUTO: 3.95 K/UL (ref 3.9–12.7)

## 2023-07-25 PROCEDURE — 85025 COMPLETE CBC W/AUTO DIFF WBC: CPT | Performed by: INTERNAL MEDICINE

## 2023-07-25 PROCEDURE — 36415 COLL VENOUS BLD VENIPUNCTURE: CPT | Performed by: INTERNAL MEDICINE

## 2023-07-25 PROCEDURE — 84443 ASSAY THYROID STIM HORMONE: CPT | Performed by: INTERNAL MEDICINE

## 2023-07-25 PROCEDURE — 80053 COMPREHEN METABOLIC PANEL: CPT | Performed by: INTERNAL MEDICINE

## 2023-07-25 PROCEDURE — 83735 ASSAY OF MAGNESIUM: CPT | Performed by: INTERNAL MEDICINE

## 2023-07-25 NOTE — PROGRESS NOTES
PROGRESS NOTE    Subjective:       Patient ID: Karyna Escoto is a 48 y.o. female.    6/9/2022:  Dx Mammo:  1.8cm mass in the left breast towards the left axilla.   Deep to the mass 2 lymph nodes are identified.     7/5/2022:  Left breast core biopsy:  Grade 2 IDCA with DCIS  ER: 0.03%, RI: 8.8%, HER2: 0  TRIPLE NEGATIVE    7/20/2022:  Left axilla LN needle biopsy:  Invasive ductal carcinoma  ER: 0%, RI: 40%, HER2 negative(0)    PLAN:  Neoadjuvant chemo/IO(Keynote 522)-surgery-radiation.     Pem/Tax/Carb:  Cycle 1: 8/11/2022  Cycle 2: 9/8/2022  Cycle 3: 10/6/2022  Cycle 4: 10/27/2022  Begin Shukri/Cytox/Pem  Cycle 5: 12/1/2022-----MUGA 8/10/2022-EF: 54%  Cycle 6: 12/22/2022  Cycle 7: 1/12/2023  Cycle 8: 2/2/2023    Cycle 9: 6/21/2023  Cycle 10: 7/10/2023-due    Surgery 3/17/2023-CR!!  Bilateral nipple sparing mastectomy  Right: atypical lobular hyperplasia, no carcinoma  Left:  no residual carcinoma or dcis seen, SLN neg x 5,   pT0N0    Cycle 9: 5/30/2023  Cycle 10: 7/12/2023  Cycle 11: 8/2/2023- Due    2/8/2023:  MRI:   Resolution of previous left breast mass and previous enlarged left axillary lymph nodes, compatible with favorable response to therapy.  No new disease.    Surgery  3/17/2023 at [facility]: complete response to chemotherapy    8/3/2022 Photos:          8/23/2022 Photo:      10/4/2022 Photo:    Lesion is no longer palpable on 10/4/2022.     11/15/2022          Chief Complaint:    Triple negative breast cancer follow up.     History of Present Illness:   Karyna Escoto is a 48 y.o. female who presents for follow up of breast cancer.      Ms. Escoto resumed pembro on 5/30 and tolerating this well.  Patient to returns to [place] in sept for evaluation to complete reconstruction. Possible in the beginning of 2024 for actual surgery.    She has completed Radiation and is well healed. Still have some fatigue and generalized aches and  pains.    Family and Social history reviewed and is unchanged from 8/3/2022      ROS:  Review of Systems   Constitutional:  Positive for fatigue. Negative for appetite change, fever and unexpected weight change.   HENT:  Negative for mouth sores.    Eyes:  Negative for visual disturbance.   Respiratory:  Negative for cough and shortness of breath.    Cardiovascular:  Negative for chest pain and leg swelling.   Gastrointestinal:  Negative for abdominal pain, blood in stool and diarrhea.   Genitourinary:  Negative for frequency and hematuria.   Musculoskeletal:  Negative for back pain.   Skin:  Negative for rash.   Neurological:  Positive for headaches.   Hematological:  Negative for adenopathy.   Psychiatric/Behavioral:  The patient is not nervous/anxious.         Current Outpatient Medications:     bisacodyL (DULCOLAX) 5 mg EC tablet, Take 5 mg by mouth daily as needed for Constipation., Disp: , Rfl:     cetirizine (ZYRTEC) 10 mg Cap, Take 1 tablet by mouth once daily., Disp: , Rfl:     dexAMETHasone (DECADRON) 2 MG tablet, Take 1 tablet (2 mg total) by mouth daily with breakfast., Disp: 30 tablet, Rfl: 1    famotidine (PEPCID) 10 MG tablet, Take 10 mg by mouth 2 (two) times daily., Disp: , Rfl:     fluticasone propionate (FLONASE) 50 mcg/actuation nasal spray, 1 spray (50 mcg total) by Each Nostril route once daily., Disp: 15.8 mL, Rfl: 5    hydrOXYzine HCL (ATARAX) 10 MG Tab, Take 10 mg by mouth 3 (three) times daily as needed., Disp: , Rfl:     Lactobacillus rhamnosus GG (CULTURELLE) 10 billion cell capsule, Take 1 capsule by mouth once daily., Disp: , Rfl:     LIDOcaine-prilocaine (EMLA) cream, Apply topically as needed. Apply 30 mins prior to port access, Disp: 30 g, Rfl: 5    loperamide HCl (IMODIUM A-D ORAL), Take 1 tablet by mouth daily as needed., Disp: , Rfl:     methocarbamoL (ROBAXIN) 500 MG Tab, Take 1,000 mg by mouth 2 (two) times daily., Disp: , Rfl:     mupirocin (BACTROBAN) 2 % ointment, SMARTSI  "Application Topical 2-3 Times Daily, Disp: , Rfl:     ondansetron (ZOFRAN) 8 MG tablet, Take 1 tablet (8 mg total) by mouth every 8 (eight) hours as needed., Disp: 30 tablet, Rfl: 5    oxyCODONE-acetaminophen (PERCOCET)  mg per tablet, Take 1 tablet by mouth every 6 (six) hours as needed for Pain., Disp: 40 tablet, Rfl: 0    promethazine (PHENERGAN) 25 MG tablet, Take 1 tablet (25 mg total) by mouth every 4 to 6 hours as needed., Disp: 30 tablet, Rfl: 5    sertraline (ZOLOFT) 50 MG tablet, Take 50 mg by mouth., Disp: , Rfl:     silver sulfADIAZINE 1% (SILVADENE) 1 % cream, Apply topically 2 (two) times daily., Disp: 400 g, Rfl: 2        Objective:       Physical Examination:     BP (!) 116/57   Pulse 69   Temp 98 °F (36.7 °C)   Resp 18   Ht 5' 4" (1.626 m)   Wt 79.8 kg (176 lb)   BMI 30.21 kg/m²     Physical Exam  Constitutional:       Appearance: Normal appearance. She is well-developed.   HENT:      Head: Normocephalic and atraumatic.      Right Ear: External ear normal.      Left Ear: External ear normal.      Nose: Nose normal.      Mouth/Throat:      Mouth: Mucous membranes are moist.   Eyes:      Conjunctiva/sclera: Conjunctivae normal.      Pupils: Pupils are equal, round, and reactive to light.   Neck:      Thyroid: No thyromegaly.      Trachea: No tracheal deviation.   Cardiovascular:      Rate and Rhythm: Normal rate and regular rhythm.      Heart sounds: Normal heart sounds.   Pulmonary:      Effort: Pulmonary effort is normal.      Breath sounds: Normal breath sounds.   Abdominal:      General: Bowel sounds are normal. There is no distension.      Palpations: Abdomen is soft. There is no mass.      Tenderness: There is no abdominal tenderness.   Skin:     General: Skin is warm and dry.      Findings: No rash.   Neurological:      General: No focal deficit present.      Mental Status: She is alert and oriented to person, place, and time.      Comments: Neuro intact througout   Psychiatric:    "      Mood and Affect: Mood normal.         Behavior: Behavior normal.         Thought Content: Thought content normal.         Judgment: Judgment normal.       Labs:   Recent Results (from the past 336 hour(s))   CBC Auto Differential    Collection Time: 07/25/23 10:21 AM   Result Value Ref Range    WBC 3.95 3.90 - 12.70 K/uL    Hemoglobin 12.8 12.0 - 16.0 g/dL    Hematocrit 38.1 37.0 - 48.5 %    Platelets 210 150 - 450 K/uL   CBC Auto Differential    Collection Time: 07/17/23 12:09 PM   Result Value Ref Range    WBC 4.41 3.90 - 12.70 K/uL    Hemoglobin 12.9 12.0 - 16.0 g/dL    Hematocrit 38.2 37.0 - 48.5 %    Platelets 169 150 - 450 K/uL       CMP  Sodium   Date Value Ref Range Status   07/25/2023 140 136 - 145 mmol/L Final     Potassium   Date Value Ref Range Status   07/25/2023 3.9 3.5 - 5.1 mmol/L Final     Chloride   Date Value Ref Range Status   07/25/2023 108 95 - 110 mmol/L Final     CO2   Date Value Ref Range Status   07/25/2023 28 23 - 29 mmol/L Final     Glucose   Date Value Ref Range Status   07/25/2023 75 70 - 110 mg/dL Final     BUN   Date Value Ref Range Status   07/25/2023 11 6 - 20 mg/dL Final     Creatinine   Date Value Ref Range Status   07/25/2023 0.6 0.5 - 1.4 mg/dL Final     Calcium   Date Value Ref Range Status   07/25/2023 9.3 8.7 - 10.5 mg/dL Final     Total Protein   Date Value Ref Range Status   07/25/2023 6.2 6.0 - 8.4 g/dL Final     Albumin   Date Value Ref Range Status   07/25/2023 3.6 3.5 - 5.2 g/dL Final     Total Bilirubin   Date Value Ref Range Status   07/25/2023 0.5 0.1 - 1.0 mg/dL Final     Comment:     For infants and newborns, interpretation of results should be based  on gestational age, weight and in agreement with clinical  observations.    Premature Infant recommended reference ranges:  Up to 24 hours.............<8.0 mg/dL  Up to 48 hours............<12.0 mg/dL  3-5 days..................<15.0 mg/dL  6-29 days.................<15.0 mg/dL       Alkaline Phosphatase   Date  Value Ref Range Status   07/25/2023 62 55 - 135 U/L Final     AST   Date Value Ref Range Status   07/25/2023 24 10 - 40 U/L Final     ALT   Date Value Ref Range Status   07/25/2023 20 10 - 44 U/L Final     Anion Gap   Date Value Ref Range Status   07/25/2023 4 (L) 8 - 16 mmol/L Final     No results found for: CEA      Assessment/Plan:     Problem List Items Addressed This Visit          Oncology    Left Breast Cancer-TRIPLE NEGATIVE - Primary    Relevant Medications    oxyCODONE-acetaminophen (PERCOCET)  mg per tablet    Cancer associated pain     Other Visit Diagnoses       Acute pain        Relevant Medications    oxyCODONE-acetaminophen (PERCOCET)  mg per tablet    Fatigue, unspecified type              Triple Negative breast cancer- Continue Cycle 11 Keytruda 8/2/2023 and return to [facility] in Sept as scheduled.  2. Cancer related pain- Refill Percocet today  3. Fatigue- Encouraged activity  4. Generalized aches and pains- Tylenol or Ibuprofen PRN    Discussion:     Follow up in about 3 weeks (around 8/16/2023) for with Dr. Peralta.      Electronically signed by Ana Quigley, MSN, APRN, AGNP-C, OCN

## 2023-07-26 ENCOUNTER — OFFICE VISIT (OUTPATIENT)
Dept: HEMATOLOGY/ONCOLOGY | Facility: CLINIC | Age: 48
End: 2023-07-26
Payer: COMMERCIAL

## 2023-07-26 VITALS
WEIGHT: 176 LBS | RESPIRATION RATE: 18 BRPM | BODY MASS INDEX: 30.05 KG/M2 | TEMPERATURE: 98 F | DIASTOLIC BLOOD PRESSURE: 57 MMHG | HEIGHT: 64 IN | HEART RATE: 69 BPM | SYSTOLIC BLOOD PRESSURE: 116 MMHG

## 2023-07-26 DIAGNOSIS — R52 ACUTE PAIN: ICD-10-CM

## 2023-07-26 DIAGNOSIS — R53.83 FATIGUE, UNSPECIFIED TYPE: ICD-10-CM

## 2023-07-26 DIAGNOSIS — Z17.1 MALIGNANT NEOPLASM OF UPPER-OUTER QUADRANT OF LEFT BREAST IN FEMALE, ESTROGEN RECEPTOR NEGATIVE: Primary | ICD-10-CM

## 2023-07-26 DIAGNOSIS — C50.412 MALIGNANT NEOPLASM OF UPPER-OUTER QUADRANT OF LEFT BREAST IN FEMALE, ESTROGEN RECEPTOR NEGATIVE: Primary | ICD-10-CM

## 2023-07-26 DIAGNOSIS — C50.412 MALIGNANT NEOPLASM OF UPPER-OUTER QUADRANT OF LEFT BREAST IN FEMALE, ESTROGEN RECEPTOR NEGATIVE: ICD-10-CM

## 2023-07-26 DIAGNOSIS — Z17.1 MALIGNANT NEOPLASM OF UPPER-OUTER QUADRANT OF LEFT BREAST IN FEMALE, ESTROGEN RECEPTOR NEGATIVE: ICD-10-CM

## 2023-07-26 DIAGNOSIS — G89.3 CANCER ASSOCIATED PAIN: ICD-10-CM

## 2023-07-26 PROCEDURE — 1159F PR MEDICATION LIST DOCUMENTED IN MEDICAL RECORD: ICD-10-PCS | Mod: CPTII,S$GLB,, | Performed by: NURSE PRACTITIONER

## 2023-07-26 PROCEDURE — 3074F PR MOST RECENT SYSTOLIC BLOOD PRESSURE < 130 MM HG: ICD-10-PCS | Mod: CPTII,S$GLB,, | Performed by: NURSE PRACTITIONER

## 2023-07-26 PROCEDURE — 99214 PR OFFICE/OUTPT VISIT, EST, LEVL IV, 30-39 MIN: ICD-10-PCS | Mod: S$GLB,,, | Performed by: NURSE PRACTITIONER

## 2023-07-26 PROCEDURE — 3008F BODY MASS INDEX DOCD: CPT | Mod: CPTII,S$GLB,, | Performed by: NURSE PRACTITIONER

## 2023-07-26 PROCEDURE — 3008F PR BODY MASS INDEX (BMI) DOCUMENTED: ICD-10-PCS | Mod: CPTII,S$GLB,, | Performed by: NURSE PRACTITIONER

## 2023-07-26 PROCEDURE — 3078F DIAST BP <80 MM HG: CPT | Mod: CPTII,S$GLB,, | Performed by: NURSE PRACTITIONER

## 2023-07-26 PROCEDURE — 3074F SYST BP LT 130 MM HG: CPT | Mod: CPTII,S$GLB,, | Performed by: NURSE PRACTITIONER

## 2023-07-26 PROCEDURE — 3078F PR MOST RECENT DIASTOLIC BLOOD PRESSURE < 80 MM HG: ICD-10-PCS | Mod: CPTII,S$GLB,, | Performed by: NURSE PRACTITIONER

## 2023-07-26 PROCEDURE — 1160F PR REVIEW ALL MEDS BY PRESCRIBER/CLIN PHARMACIST DOCUMENTED: ICD-10-PCS | Mod: CPTII,S$GLB,, | Performed by: NURSE PRACTITIONER

## 2023-07-26 PROCEDURE — 1160F RVW MEDS BY RX/DR IN RCRD: CPT | Mod: CPTII,S$GLB,, | Performed by: NURSE PRACTITIONER

## 2023-07-26 PROCEDURE — 99214 OFFICE O/P EST MOD 30 MIN: CPT | Mod: S$GLB,,, | Performed by: NURSE PRACTITIONER

## 2023-07-26 PROCEDURE — 1159F MED LIST DOCD IN RCRD: CPT | Mod: CPTII,S$GLB,, | Performed by: NURSE PRACTITIONER

## 2023-07-26 RX ORDER — OXYCODONE AND ACETAMINOPHEN 10; 325 MG/1; MG/1
1 TABLET ORAL EVERY 6 HOURS PRN
Qty: 40 TABLET | Refills: 0 | Status: SHIPPED | OUTPATIENT
Start: 2023-07-26 | End: 2023-07-31

## 2023-07-31 ENCOUNTER — LAB VISIT (OUTPATIENT)
Dept: LAB | Facility: HOSPITAL | Age: 48
End: 2023-07-31
Attending: INTERNAL MEDICINE
Payer: COMMERCIAL

## 2023-07-31 ENCOUNTER — PATIENT MESSAGE (OUTPATIENT)
Dept: HEMATOLOGY/ONCOLOGY | Facility: CLINIC | Age: 48
End: 2023-07-31

## 2023-07-31 DIAGNOSIS — R52 ACUTE PAIN: ICD-10-CM

## 2023-07-31 DIAGNOSIS — Z17.1 MALIGNANT NEOPLASM OF UPPER-OUTER QUADRANT OF LEFT BREAST IN FEMALE, ESTROGEN RECEPTOR NEGATIVE: Primary | ICD-10-CM

## 2023-07-31 DIAGNOSIS — Z17.1 MALIGNANT NEOPLASM OF UPPER-OUTER QUADRANT OF LEFT BREAST IN FEMALE, ESTROGEN RECEPTOR NEGATIVE: ICD-10-CM

## 2023-07-31 DIAGNOSIS — C50.412 MALIGNANT NEOPLASM OF UPPER-OUTER QUADRANT OF LEFT BREAST IN FEMALE, ESTROGEN RECEPTOR NEGATIVE: ICD-10-CM

## 2023-07-31 DIAGNOSIS — C50.412 MALIGNANT NEOPLASM OF UPPER-OUTER QUADRANT OF LEFT BREAST IN FEMALE, ESTROGEN RECEPTOR NEGATIVE: Primary | ICD-10-CM

## 2023-07-31 LAB
ALBUMIN SERPL BCP-MCNC: 3.5 G/DL (ref 3.5–5.2)
ALP SERPL-CCNC: 66 U/L (ref 55–135)
ALT SERPL W/O P-5'-P-CCNC: 23 U/L (ref 10–44)
ANION GAP SERPL CALC-SCNC: 10 MMOL/L (ref 8–16)
AST SERPL-CCNC: 24 U/L (ref 10–40)
BASOPHILS # BLD AUTO: 0.04 K/UL (ref 0–0.2)
BASOPHILS NFR BLD: 1.3 % (ref 0–1.9)
BILIRUB SERPL-MCNC: 0.6 MG/DL (ref 0.1–1)
BUN SERPL-MCNC: 9 MG/DL (ref 6–20)
CALCIUM SERPL-MCNC: 9.4 MG/DL (ref 8.7–10.5)
CHLORIDE SERPL-SCNC: 103 MMOL/L (ref 95–110)
CO2 SERPL-SCNC: 26 MMOL/L (ref 23–29)
CREAT SERPL-MCNC: 0.7 MG/DL (ref 0.5–1.4)
DIFFERENTIAL METHOD: ABNORMAL
EOSINOPHIL # BLD AUTO: 0.3 K/UL (ref 0–0.5)
EOSINOPHIL NFR BLD: 9.7 % (ref 0–8)
ERYTHROCYTE [DISTWIDTH] IN BLOOD BY AUTOMATED COUNT: 12.4 % (ref 11.5–14.5)
EST. GFR  (NO RACE VARIABLE): >60 ML/MIN/1.73 M^2
GLUCOSE SERPL-MCNC: 91 MG/DL (ref 70–110)
HCT VFR BLD AUTO: 37.7 % (ref 37–48.5)
HGB BLD-MCNC: 13 G/DL (ref 12–16)
IMM GRANULOCYTES # BLD AUTO: 0 K/UL (ref 0–0.04)
IMM GRANULOCYTES NFR BLD AUTO: 0 % (ref 0–0.5)
LYMPHOCYTES # BLD AUTO: 0.8 K/UL (ref 1–4.8)
LYMPHOCYTES NFR BLD: 24.5 % (ref 18–48)
MAGNESIUM SERPL-MCNC: 1.8 MG/DL (ref 1.6–2.6)
MCH RBC QN AUTO: 32.1 PG (ref 27–31)
MCHC RBC AUTO-ENTMCNC: 34.5 G/DL (ref 32–36)
MCV RBC AUTO: 93 FL (ref 82–98)
MONOCYTES # BLD AUTO: 0.6 K/UL (ref 0.3–1)
MONOCYTES NFR BLD: 20.1 % (ref 4–15)
NEUTROPHILS # BLD AUTO: 1.4 K/UL (ref 1.8–7.7)
NEUTROPHILS NFR BLD: 44.4 % (ref 38–73)
NRBC BLD-RTO: 0 /100 WBC
PLATELET # BLD AUTO: 165 K/UL (ref 150–450)
PMV BLD AUTO: 8.6 FL (ref 9.2–12.9)
POTASSIUM SERPL-SCNC: 4 MMOL/L (ref 3.5–5.1)
PROT SERPL-MCNC: 6.5 G/DL (ref 6–8.4)
RBC # BLD AUTO: 4.05 M/UL (ref 4–5.4)
SODIUM SERPL-SCNC: 139 MMOL/L (ref 136–145)
WBC # BLD AUTO: 3.19 K/UL (ref 3.9–12.7)

## 2023-07-31 PROCEDURE — 85025 COMPLETE CBC W/AUTO DIFF WBC: CPT | Performed by: INTERNAL MEDICINE

## 2023-07-31 PROCEDURE — 83735 ASSAY OF MAGNESIUM: CPT | Performed by: INTERNAL MEDICINE

## 2023-07-31 PROCEDURE — 80053 COMPREHEN METABOLIC PANEL: CPT | Performed by: INTERNAL MEDICINE

## 2023-07-31 PROCEDURE — 36415 COLL VENOUS BLD VENIPUNCTURE: CPT | Performed by: INTERNAL MEDICINE

## 2023-07-31 RX ORDER — HYDROCODONE BITARTRATE AND ACETAMINOPHEN 10; 325 MG/1; MG/1
1 TABLET ORAL EVERY 6 HOURS PRN
Qty: 40 TABLET | Refills: 0 | Status: SHIPPED | OUTPATIENT
Start: 2023-07-31 | End: 2023-09-05 | Stop reason: SDUPTHER

## 2023-08-01 ENCOUNTER — CLINICAL SUPPORT (OUTPATIENT)
Dept: RADIATION ONCOLOGY | Facility: CLINIC | Age: 48
End: 2023-08-01
Payer: COMMERCIAL

## 2023-08-01 DIAGNOSIS — Z17.1 MALIGNANT NEOPLASM OF UPPER-OUTER QUADRANT OF LEFT BREAST IN FEMALE, ESTROGEN RECEPTOR NEGATIVE: Primary | ICD-10-CM

## 2023-08-01 DIAGNOSIS — C50.412 MALIGNANT NEOPLASM OF UPPER-OUTER QUADRANT OF LEFT BREAST IN FEMALE, ESTROGEN RECEPTOR NEGATIVE: Primary | ICD-10-CM

## 2023-08-01 RX ORDER — HEPARIN 100 UNIT/ML
500 SYRINGE INTRAVENOUS
Status: CANCELLED | OUTPATIENT
Start: 2023-08-02

## 2023-08-01 RX ORDER — DIPHENHYDRAMINE HYDROCHLORIDE 50 MG/ML
50 INJECTION INTRAMUSCULAR; INTRAVENOUS ONCE AS NEEDED
Status: CANCELLED | OUTPATIENT
Start: 2023-08-02

## 2023-08-01 RX ORDER — SODIUM CHLORIDE 0.9 % (FLUSH) 0.9 %
10 SYRINGE (ML) INJECTION
Status: CANCELLED | OUTPATIENT
Start: 2023-08-02

## 2023-08-01 RX ORDER — EPINEPHRINE 0.3 MG/.3ML
0.3 INJECTION SUBCUTANEOUS ONCE AS NEEDED
Status: CANCELLED | OUTPATIENT
Start: 2023-08-02

## 2023-08-01 NOTE — PROGRESS NOTES
DIAGNOSIS: IIA, qE3iM3O6 g2 IDC of UOQ L breast, ER(-)/NH(+)/HER2(-) converted to ypT0N0(sn)    TREATMENT  Remained on adjuvant Keytruda and completed adjuvant radiotherapy, 5040 cGy to left chest wall and draining lymphatics ending July 14, 2023.  Treatment well tolerated with expected radiation dermatitis and fatigue.    Contacted patient today for 3 week checkup.  Patient reports energy level has corrected to 90%.  She continues to moisturize the skin denies peeling or discomfort.  Denies fever or chills.  Remains on Keytruda.    A/P  1.  Excellent tolerance and recovery from radiotherapy.  Continue moisturizers and range of motion exercises for left upper extremity.  Follow-up with Plastic surgery.  2.  Follow-up with Dr. Patterson; on Keytruda  3.  Return to clinic 6mos.

## 2023-08-02 ENCOUNTER — INFUSION (OUTPATIENT)
Dept: INFUSION THERAPY | Facility: HOSPITAL | Age: 48
End: 2023-08-02
Attending: INTERNAL MEDICINE
Payer: COMMERCIAL

## 2023-08-02 VITALS
BODY MASS INDEX: 29.79 KG/M2 | OXYGEN SATURATION: 99 % | TEMPERATURE: 98 F | SYSTOLIC BLOOD PRESSURE: 114 MMHG | HEIGHT: 64 IN | HEART RATE: 76 BPM | DIASTOLIC BLOOD PRESSURE: 79 MMHG | WEIGHT: 174.5 LBS | RESPIRATION RATE: 18 BRPM

## 2023-08-02 DIAGNOSIS — C50.412 MALIGNANT NEOPLASM OF UPPER-OUTER QUADRANT OF LEFT BREAST IN FEMALE, ESTROGEN RECEPTOR NEGATIVE: ICD-10-CM

## 2023-08-02 DIAGNOSIS — T45.1X5A CHEMOTHERAPY-INDUCED NEUTROPENIA: Primary | ICD-10-CM

## 2023-08-02 DIAGNOSIS — Z17.1 MALIGNANT NEOPLASM OF UPPER-OUTER QUADRANT OF LEFT BREAST IN FEMALE, ESTROGEN RECEPTOR NEGATIVE: ICD-10-CM

## 2023-08-02 DIAGNOSIS — D70.1 CHEMOTHERAPY-INDUCED NEUTROPENIA: Primary | ICD-10-CM

## 2023-08-02 PROCEDURE — 96413 CHEMO IV INFUSION 1 HR: CPT

## 2023-08-02 PROCEDURE — 25000003 PHARM REV CODE 250: Performed by: NURSE PRACTITIONER

## 2023-08-02 PROCEDURE — 63600175 PHARM REV CODE 636 W HCPCS: Performed by: NURSE PRACTITIONER

## 2023-08-02 RX ORDER — HEPARIN 100 UNIT/ML
500 SYRINGE INTRAVENOUS
Status: DISCONTINUED | OUTPATIENT
Start: 2023-08-02 | End: 2023-08-02 | Stop reason: HOSPADM

## 2023-08-02 RX ORDER — SODIUM CHLORIDE 0.9 % (FLUSH) 0.9 %
10 SYRINGE (ML) INJECTION
Status: DISCONTINUED | OUTPATIENT
Start: 2023-08-02 | End: 2023-08-02 | Stop reason: HOSPADM

## 2023-08-02 RX ADMIN — SODIUM CHLORIDE 200 MG: 9 INJECTION, SOLUTION INTRAVENOUS at 10:08

## 2023-08-02 RX ADMIN — HEPARIN 500 UNITS: 100 SYRINGE at 11:08

## 2023-08-07 ENCOUNTER — LAB VISIT (OUTPATIENT)
Dept: LAB | Facility: HOSPITAL | Age: 48
End: 2023-08-07
Attending: NURSE PRACTITIONER
Payer: COMMERCIAL

## 2023-08-07 DIAGNOSIS — C50.412 MALIGNANT NEOPLASM OF UPPER-OUTER QUADRANT OF LEFT BREAST IN FEMALE, ESTROGEN RECEPTOR NEGATIVE: ICD-10-CM

## 2023-08-07 DIAGNOSIS — R53.83 FATIGUE, UNSPECIFIED TYPE: ICD-10-CM

## 2023-08-07 DIAGNOSIS — Z17.1 MALIGNANT NEOPLASM OF UPPER-OUTER QUADRANT OF LEFT BREAST IN FEMALE, ESTROGEN RECEPTOR NEGATIVE: ICD-10-CM

## 2023-08-07 LAB
ALBUMIN SERPL BCP-MCNC: 3.6 G/DL (ref 3.5–5.2)
ALBUMIN SERPL BCP-MCNC: 3.6 G/DL (ref 3.5–5.2)
ALP SERPL-CCNC: 50 U/L (ref 55–135)
ALP SERPL-CCNC: 50 U/L (ref 55–135)
ALT SERPL W/O P-5'-P-CCNC: 18 U/L (ref 10–44)
ALT SERPL W/O P-5'-P-CCNC: 18 U/L (ref 10–44)
ANION GAP SERPL CALC-SCNC: 4 MMOL/L (ref 8–16)
ANION GAP SERPL CALC-SCNC: 4 MMOL/L (ref 8–16)
AST SERPL-CCNC: 19 U/L (ref 10–40)
AST SERPL-CCNC: 19 U/L (ref 10–40)
BASOPHILS # BLD AUTO: 0.04 K/UL (ref 0–0.2)
BASOPHILS NFR BLD: 1.2 % (ref 0–1.9)
BILIRUB SERPL-MCNC: 0.6 MG/DL (ref 0.1–1)
BILIRUB SERPL-MCNC: 0.6 MG/DL (ref 0.1–1)
BUN SERPL-MCNC: 11 MG/DL (ref 6–20)
BUN SERPL-MCNC: 11 MG/DL (ref 6–20)
CALCIUM SERPL-MCNC: 9.6 MG/DL (ref 8.7–10.5)
CALCIUM SERPL-MCNC: 9.6 MG/DL (ref 8.7–10.5)
CHLORIDE SERPL-SCNC: 107 MMOL/L (ref 95–110)
CHLORIDE SERPL-SCNC: 107 MMOL/L (ref 95–110)
CO2 SERPL-SCNC: 28 MMOL/L (ref 23–29)
CO2 SERPL-SCNC: 28 MMOL/L (ref 23–29)
CREAT SERPL-MCNC: 0.8 MG/DL (ref 0.5–1.4)
CREAT SERPL-MCNC: 0.8 MG/DL (ref 0.5–1.4)
DIFFERENTIAL METHOD: ABNORMAL
EOSINOPHIL # BLD AUTO: 0.3 K/UL (ref 0–0.5)
EOSINOPHIL NFR BLD: 7.9 % (ref 0–8)
ERYTHROCYTE [DISTWIDTH] IN BLOOD BY AUTOMATED COUNT: 12 % (ref 11.5–14.5)
EST. GFR  (NO RACE VARIABLE): >60 ML/MIN/1.73 M^2
EST. GFR  (NO RACE VARIABLE): >60 ML/MIN/1.73 M^2
GLUCOSE SERPL-MCNC: 92 MG/DL (ref 70–110)
GLUCOSE SERPL-MCNC: 92 MG/DL (ref 70–110)
HCT VFR BLD AUTO: 37.3 % (ref 37–48.5)
HGB BLD-MCNC: 12.9 G/DL (ref 12–16)
IMM GRANULOCYTES # BLD AUTO: 0.01 K/UL (ref 0–0.04)
IMM GRANULOCYTES NFR BLD AUTO: 0.3 % (ref 0–0.5)
LYMPHOCYTES # BLD AUTO: 0.9 K/UL (ref 1–4.8)
LYMPHOCYTES NFR BLD: 25.8 % (ref 18–48)
MAGNESIUM SERPL-MCNC: 1.8 MG/DL (ref 1.6–2.6)
MCH RBC QN AUTO: 31.6 PG (ref 27–31)
MCHC RBC AUTO-ENTMCNC: 34.6 G/DL (ref 32–36)
MCV RBC AUTO: 91 FL (ref 82–98)
MONOCYTES # BLD AUTO: 0.6 K/UL (ref 0.3–1)
MONOCYTES NFR BLD: 16.7 % (ref 4–15)
NEUTROPHILS # BLD AUTO: 1.6 K/UL (ref 1.8–7.7)
NEUTROPHILS NFR BLD: 48.1 % (ref 38–73)
NRBC BLD-RTO: 0 /100 WBC
PLATELET # BLD AUTO: 156 K/UL (ref 150–450)
PMV BLD AUTO: 8.7 FL (ref 9.2–12.9)
POTASSIUM SERPL-SCNC: 3.9 MMOL/L (ref 3.5–5.1)
POTASSIUM SERPL-SCNC: 3.9 MMOL/L (ref 3.5–5.1)
PROT SERPL-MCNC: 6.4 G/DL (ref 6–8.4)
PROT SERPL-MCNC: 6.4 G/DL (ref 6–8.4)
RBC # BLD AUTO: 4.08 M/UL (ref 4–5.4)
SODIUM SERPL-SCNC: 139 MMOL/L (ref 136–145)
SODIUM SERPL-SCNC: 139 MMOL/L (ref 136–145)
WBC # BLD AUTO: 3.29 K/UL (ref 3.9–12.7)

## 2023-08-07 PROCEDURE — 36415 COLL VENOUS BLD VENIPUNCTURE: CPT | Performed by: INTERNAL MEDICINE

## 2023-08-07 PROCEDURE — 85025 COMPLETE CBC W/AUTO DIFF WBC: CPT | Performed by: INTERNAL MEDICINE

## 2023-08-07 PROCEDURE — 80053 COMPREHEN METABOLIC PANEL: CPT | Performed by: NURSE PRACTITIONER

## 2023-08-07 PROCEDURE — 83735 ASSAY OF MAGNESIUM: CPT | Performed by: INTERNAL MEDICINE

## 2023-08-14 ENCOUNTER — LAB VISIT (OUTPATIENT)
Dept: LAB | Facility: HOSPITAL | Age: 48
End: 2023-08-14
Attending: INTERNAL MEDICINE
Payer: COMMERCIAL

## 2023-08-14 DIAGNOSIS — Z17.1 MALIGNANT NEOPLASM OF UPPER-OUTER QUADRANT OF LEFT BREAST IN FEMALE, ESTROGEN RECEPTOR NEGATIVE: ICD-10-CM

## 2023-08-14 DIAGNOSIS — C50.412 MALIGNANT NEOPLASM OF UPPER-OUTER QUADRANT OF LEFT BREAST IN FEMALE, ESTROGEN RECEPTOR NEGATIVE: ICD-10-CM

## 2023-08-14 LAB
ALBUMIN SERPL BCP-MCNC: 3.6 G/DL (ref 3.5–5.2)
ALP SERPL-CCNC: 55 U/L (ref 55–135)
ALT SERPL W/O P-5'-P-CCNC: 20 U/L (ref 10–44)
ANION GAP SERPL CALC-SCNC: 6 MMOL/L (ref 8–16)
AST SERPL-CCNC: 27 U/L (ref 10–40)
BASOPHILS # BLD AUTO: 0.03 K/UL (ref 0–0.2)
BASOPHILS NFR BLD: 0.8 % (ref 0–1.9)
BILIRUB SERPL-MCNC: 0.7 MG/DL (ref 0.1–1)
BUN SERPL-MCNC: 9 MG/DL (ref 6–20)
CALCIUM SERPL-MCNC: 9.5 MG/DL (ref 8.7–10.5)
CHLORIDE SERPL-SCNC: 104 MMOL/L (ref 95–110)
CO2 SERPL-SCNC: 28 MMOL/L (ref 23–29)
CREAT SERPL-MCNC: 0.8 MG/DL (ref 0.5–1.4)
DIFFERENTIAL METHOD: ABNORMAL
EOSINOPHIL # BLD AUTO: 0.2 K/UL (ref 0–0.5)
EOSINOPHIL NFR BLD: 5.7 % (ref 0–8)
ERYTHROCYTE [DISTWIDTH] IN BLOOD BY AUTOMATED COUNT: 12.3 % (ref 11.5–14.5)
EST. GFR  (NO RACE VARIABLE): >60 ML/MIN/1.73 M^2
GLUCOSE SERPL-MCNC: 96 MG/DL (ref 70–110)
HCT VFR BLD AUTO: 36.7 % (ref 37–48.5)
HGB BLD-MCNC: 12.5 G/DL (ref 12–16)
IMM GRANULOCYTES # BLD AUTO: 0.01 K/UL (ref 0–0.04)
IMM GRANULOCYTES NFR BLD AUTO: 0.3 % (ref 0–0.5)
LYMPHOCYTES # BLD AUTO: 1 K/UL (ref 1–4.8)
LYMPHOCYTES NFR BLD: 24.8 % (ref 18–48)
MAGNESIUM SERPL-MCNC: 1.8 MG/DL (ref 1.6–2.6)
MCH RBC QN AUTO: 31.7 PG (ref 27–31)
MCHC RBC AUTO-ENTMCNC: 34.1 G/DL (ref 32–36)
MCV RBC AUTO: 93 FL (ref 82–98)
MONOCYTES # BLD AUTO: 0.7 K/UL (ref 0.3–1)
MONOCYTES NFR BLD: 17.5 % (ref 4–15)
NEUTROPHILS # BLD AUTO: 2 K/UL (ref 1.8–7.7)
NEUTROPHILS NFR BLD: 50.9 % (ref 38–73)
NRBC BLD-RTO: 0 /100 WBC
PLATELET # BLD AUTO: 186 K/UL (ref 150–450)
PMV BLD AUTO: 9.1 FL (ref 9.2–12.9)
POTASSIUM SERPL-SCNC: 4 MMOL/L (ref 3.5–5.1)
PROT SERPL-MCNC: 6.3 G/DL (ref 6–8.4)
RBC # BLD AUTO: 3.94 M/UL (ref 4–5.4)
SODIUM SERPL-SCNC: 138 MMOL/L (ref 136–145)
WBC # BLD AUTO: 3.83 K/UL (ref 3.9–12.7)

## 2023-08-14 PROCEDURE — 36415 COLL VENOUS BLD VENIPUNCTURE: CPT | Performed by: INTERNAL MEDICINE

## 2023-08-14 PROCEDURE — 85025 COMPLETE CBC W/AUTO DIFF WBC: CPT | Performed by: INTERNAL MEDICINE

## 2023-08-14 PROCEDURE — 80053 COMPREHEN METABOLIC PANEL: CPT | Performed by: INTERNAL MEDICINE

## 2023-08-14 PROCEDURE — 83735 ASSAY OF MAGNESIUM: CPT | Performed by: INTERNAL MEDICINE

## 2023-08-17 ENCOUNTER — OFFICE VISIT (OUTPATIENT)
Dept: HEMATOLOGY/ONCOLOGY | Facility: CLINIC | Age: 48
End: 2023-08-17
Payer: COMMERCIAL

## 2023-08-17 ENCOUNTER — INFUSION (OUTPATIENT)
Dept: INFUSION THERAPY | Facility: HOSPITAL | Age: 48
End: 2023-08-17
Attending: INTERNAL MEDICINE
Payer: COMMERCIAL

## 2023-08-17 VITALS
BODY MASS INDEX: 29.31 KG/M2 | WEIGHT: 171.69 LBS | TEMPERATURE: 98 F | OXYGEN SATURATION: 100 % | HEART RATE: 86 BPM | DIASTOLIC BLOOD PRESSURE: 58 MMHG | SYSTOLIC BLOOD PRESSURE: 104 MMHG | HEIGHT: 64 IN | RESPIRATION RATE: 16 BRPM

## 2023-08-17 VITALS
TEMPERATURE: 98 F | DIASTOLIC BLOOD PRESSURE: 67 MMHG | BODY MASS INDEX: 29.47 KG/M2 | WEIGHT: 171.69 LBS | HEART RATE: 113 BPM | OXYGEN SATURATION: 97 % | SYSTOLIC BLOOD PRESSURE: 99 MMHG

## 2023-08-17 DIAGNOSIS — E86.0 DEHYDRATION: ICD-10-CM

## 2023-08-17 DIAGNOSIS — R11.2 INTRACTABLE NAUSEA AND VOMITING: Primary | ICD-10-CM

## 2023-08-17 DIAGNOSIS — C50.412 MALIGNANT NEOPLASM OF UPPER-OUTER QUADRANT OF LEFT BREAST IN FEMALE, ESTROGEN RECEPTOR NEGATIVE: ICD-10-CM

## 2023-08-17 DIAGNOSIS — Z17.1 MALIGNANT NEOPLASM OF UPPER-OUTER QUADRANT OF LEFT BREAST IN FEMALE, ESTROGEN RECEPTOR NEGATIVE: ICD-10-CM

## 2023-08-17 PROCEDURE — 3078F DIAST BP <80 MM HG: CPT | Mod: CPTII,S$GLB,, | Performed by: INTERNAL MEDICINE

## 2023-08-17 PROCEDURE — 3078F PR MOST RECENT DIASTOLIC BLOOD PRESSURE < 80 MM HG: ICD-10-PCS | Mod: CPTII,S$GLB,, | Performed by: INTERNAL MEDICINE

## 2023-08-17 PROCEDURE — 1159F PR MEDICATION LIST DOCUMENTED IN MEDICAL RECORD: ICD-10-PCS | Mod: CPTII,S$GLB,, | Performed by: INTERNAL MEDICINE

## 2023-08-17 PROCEDURE — 25000003 PHARM REV CODE 250: Performed by: INTERNAL MEDICINE

## 2023-08-17 PROCEDURE — 99214 OFFICE O/P EST MOD 30 MIN: CPT | Mod: S$GLB,,, | Performed by: INTERNAL MEDICINE

## 2023-08-17 PROCEDURE — 3074F SYST BP LT 130 MM HG: CPT | Mod: CPTII,S$GLB,, | Performed by: INTERNAL MEDICINE

## 2023-08-17 PROCEDURE — 1160F PR REVIEW ALL MEDS BY PRESCRIBER/CLIN PHARMACIST DOCUMENTED: ICD-10-PCS | Mod: CPTII,S$GLB,, | Performed by: INTERNAL MEDICINE

## 2023-08-17 PROCEDURE — 3074F PR MOST RECENT SYSTOLIC BLOOD PRESSURE < 130 MM HG: ICD-10-PCS | Mod: CPTII,S$GLB,, | Performed by: INTERNAL MEDICINE

## 2023-08-17 PROCEDURE — 96361 HYDRATE IV INFUSION ADD-ON: CPT

## 2023-08-17 PROCEDURE — 63600175 PHARM REV CODE 636 W HCPCS: Performed by: INTERNAL MEDICINE

## 2023-08-17 PROCEDURE — 99214 PR OFFICE/OUTPT VISIT, EST, LEVL IV, 30-39 MIN: ICD-10-PCS | Mod: S$GLB,,, | Performed by: INTERNAL MEDICINE

## 2023-08-17 PROCEDURE — 1160F RVW MEDS BY RX/DR IN RCRD: CPT | Mod: CPTII,S$GLB,, | Performed by: INTERNAL MEDICINE

## 2023-08-17 PROCEDURE — 1159F MED LIST DOCD IN RCRD: CPT | Mod: CPTII,S$GLB,, | Performed by: INTERNAL MEDICINE

## 2023-08-17 PROCEDURE — 96360 HYDRATION IV INFUSION INIT: CPT

## 2023-08-17 PROCEDURE — 3008F PR BODY MASS INDEX (BMI) DOCUMENTED: ICD-10-PCS | Mod: CPTII,S$GLB,, | Performed by: INTERNAL MEDICINE

## 2023-08-17 PROCEDURE — 3008F BODY MASS INDEX DOCD: CPT | Mod: CPTII,S$GLB,, | Performed by: INTERNAL MEDICINE

## 2023-08-17 RX ORDER — HEPARIN 100 UNIT/ML
500 SYRINGE INTRAVENOUS
Status: DISCONTINUED | OUTPATIENT
Start: 2023-08-17 | End: 2023-08-17 | Stop reason: HOSPADM

## 2023-08-17 RX ORDER — HEPARIN 100 UNIT/ML
500 SYRINGE INTRAVENOUS
Status: CANCELLED | OUTPATIENT
Start: 2023-08-17

## 2023-08-17 RX ORDER — SODIUM CHLORIDE 0.9 % (FLUSH) 0.9 %
10 SYRINGE (ML) INJECTION
Status: CANCELLED | OUTPATIENT
Start: 2023-08-17

## 2023-08-17 RX ORDER — SODIUM CHLORIDE 0.9 % (FLUSH) 0.9 %
10 SYRINGE (ML) INJECTION
Status: DISCONTINUED | OUTPATIENT
Start: 2023-08-17 | End: 2023-08-17 | Stop reason: HOSPADM

## 2023-08-17 RX ADMIN — SODIUM CHLORIDE 1000 ML: 0.9 INJECTION, SOLUTION INTRAVENOUS at 11:08

## 2023-08-17 RX ADMIN — HEPARIN 500 UNITS: 100 SYRINGE at 01:08

## 2023-08-17 NOTE — PLAN OF CARE
Problem: Fluid Volume Deficit  Goal: Fluid Balance  Outcome: Ongoing, Progressing  Intervention: Monitor and Manage Hypovolemia  Flowsheets (Taken 8/17/2023 7169)  Fluid/Electrolyte Management: intravenous fluid replacement initiated

## 2023-08-17 NOTE — ASSESSMENT & PLAN NOTE
Patient is tachy and fatigued with low BP.  No anemia and no other symptoms.  Will try IVFs today and see if she improves.

## 2023-08-17 NOTE — PROGRESS NOTES
PROGRESS NOTE    Subjective:       Patient ID: Karyna Escoto is a 48 y.o. female.    6/9/2022:  Dx Mammo:  1.8cm mass in the left breast towards the left axilla.   Deep to the mass 2 lymph nodes are identified.     7/5/2022:  Left breast core biopsy:  Grade 2 IDCA with DCIS  ER: 0.03%, TX: 8.8%, HER2: 0  TRIPLE NEGATIVE    7/20/2022:  Left axilla LN needle biopsy:  Invasive ductal carcinoma  ER: 0%, TX: 40%, HER2 negative(0)    PLAN:  Neoadjuvant chemo/IO(Keynote 522)-surgery-radiation.     Pem/Tax/Carb:  Cycle 1: 8/11/2022  Cycle 2: 9/8/2022  Cycle 3: 10/6/2022  Cycle 4: 10/27/2022  Begin Shukri/Cytox/Pem  Cycle 5: 12/1/2022-----MUGA 8/10/2022-EF: 54%  Cycle 6: 12/22/2022  Cycle 7: 1/12/2023  Cycle 8: 2/2/2023    Cycle 9: 6/21/2023  Cycle 10: 7/10/2023-due    Surgery 3/17/2023-CR!!  Bilateral nipple sparing mastectomy  Right: atypical lobular hyperplasia, no carcinoma  Left:  no residual carcinoma or dcis seen, SLN neg x 5,   pT0N0    Cycle 9: 5/30/2023  Cycle 10: 7/12/2023  Cycle 11: 8/2/2023  Cycle 12: 8/23/2023-due    2/8/2023:  MRI:   Resolution of previous left breast mass and previous enlarged left axillary lymph nodes, compatible with favorable response to therapy.  No new disease.    Surgery  3/17/2023 at [facility]: complete response to chemotherapy      8/3/2022 Photos:          8/23/2022 Photo:      10/4/2022 Photo:    Lesion is no longer palpable on 10/4/2022.     11/15/2022          Chief Complaint:  No chief complaint on file.  Triple negative breast cancer follow up.     History of Present Illness:   Karyna Escoto is a 48 y.o. female who presents for follow up of breast cancer.      Patient complains of increasing fatigue over the last few weeks and some noted joint pain.  No sob, mild diarrhea.         Family and Social history reviewed and is unchanged from 8/3/2022      ROS:  Review of Systems   Constitutional:  Negative for  appetite change, fever and unexpected weight change.   HENT:  Negative for mouth sores.    Eyes:  Negative for visual disturbance.   Respiratory:  Negative for cough and shortness of breath.    Cardiovascular:  Negative for chest pain and leg swelling.   Gastrointestinal:  Positive for diarrhea. Negative for abdominal pain and blood in stool.   Genitourinary:  Positive for frequency. Negative for hematuria.   Musculoskeletal:  Negative for back pain.   Skin:  Negative for rash.   Neurological:  Positive for headaches.   Hematological:  Positive for adenopathy.   Psychiatric/Behavioral:  The patient is not nervous/anxious.           Current Outpatient Medications:     bisacodyL (DULCOLAX) 5 mg EC tablet, Take 5 mg by mouth daily as needed for Constipation., Disp: , Rfl:     cetirizine (ZYRTEC) 10 mg Cap, Take 1 tablet by mouth once daily., Disp: , Rfl:     dexAMETHasone (DECADRON) 2 MG tablet, Take 1 tablet (2 mg total) by mouth daily with breakfast., Disp: 30 tablet, Rfl: 1    famotidine (PEPCID) 10 MG tablet, Take 10 mg by mouth 2 (two) times daily., Disp: , Rfl:     fluticasone propionate (FLONASE) 50 mcg/actuation nasal spray, 1 spray (50 mcg total) by Each Nostril route once daily., Disp: 15.8 mL, Rfl: 5    HYDROcodone-acetaminophen (NORCO)  mg per tablet, Take 1 tablet by mouth every 6 (six) hours as needed for Pain., Disp: 40 tablet, Rfl: 0    hydrOXYzine HCL (ATARAX) 10 MG Tab, Take 10 mg by mouth 3 (three) times daily as needed., Disp: , Rfl:     Lactobacillus rhamnosus GG (CULTURELLE) 10 billion cell capsule, Take 1 capsule by mouth once daily., Disp: , Rfl:     LIDOcaine-prilocaine (EMLA) cream, Apply topically as needed. Apply 30 mins prior to port access, Disp: 30 g, Rfl: 5    loperamide HCl (IMODIUM A-D ORAL), Take 1 tablet by mouth daily as needed., Disp: , Rfl:     methocarbamoL (ROBAXIN) 500 MG Tab, Take 1,000 mg by mouth 2 (two) times daily., Disp: , Rfl:     mupirocin (BACTROBAN) 2 %  ointment, SMARTSI Application Topical 2-3 Times Daily, Disp: , Rfl:     promethazine (PHENERGAN) 25 MG tablet, Take 1 tablet (25 mg total) by mouth every 4 to 6 hours as needed., Disp: 30 tablet, Rfl: 5    sertraline (ZOLOFT) 50 MG tablet, Take 50 mg by mouth., Disp: , Rfl:     silver sulfADIAZINE 1% (SILVADENE) 1 % cream, Apply topically 2 (two) times daily., Disp: 400 g, Rfl: 2        Objective:       Physical Examination:     BP 99/67   Pulse (!) 113   Temp 98.3 °F (36.8 °C)   Wt 77.9 kg (171 lb 11.2 oz)   SpO2 97%   BMI 29.47 kg/m²     Physical Exam  Constitutional:       Appearance: She is well-developed.   HENT:      Head: Normocephalic and atraumatic.      Right Ear: External ear normal.      Left Ear: External ear normal.   Eyes:      Conjunctiva/sclera: Conjunctivae normal.      Pupils: Pupils are equal, round, and reactive to light.   Neck:      Thyroid: No thyromegaly.      Trachea: No tracheal deviation.   Cardiovascular:      Rate and Rhythm: Normal rate and regular rhythm.      Heart sounds: Normal heart sounds.   Pulmonary:      Effort: Pulmonary effort is normal.      Breath sounds: Normal breath sounds.   Abdominal:      General: Bowel sounds are normal. There is no distension.      Palpations: Abdomen is soft. There is no mass.      Tenderness: There is no abdominal tenderness.   Skin:     Findings: No rash.   Neurological:      Comments: Neuro intact througout   Psychiatric:         Behavior: Behavior normal.         Thought Content: Thought content normal.         Judgment: Judgment normal.         Labs:   Recent Results (from the past 336 hour(s))   CBC Auto Differential    Collection Time: 23 10:36 AM   Result Value Ref Range    WBC 3.83 (L) 3.90 - 12.70 K/uL    Hemoglobin 12.5 12.0 - 16.0 g/dL    Hematocrit 36.7 (L) 37.0 - 48.5 %    Platelets 186 150 - 450 K/uL   CBC Auto Differential    Collection Time: 23  9:29 AM   Result Value Ref Range    WBC 3.29 (L) 3.90 - 12.70 K/uL  "   Hemoglobin 12.9 12.0 - 16.0 g/dL    Hematocrit 37.3 37.0 - 48.5 %    Platelets 156 150 - 450 K/uL       CMP  Sodium   Date Value Ref Range Status   08/14/2023 138 136 - 145 mmol/L Final     Potassium   Date Value Ref Range Status   08/14/2023 4.0 3.5 - 5.1 mmol/L Final     Chloride   Date Value Ref Range Status   08/14/2023 104 95 - 110 mmol/L Final     CO2   Date Value Ref Range Status   08/14/2023 28 23 - 29 mmol/L Final     Glucose   Date Value Ref Range Status   08/14/2023 96 70 - 110 mg/dL Final     BUN   Date Value Ref Range Status   08/14/2023 9 6 - 20 mg/dL Final     Creatinine   Date Value Ref Range Status   08/14/2023 0.8 0.5 - 1.4 mg/dL Final     Calcium   Date Value Ref Range Status   08/14/2023 9.5 8.7 - 10.5 mg/dL Final     Total Protein   Date Value Ref Range Status   08/14/2023 6.3 6.0 - 8.4 g/dL Final     Albumin   Date Value Ref Range Status   08/14/2023 3.6 3.5 - 5.2 g/dL Final     Total Bilirubin   Date Value Ref Range Status   08/14/2023 0.7 0.1 - 1.0 mg/dL Final     Comment:     For infants and newborns, interpretation of results should be based  on gestational age, weight and in agreement with clinical  observations.    Premature Infant recommended reference ranges:  Up to 24 hours.............<8.0 mg/dL  Up to 48 hours............<12.0 mg/dL  3-5 days..................<15.0 mg/dL  6-29 days.................<15.0 mg/dL       Alkaline Phosphatase   Date Value Ref Range Status   08/14/2023 55 55 - 135 U/L Final     AST   Date Value Ref Range Status   08/14/2023 27 10 - 40 U/L Final     ALT   Date Value Ref Range Status   08/14/2023 20 10 - 44 U/L Final     Anion Gap   Date Value Ref Range Status   08/14/2023 6 (L) 8 - 16 mmol/L Final     No results found for: "CEA"  No results found for: "PSA"        Assessment/Plan:     Problem List Items Addressed This Visit       Left Breast Cancer-TRIPLE NEGATIVE     She is doing ok and continues on pembro.  Her symptoms are likely side effects but no " clear AI issues at this time.  Will continue therapy.          Dehydration     Patient is tachy and fatigued with low BP.  No anemia and no other symptoms.  Will try IVFs today and see if she improves.            Discussion:     Follow up in about 3 weeks (around 9/7/2023).      Electronically signed by Derek Patterson

## 2023-08-17 NOTE — ASSESSMENT & PLAN NOTE
She is doing ok and continues on pembro.  Her symptoms are likely side effects but no clear AI issues at this time.  Will continue therapy.

## 2023-08-21 ENCOUNTER — LAB VISIT (OUTPATIENT)
Dept: LAB | Facility: HOSPITAL | Age: 48
End: 2023-08-21
Attending: INTERNAL MEDICINE
Payer: COMMERCIAL

## 2023-08-21 DIAGNOSIS — Z17.1 MALIGNANT NEOPLASM OF UPPER-OUTER QUADRANT OF LEFT BREAST IN FEMALE, ESTROGEN RECEPTOR NEGATIVE: ICD-10-CM

## 2023-08-21 DIAGNOSIS — C50.412 MALIGNANT NEOPLASM OF UPPER-OUTER QUADRANT OF LEFT BREAST IN FEMALE, ESTROGEN RECEPTOR NEGATIVE: ICD-10-CM

## 2023-08-21 DIAGNOSIS — R53.83 FATIGUE, UNSPECIFIED TYPE: ICD-10-CM

## 2023-08-21 LAB
ALBUMIN SERPL BCP-MCNC: 3.5 G/DL (ref 3.5–5.2)
ALP SERPL-CCNC: 62 U/L (ref 55–135)
ALT SERPL W/O P-5'-P-CCNC: 21 U/L (ref 10–44)
ANION GAP SERPL CALC-SCNC: 5 MMOL/L (ref 8–16)
AST SERPL-CCNC: 27 U/L (ref 10–40)
BASOPHILS # BLD AUTO: 0.04 K/UL (ref 0–0.2)
BASOPHILS NFR BLD: 1.1 % (ref 0–1.9)
BILIRUB SERPL-MCNC: 0.6 MG/DL (ref 0.1–1)
BUN SERPL-MCNC: 8 MG/DL (ref 6–20)
CALCIUM SERPL-MCNC: 9.6 MG/DL (ref 8.7–10.5)
CHLORIDE SERPL-SCNC: 108 MMOL/L (ref 95–110)
CO2 SERPL-SCNC: 26 MMOL/L (ref 23–29)
CREAT SERPL-MCNC: 0.8 MG/DL (ref 0.5–1.4)
DIFFERENTIAL METHOD: ABNORMAL
EOSINOPHIL # BLD AUTO: 0.2 K/UL (ref 0–0.5)
EOSINOPHIL NFR BLD: 6.1 % (ref 0–8)
ERYTHROCYTE [DISTWIDTH] IN BLOOD BY AUTOMATED COUNT: 12.3 % (ref 11.5–14.5)
EST. GFR  (NO RACE VARIABLE): >60 ML/MIN/1.73 M^2
GLUCOSE SERPL-MCNC: 103 MG/DL (ref 70–110)
HCT VFR BLD AUTO: 36.2 % (ref 37–48.5)
HGB BLD-MCNC: 12.4 G/DL (ref 12–16)
IMM GRANULOCYTES # BLD AUTO: 0.01 K/UL (ref 0–0.04)
IMM GRANULOCYTES NFR BLD AUTO: 0.3 % (ref 0–0.5)
LYMPHOCYTES # BLD AUTO: 1 K/UL (ref 1–4.8)
LYMPHOCYTES NFR BLD: 28.2 % (ref 18–48)
MAGNESIUM SERPL-MCNC: 1.7 MG/DL (ref 1.6–2.6)
MCH RBC QN AUTO: 31.9 PG (ref 27–31)
MCHC RBC AUTO-ENTMCNC: 34.3 G/DL (ref 32–36)
MCV RBC AUTO: 93 FL (ref 82–98)
MONOCYTES # BLD AUTO: 0.6 K/UL (ref 0.3–1)
MONOCYTES NFR BLD: 15.2 % (ref 4–15)
NEUTROPHILS # BLD AUTO: 1.8 K/UL (ref 1.8–7.7)
NEUTROPHILS NFR BLD: 49.1 % (ref 38–73)
NRBC BLD-RTO: 0 /100 WBC
PLATELET # BLD AUTO: 202 K/UL (ref 150–450)
PMV BLD AUTO: 9.1 FL (ref 9.2–12.9)
POTASSIUM SERPL-SCNC: 3.7 MMOL/L (ref 3.5–5.1)
PROT SERPL-MCNC: 6.3 G/DL (ref 6–8.4)
RBC # BLD AUTO: 3.89 M/UL (ref 4–5.4)
SODIUM SERPL-SCNC: 139 MMOL/L (ref 136–145)
TSH SERPL DL<=0.005 MIU/L-ACNC: 4.6 UIU/ML (ref 0.34–5.6)
WBC # BLD AUTO: 3.62 K/UL (ref 3.9–12.7)

## 2023-08-21 PROCEDURE — 83735 ASSAY OF MAGNESIUM: CPT | Performed by: INTERNAL MEDICINE

## 2023-08-21 PROCEDURE — 80053 COMPREHEN METABOLIC PANEL: CPT | Performed by: INTERNAL MEDICINE

## 2023-08-21 PROCEDURE — 36415 COLL VENOUS BLD VENIPUNCTURE: CPT | Performed by: INTERNAL MEDICINE

## 2023-08-21 PROCEDURE — 85025 COMPLETE CBC W/AUTO DIFF WBC: CPT | Performed by: INTERNAL MEDICINE

## 2023-08-21 PROCEDURE — 84443 ASSAY THYROID STIM HORMONE: CPT | Performed by: INTERNAL MEDICINE

## 2023-08-22 RX ORDER — DIPHENHYDRAMINE HYDROCHLORIDE 50 MG/ML
50 INJECTION INTRAMUSCULAR; INTRAVENOUS ONCE AS NEEDED
Status: CANCELLED | OUTPATIENT
Start: 2023-08-23

## 2023-08-22 RX ORDER — EPINEPHRINE 0.3 MG/.3ML
0.3 INJECTION SUBCUTANEOUS ONCE AS NEEDED
Status: CANCELLED | OUTPATIENT
Start: 2023-08-23

## 2023-08-22 RX ORDER — HEPARIN 100 UNIT/ML
500 SYRINGE INTRAVENOUS
Status: CANCELLED | OUTPATIENT
Start: 2023-08-23

## 2023-08-22 RX ORDER — SODIUM CHLORIDE 0.9 % (FLUSH) 0.9 %
10 SYRINGE (ML) INJECTION
Status: CANCELLED | OUTPATIENT
Start: 2023-08-23

## 2023-08-23 ENCOUNTER — INFUSION (OUTPATIENT)
Dept: INFUSION THERAPY | Facility: HOSPITAL | Age: 48
End: 2023-08-23
Attending: INTERNAL MEDICINE
Payer: COMMERCIAL

## 2023-08-23 VITALS
WEIGHT: 172.38 LBS | OXYGEN SATURATION: 100 % | DIASTOLIC BLOOD PRESSURE: 71 MMHG | BODY MASS INDEX: 29.43 KG/M2 | HEART RATE: 80 BPM | RESPIRATION RATE: 18 BRPM | HEIGHT: 64 IN | TEMPERATURE: 98 F | SYSTOLIC BLOOD PRESSURE: 103 MMHG

## 2023-08-23 DIAGNOSIS — Z17.1 MALIGNANT NEOPLASM OF UPPER-OUTER QUADRANT OF LEFT BREAST IN FEMALE, ESTROGEN RECEPTOR NEGATIVE: ICD-10-CM

## 2023-08-23 DIAGNOSIS — D70.1 CHEMOTHERAPY-INDUCED NEUTROPENIA: Primary | ICD-10-CM

## 2023-08-23 DIAGNOSIS — T45.1X5A CHEMOTHERAPY-INDUCED NEUTROPENIA: Primary | ICD-10-CM

## 2023-08-23 DIAGNOSIS — C50.412 MALIGNANT NEOPLASM OF UPPER-OUTER QUADRANT OF LEFT BREAST IN FEMALE, ESTROGEN RECEPTOR NEGATIVE: ICD-10-CM

## 2023-08-23 PROCEDURE — 25000003 PHARM REV CODE 250: Performed by: NURSE PRACTITIONER

## 2023-08-23 PROCEDURE — 96413 CHEMO IV INFUSION 1 HR: CPT

## 2023-08-23 PROCEDURE — A4216 STERILE WATER/SALINE, 10 ML: HCPCS | Performed by: NURSE PRACTITIONER

## 2023-08-23 PROCEDURE — 63600175 PHARM REV CODE 636 W HCPCS: Performed by: NURSE PRACTITIONER

## 2023-08-23 RX ORDER — HEPARIN 100 UNIT/ML
500 SYRINGE INTRAVENOUS
Status: DISCONTINUED | OUTPATIENT
Start: 2023-08-23 | End: 2023-08-23 | Stop reason: HOSPADM

## 2023-08-23 RX ORDER — SODIUM CHLORIDE 0.9 % (FLUSH) 0.9 %
10 SYRINGE (ML) INJECTION
Status: DISCONTINUED | OUTPATIENT
Start: 2023-08-23 | End: 2023-08-23 | Stop reason: HOSPADM

## 2023-08-23 RX ADMIN — SODIUM CHLORIDE, PRESERVATIVE FREE 10 ML: 5 INJECTION INTRAVENOUS at 10:08

## 2023-08-23 RX ADMIN — HEPARIN 500 UNITS: 100 SYRINGE at 10:08

## 2023-08-23 RX ADMIN — SODIUM CHLORIDE 200 MG: 9 INJECTION, SOLUTION INTRAVENOUS at 10:08

## 2023-08-23 NOTE — PLAN OF CARE
Problem: Fatigue  Goal: Improved Activity Tolerance  Outcome: Ongoing, Progressing  Intervention: Promote Improved Energy  Flowsheets (Taken 8/23/2023 1013)  Fatigue Management:   fatigue-related activity identified   paced activity encouraged   frequent rest breaks encouraged   activity assistance provided  Sleep/Rest Enhancement:   relaxation techniques promoted   noise level reduced   regular sleep/rest pattern promoted   consistent schedule promoted  Activity Management: Ambulated -L4

## 2023-08-25 DIAGNOSIS — Z17.1 MALIGNANT NEOPLASM OF UPPER-OUTER QUADRANT OF LEFT BREAST IN FEMALE, ESTROGEN RECEPTOR NEGATIVE: ICD-10-CM

## 2023-08-25 DIAGNOSIS — R53.83 FATIGUE, UNSPECIFIED TYPE: ICD-10-CM

## 2023-08-25 DIAGNOSIS — C50.412 MALIGNANT NEOPLASM OF UPPER-OUTER QUADRANT OF LEFT BREAST IN FEMALE, ESTROGEN RECEPTOR NEGATIVE: ICD-10-CM

## 2023-08-25 RX ORDER — PROMETHAZINE HYDROCHLORIDE 25 MG/1
25 TABLET ORAL
Qty: 30 TABLET | Refills: 5 | Status: SHIPPED | OUTPATIENT
Start: 2023-08-25 | End: 2023-12-19 | Stop reason: SDUPTHER

## 2023-08-28 ENCOUNTER — LAB VISIT (OUTPATIENT)
Dept: LAB | Facility: HOSPITAL | Age: 48
End: 2023-08-28
Attending: INTERNAL MEDICINE
Payer: COMMERCIAL

## 2023-08-28 DIAGNOSIS — C50.412 MALIGNANT NEOPLASM OF UPPER-OUTER QUADRANT OF LEFT BREAST IN FEMALE, ESTROGEN RECEPTOR NEGATIVE: ICD-10-CM

## 2023-08-28 DIAGNOSIS — Z17.1 MALIGNANT NEOPLASM OF UPPER-OUTER QUADRANT OF LEFT BREAST IN FEMALE, ESTROGEN RECEPTOR NEGATIVE: ICD-10-CM

## 2023-08-28 LAB
ALBUMIN SERPL BCP-MCNC: 3.6 G/DL (ref 3.5–5.2)
ALP SERPL-CCNC: 61 U/L (ref 55–135)
ALT SERPL W/O P-5'-P-CCNC: 20 U/L (ref 10–44)
ANION GAP SERPL CALC-SCNC: 7 MMOL/L (ref 8–16)
AST SERPL-CCNC: 25 U/L (ref 10–40)
BASOPHILS # BLD AUTO: 0.03 K/UL (ref 0–0.2)
BASOPHILS NFR BLD: 0.6 % (ref 0–1.9)
BILIRUB SERPL-MCNC: 0.5 MG/DL (ref 0.1–1)
BUN SERPL-MCNC: 13 MG/DL (ref 6–20)
CALCIUM SERPL-MCNC: 9.7 MG/DL (ref 8.7–10.5)
CHLORIDE SERPL-SCNC: 106 MMOL/L (ref 95–110)
CO2 SERPL-SCNC: 27 MMOL/L (ref 23–29)
CREAT SERPL-MCNC: 0.8 MG/DL (ref 0.5–1.4)
DIFFERENTIAL METHOD: ABNORMAL
EOSINOPHIL # BLD AUTO: 0.2 K/UL (ref 0–0.5)
EOSINOPHIL NFR BLD: 3.7 % (ref 0–8)
ERYTHROCYTE [DISTWIDTH] IN BLOOD BY AUTOMATED COUNT: 12 % (ref 11.5–14.5)
EST. GFR  (NO RACE VARIABLE): >60 ML/MIN/1.73 M^2
GLUCOSE SERPL-MCNC: 92 MG/DL (ref 70–110)
HCT VFR BLD AUTO: 35.9 % (ref 37–48.5)
HGB BLD-MCNC: 12.3 G/DL (ref 12–16)
IMM GRANULOCYTES # BLD AUTO: 0.01 K/UL (ref 0–0.04)
IMM GRANULOCYTES NFR BLD AUTO: 0.2 % (ref 0–0.5)
LYMPHOCYTES # BLD AUTO: 1 K/UL (ref 1–4.8)
LYMPHOCYTES NFR BLD: 20.5 % (ref 18–48)
MAGNESIUM SERPL-MCNC: 1.7 MG/DL (ref 1.6–2.6)
MCH RBC QN AUTO: 31.3 PG (ref 27–31)
MCHC RBC AUTO-ENTMCNC: 34.3 G/DL (ref 32–36)
MCV RBC AUTO: 91 FL (ref 82–98)
MONOCYTES # BLD AUTO: 0.6 K/UL (ref 0.3–1)
MONOCYTES NFR BLD: 12.2 % (ref 4–15)
NEUTROPHILS # BLD AUTO: 3.1 K/UL (ref 1.8–7.7)
NEUTROPHILS NFR BLD: 62.8 % (ref 38–73)
NRBC BLD-RTO: 0 /100 WBC
PLATELET # BLD AUTO: 182 K/UL (ref 150–450)
PMV BLD AUTO: 8.8 FL (ref 9.2–12.9)
POTASSIUM SERPL-SCNC: 3.9 MMOL/L (ref 3.5–5.1)
PROT SERPL-MCNC: 6.4 G/DL (ref 6–8.4)
RBC # BLD AUTO: 3.93 M/UL (ref 4–5.4)
SODIUM SERPL-SCNC: 140 MMOL/L (ref 136–145)
WBC # BLD AUTO: 4.93 K/UL (ref 3.9–12.7)

## 2023-08-28 PROCEDURE — 36415 COLL VENOUS BLD VENIPUNCTURE: CPT | Performed by: INTERNAL MEDICINE

## 2023-08-28 PROCEDURE — 80053 COMPREHEN METABOLIC PANEL: CPT | Performed by: INTERNAL MEDICINE

## 2023-08-28 PROCEDURE — 83735 ASSAY OF MAGNESIUM: CPT | Performed by: INTERNAL MEDICINE

## 2023-08-28 PROCEDURE — 85025 COMPLETE CBC W/AUTO DIFF WBC: CPT | Performed by: INTERNAL MEDICINE

## 2023-09-05 ENCOUNTER — LAB VISIT (OUTPATIENT)
Dept: LAB | Facility: HOSPITAL | Age: 48
End: 2023-09-05
Attending: INTERNAL MEDICINE
Payer: COMMERCIAL

## 2023-09-05 DIAGNOSIS — C50.412 MALIGNANT NEOPLASM OF UPPER-OUTER QUADRANT OF LEFT BREAST IN FEMALE, ESTROGEN RECEPTOR NEGATIVE: ICD-10-CM

## 2023-09-05 DIAGNOSIS — R52 ACUTE PAIN: ICD-10-CM

## 2023-09-05 DIAGNOSIS — Z17.1 MALIGNANT NEOPLASM OF UPPER-OUTER QUADRANT OF LEFT BREAST IN FEMALE, ESTROGEN RECEPTOR NEGATIVE: ICD-10-CM

## 2023-09-05 LAB
ALBUMIN SERPL BCP-MCNC: 3.7 G/DL (ref 3.5–5.2)
ALP SERPL-CCNC: 65 U/L (ref 55–135)
ALT SERPL W/O P-5'-P-CCNC: 19 U/L (ref 10–44)
ANION GAP SERPL CALC-SCNC: 7 MMOL/L (ref 8–16)
AST SERPL-CCNC: 25 U/L (ref 10–40)
BASOPHILS # BLD AUTO: 0.03 K/UL (ref 0–0.2)
BASOPHILS NFR BLD: 0.6 % (ref 0–1.9)
BILIRUB SERPL-MCNC: 0.6 MG/DL (ref 0.1–1)
BUN SERPL-MCNC: 9 MG/DL (ref 6–20)
CALCIUM SERPL-MCNC: 9.8 MG/DL (ref 8.7–10.5)
CHLORIDE SERPL-SCNC: 105 MMOL/L (ref 95–110)
CO2 SERPL-SCNC: 27 MMOL/L (ref 23–29)
CREAT SERPL-MCNC: 0.7 MG/DL (ref 0.5–1.4)
DIFFERENTIAL METHOD: ABNORMAL
EOSINOPHIL # BLD AUTO: 0.2 K/UL (ref 0–0.5)
EOSINOPHIL NFR BLD: 3.3 % (ref 0–8)
ERYTHROCYTE [DISTWIDTH] IN BLOOD BY AUTOMATED COUNT: 12.1 % (ref 11.5–14.5)
EST. GFR  (NO RACE VARIABLE): >60 ML/MIN/1.73 M^2
GLUCOSE SERPL-MCNC: 93 MG/DL (ref 70–110)
HCT VFR BLD AUTO: 37.4 % (ref 37–48.5)
HGB BLD-MCNC: 12.7 G/DL (ref 12–16)
IMM GRANULOCYTES # BLD AUTO: 0.01 K/UL (ref 0–0.04)
IMM GRANULOCYTES NFR BLD AUTO: 0.2 % (ref 0–0.5)
LYMPHOCYTES # BLD AUTO: 1.1 K/UL (ref 1–4.8)
LYMPHOCYTES NFR BLD: 21.9 % (ref 18–48)
MAGNESIUM SERPL-MCNC: 1.8 MG/DL (ref 1.6–2.6)
MCH RBC QN AUTO: 31.6 PG (ref 27–31)
MCHC RBC AUTO-ENTMCNC: 34 G/DL (ref 32–36)
MCV RBC AUTO: 93 FL (ref 82–98)
MONOCYTES # BLD AUTO: 0.5 K/UL (ref 0.3–1)
MONOCYTES NFR BLD: 11 % (ref 4–15)
NEUTROPHILS # BLD AUTO: 3.1 K/UL (ref 1.8–7.7)
NEUTROPHILS NFR BLD: 63 % (ref 38–73)
NRBC BLD-RTO: 0 /100 WBC
PLATELET # BLD AUTO: 223 K/UL (ref 150–450)
PMV BLD AUTO: 9.8 FL (ref 9.2–12.9)
POTASSIUM SERPL-SCNC: 3.9 MMOL/L (ref 3.5–5.1)
PROT SERPL-MCNC: 6.5 G/DL (ref 6–8.4)
RBC # BLD AUTO: 4.02 M/UL (ref 4–5.4)
SODIUM SERPL-SCNC: 139 MMOL/L (ref 136–145)
WBC # BLD AUTO: 4.84 K/UL (ref 3.9–12.7)

## 2023-09-05 PROCEDURE — 80053 COMPREHEN METABOLIC PANEL: CPT | Performed by: INTERNAL MEDICINE

## 2023-09-05 PROCEDURE — 85025 COMPLETE CBC W/AUTO DIFF WBC: CPT | Performed by: INTERNAL MEDICINE

## 2023-09-05 PROCEDURE — 36415 COLL VENOUS BLD VENIPUNCTURE: CPT | Performed by: INTERNAL MEDICINE

## 2023-09-05 PROCEDURE — 83735 ASSAY OF MAGNESIUM: CPT | Performed by: INTERNAL MEDICINE

## 2023-09-06 RX ORDER — HYDROCODONE BITARTRATE AND ACETAMINOPHEN 10; 325 MG/1; MG/1
1 TABLET ORAL EVERY 6 HOURS PRN
Qty: 40 TABLET | Refills: 0 | Status: SHIPPED | OUTPATIENT
Start: 2023-09-06 | End: 2023-10-08 | Stop reason: SDUPTHER

## 2023-09-11 ENCOUNTER — LAB VISIT (OUTPATIENT)
Dept: LAB | Facility: HOSPITAL | Age: 48
End: 2023-09-11
Attending: INTERNAL MEDICINE
Payer: COMMERCIAL

## 2023-09-11 DIAGNOSIS — Z17.1 MALIGNANT NEOPLASM OF UPPER-OUTER QUADRANT OF LEFT BREAST IN FEMALE, ESTROGEN RECEPTOR NEGATIVE: ICD-10-CM

## 2023-09-11 DIAGNOSIS — C50.412 MALIGNANT NEOPLASM OF UPPER-OUTER QUADRANT OF LEFT BREAST IN FEMALE, ESTROGEN RECEPTOR NEGATIVE: ICD-10-CM

## 2023-09-11 LAB
ALBUMIN SERPL BCP-MCNC: 3.6 G/DL (ref 3.5–5.2)
ALP SERPL-CCNC: 64 U/L (ref 55–135)
ALT SERPL W/O P-5'-P-CCNC: 21 U/L (ref 10–44)
ANION GAP SERPL CALC-SCNC: 5 MMOL/L (ref 8–16)
AST SERPL-CCNC: 29 U/L (ref 10–40)
BASOPHILS # BLD AUTO: 0.03 K/UL (ref 0–0.2)
BASOPHILS NFR BLD: 0.7 % (ref 0–1.9)
BILIRUB SERPL-MCNC: 0.7 MG/DL (ref 0.1–1)
BUN SERPL-MCNC: 11 MG/DL (ref 6–20)
CALCIUM SERPL-MCNC: 9.7 MG/DL (ref 8.7–10.5)
CHLORIDE SERPL-SCNC: 106 MMOL/L (ref 95–110)
CO2 SERPL-SCNC: 27 MMOL/L (ref 23–29)
CREAT SERPL-MCNC: 0.8 MG/DL (ref 0.5–1.4)
DIFFERENTIAL METHOD: ABNORMAL
EOSINOPHIL # BLD AUTO: 0.2 K/UL (ref 0–0.5)
EOSINOPHIL NFR BLD: 4.2 % (ref 0–8)
ERYTHROCYTE [DISTWIDTH] IN BLOOD BY AUTOMATED COUNT: 12.2 % (ref 11.5–14.5)
EST. GFR  (NO RACE VARIABLE): >60 ML/MIN/1.73 M^2
GLUCOSE SERPL-MCNC: 90 MG/DL (ref 70–110)
HCT VFR BLD AUTO: 35.6 % (ref 37–48.5)
HGB BLD-MCNC: 12.4 G/DL (ref 12–16)
IMM GRANULOCYTES # BLD AUTO: 0 K/UL (ref 0–0.04)
IMM GRANULOCYTES NFR BLD AUTO: 0 % (ref 0–0.5)
LYMPHOCYTES # BLD AUTO: 1.1 K/UL (ref 1–4.8)
LYMPHOCYTES NFR BLD: 24.2 % (ref 18–48)
MAGNESIUM SERPL-MCNC: 1.7 MG/DL (ref 1.6–2.6)
MCH RBC QN AUTO: 31.6 PG (ref 27–31)
MCHC RBC AUTO-ENTMCNC: 34.8 G/DL (ref 32–36)
MCV RBC AUTO: 91 FL (ref 82–98)
MONOCYTES # BLD AUTO: 0.5 K/UL (ref 0.3–1)
MONOCYTES NFR BLD: 11.1 % (ref 4–15)
NEUTROPHILS # BLD AUTO: 2.6 K/UL (ref 1.8–7.7)
NEUTROPHILS NFR BLD: 59.8 % (ref 38–73)
NRBC BLD-RTO: 0 /100 WBC
PLATELET # BLD AUTO: 201 K/UL (ref 150–450)
PMV BLD AUTO: 9 FL (ref 9.2–12.9)
POTASSIUM SERPL-SCNC: 3.8 MMOL/L (ref 3.5–5.1)
PROT SERPL-MCNC: 6.4 G/DL (ref 6–8.4)
RBC # BLD AUTO: 3.92 M/UL (ref 4–5.4)
SODIUM SERPL-SCNC: 138 MMOL/L (ref 136–145)
WBC # BLD AUTO: 4.33 K/UL (ref 3.9–12.7)

## 2023-09-11 PROCEDURE — 80053 COMPREHEN METABOLIC PANEL: CPT | Performed by: INTERNAL MEDICINE

## 2023-09-11 PROCEDURE — 83735 ASSAY OF MAGNESIUM: CPT | Performed by: INTERNAL MEDICINE

## 2023-09-11 PROCEDURE — 36415 COLL VENOUS BLD VENIPUNCTURE: CPT | Performed by: INTERNAL MEDICINE

## 2023-09-11 PROCEDURE — 85025 COMPLETE CBC W/AUTO DIFF WBC: CPT | Performed by: INTERNAL MEDICINE

## 2023-09-12 RX ORDER — HEPARIN 100 UNIT/ML
500 SYRINGE INTRAVENOUS
Status: CANCELLED | OUTPATIENT
Start: 2023-09-13

## 2023-09-12 RX ORDER — SODIUM CHLORIDE 0.9 % (FLUSH) 0.9 %
10 SYRINGE (ML) INJECTION
Status: CANCELLED | OUTPATIENT
Start: 2023-09-13

## 2023-09-12 RX ORDER — EPINEPHRINE 0.3 MG/.3ML
0.3 INJECTION SUBCUTANEOUS ONCE AS NEEDED
Status: CANCELLED | OUTPATIENT
Start: 2023-09-13

## 2023-09-12 RX ORDER — DIPHENHYDRAMINE HYDROCHLORIDE 50 MG/ML
50 INJECTION INTRAMUSCULAR; INTRAVENOUS ONCE AS NEEDED
Status: CANCELLED | OUTPATIENT
Start: 2023-09-13

## 2023-09-13 ENCOUNTER — INFUSION (OUTPATIENT)
Dept: INFUSION THERAPY | Facility: HOSPITAL | Age: 48
End: 2023-09-13
Attending: INTERNAL MEDICINE
Payer: COMMERCIAL

## 2023-09-13 VITALS
WEIGHT: 169.5 LBS | SYSTOLIC BLOOD PRESSURE: 111 MMHG | BODY MASS INDEX: 28.94 KG/M2 | HEART RATE: 84 BPM | DIASTOLIC BLOOD PRESSURE: 79 MMHG | HEIGHT: 64 IN | TEMPERATURE: 98 F | OXYGEN SATURATION: 100 % | RESPIRATION RATE: 18 BRPM

## 2023-09-13 DIAGNOSIS — C50.412 MALIGNANT NEOPLASM OF UPPER-OUTER QUADRANT OF LEFT BREAST IN FEMALE, ESTROGEN RECEPTOR NEGATIVE: ICD-10-CM

## 2023-09-13 DIAGNOSIS — D70.1 CHEMOTHERAPY-INDUCED NEUTROPENIA: Primary | ICD-10-CM

## 2023-09-13 DIAGNOSIS — Z17.1 MALIGNANT NEOPLASM OF UPPER-OUTER QUADRANT OF LEFT BREAST IN FEMALE, ESTROGEN RECEPTOR NEGATIVE: ICD-10-CM

## 2023-09-13 DIAGNOSIS — T45.1X5A CHEMOTHERAPY-INDUCED NEUTROPENIA: Primary | ICD-10-CM

## 2023-09-13 PROCEDURE — 96413 CHEMO IV INFUSION 1 HR: CPT

## 2023-09-13 PROCEDURE — 63600175 PHARM REV CODE 636 W HCPCS: Performed by: NURSE PRACTITIONER

## 2023-09-13 PROCEDURE — 25000003 PHARM REV CODE 250: Performed by: NURSE PRACTITIONER

## 2023-09-13 PROCEDURE — A4216 STERILE WATER/SALINE, 10 ML: HCPCS | Performed by: NURSE PRACTITIONER

## 2023-09-13 RX ORDER — EPINEPHRINE 0.3 MG/.3ML
0.3 INJECTION SUBCUTANEOUS ONCE AS NEEDED
Status: DISCONTINUED | OUTPATIENT
Start: 2023-09-13 | End: 2023-09-13 | Stop reason: HOSPADM

## 2023-09-13 RX ORDER — SODIUM CHLORIDE 0.9 % (FLUSH) 0.9 %
10 SYRINGE (ML) INJECTION
Status: DISCONTINUED | OUTPATIENT
Start: 2023-09-13 | End: 2023-09-13 | Stop reason: HOSPADM

## 2023-09-13 RX ORDER — HEPARIN 100 UNIT/ML
500 SYRINGE INTRAVENOUS
Status: DISCONTINUED | OUTPATIENT
Start: 2023-09-13 | End: 2023-09-13 | Stop reason: HOSPADM

## 2023-09-13 RX ORDER — DIPHENHYDRAMINE HYDROCHLORIDE 50 MG/ML
50 INJECTION INTRAMUSCULAR; INTRAVENOUS ONCE AS NEEDED
Status: DISCONTINUED | OUTPATIENT
Start: 2023-09-13 | End: 2023-09-13 | Stop reason: HOSPADM

## 2023-09-13 RX ADMIN — SODIUM CHLORIDE, PRESERVATIVE FREE 10 ML: 5 INJECTION INTRAVENOUS at 11:09

## 2023-09-13 RX ADMIN — HEPARIN 500 UNITS: 100 SYRINGE at 11:09

## 2023-09-13 RX ADMIN — SODIUM CHLORIDE 200 MG: 9 INJECTION, SOLUTION INTRAVENOUS at 10:09

## 2023-09-13 NOTE — PLAN OF CARE
Problem: Fatigue  Goal: Improved Activity Tolerance  Outcome: Ongoing, Progressing  Intervention: Promote Improved Energy  Flowsheets (Taken 9/13/2023 1015)  Fatigue Management:   fatigue-related activity identified   paced activity encouraged   frequent rest breaks encouraged  Sleep/Rest Enhancement:   noise level reduced   relaxation techniques promoted   regular sleep/rest pattern promoted  Activity Management:   Ambulated -L4   Up in chair - L3

## 2023-09-25 ENCOUNTER — LAB VISIT (OUTPATIENT)
Dept: LAB | Facility: HOSPITAL | Age: 48
End: 2023-09-25
Attending: INTERNAL MEDICINE
Payer: COMMERCIAL

## 2023-09-25 DIAGNOSIS — C50.412 MALIGNANT NEOPLASM OF UPPER-OUTER QUADRANT OF LEFT BREAST IN FEMALE, ESTROGEN RECEPTOR NEGATIVE: ICD-10-CM

## 2023-09-25 DIAGNOSIS — Z17.1 MALIGNANT NEOPLASM OF UPPER-OUTER QUADRANT OF LEFT BREAST IN FEMALE, ESTROGEN RECEPTOR NEGATIVE: ICD-10-CM

## 2023-09-25 LAB
ALBUMIN SERPL BCP-MCNC: 3.8 G/DL (ref 3.5–5.2)
ALP SERPL-CCNC: 69 U/L (ref 55–135)
ALT SERPL W/O P-5'-P-CCNC: 17 U/L (ref 10–44)
ANION GAP SERPL CALC-SCNC: 7 MMOL/L (ref 8–16)
AST SERPL-CCNC: 25 U/L (ref 10–40)
BASOPHILS # BLD AUTO: 0.03 K/UL (ref 0–0.2)
BASOPHILS NFR BLD: 0.7 % (ref 0–1.9)
BILIRUB SERPL-MCNC: 0.5 MG/DL (ref 0.1–1)
BUN SERPL-MCNC: 9 MG/DL (ref 6–20)
CALCIUM SERPL-MCNC: 9.8 MG/DL (ref 8.7–10.5)
CHLORIDE SERPL-SCNC: 104 MMOL/L (ref 95–110)
CO2 SERPL-SCNC: 27 MMOL/L (ref 23–29)
CREAT SERPL-MCNC: 0.7 MG/DL (ref 0.5–1.4)
DIFFERENTIAL METHOD: ABNORMAL
EOSINOPHIL # BLD AUTO: 0.2 K/UL (ref 0–0.5)
EOSINOPHIL NFR BLD: 4.5 % (ref 0–8)
ERYTHROCYTE [DISTWIDTH] IN BLOOD BY AUTOMATED COUNT: 12.4 % (ref 11.5–14.5)
EST. GFR  (NO RACE VARIABLE): >60 ML/MIN/1.73 M^2
GLUCOSE SERPL-MCNC: 84 MG/DL (ref 70–110)
HCT VFR BLD AUTO: 36.7 % (ref 37–48.5)
HGB BLD-MCNC: 12.7 G/DL (ref 12–16)
IMM GRANULOCYTES # BLD AUTO: 0.01 K/UL (ref 0–0.04)
IMM GRANULOCYTES NFR BLD AUTO: 0.2 % (ref 0–0.5)
LYMPHOCYTES # BLD AUTO: 1.1 K/UL (ref 1–4.8)
LYMPHOCYTES NFR BLD: 25.7 % (ref 18–48)
MAGNESIUM SERPL-MCNC: 1.7 MG/DL (ref 1.6–2.6)
MCH RBC QN AUTO: 31.7 PG (ref 27–31)
MCHC RBC AUTO-ENTMCNC: 34.6 G/DL (ref 32–36)
MCV RBC AUTO: 92 FL (ref 82–98)
MONOCYTES # BLD AUTO: 0.5 K/UL (ref 0.3–1)
MONOCYTES NFR BLD: 12.9 % (ref 4–15)
NEUTROPHILS # BLD AUTO: 2.4 K/UL (ref 1.8–7.7)
NEUTROPHILS NFR BLD: 56 % (ref 38–73)
NRBC BLD-RTO: 0 /100 WBC
PLATELET # BLD AUTO: 189 K/UL (ref 150–450)
PMV BLD AUTO: 9.2 FL (ref 9.2–12.9)
POTASSIUM SERPL-SCNC: 4 MMOL/L (ref 3.5–5.1)
PROT SERPL-MCNC: 6.6 G/DL (ref 6–8.4)
RBC # BLD AUTO: 4.01 M/UL (ref 4–5.4)
SODIUM SERPL-SCNC: 138 MMOL/L (ref 136–145)
WBC # BLD AUTO: 4.2 K/UL (ref 3.9–12.7)

## 2023-09-25 PROCEDURE — 80053 COMPREHEN METABOLIC PANEL: CPT | Performed by: INTERNAL MEDICINE

## 2023-09-25 PROCEDURE — 83735 ASSAY OF MAGNESIUM: CPT | Performed by: INTERNAL MEDICINE

## 2023-09-25 PROCEDURE — 36415 COLL VENOUS BLD VENIPUNCTURE: CPT | Performed by: INTERNAL MEDICINE

## 2023-09-25 PROCEDURE — 85025 COMPLETE CBC W/AUTO DIFF WBC: CPT | Performed by: INTERNAL MEDICINE

## 2023-09-27 NOTE — PROGRESS NOTES
PROGRESS NOTE    Subjective:       Patient ID: Karyna Escoto is a 48 y.o. female.    6/9/2022:  Dx Mammo:  1.8cm mass in the left breast towards the left axilla.   Deep to the mass 2 lymph nodes are identified.     7/5/2022:  Left breast core biopsy:  Grade 2 IDCA with DCIS  ER: 0.03%, IN: 8.8%, HER2: 0  TRIPLE NEGATIVE    7/20/2022:  Left axilla LN needle biopsy:  Invasive ductal carcinoma  ER: 0%, IN: 40%, HER2 negative(0)    PLAN:  Neoadjuvant chemo/IO(Keynote 522)-surgery-radiation.     Pem/Tax/Carb:  Cycle 1: 8/11/2022  Cycle 2: 9/8/2022  Cycle 3: 10/6/2022  Cycle 4: 10/27/2022  Begin Shukri/Cytox/Pem  Cycle 5: 12/1/2022-----MUGA 8/10/2022-EF: 54%  Cycle 6: 12/22/2022  Cycle 7: 1/12/2023  Cycle 8: 2/2/2023    Cycle 9: 6/21/2023  Cycle 10: 7/10/2023-due    Surgery 3/17/2023-CR!!  Bilateral nipple sparing mastectomy  Right: atypical lobular hyperplasia, no carcinoma  Left:  no residual carcinoma or dcis seen, SLN neg x 5,   pT0N0    Cycle 9: 5/30/2023  Cycle 10: 7/12/2023  Cycle 11: 8/2/2023  Cycle 12: 8/23/2023-due    2/8/2023:  MRI:   Resolution of previous left breast mass and previous enlarged left axillary lymph nodes, compatible with favorable response to therapy.  No new disease.    Surgery  3/17/2023 at [facility]: complete response to chemotherapy      8/3/2022 Photos:          8/23/2022 Photo:      10/4/2022 Photo:    Lesion is no longer palpable on 10/4/2022.     11/15/2022          Chief Complaint:  No chief complaint on file.  Triple negative breast cancer follow up.     History of Present Illness:   Karyna Escoto is a 48 y.o. female who presents for follow up of breast cancer.      Patient complains of increasing fatigue over the last few weeks and some noted joint pain.  No sob, mild diarrhea.  She has nausea in the am and with certain foods. Decreased appetite and 5 lbs weight loss.       Family and Social history reviewed and is  unchanged from 8/3/2022      ROS:  Review of Systems   Constitutional:  Positive for fatigue. Negative for appetite change, fever and unexpected weight change.   HENT:  Negative for mouth sores.    Eyes:  Negative for visual disturbance.   Respiratory:  Negative for cough and shortness of breath.    Cardiovascular:  Negative for chest pain and leg swelling.   Gastrointestinal:  Positive for nausea. Negative for abdominal pain, blood in stool and diarrhea.   Genitourinary:  Negative for frequency and hematuria.   Musculoskeletal:  Negative for back pain.   Skin:  Negative for rash.   Neurological:  Positive for headaches.   Hematological:  Positive for adenopathy.   Psychiatric/Behavioral:  The patient is not nervous/anxious.           Current Outpatient Medications:     bisacodyL (DULCOLAX) 5 mg EC tablet, Take 5 mg by mouth daily as needed for Constipation., Disp: , Rfl:     cetirizine (ZYRTEC) 10 mg Cap, Take 1 tablet by mouth once daily., Disp: , Rfl:     dexAMETHasone (DECADRON) 2 MG tablet, Take 1 tablet (2 mg total) by mouth daily with breakfast., Disp: 30 tablet, Rfl: 1    famotidine (PEPCID) 10 MG tablet, Take 10 mg by mouth 2 (two) times daily., Disp: , Rfl:     fluticasone propionate (FLONASE) 50 mcg/actuation nasal spray, 1 spray (50 mcg total) by Each Nostril route once daily., Disp: 15.8 mL, Rfl: 5    HYDROcodone-acetaminophen (NORCO)  mg per tablet, Take 1 tablet by mouth every 6 (six) hours as needed for Pain., Disp: 40 tablet, Rfl: 0    hydrOXYzine HCL (ATARAX) 10 MG Tab, Take 10 mg by mouth 3 (three) times daily as needed., Disp: , Rfl:     Lactobacillus rhamnosus GG (CULTURELLE) 10 billion cell capsule, Take 1 capsule by mouth once daily., Disp: , Rfl:     LIDOcaine-prilocaine (EMLA) cream, Apply topically as needed. Apply 30 mins prior to port access, Disp: 30 g, Rfl: 5    loperamide HCl (IMODIUM A-D ORAL), Take 1 tablet by mouth daily as needed., Disp: , Rfl:     methocarbamoL (ROBAXIN) 500  "MG Tab, Take 1,000 mg by mouth 2 (two) times daily., Disp: , Rfl:     mupirocin (BACTROBAN) 2 % ointment, SMARTSI Application Topical 2-3 Times Daily, Disp: , Rfl:     promethazine (PHENERGAN) 25 MG tablet, Take 1 tablet (25 mg total) by mouth every 4 to 6 hours as needed., Disp: 30 tablet, Rfl: 5    sertraline (ZOLOFT) 50 MG tablet, Take 50 mg by mouth., Disp: , Rfl:     silver sulfADIAZINE 1% (SILVADENE) 1 % cream, Apply topically 2 (two) times daily., Disp: 400 g, Rfl: 2    omeprazole (PRILOSEC) 40 MG capsule, Take 1 capsule (40 mg total) by mouth once daily., Disp: 30 capsule, Rfl: 11        Objective:       Physical Examination:     /65   Pulse 94   Temp 98 °F (36.7 °C)   Resp 18   Ht 5' 4" (1.626 m)   Wt 74.4 kg (164 lb)   BMI 28.15 kg/m²     Physical Exam  Constitutional:       Appearance: Normal appearance. She is well-developed.   HENT:      Head: Normocephalic and atraumatic.      Right Ear: External ear normal.      Left Ear: External ear normal.      Nose: Nose normal.      Mouth/Throat:      Mouth: Mucous membranes are moist.   Eyes:      Conjunctiva/sclera: Conjunctivae normal.      Pupils: Pupils are equal, round, and reactive to light.   Neck:      Thyroid: No thyromegaly.      Trachea: No tracheal deviation.   Cardiovascular:      Rate and Rhythm: Normal rate and regular rhythm.      Heart sounds: Normal heart sounds.   Pulmonary:      Effort: Pulmonary effort is normal.      Breath sounds: Normal breath sounds.   Abdominal:      General: Bowel sounds are normal. There is no distension.      Palpations: Abdomen is soft. There is no mass.      Tenderness: There is no abdominal tenderness.   Skin:     General: Skin is warm and dry.      Findings: No rash.   Neurological:      General: No focal deficit present.      Mental Status: She is alert and oriented to person, place, and time.      Comments: Neuro intact througout   Psychiatric:         Mood and Affect: Mood normal.         " Behavior: Behavior normal.         Thought Content: Thought content normal.         Judgment: Judgment normal.         Labs:   Recent Results (from the past 336 hour(s))   CBC Auto Differential    Collection Time: 09/25/23 10:19 AM   Result Value Ref Range    WBC 4.20 3.90 - 12.70 K/uL    Hemoglobin 12.7 12.0 - 16.0 g/dL    Hematocrit 36.7 (L) 37.0 - 48.5 %    Platelets 189 150 - 450 K/uL       CMP  Sodium   Date Value Ref Range Status   09/25/2023 138 136 - 145 mmol/L Final     Potassium   Date Value Ref Range Status   09/25/2023 4.0 3.5 - 5.1 mmol/L Final     Chloride   Date Value Ref Range Status   09/25/2023 104 95 - 110 mmol/L Final     CO2   Date Value Ref Range Status   09/25/2023 27 23 - 29 mmol/L Final     Glucose   Date Value Ref Range Status   09/25/2023 84 70 - 110 mg/dL Final     BUN   Date Value Ref Range Status   09/25/2023 9 6 - 20 mg/dL Final     Creatinine   Date Value Ref Range Status   09/25/2023 0.7 0.5 - 1.4 mg/dL Final     Calcium   Date Value Ref Range Status   09/25/2023 9.8 8.7 - 10.5 mg/dL Final     Total Protein   Date Value Ref Range Status   09/25/2023 6.6 6.0 - 8.4 g/dL Final     Albumin   Date Value Ref Range Status   09/25/2023 3.8 3.5 - 5.2 g/dL Final     Total Bilirubin   Date Value Ref Range Status   09/25/2023 0.5 0.1 - 1.0 mg/dL Final     Comment:     For infants and newborns, interpretation of results should be based  on gestational age, weight and in agreement with clinical  observations.    Premature Infant recommended reference ranges:  Up to 24 hours.............<8.0 mg/dL  Up to 48 hours............<12.0 mg/dL  3-5 days..................<15.0 mg/dL  6-29 days.................<15.0 mg/dL       Alkaline Phosphatase   Date Value Ref Range Status   09/25/2023 69 55 - 135 U/L Final     AST   Date Value Ref Range Status   09/25/2023 25 10 - 40 U/L Final     ALT   Date Value Ref Range Status   09/25/2023 17 10 - 44 U/L Final     Anion Gap   Date Value Ref Range Status  "  09/25/2023 7 (L) 8 - 16 mmol/L Final     No results found for: "CEA"          Assessment/Plan:     Problem List Items Addressed This Visit          Oncology    Left Breast Cancer-TRIPLE NEGATIVE    Relevant Orders    Ambulatory referral/consult to Physical/Occupational Therapy     Other Visit Diagnoses       Gastroesophageal reflux disease, unspecified whether esophagitis present    -  Primary    Relevant Medications    omeprazole (PRILOSEC) 40 MG capsule    S/P bilateral mastectomy        Relevant Orders    Ambulatory referral/consult to Physical/Occupational Therapy          Triple Negative breast cancer- Continue Keytruda  2. GERD- Start Prilosec  3. Neuropathy- Start Alphalipoic Acid daily  4. S/p bilateral Mastectomies- Amb ref Lymphedema clinci for eval      Discussion:     Follow up in about 6 weeks (around 11/9/2023) for with me and 12 weeks with Dr. Peralta.      Electronically signed by Ana Quigley, MSN, APRN, AGNP-C, OCN              "

## 2023-09-28 ENCOUNTER — OFFICE VISIT (OUTPATIENT)
Dept: HEMATOLOGY/ONCOLOGY | Facility: CLINIC | Age: 48
End: 2023-09-28
Payer: COMMERCIAL

## 2023-09-28 VITALS
TEMPERATURE: 98 F | DIASTOLIC BLOOD PRESSURE: 65 MMHG | HEART RATE: 94 BPM | RESPIRATION RATE: 18 BRPM | BODY MASS INDEX: 28 KG/M2 | SYSTOLIC BLOOD PRESSURE: 112 MMHG | WEIGHT: 164 LBS | HEIGHT: 64 IN

## 2023-09-28 DIAGNOSIS — Z90.13 S/P BILATERAL MASTECTOMY: ICD-10-CM

## 2023-09-28 DIAGNOSIS — C50.412 MALIGNANT NEOPLASM OF UPPER-OUTER QUADRANT OF LEFT BREAST IN FEMALE, ESTROGEN RECEPTOR NEGATIVE: ICD-10-CM

## 2023-09-28 DIAGNOSIS — Z17.1 MALIGNANT NEOPLASM OF UPPER-OUTER QUADRANT OF LEFT BREAST IN FEMALE, ESTROGEN RECEPTOR NEGATIVE: ICD-10-CM

## 2023-09-28 DIAGNOSIS — K21.9 GASTROESOPHAGEAL REFLUX DISEASE, UNSPECIFIED WHETHER ESOPHAGITIS PRESENT: Primary | ICD-10-CM

## 2023-09-28 PROCEDURE — 99214 OFFICE O/P EST MOD 30 MIN: CPT | Mod: S$GLB,,, | Performed by: NURSE PRACTITIONER

## 2023-09-28 PROCEDURE — 3074F SYST BP LT 130 MM HG: CPT | Mod: CPTII,S$GLB,, | Performed by: NURSE PRACTITIONER

## 2023-09-28 PROCEDURE — 1159F PR MEDICATION LIST DOCUMENTED IN MEDICAL RECORD: ICD-10-PCS | Mod: CPTII,S$GLB,, | Performed by: NURSE PRACTITIONER

## 2023-09-28 PROCEDURE — 1160F RVW MEDS BY RX/DR IN RCRD: CPT | Mod: CPTII,S$GLB,, | Performed by: NURSE PRACTITIONER

## 2023-09-28 PROCEDURE — 1159F MED LIST DOCD IN RCRD: CPT | Mod: CPTII,S$GLB,, | Performed by: NURSE PRACTITIONER

## 2023-09-28 PROCEDURE — 1160F PR REVIEW ALL MEDS BY PRESCRIBER/CLIN PHARMACIST DOCUMENTED: ICD-10-PCS | Mod: CPTII,S$GLB,, | Performed by: NURSE PRACTITIONER

## 2023-09-28 PROCEDURE — 3078F DIAST BP <80 MM HG: CPT | Mod: CPTII,S$GLB,, | Performed by: NURSE PRACTITIONER

## 2023-09-28 PROCEDURE — 99214 PR OFFICE/OUTPT VISIT, EST, LEVL IV, 30-39 MIN: ICD-10-PCS | Mod: S$GLB,,, | Performed by: NURSE PRACTITIONER

## 2023-09-28 PROCEDURE — 3008F PR BODY MASS INDEX (BMI) DOCUMENTED: ICD-10-PCS | Mod: CPTII,S$GLB,, | Performed by: NURSE PRACTITIONER

## 2023-09-28 PROCEDURE — 3008F BODY MASS INDEX DOCD: CPT | Mod: CPTII,S$GLB,, | Performed by: NURSE PRACTITIONER

## 2023-09-28 PROCEDURE — 3074F PR MOST RECENT SYSTOLIC BLOOD PRESSURE < 130 MM HG: ICD-10-PCS | Mod: CPTII,S$GLB,, | Performed by: NURSE PRACTITIONER

## 2023-09-28 PROCEDURE — 3078F PR MOST RECENT DIASTOLIC BLOOD PRESSURE < 80 MM HG: ICD-10-PCS | Mod: CPTII,S$GLB,, | Performed by: NURSE PRACTITIONER

## 2023-09-28 RX ORDER — OMEPRAZOLE 40 MG/1
40 CAPSULE, DELAYED RELEASE ORAL DAILY
Qty: 30 CAPSULE | Refills: 11 | Status: SHIPPED | OUTPATIENT
Start: 2023-09-28 | End: 2024-02-01

## 2023-10-02 ENCOUNTER — LAB VISIT (OUTPATIENT)
Dept: LAB | Facility: HOSPITAL | Age: 48
End: 2023-10-02
Attending: INTERNAL MEDICINE
Payer: COMMERCIAL

## 2023-10-02 DIAGNOSIS — C50.412 MALIGNANT NEOPLASM OF UPPER-OUTER QUADRANT OF LEFT BREAST IN FEMALE, ESTROGEN RECEPTOR NEGATIVE: ICD-10-CM

## 2023-10-02 DIAGNOSIS — Z17.1 MALIGNANT NEOPLASM OF UPPER-OUTER QUADRANT OF LEFT BREAST IN FEMALE, ESTROGEN RECEPTOR NEGATIVE: ICD-10-CM

## 2023-10-02 LAB
ALBUMIN SERPL BCP-MCNC: 3.7 G/DL (ref 3.5–5.2)
ALP SERPL-CCNC: 71 U/L (ref 55–135)
ALT SERPL W/O P-5'-P-CCNC: 17 U/L (ref 10–44)
ANION GAP SERPL CALC-SCNC: 5 MMOL/L (ref 8–16)
AST SERPL-CCNC: 23 U/L (ref 10–40)
BASOPHILS # BLD AUTO: 0.02 K/UL (ref 0–0.2)
BASOPHILS NFR BLD: 0.5 % (ref 0–1.9)
BILIRUB SERPL-MCNC: 0.4 MG/DL (ref 0.1–1)
BUN SERPL-MCNC: 6 MG/DL (ref 6–20)
CALCIUM SERPL-MCNC: 9.1 MG/DL (ref 8.7–10.5)
CHLORIDE SERPL-SCNC: 107 MMOL/L (ref 95–110)
CO2 SERPL-SCNC: 27 MMOL/L (ref 23–29)
CREAT SERPL-MCNC: 0.7 MG/DL (ref 0.5–1.4)
DIFFERENTIAL METHOD: ABNORMAL
EOSINOPHIL # BLD AUTO: 0.1 K/UL (ref 0–0.5)
EOSINOPHIL NFR BLD: 3.4 % (ref 0–8)
ERYTHROCYTE [DISTWIDTH] IN BLOOD BY AUTOMATED COUNT: 12.6 % (ref 11.5–14.5)
EST. GFR  (NO RACE VARIABLE): >60 ML/MIN/1.73 M^2
GLUCOSE SERPL-MCNC: 87 MG/DL (ref 70–110)
HCT VFR BLD AUTO: 35 % (ref 37–48.5)
HGB BLD-MCNC: 11.9 G/DL (ref 12–16)
IMM GRANULOCYTES # BLD AUTO: 0.01 K/UL (ref 0–0.04)
IMM GRANULOCYTES NFR BLD AUTO: 0.2 % (ref 0–0.5)
LYMPHOCYTES # BLD AUTO: 1.3 K/UL (ref 1–4.8)
LYMPHOCYTES NFR BLD: 30.5 % (ref 18–48)
MAGNESIUM SERPL-MCNC: 1.8 MG/DL (ref 1.6–2.6)
MCH RBC QN AUTO: 31.5 PG (ref 27–31)
MCHC RBC AUTO-ENTMCNC: 34 G/DL (ref 32–36)
MCV RBC AUTO: 93 FL (ref 82–98)
MONOCYTES # BLD AUTO: 0.5 K/UL (ref 0.3–1)
MONOCYTES NFR BLD: 12.2 % (ref 4–15)
NEUTROPHILS # BLD AUTO: 2.2 K/UL (ref 1.8–7.7)
NEUTROPHILS NFR BLD: 53.2 % (ref 38–73)
NRBC BLD-RTO: 0 /100 WBC
PLATELET # BLD AUTO: 184 K/UL (ref 150–450)
PMV BLD AUTO: 9.8 FL (ref 9.2–12.9)
POTASSIUM SERPL-SCNC: 3.9 MMOL/L (ref 3.5–5.1)
PROT SERPL-MCNC: 6.2 G/DL (ref 6–8.4)
RBC # BLD AUTO: 3.78 M/UL (ref 4–5.4)
SODIUM SERPL-SCNC: 139 MMOL/L (ref 136–145)
WBC # BLD AUTO: 4.1 K/UL (ref 3.9–12.7)

## 2023-10-02 PROCEDURE — 36415 COLL VENOUS BLD VENIPUNCTURE: CPT | Performed by: INTERNAL MEDICINE

## 2023-10-02 PROCEDURE — 83735 ASSAY OF MAGNESIUM: CPT | Performed by: INTERNAL MEDICINE

## 2023-10-02 PROCEDURE — 85025 COMPLETE CBC W/AUTO DIFF WBC: CPT | Performed by: INTERNAL MEDICINE

## 2023-10-02 PROCEDURE — 84443 ASSAY THYROID STIM HORMONE: CPT | Performed by: INTERNAL MEDICINE

## 2023-10-02 PROCEDURE — 80053 COMPREHEN METABOLIC PANEL: CPT | Performed by: INTERNAL MEDICINE

## 2023-10-03 ENCOUNTER — TELEPHONE (OUTPATIENT)
Dept: HEMATOLOGY/ONCOLOGY | Facility: CLINIC | Age: 48
End: 2023-10-03

## 2023-10-03 DIAGNOSIS — R53.83 FATIGUE, UNSPECIFIED TYPE: Primary | ICD-10-CM

## 2023-10-03 LAB — TSH SERPL DL<=0.005 MIU/L-ACNC: 3.27 UIU/ML (ref 0.34–5.6)

## 2023-10-03 RX ORDER — DIPHENHYDRAMINE HYDROCHLORIDE 50 MG/ML
50 INJECTION INTRAMUSCULAR; INTRAVENOUS ONCE AS NEEDED
Status: CANCELLED | OUTPATIENT
Start: 2023-10-04

## 2023-10-03 RX ORDER — HEPARIN 100 UNIT/ML
500 SYRINGE INTRAVENOUS
Status: CANCELLED | OUTPATIENT
Start: 2023-10-04

## 2023-10-03 RX ORDER — SODIUM CHLORIDE 0.9 % (FLUSH) 0.9 %
10 SYRINGE (ML) INJECTION
Status: CANCELLED | OUTPATIENT
Start: 2023-10-04

## 2023-10-03 RX ORDER — EPINEPHRINE 0.3 MG/.3ML
0.3 INJECTION SUBCUTANEOUS ONCE AS NEEDED
Status: CANCELLED | OUTPATIENT
Start: 2023-10-04

## 2023-10-04 ENCOUNTER — INFUSION (OUTPATIENT)
Dept: INFUSION THERAPY | Facility: HOSPITAL | Age: 48
End: 2023-10-04
Attending: INTERNAL MEDICINE
Payer: COMMERCIAL

## 2023-10-04 VITALS
DIASTOLIC BLOOD PRESSURE: 71 MMHG | RESPIRATION RATE: 18 BRPM | TEMPERATURE: 97 F | SYSTOLIC BLOOD PRESSURE: 109 MMHG | OXYGEN SATURATION: 100 % | HEIGHT: 64 IN | HEART RATE: 72 BPM | BODY MASS INDEX: 28.94 KG/M2 | WEIGHT: 169.5 LBS

## 2023-10-04 DIAGNOSIS — T45.1X5A CHEMOTHERAPY-INDUCED NEUTROPENIA: Primary | ICD-10-CM

## 2023-10-04 DIAGNOSIS — D70.1 CHEMOTHERAPY-INDUCED NEUTROPENIA: Primary | ICD-10-CM

## 2023-10-04 DIAGNOSIS — Z17.1 MALIGNANT NEOPLASM OF UPPER-OUTER QUADRANT OF LEFT BREAST IN FEMALE, ESTROGEN RECEPTOR NEGATIVE: ICD-10-CM

## 2023-10-04 DIAGNOSIS — C50.412 MALIGNANT NEOPLASM OF UPPER-OUTER QUADRANT OF LEFT BREAST IN FEMALE, ESTROGEN RECEPTOR NEGATIVE: ICD-10-CM

## 2023-10-04 PROCEDURE — 63600175 PHARM REV CODE 636 W HCPCS: Mod: JZ,JG | Performed by: NURSE PRACTITIONER

## 2023-10-04 PROCEDURE — 25000003 PHARM REV CODE 250: Performed by: NURSE PRACTITIONER

## 2023-10-04 PROCEDURE — A4216 STERILE WATER/SALINE, 10 ML: HCPCS | Performed by: NURSE PRACTITIONER

## 2023-10-04 PROCEDURE — 96413 CHEMO IV INFUSION 1 HR: CPT

## 2023-10-04 RX ORDER — HEPARIN 100 UNIT/ML
500 SYRINGE INTRAVENOUS
Status: DISCONTINUED | OUTPATIENT
Start: 2023-10-04 | End: 2023-10-04 | Stop reason: HOSPADM

## 2023-10-04 RX ORDER — SODIUM CHLORIDE 0.9 % (FLUSH) 0.9 %
10 SYRINGE (ML) INJECTION
Status: DISCONTINUED | OUTPATIENT
Start: 2023-10-04 | End: 2023-10-04 | Stop reason: HOSPADM

## 2023-10-04 RX ADMIN — SODIUM CHLORIDE 200 MG: 9 INJECTION, SOLUTION INTRAVENOUS at 10:10

## 2023-10-04 RX ADMIN — HEPARIN 500 UNITS: 100 SYRINGE at 11:10

## 2023-10-04 RX ADMIN — SODIUM CHLORIDE, PRESERVATIVE FREE 10 ML: 5 INJECTION INTRAVENOUS at 11:10

## 2023-10-06 ENCOUNTER — CLINICAL SUPPORT (OUTPATIENT)
Dept: REHABILITATION | Facility: HOSPITAL | Age: 48
End: 2023-10-06
Payer: COMMERCIAL

## 2023-10-06 DIAGNOSIS — Z17.1 MALIGNANT NEOPLASM OF UPPER-OUTER QUADRANT OF LEFT BREAST IN FEMALE, ESTROGEN RECEPTOR NEGATIVE: ICD-10-CM

## 2023-10-06 DIAGNOSIS — G89.18 POST-MASTECTOMY PAIN: ICD-10-CM

## 2023-10-06 DIAGNOSIS — Z90.13 S/P BILATERAL MASTECTOMY: ICD-10-CM

## 2023-10-06 DIAGNOSIS — C50.412 MALIGNANT NEOPLASM OF UPPER-OUTER QUADRANT OF LEFT BREAST IN FEMALE, ESTROGEN RECEPTOR NEGATIVE: ICD-10-CM

## 2023-10-06 PROCEDURE — 97110 THERAPEUTIC EXERCISES: CPT | Mod: PO

## 2023-10-06 PROCEDURE — 97166 OT EVAL MOD COMPLEX 45 MIN: CPT | Mod: PO

## 2023-10-08 DIAGNOSIS — C50.412 MALIGNANT NEOPLASM OF UPPER-OUTER QUADRANT OF LEFT BREAST IN FEMALE, ESTROGEN RECEPTOR NEGATIVE: ICD-10-CM

## 2023-10-08 DIAGNOSIS — R52 ACUTE PAIN: ICD-10-CM

## 2023-10-08 DIAGNOSIS — Z17.1 MALIGNANT NEOPLASM OF UPPER-OUTER QUADRANT OF LEFT BREAST IN FEMALE, ESTROGEN RECEPTOR NEGATIVE: ICD-10-CM

## 2023-10-09 ENCOUNTER — LAB VISIT (OUTPATIENT)
Dept: LAB | Facility: HOSPITAL | Age: 48
End: 2023-10-09
Attending: INTERNAL MEDICINE
Payer: COMMERCIAL

## 2023-10-09 ENCOUNTER — CLINICAL SUPPORT (OUTPATIENT)
Dept: REHABILITATION | Facility: HOSPITAL | Age: 48
End: 2023-10-09
Payer: COMMERCIAL

## 2023-10-09 DIAGNOSIS — Z17.1 MALIGNANT NEOPLASM OF UPPER-OUTER QUADRANT OF LEFT BREAST IN FEMALE, ESTROGEN RECEPTOR NEGATIVE: ICD-10-CM

## 2023-10-09 DIAGNOSIS — C50.412 MALIGNANT NEOPLASM OF UPPER-OUTER QUADRANT OF LEFT BREAST IN FEMALE, ESTROGEN RECEPTOR NEGATIVE: ICD-10-CM

## 2023-10-09 DIAGNOSIS — G89.18 POST-MASTECTOMY PAIN: Primary | ICD-10-CM

## 2023-10-09 DIAGNOSIS — Z90.13 S/P BILATERAL MASTECTOMY: ICD-10-CM

## 2023-10-09 LAB
ALBUMIN SERPL BCP-MCNC: 4.1 G/DL (ref 3.5–5.2)
ALP SERPL-CCNC: 71 U/L (ref 55–135)
ALT SERPL W/O P-5'-P-CCNC: 19 U/L (ref 10–44)
ANION GAP SERPL CALC-SCNC: 8 MMOL/L (ref 8–16)
AST SERPL-CCNC: 23 U/L (ref 10–40)
BASOPHILS # BLD AUTO: 0.02 K/UL (ref 0–0.2)
BASOPHILS NFR BLD: 0.4 % (ref 0–1.9)
BILIRUB SERPL-MCNC: 0.5 MG/DL (ref 0.1–1)
BUN SERPL-MCNC: 8 MG/DL (ref 6–20)
CALCIUM SERPL-MCNC: 10 MG/DL (ref 8.7–10.5)
CHLORIDE SERPL-SCNC: 103 MMOL/L (ref 95–110)
CO2 SERPL-SCNC: 27 MMOL/L (ref 23–29)
CREAT SERPL-MCNC: 0.7 MG/DL (ref 0.5–1.4)
DIFFERENTIAL METHOD: ABNORMAL
EOSINOPHIL # BLD AUTO: 0.2 K/UL (ref 0–0.5)
EOSINOPHIL NFR BLD: 3.1 % (ref 0–8)
ERYTHROCYTE [DISTWIDTH] IN BLOOD BY AUTOMATED COUNT: 12.6 % (ref 11.5–14.5)
EST. GFR  (NO RACE VARIABLE): >60 ML/MIN/1.73 M^2
GLUCOSE SERPL-MCNC: 106 MG/DL (ref 70–110)
HCT VFR BLD AUTO: 37.6 % (ref 37–48.5)
HGB BLD-MCNC: 13.1 G/DL (ref 12–16)
IMM GRANULOCYTES # BLD AUTO: 0.01 K/UL (ref 0–0.04)
IMM GRANULOCYTES NFR BLD AUTO: 0.2 % (ref 0–0.5)
LYMPHOCYTES # BLD AUTO: 1.3 K/UL (ref 1–4.8)
LYMPHOCYTES NFR BLD: 26.1 % (ref 18–48)
MAGNESIUM SERPL-MCNC: 1.9 MG/DL (ref 1.6–2.6)
MCH RBC QN AUTO: 31.5 PG (ref 27–31)
MCHC RBC AUTO-ENTMCNC: 34.8 G/DL (ref 32–36)
MCV RBC AUTO: 90 FL (ref 82–98)
MONOCYTES # BLD AUTO: 0.6 K/UL (ref 0.3–1)
MONOCYTES NFR BLD: 12.7 % (ref 4–15)
NEUTROPHILS # BLD AUTO: 2.8 K/UL (ref 1.8–7.7)
NEUTROPHILS NFR BLD: 57.5 % (ref 38–73)
NRBC BLD-RTO: 0 /100 WBC
PLATELET # BLD AUTO: 204 K/UL (ref 150–450)
PMV BLD AUTO: 9.4 FL (ref 9.2–12.9)
POTASSIUM SERPL-SCNC: 4 MMOL/L (ref 3.5–5.1)
PROT SERPL-MCNC: 6.7 G/DL (ref 6–8.4)
RBC # BLD AUTO: 4.16 M/UL (ref 4–5.4)
SODIUM SERPL-SCNC: 138 MMOL/L (ref 136–145)
WBC # BLD AUTO: 4.79 K/UL (ref 3.9–12.7)

## 2023-10-09 PROCEDURE — 80053 COMPREHEN METABOLIC PANEL: CPT | Performed by: INTERNAL MEDICINE

## 2023-10-09 PROCEDURE — 97530 THERAPEUTIC ACTIVITIES: CPT | Mod: PO

## 2023-10-09 PROCEDURE — 97140 MANUAL THERAPY 1/> REGIONS: CPT | Mod: PO

## 2023-10-09 PROCEDURE — 97110 THERAPEUTIC EXERCISES: CPT | Mod: PO

## 2023-10-09 PROCEDURE — 83735 ASSAY OF MAGNESIUM: CPT | Performed by: INTERNAL MEDICINE

## 2023-10-09 PROCEDURE — 36415 COLL VENOUS BLD VENIPUNCTURE: CPT | Performed by: INTERNAL MEDICINE

## 2023-10-09 PROCEDURE — 85025 COMPLETE CBC W/AUTO DIFF WBC: CPT | Performed by: INTERNAL MEDICINE

## 2023-10-09 RX ORDER — HYDROCODONE BITARTRATE AND ACETAMINOPHEN 10; 325 MG/1; MG/1
1 TABLET ORAL EVERY 6 HOURS PRN
Qty: 40 TABLET | Refills: 0 | Status: SHIPPED | OUTPATIENT
Start: 2023-10-09 | End: 2023-10-13 | Stop reason: SDUPTHER

## 2023-10-09 NOTE — PROGRESS NOTES
[facility]  Occupational Therapy Treatment Note    Date: 10/9/2023  Name: Karyna Escoto  Clinic Number: [id number]    Therapy Diagnosis:   Encounter Diagnoses   Name Primary?    Post-mastectomy pain Yes    S/P bilateral mastectomy       Physician: Ana Quigley NP-C     Physician Orders: evaluate and treat  Medical Diagnosis: malignant neoplasm left breast  Surgical Procedure and Date: bilateral mastectomy with insertion of tissue expanders, 03/2023  Evaluation Date: 10/6/2023  Insurance Authorization Period Expiration: 12-  Plan of Care Certification Period: 12-  Progress Note Due: same   Date of Return to MD: scheduled  Visit # / Visits authorized: 1/20  FOTO: intake completed/     Precautions:  Standard and Immunosuppression    Time In: 0700  Time Out: 0800  Total Billable Time: 60 minutes    SUBJECTIVE     Pt reports: no change in pain  She was compliant with home exercise program given last session.   Response to previous treatment:no change  Functional change: no    Pain: 0/10  Location: left shoulder , no pain at rest    OBJECTIVE     Objective Measures updated at progress report unless specified.    Treatment     Flori received the treatments listed below:     Manual therapy techniques: Manual Lymphatic Drainage were applied for 30 minutes, including:  Patient presented for first treatment.. Supine on mat with head elevated for manual therapy including clearing of neck, clavicle and axillary nodes, followed by manual lymph drainage of abdomen and chest. Joint distraction all joints of left upper extremity with prolonged gentle stretch. 2 sets of 3 repetitions.  Therapeutic activities to improve functional performance for 15  minutes, including:  Following modified manual therapy, light compression was applied to left upper extremity using 3 short stretch bandages.   Therapeutic exercises:   15 minutes self passive range of internal and external rotation with  dowel, 2 sets 8 reps and hold at end 10 seconds. Also, instructed in internal rotation in sidelying with instructions to hold at least 10 seconds at end range.    Patient Education and Home Exercises      Education provided:   Instructed on 3 new exercises  Discussed probability that rotator cuff is injured (rule out cording)  Expalined rationale for compression  Reviewed treatment frequency and likely duration of weeks  Plan of care and goals.   Educated on home management protocols.     Written Home Exercises Provided: Patient instructed to cont prior HEP. Added 3 exercises to protocol  Exercises were reviewed and Flori was able to demonstrate them prior to the end of the session.  Flori demonstrated good  understanding of the HEP provided. See EMR under Patient Instructions for exercises provided during therapy sessions.       Assessment     Pt would continue to benefit from skilled OT. Yes.  Flori was referred to this clinic with post mastectomy pain. Therapist feels that she may have an injury to her left rotator cuff rather than cording or other post mastectomy pain. Will continue with treatment as described.    Flori is progressing well towards her goals and there are no updates to goals at this time. Pt prognosis is Good.     Pt will continue to benefit from skilled outpatient occupational therapy to address the deficits listed in the problem list on initial evaluation provide pt/family education and to maximize pt's level of independence in the home and community environment.     Pt's spiritual, cultural and educational needs considered and pt agreeable to plan of care and goals.    Anticipated barriers to occupational therapy: none     The following goals were discussed with the patient and patient is in agreement with them as to be addressed in the treatment plan.      Goals:   Short Term Goals for 4 weeks: (phase 1 of protocol)  Patient will comply with home exercise program ongoing  Patient will be  educated on lymphedema precautions and signs of infection. - Ongoing 10/6/2023   Patient will report 25% decrease in pain  Internal rotation left upper extremity will increase from 45 degrees to 60 degrees.        Long Term Goals for 6 weeks: (Phase 2 of goals)  Patient will comply with home exercise program  Patient will display within functional range of motion left upper quadrant  0/10 pain left upper quadrant  Patient will demonstrate compliance with all home management recommendations.        PLAN   Plan of Care Certification: 10/6/2023 to 12-.      Outpatient Occupational Therapy 2 times weekly for 10 weeks to include the following interventions: Manual therapy/joint mobilizations, Modalities for pain management, and Therapeutic exercises/activities..        EVA Blevins, GRANT/ROD

## 2023-10-09 NOTE — PLAN OF CARE
[facility]  Occupational Therapy Initial Evaluation    Date: 10/6/2023  Name: Karyna Escoto  Clinic Number: [id number]    Therapy Diagnosis:   Encounter Diagnoses   Name Primary?    Left Breast Cancer-TRIPLE NEGATIVE     S/P bilateral mastectomy     Post-mastectomy pain      Physician: Ana Quigley NP-C    Physician Orders: evaluate and treat  Medical Diagnosis: malignant neoplasm left breast  Surgical Procedure and Date: bilateral mastectomy with insertion of tissue expanders, 03/2023  Evaluation Date: 10/6/2023  Insurance Authorization Period Expiration: 12-  Plan of Care Certification Period: 12-  Progress Note Due: same   Date of Return to MD: scheduled  Visit # / Visits authorized: 1 / 1  FOTO: intake completed/    Precautions:  Standard and Immunosuppression    Time In:0800  Time Out: 0900  Total Appointment Time (timed & untimed codes): 60 minutes    SUBJECTIVE     Date of Onset: diagnosed 06/2022    History of Current Condition/Mechanism of Injury: Karyna Escoto is a 48 y.o. female who presents to [facility] for evaluation secondary to post mastectomy pain. Patient was referred to therapy by Ana Quigley NP-C , which is the patient's oncologist. Patient reports that she has discomfort/pain of left shoulder not relieved with medication. Patient was accompanied to the evaluation by self.    Falls: no    Involved Side: left  Prior Therapy: no  Occupation/Working presently: has not worked since diagnosis  Duties: na    Functional Limitations/Social History:    Previous functional status includes: Independent with all ADLs.     Current Functional Status   Home/Living environment: lives with their family and lives with their spouse      No Limitation of Functional Status.     Pain:  Functional Pain Scale Rating 0-10: Current 6/10, worst 7/10, best 1/10   Location: left shoulder  Description: Grabbing,  Sharp, Electric, and Shooting  Aggravating Factors: internal rotation  Easing Factors: rest    Patient's Goals for Therapy: pain relief    Medical History:   Past Medical History:   Diagnosis Date    Anxiety     Breast cancer 07/2022       Surgical History:    has a past surgical history that includes Nose surgery (1990); Tonsillectomy (1990); eye lid surgery (Bilateral, 1990); Portacath placement (08/2022); Breast biopsy (Left, 07/2022); Tubal ligation (2013); and Insertion of tunneled central venous catheter (CVC) with subcutaneous port (Left, 11/21/2022).    Medications:   has a current medication list which includes the following prescription(s): bisacodyl, zyrtec, dexamethasone, famotidine, fluticasone propionate, hydrocodone-acetaminophen, hydroxyzine hcl, lactobacillus rhamnosus gg, lidocaine-prilocaine, loperamide hcl, methocarbamol, mupirocin, omeprazole, promethazine, sertraline, and silver sulfadiazine 1%.    Allergies:   Review of patient's allergies indicates:   Allergen Reactions    Taxol [paclitaxel] Rash          OBJECTIVE     Flori arrived alone for evaluation. Reliable historian. Relays that post mastectomies, she has experienced pain left shoulder. Denies swelling. Physical exam of upper quadrants: within normal range of temperature, color. No edema, no increased density of tissue in quadrants. No pain reported at rest.   Range of motion reveals significantly impaired internal rotation, mildly impaired external rotation. Assessed range with patient in supine at first, placed in position that will elicit symptoms of post mastectomy cording: upon first assessment, cording was considered to be positive. This consideration less after testing range and function in variable positions and activities. Pain appears to be specific to a possible rotator cuff injury or inflammation.  No edema as bilateral upper quadrant measurements equal or within 1 centimeter at all landmarks.       Treatment   Total  Treatment time (time-based codes) separate from Evaluation: 30 minutes    Flori received the treatments listed below:       Therapeutic exercises to improve functional performance for 30  minutes, including:  Patient presented for evaluation. Evaluation completed and instruction in the following exercises with good return demonstration, followed:  Corner stretches 3 repetitions 10 second hold  Theraband scapular stability 4 exercises (attached) 8 repetitions of each.  Patient Education and Home Exercises    Education provided:   Educated on definition of lymphedema.  Home exercise program  Reviewed treatment frequency and likely duration of weeks  Contraindications for treatment.  Plan of care and goals.  Educated on home management protocols.     Written Home Exercises Provided: yes.  Exercises were reviewed and Flori was able to demonstrate them prior to the end of the session.  Flori demonstrated good  understanding of the education provided. See EMR under Patient Instructions for exercises provided during therapy sessions.     Pt was advised to perform these exercises free of pain, and to stop performing them if pain occurs.    Patient/Family Education: role of OT, goals for OT, scheduling/cancellations - pt verbalized understanding. Discussed insurance limitations with patient.      ASSESSMENT     Karyna Escoto is a 48 y.o. female referred to outpatient occupational therapy and presents with a medical diagnosis of post mastectomy pain. Following medical record review it is determined that pt may benefit from Occupational therapy services for the treatment and management of this  condition. The following goals were discussed with the patient and patient is in agreement with them as to be addressed in the treatment plan. The patient's rehab potential is Good.     Anticipated barriers to occupational therapy: none  Pt has no cultural, educational or language barriers to learning provided.    Profile and  History Assessment of Occupational Performance Level of Clinical Decision Making Complexity Score   Occupational Profile:   Karyna Escoto is a 48 y.o. female who lives with their family and lives with their spouse and is on disability Karyna Escoto has difficulty with  ADLs and IADLs as listed previously, which  Affecting herdaily functional abilities.      Comorbidities:    has a past medical history of Anxiety and Breast cancer.    Medical and Therapy History Review:   Expanded               Performance Deficits    Physical:  Joint Mobility  Joint Stability  Muscle Power/Strength  Pain    Cognitive:  No Deficits    Psychosocial:    No Deficits     Clinical Decision Making:  moderate    Assessment Process:  Detailed Assessments    Modification/Need for Assistance:  Not Necessary    Intervention Selection:  Several Treatment Options       moderate  Based on PMHX, co morbidities , data from assessments and functional level of assistance required with task and clinical presentation directly impacting function.       The following goals were discussed with the patient and patient is in agreement with them as to be addressed in the treatment plan.     Goals:   Short Term Goals for 4 weeks: (phase 1 of protocol)  Patient will comply with home exercise program ongoing  Patient will be educated on lymphedema precautions and signs of infection. - Ongoing 10/6/2023   Patient will report 25% decrease in pain  Internal rotation left upper extremity will increase from 45 degrees to 60 degrees.       Long Term Goals for 6 weeks: (Phase 2 of goals)  Patient will comply with home exercise program  Patient will display within functional range of motion left upper quadrant  0/10 pain left upper quadrant  Patient will demonstrate compliance with all home management recommendations.      PLAN   Plan of Care Certification: 10/6/2023 to 12-.     Outpatient Occupational Therapy 2 times weekly for 10 weeks to include the  following interventions: Manual therapy/joint mobilizations, Modalities for pain management, and Therapeutic exercises/activities..      EVA Blevins, GRANT/ROD      I CERTIFY THE NEED FOR THESE SERVICES FURNISHED UNDER THIS PLAN OF TREATMENT AND WHILE UNDER MY CARE  Physician's comments:      Physician's Signature: ___________________________________________________

## 2023-10-11 ENCOUNTER — CLINICAL SUPPORT (OUTPATIENT)
Dept: REHABILITATION | Facility: HOSPITAL | Age: 48
End: 2023-10-11
Payer: COMMERCIAL

## 2023-10-11 ENCOUNTER — PATIENT MESSAGE (OUTPATIENT)
Dept: HEMATOLOGY/ONCOLOGY | Facility: CLINIC | Age: 48
End: 2023-10-11

## 2023-10-11 DIAGNOSIS — G89.18 POST-MASTECTOMY PAIN: Primary | ICD-10-CM

## 2023-10-11 PROCEDURE — 97140 MANUAL THERAPY 1/> REGIONS: CPT | Mod: PO

## 2023-10-11 PROCEDURE — 97110 THERAPEUTIC EXERCISES: CPT | Mod: PO

## 2023-10-11 NOTE — PROGRESS NOTES
[facility]  Occupational Therapy Treatment Note    Date: 10/11/2023  Name: Karyna Escoto  Clinic Number: [id number]    Therapy Diagnosis:   Encounter Diagnosis   Name Primary?    Post-mastectomy pain Yes      Physician: Ana Quigley NP-C     Physician Orders: evaluate and treat  Medical Diagnosis: malignant neoplasm left breast  Surgical Procedure and Date: bilateral mastectomy with insertion of tissue expanders, 03/2023  Evaluation Date: 10/6/2023  Insurance Authorization Period Expiration: 12-  Plan of Care Certification Period: 12-  Progress Note Due: same   Date of Return to MD: scheduled  Visit # / Visits authorized: 2/20  FOTO: intake completed/     Precautions:  Standard and Immunosuppression    Time In: 0700  Time Out: 0745  Total Billable Time: 45 minutes    SUBJECTIVE     Pt reports: no change in pain  She was compliant with home exercise program given last session.   Response to previous treatment:no change  Functional change: no    Pain: 0/10  Location: left shoulder , no pain at rest    OBJECTIVE     Objective Measures updated at progress report unless specified.    Treatment     Flori received the treatments listed below:   Manual Therapy     applied for 15 minutes, including:  Supine on mat with head elevated with ice over shoulder. Joint distraction all joints of left upper extremity with prolonged gentle stretch. 2 sets of 3 repetitions.      Therapeutic exercises:   30 minutes self passive range of internal and external rotation with dowel, 2 sets 8 reps and hold at end 10 seconds. Also, instructed in internal rotation in sidelying with instructions to hold at least 10 seconds at end range.  Verbal and written instructions for new exercises was given to patient  Patient Education and Home Exercises      Education provided:   Instructed on 3 new exercises  Discussed probability that rotator cuff is injured (rule out cording)  Expalined rationale  for compression  Reviewed treatment frequency and likely duration of weeks  Plan of care and goals.   Educated on home management protocols.     Written Home Exercises Provided: Patient instructed to cont prior HEP. Added 3 exercises to protocol  Exercises were reviewed and Flori was able to demonstrate them prior to the end of the session.  Flori demonstrated good  understanding of the HEP provided. See EMR under Patient Instructions for exercises provided during therapy sessions.       Assessment     Pt would continue to benefit from skilled OT. Yes.  Flori was referred to this clinic with post mastectomy pain. Therapist feels that she may have an injury to her left rotator cuff rather than cording or other post mastectomy pain. Will continue with treatment as described.    Flori is progressing well towards her goals and there are no updates to goals at this time. Pt prognosis is Good.     Pt will continue to benefit from skilled outpatient occupational therapy to address the deficits listed in the problem list on initial evaluation provide pt/family education and to maximize pt's level of independence in the home and community environment.     Pt's spiritual, cultural and educational needs considered and pt agreeable to plan of care and goals.    Anticipated barriers to occupational therapy: none     The following goals were discussed with the patient and patient is in agreement with them as to be addressed in the treatment plan.      Goals:   Short Term Goals for 4 weeks: (phase 1 of protocol)  Patient will comply with home exercise program ongoing  Patient will be educated on lymphedema precautions and signs of infection. - Ongoing 10/6/2023   Patient will report 25% decrease in pain  Internal rotation left upper extremity will increase from 45 degrees to 60 degrees.        Long Term Goals for 6 weeks: (Phase 2 of goals)  Patient will comply with home exercise program  Patient will display within functional  range of motion left upper quadrant  0/10 pain left upper quadrant  Patient will demonstrate compliance with all home management recommendations.        PLAN   Plan of Care Certification: 10/6/2023 to 12-.      Outpatient Occupational Therapy 2 times weekly for 10 weeks to include the following interventions: Manual therapy/joint mobilizations, Modalities for pain management, and Therapeutic exercises/activities..        EVA Blevins, GRANT/ROD

## 2023-10-13 DIAGNOSIS — C50.412 MALIGNANT NEOPLASM OF UPPER-OUTER QUADRANT OF LEFT BREAST IN FEMALE, ESTROGEN RECEPTOR NEGATIVE: ICD-10-CM

## 2023-10-13 DIAGNOSIS — Z17.1 MALIGNANT NEOPLASM OF UPPER-OUTER QUADRANT OF LEFT BREAST IN FEMALE, ESTROGEN RECEPTOR NEGATIVE: ICD-10-CM

## 2023-10-13 DIAGNOSIS — R52 ACUTE PAIN: ICD-10-CM

## 2023-10-13 RX ORDER — HYDROCODONE BITARTRATE AND ACETAMINOPHEN 10; 325 MG/1; MG/1
1 TABLET ORAL EVERY 6 HOURS PRN
Qty: 40 TABLET | Refills: 0 | Status: SHIPPED | OUTPATIENT
Start: 2023-10-13 | End: 2023-10-16 | Stop reason: SDUPTHER

## 2023-10-16 ENCOUNTER — LAB VISIT (OUTPATIENT)
Dept: LAB | Facility: HOSPITAL | Age: 48
End: 2023-10-16
Attending: INTERNAL MEDICINE
Payer: COMMERCIAL

## 2023-10-16 ENCOUNTER — CLINICAL SUPPORT (OUTPATIENT)
Dept: REHABILITATION | Facility: HOSPITAL | Age: 48
End: 2023-10-16
Payer: COMMERCIAL

## 2023-10-16 DIAGNOSIS — R52 ACUTE PAIN: ICD-10-CM

## 2023-10-16 DIAGNOSIS — Z17.1 MALIGNANT NEOPLASM OF UPPER-OUTER QUADRANT OF LEFT BREAST IN FEMALE, ESTROGEN RECEPTOR NEGATIVE: ICD-10-CM

## 2023-10-16 DIAGNOSIS — C50.412 MALIGNANT NEOPLASM OF UPPER-OUTER QUADRANT OF LEFT BREAST IN FEMALE, ESTROGEN RECEPTOR NEGATIVE: ICD-10-CM

## 2023-10-16 DIAGNOSIS — R53.83 FATIGUE, UNSPECIFIED TYPE: ICD-10-CM

## 2023-10-16 DIAGNOSIS — G89.18 POST-MASTECTOMY PAIN: Primary | ICD-10-CM

## 2023-10-16 LAB
ALBUMIN SERPL BCP-MCNC: 3.9 G/DL (ref 3.5–5.2)
ALP SERPL-CCNC: 62 U/L (ref 55–135)
ALT SERPL W/O P-5'-P-CCNC: 18 U/L (ref 10–44)
ANION GAP SERPL CALC-SCNC: 5 MMOL/L (ref 8–16)
AST SERPL-CCNC: 24 U/L (ref 10–40)
BASOPHILS # BLD AUTO: 0.04 K/UL (ref 0–0.2)
BASOPHILS NFR BLD: 1.1 % (ref 0–1.9)
BILIRUB SERPL-MCNC: 0.4 MG/DL (ref 0.1–1)
BUN SERPL-MCNC: 7 MG/DL (ref 6–20)
CALCIUM SERPL-MCNC: 9.4 MG/DL (ref 8.7–10.5)
CHLORIDE SERPL-SCNC: 102 MMOL/L (ref 95–110)
CO2 SERPL-SCNC: 29 MMOL/L (ref 23–29)
CREAT SERPL-MCNC: 0.7 MG/DL (ref 0.5–1.4)
DIFFERENTIAL METHOD: ABNORMAL
EOSINOPHIL # BLD AUTO: 0.2 K/UL (ref 0–0.5)
EOSINOPHIL NFR BLD: 5.2 % (ref 0–8)
ERYTHROCYTE [DISTWIDTH] IN BLOOD BY AUTOMATED COUNT: 12.6 % (ref 11.5–14.5)
EST. GFR  (NO RACE VARIABLE): >60 ML/MIN/1.73 M^2
GLUCOSE SERPL-MCNC: 86 MG/DL (ref 70–110)
HCT VFR BLD AUTO: 35 % (ref 37–48.5)
HGB BLD-MCNC: 12.1 G/DL (ref 12–16)
IMM GRANULOCYTES # BLD AUTO: 0.02 K/UL (ref 0–0.04)
IMM GRANULOCYTES NFR BLD AUTO: 0.6 % (ref 0–0.5)
LYMPHOCYTES # BLD AUTO: 1.3 K/UL (ref 1–4.8)
LYMPHOCYTES NFR BLD: 36.6 % (ref 18–48)
MAGNESIUM SERPL-MCNC: 1.7 MG/DL (ref 1.6–2.6)
MCH RBC QN AUTO: 31.5 PG (ref 27–31)
MCHC RBC AUTO-ENTMCNC: 34.6 G/DL (ref 32–36)
MCV RBC AUTO: 91 FL (ref 82–98)
MONOCYTES # BLD AUTO: 0.6 K/UL (ref 0.3–1)
MONOCYTES NFR BLD: 16 % (ref 4–15)
NEUTROPHILS # BLD AUTO: 1.5 K/UL (ref 1.8–7.7)
NEUTROPHILS NFR BLD: 40.5 % (ref 38–73)
NRBC BLD-RTO: 0 /100 WBC
PLATELET # BLD AUTO: 192 K/UL (ref 150–450)
PMV BLD AUTO: 9.8 FL (ref 9.2–12.9)
POTASSIUM SERPL-SCNC: 3.9 MMOL/L (ref 3.5–5.1)
PROT SERPL-MCNC: 6.3 G/DL (ref 6–8.4)
RBC # BLD AUTO: 3.84 M/UL (ref 4–5.4)
SODIUM SERPL-SCNC: 136 MMOL/L (ref 136–145)
TSH SERPL DL<=0.005 MIU/L-ACNC: 4.53 UIU/ML (ref 0.34–5.6)
WBC # BLD AUTO: 3.63 K/UL (ref 3.9–12.7)

## 2023-10-16 PROCEDURE — 83735 ASSAY OF MAGNESIUM: CPT | Performed by: INTERNAL MEDICINE

## 2023-10-16 PROCEDURE — 84443 ASSAY THYROID STIM HORMONE: CPT | Performed by: INTERNAL MEDICINE

## 2023-10-16 PROCEDURE — 36415 COLL VENOUS BLD VENIPUNCTURE: CPT | Performed by: INTERNAL MEDICINE

## 2023-10-16 PROCEDURE — 80053 COMPREHEN METABOLIC PANEL: CPT | Performed by: INTERNAL MEDICINE

## 2023-10-16 PROCEDURE — 85025 COMPLETE CBC W/AUTO DIFF WBC: CPT | Performed by: INTERNAL MEDICINE

## 2023-10-16 PROCEDURE — 97530 THERAPEUTIC ACTIVITIES: CPT | Mod: PO

## 2023-10-16 PROCEDURE — 97140 MANUAL THERAPY 1/> REGIONS: CPT | Mod: PO

## 2023-10-16 RX ORDER — HYDROCODONE BITARTRATE AND ACETAMINOPHEN 10; 325 MG/1; MG/1
1 TABLET ORAL EVERY 6 HOURS PRN
Qty: 40 TABLET | Refills: 0 | Status: CANCELLED | OUTPATIENT
Start: 2023-10-23

## 2023-10-16 NOTE — PROGRESS NOTES
[facility]  Occupational Therapy Treatment Note    Date: 10/16/2023  Name: Karyna Escoto  Clinic Number: [id number]    Therapy Diagnosis:   Encounter Diagnosis   Name Primary?    Post-mastectomy pain Yes      Physician: Ana Quigley NP-C     Physician Orders: evaluate and treat  Medical Diagnosis: malignant neoplasm left breast  Surgical Procedure and Date: bilateral mastectomy with insertion of tissue expanders, 03/2023  Evaluation Date: 10/6/2023  Insurance Authorization Period Expiration: 12-  Plan of Care Certification Period: 12-  Progress Note Due: same   Date of Return to MD: scheduled  Visit # / Visits authorized: 3/20  FOTO: intake completed/     Precautions:  Standard and Immunosuppression    Time In: 0700  Time Out: 0745  Total Billable Time: 45 minutes    SUBJECTIVE     Pt reports: reports less pain in left shoulder  She was compliant with home exercise program given last session.   Response to previous treatment:no change  Functional change: no    Pain: 0/10  Location: left shoulder , no pain at rest    OBJECTIVE     Objective Measures updated at progress report unless specified.    Treatment     Flori received the treatments listed below:   Manual Therapy     applied for 30 minutes, including:  Supine on mat with head elevated. Joint distraction all joints of left upper extremity with prolonged gentle stretch. 2 sets of 3 repetitions.      Therapeutic activities:  15 minutes patient demonstrated  her exercise program that she has been doing at home: self passive range of internal and external rotation with dowel, 2 sets 8 reps and hold at end 10 seconds. Also, instructed in internal rotation in sidelying with instructions to hold at least 10 seconds at end range.    Patient Education and Home Exercises      Education provided:   Instructed on 3 new exercises  Discussed probability that rotator cuff is injured (rule out cording)  Expalined rationale  for compression  Reviewed treatment frequency and likely duration of weeks  Plan of care and goals.   Educated on home management protocols.     Written Home Exercises Provided: Patient instructed to cont prior HEP. Added 3 exercises to protocol  Exercises were reviewed and Flori was able to demonstrate them prior to the end of the session.  Flori demonstrated good  understanding of the HEP provided. See EMR under Patient Instructions for exercises provided during therapy sessions.       Assessment     Pt would continue to benefit from skilled OT. Yes.  Flori was referred to this clinic with post mastectomy pain. Therapist feels that she may have an injury to her left rotator cuff rather than cording or other post mastectomy pain. Will continue with treatment as described.    Flori is progressing well towards her goals and there are no updates to goals at this time. Pt prognosis is Good.     Pt will continue to benefit from skilled outpatient occupational therapy to address the deficits listed in the problem list on initial evaluation provide pt/family education and to maximize pt's level of independence in the home and community environment.     Pt's spiritual, cultural and educational needs considered and pt agreeable to plan of care and goals.    Anticipated barriers to occupational therapy: none     The following goals were discussed with the patient and patient is in agreement with them as to be addressed in the treatment plan.      Goals:   Short Term Goals for 4 weeks: (phase 1 of protocol)  Patient will comply with home exercise program ongoing  Patient will be educated on lymphedema precautions and signs of infection. - Ongoing 10/6/2023   Patient will report 25% decrease in pain  Internal rotation left upper extremity will increase from 45 degrees to 60 degrees.        Long Term Goals for 6 weeks: (Phase 2 of goals)  Patient will comply with home exercise program  Patient will display within functional  range of motion left upper quadrant  0/10 pain left upper quadrant  Patient will demonstrate compliance with all home management recommendations.        PLAN   Plan of Care Certification: 10/6/2023 to 12-.      Outpatient Occupational Therapy 2 times weekly for 10 weeks to include the following interventions: Manual therapy/joint mobilizations, Modalities for pain management, and Therapeutic exercises/activities..        EVA Blevins, GRANT/ROD

## 2023-10-17 RX ORDER — HYDROCODONE BITARTRATE AND ACETAMINOPHEN 10; 325 MG/1; MG/1
1 TABLET ORAL EVERY 6 HOURS PRN
Qty: 40 TABLET | Refills: 0 | Status: SHIPPED | OUTPATIENT
Start: 2023-10-23 | End: 2023-11-15 | Stop reason: SDUPTHER

## 2023-10-18 ENCOUNTER — CLINICAL SUPPORT (OUTPATIENT)
Dept: REHABILITATION | Facility: HOSPITAL | Age: 48
End: 2023-10-18
Payer: COMMERCIAL

## 2023-10-18 DIAGNOSIS — G89.18 POST-MASTECTOMY PAIN: Primary | ICD-10-CM

## 2023-10-18 PROCEDURE — 97140 MANUAL THERAPY 1/> REGIONS: CPT | Mod: PO

## 2023-10-18 PROCEDURE — 97530 THERAPEUTIC ACTIVITIES: CPT | Mod: PO

## 2023-10-19 DIAGNOSIS — Z90.13 S/P BILATERAL MASTECTOMY: Primary | ICD-10-CM

## 2023-10-20 NOTE — PROGRESS NOTES
[facility]  Occupational Therapy Treatment Note    Date: 10/18/2023  Name: Karyna Escoto  Clinic Number: [id number]    Therapy Diagnosis:   Encounter Diagnosis   Name Primary?    Post-mastectomy pain Yes      Physician: Ana Quigley NP-C     Physician Orders: evaluate and treat  Medical Diagnosis: malignant neoplasm left breast  Surgical Procedure and Date: bilateral mastectomy with insertion of tissue expanders, 03/2023  Evaluation Date: 10/6/2023  Insurance Authorization Period Expiration: 12-  Plan of Care Certification Period: 12-  Progress Note Due: same   Date of Return to MD: scheduled  Visit # / Visits authorized: 4/20  FOTO: intake completed/     Precautions:  Standard and Immunosuppression    Time In: 0700  Time Out: 0800  Total Billable Time: 60 minutes    SUBJECTIVE     Pt reports: reports less pain in left shoulder, but does have tightness at axilla.  She was compliant with home exercise program given last session.   Response to previous treatment:no change  Functional change: no    Pain: 0/10  Location: left shoulder , no pain at rest    OBJECTIVE     Objective Measures updated at progress report unless specified.    Treatment     Flori received the treatments listed below:   Manual Therapy     applied for 45 minutes, including:  Supine on mat with head elevated. Joint distraction all joints of left upper extremity with prolonged gentle stretch. 2 sets of 3 repetitions.  Followed with myofascial relase of left upper quadrant supine and prone.    Therapeutic activities:  15 minutes patient demonstrated  her exercise program that she has been doing at home: self passive range of internal and external rotation with dowel, 2 sets 8 reps and hold at end 10 seconds. Also, instructed in internal rotation in sidelying with instructions to hold at least 10 seconds at end range.    Patient Education and Home Exercises      Education provided:   Instructed on 3  new exercises  Discussed probability that rotator cuff is injured (rule out cording)  Expalined rationale for compression  Reviewed treatment frequency and likely duration of weeks  Plan of care and goals.   Educated on home management protocols.     Written Home Exercises Provided: Patient instructed to cont prior HEP. Added 3 exercises to protocol  Exercises were reviewed and Flori was able to demonstrate them prior to the end of the session.  Flori demonstrated good  understanding of the HEP provided. See EMR under Patient Instructions for exercises provided during therapy sessions.       Assessment     Pt would continue to benefit from skilled OT. Yes.  Flori was referred to this clinic with post mastectomy pain. Therapist feels that she may have an injury to her left rotator cuff rather than cording or other post mastectomy pain. Will continue with treatment as described.    Flori is progressing well towards her goals and there are no updates to goals at this time. Pt prognosis is Good.     Pt will continue to benefit from skilled outpatient occupational therapy to address the deficits listed in the problem list on initial evaluation provide pt/family education and to maximize pt's level of independence in the home and community environment.     Pt's spiritual, cultural and educational needs considered and pt agreeable to plan of care and goals.    Anticipated barriers to occupational therapy: none     The following goals were discussed with the patient and patient is in agreement with them as to be addressed in the treatment plan.      Goals:   Short Term Goals for 4 weeks: (phase 1 of protocol)  Patient will comply with home exercise program ongoing  Patient will be educated on lymphedema precautions and signs of infection. - Ongoing 10/6/2023   Patient will report 25% decrease in pain  Internal rotation left upper extremity will increase from 45 degrees to 60 degrees.        Long Term Goals for 6 weeks:  (Phase 2 of goals)  Patient will comply with home exercise program  Patient will display within functional range of motion left upper quadrant  0/10 pain left upper quadrant  Patient will demonstrate compliance with all home management recommendations.        PLAN   Plan of Care Certification: 10/6/2023 to 12-.      Outpatient Occupational Therapy 2 times weekly for 10 weeks to include the following interventions: Manual therapy/joint mobilizations, Modalities for pain management, and Therapeutic exercises/activities..        EVA Blevins, GRANT/ROD

## 2023-10-23 ENCOUNTER — LAB VISIT (OUTPATIENT)
Dept: LAB | Facility: HOSPITAL | Age: 48
End: 2023-10-23
Attending: INTERNAL MEDICINE
Payer: COMMERCIAL

## 2023-10-23 ENCOUNTER — CLINICAL SUPPORT (OUTPATIENT)
Dept: REHABILITATION | Facility: HOSPITAL | Age: 48
End: 2023-10-23
Payer: COMMERCIAL

## 2023-10-23 DIAGNOSIS — G89.18 POST-MASTECTOMY PAIN: Primary | ICD-10-CM

## 2023-10-23 DIAGNOSIS — Z17.1 MALIGNANT NEOPLASM OF UPPER-OUTER QUADRANT OF LEFT BREAST IN FEMALE, ESTROGEN RECEPTOR NEGATIVE: ICD-10-CM

## 2023-10-23 DIAGNOSIS — C50.412 MALIGNANT NEOPLASM OF UPPER-OUTER QUADRANT OF LEFT BREAST IN FEMALE, ESTROGEN RECEPTOR NEGATIVE: ICD-10-CM

## 2023-10-23 LAB
ALBUMIN SERPL BCP-MCNC: 3.9 G/DL (ref 3.5–5.2)
ALP SERPL-CCNC: 68 U/L (ref 55–135)
ALT SERPL W/O P-5'-P-CCNC: 23 U/L (ref 10–44)
ANION GAP SERPL CALC-SCNC: 5 MMOL/L (ref 8–16)
AST SERPL-CCNC: 32 U/L (ref 10–40)
BASOPHILS # BLD AUTO: 0.02 K/UL (ref 0–0.2)
BASOPHILS NFR BLD: 0.6 % (ref 0–1.9)
BILIRUB SERPL-MCNC: 0.3 MG/DL (ref 0.1–1)
BUN SERPL-MCNC: 12 MG/DL (ref 6–20)
CALCIUM SERPL-MCNC: 9.7 MG/DL (ref 8.7–10.5)
CHLORIDE SERPL-SCNC: 106 MMOL/L (ref 95–110)
CO2 SERPL-SCNC: 28 MMOL/L (ref 23–29)
CREAT SERPL-MCNC: 0.7 MG/DL (ref 0.5–1.4)
DIFFERENTIAL METHOD: ABNORMAL
EOSINOPHIL # BLD AUTO: 0.2 K/UL (ref 0–0.5)
EOSINOPHIL NFR BLD: 4.7 % (ref 0–8)
ERYTHROCYTE [DISTWIDTH] IN BLOOD BY AUTOMATED COUNT: 12.5 % (ref 11.5–14.5)
EST. GFR  (NO RACE VARIABLE): >60 ML/MIN/1.73 M^2
GLUCOSE SERPL-MCNC: 87 MG/DL (ref 70–110)
HCT VFR BLD AUTO: 35.4 % (ref 37–48.5)
HGB BLD-MCNC: 12.2 G/DL (ref 12–16)
IMM GRANULOCYTES # BLD AUTO: 0 K/UL (ref 0–0.04)
IMM GRANULOCYTES NFR BLD AUTO: 0 % (ref 0–0.5)
LYMPHOCYTES # BLD AUTO: 1.3 K/UL (ref 1–4.8)
LYMPHOCYTES NFR BLD: 35.6 % (ref 18–48)
MAGNESIUM SERPL-MCNC: 1.7 MG/DL (ref 1.6–2.6)
MCH RBC QN AUTO: 31.4 PG (ref 27–31)
MCHC RBC AUTO-ENTMCNC: 34.5 G/DL (ref 32–36)
MCV RBC AUTO: 91 FL (ref 82–98)
MONOCYTES # BLD AUTO: 0.5 K/UL (ref 0.3–1)
MONOCYTES NFR BLD: 13.5 % (ref 4–15)
NEUTROPHILS # BLD AUTO: 1.7 K/UL (ref 1.8–7.7)
NEUTROPHILS NFR BLD: 45.6 % (ref 38–73)
NRBC BLD-RTO: 0 /100 WBC
PLATELET # BLD AUTO: 188 K/UL (ref 150–450)
PMV BLD AUTO: 9.7 FL (ref 9.2–12.9)
POTASSIUM SERPL-SCNC: 4.3 MMOL/L (ref 3.5–5.1)
PROT SERPL-MCNC: 6.4 G/DL (ref 6–8.4)
RBC # BLD AUTO: 3.89 M/UL (ref 4–5.4)
SODIUM SERPL-SCNC: 139 MMOL/L (ref 136–145)
WBC # BLD AUTO: 3.62 K/UL (ref 3.9–12.7)

## 2023-10-23 PROCEDURE — 83735 ASSAY OF MAGNESIUM: CPT | Performed by: INTERNAL MEDICINE

## 2023-10-23 PROCEDURE — 85025 COMPLETE CBC W/AUTO DIFF WBC: CPT | Performed by: INTERNAL MEDICINE

## 2023-10-23 PROCEDURE — 80053 COMPREHEN METABOLIC PANEL: CPT | Performed by: INTERNAL MEDICINE

## 2023-10-23 PROCEDURE — 36415 COLL VENOUS BLD VENIPUNCTURE: CPT | Performed by: INTERNAL MEDICINE

## 2023-10-23 PROCEDURE — 97140 MANUAL THERAPY 1/> REGIONS: CPT | Mod: PO

## 2023-10-24 RX ORDER — DIPHENHYDRAMINE HYDROCHLORIDE 50 MG/ML
50 INJECTION INTRAMUSCULAR; INTRAVENOUS ONCE AS NEEDED
Status: CANCELLED | OUTPATIENT
Start: 2023-10-25

## 2023-10-24 RX ORDER — SODIUM CHLORIDE 0.9 % (FLUSH) 0.9 %
10 SYRINGE (ML) INJECTION
Status: CANCELLED | OUTPATIENT
Start: 2023-10-25

## 2023-10-24 RX ORDER — HEPARIN 100 UNIT/ML
500 SYRINGE INTRAVENOUS
Status: CANCELLED | OUTPATIENT
Start: 2023-10-25

## 2023-10-24 RX ORDER — EPINEPHRINE 0.3 MG/.3ML
0.3 INJECTION SUBCUTANEOUS ONCE AS NEEDED
Status: CANCELLED | OUTPATIENT
Start: 2023-10-25

## 2023-10-24 NOTE — PROGRESS NOTES
[facility]  Occupational Therapy Treatment Note    Date: 10/23/2023  Name: Karyna Escoto  Clinic Number: [id number]    Therapy Diagnosis:   Encounter Diagnosis   Name Primary?    Post-mastectomy pain Yes      Physician: Ana Quigley NP-C     Physician Orders: evaluate and treat  Medical Diagnosis: malignant neoplasm left breast  Surgical Procedure and Date: bilateral mastectomy with insertion of tissue expanders, 03/2023  Evaluation Date: 10/6/2023  Insurance Authorization Period Expiration: 12-  Plan of Care Certification Period: 12-  Progress Note Due: same   Date of Return to MD: scheduled  Visit # / Visits authorized: 5/20  FOTO: intake completed/     Precautions:  Standard and Immunosuppression    Time In: 0700  Time Out: 0800  Total Billable Time: 60 minutes    SUBJECTIVE     Pt reports: reports less pain in left shoulder, but does continue to have tightness at axilla.  She was compliant with home exercise program given last session.   Response to previous treatment:no change  Functional change: no    Pain: 0/10  Location: left shoulder , no pain at rest    OBJECTIVE     Objective Measures updated at progress report unless specified.    Treatment     Flori received the treatments listed below:   Manual Therapy     applied for 45 minutes, including:  Supine on mat with head elevated. Joint distraction all joints of left upper extremity with prolonged gentle stretch. 2 sets of 3 repetitions.  Followed with myofascial relase of left upper quadrant supine and prone.    Therapeutic activities:  15 minutes patient demonstrated  her exercise program that she has been doing at home: self passive range of internal and external rotation with dowel, 2 sets 8 reps and hold at end 10 seconds. Also, instructed in internal rotation in sidelying with instructions to hold at least 10 seconds at end range.    Patient Education and Home Exercises      Education provided:    Instructed on 3 new exercises  Discussed probability that rotator cuff is injured (rule out cording)  Expalined rationale for compression  Reviewed treatment frequency and likely duration of weeks  Plan of care and goals.   Educated on home management protocols.     Written Home Exercises Provided: Patient instructed to cont prior HEP. Added 3 exercises to protocol  Exercises were reviewed and Flori was able to demonstrate them prior to the end of the session.  Flori demonstrated good  understanding of the HEP provided. See EMR under Patient Instructions for exercises provided during therapy sessions.       Assessment     Pt would continue to benefit from skilled OT. Yes.  Flori was referred to this clinic with post mastectomy pain. Therapist feels that she may have an injury to her left rotator cuff rather than cording or other post mastectomy pain. Will continue with treatment as described.    Flori is progressing well towards her goals and there are no updates to goals at this time. Pt prognosis is Good.     Pt will continue to benefit from skilled outpatient occupational therapy to address the deficits listed in the problem list on initial evaluation provide pt/family education and to maximize pt's level of independence in the home and community environment.     Pt's spiritual, cultural and educational needs considered and pt agreeable to plan of care and goals.    Anticipated barriers to occupational therapy: none     The following goals were discussed with the patient and patient is in agreement with them as to be addressed in the treatment plan.      Goals:   Short Term Goals for 4 weeks: (phase 1 of protocol)  Patient will comply with home exercise program ongoing  Patient will be educated on lymphedema precautions and signs of infection. - Ongoing 10/6/2023   Patient will report 25% decrease in pain  Internal rotation left upper extremity will increase from 45 degrees to 60 degrees.        Long Term Goals  for 6 weeks: (Phase 2 of goals)  Patient will comply with home exercise program  Patient will display within functional range of motion left upper quadrant  0/10 pain left upper quadrant  Patient will demonstrate compliance with all home management recommendations.        PLAN   Plan of Care Certification: 10/6/2023 to 12-.      Outpatient Occupational Therapy 2 times weekly for 10 weeks to include the following interventions: Manual therapy/joint mobilizations, Modalities for pain management, and Therapeutic exercises/activities..        EVA Blevins, GRANT/ROD

## 2023-10-25 ENCOUNTER — INFUSION (OUTPATIENT)
Dept: INFUSION THERAPY | Facility: HOSPITAL | Age: 48
End: 2023-10-25
Attending: INTERNAL MEDICINE
Payer: COMMERCIAL

## 2023-10-25 ENCOUNTER — DOCUMENTATION ONLY (OUTPATIENT)
Dept: HEMATOLOGY/ONCOLOGY | Facility: CLINIC | Age: 48
End: 2023-10-25

## 2023-10-25 ENCOUNTER — CLINICAL SUPPORT (OUTPATIENT)
Dept: REHABILITATION | Facility: HOSPITAL | Age: 48
End: 2023-10-25
Payer: COMMERCIAL

## 2023-10-25 VITALS
RESPIRATION RATE: 15 BRPM | TEMPERATURE: 98 F | HEIGHT: 63 IN | BODY MASS INDEX: 28.26 KG/M2 | SYSTOLIC BLOOD PRESSURE: 108 MMHG | DIASTOLIC BLOOD PRESSURE: 74 MMHG | WEIGHT: 159.5 LBS | HEART RATE: 88 BPM | OXYGEN SATURATION: 100 %

## 2023-10-25 DIAGNOSIS — Z17.1 MALIGNANT NEOPLASM OF UPPER-OUTER QUADRANT OF LEFT BREAST IN FEMALE, ESTROGEN RECEPTOR NEGATIVE: ICD-10-CM

## 2023-10-25 DIAGNOSIS — G89.18 POST-MASTECTOMY PAIN: Primary | ICD-10-CM

## 2023-10-25 DIAGNOSIS — D70.1 CHEMOTHERAPY-INDUCED NEUTROPENIA: Primary | ICD-10-CM

## 2023-10-25 DIAGNOSIS — C50.412 MALIGNANT NEOPLASM OF UPPER-OUTER QUADRANT OF LEFT BREAST IN FEMALE, ESTROGEN RECEPTOR NEGATIVE: ICD-10-CM

## 2023-10-25 DIAGNOSIS — T45.1X5A CHEMOTHERAPY-INDUCED NEUTROPENIA: Primary | ICD-10-CM

## 2023-10-25 PROCEDURE — 25000003 PHARM REV CODE 250: Performed by: NURSE PRACTITIONER

## 2023-10-25 PROCEDURE — 63600175 PHARM REV CODE 636 W HCPCS: Performed by: NURSE PRACTITIONER

## 2023-10-25 PROCEDURE — 96413 CHEMO IV INFUSION 1 HR: CPT

## 2023-10-25 PROCEDURE — 97140 MANUAL THERAPY 1/> REGIONS: CPT | Mod: PO

## 2023-10-25 PROCEDURE — A4216 STERILE WATER/SALINE, 10 ML: HCPCS | Performed by: NURSE PRACTITIONER

## 2023-10-25 RX ORDER — SODIUM CHLORIDE 0.9 % (FLUSH) 0.9 %
10 SYRINGE (ML) INJECTION
Status: DISCONTINUED | OUTPATIENT
Start: 2023-10-25 | End: 2023-10-25 | Stop reason: HOSPADM

## 2023-10-25 RX ORDER — DIPHENHYDRAMINE HYDROCHLORIDE 50 MG/ML
50 INJECTION INTRAMUSCULAR; INTRAVENOUS ONCE AS NEEDED
Status: DISCONTINUED | OUTPATIENT
Start: 2023-10-25 | End: 2023-10-25 | Stop reason: HOSPADM

## 2023-10-25 RX ORDER — EPINEPHRINE 0.3 MG/.3ML
0.3 INJECTION SUBCUTANEOUS ONCE AS NEEDED
Status: DISCONTINUED | OUTPATIENT
Start: 2023-10-25 | End: 2023-10-25 | Stop reason: HOSPADM

## 2023-10-25 RX ORDER — HEPARIN 100 UNIT/ML
500 SYRINGE INTRAVENOUS
Status: DISCONTINUED | OUTPATIENT
Start: 2023-10-25 | End: 2023-10-25 | Stop reason: HOSPADM

## 2023-10-25 RX ADMIN — SODIUM CHLORIDE 200 MG: 9 INJECTION, SOLUTION INTRAVENOUS at 10:10

## 2023-10-25 RX ADMIN — HEPARIN 500 UNITS: 100 SYRINGE at 11:10

## 2023-10-25 RX ADMIN — SODIUM CHLORIDE, PRESERVATIVE FREE 10 ML: 5 INJECTION INTRAVENOUS at 11:10

## 2023-10-25 RX ADMIN — SODIUM CHLORIDE: 0.9 INJECTION, SOLUTION INTRAVENOUS at 10:10

## 2023-10-25 NOTE — PLAN OF CARE
Problem: Fall Injury Risk  Goal: Absence of Fall and Fall-Related Injury  Outcome: Met     Problem: Activity Intolerance  Goal: Enhanced Capacity and Energy  Outcome: Met     Problem: Nausea and Vomiting  Goal: Fluid and Electrolyte Balance  Outcome: Met

## 2023-10-26 NOTE — PROGRESS NOTES
[facility]  Occupational Therapy Treatment Note    Date: 10/25/2023  Name: Karyna Escoto  Clinic Number: [id number]    Therapy Diagnosis:   Encounter Diagnosis   Name Primary?    Post-mastectomy pain Yes      Physician: Ana Quigley NP-C     Physician Orders: evaluate and treat  Medical Diagnosis: malignant neoplasm left breast  Surgical Procedure and Date: bilateral mastectomy with insertion of tissue expanders, 03/2023  Evaluation Date: 10/6/2023  Insurance Authorization Period Expiration: 12-  Plan of Care Certification Period: 12-  Progress Note Due: same   Date of Return to MD: scheduled  Visit # / Visits authorized: 6/20  FOTO: intake completed/     Precautions:  Standard and Immunosuppression    Time In: 0700  Time Out: 0800  Total Billable Time: 60 minutes    SUBJECTIVE     Pt reports: reports less pain in left shoulder, but does continue to have tightness at axilla.  She was compliant with home exercise program given last session.   Response to previous treatment:no change  Functional change: no    Pain: 0/10  Location: left shoulder , no pain at rest    OBJECTIVE     Objective Measures updated at progress report unless specified.    Treatment     Flori received the treatments listed below:   Manual Therapy     applied for 60 minutes, including:  Supine on mat with head elevated. Joint distraction all joints of left upper extremity with prolonged gentle stretch. 2 sets of 3 repetitions.  Followed with myofascial relase of left upper quadrant supine and prone.  Patient Education and Home Exercises      Education provided:   Instructed on 3 new exercises  Discussed probability that rotator cuff is injured (rule out cording)  Expalined rationale for compression  Reviewed treatment frequency and likely duration of weeks  Plan of care and goals.   Educated on home management protocols.     Written Home Exercises Provided: Patient instructed to cont prior HEP.  Added 3 exercises to protocol  Exercises were reviewed and Flori was able to demonstrate them prior to the end of the session.  Flori demonstrated good  understanding of the HEP provided. See EMR under Patient Instructions for exercises provided during therapy sessions.       Assessment     Pt would continue to benefit from skilled OT. Yes.  Flori was referred to this clinic with post mastectomy pain. Therapist feels that she may have an injury to her left rotator cuff rather than cording or other post mastectomy pain. Will continue with treatment as described.    Flori is progressing well towards her goals and there are no updates to goals at this time. Pt prognosis is Good.     Pt will continue to benefit from skilled outpatient occupational therapy to address the deficits listed in the problem list on initial evaluation provide pt/family education and to maximize pt's level of independence in the home and community environment.     Pt's spiritual, cultural and educational needs considered and pt agreeable to plan of care and goals.    Anticipated barriers to occupational therapy: none     The following goals were discussed with the patient and patient is in agreement with them as to be addressed in the treatment plan.      Goals:   Short Term Goals for 4 weeks: (phase 1 of protocol)  Patient will comply with home exercise program ongoing  Patient will be educated on lymphedema precautions and signs of infection. - Ongoing 10/6/2023   Patient will report 25% decrease in pain  Internal rotation left upper extremity will increase from 45 degrees to 60 degrees.        Long Term Goals for 6 weeks: (Phase 2 of goals)  Patient will comply with home exercise program  Patient will display within functional range of motion left upper quadrant  0/10 pain left upper quadrant  Patient will demonstrate compliance with all home management recommendations.        PLAN   Plan of Care Certification: 10/6/2023 to 12-.       Outpatient Occupational Therapy 2 times weekly for 10 weeks to include the following interventions: Manual therapy/joint mobilizations, Modalities for pain management, and Therapeutic exercises/activities..        EVA Blevins, GRANT/ROD

## 2023-10-30 ENCOUNTER — LAB VISIT (OUTPATIENT)
Dept: LAB | Facility: HOSPITAL | Age: 48
End: 2023-10-30
Attending: INTERNAL MEDICINE
Payer: COMMERCIAL

## 2023-10-30 ENCOUNTER — CLINICAL SUPPORT (OUTPATIENT)
Dept: REHABILITATION | Facility: HOSPITAL | Age: 48
End: 2023-10-30
Payer: COMMERCIAL

## 2023-10-30 DIAGNOSIS — Z17.1 MALIGNANT NEOPLASM OF UPPER-OUTER QUADRANT OF LEFT BREAST IN FEMALE, ESTROGEN RECEPTOR NEGATIVE: ICD-10-CM

## 2023-10-30 DIAGNOSIS — G89.18 POST-MASTECTOMY PAIN: Primary | ICD-10-CM

## 2023-10-30 DIAGNOSIS — C50.412 MALIGNANT NEOPLASM OF UPPER-OUTER QUADRANT OF LEFT BREAST IN FEMALE, ESTROGEN RECEPTOR NEGATIVE: ICD-10-CM

## 2023-10-30 LAB
ALBUMIN SERPL BCP-MCNC: 4.1 G/DL (ref 3.5–5.2)
ALP SERPL-CCNC: 67 U/L (ref 55–135)
ALT SERPL W/O P-5'-P-CCNC: 21 U/L (ref 10–44)
ANION GAP SERPL CALC-SCNC: 6 MMOL/L (ref 8–16)
AST SERPL-CCNC: 27 U/L (ref 10–40)
BASOPHILS # BLD AUTO: 0.03 K/UL (ref 0–0.2)
BASOPHILS NFR BLD: 0.6 % (ref 0–1.9)
BILIRUB SERPL-MCNC: 0.8 MG/DL (ref 0.1–1)
BUN SERPL-MCNC: 9 MG/DL (ref 6–20)
CALCIUM SERPL-MCNC: 10.1 MG/DL (ref 8.7–10.5)
CHLORIDE SERPL-SCNC: 103 MMOL/L (ref 95–110)
CO2 SERPL-SCNC: 27 MMOL/L (ref 23–29)
CREAT SERPL-MCNC: 0.7 MG/DL (ref 0.5–1.4)
DIFFERENTIAL METHOD: NORMAL
EOSINOPHIL # BLD AUTO: 0.2 K/UL (ref 0–0.5)
EOSINOPHIL NFR BLD: 3.9 % (ref 0–8)
ERYTHROCYTE [DISTWIDTH] IN BLOOD BY AUTOMATED COUNT: 12.7 % (ref 11.5–14.5)
EST. GFR  (NO RACE VARIABLE): >60 ML/MIN/1.73 M^2
GLUCOSE SERPL-MCNC: 93 MG/DL (ref 70–110)
HCT VFR BLD AUTO: 38.3 % (ref 37–48.5)
HGB BLD-MCNC: 13.1 G/DL (ref 12–16)
IMM GRANULOCYTES # BLD AUTO: 0.01 K/UL (ref 0–0.04)
IMM GRANULOCYTES NFR BLD AUTO: 0.2 % (ref 0–0.5)
LYMPHOCYTES # BLD AUTO: 1.5 K/UL (ref 1–4.8)
LYMPHOCYTES NFR BLD: 29.7 % (ref 18–48)
MAGNESIUM SERPL-MCNC: 1.6 MG/DL (ref 1.6–2.6)
MCH RBC QN AUTO: 30.8 PG (ref 27–31)
MCHC RBC AUTO-ENTMCNC: 34.2 G/DL (ref 32–36)
MCV RBC AUTO: 90 FL (ref 82–98)
MONOCYTES # BLD AUTO: 0.6 K/UL (ref 0.3–1)
MONOCYTES NFR BLD: 11.7 % (ref 4–15)
NEUTROPHILS # BLD AUTO: 2.6 K/UL (ref 1.8–7.7)
NEUTROPHILS NFR BLD: 53.9 % (ref 38–73)
NRBC BLD-RTO: 0 /100 WBC
PLATELET # BLD AUTO: 201 K/UL (ref 150–450)
PMV BLD AUTO: 9.5 FL (ref 9.2–12.9)
POTASSIUM SERPL-SCNC: 3.9 MMOL/L (ref 3.5–5.1)
PROT SERPL-MCNC: 7.1 G/DL (ref 6–8.4)
RBC # BLD AUTO: 4.25 M/UL (ref 4–5.4)
SODIUM SERPL-SCNC: 136 MMOL/L (ref 136–145)
WBC # BLD AUTO: 4.88 K/UL (ref 3.9–12.7)

## 2023-10-30 PROCEDURE — 97140 MANUAL THERAPY 1/> REGIONS: CPT | Mod: PO

## 2023-10-30 PROCEDURE — 80053 COMPREHEN METABOLIC PANEL: CPT | Performed by: INTERNAL MEDICINE

## 2023-10-30 PROCEDURE — 36415 COLL VENOUS BLD VENIPUNCTURE: CPT | Performed by: INTERNAL MEDICINE

## 2023-10-30 PROCEDURE — 83735 ASSAY OF MAGNESIUM: CPT | Performed by: INTERNAL MEDICINE

## 2023-10-30 PROCEDURE — 85025 COMPLETE CBC W/AUTO DIFF WBC: CPT | Performed by: INTERNAL MEDICINE

## 2023-10-31 NOTE — PROGRESS NOTES
[facility]  Occupational Therapy Treatment Note    Date: 10/30/2023  Name: Karyna Escoto  Clinic Number: [id number]    Therapy Diagnosis:   Encounter Diagnosis   Name Primary?    Post-mastectomy pain Yes      Physician: Ana Quigley NP-C     Physician Orders: evaluate and treat  Medical Diagnosis: malignant neoplasm left breast  Surgical Procedure and Date: bilateral mastectomy with insertion of tissue expanders, 03/2023  Evaluation Date: 10/6/2023  Insurance Authorization Period Expiration: 12-  Plan of Care Certification Period: 12-  Progress Note Due: same   Date of Return to MD: scheduled  Visit # / Visits authorized: 720  FOTO: intake completed/     Precautions:  Standard and Immunosuppression    Time In: 0700  Time Out: 0800  Total Billable Time: 60 minutes    SUBJECTIVE     Pt reports: reports less pain in left shoulder, but does continue to have tightness at axilla.  She was compliant with home exercise program given last session.   Response to previous treatment:no change  Functional change: no    Pain: 0/10  Location: left shoulder , no pain at rest    OBJECTIVE     Objective Measures updated at progress report unless specified.    Treatment     Flori received the treatments listed below:   Manual Therapy     applied for 60 minutes, including:  Supine on mat with head elevated. Joint distraction all joints of left upper extremity with prolonged gentle stretch. 2 sets of 3 repetitions.  Followed with myofascial relase of left upper quadrant supine and prone.  Patient Education and Home Exercises      Education provided:   Instructed on 3 new exercises  Discussed probability that rotator cuff is injured (rule out cording)  Expalined rationale for compression  Reviewed treatment frequency and likely duration of weeks  Plan of care and goals.   Educated on home management protocols.     Written Home Exercises Provided: Patient instructed to cont prior HEP.  Added 3 exercises to protocol  Exercises were reviewed and Flori was able to demonstrate them prior to the end of the session.  Flori demonstrated good  understanding of the HEP provided. See EMR under Patient Instructions for exercises provided during therapy sessions.       Assessment     Pt would continue to benefit from skilled OT. Yes.  Flori was referred to this clinic with post mastectomy pain. Therapist feels that she may have an injury to her left rotator cuff rather than cording or other post mastectomy pain. Will continue with treatment as described.    Flori is progressing well towards her goals and there are no updates to goals at this time. Pt prognosis is Good.     Pt will continue to benefit from skilled outpatient occupational therapy to address the deficits listed in the problem list on initial evaluation provide pt/family education and to maximize pt's level of independence in the home and community environment.     Pt's spiritual, cultural and educational needs considered and pt agreeable to plan of care and goals.    Anticipated barriers to occupational therapy: none     The following goals were discussed with the patient and patient is in agreement with them as to be addressed in the treatment plan.      Goals:   Short Term Goals for 4 weeks: (phase 1 of protocol)  Patient will comply with home exercise program ongoing  Patient will be educated on lymphedema precautions and signs of infection. - Ongoing 10/6/2023   Patient will report 25% decrease in pain  Internal rotation left upper extremity will increase from 45 degrees to 60 degrees.        Long Term Goals for 6 weeks: (Phase 2 of goals)  Patient will comply with home exercise program  Patient will display within functional range of motion left upper quadrant  0/10 pain left upper quadrant  Patient will demonstrate compliance with all home management recommendations.        PLAN   Plan of Care Certification: 10/6/2023 to 12-.       Outpatient Occupational Therapy 2 times weekly for 10 weeks to include the following interventions: Manual therapy/joint mobilizations, Modalities for pain management, and Therapeutic exercises/activities..        EVA Blevins, GRANT/ROD

## 2023-11-01 ENCOUNTER — CLINICAL SUPPORT (OUTPATIENT)
Dept: REHABILITATION | Facility: HOSPITAL | Age: 48
End: 2023-11-01
Payer: COMMERCIAL

## 2023-11-01 DIAGNOSIS — G89.18 POST-MASTECTOMY PAIN: Primary | ICD-10-CM

## 2023-11-01 PROCEDURE — 97140 MANUAL THERAPY 1/> REGIONS: CPT | Mod: PO

## 2023-11-02 NOTE — PROGRESS NOTES
[facility]  Occupational Therapy Treatment Note    Date: 11/1/2023  Name: Karyna Escoto  Clinic Number: [id number]    Therapy Diagnosis:   Encounter Diagnosis   Name Primary?    Post-mastectomy pain Yes      Physician: Ana Quigley NP-C     Physician Orders: evaluate and treat  Medical Diagnosis: malignant neoplasm left breast  Surgical Procedure and Date: bilateral mastectomy with insertion of tissue expanders, 03/2023  Evaluation Date: 10/6/2023  Insurance Authorization Period Expiration: 12-  Plan of Care Certification Period: 12-  Progress Note Due: same   Date of Return to MD: scheduled  Visit # / Visits authorized: 720  FOTO: intake completed/     Precautions:  Standard and Immunosuppression    Time In: 0700  Time Out: 0800  Total Billable Time: 60 minutes    SUBJECTIVE     Pt reports: reports less pain in left shoulder, but does continue to have tightness at axilla.  She was compliant with home exercise program given last session.   Response to previous treatment:no change  Functional change: no    Pain: 0/10  Location: left shoulder , no pain at rest    OBJECTIVE     Objective Measures updated at progress report unless specified.    Treatment     Flori received the treatments listed below:   Manual Therapy     applied for 60 minutes, including:  Supine on mat with head elevated. Joint distraction all joints of left upper extremity with prolonged gentle stretch. 2 sets of 3 repetitions.  Followed with myofascial relase of left upper quadrant supine and prone.  Patient Education and Home Exercises      Education provided:   Instructed on 3 new exercises  Discussed probability that rotator cuff is injured (rule out cording)  Expalined rationale for compression  Reviewed treatment frequency and likely duration of weeks  Plan of care and goals.   Educated on home management protocols.     Written Home Exercises Provided: Patient instructed to cont prior HEP.  Added 3 exercises to protocol  Exercises were reviewed and Flori was able to demonstrate them prior to the end of the session.  Flori demonstrated good  understanding of the HEP provided. See EMR under Patient Instructions for exercises provided during therapy sessions.       Assessment     Pt would continue to benefit from skilled OT. Yes.  Flori was referred to this clinic with post mastectomy pain. Therapist feels that she may have an injury to her left rotator cuff rather than cording or other post mastectomy pain. Will continue with treatment as described.    Flori is progressing well towards her goals and there are no updates to goals at this time. Pt prognosis is Good.     Pt will continue to benefit from skilled outpatient occupational therapy to address the deficits listed in the problem list on initial evaluation provide pt/family education and to maximize pt's level of independence in the home and community environment.     Pt's spiritual, cultural and educational needs considered and pt agreeable to plan of care and goals.    Anticipated barriers to occupational therapy: none     The following goals were discussed with the patient and patient is in agreement with them as to be addressed in the treatment plan.      Goals:   Short Term Goals for 4 weeks: (phase 1 of protocol)  Patient will comply with home exercise program ongoing  Patient will be educated on lymphedema precautions and signs of infection. - Ongoing 10/6/2023   Patient will report 25% decrease in pain  Internal rotation left upper extremity will increase from 45 degrees to 60 degrees.        Long Term Goals for 6 weeks: (Phase 2 of goals)  Patient will comply with home exercise program  Patient will display within functional range of motion left upper quadrant  0/10 pain left upper quadrant  Patient will demonstrate compliance with all home management recommendations.        PLAN   Plan of Care Certification: 10/6/2023 to 12-.       Outpatient Occupational Therapy 2 times weekly for 10 weeks to include the following interventions: Manual therapy/joint mobilizations, Modalities for pain management, and Therapeutic exercises/activities..        EVA Blevins, GRANT/ROD

## 2023-11-06 ENCOUNTER — LAB VISIT (OUTPATIENT)
Dept: LAB | Facility: HOSPITAL | Age: 48
End: 2023-11-06
Attending: INTERNAL MEDICINE
Payer: COMMERCIAL

## 2023-11-06 ENCOUNTER — CLINICAL SUPPORT (OUTPATIENT)
Dept: REHABILITATION | Facility: HOSPITAL | Age: 48
End: 2023-11-06
Payer: COMMERCIAL

## 2023-11-06 DIAGNOSIS — C50.412 MALIGNANT NEOPLASM OF UPPER-OUTER QUADRANT OF LEFT BREAST IN FEMALE, ESTROGEN RECEPTOR NEGATIVE: ICD-10-CM

## 2023-11-06 DIAGNOSIS — G89.18 POST-MASTECTOMY PAIN: Primary | ICD-10-CM

## 2023-11-06 DIAGNOSIS — Z17.1 MALIGNANT NEOPLASM OF UPPER-OUTER QUADRANT OF LEFT BREAST IN FEMALE, ESTROGEN RECEPTOR NEGATIVE: ICD-10-CM

## 2023-11-06 LAB
ALBUMIN SERPL BCP-MCNC: 3.8 G/DL (ref 3.5–5.2)
ALP SERPL-CCNC: 71 U/L (ref 55–135)
ALT SERPL W/O P-5'-P-CCNC: 17 U/L (ref 10–44)
ANION GAP SERPL CALC-SCNC: 4 MMOL/L (ref 8–16)
AST SERPL-CCNC: 23 U/L (ref 10–40)
BASOPHILS # BLD AUTO: 0.04 K/UL (ref 0–0.2)
BASOPHILS NFR BLD: 1 % (ref 0–1.9)
BILIRUB SERPL-MCNC: 0.4 MG/DL (ref 0.1–1)
BUN SERPL-MCNC: 7 MG/DL (ref 6–20)
CALCIUM SERPL-MCNC: 9.6 MG/DL (ref 8.7–10.5)
CHLORIDE SERPL-SCNC: 107 MMOL/L (ref 95–110)
CO2 SERPL-SCNC: 28 MMOL/L (ref 23–29)
CREAT SERPL-MCNC: 0.7 MG/DL (ref 0.5–1.4)
DIFFERENTIAL METHOD: ABNORMAL
EOSINOPHIL # BLD AUTO: 0.1 K/UL (ref 0–0.5)
EOSINOPHIL NFR BLD: 3.6 % (ref 0–8)
ERYTHROCYTE [DISTWIDTH] IN BLOOD BY AUTOMATED COUNT: 12.6 % (ref 11.5–14.5)
EST. GFR  (NO RACE VARIABLE): >60 ML/MIN/1.73 M^2
GLUCOSE SERPL-MCNC: 85 MG/DL (ref 70–110)
HCT VFR BLD AUTO: 34.7 % (ref 37–48.5)
HGB BLD-MCNC: 12.1 G/DL (ref 12–16)
IMM GRANULOCYTES # BLD AUTO: 0.01 K/UL (ref 0–0.04)
IMM GRANULOCYTES NFR BLD AUTO: 0.3 % (ref 0–0.5)
LYMPHOCYTES # BLD AUTO: 1.4 K/UL (ref 1–4.8)
LYMPHOCYTES NFR BLD: 35 % (ref 18–48)
MAGNESIUM SERPL-MCNC: 1.8 MG/DL (ref 1.6–2.6)
MCH RBC QN AUTO: 31.4 PG (ref 27–31)
MCHC RBC AUTO-ENTMCNC: 34.9 G/DL (ref 32–36)
MCV RBC AUTO: 90 FL (ref 82–98)
MONOCYTES # BLD AUTO: 0.5 K/UL (ref 0.3–1)
MONOCYTES NFR BLD: 12.7 % (ref 4–15)
NEUTROPHILS # BLD AUTO: 1.8 K/UL (ref 1.8–7.7)
NEUTROPHILS NFR BLD: 47.4 % (ref 38–73)
NRBC BLD-RTO: 0 /100 WBC
PLATELET # BLD AUTO: 201 K/UL (ref 150–450)
PMV BLD AUTO: 9.2 FL (ref 9.2–12.9)
POTASSIUM SERPL-SCNC: 4.1 MMOL/L (ref 3.5–5.1)
PROT SERPL-MCNC: 6.4 G/DL (ref 6–8.4)
RBC # BLD AUTO: 3.85 M/UL (ref 4–5.4)
SODIUM SERPL-SCNC: 139 MMOL/L (ref 136–145)
WBC # BLD AUTO: 3.86 K/UL (ref 3.9–12.7)

## 2023-11-06 PROCEDURE — 83735 ASSAY OF MAGNESIUM: CPT | Performed by: INTERNAL MEDICINE

## 2023-11-06 PROCEDURE — 97140 MANUAL THERAPY 1/> REGIONS: CPT | Mod: PO

## 2023-11-06 PROCEDURE — 80053 COMPREHEN METABOLIC PANEL: CPT | Performed by: INTERNAL MEDICINE

## 2023-11-06 PROCEDURE — 36415 COLL VENOUS BLD VENIPUNCTURE: CPT | Performed by: INTERNAL MEDICINE

## 2023-11-06 PROCEDURE — 85025 COMPLETE CBC W/AUTO DIFF WBC: CPT | Performed by: INTERNAL MEDICINE

## 2023-11-07 NOTE — PROGRESS NOTES
[facility]  Occupational Therapy Treatment Note    Date: 11/6/2023  Name: Karyna Escoto  Clinic Number: [id number]    Therapy Diagnosis:   Encounter Diagnosis   Name Primary?    Post-mastectomy pain Yes      Physician: Ana Quigley NP-C     Physician Orders: evaluate and treat  Medical Diagnosis: malignant neoplasm left breast  Surgical Procedure and Date: bilateral mastectomy with insertion of tissue expanders, 03/2023  Evaluation Date: 10/6/2023  Insurance Authorization Period Expiration: 12-  Plan of Care Certification Period: 12-  Progress Note Due: same   Date of Return to MD: scheduled  Visit # / Visits authorized: 9/20  FOTO: intake completed/     Precautions:  Standard and Immunosuppression    Time In: 0700  Time Out: 0800  Total Billable Time: 60 minutes    SUBJECTIVE     Pt reports: reports less pain in left shoulder, but does continue to have tightness at axilla.  She was compliant with home exercise program given last session.   Response to previous treatment:no change  Functional change: no    Pain: 0/10  Location: left shoulder , no pain at rest    OBJECTIVE     Objective Measures updated at progress report unless specified.    Treatment     Flori received the treatments listed below:   Manual Therapy     applied for 60 minutes, including:  Supine on mat with head elevated. Joint distraction all joints of left upper extremity with prolonged gentle stretch. 2 sets of 3 repetitions.  Followed with myofascial relase of left upper quadrant supine and prone.  Patient Education and Home Exercises      Education provided:   Instructed on 3 new exercises  Discussed probability that rotator cuff is injured (rule out cording)  Expalined rationale for compression  Reviewed treatment frequency and likely duration of weeks  Plan of care and goals.   Educated on home management protocols.     Written Home Exercises Provided: Patient instructed to cont prior HEP.  Added 3 exercises to protocol  Exercises were reviewed and Flori was able to demonstrate them prior to the end of the session.  Flori demonstrated good  understanding of the HEP provided. See EMR under Patient Instructions for exercises provided during therapy sessions.       Assessment     Pt would continue to benefit from skilled OT. Yes.  Flori was referred to this clinic with post mastectomy pain. Therapist feels that she may have an injury to her left rotator cuff rather than cording or other post mastectomy pain. Will continue with treatment as described.    Flori is progressing well towards her goals and there are no updates to goals at this time. Pt prognosis is Good.     Pt will continue to benefit from skilled outpatient occupational therapy to address the deficits listed in the problem list on initial evaluation provide pt/family education and to maximize pt's level of independence in the home and community environment.     Pt's spiritual, cultural and educational needs considered and pt agreeable to plan of care and goals.    Anticipated barriers to occupational therapy: none     The following goals were discussed with the patient and patient is in agreement with them as to be addressed in the treatment plan.      Goals:   Short Term Goals for 4 weeks: (phase 1 of protocol)  Patient will comply with home exercise program ongoing  Patient will be educated on lymphedema precautions and signs of infection. - Ongoing 10/6/2023   Patient will report 25% decrease in pain  Internal rotation left upper extremity will increase from 45 degrees to 60 degrees.        Long Term Goals for 6 weeks: (Phase 2 of goals)  Patient will comply with home exercise program  Patient will display within functional range of motion left upper quadrant  0/10 pain left upper quadrant  Patient will demonstrate compliance with all home management recommendations.        PLAN   Plan of Care Certification: 10/6/2023 to 12-.       Outpatient Occupational Therapy 2 times weekly for 10 weeks to include the following interventions: Manual therapy/joint mobilizations, Modalities for pain management, and Therapeutic exercises/activities..        EVA Blevins, GRANT/ROD

## 2023-11-08 ENCOUNTER — CLINICAL SUPPORT (OUTPATIENT)
Dept: REHABILITATION | Facility: HOSPITAL | Age: 48
End: 2023-11-08
Payer: COMMERCIAL

## 2023-11-08 ENCOUNTER — OFFICE VISIT (OUTPATIENT)
Dept: HEMATOLOGY/ONCOLOGY | Facility: CLINIC | Age: 48
End: 2023-11-08
Payer: COMMERCIAL

## 2023-11-08 VITALS
SYSTOLIC BLOOD PRESSURE: 120 MMHG | OXYGEN SATURATION: 98 % | BODY MASS INDEX: 28.09 KG/M2 | DIASTOLIC BLOOD PRESSURE: 65 MMHG | WEIGHT: 158.63 LBS | TEMPERATURE: 99 F | HEART RATE: 88 BPM

## 2023-11-08 DIAGNOSIS — R63.0 ANOREXIA: ICD-10-CM

## 2023-11-08 DIAGNOSIS — R63.0 POOR APPETITE: ICD-10-CM

## 2023-11-08 DIAGNOSIS — G62.9 NEUROPATHY: ICD-10-CM

## 2023-11-08 DIAGNOSIS — R63.4 WEIGHT LOSS: ICD-10-CM

## 2023-11-08 DIAGNOSIS — Z17.1 MALIGNANT NEOPLASM OF UPPER-OUTER QUADRANT OF LEFT BREAST IN FEMALE, ESTROGEN RECEPTOR NEGATIVE: Primary | ICD-10-CM

## 2023-11-08 DIAGNOSIS — G89.3 CANCER ASSOCIATED PAIN: ICD-10-CM

## 2023-11-08 DIAGNOSIS — C50.412 MALIGNANT NEOPLASM OF UPPER-OUTER QUADRANT OF LEFT BREAST IN FEMALE, ESTROGEN RECEPTOR NEGATIVE: Primary | ICD-10-CM

## 2023-11-08 DIAGNOSIS — G89.18 POST-MASTECTOMY PAIN: Primary | ICD-10-CM

## 2023-11-08 PROCEDURE — 3074F PR MOST RECENT SYSTOLIC BLOOD PRESSURE < 130 MM HG: ICD-10-PCS | Mod: CPTII,S$GLB,, | Performed by: NURSE PRACTITIONER

## 2023-11-08 PROCEDURE — 99214 PR OFFICE/OUTPT VISIT, EST, LEVL IV, 30-39 MIN: ICD-10-PCS | Mod: S$GLB,,, | Performed by: NURSE PRACTITIONER

## 2023-11-08 PROCEDURE — 3008F BODY MASS INDEX DOCD: CPT | Mod: CPTII,S$GLB,, | Performed by: NURSE PRACTITIONER

## 2023-11-08 PROCEDURE — 3074F SYST BP LT 130 MM HG: CPT | Mod: CPTII,S$GLB,, | Performed by: NURSE PRACTITIONER

## 2023-11-08 PROCEDURE — 97140 MANUAL THERAPY 1/> REGIONS: CPT | Mod: PO

## 2023-11-08 PROCEDURE — 1159F PR MEDICATION LIST DOCUMENTED IN MEDICAL RECORD: ICD-10-PCS | Mod: CPTII,S$GLB,, | Performed by: NURSE PRACTITIONER

## 2023-11-08 PROCEDURE — 1160F PR REVIEW ALL MEDS BY PRESCRIBER/CLIN PHARMACIST DOCUMENTED: ICD-10-PCS | Mod: CPTII,S$GLB,, | Performed by: NURSE PRACTITIONER

## 2023-11-08 PROCEDURE — 3078F DIAST BP <80 MM HG: CPT | Mod: CPTII,S$GLB,, | Performed by: NURSE PRACTITIONER

## 2023-11-08 PROCEDURE — 3008F PR BODY MASS INDEX (BMI) DOCUMENTED: ICD-10-PCS | Mod: CPTII,S$GLB,, | Performed by: NURSE PRACTITIONER

## 2023-11-08 PROCEDURE — 3078F PR MOST RECENT DIASTOLIC BLOOD PRESSURE < 80 MM HG: ICD-10-PCS | Mod: CPTII,S$GLB,, | Performed by: NURSE PRACTITIONER

## 2023-11-08 PROCEDURE — 1159F MED LIST DOCD IN RCRD: CPT | Mod: CPTII,S$GLB,, | Performed by: NURSE PRACTITIONER

## 2023-11-08 PROCEDURE — 99214 OFFICE O/P EST MOD 30 MIN: CPT | Mod: S$GLB,,, | Performed by: NURSE PRACTITIONER

## 2023-11-08 PROCEDURE — 1160F RVW MEDS BY RX/DR IN RCRD: CPT | Mod: CPTII,S$GLB,, | Performed by: NURSE PRACTITIONER

## 2023-11-08 RX ORDER — MEGESTROL ACETATE 40 MG/ML
400 SUSPENSION ORAL 2 TIMES DAILY
Qty: 600 ML | Refills: 11 | Status: SHIPPED | OUTPATIENT
Start: 2023-11-08 | End: 2024-02-01

## 2023-11-08 NOTE — PROGRESS NOTES
PROGRESS NOTE    Subjective:       Patient ID: Karyna Escoto is a 48 y.o. female.    6/9/2022:  Dx Mammo:  1.8cm mass in the left breast towards the left axilla.   Deep to the mass 2 lymph nodes are identified.     7/5/2022:  Left breast core biopsy:  Grade 2 IDCA with DCIS  ER: 0.03%, KS: 8.8%, HER2: 0  TRIPLE NEGATIVE    7/20/2022:  Left axilla LN needle biopsy:  Invasive ductal carcinoma  ER: 0%, KS: 40%, HER2 negative(0)    PLAN:  Neoadjuvant chemo/IO(Keynote 522)-surgery-radiation.     Pem/Tax/Carb:  Cycle 1: 8/11/2022  Cycle 2: 9/8/2022  Cycle 3: 10/6/2022  Cycle 4: 10/27/2022  Begin Shukri/Cytox/Pem  Cycle 5: 12/1/2022-----MUGA 8/10/2022-EF: 54%  Cycle 6: 12/22/2022  Cycle 7: 1/12/2023  Cycle 8: 2/2/2023    Cycle 9: 6/21/2023  Cycle 10: 7/10/2023-due    Surgery 3/17/2023-CR!!  Bilateral nipple sparing mastectomy  Right: atypical lobular hyperplasia, no carcinoma  Left:  no residual carcinoma or dcis seen, SLN neg x 5,   pT0N0    Cycle 9: 5/30/2023  Cycle 10: 7/12/2023  Cycle 11: 8/2/2023  Cycle 12: 8/23/2023-due    2/8/2023:  MRI:   Resolution of previous left breast mass and previous enlarged left axillary lymph nodes, compatible with favorable response to therapy.  No new disease.    Surgery  3/17/2023 at [facility]: complete response to chemotherapy      8/3/2022 Photos:          8/23/2022 Photo:      10/4/2022 Photo:    Lesion is no longer palpable on 10/4/2022.     11/15/2022          Chief Complaint:  Triple negative breast cancer follow up.     History of Present Illness:   Karyna Escoto is a 48 y.o. female who presents for follow up of breast cancer.      Patient complains of increasing fatigue over the last few weeks and some noted joint pain.  No sob, mild diarrhea.  She has nausea in the am and with certain foods. Decreased appetite and continued weight loss.   She was doing well with appetite and fatigue on the Dexamethasone but  had insomnia.    Family and Social history reviewed and is unchanged from 8/3/2022      ROS:  Review of Systems   Constitutional:  Positive for fatigue. Negative for appetite change, fever and unexpected weight change.   HENT:  Negative for mouth sores.    Eyes:  Negative for visual disturbance.   Respiratory:  Negative for cough and shortness of breath.    Cardiovascular:  Negative for chest pain and leg swelling.   Gastrointestinal:  Positive for nausea. Negative for abdominal pain, blood in stool and diarrhea.   Genitourinary:  Negative for frequency and hematuria.   Musculoskeletal:  Negative for back pain.   Skin:  Negative for rash.   Neurological:  Positive for headaches.   Hematological:  Positive for adenopathy.   Psychiatric/Behavioral:  The patient is not nervous/anxious.           Current Outpatient Medications:     bisacodyL (DULCOLAX) 5 mg EC tablet, Take 5 mg by mouth daily as needed for Constipation., Disp: , Rfl:     cetirizine (ZYRTEC) 10 mg Cap, Take 1 tablet by mouth once daily., Disp: , Rfl:     dexAMETHasone (DECADRON) 2 MG tablet, Take 1 tablet (2 mg total) by mouth daily with breakfast., Disp: 30 tablet, Rfl: 1    famotidine (PEPCID) 10 MG tablet, Take 10 mg by mouth 2 (two) times daily., Disp: , Rfl:     fluticasone propionate (FLONASE) 50 mcg/actuation nasal spray, 1 spray (50 mcg total) by Each Nostril route once daily., Disp: 15.8 mL, Rfl: 5    HYDROcodone-acetaminophen (NORCO)  mg per tablet, Take 1 tablet by mouth every 6 (six) hours as needed for Pain., Disp: 40 tablet, Rfl: 0    hydrOXYzine HCL (ATARAX) 10 MG Tab, Take 10 mg by mouth 3 (three) times daily as needed., Disp: , Rfl:     Lactobacillus rhamnosus GG (CULTURELLE) 10 billion cell capsule, Take 1 capsule by mouth once daily., Disp: , Rfl:     LIDOcaine-prilocaine (EMLA) cream, Apply topically as needed. Apply 30 mins prior to port access, Disp: 30 g, Rfl: 5    loperamide HCl (IMODIUM A-D ORAL), Take 1 tablet by mouth  daily as needed., Disp: , Rfl:     megestroL (MEGACE) 400 mg/10 mL (40 mg/mL) Susp, Take 10 mLs (400 mg total) by mouth 2 (two) times daily., Disp: 600 mL, Rfl: 11    methocarbamoL (ROBAXIN) 500 MG Tab, Take 1,000 mg by mouth 2 (two) times daily., Disp: , Rfl:     mupirocin (BACTROBAN) 2 % ointment, SMARTSI Application Topical 2-3 Times Daily, Disp: , Rfl:     omeprazole (PRILOSEC) 40 MG capsule, Take 1 capsule (40 mg total) by mouth once daily., Disp: 30 capsule, Rfl: 11    promethazine (PHENERGAN) 25 MG tablet, Take 1 tablet (25 mg total) by mouth every 4 to 6 hours as needed., Disp: 30 tablet, Rfl: 5    sertraline (ZOLOFT) 50 MG tablet, Take 50 mg by mouth., Disp: , Rfl:     silver sulfADIAZINE 1% (SILVADENE) 1 % cream, Apply topically 2 (two) times daily., Disp: 400 g, Rfl: 2        Objective:       Physical Examination:     /65   Pulse 88   Temp 98.5 °F (36.9 °C)   Wt 71.9 kg (158 lb 9.6 oz)   SpO2 98%   BMI 28.09 kg/m²     Physical Exam  Constitutional:       Appearance: Normal appearance. She is well-developed.   HENT:      Head: Normocephalic and atraumatic.      Right Ear: External ear normal.      Left Ear: External ear normal.      Nose: Nose normal.      Mouth/Throat:      Mouth: Mucous membranes are moist.   Eyes:      Conjunctiva/sclera: Conjunctivae normal.      Pupils: Pupils are equal, round, and reactive to light.   Neck:      Thyroid: No thyromegaly.      Trachea: No tracheal deviation.   Cardiovascular:      Rate and Rhythm: Normal rate and regular rhythm.      Heart sounds: Normal heart sounds.   Pulmonary:      Effort: Pulmonary effort is normal.      Breath sounds: Normal breath sounds.   Abdominal:      General: Bowel sounds are normal. There is no distension.      Palpations: Abdomen is soft. There is no mass.      Tenderness: There is no abdominal tenderness.   Skin:     General: Skin is warm and dry.      Findings: No rash.   Neurological:      General: No focal deficit  present.      Mental Status: She is alert and oriented to person, place, and time.      Comments: Neuro intact througout   Psychiatric:         Mood and Affect: Mood normal.         Behavior: Behavior normal.         Thought Content: Thought content normal.         Judgment: Judgment normal.         Labs:   Recent Results (from the past 336 hour(s))   CBC Auto Differential    Collection Time: 11/06/23  8:05 AM   Result Value Ref Range    WBC 3.86 (L) 3.90 - 12.70 K/uL    Hemoglobin 12.1 12.0 - 16.0 g/dL    Hematocrit 34.7 (L) 37.0 - 48.5 %    Platelets 201 150 - 450 K/uL   CBC Auto Differential    Collection Time: 10/30/23  7:58 AM   Result Value Ref Range    WBC 4.88 3.90 - 12.70 K/uL    Hemoglobin 13.1 12.0 - 16.0 g/dL    Hematocrit 38.3 37.0 - 48.5 %    Platelets 201 150 - 450 K/uL       CMP  Sodium   Date Value Ref Range Status   11/06/2023 139 136 - 145 mmol/L Final     Potassium   Date Value Ref Range Status   11/06/2023 4.1 3.5 - 5.1 mmol/L Final     Chloride   Date Value Ref Range Status   11/06/2023 107 95 - 110 mmol/L Final     CO2   Date Value Ref Range Status   11/06/2023 28 23 - 29 mmol/L Final     Glucose   Date Value Ref Range Status   11/06/2023 85 70 - 110 mg/dL Final     BUN   Date Value Ref Range Status   11/06/2023 7 6 - 20 mg/dL Final     Creatinine   Date Value Ref Range Status   11/06/2023 0.7 0.5 - 1.4 mg/dL Final     Calcium   Date Value Ref Range Status   11/06/2023 9.6 8.7 - 10.5 mg/dL Final     Total Protein   Date Value Ref Range Status   11/06/2023 6.4 6.0 - 8.4 g/dL Final     Albumin   Date Value Ref Range Status   11/06/2023 3.8 3.5 - 5.2 g/dL Final     Total Bilirubin   Date Value Ref Range Status   11/06/2023 0.4 0.1 - 1.0 mg/dL Final     Comment:     For infants and newborns, interpretation of results should be based  on gestational age, weight and in agreement with clinical  observations.    Premature Infant recommended reference ranges:  Up to 24 hours.............<8.0 mg/dL  Up  "to 48 hours............<12.0 mg/dL  3-5 days..................<15.0 mg/dL  6-29 days.................<15.0 mg/dL       Alkaline Phosphatase   Date Value Ref Range Status   11/06/2023 71 55 - 135 U/L Final     AST   Date Value Ref Range Status   11/06/2023 23 10 - 40 U/L Final     ALT   Date Value Ref Range Status   11/06/2023 17 10 - 44 U/L Final     Anion Gap   Date Value Ref Range Status   11/06/2023 4 (L) 8 - 16 mmol/L Final     No results found for: "CEA"      Assessment/Plan:     Problem List Items Addressed This Visit          Neuro    Neuropathy       Oncology    Left Breast Cancer-TRIPLE NEGATIVE - Primary    Cancer associated pain       Other    Poor appetite     Other Visit Diagnoses       Anorexia        Relevant Medications    megestroL (MEGACE) 400 mg/10 mL (40 mg/mL) Susp    Weight loss        Relevant Medications    megestroL (MEGACE) 400 mg/10 mL (40 mg/mL) Susp          Triple Negative breast cancer- Continue Keytruda  2. GERD-  Prilosec not helping- stop  3. Neuropathy- Alpha lipoic Acid did not help  4. S/p bilateral Mastectomies- Continue treatment with Lymphedema   5. Cancer related pain- Continue Norco  6. Anorexia- Start Megace BID      Discussion:     Follow up in about 4 weeks (around 12/6/2023) for with Dr. Peralta.    Electronically signed by Ana Quigley, MSN, APRN, AGNP-C, OCN              "

## 2023-11-08 NOTE — PROGRESS NOTES
[facility]  Occupational Therapy Treatment Note    Date: 11/8/2023  Name: Karyna Escoto  Clinic Number: [id number]    Therapy Diagnosis:   Encounter Diagnosis   Name Primary?    Post-mastectomy pain Yes      Physician: Ana Quigley NP-C     Physician Orders: evaluate and treat  Medical Diagnosis: malignant neoplasm left breast  Surgical Procedure and Date: bilateral mastectomy with insertion of tissue expanders, 03/2023  Evaluation Date: 10/6/2023  Insurance Authorization Period Expiration: 12-  Plan of Care Certification Period: 12-  Progress Note Due: same   Date of Return to MD: scheduled  Visit # / Visits authorized: 9/20  FOTO: intake completed/     Precautions:  Standard and Immunosuppression    Time In: 0700  Time Out: 0745  Total Billable Time: 45 minutes    SUBJECTIVE     Pt reports: reports less pain in left shoulder, but does continue to have tightness at axilla.  She was compliant with home exercise program given last session.   Response to previous treatment:no change  Functional change: no    Pain: 0/10  Location: left shoulder , no pain at rest    OBJECTIVE     Objective Measures updated at progress report unless specified.    Treatment     Flori received the treatments listed below:   Manual Therapy     applied for 45 minutes, including:  Supine on mat with head elevated. Joint distraction all joints of left upper extremity with prolonged gentle stretch. 2 sets of 3 repetitions.  Followed with myofascial relase of left upper quadrant supine and prone.  Patient Education and Home Exercises      Education provided:   Instructed on 3 new exercises  Discussed probability that rotator cuff is injured (rule out cording)  Expalined rationale for compression  Reviewed treatment frequency and likely duration of weeks  Plan of care and goals.   Educated on home management protocols.     Written Home Exercises Provided: Patient instructed to cont prior HEP.  Added 3 exercises to protocol  Exercises were reviewed and Flori was able to demonstrate them prior to the end of the session.  Flori demonstrated good  understanding of the HEP provided. See EMR under Patient Instructions for exercises provided during therapy sessions.       Assessment     Pt would continue to benefit from skilled OT. Yes.  Flori was referred to this clinic with post mastectomy pain. Therapist feels that she may have an injury to her left rotator cuff rather than cording or other post mastectomy pain. Will continue with treatment as described.    Flori is progressing well towards her goals and there are no updates to goals at this time. Pt prognosis is Good.     Pt will continue to benefit from skilled outpatient occupational therapy to address the deficits listed in the problem list on initial evaluation provide pt/family education and to maximize pt's level of independence in the home and community environment.     Pt's spiritual, cultural and educational needs considered and pt agreeable to plan of care and goals.    Anticipated barriers to occupational therapy: none     The following goals were discussed with the patient and patient is in agreement with them as to be addressed in the treatment plan.      Goals:   Short Term Goals for 4 weeks: (phase 1 of protocol)  Patient will comply with home exercise program ongoing  Patient will be educated on lymphedema precautions and signs of infection. - Ongoing 10/6/2023   Patient will report 25% decrease in pain  Internal rotation left upper extremity will increase from 45 degrees to 60 degrees.        Long Term Goals for 6 weeks: (Phase 2 of goals)  Patient will comply with home exercise program  Patient will display within functional range of motion left upper quadrant  0/10 pain left upper quadrant  Patient will demonstrate compliance with all home management recommendations.        PLAN   Plan of Care Certification: 10/6/2023 to 12-.       Outpatient Occupational Therapy 2 times weekly for 10 weeks to include the following interventions: Manual therapy/joint mobilizations, Modalities for pain management, and Therapeutic exercises/activities..        EVA Blevins, GRANT/ROD

## 2023-11-10 ENCOUNTER — TELEPHONE (OUTPATIENT)
Dept: HEMATOLOGY/ONCOLOGY | Facility: CLINIC | Age: 48
End: 2023-11-10

## 2023-11-10 DIAGNOSIS — Z17.1 MALIGNANT NEOPLASM OF UPPER-OUTER QUADRANT OF LEFT BREAST IN FEMALE, ESTROGEN RECEPTOR NEGATIVE: Primary | ICD-10-CM

## 2023-11-10 DIAGNOSIS — R53.83 FATIGUE, UNSPECIFIED TYPE: ICD-10-CM

## 2023-11-10 DIAGNOSIS — C50.412 MALIGNANT NEOPLASM OF UPPER-OUTER QUADRANT OF LEFT BREAST IN FEMALE, ESTROGEN RECEPTOR NEGATIVE: Primary | ICD-10-CM

## 2023-11-13 ENCOUNTER — LAB VISIT (OUTPATIENT)
Dept: LAB | Facility: HOSPITAL | Age: 48
End: 2023-11-13
Attending: INTERNAL MEDICINE
Payer: COMMERCIAL

## 2023-11-13 ENCOUNTER — CLINICAL SUPPORT (OUTPATIENT)
Dept: REHABILITATION | Facility: HOSPITAL | Age: 48
End: 2023-11-13
Payer: COMMERCIAL

## 2023-11-13 DIAGNOSIS — G89.18 POST-MASTECTOMY PAIN: Primary | ICD-10-CM

## 2023-11-13 DIAGNOSIS — C50.412 MALIGNANT NEOPLASM OF UPPER-OUTER QUADRANT OF LEFT BREAST IN FEMALE, ESTROGEN RECEPTOR NEGATIVE: ICD-10-CM

## 2023-11-13 DIAGNOSIS — Z17.1 MALIGNANT NEOPLASM OF UPPER-OUTER QUADRANT OF LEFT BREAST IN FEMALE, ESTROGEN RECEPTOR NEGATIVE: ICD-10-CM

## 2023-11-13 DIAGNOSIS — R53.83 FATIGUE, UNSPECIFIED TYPE: ICD-10-CM

## 2023-11-13 LAB
ALBUMIN SERPL BCP-MCNC: 4.2 G/DL (ref 3.5–5.2)
ALP SERPL-CCNC: 65 U/L (ref 55–135)
ALT SERPL W/O P-5'-P-CCNC: 19 U/L (ref 10–44)
ANION GAP SERPL CALC-SCNC: 5 MMOL/L (ref 8–16)
AST SERPL-CCNC: 18 U/L (ref 10–40)
BASOPHILS # BLD AUTO: 0.04 K/UL (ref 0–0.2)
BASOPHILS NFR BLD: 0.8 % (ref 0–1.9)
BILIRUB SERPL-MCNC: 0.3 MG/DL (ref 0.1–1)
BUN SERPL-MCNC: 13 MG/DL (ref 6–20)
CALCIUM SERPL-MCNC: 9.6 MG/DL (ref 8.7–10.5)
CHLORIDE SERPL-SCNC: 107 MMOL/L (ref 95–110)
CO2 SERPL-SCNC: 25 MMOL/L (ref 23–29)
CREAT SERPL-MCNC: 0.7 MG/DL (ref 0.5–1.4)
DIFFERENTIAL METHOD: NORMAL
EOSINOPHIL # BLD AUTO: 0.1 K/UL (ref 0–0.5)
EOSINOPHIL NFR BLD: 1.1 % (ref 0–8)
ERYTHROCYTE [DISTWIDTH] IN BLOOD BY AUTOMATED COUNT: 13.5 % (ref 11.5–14.5)
EST. GFR  (NO RACE VARIABLE): >60 ML/MIN/1.73 M^2
GLUCOSE SERPL-MCNC: 93 MG/DL (ref 70–110)
HCT VFR BLD AUTO: 37.8 % (ref 37–48.5)
HGB BLD-MCNC: 12.6 G/DL (ref 12–16)
IMM GRANULOCYTES # BLD AUTO: 0.01 K/UL (ref 0–0.04)
IMM GRANULOCYTES NFR BLD AUTO: 0.2 % (ref 0–0.5)
LYMPHOCYTES # BLD AUTO: 1.7 K/UL (ref 1–4.8)
LYMPHOCYTES NFR BLD: 32.1 % (ref 18–48)
MAGNESIUM SERPL-MCNC: 2 MG/DL (ref 1.6–2.6)
MCH RBC QN AUTO: 31 PG (ref 27–31)
MCHC RBC AUTO-ENTMCNC: 33.3 G/DL (ref 32–36)
MCV RBC AUTO: 93 FL (ref 82–98)
MONOCYTES # BLD AUTO: 0.6 K/UL (ref 0.3–1)
MONOCYTES NFR BLD: 12 % (ref 4–15)
NEUTROPHILS # BLD AUTO: 2.8 K/UL (ref 1.8–7.7)
NEUTROPHILS NFR BLD: 53.8 % (ref 38–73)
NRBC BLD-RTO: 0 /100 WBC
PLATELET # BLD AUTO: 210 K/UL (ref 150–450)
PMV BLD AUTO: 9.3 FL (ref 9.2–12.9)
POTASSIUM SERPL-SCNC: 4.3 MMOL/L (ref 3.5–5.1)
PROT SERPL-MCNC: 6.9 G/DL (ref 6–8.4)
RBC # BLD AUTO: 4.07 M/UL (ref 4–5.4)
SODIUM SERPL-SCNC: 137 MMOL/L (ref 136–145)
T4 FREE SERPL-MCNC: 1.01 NG/DL (ref 0.71–1.51)
TSH SERPL DL<=0.005 MIU/L-ACNC: 6.95 UIU/ML (ref 0.34–5.6)
WBC # BLD AUTO: 5.24 K/UL (ref 3.9–12.7)

## 2023-11-13 PROCEDURE — 83735 ASSAY OF MAGNESIUM: CPT | Performed by: INTERNAL MEDICINE

## 2023-11-13 PROCEDURE — 84439 ASSAY OF FREE THYROXINE: CPT | Performed by: INTERNAL MEDICINE

## 2023-11-13 PROCEDURE — 97140 MANUAL THERAPY 1/> REGIONS: CPT | Mod: PO

## 2023-11-13 PROCEDURE — 36415 COLL VENOUS BLD VENIPUNCTURE: CPT | Performed by: INTERNAL MEDICINE

## 2023-11-13 PROCEDURE — 85025 COMPLETE CBC W/AUTO DIFF WBC: CPT | Performed by: INTERNAL MEDICINE

## 2023-11-13 PROCEDURE — 84443 ASSAY THYROID STIM HORMONE: CPT | Performed by: INTERNAL MEDICINE

## 2023-11-13 PROCEDURE — 80053 COMPREHEN METABOLIC PANEL: CPT | Performed by: INTERNAL MEDICINE

## 2023-11-13 NOTE — PROGRESS NOTES
[facility]  Occupational Therapy Treatment Note    Date: 11/13/2023  Name: Karyna Escoto  Clinic Number: [id number]    Therapy Diagnosis:   Encounter Diagnosis   Name Primary?    Post-mastectomy pain Yes      Physician: Ana Quigley NP-C     Physician Orders: evaluate and treat  Medical Diagnosis: malignant neoplasm left breast  Surgical Procedure and Date: bilateral mastectomy with insertion of tissue expanders, 03/2023  Evaluation Date: 10/6/2023  Insurance Authorization Period Expiration: 12-  Plan of Care Certification Period: 12-  Progress Note Due: same   Date of Return to MD: scheduled  Visit # / Visits authorized: 10/20  FOTO: intake completed/     Precautions:  Standard and Immunosuppression    Time In: 0700  Time Out: 0800  Total Billable Time: 60 minutes    SUBJECTIVE     Pt reports: reports less pain in left shoulder, but does continue to have tightness at axilla.  She was compliant with home exercise program given last session.   Response to previous treatment:no change  Functional change: no    Pain: 0/10  Location: left shoulder , no pain at rest    OBJECTIVE     Objective Measures updated at progress report unless specified.    Treatment     Flori received the treatments listed below:   Manual Therapy     applied for 60 minutes, including:  Supine on mat with head elevated. Joint distraction all joints of left upper extremity with prolonged gentle stretch. 2 sets of 3 repetitions.  Followed with myofascial relase of left upper quadrant supine and prone.  Patient Education and Home Exercises      Education provided:   Instructed on 3 new exercises  Discussed probability that rotator cuff is injured (rule out cording)  Expalined rationale for compression  Reviewed treatment frequency and likely duration of weeks  Plan of care and goals.   Educated on home management protocols.     Written Home Exercises Provided: Patient instructed to cont prior HEP.  Added 3 exercises to protocol  Exercises were reviewed and Flori was able to demonstrate them prior to the end of the session.  Flori demonstrated good  understanding of the HEP provided. See EMR under Patient Instructions for exercises provided during therapy sessions.       Assessment     Pt would continue to benefit from skilled OT. Yes.  Flori was referred to this clinic with post mastectomy pain. Therapist feels that she may have an injury to her left rotator cuff rather than cording or other post mastectomy pain. Will continue with treatment as described.    Flori is progressing well towards her goals and there are no updates to goals at this time. Pt prognosis is Good.     Pt will continue to benefit from skilled outpatient occupational therapy to address the deficits listed in the problem list on initial evaluation provide pt/family education and to maximize pt's level of independence in the home and community environment.     Pt's spiritual, cultural and educational needs considered and pt agreeable to plan of care and goals.    Anticipated barriers to occupational therapy: none     The following goals were discussed with the patient and patient is in agreement with them as to be addressed in the treatment plan.      Goals:   Short Term Goals for 4 weeks: (phase 1 of protocol)  Patient will comply with home exercise program ongoing  Patient will be educated on lymphedema precautions and signs of infection. - Ongoing 10/6/2023   Patient will report 25% decrease in pain  Internal rotation left upper extremity will increase from 45 degrees to 60 degrees.        Long Term Goals for 6 weeks: (Phase 2 of goals)  Patient will comply with home exercise program  Patient will display within functional range of motion left upper quadrant  0/10 pain left upper quadrant  Patient will demonstrate compliance with all home management recommendations.        PLAN   Plan of Care Certification: 10/6/2023 to 12-.       Outpatient Occupational Therapy 2 times weekly for 10 weeks to include the following interventions: Manual therapy/joint mobilizations, Modalities for pain management, and Therapeutic exercises/activities..        EVA Blevins, GRANT/ROD

## 2023-11-15 ENCOUNTER — INFUSION (OUTPATIENT)
Dept: INFUSION THERAPY | Facility: HOSPITAL | Age: 48
End: 2023-11-15
Attending: INTERNAL MEDICINE
Payer: COMMERCIAL

## 2023-11-15 ENCOUNTER — PATIENT MESSAGE (OUTPATIENT)
Dept: HEMATOLOGY/ONCOLOGY | Facility: CLINIC | Age: 48
End: 2023-11-15

## 2023-11-15 ENCOUNTER — CLINICAL SUPPORT (OUTPATIENT)
Dept: REHABILITATION | Facility: HOSPITAL | Age: 48
End: 2023-11-15
Payer: COMMERCIAL

## 2023-11-15 VITALS
HEIGHT: 63 IN | OXYGEN SATURATION: 100 % | TEMPERATURE: 98 F | HEART RATE: 83 BPM | WEIGHT: 159.31 LBS | DIASTOLIC BLOOD PRESSURE: 74 MMHG | SYSTOLIC BLOOD PRESSURE: 111 MMHG | BODY MASS INDEX: 28.23 KG/M2 | RESPIRATION RATE: 18 BRPM

## 2023-11-15 DIAGNOSIS — Z17.1 MALIGNANT NEOPLASM OF UPPER-OUTER QUADRANT OF LEFT BREAST IN FEMALE, ESTROGEN RECEPTOR NEGATIVE: Primary | ICD-10-CM

## 2023-11-15 DIAGNOSIS — G89.18 POST-MASTECTOMY PAIN: Primary | ICD-10-CM

## 2023-11-15 DIAGNOSIS — C50.412 MALIGNANT NEOPLASM OF UPPER-OUTER QUADRANT OF LEFT BREAST IN FEMALE, ESTROGEN RECEPTOR NEGATIVE: Primary | ICD-10-CM

## 2023-11-15 DIAGNOSIS — Z17.1 MALIGNANT NEOPLASM OF UPPER-OUTER QUADRANT OF LEFT BREAST IN FEMALE, ESTROGEN RECEPTOR NEGATIVE: ICD-10-CM

## 2023-11-15 DIAGNOSIS — R52 ACUTE PAIN: ICD-10-CM

## 2023-11-15 DIAGNOSIS — C50.412 MALIGNANT NEOPLASM OF UPPER-OUTER QUADRANT OF LEFT BREAST IN FEMALE, ESTROGEN RECEPTOR NEGATIVE: ICD-10-CM

## 2023-11-15 DIAGNOSIS — T45.1X5A CHEMOTHERAPY-INDUCED NEUTROPENIA: ICD-10-CM

## 2023-11-15 DIAGNOSIS — D70.1 CHEMOTHERAPY-INDUCED NEUTROPENIA: ICD-10-CM

## 2023-11-15 PROCEDURE — 25000003 PHARM REV CODE 250: Performed by: INTERNAL MEDICINE

## 2023-11-15 PROCEDURE — 97140 MANUAL THERAPY 1/> REGIONS: CPT | Mod: PO

## 2023-11-15 PROCEDURE — A4216 STERILE WATER/SALINE, 10 ML: HCPCS | Performed by: INTERNAL MEDICINE

## 2023-11-15 PROCEDURE — 96413 CHEMO IV INFUSION 1 HR: CPT

## 2023-11-15 PROCEDURE — 63600175 PHARM REV CODE 636 W HCPCS: Mod: JZ,JG | Performed by: INTERNAL MEDICINE

## 2023-11-15 RX ORDER — SODIUM CHLORIDE 0.9 % (FLUSH) 0.9 %
10 SYRINGE (ML) INJECTION
Status: CANCELLED | OUTPATIENT
Start: 2023-11-15

## 2023-11-15 RX ORDER — HEPARIN 100 UNIT/ML
500 SYRINGE INTRAVENOUS
Status: CANCELLED | OUTPATIENT
Start: 2023-11-15

## 2023-11-15 RX ORDER — EPINEPHRINE 0.3 MG/.3ML
0.3 INJECTION SUBCUTANEOUS ONCE AS NEEDED
Status: DISCONTINUED | OUTPATIENT
Start: 2023-11-15 | End: 2023-11-15 | Stop reason: HOSPADM

## 2023-11-15 RX ORDER — SODIUM CHLORIDE 0.9 % (FLUSH) 0.9 %
10 SYRINGE (ML) INJECTION
Status: DISCONTINUED | OUTPATIENT
Start: 2023-11-15 | End: 2023-11-15 | Stop reason: HOSPADM

## 2023-11-15 RX ORDER — DIPHENHYDRAMINE HYDROCHLORIDE 50 MG/ML
50 INJECTION INTRAMUSCULAR; INTRAVENOUS ONCE AS NEEDED
Status: CANCELLED | OUTPATIENT
Start: 2023-11-15

## 2023-11-15 RX ORDER — DIPHENHYDRAMINE HYDROCHLORIDE 50 MG/ML
50 INJECTION INTRAMUSCULAR; INTRAVENOUS ONCE AS NEEDED
Status: DISCONTINUED | OUTPATIENT
Start: 2023-11-15 | End: 2023-11-15 | Stop reason: HOSPADM

## 2023-11-15 RX ORDER — EPINEPHRINE 0.3 MG/.3ML
0.3 INJECTION SUBCUTANEOUS ONCE AS NEEDED
Status: CANCELLED | OUTPATIENT
Start: 2023-11-15

## 2023-11-15 RX ORDER — HEPARIN 100 UNIT/ML
500 SYRINGE INTRAVENOUS
Status: DISCONTINUED | OUTPATIENT
Start: 2023-11-15 | End: 2023-11-15 | Stop reason: HOSPADM

## 2023-11-15 RX ADMIN — SODIUM CHLORIDE, PRESERVATIVE FREE 10 ML: 5 INJECTION INTRAVENOUS at 10:11

## 2023-11-15 RX ADMIN — SODIUM CHLORIDE 200 MG: 9 INJECTION, SOLUTION INTRAVENOUS at 10:11

## 2023-11-15 RX ADMIN — HEPARIN 500 UNITS: 100 SYRINGE at 10:11

## 2023-11-15 NOTE — PLAN OF CARE
Problem: Fatigue  Goal: Improved Activity Tolerance  Outcome: Ongoing, Progressing  Intervention: Promote Improved Energy  Flowsheets (Taken 11/15/2023 2547)  Fatigue Management:   fatigue-related activity identified   paced activity encouraged   frequent rest breaks encouraged  Sleep/Rest Enhancement:   noise level reduced   relaxation techniques promoted   regular sleep/rest pattern promoted  Activity Management:   Ambulated -L4   Up in chair - L3

## 2023-11-16 RX ORDER — HYDROCODONE BITARTRATE AND ACETAMINOPHEN 10; 325 MG/1; MG/1
1 TABLET ORAL EVERY 6 HOURS PRN
Qty: 40 TABLET | Refills: 0 | Status: SHIPPED | OUTPATIENT
Start: 2023-11-16 | End: 2024-02-01

## 2023-11-16 NOTE — PROGRESS NOTES
[facility]  Occupational Therapy Treatment Note    Date: 11/15/2023  Name: Karyna Escoto  Clinic Number: [id number]    Therapy Diagnosis:   Encounter Diagnosis   Name Primary?    Post-mastectomy pain Yes      Physician: Ana Quigley NP-C     Physician Orders: evaluate and treat  Medical Diagnosis: malignant neoplasm left breast  Surgical Procedure and Date: bilateral mastectomy with insertion of tissue expanders, 03/2023  Evaluation Date: 10/6/2023  Insurance Authorization Period Expiration: 12-  Plan of Care Certification Period: 12-  Progress Note Due: same   Date of Return to MD: scheduled  Visit # / Visits authorized: 12/20  FOTO: intake completed/     Precautions:  Standard and Immunosuppression    Time In: 0700  Time Out: 0800  Total Billable Time: 60 minutes    SUBJECTIVE     Pt reports: reports no pain at shoulder  She was compliant with home exercise program given last session.   Response to previous treatment: less pain  Functional change: yes: Flori reports that she was able to do activities over head this weekend no pain    Pain: 0/10  Location: no pain    OBJECTIVE     Objective Measures updated at progress report unless specified.    Treatment     Flori received the treatments listed below:   Manual Therapy     applied for 60 minutes, including:  Supine on mat with head elevated. Joint distraction all joints of left upper extremity with prolonged gentle stretch. 2 sets of 3 repetitions.  Followed with myofascial relase of left upper quadrant supine and prone. Used electric massager at upper at upper scapular trigger points  Patient Education and Home Exercises      Education provided:       Plan of care and goals, status  Reviewed home management protocols.     Written Home Exercises Provided: Patient instructed to cont prior HEP.   Exercises were reviewed and Flori was able to demonstrate them prior to the end of the session.  Flori demonstrated good   understanding of the HEP provided. See EMR under Patient Instructions for exercises provided during therapy sessions.       Assessment     Pt has met goals in plan of care    Anticipated barriers to occupational therapy: none     Goals:   Short Term Goals for 4 weeks: (phase 1 of protocol)  Patient will comply with home exercise program met  Patient will be educated on lymphedema precautions and signs of infection. - met  Patient will report 25% decrease in painmet  Internal rotation left upper extremity will increase from 45 degrees to 60 degrees.met        Long Term Goals for 6 weeks: (Phase 2 of goals)  Patient will comply with home exercise program met  Patient will display within functional range of motion left upper quadrant met  0/10 pain left upper quadrant met  Patient will demonstrate compliance with all home management recommendations.met        PLAN   Plan of Care Certification: 10/6/2023 to 12-.      Outpatient Occupational Therapy discharged this date.     EVA Blevins, GRANT/ROD

## 2023-11-20 ENCOUNTER — PATIENT MESSAGE (OUTPATIENT)
Dept: HEMATOLOGY/ONCOLOGY | Facility: CLINIC | Age: 48
End: 2023-11-20

## 2023-11-20 DIAGNOSIS — G47.00 INSOMNIA, UNSPECIFIED TYPE: Primary | ICD-10-CM

## 2023-11-20 RX ORDER — TRAZODONE HYDROCHLORIDE 50 MG/1
50 TABLET ORAL NIGHTLY
Qty: 30 TABLET | Refills: 11 | Status: SHIPPED | OUTPATIENT
Start: 2023-11-20 | End: 2024-11-19

## 2023-12-04 ENCOUNTER — LAB VISIT (OUTPATIENT)
Dept: LAB | Facility: HOSPITAL | Age: 48
End: 2023-12-04
Attending: INTERNAL MEDICINE
Payer: COMMERCIAL

## 2023-12-04 DIAGNOSIS — C50.412 MALIGNANT NEOPLASM OF UPPER-OUTER QUADRANT OF LEFT BREAST IN FEMALE, ESTROGEN RECEPTOR NEGATIVE: ICD-10-CM

## 2023-12-04 DIAGNOSIS — Z17.1 MALIGNANT NEOPLASM OF UPPER-OUTER QUADRANT OF LEFT BREAST IN FEMALE, ESTROGEN RECEPTOR NEGATIVE: ICD-10-CM

## 2023-12-04 LAB
ALBUMIN SERPL BCP-MCNC: 3.9 G/DL (ref 3.5–5.2)
ALP SERPL-CCNC: 100 U/L (ref 55–135)
ALT SERPL W/O P-5'-P-CCNC: 25 U/L (ref 10–44)
ANION GAP SERPL CALC-SCNC: 4 MMOL/L (ref 8–16)
AST SERPL-CCNC: 24 U/L (ref 10–40)
BASOPHILS # BLD AUTO: 0.03 K/UL (ref 0–0.2)
BASOPHILS NFR BLD: 0.8 % (ref 0–1.9)
BILIRUB SERPL-MCNC: 0.4 MG/DL (ref 0.1–1)
BUN SERPL-MCNC: 10 MG/DL (ref 6–20)
CALCIUM SERPL-MCNC: 9.4 MG/DL (ref 8.7–10.5)
CHLORIDE SERPL-SCNC: 107 MMOL/L (ref 95–110)
CO2 SERPL-SCNC: 26 MMOL/L (ref 23–29)
CREAT SERPL-MCNC: 0.8 MG/DL (ref 0.5–1.4)
DIFFERENTIAL METHOD: ABNORMAL
EOSINOPHIL # BLD AUTO: 0.2 K/UL (ref 0–0.5)
EOSINOPHIL NFR BLD: 3.9 % (ref 0–8)
ERYTHROCYTE [DISTWIDTH] IN BLOOD BY AUTOMATED COUNT: 13.1 % (ref 11.5–14.5)
EST. GFR  (NO RACE VARIABLE): >60 ML/MIN/1.73 M^2
GLUCOSE SERPL-MCNC: 94 MG/DL (ref 70–110)
HCT VFR BLD AUTO: 37 % (ref 37–48.5)
HGB BLD-MCNC: 12.6 G/DL (ref 12–16)
IMM GRANULOCYTES # BLD AUTO: 0 K/UL (ref 0–0.04)
IMM GRANULOCYTES NFR BLD AUTO: 0 % (ref 0–0.5)
LYMPHOCYTES # BLD AUTO: 1.5 K/UL (ref 1–4.8)
LYMPHOCYTES NFR BLD: 40.1 % (ref 18–48)
MAGNESIUM SERPL-MCNC: 1.9 MG/DL (ref 1.6–2.6)
MCH RBC QN AUTO: 31.7 PG (ref 27–31)
MCHC RBC AUTO-ENTMCNC: 34.1 G/DL (ref 32–36)
MCV RBC AUTO: 93 FL (ref 82–98)
MONOCYTES # BLD AUTO: 0.4 K/UL (ref 0.3–1)
MONOCYTES NFR BLD: 11.5 % (ref 4–15)
NEUTROPHILS # BLD AUTO: 1.7 K/UL (ref 1.8–7.7)
NEUTROPHILS NFR BLD: 43.7 % (ref 38–73)
NRBC BLD-RTO: 0 /100 WBC
PLATELET # BLD AUTO: 183 K/UL (ref 150–450)
PMV BLD AUTO: 9.8 FL (ref 9.2–12.9)
POTASSIUM SERPL-SCNC: 4.1 MMOL/L (ref 3.5–5.1)
PROT SERPL-MCNC: 6.7 G/DL (ref 6–8.4)
RBC # BLD AUTO: 3.97 M/UL (ref 4–5.4)
SODIUM SERPL-SCNC: 137 MMOL/L (ref 136–145)
WBC # BLD AUTO: 3.82 K/UL (ref 3.9–12.7)

## 2023-12-04 PROCEDURE — 83735 ASSAY OF MAGNESIUM: CPT | Performed by: INTERNAL MEDICINE

## 2023-12-04 PROCEDURE — 80053 COMPREHEN METABOLIC PANEL: CPT | Performed by: INTERNAL MEDICINE

## 2023-12-04 PROCEDURE — 85025 COMPLETE CBC W/AUTO DIFF WBC: CPT | Performed by: INTERNAL MEDICINE

## 2023-12-04 PROCEDURE — 36415 COLL VENOUS BLD VENIPUNCTURE: CPT | Performed by: INTERNAL MEDICINE

## 2023-12-04 NOTE — PROGRESS NOTES
PROGRESS NOTE    Subjective:       Patient ID: Karyna Escoto is a 47 y.o. female.    6/9/2022:  Dx Mammo:  1.8cm mass in the left breast towards the left axilla.   Deep to the mass 2 lymph nodes are identified.     7/5/2022:  Left breast core biopsy:  Grade 2 IDCA with DCIS  ER: 0.03%, MD: 8.8%, HER2: 0  TRIPLE NEGATIVE    7/20/2022:  Left axilla LN needle biopsy:  Invasive ductal carcinoma  ER: 0%, MD: 40%, HER2 negative(0)    PLAN:  Neoadjuvant chemo/IO(Keynote 522)-surgery-radiation.     Pem/Tax/Carb:  Cycle 1: 8/11/2022  Cycle 2: 8/23/2022 switched from Taxol to Abraxane    9/6/2022: Breast MRI mass now 1.2cm and sternum abnormality    8/3/2022 Photos:          8/23/2022 Photo:      9/13/2022              Chief Complaint:  Triple negative breast cancer follow up.     History of Present Illness:   Karyna Escoto is a 47 y.o. female who presents for follow up of breast cancer.      Patient is on cycle two of chemotherapy she had a diffuse rash from head to toe with Taxol. Switched to Abraxane and she is tolerating it much better no rash today. She does have persistent nausea and fatigue. She continue to take the Zofran with relief.      Family and Social history reviewed and is unchanged from 8/3/2022    ROS:  Review of Systems   Constitutional:  Positive for fatigue. Negative for appetite change, fever and unexpected weight change.   HENT:  Negative for mouth sores.    Eyes:  Negative for visual disturbance.   Respiratory:  Negative for cough and shortness of breath.    Cardiovascular:  Negative for chest pain and leg swelling.   Gastrointestinal:  Positive for diarrhea and nausea. Negative for abdominal pain and blood in stool.   Genitourinary:  Negative for frequency and hematuria.   Musculoskeletal:  Negative for back pain.   Skin:  Positive for rash. Negative for pallor.   Neurological:  Positive for headaches.   Hematological:  Positive for  Physical Therapy Visit    Visit Type: Daily Treatment Note  Visit: 3  Referring Provider: Suzy Johnson*  Medical Diagnosis (from order): S76.111D - Rupture of right quadriceps tendon, subsequent encounter  S76.112D - Rupture of left quadriceps tendon, subsequent encounter     SUBJECTIVE                                                                                                               Patient states experiencing no pain.  Reports now walking a lot more and using his treadmill but cannot use the stationary bike.  He no longer wears the knee immobilizer, just knee wraps. Reports doing his HEP consistently.  . He returned to work last Tues, 2, 10 hour shifts  Functional Change: Walking outdoors  Using the treadmill    Pain / Symptoms  - Pain rating (out of 10): Current: 0     Worker's Compensation Information  Occupation Information  - Employer:  [employer] ( [employer])  [address] [address]     - Current Work Status: working, restricted duty due to current condition       OBJECTIVE                                                                                                                     Range of Motion (ROM)   (degrees unless noted; active unless noted; norms in ( ); negative=lacking to 0, positive=beyond 0)  Knee:   - Flexion (150):       Left:  105        Right:  112   Comments: Measured knee AROM FLEX.  after manual therapy and stretching   ~ 3 - 5 degree EXT lag bi-laterally                Ambulation / Gait  - Assistive device: no assistive device  - Description: antalgic  Both lower extremity outwardly rotated             Treatment     Therapeutic Exercise  Pt education on diagnosis and POC    Nu step L2 x 6'   *SLR x 10 (focused on TKE)  *Hip abd in SL x 10  *prone hip ext x 10  *heel slides x 10 with use of belt - did not perform  *supine ball with both lower extremity elevated   *hamstring curls using swiss ball  *quad sets x 10 x 10 sec hold   *prone QS  adenopathy.   Psychiatric/Behavioral:  The patient is not nervous/anxious.         Current Outpatient Medications:     acetaminophen (TYLENOL) 325 MG tablet, Take 650 mg by mouth., Disp: , Rfl:     fluticasone propionate (FLONASE) 50 mcg/actuation nasal spray, 1 spray (50 mcg total) by Each Nostril route once daily., Disp: 15.8 mL, Rfl: 5    gabapentin (NEURONTIN) 300 MG capsule, Take 300 mg by mouth 3 (three) times daily., Disp: , Rfl:     HYDROcodone-acetaminophen (NORCO) 5-325 mg per tablet, Take 1 tablet by mouth every 6 (six) hours as needed for Pain., Disp: 10 tablet, Rfl: 0    hydrOXYzine HCL (ATARAX) 10 MG Tab, Take 25 mg by mouth 3 (three) times daily as needed., Disp: , Rfl:     hydrOXYzine HCL (ATARAX) 10 MG Tab, Take 10 mg by mouth., Disp: , Rfl:     LIDOcaine-prilocaine (EMLA) cream, Apply topically as needed. Apply 30 mins prior to port access, Disp: 30 g, Rfl: 5    methylPREDNISolone (MEDROL DOSEPACK) 4 mg tablet, use as directed, Disp: 21 each, Rfl: 0    naproxen sodium 220 mg Cap, Take by mouth., Disp: , Rfl:     omeprazole (PRILOSEC) 10 MG capsule, Take 10 mg by mouth every 12 (twelve) hours as needed., Disp: , Rfl:     ondansetron (ZOFRAN) 8 MG tablet, Take 1 tablet (8 mg total) by mouth every 8 (eight) hours as needed., Disp: 30 tablet, Rfl: 5    promethazine (PHENERGAN) 25 MG tablet, Take 1 tablet (25 mg total) by mouth every 4 to 6 hours as needed., Disp: 30 tablet, Rfl: 5    sertraline (ZOLOFT) 50 MG tablet, Take 50 mg by mouth once daily., Disp: , Rfl:     sertraline (ZOLOFT) 50 MG tablet, Take 50 mg by mouth., Disp: , Rfl:         Objective:       Physical Examination:     /80   Pulse 65   Temp 98.3 °F (36.8 °C)   Wt 88.3 kg (194 lb 11.2 oz)   BMI 35.61 kg/m²     Physical Exam  Constitutional:       Appearance: Normal appearance. She is well-developed.   HENT:      Head: Normocephalic and atraumatic.      Right Ear: External ear normal.      Left Ear: External ear normal.       with core activation with forearm core activation     Not Today:  *ankle circles  *calf stretch      Manual Therapy   Tool assisted STM   Patella mobilization on B      Skilled input: verbal instruction/cues    Writer verbally educated and received verbal consent for hand placement, positioning of patient, and techniques to be performed today from patient   Home Exercise Program  *above indicates provided as part of home exercise program  Access Code: [email or web address]  URL: [email or web address]  Date: 12/04/2023  Prepared by: Tate Piña    Exercises  - Bridge with Heels on Swiss Ball  - 1 x daily - 7 x weekly - 1 sets - 10-12 reps - 10 hold  - Hamstring curls, roll in and out and bridging   - 1 x daily - 7 x weekly - 1 sets - 10 reps  - Prone Quadriceps Set with Towel Roll  - 1 x daily - 7 x weekly - 1 sets - 10 reps - 10 hold      ASSESSMENT                                                                                                            Patient 6.5 weeks post op Bilateral quadriceps tendon repair.  Patient arrived with both knee wraps on stating, \"they OK's discontinuation of the brace and I can wear these knee wraps following today's appointment with the MD\".   Per protocol increased ROM limited from to 103 degrees FLEX left, 112 degree FLEX right .  Noted a 3 to 5 degree EXT lag bi-laterally.  We discussed the importance of maintaining full TKE when performing SLR, and to be careful not to push knee FLEX aggressively until 9-10 week per protocol.   Added ball exercises in supine and prone quad set.    Education:   - Results of above outlined education: Verbalizes understanding    PLAN                                                                                                                           Suggestions for next session as indicated: Progress per plan of care; progress per protocol       Goals  Long Term Goals: to be met by end of plan of care  1. Decrease patient's  Mouth/Throat:      Mouth: Mucous membranes are moist.   Eyes:      Conjunctiva/sclera: Conjunctivae normal.      Pupils: Pupils are equal, round, and reactive to light.   Neck:      Thyroid: No thyromegaly.      Trachea: No tracheal deviation.   Cardiovascular:      Rate and Rhythm: Normal rate and regular rhythm.      Heart sounds: Normal heart sounds.   Pulmonary:      Effort: Pulmonary effort is normal.      Breath sounds: Normal breath sounds.   Chest:   Breasts:     Right: Normal.       Abdominal:      General: Bowel sounds are normal. There is no distension.      Palpations: Abdomen is soft. There is no mass.      Tenderness: There is no abdominal tenderness.   Musculoskeletal:         General: Normal range of motion.      Right lower leg: No edema.      Left lower leg: No edema.   Lymphadenopathy:      Upper Body:      Left upper body: Axillary adenopathy present.   Skin:     Findings: No rash.   Neurological:      General: No focal deficit present.      Mental Status: She is alert and oriented to person, place, and time.      Comments: Neuro intact througout   Psychiatric:         Mood and Affect: Mood normal.         Behavior: Behavior normal.         Thought Content: Thought content normal.         Judgment: Judgment normal.       Labs:   Recent Results (from the past 336 hour(s))   CBC Auto Differential    Collection Time: 09/13/22  7:53 AM   Result Value Ref Range    WBC 2.79 (L) 3.90 - 12.70 K/uL    Hemoglobin 12.9 12.0 - 16.0 g/dL    Hematocrit 38.0 37.0 - 48.5 %    Platelets 179 150 - 450 K/uL   CBC Auto Differential    Collection Time: 09/06/22  7:48 AM   Result Value Ref Range    WBC 6.25 3.90 - 12.70 K/uL    Hemoglobin 13.1 12.0 - 16.0 g/dL    Hematocrit 38.8 37.0 - 48.5 %    Platelets 142 (L) 150 - 450 K/uL     CMP  Sodium   Date Value Ref Range Status   09/06/2022 137 136 - 145 mmol/L Final     Potassium   Date Value Ref Range Status   09/06/2022 4.4 3.5 - 5.1 mmol/L Final     Chloride   Date  perceived disability to less than 20%.  2. Increase B knee motions to 130(week 9-10) to enable ease with function/mobility.  3. Independent with HEP/self management techniques.  4. Return to prior level of function including work full duty.  5. Patient to be able to go up/down stairs without difficulty.  6. Normalize gait.      Therapy procedure time and total treatment time can be found documented on the Time Entry flowsheet     Value Ref Range Status   09/06/2022 102 95 - 110 mmol/L Final     CO2   Date Value Ref Range Status   09/06/2022 29 23 - 29 mmol/L Final     Glucose   Date Value Ref Range Status   09/06/2022 95 70 - 110 mg/dL Final     BUN   Date Value Ref Range Status   09/06/2022 18 6 - 20 mg/dL Final     Creatinine   Date Value Ref Range Status   09/06/2022 0.8 0.5 - 1.4 mg/dL Final     Calcium   Date Value Ref Range Status   09/06/2022 9.0 8.7 - 10.5 mg/dL Final     Total Protein   Date Value Ref Range Status   09/06/2022 7.1 6.0 - 8.4 g/dL Final     Albumin   Date Value Ref Range Status   09/06/2022 4.0 3.5 - 5.2 g/dL Final     Total Bilirubin   Date Value Ref Range Status   09/06/2022 0.8 0.1 - 1.0 mg/dL Final     Comment:     For infants and newborns, interpretation of results should be based  on gestational age, weight and in agreement with clinical  observations.    Premature Infant recommended reference ranges:  Up to 24 hours.............<8.0 mg/dL  Up to 48 hours............<12.0 mg/dL  3-5 days..................<15.0 mg/dL  6-29 days.................<15.0 mg/dL       Alkaline Phosphatase   Date Value Ref Range Status   09/06/2022 73 55 - 135 U/L Final     AST   Date Value Ref Range Status   09/06/2022 18 10 - 40 U/L Final     ALT   Date Value Ref Range Status   09/06/2022 29 10 - 44 U/L Final     Anion Gap   Date Value Ref Range Status   09/06/2022 6 (L) 8 - 16 mmol/L Final     No results found for: CEA  No results found for: PSA        Assessment/Plan:     Problem List Items Addressed This Visit          Oncology    Left Breast Cancer-TRIPLE NEGATIVE - Primary    Relevant Orders    NM PET CT Routine Skull to Mid Thigh       GI    Transaminitis     Other Visit Diagnoses       Nausea        Seasonal allergic rhinitis due to pollen        Nonintractable headache, unspecified chronicity pattern, unspecified headache type        Abnormal MRI, breast        Relevant Orders    NM PET CT Routine Skull to Mid Thigh    Fatigue,  unspecified type              1. Triple Negative breast cancer- Continue Taxol Cycle 2 Day 8 Thursday 9/14/2022 PET scan ordered  2. Nausea- Continue Zofran and Phenergan PRN  3. Transaminitis- Resolved; Continue weekly labs  4. Allergic Rhinitis- Continue Zyrtec and Flonase  5. Headaches- Continue Zyrtec and Ibuprofen PRN  6. Fatigue- Encouraged activity.  7. Sternum Abnormality on MRI- PET scan ordered    Discussion:     Follow up in about 3 weeks (around 10/4/2022) for with Dr. Peralta and 6 weeks with me.    Electronically signed by Ana Quigley, MSN, APRN, AGNP-C, OCN

## 2023-12-05 NOTE — PROGRESS NOTES
PROGRESS NOTE    Subjective:       Patient ID: Karyna Escoto is a 48 y.o. female.    6/9/2022:  Dx Mammo:  1.8cm mass in the left breast towards the left axilla.   Deep to the mass 2 lymph nodes are identified.     7/5/2022:  Left breast core biopsy:  Grade 2 IDCA with DCIS  ER: 0.03%, [place]: 8.8%, HER2: 0  TRIPLE NEGATIVE    7/20/2022:  Left axilla LN needle biopsy:  Invasive ductal carcinoma  ER: 0%, [place]: 40%, HER2 negative(0)    PLAN:  Neoadjuvant chemo/IO(Keynote 522)-surgery-radiation.     Pem/Tax/Carb:  Cycle 1: 8/11/2022  Cycle 2: 9/8/2022  Cycle 3: 10/6/2022  Cycle 4: 10/27/2022  Begin Shukri/Cytox/Pem  Cycle 5: 12/1/2022-----MUGA 8/10/2022-EF: 54%  Cycle 6: 12/22/2022  Cycle 7: 1/12/2023  Cycle 8: 2/2/2023    Cycle 9: 6/21/2023  Cycle 10: 7/10/2023-due    Surgery 3/17/2023-CR!!  Bilateral nipple sparing mastectomy  Right: atypical lobular hyperplasia, no carcinoma  Left:  no residual carcinoma or dcis seen, SLN neg x 5,   pT0N0    Cycle 9: 5/30/2023  Cycle 10: 7/12/2023  Cycle 11: 8/2/2023  Cycle 12: 8/23/2023-due    2/8/2023:  MRI:   Resolution of previous left breast mass and previous enlarged left axillary lymph nodes, compatible with favorable response to therapy.  No new disease.    Surgery  3/17/2023 at [facility]: complete response to chemotherapy      8/3/2022 Photos:          8/23/2022 Photo:      10/4/2022 Photo:    Lesion is no longer palpable on 10/4/2022.     11/15/2022          Chief Complaint:  Triple negative breast cancer follow up.     History of Present Illness:   Karyna Escoto is a 48 y.o. female who presents for follow up of breast cancer.      Patient complains of increasing fatigue over the last few weeks and some noted joint pain.  No sob, mild diarrhea.  She has nausea in the am and with certain foods. Decreased appetite and continued weight loss.   She was doing well with appetite and fatigue on the Dexamethasone but  had insomnia.    Family and Social history reviewed and is unchanged from 8/3/2022      ROS:  Review of Systems   Constitutional:  Positive for activity change, appetite change, fatigue and unexpected weight change. Negative for fever.   HENT:  Negative for mouth sores.    Eyes:  Negative for visual disturbance.   Respiratory:  Negative for cough and shortness of breath.    Cardiovascular:  Negative for chest pain and leg swelling.   Gastrointestinal:  Positive for nausea. Negative for abdominal pain, blood in stool and diarrhea.   Genitourinary:  Positive for vaginal pain. Negative for frequency and hematuria.   Musculoskeletal:  Negative for back pain.   Skin:  Negative for rash.   Neurological:  Positive for numbness and headaches.   Hematological:  Positive for adenopathy.   Psychiatric/Behavioral:  The patient is not nervous/anxious.           Current Outpatient Medications:     bisacodyL (DULCOLAX) 5 mg EC tablet, Take 5 mg by mouth daily as needed for Constipation., Disp: , Rfl:     cetirizine (ZYRTEC) 10 mg Cap, Take 1 tablet by mouth once daily., Disp: , Rfl:     dexAMETHasone (DECADRON) 2 MG tablet, Take 1 tablet (2 mg total) by mouth daily with breakfast., Disp: 30 tablet, Rfl: 1    droNABinol (MARINOL) 2.5 MG capsule, Take 1 capsule (2.5 mg total) by mouth 2 (two) times daily before meals., Disp: 60 capsule, Rfl: 2    famotidine (PEPCID) 10 MG tablet, Take 10 mg by mouth 2 (two) times daily., Disp: , Rfl:     fluticasone propionate (FLONASE) 50 mcg/actuation nasal spray, 1 spray (50 mcg total) by Each Nostril route once daily., Disp: 15.8 mL, Rfl: 5    HYDROcodone-acetaminophen (NORCO)  mg per tablet, Take 1 tablet by mouth every 6 (six) hours as needed for Pain., Disp: 40 tablet, Rfl: 0    hydrOXYzine HCL (ATARAX) 10 MG Tab, Take 10 mg by mouth 3 (three) times daily as needed., Disp: , Rfl:     Lactobacillus rhamnosus GG (CULTURELLE) 10 billion cell capsule, Take 1 capsule by mouth once daily.,  "Disp: , Rfl:     LIDOcaine-prilocaine (EMLA) cream, Apply topically as needed. Apply 30 mins prior to port access, Disp: 30 g, Rfl: 5    loperamide HCl (IMODIUM A-D ORAL), Take 1 tablet by mouth daily as needed., Disp: , Rfl:     megestroL (MEGACE) 400 mg/10 mL (40 mg/mL) Susp, Take 10 mLs (400 mg total) by mouth 2 (two) times daily., Disp: 600 mL, Rfl: 11    methocarbamoL (ROBAXIN) 500 MG Tab, Take 1,000 mg by mouth 2 (two) times daily., Disp: , Rfl:     mupirocin (BACTROBAN) 2 % ointment, SMARTSI Application Topical 2-3 Times Daily, Disp: , Rfl:     omeprazole (PRILOSEC) 40 MG capsule, Take 1 capsule (40 mg total) by mouth once daily., Disp: 30 capsule, Rfl: 11    promethazine (PHENERGAN) 25 MG tablet, Take 1 tablet (25 mg total) by mouth every 4 to 6 hours as needed., Disp: 30 tablet, Rfl: 5    sertraline (ZOLOFT) 50 MG tablet, Take 50 mg by mouth., Disp: , Rfl:     silver sulfADIAZINE 1% (SILVADENE) 1 % cream, Apply topically 2 (two) times daily., Disp: 400 g, Rfl: 2    traZODone (DESYREL) 50 MG tablet, Take 1 tablet (50 mg total) by mouth every evening., Disp: 30 tablet, Rfl: 11  No current facility-administered medications for this visit.        Objective:       Physical Examination:     /82   Pulse 102   Temp 97.6 °F (36.4 °C)   Resp 17   Ht 5' 3" (1.6 m)   Wt 71.7 kg (158 lb)   BMI 27.99 kg/m²     Physical Exam  Constitutional:       Appearance: Normal appearance. She is well-developed.   HENT:      Head: Normocephalic and atraumatic.      Right Ear: External ear normal.      Left Ear: External ear normal.      Nose: Nose normal.      Mouth/Throat:      Mouth: Mucous membranes are moist.   Eyes:      Conjunctiva/sclera: Conjunctivae normal.      Pupils: Pupils are equal, round, and reactive to light.   Neck:      Thyroid: No thyromegaly.      Trachea: No tracheal deviation.   Cardiovascular:      Rate and Rhythm: Normal rate and regular rhythm.      Heart sounds: Normal heart sounds. "   Pulmonary:      Effort: Pulmonary effort is normal.      Breath sounds: Normal breath sounds.   Abdominal:      General: Bowel sounds are normal. There is no distension.      Palpations: Abdomen is soft. There is no mass.      Tenderness: There is no abdominal tenderness.   Skin:     General: Skin is warm and dry.      Findings: No rash.   Neurological:      General: No focal deficit present.      Mental Status: She is alert and oriented to person, place, and time.      Comments: Neuro intact througout   Psychiatric:         Mood and Affect: Mood normal.         Behavior: Behavior normal.         Thought Content: Thought content normal.         Judgment: Judgment normal.         Labs:   Recent Results (from the past 336 hour(s))   CBC Auto Differential    Collection Time: 12/04/23  8:28 AM   Result Value Ref Range    WBC 3.82 (L) 3.90 - 12.70 K/uL    Hemoglobin 12.6 12.0 - 16.0 g/dL    Hematocrit 37.0 37.0 - 48.5 %    Platelets 183 150 - 450 K/uL       CMP  Sodium   Date Value Ref Range Status   12/04/2023 137 136 - 145 mmol/L Final     Potassium   Date Value Ref Range Status   12/04/2023 4.1 3.5 - 5.1 mmol/L Final     Chloride   Date Value Ref Range Status   12/04/2023 107 95 - 110 mmol/L Final     CO2   Date Value Ref Range Status   12/04/2023 26 23 - 29 mmol/L Final     Glucose   Date Value Ref Range Status   12/04/2023 94 70 - 110 mg/dL Final     BUN   Date Value Ref Range Status   12/04/2023 10 6 - 20 mg/dL Final     Creatinine   Date Value Ref Range Status   12/04/2023 0.8 0.5 - 1.4 mg/dL Final     Calcium   Date Value Ref Range Status   12/04/2023 9.4 8.7 - 10.5 mg/dL Final     Total Protein   Date Value Ref Range Status   12/04/2023 6.7 6.0 - 8.4 g/dL Final     Albumin   Date Value Ref Range Status   12/04/2023 3.9 3.5 - 5.2 g/dL Final     Total Bilirubin   Date Value Ref Range Status   12/04/2023 0.4 0.1 - 1.0 mg/dL Final     Comment:     For infants and newborns, interpretation of results should be  "based  on gestational age, weight and in agreement with clinical  observations.    Premature Infant recommended reference ranges:  Up to 24 hours.............<8.0 mg/dL  Up to 48 hours............<12.0 mg/dL  3-5 days..................<15.0 mg/dL  6-29 days.................<15.0 mg/dL       Alkaline Phosphatase   Date Value Ref Range Status   12/04/2023 100 55 - 135 U/L Final     AST   Date Value Ref Range Status   12/04/2023 24 10 - 40 U/L Final     ALT   Date Value Ref Range Status   12/04/2023 25 10 - 44 U/L Final     Anion Gap   Date Value Ref Range Status   12/04/2023 4 (L) 8 - 16 mmol/L Final     No results found for: "CEA"      Assessment/Plan:     Problem List Items Addressed This Visit          Neuro    Neuropathy       Oncology    Left Breast Cancer-TRIPLE NEGATIVE - Primary    Cancer associated pain    Primary malignant neoplasm of breast       GI    Intractable nausea and vomiting       Other    Poor appetite     Other Visit Diagnoses       Seasonal allergic rhinitis, unspecified trigger        Relevant Medications    fluticasone propionate (FLONASE) 50 mcg/actuation nasal spray    Nausea        Relevant Medications    droNABinol (MARINOL) 2.5 MG capsule    Decreased libido        Vaginal dryness                Triple Negative breast cancer- Continue Keytruda  2. GERD-    3. Neuropathy- Continue to monitor  4. S/p bilateral Mastectomies- Continue treatment with Lymphedema   5. Cancer related pain- Continue Norco  6. Anorexia- Start Marinol BID  7. Decreased Libido and Vaginal Dryness- Ok to use vagifem and lubricants  8. Seasonal Allergies- Refill Flonase      Discussion:     Follow up in about 3 weeks (around 12/27/2023) for with Dr. Peralta and 6 weeks with me.    Electronically signed by Ana Quigley, MSN, APRN, AGNP-C, OCN              "

## 2023-12-06 ENCOUNTER — OFFICE VISIT (OUTPATIENT)
Dept: HEMATOLOGY/ONCOLOGY | Facility: CLINIC | Age: 48
End: 2023-12-06
Payer: COMMERCIAL

## 2023-12-06 ENCOUNTER — INFUSION (OUTPATIENT)
Dept: INFUSION THERAPY | Facility: HOSPITAL | Age: 48
End: 2023-12-06
Attending: INTERNAL MEDICINE
Payer: COMMERCIAL

## 2023-12-06 VITALS
TEMPERATURE: 98 F | SYSTOLIC BLOOD PRESSURE: 119 MMHG | HEART RATE: 102 BPM | OXYGEN SATURATION: 100 % | HEART RATE: 84 BPM | HEIGHT: 63 IN | WEIGHT: 158 LBS | WEIGHT: 158.63 LBS | SYSTOLIC BLOOD PRESSURE: 123 MMHG | HEIGHT: 63 IN | DIASTOLIC BLOOD PRESSURE: 82 MMHG | RESPIRATION RATE: 17 BRPM | RESPIRATION RATE: 15 BRPM | BODY MASS INDEX: 28.11 KG/M2 | DIASTOLIC BLOOD PRESSURE: 78 MMHG | TEMPERATURE: 98 F | BODY MASS INDEX: 28 KG/M2

## 2023-12-06 DIAGNOSIS — G62.9 NEUROPATHY: ICD-10-CM

## 2023-12-06 DIAGNOSIS — T45.1X5A CHEMOTHERAPY-INDUCED NEUTROPENIA: ICD-10-CM

## 2023-12-06 DIAGNOSIS — Z17.1 MALIGNANT NEOPLASM OF UPPER-OUTER QUADRANT OF LEFT BREAST IN FEMALE, ESTROGEN RECEPTOR NEGATIVE: ICD-10-CM

## 2023-12-06 DIAGNOSIS — R11.2 NAUSEA & VOMITING: Primary | ICD-10-CM

## 2023-12-06 DIAGNOSIS — R68.82 DECREASED LIBIDO: ICD-10-CM

## 2023-12-06 DIAGNOSIS — R11.0 NAUSEA: ICD-10-CM

## 2023-12-06 DIAGNOSIS — G89.3 CANCER ASSOCIATED PAIN: ICD-10-CM

## 2023-12-06 DIAGNOSIS — C50.919 PRIMARY MALIGNANT NEOPLASM OF BREAST, UNSPECIFIED LATERALITY: ICD-10-CM

## 2023-12-06 DIAGNOSIS — N89.8 VAGINAL DRYNESS: ICD-10-CM

## 2023-12-06 DIAGNOSIS — C50.412 MALIGNANT NEOPLASM OF UPPER-OUTER QUADRANT OF LEFT BREAST IN FEMALE, ESTROGEN RECEPTOR NEGATIVE: ICD-10-CM

## 2023-12-06 DIAGNOSIS — Z17.1 MALIGNANT NEOPLASM OF UPPER-OUTER QUADRANT OF LEFT BREAST IN FEMALE, ESTROGEN RECEPTOR NEGATIVE: Primary | ICD-10-CM

## 2023-12-06 DIAGNOSIS — R11.2 INTRACTABLE NAUSEA AND VOMITING: ICD-10-CM

## 2023-12-06 DIAGNOSIS — J30.2 SEASONAL ALLERGIC RHINITIS, UNSPECIFIED TRIGGER: ICD-10-CM

## 2023-12-06 DIAGNOSIS — C50.412 MALIGNANT NEOPLASM OF UPPER-OUTER QUADRANT OF LEFT BREAST IN FEMALE, ESTROGEN RECEPTOR NEGATIVE: Primary | ICD-10-CM

## 2023-12-06 DIAGNOSIS — D70.1 CHEMOTHERAPY-INDUCED NEUTROPENIA: ICD-10-CM

## 2023-12-06 DIAGNOSIS — R63.0 POOR APPETITE: ICD-10-CM

## 2023-12-06 PROCEDURE — 3079F PR MOST RECENT DIASTOLIC BLOOD PRESSURE 80-89 MM HG: ICD-10-PCS | Mod: CPTII,S$GLB,, | Performed by: NURSE PRACTITIONER

## 2023-12-06 PROCEDURE — 99214 OFFICE O/P EST MOD 30 MIN: CPT | Mod: S$GLB,,, | Performed by: NURSE PRACTITIONER

## 2023-12-06 PROCEDURE — 3008F PR BODY MASS INDEX (BMI) DOCUMENTED: ICD-10-PCS | Mod: CPTII,S$GLB,, | Performed by: NURSE PRACTITIONER

## 2023-12-06 PROCEDURE — 99214 PR OFFICE/OUTPT VISIT, EST, LEVL IV, 30-39 MIN: ICD-10-PCS | Mod: S$GLB,,, | Performed by: NURSE PRACTITIONER

## 2023-12-06 PROCEDURE — 96367 TX/PROPH/DG ADDL SEQ IV INF: CPT

## 2023-12-06 PROCEDURE — 1160F PR REVIEW ALL MEDS BY PRESCRIBER/CLIN PHARMACIST DOCUMENTED: ICD-10-PCS | Mod: CPTII,S$GLB,, | Performed by: NURSE PRACTITIONER

## 2023-12-06 PROCEDURE — 3074F SYST BP LT 130 MM HG: CPT | Mod: CPTII,S$GLB,, | Performed by: NURSE PRACTITIONER

## 2023-12-06 PROCEDURE — 25000003 PHARM REV CODE 250: Performed by: NURSE PRACTITIONER

## 2023-12-06 PROCEDURE — 3074F PR MOST RECENT SYSTOLIC BLOOD PRESSURE < 130 MM HG: ICD-10-PCS | Mod: CPTII,S$GLB,, | Performed by: NURSE PRACTITIONER

## 2023-12-06 PROCEDURE — 1160F RVW MEDS BY RX/DR IN RCRD: CPT | Mod: CPTII,S$GLB,, | Performed by: NURSE PRACTITIONER

## 2023-12-06 PROCEDURE — A4216 STERILE WATER/SALINE, 10 ML: HCPCS | Performed by: NURSE PRACTITIONER

## 2023-12-06 PROCEDURE — 3079F DIAST BP 80-89 MM HG: CPT | Mod: CPTII,S$GLB,, | Performed by: NURSE PRACTITIONER

## 2023-12-06 PROCEDURE — 96413 CHEMO IV INFUSION 1 HR: CPT

## 2023-12-06 PROCEDURE — 1159F PR MEDICATION LIST DOCUMENTED IN MEDICAL RECORD: ICD-10-PCS | Mod: CPTII,S$GLB,, | Performed by: NURSE PRACTITIONER

## 2023-12-06 PROCEDURE — 63600175 PHARM REV CODE 636 W HCPCS: Mod: JZ,JG | Performed by: NURSE PRACTITIONER

## 2023-12-06 PROCEDURE — 1159F MED LIST DOCD IN RCRD: CPT | Mod: CPTII,S$GLB,, | Performed by: NURSE PRACTITIONER

## 2023-12-06 PROCEDURE — 3008F BODY MASS INDEX DOCD: CPT | Mod: CPTII,S$GLB,, | Performed by: NURSE PRACTITIONER

## 2023-12-06 RX ORDER — DIPHENHYDRAMINE HYDROCHLORIDE 50 MG/ML
50 INJECTION INTRAMUSCULAR; INTRAVENOUS ONCE AS NEEDED
Status: CANCELLED | OUTPATIENT
Start: 2023-12-06

## 2023-12-06 RX ORDER — EPINEPHRINE 0.3 MG/.3ML
0.3 INJECTION SUBCUTANEOUS ONCE AS NEEDED
Status: CANCELLED | OUTPATIENT
Start: 2023-12-06

## 2023-12-06 RX ORDER — DRONABINOL 2.5 MG/1
2.5 CAPSULE ORAL
Qty: 60 CAPSULE | Refills: 2 | Status: SHIPPED | OUTPATIENT
Start: 2023-12-06 | End: 2023-12-06 | Stop reason: SDUPTHER

## 2023-12-06 RX ORDER — ONDANSETRON HCL IN 0.9 % NACL 8 MG/50 ML
8 INTRAVENOUS SOLUTION, PIGGYBACK (ML) INTRAVENOUS
Status: COMPLETED | OUTPATIENT
Start: 2023-12-06 | End: 2023-12-06

## 2023-12-06 RX ORDER — DRONABINOL 2.5 MG/1
2.5 CAPSULE ORAL
Qty: 60 CAPSULE | Refills: 2 | Status: SHIPPED | OUTPATIENT
Start: 2023-12-06 | End: 2023-12-28 | Stop reason: SDUPTHER

## 2023-12-06 RX ORDER — EPINEPHRINE 0.3 MG/.3ML
0.3 INJECTION SUBCUTANEOUS ONCE AS NEEDED
Status: DISCONTINUED | OUTPATIENT
Start: 2023-12-06 | End: 2023-12-06 | Stop reason: HOSPADM

## 2023-12-06 RX ORDER — ONDANSETRON HCL IN 0.9 % NACL 8 MG/50 ML
8 INTRAVENOUS SOLUTION, PIGGYBACK (ML) INTRAVENOUS
Status: CANCELLED
Start: 2023-12-06

## 2023-12-06 RX ORDER — DIPHENHYDRAMINE HYDROCHLORIDE 50 MG/ML
50 INJECTION INTRAMUSCULAR; INTRAVENOUS ONCE AS NEEDED
Status: DISCONTINUED | OUTPATIENT
Start: 2023-12-06 | End: 2023-12-06 | Stop reason: HOSPADM

## 2023-12-06 RX ORDER — FLUTICASONE PROPIONATE 50 MCG
1 SPRAY, SUSPENSION (ML) NASAL DAILY
Qty: 15.8 ML | Refills: 5 | Status: SHIPPED | OUTPATIENT
Start: 2023-12-06 | End: 2023-12-18 | Stop reason: SDUPTHER

## 2023-12-06 RX ORDER — HEPARIN 100 UNIT/ML
500 SYRINGE INTRAVENOUS
Status: CANCELLED | OUTPATIENT
Start: 2023-12-06

## 2023-12-06 RX ORDER — SODIUM CHLORIDE 0.9 % (FLUSH) 0.9 %
10 SYRINGE (ML) INJECTION
Status: DISCONTINUED | OUTPATIENT
Start: 2023-12-06 | End: 2023-12-06 | Stop reason: HOSPADM

## 2023-12-06 RX ORDER — HEPARIN 100 UNIT/ML
500 SYRINGE INTRAVENOUS
Status: DISCONTINUED | OUTPATIENT
Start: 2023-12-06 | End: 2023-12-06 | Stop reason: HOSPADM

## 2023-12-06 RX ORDER — SODIUM CHLORIDE 0.9 % (FLUSH) 0.9 %
10 SYRINGE (ML) INJECTION
Status: CANCELLED | OUTPATIENT
Start: 2023-12-06

## 2023-12-06 RX ADMIN — ONDANSETRON 8 MG: 2 INJECTION INTRAMUSCULAR; INTRAVENOUS at 10:12

## 2023-12-06 RX ADMIN — SODIUM CHLORIDE, PRESERVATIVE FREE 10 ML: 5 INJECTION INTRAVENOUS at 11:12

## 2023-12-06 RX ADMIN — SODIUM CHLORIDE 200 MG: 9 INJECTION, SOLUTION INTRAVENOUS at 10:12

## 2023-12-06 RX ADMIN — HEPARIN 500 UNITS: 100 SYRINGE at 11:12

## 2023-12-06 NOTE — PLAN OF CARE
Problem: Fatigue  Goal: Improved Activity Tolerance  Outcome: Ongoing, Progressing  Intervention: Promote Improved Energy  Flowsheets (Taken 12/6/2023 1132)  Fatigue Management:   fatigue-related activity identified   frequent rest breaks encouraged   paced activity encouraged  Sleep/Rest Enhancement:   regular sleep/rest pattern promoted   relaxation techniques promoted  Activity Management: Ambulated -L4

## 2023-12-11 ENCOUNTER — LAB VISIT (OUTPATIENT)
Dept: LAB | Facility: HOSPITAL | Age: 48
End: 2023-12-11
Attending: INTERNAL MEDICINE
Payer: COMMERCIAL

## 2023-12-11 DIAGNOSIS — R53.83 FATIGUE, UNSPECIFIED TYPE: ICD-10-CM

## 2023-12-11 DIAGNOSIS — Z17.1 MALIGNANT NEOPLASM OF UPPER-OUTER QUADRANT OF LEFT BREAST IN FEMALE, ESTROGEN RECEPTOR NEGATIVE: ICD-10-CM

## 2023-12-11 DIAGNOSIS — C50.412 MALIGNANT NEOPLASM OF UPPER-OUTER QUADRANT OF LEFT BREAST IN FEMALE, ESTROGEN RECEPTOR NEGATIVE: ICD-10-CM

## 2023-12-11 LAB
ALBUMIN SERPL BCP-MCNC: 3.9 G/DL (ref 3.5–5.2)
ALP SERPL-CCNC: 90 U/L (ref 55–135)
ALT SERPL W/O P-5'-P-CCNC: 24 U/L (ref 10–44)
ANION GAP SERPL CALC-SCNC: 5 MMOL/L (ref 8–16)
AST SERPL-CCNC: 24 U/L (ref 10–40)
BASOPHILS # BLD AUTO: 0.04 K/UL (ref 0–0.2)
BASOPHILS NFR BLD: 1.1 % (ref 0–1.9)
BILIRUB SERPL-MCNC: 0.5 MG/DL (ref 0.1–1)
BUN SERPL-MCNC: 11 MG/DL (ref 6–20)
CALCIUM SERPL-MCNC: 9.5 MG/DL (ref 8.7–10.5)
CHLORIDE SERPL-SCNC: 106 MMOL/L (ref 95–110)
CO2 SERPL-SCNC: 26 MMOL/L (ref 23–29)
CREAT SERPL-MCNC: 0.8 MG/DL (ref 0.5–1.4)
DIFFERENTIAL METHOD: ABNORMAL
EOSINOPHIL # BLD AUTO: 0.2 K/UL (ref 0–0.5)
EOSINOPHIL NFR BLD: 4.2 % (ref 0–8)
ERYTHROCYTE [DISTWIDTH] IN BLOOD BY AUTOMATED COUNT: 12.8 % (ref 11.5–14.5)
EST. GFR  (NO RACE VARIABLE): >60 ML/MIN/1.73 M^2
GLUCOSE SERPL-MCNC: 119 MG/DL (ref 70–110)
HCT VFR BLD AUTO: 36.6 % (ref 37–48.5)
HGB BLD-MCNC: 12.6 G/DL (ref 12–16)
IMM GRANULOCYTES # BLD AUTO: 0.01 K/UL (ref 0–0.04)
IMM GRANULOCYTES NFR BLD AUTO: 0.3 % (ref 0–0.5)
LYMPHOCYTES # BLD AUTO: 1.3 K/UL (ref 1–4.8)
LYMPHOCYTES NFR BLD: 36.3 % (ref 18–48)
MAGNESIUM SERPL-MCNC: 1.7 MG/DL (ref 1.6–2.6)
MCH RBC QN AUTO: 31.7 PG (ref 27–31)
MCHC RBC AUTO-ENTMCNC: 34.4 G/DL (ref 32–36)
MCV RBC AUTO: 92 FL (ref 82–98)
MONOCYTES # BLD AUTO: 0.4 K/UL (ref 0.3–1)
MONOCYTES NFR BLD: 11.6 % (ref 4–15)
NEUTROPHILS # BLD AUTO: 1.7 K/UL (ref 1.8–7.7)
NEUTROPHILS NFR BLD: 46.5 % (ref 38–73)
NRBC BLD-RTO: 0 /100 WBC
PLATELET # BLD AUTO: 176 K/UL (ref 150–450)
PMV BLD AUTO: 9.8 FL (ref 9.2–12.9)
POTASSIUM SERPL-SCNC: 4 MMOL/L (ref 3.5–5.1)
PROT SERPL-MCNC: 6.4 G/DL (ref 6–8.4)
RBC # BLD AUTO: 3.97 M/UL (ref 4–5.4)
SODIUM SERPL-SCNC: 137 MMOL/L (ref 136–145)
TSH SERPL DL<=0.005 MIU/L-ACNC: 3.3 UIU/ML (ref 0.34–5.6)
WBC # BLD AUTO: 3.61 K/UL (ref 3.9–12.7)

## 2023-12-11 PROCEDURE — 80053 COMPREHEN METABOLIC PANEL: CPT | Performed by: INTERNAL MEDICINE

## 2023-12-11 PROCEDURE — 85025 COMPLETE CBC W/AUTO DIFF WBC: CPT | Performed by: INTERNAL MEDICINE

## 2023-12-11 PROCEDURE — 84443 ASSAY THYROID STIM HORMONE: CPT | Performed by: INTERNAL MEDICINE

## 2023-12-11 PROCEDURE — 83735 ASSAY OF MAGNESIUM: CPT | Performed by: INTERNAL MEDICINE

## 2023-12-11 PROCEDURE — 36415 COLL VENOUS BLD VENIPUNCTURE: CPT | Performed by: INTERNAL MEDICINE

## 2023-12-18 DIAGNOSIS — Z17.1 MALIGNANT NEOPLASM OF UPPER-OUTER QUADRANT OF LEFT BREAST IN FEMALE, ESTROGEN RECEPTOR NEGATIVE: ICD-10-CM

## 2023-12-18 DIAGNOSIS — C50.412 MALIGNANT NEOPLASM OF UPPER-OUTER QUADRANT OF LEFT BREAST IN FEMALE, ESTROGEN RECEPTOR NEGATIVE: ICD-10-CM

## 2023-12-18 DIAGNOSIS — R53.83 FATIGUE, UNSPECIFIED TYPE: ICD-10-CM

## 2023-12-18 DIAGNOSIS — J30.2 SEASONAL ALLERGIC RHINITIS, UNSPECIFIED TRIGGER: ICD-10-CM

## 2023-12-19 RX ORDER — PROMETHAZINE HYDROCHLORIDE 25 MG/1
25 TABLET ORAL
Qty: 30 TABLET | Refills: 5 | Status: SHIPPED | OUTPATIENT
Start: 2023-12-19

## 2023-12-19 RX ORDER — FLUTICASONE PROPIONATE 50 MCG
1 SPRAY, SUSPENSION (ML) NASAL DAILY
Qty: 15.8 ML | Refills: 5 | Status: SHIPPED | OUTPATIENT
Start: 2023-12-19

## 2023-12-26 ENCOUNTER — PATIENT MESSAGE (OUTPATIENT)
Dept: HEMATOLOGY/ONCOLOGY | Facility: CLINIC | Age: 48
End: 2023-12-26

## 2023-12-26 DIAGNOSIS — C50.412 MALIGNANT NEOPLASM OF UPPER-OUTER QUADRANT OF LEFT BREAST IN FEMALE, ESTROGEN RECEPTOR NEGATIVE: ICD-10-CM

## 2023-12-26 DIAGNOSIS — R52 ACUTE PAIN: ICD-10-CM

## 2023-12-26 DIAGNOSIS — Z17.1 MALIGNANT NEOPLASM OF UPPER-OUTER QUADRANT OF LEFT BREAST IN FEMALE, ESTROGEN RECEPTOR NEGATIVE: ICD-10-CM

## 2023-12-26 RX ORDER — OXYCODONE AND ACETAMINOPHEN 10; 325 MG/1; MG/1
1 TABLET ORAL EVERY 6 HOURS PRN
Qty: 40 TABLET | Refills: 0 | Status: SHIPPED | OUTPATIENT
Start: 2023-12-26 | End: 2024-02-01 | Stop reason: SDUPTHER

## 2023-12-26 RX ORDER — HYDROCODONE BITARTRATE AND ACETAMINOPHEN 10; 325 MG/1; MG/1
1 TABLET ORAL EVERY 6 HOURS PRN
Qty: 40 TABLET | Refills: 0 | Status: CANCELLED | OUTPATIENT
Start: 2023-12-26

## 2023-12-26 NOTE — PROGRESS NOTES
PROGRESS NOTE    Subjective:       Patient ID: Karyna Escoto is a 48 y.o. female.    6/9/2022:  Dx Mammo:  1.8cm mass in the left breast towards the left axilla.   Deep to the mass 2 lymph nodes are identified.     7/5/2022:  Left breast core biopsy:  Grade 2 IDCA with DCIS  ER: 0.03%, IA: 8.8%, HER2: 0  TRIPLE NEGATIVE    7/20/2022:  Left axilla LN needle biopsy:  Invasive ductal carcinoma  ER: 0%, IA: 40%, HER2 negative(0)    PLAN:  Neoadjuvant chemo/IO(Keynote 522)-surgery-radiation.     Pem/Tax/Carb:  Cycle 1: 8/11/2022  Cycle 2: 9/8/2022  Cycle 3: 10/6/2022  Cycle 4: 10/27/2022  Begin Shukri/Cytox/Pem  Cycle 5: 12/1/2022-----MUGA 8/10/2022-EF: 54%  Cycle 6: 12/22/2022  Cycle 7: 1/12/2023  Cycle 8: 2/2/2023    Cycle 9: 6/21/2023  Cycle 10: 7/10/2023-due    Surgery 3/17/2023-CR!!  Bilateral nipple sparing mastectomy  Right: atypical lobular hyperplasia, no carcinoma  Left:  no residual carcinoma or dcis seen, SLN neg x 5,   pT0N0    Cycle 9-17: 5/30/2023-12/6/2023  THERAPY COMPLETE    2/8/2023:  MRI:   Resolution of previous left breast mass and previous enlarged left axillary lymph nodes, compatible with favorable response to therapy.  No new disease.    Surgery  3/17/2023 at [facility] [place]: complete response to chemotherapy      8/3/2022 Photos:          8/23/2022 Photo:      10/4/2022 Photo:    Lesion is no longer palpable on 10/4/2022.     11/15/2022          Chief Complaint:  No chief complaint on file.  Triple negative breast cancer follow up.     History of Present Illness:   Karyna Escoto is a 48 y.o. female who presents for follow up of breast cancer.      Patient has completed her therapy.  She does have some n/v continuing and this could be residual from therapy.  No other signs of infection or AI disease.        Family and Social history reviewed and is unchanged from 8/3/2022      ROS:  Review of Systems   Constitutional:  Negative for  appetite change, fever and unexpected weight change.   HENT:  Negative for mouth sores.    Eyes:  Negative for visual disturbance.   Respiratory:  Negative for cough and shortness of breath.    Cardiovascular:  Negative for chest pain and leg swelling.   Gastrointestinal:  Positive for diarrhea. Negative for abdominal pain and blood in stool.   Genitourinary:  Positive for frequency. Negative for hematuria.   Musculoskeletal:  Negative for back pain.   Skin:  Negative for rash.   Neurological:  Positive for headaches.   Hematological:  Positive for adenopathy.   Psychiatric/Behavioral:  The patient is not nervous/anxious.           Current Outpatient Medications:     bisacodyL (DULCOLAX) 5 mg EC tablet, Take 5 mg by mouth daily as needed for Constipation., Disp: , Rfl:     cetirizine (ZYRTEC) 10 mg Cap, Take 1 tablet by mouth once daily., Disp: , Rfl:     dexAMETHasone (DECADRON) 2 MG tablet, Take 1 tablet (2 mg total) by mouth daily with breakfast., Disp: 30 tablet, Rfl: 1    droNABinol (MARINOL) 2.5 MG capsule, Take 1 capsule (2.5 mg total) by mouth 2 (two) times daily before meals., Disp: 60 capsule, Rfl: 2    famotidine (PEPCID) 10 MG tablet, Take 10 mg by mouth 2 (two) times daily., Disp: , Rfl:     fluticasone propionate (FLONASE) 50 mcg/actuation nasal spray, 1 spray (50 mcg total) by Each Nostril route once daily., Disp: 15.8 mL, Rfl: 5    HYDROcodone-acetaminophen (NORCO)  mg per tablet, Take 1 tablet by mouth every 6 (six) hours as needed for Pain., Disp: 40 tablet, Rfl: 0    hydrOXYzine HCL (ATARAX) 10 MG Tab, Take 10 mg by mouth 3 (three) times daily as needed., Disp: , Rfl:     Lactobacillus rhamnosus GG (CULTURELLE) 10 billion cell capsule, Take 1 capsule by mouth once daily., Disp: , Rfl:     LIDOcaine-prilocaine (EMLA) cream, Apply topically as needed. Apply 30 mins prior to port access, Disp: 30 g, Rfl: 5    loperamide HCl (IMODIUM A-D ORAL), Take 1 tablet by mouth daily as needed., Disp: ,  "Rfl:     megestroL (MEGACE) 400 mg/10 mL (40 mg/mL) Susp, Take 10 mLs (400 mg total) by mouth 2 (two) times daily., Disp: 600 mL, Rfl: 11    methocarbamoL (ROBAXIN) 500 MG Tab, Take 1,000 mg by mouth 2 (two) times daily., Disp: , Rfl:     mupirocin (BACTROBAN) 2 % ointment, SMARTSI Application Topical 2-3 Times Daily, Disp: , Rfl:     omeprazole (PRILOSEC) 40 MG capsule, Take 1 capsule (40 mg total) by mouth once daily., Disp: 30 capsule, Rfl: 11    oxyCODONE-acetaminophen (PERCOCET)  mg per tablet, Take 1 tablet by mouth every 6 (six) hours as needed for Pain., Disp: 40 tablet, Rfl: 0    promethazine (PHENERGAN) 25 MG tablet, Take 1 tablet (25 mg total) by mouth every 4 to 6 hours as needed., Disp: 30 tablet, Rfl: 5    sertraline (ZOLOFT) 50 MG tablet, Take 50 mg by mouth., Disp: , Rfl:     silver sulfADIAZINE 1% (SILVADENE) 1 % cream, Apply topically 2 (two) times daily., Disp: 400 g, Rfl: 2    traZODone (DESYREL) 50 MG tablet, Take 1 tablet (50 mg total) by mouth every evening., Disp: 30 tablet, Rfl: 11    YUVAFEM 10 mcg Tab, Place 1 tablet vaginally 2 (two) times a day., Disp: , Rfl:         Objective:       Physical Examination:     /70   Pulse 105   Temp 97.3 °F (36.3 °C)   Resp 18   Ht 5' 3" (1.6 m)   BMI 27.99 kg/m²     Physical Exam  Constitutional:       Appearance: She is well-developed.   HENT:      Head: Normocephalic and atraumatic.      Right Ear: External ear normal.      Left Ear: External ear normal.   Eyes:      Conjunctiva/sclera: Conjunctivae normal.      Pupils: Pupils are equal, round, and reactive to light.   Neck:      Thyroid: No thyromegaly.      Trachea: No tracheal deviation.   Cardiovascular:      Rate and Rhythm: Normal rate and regular rhythm.      Heart sounds: Normal heart sounds.   Pulmonary:      Effort: Pulmonary effort is normal.      Breath sounds: Normal breath sounds.   Abdominal:      General: Bowel sounds are normal. There is no distension.      " Palpations: Abdomen is soft. There is no mass.      Tenderness: There is no abdominal tenderness.   Skin:     Findings: No rash.   Neurological:      Comments: Neuro intact througout   Psychiatric:         Behavior: Behavior normal.         Thought Content: Thought content normal.         Judgment: Judgment normal.         Labs:   No results found for this or any previous visit (from the past 336 hour(s)).      CMP  Sodium   Date Value Ref Range Status   12/11/2023 137 136 - 145 mmol/L Final     Potassium   Date Value Ref Range Status   12/11/2023 4.0 3.5 - 5.1 mmol/L Final     Chloride   Date Value Ref Range Status   12/11/2023 106 95 - 110 mmol/L Final     CO2   Date Value Ref Range Status   12/11/2023 26 23 - 29 mmol/L Final     Glucose   Date Value Ref Range Status   12/11/2023 119 (H) 70 - 110 mg/dL Final     BUN   Date Value Ref Range Status   12/11/2023 11 6 - 20 mg/dL Final     Creatinine   Date Value Ref Range Status   12/11/2023 0.8 0.5 - 1.4 mg/dL Final     Calcium   Date Value Ref Range Status   12/11/2023 9.5 8.7 - 10.5 mg/dL Final     Total Protein   Date Value Ref Range Status   12/11/2023 6.4 6.0 - 8.4 g/dL Final     Albumin   Date Value Ref Range Status   12/11/2023 3.9 3.5 - 5.2 g/dL Final     Total Bilirubin   Date Value Ref Range Status   12/11/2023 0.5 0.1 - 1.0 mg/dL Final     Comment:     For infants and newborns, interpretation of results should be based  on gestational age, weight and in agreement with clinical  observations.    Premature Infant recommended reference ranges:  Up to 24 hours.............<8.0 mg/dL  Up to 48 hours............<12.0 mg/dL  3-5 days..................<15.0 mg/dL  6-29 days.................<15.0 mg/dL       Alkaline Phosphatase   Date Value Ref Range Status   12/11/2023 90 55 - 135 U/L Final     AST   Date Value Ref Range Status   12/11/2023 24 10 - 40 U/L Final     ALT   Date Value Ref Range Status   12/11/2023 24 10 - 44 U/L Final     Anion Gap   Date Value Ref  "Range Status   12/11/2023 5 (L) 8 - 16 mmol/L Final     No results found for: "CEA"  No results found for: "PSA"        Assessment/Plan:     Problem List Items Addressed This Visit       Left Breast Cancer-TRIPLE NEGATIVE - Primary     Patient has completed her AI therapy.  She still has some lingering effects of weakness and nausea.  This will likely improve over the next few weeks.  She is quite debilitated and will prescrbie physical therapy to start this process.  Will see her again in 2 months to see how she is doing with this.           Relevant Orders    Ambulatory referral/consult to Physical/Occupational Therapy    Debility from chemotherapy/cancer     I will begin her on PT to get her moving.  She is not nearly active enough and needs to begin building this strength.          Relevant Orders    Ambulatory referral/consult to Physical/Occupational Therapy     Discussion:     Follow up in about 2 months (around 2/27/2024).      Electronically signed by Derek Patterson              "

## 2023-12-27 ENCOUNTER — OFFICE VISIT (OUTPATIENT)
Dept: HEMATOLOGY/ONCOLOGY | Facility: CLINIC | Age: 48
End: 2023-12-27
Payer: COMMERCIAL

## 2023-12-27 ENCOUNTER — PATIENT MESSAGE (OUTPATIENT)
Dept: HEMATOLOGY/ONCOLOGY | Facility: CLINIC | Age: 48
End: 2023-12-27

## 2023-12-27 VITALS
HEIGHT: 63 IN | SYSTOLIC BLOOD PRESSURE: 105 MMHG | BODY MASS INDEX: 27.99 KG/M2 | DIASTOLIC BLOOD PRESSURE: 70 MMHG | TEMPERATURE: 97 F | RESPIRATION RATE: 18 BRPM | HEART RATE: 105 BPM

## 2023-12-27 DIAGNOSIS — R11.0 NAUSEA: ICD-10-CM

## 2023-12-27 DIAGNOSIS — R53.81 DEBILITY: ICD-10-CM

## 2023-12-27 DIAGNOSIS — C50.412 MALIGNANT NEOPLASM OF UPPER-OUTER QUADRANT OF LEFT BREAST IN FEMALE, ESTROGEN RECEPTOR NEGATIVE: Primary | ICD-10-CM

## 2023-12-27 DIAGNOSIS — Z17.1 MALIGNANT NEOPLASM OF UPPER-OUTER QUADRANT OF LEFT BREAST IN FEMALE, ESTROGEN RECEPTOR NEGATIVE: Primary | ICD-10-CM

## 2023-12-27 PROCEDURE — 3074F SYST BP LT 130 MM HG: CPT | Mod: CPTII,S$GLB,, | Performed by: INTERNAL MEDICINE

## 2023-12-27 PROCEDURE — 3008F PR BODY MASS INDEX (BMI) DOCUMENTED: ICD-10-PCS | Mod: CPTII,S$GLB,, | Performed by: INTERNAL MEDICINE

## 2023-12-27 PROCEDURE — 3078F DIAST BP <80 MM HG: CPT | Mod: CPTII,S$GLB,, | Performed by: INTERNAL MEDICINE

## 2023-12-27 PROCEDURE — 1159F MED LIST DOCD IN RCRD: CPT | Mod: CPTII,S$GLB,, | Performed by: INTERNAL MEDICINE

## 2023-12-27 PROCEDURE — 1159F PR MEDICATION LIST DOCUMENTED IN MEDICAL RECORD: ICD-10-PCS | Mod: CPTII,S$GLB,, | Performed by: INTERNAL MEDICINE

## 2023-12-27 PROCEDURE — 3008F BODY MASS INDEX DOCD: CPT | Mod: CPTII,S$GLB,, | Performed by: INTERNAL MEDICINE

## 2023-12-27 PROCEDURE — 3078F PR MOST RECENT DIASTOLIC BLOOD PRESSURE < 80 MM HG: ICD-10-PCS | Mod: CPTII,S$GLB,, | Performed by: INTERNAL MEDICINE

## 2023-12-27 PROCEDURE — 3074F PR MOST RECENT SYSTOLIC BLOOD PRESSURE < 130 MM HG: ICD-10-PCS | Mod: CPTII,S$GLB,, | Performed by: INTERNAL MEDICINE

## 2023-12-27 PROCEDURE — 99214 PR OFFICE/OUTPT VISIT, EST, LEVL IV, 30-39 MIN: ICD-10-PCS | Mod: S$GLB,,, | Performed by: INTERNAL MEDICINE

## 2023-12-27 PROCEDURE — 99214 OFFICE O/P EST MOD 30 MIN: CPT | Mod: S$GLB,,, | Performed by: INTERNAL MEDICINE

## 2023-12-27 RX ORDER — ESTRADIOL 10 UG/1
1 TABLET VAGINAL 2 TIMES DAILY
COMMUNITY
Start: 2023-12-20

## 2023-12-27 NOTE — ASSESSMENT & PLAN NOTE
I will begin her on PT to get her moving.  She is not nearly active enough and needs to begin building this strength.

## 2023-12-27 NOTE — ASSESSMENT & PLAN NOTE
Patient has completed her AI therapy.  She still has some lingering effects of weakness and nausea.  This will likely improve over the next few weeks.  She is quite debilitated and will prescrbie physical therapy to start this process.  Will see her again in 2 months to see how she is doing with this.

## 2023-12-28 ENCOUNTER — TELEPHONE (OUTPATIENT)
Dept: HEMATOLOGY/ONCOLOGY | Facility: CLINIC | Age: 48
End: 2023-12-28

## 2023-12-28 ENCOUNTER — PATIENT MESSAGE (OUTPATIENT)
Dept: HEMATOLOGY/ONCOLOGY | Facility: CLINIC | Age: 48
End: 2023-12-28

## 2023-12-28 DIAGNOSIS — R53.81 DEBILITY: Primary | ICD-10-CM

## 2023-12-28 RX ORDER — DRONABINOL 2.5 MG/1
2.5 CAPSULE ORAL
Qty: 60 CAPSULE | Refills: 2 | Status: SHIPPED | OUTPATIENT
Start: 2023-12-28 | End: 2024-02-01

## 2023-12-29 NOTE — TELEPHONE ENCOUNTER
Medrol dose pack   [Post-hospitalization from ___ Hospital] : Post-hospitalization from [unfilled] Hospital [Admitted on: ___] : The patient was admitted on [unfilled] [Discharged on ___] : discharged on [unfilled] [Discharge Summary] : discharge summary [Pertinent Labs] : pertinent labs [Discharge Med List] : discharge medication list [Med Reconciliation] : medication reconciliation has been completed [FreeTextEntry2] : 61 yo M admitted to [facility] for ambulation difficulty with wheelchair, found to have incidental troponemia, resolved, evaluated by cardiology.

## 2024-01-08 PROBLEM — G89.18 POST-MASTECTOMY PAIN: Status: RESOLVED | Noted: 2023-10-09 | Resolved: 2024-01-08

## 2024-01-31 ENCOUNTER — OFFICE VISIT (OUTPATIENT)
Dept: RADIATION ONCOLOGY | Facility: CLINIC | Age: 49
End: 2024-01-31
Payer: COMMERCIAL

## 2024-01-31 VITALS
RESPIRATION RATE: 16 BRPM | OXYGEN SATURATION: 100 % | HEART RATE: 76 BPM | BODY MASS INDEX: 26.59 KG/M2 | SYSTOLIC BLOOD PRESSURE: 113 MMHG | DIASTOLIC BLOOD PRESSURE: 76 MMHG | WEIGHT: 150.13 LBS

## 2024-01-31 DIAGNOSIS — Z17.1 MALIGNANT NEOPLASM OF UPPER-OUTER QUADRANT OF LEFT BREAST IN FEMALE, ESTROGEN RECEPTOR NEGATIVE: Primary | ICD-10-CM

## 2024-01-31 DIAGNOSIS — C50.412 MALIGNANT NEOPLASM OF UPPER-OUTER QUADRANT OF LEFT BREAST IN FEMALE, ESTROGEN RECEPTOR NEGATIVE: Primary | ICD-10-CM

## 2024-01-31 PROCEDURE — 1160F RVW MEDS BY RX/DR IN RCRD: CPT | Mod: CPTII,S$GLB,, | Performed by: RADIOLOGY

## 2024-01-31 PROCEDURE — 99214 OFFICE O/P EST MOD 30 MIN: CPT | Mod: S$GLB,,, | Performed by: RADIOLOGY

## 2024-01-31 PROCEDURE — 3078F DIAST BP <80 MM HG: CPT | Mod: CPTII,S$GLB,, | Performed by: RADIOLOGY

## 2024-01-31 PROCEDURE — 1159F MED LIST DOCD IN RCRD: CPT | Mod: CPTII,S$GLB,, | Performed by: RADIOLOGY

## 2024-01-31 PROCEDURE — 3008F BODY MASS INDEX DOCD: CPT | Mod: CPTII,S$GLB,, | Performed by: RADIOLOGY

## 2024-01-31 PROCEDURE — 3074F SYST BP LT 130 MM HG: CPT | Mod: CPTII,S$GLB,, | Performed by: RADIOLOGY

## 2024-01-31 NOTE — PROGRESS NOTES
Karyna Escoto  [id number]  1975  1/31/2024  No referring provider defined for this encounter.    DIAGNOSIS:  Cancer Staging   Left Breast Cancer-TRIPLE NEGATIVE  Staging form: Breast, AJCC 8th Edition  - Clinical: Stage IIA (cT1c, cN1(f), cM0, G2, ER-, LA+, HER2-) - Signed by John Bass Jr., MD on 8/5/2022  - Pathologic: No Stage Recommended (ypT0, pN0(sn), cM0) - Signed by John Bass Jr., MD on 4/3/2023    REASON FOR VISIT: Routine scheduled follow-up.    HISTORY OF PRESENT ILLNESS:   Karyna Escoto is a 48 y.o. female who presented with left axillary bruising with diagnostic mammogram and ultrasound noting 1.8cm hypoechoic mass in the axilla with extension to the skin and adjacent lymph nodes with small fatty rena.  Biopsy from the left breast returned grade 2 IDC, LVSI(-), PNI(-); ER(-),LA+ 8.9%, HER2(-) and from the L axilla: grade 2 IDC ER(-),LA+ 40%, HER2(-).  She was seen by Lolis Arevalo and Norma and expressed preference for bilateral mastectomy with reconstruction, recommended for tissue expanders.    Patient was seen by Dr. Patterson and is planned for neoadjuvant chemo/immunotherapy.  She was referred to Dr. Murphy for port placement and presented to discuss radiotherapeutic options 8/5/22:    Because of her clinical lymph node positive disease and several high risk features including young age and ER(-), I do recommend adjuvant treatment of the left chest wall and draining lymphatics to include axilla 1-3, supraclavicular fossa, internal mammary lymph node chain with demonstrated survival benefit per EBCTCG meta-analysis and EORTC 84713.    BRCA: (-) with VUS detected  MRI:  1.2 cm mass in left axillary tail with abnormal left level 1 lymphadenopathy with questionable marrow changes in sternum, ultimately negative on PET-CT.     She was placed on chemo regimen and completed keytruda + carbotaxol x 4c followed by AC x 4c with post treatment MRI consistent with CR.  She was recently  hospitalized for nausea and vomiting with failure to thrive and was placed on TPN briefly.    MRI breast interpretation at [facility] noted CR with resolution of left level 1 axillary lymph node.    She underwent bilateral mastectomy with left sentinel lymph node biopsy with Dr. Raza at [facility], 03/16/2023:   - L breast: ALH   - R breast: ALH   - 0/5 LNs   - TE's placed by Dr. Barrios    She returned to discuss adjuvant radiotherapy with concurrent Keytruda planned on April 3, 2023.  I recommended continued serial saline expansions followed by adjuvant radiotherapy encompassing the lymphatic distribution due to her clinical N+ disease.  We discussed DIBH.  She continues to follow with Dr. Patterson and recently was placed on Decadron for poor appetite and energy.    Remained on adjuvant Keytruda and completed adjuvant radiotherapy, 5040 cGy to left chest wall and draining lymphatics ending July 14, 2023.  Treatment well tolerated with expected radiation dermatitis and fatigue.    Completed adjuvant Keytruda 12/6/2023.    INTERVAL HISTORY:   Patient reports fatigue and poor appetite.  This is very slowly beginning to recover.  She has PT pending once she is released from Plastics.  She denies fever, chills, chest pain, shortness of breath, cough or hemoptysis.  She denies pain or discomfort of the bilateral chest wall.  She denies swelling of the left upper extremity.    1/19/24 Plastics f/u (TE's swapped for silicon implants 1/15/24)  ASSESSMENT AND PLAN:  - Continued restrictions discussed  - She will continue on the Bactrim until complete, it was unclear whether she truly had an allergic reaction to Duricef. I have offered her formal allergy testing she has deferred this at this time. She will let me know in the future if she would like to consider having this done.  - She will notify our office if she develops any fevers, chills, redness, drainage or if she is any concerns.  - Plan to follow-up in approximately 4-6 weeks  via video visit as she lives [place].  - All questions and concerns addressed     Review of systems otherwise negative unless indicated in HPI/interval history.    Past Medical History:   Diagnosis Date    Anxiety     Breast cancer 07/2022     Past Surgical History:   Procedure Laterality Date    BREAST BIOPSY Left 07/2022    eye lid surgery Bilateral 1990    INSERTION OF TUNNELED CENTRAL VENOUS CATHETER (CVC) WITH SUBCUTANEOUS PORT Left 11/21/2022    Procedure: [id number];  Surgeon: Oscar Murphy III, MD;  Location: [facility];  Service: General;  Laterality: Left;    NOSE SURGERY  1990    PORTACATH PLACEMENT  08/2022    [facility]    TONSILLECTOMY  1990    TUBAL LIGATION  2013     Social History     Socioeconomic History    Marital status:    Tobacco Use    Smoking status: Former    Tobacco comments:     Quit 2005-13 year history -1 daily   Substance and Sexual Activity    Alcohol use: Not Currently     Comment: occasional    Sexual activity: Yes     Social Determinants of Health     Financial Resource Strain: Low Risk  (12/1/2023)    Overall Financial Resource Strain (CARDIA)     Difficulty of Paying Living Expenses: Not hard at all   Food Insecurity: No Food Insecurity (12/1/2023)    Hunger Vital Sign     Worried About Running Out of Food in the Last Year: Never true     Ran Out of Food in the Last Year: Never true   Transportation Needs: No Transportation Needs (12/1/2023)    PRAPARE - Transportation     Lack of Transportation (Medical): No     Lack of Transportation (Non-Medical): No   Physical Activity: Inactive (12/1/2023)    Exercise Vital Sign     Days of Exercise per Week: 0 days     Minutes of Exercise per Session: 0 min   Stress: Stress Concern Present (12/1/2023)    [employer] [place] [employer] - Occupational Stress Questionnaire     Feeling of Stress : Rather much   Social Connections: Moderately Isolated (12/1/2023)    Social Connection and Isolation Panel  [NHANES]     Frequency of Communication with Friends and Family: Once a week     Frequency of Social Gatherings with Friends and Family: More than three times a week     Attends Yarsanism Services: Never     Active Member of Clubs or Organizations: No     Attends Club or Organization Meetings: Never     Marital Status:    Housing Stability: Low Risk  (12/1/2023)    Housing Stability Vital Sign     Unable to Pay for Housing in the Last Year: No     Number of Places Lived in the Last Year: 1     Unstable Housing in the Last Year: No     Family History   Problem Relation Age of Onset    Breast cancer Maternal Grandmother     Prostate cancer Maternal Grandfather      Medication List with Changes/Refills   Current Medications    BISACODYL (DULCOLAX) 5 MG EC TABLET    Take 5 mg by mouth daily as needed for Constipation.    CETIRIZINE (ZYRTEC) 10 MG CAP    Take 1 tablet by mouth once daily.    DEXAMETHASONE (DECADRON) 2 MG TABLET    Take 1 tablet (2 mg total) by mouth daily with breakfast.    DRONABINOL (MARINOL) 2.5 MG CAPSULE    Take 1 capsule (2.5 mg total) by mouth 2 (two) times daily before meals.    FAMOTIDINE (PEPCID) 10 MG TABLET    Take 10 mg by mouth 2 (two) times daily.    FLUTICASONE PROPIONATE (FLONASE) 50 MCG/ACTUATION NASAL SPRAY    1 spray (50 mcg total) by Each Nostril route once daily.    HYDROCODONE-ACETAMINOPHEN (NORCO)  MG PER TABLET    Take 1 tablet by mouth every 6 (six) hours as needed for Pain.    HYDROXYZINE HCL (ATARAX) 10 MG TAB    Take 10 mg by mouth 3 (three) times daily as needed.    LACTOBACILLUS RHAMNOSUS GG (CULTURELLE) 10 BILLION CELL CAPSULE    Take 1 capsule by mouth once daily.    LIDOCAINE-PRILOCAINE (EMLA) CREAM    Apply topically as needed. Apply 30 mins prior to port access    LOPERAMIDE HCL (IMODIUM A-D ORAL)    Take 1 tablet by mouth daily as needed.    MEGESTROL (MEGACE) 400 MG/10 ML (40 MG/ML) SUSP    Take 10 mLs (400 mg total) by mouth 2 (two) times daily.     METHOCARBAMOL (ROBAXIN) 500 MG TAB    Take 1,000 mg by mouth 2 (two) times daily.    MUPIROCIN (BACTROBAN) 2 % OINTMENT    SMARTSI Application Topical 2-3 Times Daily    OMEPRAZOLE (PRILOSEC) 40 MG CAPSULE    Take 1 capsule (40 mg total) by mouth once daily.    OXYCODONE-ACETAMINOPHEN (PERCOCET)  MG PER TABLET    Take 1 tablet by mouth every 6 (six) hours as needed for Pain.    PROMETHAZINE (PHENERGAN) 25 MG TABLET    Take 1 tablet (25 mg total) by mouth every 4 to 6 hours as needed.    SERTRALINE (ZOLOFT) 50 MG TABLET    Take 50 mg by mouth.    SILVER SULFADIAZINE 1% (SILVADENE) 1 % CREAM    Apply topically 2 (two) times daily.    TRAZODONE (DESYREL) 50 MG TABLET    Take 1 tablet (50 mg total) by mouth every evening.    YUVAFEM 10 MCG TAB    Place 1 tablet vaginally 2 (two) times a day.     Review of patient's allergies indicates:   Allergen Reactions    Taxol [paclitaxel] Rash       QUALITY OF LIFE: 90%- Able to Carry on Normal Activity: Minor Symptoms of Disease    There were no vitals filed for this visit.  There is no height or weight on file to calculate BMI.    PHYSICAL EXAM:   GENERAL: alert; in no apparent distress.   HEAD: normocephalic, atraumatic.  Alopecia recovery  EYES: pupils are equal, round, reactive to light and accommodation. Sclera anicteric. Conjunctiva not injected.   NOSE/THROAT: no nasal erythema or rhinorrhea. Oropharynx pink, without erythema, ulcerations or thrush.   NECK: no cervical motion rigidity; supple with no masses.  CHEST: Patient is speaking comfortably on room air with normal work of breathing without using accessory muscles of respiration.  CARDIOVASCULAR: regular rate and rhythm  ABDOMEN: soft, nontender, nondistended.   MUSCULOSKELETAL: no tenderness to palpation along the spine or scapulae. Normal range of motion.  NEUROLOGIC: cranial nerves II-XII intact bilaterally. Strength 5/5 in bilateral upper and lower extremities. No sensory deficits appreciated. Normal  gait.  LYMPHATIC: no left axillary adenopathy appreciated.   EXTREMITIES: no clubbing, cyanosis, edema.  SKIN: no erythema, rashes, ulcerations noted.   CW:  Bilateral silicone implants in place, incisions clean dry and intact with residual scabbing and Dermabond.  No cellulitis, fluctuance or discharge.  Nontender exam.  Mild retraction at left compared to right with hyperpigmentation and minimal fibrosis.    ANCILLARY DATA:   none    ASSESSMENT: Karyna Escoto is a female with stage IIA, [id number] g2 IDC of UOQ L breast, ER(-)/IA(+)/HER2(-) converted to ypT0N0(sn)  PLAN:     - RT well tolerated.  Recommend continue with massages and moisturizers of the left breast and once permitted by Plastic surgery, range of motion exercises of left upper extremity facilitated by PT. Stressed importance of nutrition and rest as part of recovery.  - KATHE   - Follow up with Dr. Peralta, Plastics ([facility])  - Return to clinic 6mos.    All questions answered and contact information provided. Patient understands free to call us anytime with any questions or concerns regarding radiation therapy.    I have personally seen and evaluated this patient with a moderate to high complexity diagnosis.      Greater than 30 minutes were dedicated to reviewing/interpreting pertinent laboratory/imaging/pathology as well as follow-up with concurrent consultants; reviewing and performing history and physical; counseling patient on continuing oncologic recommendations; documentation in the electronic medical record including ordering of additional tests and/or radiation treatment protocol; and coordination of care with physicians with referrals placed as appropriate.    PHYSICIAN: John Bass Jr, MD

## 2024-02-01 ENCOUNTER — OFFICE VISIT (OUTPATIENT)
Dept: HEMATOLOGY/ONCOLOGY | Facility: CLINIC | Age: 49
End: 2024-02-01
Payer: COMMERCIAL

## 2024-02-01 VITALS
RESPIRATION RATE: 15 BRPM | BODY MASS INDEX: 26.43 KG/M2 | DIASTOLIC BLOOD PRESSURE: 58 MMHG | TEMPERATURE: 98 F | SYSTOLIC BLOOD PRESSURE: 149 MMHG | WEIGHT: 149.19 LBS | HEART RATE: 86 BPM

## 2024-02-01 DIAGNOSIS — G89.3 CANCER ASSOCIATED PAIN: ICD-10-CM

## 2024-02-01 DIAGNOSIS — C50.412 MALIGNANT NEOPLASM OF UPPER-OUTER QUADRANT OF LEFT BREAST IN FEMALE, ESTROGEN RECEPTOR NEGATIVE: ICD-10-CM

## 2024-02-01 DIAGNOSIS — G62.9 NEUROPATHY: ICD-10-CM

## 2024-02-01 DIAGNOSIS — Z17.1 MALIGNANT NEOPLASM OF UPPER-OUTER QUADRANT OF LEFT BREAST IN FEMALE, ESTROGEN RECEPTOR NEGATIVE: ICD-10-CM

## 2024-02-01 DIAGNOSIS — Z17.1 MALIGNANT NEOPLASM OF UPPER-OUTER QUADRANT OF LEFT BREAST IN FEMALE, ESTROGEN RECEPTOR NEGATIVE: Primary | ICD-10-CM

## 2024-02-01 DIAGNOSIS — F41.9 ANXIETY: ICD-10-CM

## 2024-02-01 DIAGNOSIS — C50.412 MALIGNANT NEOPLASM OF UPPER-OUTER QUADRANT OF LEFT BREAST IN FEMALE, ESTROGEN RECEPTOR NEGATIVE: Primary | ICD-10-CM

## 2024-02-01 DIAGNOSIS — R52 ACUTE PAIN: ICD-10-CM

## 2024-02-01 PROCEDURE — 1159F MED LIST DOCD IN RCRD: CPT | Mod: CPTII,S$GLB,, | Performed by: NURSE PRACTITIONER

## 2024-02-01 PROCEDURE — 3077F SYST BP >= 140 MM HG: CPT | Mod: CPTII,S$GLB,, | Performed by: NURSE PRACTITIONER

## 2024-02-01 PROCEDURE — 3078F DIAST BP <80 MM HG: CPT | Mod: CPTII,S$GLB,, | Performed by: NURSE PRACTITIONER

## 2024-02-01 PROCEDURE — 3008F BODY MASS INDEX DOCD: CPT | Mod: CPTII,S$GLB,, | Performed by: NURSE PRACTITIONER

## 2024-02-01 PROCEDURE — 1160F RVW MEDS BY RX/DR IN RCRD: CPT | Mod: CPTII,S$GLB,, | Performed by: NURSE PRACTITIONER

## 2024-02-01 PROCEDURE — 99214 OFFICE O/P EST MOD 30 MIN: CPT | Mod: S$GLB,,, | Performed by: NURSE PRACTITIONER

## 2024-02-01 RX ORDER — BUSPIRONE HYDROCHLORIDE 5 MG/1
5 TABLET ORAL 2 TIMES DAILY
COMMUNITY
Start: 2024-01-10

## 2024-02-01 RX ORDER — SODIUM CHLORIDE 0.9 % (FLUSH) 0.9 %
10 SYRINGE (ML) INJECTION
Status: CANCELLED | OUTPATIENT
Start: 2024-02-02

## 2024-02-01 RX ORDER — HEPARIN 100 UNIT/ML
500 SYRINGE INTRAVENOUS
Status: CANCELLED | OUTPATIENT
Start: 2024-02-02

## 2024-02-01 RX ORDER — OXYCODONE AND ACETAMINOPHEN 10; 325 MG/1; MG/1
1 TABLET ORAL EVERY 6 HOURS PRN
Qty: 40 TABLET | Refills: 0 | Status: SHIPPED | OUTPATIENT
Start: 2024-02-01 | End: 2024-03-11 | Stop reason: SDUPTHER

## 2024-02-01 NOTE — PROGRESS NOTES
PROGRESS NOTE    Subjective:       Patient ID: Karyna Escoto is a 48 y.o. female.    6/9/2022:  Dx Mammo:  1.8cm mass in the left breast towards the left axilla.   Deep to the mass 2 lymph nodes are identified.     7/5/2022:  Left breast core biopsy:  Grade 2 IDCA with DCIS  ER: 0.03%, CO: 8.8%, HER2: 0  TRIPLE NEGATIVE    7/20/2022:  Left axilla LN needle biopsy:  Invasive ductal carcinoma  ER: 0%, CO: 40%, HER2 negative(0)    PLAN:  Neoadjuvant chemo/IO(Keynote 522)-surgery-radiation.     Pem/Tax/Carb:  Cycle 1: 8/11/2022  Cycle 2: 9/8/2022  Cycle 3: 10/6/2022  Cycle 4: 10/27/2022  Begin Shukri/Cytox/Pem  Cycle 5: 12/1/2022-----MUGA 8/10/2022-EF: 54%  Cycle 6: 12/22/2022  Cycle 7: 1/12/2023  Cycle 8: 2/2/2023    Cycle 9: 6/21/2023  Cycle 10: 7/10/2023-due    Surgery 3/17/2023-CR!!  Bilateral nipple sparing mastectomy  Right: atypical lobular hyperplasia, no carcinoma  Left:  no residual carcinoma or dcis seen, SLN neg x 5,   pT0N0    Cycle 9-17: 5/30/2023-12/6/2023  THERAPY COMPLETE    2/8/2023:  MRI:   Resolution of previous left breast mass and previous enlarged left axillary lymph nodes, compatible with favorable response to therapy.  No new disease.    Surgery  3/17/2023 at [facility]: complete response to chemotherapy      8/3/2022 Photos:          8/23/2022 Photo:      10/4/2022 Photo:    Lesion is no longer palpable on 10/4/2022.     11/15/2022          Chief Complaint:  Triple negative breast cancer follow up.     History of Present Illness:   Karyna Escoto is a 48 y.o. female who presents for follow up of breast cancer.      Patient has completed her therapy. Patient has had the implant exchange and is healing well.     She continues with anxiety and switched from Zoloft to  Buspar which she says in helping. She has been walking daily in the afternoons. She is trying to eat regularly small amounts.    Family and Social history reviewed and  is unchanged from 8/3/2022      ROS:  Review of Systems   Constitutional:  Negative for appetite change, fever and unexpected weight change.   HENT:  Negative for mouth sores.    Eyes:  Negative for visual disturbance.   Respiratory:  Negative for cough and shortness of breath.    Cardiovascular:  Negative for chest pain and leg swelling.   Gastrointestinal:  Positive for diarrhea. Negative for abdominal pain and blood in stool.   Genitourinary:  Positive for frequency. Negative for hematuria.   Musculoskeletal:  Negative for back pain.   Skin:  Negative for rash.   Neurological:  Positive for headaches.   Hematological:  Positive for adenopathy.   Psychiatric/Behavioral:  The patient is not nervous/anxious.           Current Outpatient Medications:     busPIRone (BUSPAR) 5 MG Tab, Take 5 mg by mouth 2 (two) times daily., Disp: , Rfl:     bisacodyL (DULCOLAX) 5 mg EC tablet, Take 5 mg by mouth daily as needed for Constipation., Disp: , Rfl:     famotidine (PEPCID) 10 MG tablet, Take 10 mg by mouth 2 (two) times daily., Disp: , Rfl:     fluticasone propionate (FLONASE) 50 mcg/actuation nasal spray, 1 spray (50 mcg total) by Each Nostril route once daily., Disp: 15.8 mL, Rfl: 5    hydrOXYzine HCL (ATARAX) 10 MG Tab, Take 10 mg by mouth 3 (three) times daily as needed., Disp: , Rfl:     Lactobacillus rhamnosus GG (CULTURELLE) 10 billion cell capsule, Take 1 capsule by mouth once daily., Disp: , Rfl:     LIDOcaine-prilocaine (EMLA) cream, Apply topically as needed. Apply 30 mins prior to port access, Disp: 30 g, Rfl: 5    loperamide HCl (IMODIUM A-D ORAL), Take 1 tablet by mouth daily as needed., Disp: , Rfl:     mupirocin (BACTROBAN) 2 % ointment, SMARTSI Application Topical 2-3 Times Daily, Disp: , Rfl:     oxyCODONE-acetaminophen (PERCOCET)  mg per tablet, Take 1 tablet by mouth every 6 (six) hours as needed for Pain., Disp: 40 tablet, Rfl: 0    promethazine (PHENERGAN) 25 MG tablet, Take 1 tablet (25 mg  total) by mouth every 4 to 6 hours as needed., Disp: 30 tablet, Rfl: 5    silver sulfADIAZINE 1% (SILVADENE) 1 % cream, Apply topically 2 (two) times daily., Disp: 400 g, Rfl: 2    traZODone (DESYREL) 50 MG tablet, Take 1 tablet (50 mg total) by mouth every evening., Disp: 30 tablet, Rfl: 11    YUVAFEM 10 mcg Tab, Place 1 tablet vaginally 2 (two) times a day., Disp: , Rfl:         Objective:       Physical Examination:     BP (!) 149/58   Pulse 86   Temp 97.9 °F (36.6 °C)   Resp 15   Wt 67.7 kg (149 lb 3.2 oz)   BMI 26.43 kg/m²     Physical Exam  Constitutional:       Appearance: Normal appearance. She is well-developed.   HENT:      Head: Normocephalic and atraumatic.      Right Ear: External ear normal.      Left Ear: External ear normal.      Nose: Nose normal.      Mouth/Throat:      Mouth: Mucous membranes are moist.   Eyes:      Conjunctiva/sclera: Conjunctivae normal.      Pupils: Pupils are equal, round, and reactive to light.   Neck:      Thyroid: No thyromegaly.      Trachea: No tracheal deviation.   Cardiovascular:      Rate and Rhythm: Normal rate and regular rhythm.      Heart sounds: Normal heart sounds.   Pulmonary:      Effort: Pulmonary effort is normal.      Breath sounds: Normal breath sounds.   Abdominal:      General: Bowel sounds are normal. There is no distension.      Palpations: Abdomen is soft. There is no mass.      Tenderness: There is no abdominal tenderness.   Skin:     General: Skin is warm and dry.      Findings: No rash.   Neurological:      General: No focal deficit present.      Mental Status: She is alert and oriented to person, place, and time.      Comments: Neuro intact througout   Psychiatric:         Behavior: Behavior normal.         Thought Content: Thought content normal.         Judgment: Judgment normal.         Labs:   No results found for this or any previous visit (from the past 336 hour(s)).      CMP  Sodium   Date Value Ref Range Status   12/11/2023 137 136 -  "145 mmol/L Final     Potassium   Date Value Ref Range Status   12/11/2023 4.0 3.5 - 5.1 mmol/L Final     Chloride   Date Value Ref Range Status   12/11/2023 106 95 - 110 mmol/L Final     CO2   Date Value Ref Range Status   12/11/2023 26 23 - 29 mmol/L Final     Glucose   Date Value Ref Range Status   12/11/2023 119 (H) 70 - 110 mg/dL Final     BUN   Date Value Ref Range Status   12/11/2023 11 6 - 20 mg/dL Final     Creatinine   Date Value Ref Range Status   12/11/2023 0.8 0.5 - 1.4 mg/dL Final     Calcium   Date Value Ref Range Status   12/11/2023 9.5 8.7 - 10.5 mg/dL Final     Total Protein   Date Value Ref Range Status   12/11/2023 6.4 6.0 - 8.4 g/dL Final     Albumin   Date Value Ref Range Status   12/11/2023 3.9 3.5 - 5.2 g/dL Final     Total Bilirubin   Date Value Ref Range Status   12/11/2023 0.5 0.1 - 1.0 mg/dL Final     Comment:     For infants and newborns, interpretation of results should be based  on gestational age, weight and in agreement with clinical  observations.    Premature Infant recommended reference ranges:  Up to 24 hours.............<8.0 mg/dL  Up to 48 hours............<12.0 mg/dL  3-5 days..................<15.0 mg/dL  6-29 days.................<15.0 mg/dL       Alkaline Phosphatase   Date Value Ref Range Status   12/11/2023 90 55 - 135 U/L Final     AST   Date Value Ref Range Status   12/11/2023 24 10 - 40 U/L Final     ALT   Date Value Ref Range Status   12/11/2023 24 10 - 44 U/L Final     Anion Gap   Date Value Ref Range Status   12/11/2023 5 (L) 8 - 16 mmol/L Final     No results found for: "CEA"      Assessment/Plan:     Problem List Items Addressed This Visit          Neuro    Neuropathy       Oncology    Left Breast Cancer-TRIPLE NEGATIVE - Primary    Relevant Orders    CBC Auto Differential    CMP    Cancer Antigen 15-3    CA 27.29    CEA    Cancer associated pain     Other Visit Diagnoses       Anxiety              Triple Negative Breast Cancer- Continue to follow up in 2 mths, " labs prior to MD visit in 2 mths.  2. Neuropathy- Stable  3. Cancer related pain- Refill Percocet today  4. Anxiety- Continue Buspar and follow up with PCP as scheduled.    Discussion:     Follow up in about 2 months (around 4/1/2024) for with Dr. Peralta.      Electronically signed by Ana Quigley, MSN, APRN, AGNP-C, OCN

## 2024-02-22 ENCOUNTER — TELEPHONE (OUTPATIENT)
Dept: PSYCHIATRY | Facility: CLINIC | Age: 49
End: 2024-02-22
Payer: COMMERCIAL

## 2024-02-22 ENCOUNTER — TELEPHONE (OUTPATIENT)
Dept: HEMATOLOGY/ONCOLOGY | Facility: CLINIC | Age: 49
End: 2024-02-22
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
## 2024-02-22 NOTE — TELEPHONE ENCOUNTER
Spoke to patient regarding therapy referral on wait list. Patient declined to schedule stating she is no longer interested. Patient is aware that if she needs services in the future to reach out to her provider for a new referral. Referral Closed.

## 2024-02-22 NOTE — TELEPHONE ENCOUNTER
----- Message from Ann Colvin sent at 2/22/2024  1:53 PM CST -----  [facility] [place] requesting [facility] order for pt    Cb [phone]    Fax [phone]

## 2024-02-22 NOTE — TELEPHONE ENCOUNTER
----- Message from Ann Colvin sent at 2/22/2024  1:53 PM CST -----  [facility] [place] requesting new order for pt    Cb [phone]    Fax [phone]      Order sent for strengthening.

## 2024-03-11 DIAGNOSIS — C50.412 MALIGNANT NEOPLASM OF UPPER-OUTER QUADRANT OF LEFT BREAST IN FEMALE, ESTROGEN RECEPTOR NEGATIVE: ICD-10-CM

## 2024-03-11 DIAGNOSIS — R52 ACUTE PAIN: ICD-10-CM

## 2024-03-11 DIAGNOSIS — Z17.1 MALIGNANT NEOPLASM OF UPPER-OUTER QUADRANT OF LEFT BREAST IN FEMALE, ESTROGEN RECEPTOR NEGATIVE: ICD-10-CM

## 2024-03-12 RX ORDER — OXYCODONE AND ACETAMINOPHEN 10; 325 MG/1; MG/1
1 TABLET ORAL EVERY 6 HOURS PRN
Qty: 40 TABLET | Refills: 0 | Status: SHIPPED | OUTPATIENT
Start: 2024-03-12 | End: 2024-04-18 | Stop reason: SDUPTHER

## 2024-03-28 ENCOUNTER — INFUSION (OUTPATIENT)
Dept: INFUSION THERAPY | Facility: HOSPITAL | Age: 49
End: 2024-03-28
Attending: INTERNAL MEDICINE
Payer: COMMERCIAL

## 2024-03-28 VITALS
RESPIRATION RATE: 18 BRPM | SYSTOLIC BLOOD PRESSURE: 105 MMHG | HEIGHT: 62 IN | WEIGHT: 145 LBS | OXYGEN SATURATION: 99 % | TEMPERATURE: 97 F | BODY MASS INDEX: 26.68 KG/M2 | HEART RATE: 79 BPM | DIASTOLIC BLOOD PRESSURE: 74 MMHG

## 2024-03-28 DIAGNOSIS — Z17.1 MALIGNANT NEOPLASM OF UPPER-OUTER QUADRANT OF LEFT BREAST IN FEMALE, ESTROGEN RECEPTOR NEGATIVE: Primary | ICD-10-CM

## 2024-03-28 DIAGNOSIS — C50.412 MALIGNANT NEOPLASM OF UPPER-OUTER QUADRANT OF LEFT BREAST IN FEMALE, ESTROGEN RECEPTOR NEGATIVE: Primary | ICD-10-CM

## 2024-03-28 PROCEDURE — 96523 IRRIG DRUG DELIVERY DEVICE: CPT

## 2024-03-28 PROCEDURE — 63600175 PHARM REV CODE 636 W HCPCS: Performed by: NURSE PRACTITIONER

## 2024-03-28 RX ORDER — SODIUM CHLORIDE 0.9 % (FLUSH) 0.9 %
10 SYRINGE (ML) INJECTION
Status: DISCONTINUED | OUTPATIENT
Start: 2024-03-28 | End: 2024-03-28 | Stop reason: HOSPADM

## 2024-03-28 RX ORDER — SODIUM CHLORIDE 0.9 % (FLUSH) 0.9 %
10 SYRINGE (ML) INJECTION
Status: CANCELLED | OUTPATIENT
Start: 2024-03-28

## 2024-03-28 RX ORDER — HEPARIN 100 UNIT/ML
500 SYRINGE INTRAVENOUS
Status: CANCELLED | OUTPATIENT
Start: 2024-03-28

## 2024-03-28 RX ORDER — HEPARIN 100 UNIT/ML
500 SYRINGE INTRAVENOUS
Status: DISCONTINUED | OUTPATIENT
Start: 2024-03-28 | End: 2024-03-28 | Stop reason: HOSPADM

## 2024-03-28 RX ADMIN — HEPARIN 500 UNITS: 100 SYRINGE at 09:03

## 2024-04-03 ENCOUNTER — LAB VISIT (OUTPATIENT)
Dept: LAB | Facility: HOSPITAL | Age: 49
End: 2024-04-03
Attending: NURSE PRACTITIONER
Payer: COMMERCIAL

## 2024-04-03 DIAGNOSIS — C50.412 MALIGNANT NEOPLASM OF UPPER-OUTER QUADRANT OF LEFT BREAST IN FEMALE, ESTROGEN RECEPTOR NEGATIVE: ICD-10-CM

## 2024-04-03 DIAGNOSIS — Z17.1 MALIGNANT NEOPLASM OF UPPER-OUTER QUADRANT OF LEFT BREAST IN FEMALE, ESTROGEN RECEPTOR NEGATIVE: ICD-10-CM

## 2024-04-03 LAB
ALBUMIN SERPL BCP-MCNC: 3.9 G/DL (ref 3.5–5.2)
ALP SERPL-CCNC: 72 U/L (ref 55–135)
ALT SERPL W/O P-5'-P-CCNC: 16 U/L (ref 10–44)
ANION GAP SERPL CALC-SCNC: 4 MMOL/L (ref 8–16)
AST SERPL-CCNC: 27 U/L (ref 10–40)
BASOPHILS # BLD AUTO: 0.03 K/UL (ref 0–0.2)
BASOPHILS NFR BLD: 0.9 % (ref 0–1.9)
BILIRUB SERPL-MCNC: 0.5 MG/DL (ref 0.1–1)
BUN SERPL-MCNC: 7 MG/DL (ref 6–20)
CALCIUM SERPL-MCNC: 9.6 MG/DL (ref 8.7–10.5)
CEA SERPL-MCNC: 0.7 NG/ML (ref 0–5)
CHLORIDE SERPL-SCNC: 107 MMOL/L (ref 95–110)
CO2 SERPL-SCNC: 27 MMOL/L (ref 23–29)
CREAT SERPL-MCNC: 0.7 MG/DL (ref 0.5–1.4)
DIFFERENTIAL METHOD BLD: ABNORMAL
EOSINOPHIL # BLD AUTO: 0.1 K/UL (ref 0–0.5)
EOSINOPHIL NFR BLD: 3.1 % (ref 0–8)
ERYTHROCYTE [DISTWIDTH] IN BLOOD BY AUTOMATED COUNT: 12.6 % (ref 11.5–14.5)
EST. GFR  (NO RACE VARIABLE): >60 ML/MIN/1.73 M^2
GLUCOSE SERPL-MCNC: 77 MG/DL (ref 70–110)
HCT VFR BLD AUTO: 35.6 % (ref 37–48.5)
HGB BLD-MCNC: 12.5 G/DL (ref 12–16)
IMM GRANULOCYTES # BLD AUTO: 0.01 K/UL (ref 0–0.04)
IMM GRANULOCYTES NFR BLD AUTO: 0.3 % (ref 0–0.5)
LYMPHOCYTES # BLD AUTO: 1.5 K/UL (ref 1–4.8)
LYMPHOCYTES NFR BLD: 42 % (ref 18–48)
MCH RBC QN AUTO: 31.6 PG (ref 27–31)
MCHC RBC AUTO-ENTMCNC: 35.1 G/DL (ref 32–36)
MCV RBC AUTO: 90 FL (ref 82–98)
MONOCYTES # BLD AUTO: 0.3 K/UL (ref 0.3–1)
MONOCYTES NFR BLD: 8.9 % (ref 4–15)
NEUTROPHILS # BLD AUTO: 1.6 K/UL (ref 1.8–7.7)
NEUTROPHILS NFR BLD: 44.8 % (ref 38–73)
NRBC BLD-RTO: 0 /100 WBC
PLATELET # BLD AUTO: 135 K/UL (ref 150–450)
PMV BLD AUTO: 9.6 FL (ref 9.2–12.9)
POTASSIUM SERPL-SCNC: 4.2 MMOL/L (ref 3.5–5.1)
PROT SERPL-MCNC: 6.3 G/DL (ref 6–8.4)
RBC # BLD AUTO: 3.95 M/UL (ref 4–5.4)
SODIUM SERPL-SCNC: 138 MMOL/L (ref 136–145)
WBC # BLD AUTO: 3.5 K/UL (ref 3.9–12.7)

## 2024-04-03 PROCEDURE — 85025 COMPLETE CBC W/AUTO DIFF WBC: CPT | Performed by: NURSE PRACTITIONER

## 2024-04-03 PROCEDURE — 80053 COMPREHEN METABOLIC PANEL: CPT | Performed by: NURSE PRACTITIONER

## 2024-04-03 PROCEDURE — 82378 CARCINOEMBRYONIC ANTIGEN: CPT | Performed by: NURSE PRACTITIONER

## 2024-04-03 PROCEDURE — 86300 IMMUNOASSAY TUMOR CA 15-3: CPT | Performed by: NURSE PRACTITIONER

## 2024-04-03 PROCEDURE — 36415 COLL VENOUS BLD VENIPUNCTURE: CPT | Performed by: NURSE PRACTITIONER

## 2024-04-03 PROCEDURE — 86300 IMMUNOASSAY TUMOR CA 15-3: CPT | Mod: 91 | Performed by: NURSE PRACTITIONER

## 2024-04-04 LAB
CANCER AG15-3 SERPL-ACNC: 11.6 U/ML (ref 0–25)
CANCER AG27-29 SERPL-ACNC: 18.9 U/ML (ref 0–38.6)

## 2024-04-10 ENCOUNTER — OFFICE VISIT (OUTPATIENT)
Dept: HEMATOLOGY/ONCOLOGY | Facility: CLINIC | Age: 49
End: 2024-04-10
Payer: COMMERCIAL

## 2024-04-10 VITALS
HEART RATE: 75 BPM | BODY MASS INDEX: 26.8 KG/M2 | SYSTOLIC BLOOD PRESSURE: 113 MMHG | DIASTOLIC BLOOD PRESSURE: 63 MMHG | WEIGHT: 146.5 LBS | TEMPERATURE: 98 F

## 2024-04-10 DIAGNOSIS — T45.1X5A CHEMOTHERAPY-INDUCED NEUTROPENIA: ICD-10-CM

## 2024-04-10 DIAGNOSIS — D70.1 CHEMOTHERAPY-INDUCED NEUTROPENIA: ICD-10-CM

## 2024-04-10 DIAGNOSIS — C50.412 MALIGNANT NEOPLASM OF UPPER-OUTER QUADRANT OF LEFT BREAST IN FEMALE, ESTROGEN RECEPTOR NEGATIVE: Primary | ICD-10-CM

## 2024-04-10 DIAGNOSIS — Z17.1 MALIGNANT NEOPLASM OF UPPER-OUTER QUADRANT OF LEFT BREAST IN FEMALE, ESTROGEN RECEPTOR NEGATIVE: Primary | ICD-10-CM

## 2024-04-10 DIAGNOSIS — D69.6 THROMBOCYTOPENIA: ICD-10-CM

## 2024-04-10 PROCEDURE — 3008F BODY MASS INDEX DOCD: CPT | Mod: CPTII,S$GLB,, | Performed by: INTERNAL MEDICINE

## 2024-04-10 PROCEDURE — 3074F SYST BP LT 130 MM HG: CPT | Mod: CPTII,S$GLB,, | Performed by: INTERNAL MEDICINE

## 2024-04-10 PROCEDURE — 1159F MED LIST DOCD IN RCRD: CPT | Mod: CPTII,S$GLB,, | Performed by: INTERNAL MEDICINE

## 2024-04-10 PROCEDURE — 99214 OFFICE O/P EST MOD 30 MIN: CPT | Mod: S$GLB,,, | Performed by: INTERNAL MEDICINE

## 2024-04-10 PROCEDURE — 3078F DIAST BP <80 MM HG: CPT | Mod: CPTII,S$GLB,, | Performed by: INTERNAL MEDICINE

## 2024-04-10 NOTE — ASSESSMENT & PLAN NOTE
Patient is doing better and appears KATHE at this time.  Exam and tumor markers are negative and she has less fatigue and nausea.  Will continue to see her every 3 months for now.

## 2024-04-10 NOTE — PROGRESS NOTES
PROGRESS NOTE    Subjective:       Patient ID: Karyna Escoto is a 49 y.o. female.    6/9/2022:  Dx Mammo:  1.8cm mass in the left breast towards the left axilla.   Deep to the mass 2 lymph nodes are identified.     7/5/2022:  Left breast core biopsy:  Grade 2 IDCA with DCIS  ER: 0.03%, LA: 8.8%, HER2: 0  TRIPLE NEGATIVE    7/20/2022:  Left axilla LN needle biopsy:  Invasive ductal carcinoma  ER: 0%, LA: 40%, HER2 negative(0)    Keynote 522  8/11/2022-2/2/2023    3/17/2023  Bilateral Nipple sparing mastectomy  Complete Remission    5/30/2023-12/6/2023  Pembro complete    6/6/2023-7/14/2023  Radiation therapy    Chief Complaint:  No chief complaint on file.  Triple negative breast cancer follow up.     History of Present Illness:   Karyna Escoto is a 49 y.o. female who presents for follow up of breast cancer.      Flori is feeling better.  Working with PT and is feeling better.  Still has occasional spells of nausea as well.      Family and Social history reviewed and is unchanged from 8/3/2022      ROS:  Review of Systems   Constitutional:  Negative for appetite change, fever and unexpected weight change.   HENT:  Negative for mouth sores.    Eyes:  Negative for visual disturbance.   Respiratory:  Negative for cough and shortness of breath.    Cardiovascular:  Negative for chest pain and leg swelling.   Gastrointestinal:  Positive for diarrhea. Negative for abdominal pain and blood in stool.   Genitourinary:  Positive for frequency. Negative for hematuria.   Musculoskeletal:  Negative for back pain.   Skin:  Negative for rash.   Neurological:  Positive for headaches.   Hematological:  Positive for adenopathy.   Psychiatric/Behavioral:  The patient is not nervous/anxious.           Current Outpatient Medications:     bisacodyL (DULCOLAX) 5 mg EC tablet, Take 5 mg by mouth daily as needed for Constipation., Disp: , Rfl:     busPIRone (BUSPAR) 5 MG Tab,  Take 5 mg by mouth 2 (two) times daily., Disp: , Rfl:     famotidine (PEPCID) 10 MG tablet, Take 10 mg by mouth 2 (two) times daily., Disp: , Rfl:     fluticasone propionate (FLONASE) 50 mcg/actuation nasal spray, 1 spray (50 mcg total) by Each Nostril route once daily., Disp: 15.8 mL, Rfl: 5    hydrOXYzine HCL (ATARAX) 10 MG Tab, Take 10 mg by mouth 3 (three) times daily as needed., Disp: , Rfl:     Lactobacillus rhamnosus GG (CULTURELLE) 10 billion cell capsule, Take 1 capsule by mouth once daily., Disp: , Rfl:     LIDOcaine-prilocaine (EMLA) cream, Apply topically as needed. Apply 30 mins prior to port access, Disp: 30 g, Rfl: 5    loperamide HCl (IMODIUM A-D ORAL), Take 1 tablet by mouth daily as needed., Disp: , Rfl:     mupirocin (BACTROBAN) 2 % ointment, SMARTSI Application Topical 2-3 Times Daily, Disp: , Rfl:     oxyCODONE-acetaminophen (PERCOCET)  mg per tablet, Take 1 tablet by mouth every 6 (six) hours as needed for Pain., Disp: 40 tablet, Rfl: 0    promethazine (PHENERGAN) 25 MG tablet, Take 1 tablet (25 mg total) by mouth every 4 to 6 hours as needed., Disp: 30 tablet, Rfl: 5    silver sulfADIAZINE 1% (SILVADENE) 1 % cream, Apply topically 2 (two) times daily., Disp: 400 g, Rfl: 2    traZODone (DESYREL) 50 MG tablet, Take 1 tablet (50 mg total) by mouth every evening., Disp: 30 tablet, Rfl: 11    YUVAFEM 10 mcg Tab, Place 1 tablet vaginally 2 (two) times a day., Disp: , Rfl:         Objective:       Physical Examination:     /63   Pulse 75   Temp 97.8 °F (36.6 °C)   Wt 66.5 kg (146 lb 8 oz)   BMI 26.80 kg/m²     Physical Exam  Constitutional:       Appearance: She is well-developed.   HENT:      Head: Normocephalic and atraumatic.      Right Ear: External ear normal.      Left Ear: External ear normal.   Eyes:      Conjunctiva/sclera: Conjunctivae normal.      Pupils: Pupils are equal, round, and reactive to light.   Neck:      Thyroid: No thyromegaly.      Trachea: No tracheal  deviation.   Cardiovascular:      Rate and Rhythm: Normal rate and regular rhythm.      Heart sounds: Normal heart sounds.   Pulmonary:      Effort: Pulmonary effort is normal.      Breath sounds: Normal breath sounds.   Abdominal:      General: Bowel sounds are normal. There is no distension.      Palpations: Abdomen is soft. There is no mass.      Tenderness: There is no abdominal tenderness.   Skin:     Findings: No rash.   Neurological:      Comments: Neuro intact througout   Psychiatric:         Behavior: Behavior normal.         Thought Content: Thought content normal.         Judgment: Judgment normal.         Labs:   Recent Results (from the past 336 hour(s))   CBC Auto Differential    Collection Time: 04/03/24 10:08 AM   Result Value Ref Range    WBC 3.50 (L) 3.90 - 12.70 K/uL    Hemoglobin 12.5 12.0 - 16.0 g/dL    Hematocrit 35.6 (L) 37.0 - 48.5 %    Platelets 135 (L) 150 - 450 K/uL         CMP  Sodium   Date Value Ref Range Status   04/03/2024 138 136 - 145 mmol/L Final     Potassium   Date Value Ref Range Status   04/03/2024 4.2 3.5 - 5.1 mmol/L Final     Chloride   Date Value Ref Range Status   04/03/2024 107 95 - 110 mmol/L Final     CO2   Date Value Ref Range Status   04/03/2024 27 23 - 29 mmol/L Final     Glucose   Date Value Ref Range Status   04/03/2024 77 70 - 110 mg/dL Final     BUN   Date Value Ref Range Status   04/03/2024 7 6 - 20 mg/dL Final     Creatinine   Date Value Ref Range Status   04/03/2024 0.7 0.5 - 1.4 mg/dL Final     Calcium   Date Value Ref Range Status   04/03/2024 9.6 8.7 - 10.5 mg/dL Final     Total Protein   Date Value Ref Range Status   04/03/2024 6.3 6.0 - 8.4 g/dL Final     Albumin   Date Value Ref Range Status   04/03/2024 3.9 3.5 - 5.2 g/dL Final     Total Bilirubin   Date Value Ref Range Status   04/03/2024 0.5 0.1 - 1.0 mg/dL Final     Comment:     For infants and newborns, interpretation of results should be based  on gestational age, weight and in agreement with  "clinical  observations.    Premature Infant recommended reference ranges:  Up to 24 hours.............<8.0 mg/dL  Up to 48 hours............<12.0 mg/dL  3-5 days..................<15.0 mg/dL  6-29 days.................<15.0 mg/dL       Alkaline Phosphatase   Date Value Ref Range Status   04/03/2024 72 55 - 135 U/L Final     AST   Date Value Ref Range Status   04/03/2024 27 10 - 40 U/L Final     ALT   Date Value Ref Range Status   04/03/2024 16 10 - 44 U/L Final     Anion Gap   Date Value Ref Range Status   04/03/2024 4 (L) 8 - 16 mmol/L Final     Lab Results   Component Value Date    CEA 0.7 04/03/2024     No results found for: "PSA"        Assessment/Plan:     Problem List Items Addressed This Visit       Thrombocytopenia     This is mild but new.  Likely therapy related marrow fluctuation.  Will observe for now.          Relevant Orders    CBC Auto Differential    Left Breast Cancer-TRIPLE NEGATIVE - Primary     Patient is doing better and appears KATHE at this time.  Exam and tumor markers are negative and she has less fatigue and nausea.  Will continue to see her every 3 months for now.          Relevant Orders    CBC Auto Differential    Comprehensive Metabolic Panel    Cancer Antigen 15-3    Cancer Antigen 27-29    CEA    Chemotherapy-induced neutropenia     This remains mild.  Will observe.          Relevant Orders    CBC Auto Differential     Discussion:     Follow up in about 3 months (around 7/10/2024).      Electronically signed by Derek Patterson              "

## 2024-04-18 DIAGNOSIS — R52 ACUTE PAIN: ICD-10-CM

## 2024-04-18 DIAGNOSIS — Z17.1 MALIGNANT NEOPLASM OF UPPER-OUTER QUADRANT OF LEFT BREAST IN FEMALE, ESTROGEN RECEPTOR NEGATIVE: ICD-10-CM

## 2024-04-18 DIAGNOSIS — C50.412 MALIGNANT NEOPLASM OF UPPER-OUTER QUADRANT OF LEFT BREAST IN FEMALE, ESTROGEN RECEPTOR NEGATIVE: ICD-10-CM

## 2024-04-19 RX ORDER — OXYCODONE AND ACETAMINOPHEN 10; 325 MG/1; MG/1
1 TABLET ORAL EVERY 6 HOURS PRN
Qty: 40 TABLET | Refills: 0 | Status: SHIPPED | OUTPATIENT
Start: 2024-04-19 | End: 2024-05-25 | Stop reason: SDUPTHER

## 2024-05-09 ENCOUNTER — INFUSION (OUTPATIENT)
Dept: INFUSION THERAPY | Facility: HOSPITAL | Age: 49
End: 2024-05-09
Attending: INTERNAL MEDICINE
Payer: COMMERCIAL

## 2024-05-09 VITALS
SYSTOLIC BLOOD PRESSURE: 102 MMHG | HEART RATE: 74 BPM | BODY MASS INDEX: 26.09 KG/M2 | RESPIRATION RATE: 18 BRPM | TEMPERATURE: 98 F | WEIGHT: 141.75 LBS | HEIGHT: 62 IN | DIASTOLIC BLOOD PRESSURE: 68 MMHG

## 2024-05-09 DIAGNOSIS — C50.412 MALIGNANT NEOPLASM OF UPPER-OUTER QUADRANT OF LEFT BREAST IN FEMALE, ESTROGEN RECEPTOR NEGATIVE: Primary | ICD-10-CM

## 2024-05-09 DIAGNOSIS — Z17.1 MALIGNANT NEOPLASM OF UPPER-OUTER QUADRANT OF LEFT BREAST IN FEMALE, ESTROGEN RECEPTOR NEGATIVE: Primary | ICD-10-CM

## 2024-05-09 PROCEDURE — 63600175 PHARM REV CODE 636 W HCPCS: Performed by: NURSE PRACTITIONER

## 2024-05-09 PROCEDURE — A4216 STERILE WATER/SALINE, 10 ML: HCPCS | Performed by: NURSE PRACTITIONER

## 2024-05-09 PROCEDURE — 25000003 PHARM REV CODE 250: Performed by: NURSE PRACTITIONER

## 2024-05-09 PROCEDURE — 96523 IRRIG DRUG DELIVERY DEVICE: CPT

## 2024-05-09 RX ORDER — HEPARIN 100 UNIT/ML
500 SYRINGE INTRAVENOUS
OUTPATIENT
Start: 2024-05-09

## 2024-05-09 RX ORDER — HEPARIN 100 UNIT/ML
500 SYRINGE INTRAVENOUS
Status: DISCONTINUED | OUTPATIENT
Start: 2024-05-09 | End: 2024-05-09 | Stop reason: HOSPADM

## 2024-05-09 RX ORDER — SODIUM CHLORIDE 0.9 % (FLUSH) 0.9 %
10 SYRINGE (ML) INJECTION
OUTPATIENT
Start: 2024-05-09

## 2024-05-09 RX ORDER — SODIUM CHLORIDE 0.9 % (FLUSH) 0.9 %
10 SYRINGE (ML) INJECTION
Status: DISCONTINUED | OUTPATIENT
Start: 2024-05-09 | End: 2024-05-09 | Stop reason: HOSPADM

## 2024-05-09 RX ADMIN — HEPARIN 500 UNITS: 100 SYRINGE at 08:05

## 2024-05-09 RX ADMIN — SODIUM CHLORIDE, PRESERVATIVE FREE 10 ML: 5 INJECTION INTRAVENOUS at 08:05

## 2024-05-09 NOTE — PLAN OF CARE
Problem: Fatigue  Goal: Improved Activity Tolerance  5/9/2024 0838 by Lela Garzon, RN  Outcome: Met  5/9/2024 0838 by Lela Garzon, RN  Outcome: Progressing

## 2024-05-25 DIAGNOSIS — R53.83 FATIGUE, UNSPECIFIED TYPE: ICD-10-CM

## 2024-05-25 DIAGNOSIS — Z17.1 MALIGNANT NEOPLASM OF UPPER-OUTER QUADRANT OF LEFT BREAST IN FEMALE, ESTROGEN RECEPTOR NEGATIVE: ICD-10-CM

## 2024-05-25 DIAGNOSIS — C50.412 MALIGNANT NEOPLASM OF UPPER-OUTER QUADRANT OF LEFT BREAST IN FEMALE, ESTROGEN RECEPTOR NEGATIVE: ICD-10-CM

## 2024-05-25 DIAGNOSIS — R52 ACUTE PAIN: ICD-10-CM

## 2024-05-27 RX ORDER — PROMETHAZINE HYDROCHLORIDE 25 MG/1
25 TABLET ORAL
Qty: 30 TABLET | Refills: 5 | Status: SHIPPED | OUTPATIENT
Start: 2024-05-27

## 2024-05-27 RX ORDER — OXYCODONE AND ACETAMINOPHEN 10; 325 MG/1; MG/1
1 TABLET ORAL EVERY 6 HOURS PRN
Qty: 40 TABLET | Refills: 0 | Status: SHIPPED | OUTPATIENT
Start: 2024-05-27 | End: 2025-05-27

## 2024-06-20 ENCOUNTER — INFUSION (OUTPATIENT)
Dept: INFUSION THERAPY | Facility: HOSPITAL | Age: 49
End: 2024-06-20
Attending: INTERNAL MEDICINE
Payer: COMMERCIAL

## 2024-06-20 VITALS
HEART RATE: 76 BPM | SYSTOLIC BLOOD PRESSURE: 97 MMHG | TEMPERATURE: 97 F | BODY MASS INDEX: 25.36 KG/M2 | RESPIRATION RATE: 18 BRPM | HEIGHT: 62 IN | WEIGHT: 137.81 LBS | DIASTOLIC BLOOD PRESSURE: 64 MMHG

## 2024-06-20 DIAGNOSIS — Z17.1 MALIGNANT NEOPLASM OF UPPER-OUTER QUADRANT OF LEFT BREAST IN FEMALE, ESTROGEN RECEPTOR NEGATIVE: Primary | ICD-10-CM

## 2024-06-20 DIAGNOSIS — C50.412 MALIGNANT NEOPLASM OF UPPER-OUTER QUADRANT OF LEFT BREAST IN FEMALE, ESTROGEN RECEPTOR NEGATIVE: Primary | ICD-10-CM

## 2024-06-20 PROCEDURE — A4216 STERILE WATER/SALINE, 10 ML: HCPCS | Performed by: NURSE PRACTITIONER

## 2024-06-20 PROCEDURE — 96523 IRRIG DRUG DELIVERY DEVICE: CPT

## 2024-06-20 PROCEDURE — 25000003 PHARM REV CODE 250: Performed by: NURSE PRACTITIONER

## 2024-06-20 PROCEDURE — 63600175 PHARM REV CODE 636 W HCPCS: Performed by: NURSE PRACTITIONER

## 2024-06-20 RX ORDER — HEPARIN 100 UNIT/ML
500 SYRINGE INTRAVENOUS
Status: DISCONTINUED | OUTPATIENT
Start: 2024-06-20 | End: 2024-06-20 | Stop reason: HOSPADM

## 2024-06-20 RX ORDER — SODIUM CHLORIDE 0.9 % (FLUSH) 0.9 %
10 SYRINGE (ML) INJECTION
OUTPATIENT
Start: 2024-06-20

## 2024-06-20 RX ORDER — SODIUM CHLORIDE 0.9 % (FLUSH) 0.9 %
10 SYRINGE (ML) INJECTION
Status: DISCONTINUED | OUTPATIENT
Start: 2024-06-20 | End: 2024-06-20 | Stop reason: HOSPADM

## 2024-06-20 RX ORDER — HEPARIN 100 UNIT/ML
500 SYRINGE INTRAVENOUS
OUTPATIENT
Start: 2024-06-20

## 2024-06-20 RX ADMIN — SODIUM CHLORIDE, PRESERVATIVE FREE 10 ML: 5 INJECTION INTRAVENOUS at 08:06

## 2024-06-20 RX ADMIN — HEPARIN 500 UNITS: 100 SYRINGE at 08:06

## 2024-06-20 NOTE — PLAN OF CARE
Problem: Fatigue  Goal: Improved Activity Tolerance  6/20/2024 0853 by Lela Garzon, RN  Outcome: Met  6/20/2024 0853 by Lela Garzon, RN  Outcome: Progressing

## 2024-06-21 NOTE — PROGRESS NOTES
PROGRESS NOTE    Subjective:       Patient ID: Karyna Escoto is a 49 y.o. female.    6/9/2022:  Dx Mammo:  1.8cm mass in the left breast towards the left axilla.   Deep to the mass 2 lymph nodes are identified.     7/5/2022:  Left breast core biopsy:  Grade 2 IDCA with DCIS  ER: 0.03%, OK: 8.8%, HER2: 0  TRIPLE NEGATIVE    7/20/2022:  Left axilla LN needle biopsy:  Invasive ductal carcinoma  ER: 0%, OK: 40%, HER2 negative(0)    Keynote 522  8/11/2022-2/2/2023    3/17/2023  Bilateral Nipple sparing mastectomy  Complete Remission    5/30/2023-12/6/2023  Pembro complete    6/6/2023-7/14/2023  Radiation therapy    Chief Complaint:  Triple negative breast cancer follow up.     History of Present Illness:   Karyna Escoto is a 49 y.o. female who presents for follow up of breast cancer.      Flori is here for add on. She is having continued nausea and intermittent vomiting. She continues on her Anxiety medications. She also complains of persistent severe fatigue.    Family and Social history reviewed and is unchanged from 8/3/2022      ROS:  Review of Systems   Constitutional:  Positive for fatigue. Negative for appetite change, fever and unexpected weight change.   HENT:  Negative for mouth sores.    Eyes:  Negative for visual disturbance.   Respiratory:  Negative for cough and shortness of breath.    Cardiovascular:  Negative for chest pain and leg swelling.   Gastrointestinal:  Positive for nausea. Negative for abdominal pain, blood in stool and diarrhea.   Genitourinary:  Positive for frequency. Negative for hematuria.   Musculoskeletal:  Negative for back pain.   Skin:  Negative for rash.   Neurological:  Positive for headaches.   Hematological:  Positive for adenopathy.   Psychiatric/Behavioral:  The patient is not nervous/anxious.           Current Outpatient Medications:     bisacodyL (DULCOLAX) 5 mg EC tablet, Take 5 mg by mouth daily as needed for  "Constipation., Disp: , Rfl:     busPIRone (BUSPAR) 5 MG Tab, Take 5 mg by mouth 2 (two) times daily., Disp: , Rfl:     famotidine (PEPCID) 10 MG tablet, Take 10 mg by mouth 2 (two) times daily., Disp: , Rfl:     fluticasone propionate (FLONASE) 50 mcg/actuation nasal spray, 1 spray (50 mcg total) by Each Nostril route once daily., Disp: 15.8 mL, Rfl: 5    hydrOXYzine HCL (ATARAX) 10 MG Tab, Take 10 mg by mouth 3 (three) times daily as needed., Disp: , Rfl:     Lactobacillus rhamnosus GG (CULTURELLE) 10 billion cell capsule, Take 1 capsule by mouth once daily., Disp: , Rfl:     LIDOcaine-prilocaine (EMLA) cream, Apply topically as needed. Apply 30 mins prior to port access, Disp: 30 g, Rfl: 5    loperamide HCl (IMODIUM A-D ORAL), Take 1 tablet by mouth daily as needed., Disp: , Rfl:     mupirocin (BACTROBAN) 2 % ointment, SMARTSI Application Topical 2-3 Times Daily, Disp: , Rfl:     oxyCODONE-acetaminophen (PERCOCET)  mg per tablet, Take 1 tablet by mouth every 6 (six) hours as needed for Pain., Disp: 40 tablet, Rfl: 0    promethazine (PHENERGAN) 25 MG tablet, Take 1 tablet (25 mg total) by mouth every 4 to 6 hours as needed., Disp: 30 tablet, Rfl: 5    silver sulfADIAZINE 1% (SILVADENE) 1 % cream, Apply topically 2 (two) times daily., Disp: 400 g, Rfl: 2    traZODone (DESYREL) 50 MG tablet, Take 1 tablet (50 mg total) by mouth every evening., Disp: 30 tablet, Rfl: 11    YUVAFEM 10 mcg Tab, Place 1 tablet vaginally 2 (two) times a day., Disp: , Rfl:     pantoprazole (PROTONIX) 40 MG tablet, Take 1 tablet (40 mg total) by mouth once daily., Disp: 90 tablet, Rfl: 3        Objective:       Physical Examination:     /68   Pulse 82   Temp 97.9 °F (36.6 °C)   Resp 16   Ht 5' 2" (1.575 m)   Wt 62.9 kg (138 lb 11.2 oz)   BMI 25.37 kg/m²     Physical Exam  Constitutional:       Appearance: Normal appearance. She is well-developed.   HENT:      Head: Normocephalic and atraumatic.      Right Ear: External " ear normal.      Left Ear: External ear normal.      Mouth/Throat:      Mouth: Mucous membranes are moist.   Eyes:      Conjunctiva/sclera: Conjunctivae normal.      Pupils: Pupils are equal, round, and reactive to light.   Neck:      Thyroid: No thyromegaly.      Trachea: No tracheal deviation.   Cardiovascular:      Rate and Rhythm: Normal rate and regular rhythm.      Heart sounds: Normal heart sounds.   Pulmonary:      Effort: Pulmonary effort is normal.      Breath sounds: Normal breath sounds.   Abdominal:      General: Bowel sounds are normal. There is no distension.      Palpations: Abdomen is soft. There is no mass.      Tenderness: There is no abdominal tenderness.   Skin:     General: Skin is warm and dry.      Findings: No rash.   Neurological:      General: No focal deficit present.      Mental Status: She is alert and oriented to person, place, and time.      Comments: Neuro intact througout   Psychiatric:         Behavior: Behavior normal.         Thought Content: Thought content normal.         Judgment: Judgment normal.         Labs:   No results found for this or any previous visit (from the past 336 hour(s)).        CMP  Sodium   Date Value Ref Range Status   04/03/2024 138 136 - 145 mmol/L Final     Potassium   Date Value Ref Range Status   04/03/2024 4.2 3.5 - 5.1 mmol/L Final     Chloride   Date Value Ref Range Status   04/03/2024 107 95 - 110 mmol/L Final     CO2   Date Value Ref Range Status   04/03/2024 27 23 - 29 mmol/L Final     Glucose   Date Value Ref Range Status   04/03/2024 77 70 - 110 mg/dL Final     BUN   Date Value Ref Range Status   04/03/2024 7 6 - 20 mg/dL Final     Creatinine   Date Value Ref Range Status   04/03/2024 0.7 0.5 - 1.4 mg/dL Final     Calcium   Date Value Ref Range Status   04/03/2024 9.6 8.7 - 10.5 mg/dL Final     Total Protein   Date Value Ref Range Status   04/03/2024 6.3 6.0 - 8.4 g/dL Final     Albumin   Date Value Ref Range Status   04/03/2024 3.9 3.5 - 5.2  "g/dL Final     Total Bilirubin   Date Value Ref Range Status   04/03/2024 0.5 0.1 - 1.0 mg/dL Final     Comment:     For infants and newborns, interpretation of results should be based  on gestational age, weight and in agreement with clinical  observations.    Premature Infant recommended reference ranges:  Up to 24 hours.............<8.0 mg/dL  Up to 48 hours............<12.0 mg/dL  3-5 days..................<15.0 mg/dL  6-29 days.................<15.0 mg/dL       Alkaline Phosphatase   Date Value Ref Range Status   04/03/2024 72 55 - 135 U/L Final     AST   Date Value Ref Range Status   04/03/2024 27 10 - 40 U/L Final     ALT   Date Value Ref Range Status   04/03/2024 16 10 - 44 U/L Final     Anion Gap   Date Value Ref Range Status   04/03/2024 4 (L) 8 - 16 mmol/L Final     Lab Results   Component Value Date    CEA 0.7 04/03/2024     No results found for: "PSA"        Assessment/Plan:     Problem List Items Addressed This Visit          Oncology    Left Breast Cancer-TRIPLE NEGATIVE - Primary     Other Visit Diagnoses       Fatigue, unspecified type        Relevant Medications    pantoprazole (PROTONIX) 40 MG tablet    Other Relevant Orders    Ambulatory referral/consult to Gastroenterology    CBC Auto Differential    CMP    FERRITIN    TSH    FOLATE    Vitamin B12    Calcitriol    CORTISOL, 8AM    Nausea        Relevant Orders    Ambulatory referral/consult to Gastroenterology    Gastroesophageal reflux disease, unspecified whether esophagitis present        Relevant Medications    pantoprazole (PROTONIX) 40 MG tablet    Other Relevant Orders    Ambulatory referral/consult to Gastroenterology          Triple Negative Breast Cancer- Continue follow up as scheduled  2. Fatigue- Labs today;  3. Nausea/Vomiting- Amb ref to GI for evaluation  4. GERD- Add Protonix    Discussion:     Follow up in about 4 weeks (around 7/22/2024) for with Dr. Peralta.      Electronically signed by Ana Quigley, MSN, APRN, AGNP-C, " [facility]

## 2024-06-24 ENCOUNTER — OFFICE VISIT (OUTPATIENT)
Facility: CLINIC | Age: 49
End: 2024-06-24
Payer: COMMERCIAL

## 2024-06-24 ENCOUNTER — TELEPHONE (OUTPATIENT)
Dept: GASTROENTEROLOGY | Facility: CLINIC | Age: 49
End: 2024-06-24
Payer: COMMERCIAL

## 2024-06-24 VITALS
RESPIRATION RATE: 16 BRPM | WEIGHT: 138.69 LBS | HEART RATE: 82 BPM | DIASTOLIC BLOOD PRESSURE: 68 MMHG | BODY MASS INDEX: 25.52 KG/M2 | HEIGHT: 62 IN | SYSTOLIC BLOOD PRESSURE: 115 MMHG | TEMPERATURE: 98 F

## 2024-06-24 DIAGNOSIS — Z17.1 MALIGNANT NEOPLASM OF UPPER-OUTER QUADRANT OF LEFT BREAST IN FEMALE, ESTROGEN RECEPTOR NEGATIVE: Primary | ICD-10-CM

## 2024-06-24 DIAGNOSIS — R53.83 FATIGUE, UNSPECIFIED TYPE: ICD-10-CM

## 2024-06-24 DIAGNOSIS — C50.412 MALIGNANT NEOPLASM OF UPPER-OUTER QUADRANT OF LEFT BREAST IN FEMALE, ESTROGEN RECEPTOR NEGATIVE: Primary | ICD-10-CM

## 2024-06-24 DIAGNOSIS — K21.9 GASTROESOPHAGEAL REFLUX DISEASE, UNSPECIFIED WHETHER ESOPHAGITIS PRESENT: ICD-10-CM

## 2024-06-24 DIAGNOSIS — R11.0 NAUSEA: ICD-10-CM

## 2024-06-24 PROCEDURE — G2211 COMPLEX E/M VISIT ADD ON: HCPCS | Mod: S$GLB,,, | Performed by: NURSE PRACTITIONER

## 2024-06-24 PROCEDURE — 3078F DIAST BP <80 MM HG: CPT | Mod: CPTII,S$GLB,, | Performed by: NURSE PRACTITIONER

## 2024-06-24 PROCEDURE — 99999 PR PBB SHADOW E&M-EST. PATIENT-LVL IV: CPT | Mod: PBBFAC,,, | Performed by: NURSE PRACTITIONER

## 2024-06-24 PROCEDURE — 99215 OFFICE O/P EST HI 40 MIN: CPT | Mod: S$GLB,,, | Performed by: NURSE PRACTITIONER

## 2024-06-24 PROCEDURE — 3074F SYST BP LT 130 MM HG: CPT | Mod: CPTII,S$GLB,, | Performed by: NURSE PRACTITIONER

## 2024-06-24 PROCEDURE — 1159F MED LIST DOCD IN RCRD: CPT | Mod: CPTII,S$GLB,, | Performed by: NURSE PRACTITIONER

## 2024-06-24 PROCEDURE — 3008F BODY MASS INDEX DOCD: CPT | Mod: CPTII,S$GLB,, | Performed by: NURSE PRACTITIONER

## 2024-06-24 PROCEDURE — 1160F RVW MEDS BY RX/DR IN RCRD: CPT | Mod: CPTII,S$GLB,, | Performed by: NURSE PRACTITIONER

## 2024-06-24 RX ORDER — PANTOPRAZOLE SODIUM 40 MG/1
40 TABLET, DELAYED RELEASE ORAL DAILY
Qty: 90 TABLET | Refills: 3 | Status: SHIPPED | OUTPATIENT
Start: 2024-06-24 | End: 2025-06-24

## 2024-06-26 NOTE — PROGRESS NOTES
Subjective:       Patient ID: Karyna Escoto is a 49 y.o. female Body mass index is 24.96 kg/m².    Chief Complaint: Nausea (vomiting)    This patient is new to me.  Referring Provider: Ana Quigley for GERD, N/V.             Gastroesophageal Reflux  She complains of belching, early satiety (reports early satiety and changes in appetite since being dx with breast cancer in 2022 states typically does not eat as much once or twice a day) and nausea (chronic intermittent nausea will vomit about once a week vomit up food or liquids no blood seen, zofran and phenergan help). She reports no abdominal pain, no chest pain, no choking, no coughing, no dysphagia, no globus sensation, no heartburn, no hoarse voice or no water brash. This is a recurrent problem. The current episode started more than 1 year ago. The problem has been waxing and waning. Nothing aggravates the symptoms. Associated symptoms include weight loss (pt currently 136lbs was 158lbs 12/6/23 states does not eat enough calories per day states her appetite has changed). Pertinent negatives include no anemia, fatigue, melena, muscle weakness or orthopnea. Pt with hx of breast cancer dx in 2022 followed by hem/onc and plastic surgery presents today to discuss nausea and vomiting No hematochezia, has a bm daily if takes stool softener (colace) if not feels constipated. Has not had an egd or colonoscopy in the past denies any family hx of colon cancer. There are no known risk factors. She has tried a PPI and an antacid (was placed on pantoprazole by hem/onc for N/V and GERD symptoms has improved symptoms) for the symptoms. The treatment provided moderate relief. Past procedures do not include an abdominal ultrasound, an EGD, esophageal manometry, esophageal pH monitoring, H. pylori antibody titer or a UGI. Past invasive treatments do not include gastroplasty, gastroplication or reflux surgery.       Review of Systems   Constitutional:  Positive for weight  loss (pt currently 136lbs was 158lbs 12/6/23 states does not eat enough calories per day states her appetite has changed). Negative for fatigue.   HENT:  Negative for hoarse voice.    Respiratory:  Negative for cough and choking.    Cardiovascular:  Negative for chest pain.   Gastrointestinal:  Positive for constipation and nausea (chronic intermittent nausea will vomit about once a week vomit up food or liquids no blood seen, zofran and phenergan help). Negative for abdominal distention, abdominal pain, anal bleeding, blood in stool, diarrhea, dysphagia, heartburn, melena, rectal pain and vomiting.   Musculoskeletal:  Negative for muscle weakness.         No LMP recorded. (Menstrual status: Other).  Past Medical History:   Diagnosis Date    Anxiety     Breast cancer 07/2022    GERD (gastroesophageal reflux disease)      Past Surgical History:   Procedure Laterality Date    BREAST BIOPSY Left 07/2022    eye lid surgery Bilateral 1990    INSERTION OF TUNNELED CENTRAL VENOUS CATHETER (CVC) WITH SUBCUTANEOUS PORT Left 11/21/2022    Procedure: [id number];  Surgeon: Oscar Murphy III, MD;  Location: [facility];  Service: General;  Laterality: Left;    NOSE SURGERY  1990    PORTACATH PLACEMENT  08/2022    [facility]    TONSILLECTOMY  1990    TUBAL LIGATION  2013     Family History   Problem Relation Name Age of Onset    Breast cancer Maternal Grandmother      Prostate cancer Maternal Grandfather      Colon cancer Neg Hx       Social History     Tobacco Use    Smoking status: Former    Tobacco comments:     Quit 2005-13 year history -1 daily   Substance Use Topics    Alcohol use: Not Currently     Comment: occasional     Wt Readings from Last 10 Encounters:   06/27/24 61.9 kg (136 lb 7.4 oz)   06/24/24 62.9 kg (138 lb 11.2 oz)   06/20/24 62.5 kg (137 lb 12.6 oz)   05/09/24 64.3 kg (141 lb 12.1 oz)   04/10/24 66.5 kg (146 lb 8 oz)   03/28/24 65.8 kg (145 lb)   02/01/24 67.7 kg (149 lb 3.2 oz)   01/31/24  "68.1 kg (150 lb 1.6 oz)   12/06/23 71.7 kg (158 lb)   12/06/23 71.9 kg (158 lb 9.6 oz)     Lab Results   Component Value Date    WBC 3.50 (L) 04/03/2024    HGB 12.5 04/03/2024    HCT 35.6 (L) 04/03/2024    MCV 90 04/03/2024     (L) 04/03/2024     CMP  Sodium   Date Value Ref Range Status   04/03/2024 138 136 - 145 mmol/L Final     Potassium   Date Value Ref Range Status   04/03/2024 4.2 3.5 - 5.1 mmol/L Final     Chloride   Date Value Ref Range Status   04/03/2024 107 95 - 110 mmol/L Final     CO2   Date Value Ref Range Status   04/03/2024 27 23 - 29 mmol/L Final     Glucose   Date Value Ref Range Status   04/03/2024 77 70 - 110 mg/dL Final     BUN   Date Value Ref Range Status   04/03/2024 7 6 - 20 mg/dL Final     Creatinine   Date Value Ref Range Status   04/03/2024 0.7 0.5 - 1.4 mg/dL Final     Calcium   Date Value Ref Range Status   04/03/2024 9.6 8.7 - 10.5 mg/dL Final     Total Protein   Date Value Ref Range Status   04/03/2024 6.3 6.0 - 8.4 g/dL Final     Albumin   Date Value Ref Range Status   04/03/2024 3.9 3.5 - 5.2 g/dL Final     Total Bilirubin   Date Value Ref Range Status   04/03/2024 0.5 0.1 - 1.0 mg/dL Final     Comment:     For infants and newborns, interpretation of results should be based  on gestational age, weight and in agreement with clinical  observations.    Premature Infant recommended reference ranges:  Up to 24 hours.............<8.0 mg/dL  Up to 48 hours............<12.0 mg/dL  3-5 days..................<15.0 mg/dL  6-29 days.................<15.0 mg/dL       Alkaline Phosphatase   Date Value Ref Range Status   04/03/2024 72 55 - 135 U/L Final     AST   Date Value Ref Range Status   04/03/2024 27 10 - 40 U/L Final     ALT   Date Value Ref Range Status   04/03/2024 16 10 - 44 U/L Final     Anion Gap   Date Value Ref Range Status   04/03/2024 4 (L) 8 - 16 mmol/L Final     Lab Results   Component Value Date    LIPASE 30 03/02/2023     No results found for: "LIPASERES"  Lab Results "   Component Value Date    TSH 3.297 12/11/2023       Reviewed prior medical records including office visit 06/24/2024 LISANDRO Quigley radiology report of CT abdomen pelvis 3/2/23 & endoscopy history (see surgical history/procedures).    Objective:      Physical Exam  Vitals and nursing note reviewed.   Constitutional:       Appearance: Normal appearance. She is normal weight.   Cardiovascular:      Rate and Rhythm: Normal rate and regular rhythm.      Heart sounds: Normal heart sounds.   Pulmonary:      Breath sounds: Normal breath sounds.   Abdominal:      General: Bowel sounds are normal.      Palpations: Abdomen is soft.      Tenderness: There is no abdominal tenderness.   Skin:     General: Skin is warm and dry.      Coloration: Skin is not jaundiced.   Neurological:      Mental Status: She is alert and oriented to person, place, and time.   Psychiatric:         Mood and Affect: Mood normal.         Behavior: Behavior normal.         Assessment:       1. Nausea and vomiting, unspecified vomiting type    2. Gastroesophageal reflux disease, unspecified whether esophagitis present    3. Belching    4. Weight loss    5. Screening for colon cancer    6. History of breast cancer        Plan:       Nausea and vomiting, unspecified vomiting type  -    schedule EGD, discussed procedure with patient, including risks and benefits, patient verbalized understanding  -continue Zofran and Phenergan alternating them as needed for nausea  -continue PPI  -follow GERD lifestyle modifications  -consider ultrasound and/or GES    Gastroesophageal reflux disease, unspecified whether esophagitis present, Belching   Continue pantoprazole 40 mg orally daily   Continue famotidine Pepcid 10 mg as needed for heartburn   -Take PPI 30min-1hr before eating breakfast  -Educated patient on lifestyle modifications to help control/reduce reflux/abdominal pain including: avoid large meals, avoid eating within 2-3 hours of bedtime (avoid late night  eating & lying down soon after eating), elevate head of bed if nocturnal symptoms are present, smoking cessation (if current smoker), & weight loss (if overweight).   -Educated to avoid known foods which trigger reflux symptoms & to minimize/avoid high-fat foods, chocolate, caffeine, citrus, alcohol, & tomato products.  -Advised to avoid/limit use of NSAID's, since they can cause GI upset, bleeding, and/or ulcers. If needed, take with food.     Weight loss   - encouraged PO intake and daily calorie counts to ensure adequate nutrition is taken in, recommend at least 2,000 calories a day  - recommend nutritional drinks, such as Boost, Ensure or Glucerna, to supplement nutrition needs    Screening for colon cancer  - recommended scheduling Colonoscopy, discussed procedure with patient, discussed the importance and advantages of colonoscopy versus other screening methods such as fecal occult testing (colonoscopy is the best screening method to detect colorectal cancer, if small polyps present can remove them before they become problematic or cancerous, and discussed that symptoms are usually not present until colorectal cancer is advance), patient verbalized understanding but declined screening colonoscopy at this time    History of breast cancer   -continue follow up with Hematology Oncology    Follow up if symptoms worsen or fail to improve.      If no improvement in symptoms or symptoms worsen, call/follow-up at clinic or go to ER.       [facility] - GASTROENTEROLOGY  [facility], [place]     Dictation software program was used for this note. Please expect some simple typographical  errors in this note.    Encounter includes face to face time and non-face to face time preparing to see the patient (eg, review of tests), obtaining and/or reviewing separately obtained history, documenting clinical information in the electronic or other health record, independently interpreting results (not  separately reported) and communicating results to the patient/family/caregiver, or care coordination (not separately reported).

## 2024-06-27 ENCOUNTER — OFFICE VISIT (OUTPATIENT)
Dept: GASTROENTEROLOGY | Facility: CLINIC | Age: 49
End: 2024-06-27
Payer: COMMERCIAL

## 2024-06-27 ENCOUNTER — TELEPHONE (OUTPATIENT)
Facility: CLINIC | Age: 49
End: 2024-06-27
Payer: COMMERCIAL

## 2024-06-27 VITALS — WEIGHT: 136.44 LBS | BODY MASS INDEX: 25.11 KG/M2 | HEIGHT: 62 IN

## 2024-06-27 DIAGNOSIS — Z85.3 HISTORY OF BREAST CANCER: ICD-10-CM

## 2024-06-27 DIAGNOSIS — R11.2 NAUSEA AND VOMITING, UNSPECIFIED VOMITING TYPE: Primary | ICD-10-CM

## 2024-06-27 DIAGNOSIS — R63.4 WEIGHT LOSS: ICD-10-CM

## 2024-06-27 DIAGNOSIS — R79.89 LOW SERUM CORTISOL LEVEL: Primary | ICD-10-CM

## 2024-06-27 DIAGNOSIS — R14.2 BELCHING: ICD-10-CM

## 2024-06-27 DIAGNOSIS — Z12.11 SCREENING FOR COLON CANCER: ICD-10-CM

## 2024-06-27 DIAGNOSIS — K21.9 GASTROESOPHAGEAL REFLUX DISEASE, UNSPECIFIED WHETHER ESOPHAGITIS PRESENT: ICD-10-CM

## 2024-06-27 PROCEDURE — 99203 OFFICE O/P NEW LOW 30 MIN: CPT | Mod: S$GLB,,,

## 2024-06-27 PROCEDURE — 3008F BODY MASS INDEX DOCD: CPT | Mod: CPTII,S$GLB,,

## 2024-06-27 PROCEDURE — 1159F MED LIST DOCD IN RCRD: CPT | Mod: CPTII,S$GLB,,

## 2024-06-27 PROCEDURE — 99999 PR PBB SHADOW E&M-EST. PATIENT-LVL V: CPT | Mod: PBBFAC,,,

## 2024-06-27 PROCEDURE — 1160F RVW MEDS BY RX/DR IN RCRD: CPT | Mod: CPTII,S$GLB,,

## 2024-06-27 NOTE — PATIENT INSTRUCTIONS
Instructions for Outpatient Endoscopy    PROCEDURE DATE: 08/01/2024 at 0830  REPORT TO [facility] ([address] [place] [address] [place] [address]) ONE HOUR BEFORE PROCEDURE at 0730    NO BLOOD THINNERS/NO ASPIRIN (OK to continue Aspirin 81mg) OR MEDICATIONS CONTAINING ASPIRIN, MOTRIN, IBUPROFEN, ALEVE, OR ANY ANTI-INFLAMMATORY MEDICATIONS FOR 7 DAYS PRIOR TO PROCEDURE. TYLENOL IS OK.      Please hold the following diabetic/weight loss medications 1 week prior to procedure:   Dulaglutide (Trulicity)  Exenatide extended release (Bydureon bcise)  Semaglutide (Ozempic, Wegovy)  Tirzepatide (Mounjaro, Zepbound)  Please hold the following medications the day before your procedure:   Exenatide (Byetta)   Liraglutide (Victoza, Saxenda)   Lixisenatide (Adlyxin)   Semaglutide (Rybelsus)     Clear Liquid diet the whole day before your procedure.     Nothing by mouth after midnight or the morning of EGD.    Ok to take morning medications with small sip water by 5:30am (except no Diabetic medications)    SINCE SEDATION IS USED, YOU MUST HAVE SOMEONE TO DRIVE YOU HOME/NO TAXI  Please call [phone] with any questions.      If you do not take any Diabetic/weight loss medication, please follow the following prep instructions.    Eat a light evening meal the night before your Endoscopy.   (Soup, Salad, [place], or grilled chicken)    Nothing by mouth after midnight or the morning of EGD.    Ok to take morning medications with small sip water by 5:30am (except no Diabetic medications)    SINCE SEDATION IS USED, YOU MUST HAVE SOMEONE TO DRIVE YOU HOME/NO TAXI

## 2024-06-27 NOTE — TELEPHONE ENCOUNTER
Called patient regarding Laboratory work Cortisol AM 0.4, per Ana Quigley Np patient needs to be referred to Endocrinology, a referral has been placed, patient stated understanding.

## 2024-06-28 ENCOUNTER — PATIENT MESSAGE (OUTPATIENT)
Facility: CLINIC | Age: 49
End: 2024-06-28
Payer: COMMERCIAL

## 2024-06-28 DIAGNOSIS — R79.89 LOW SERUM CORTISOL LEVEL: Primary | ICD-10-CM

## 2024-07-01 ENCOUNTER — PATIENT MESSAGE (OUTPATIENT)
Facility: CLINIC | Age: 49
End: 2024-07-01
Payer: COMMERCIAL

## 2024-07-05 ENCOUNTER — TELEPHONE (OUTPATIENT)
Facility: CLINIC | Age: 49
End: 2024-07-05
Payer: COMMERCIAL

## 2024-07-05 DIAGNOSIS — R79.89 LOW SERUM CORTISOL LEVEL: Primary | ICD-10-CM

## 2024-07-08 ENCOUNTER — TELEPHONE (OUTPATIENT)
Facility: CLINIC | Age: 49
End: 2024-07-08
Payer: COMMERCIAL

## 2024-07-08 NOTE — TELEPHONE ENCOUNTER
Called patient regarding referral to Endocrinology, per patient she has requested information to her health insurance about a provider in network, she missed their call, patient will let us know if a referral is required with the name of the provider. Additionally a referral for Dr. De Jesus was sent on 07/05/2024, patient stated understanding.

## 2024-07-17 ENCOUNTER — OFFICE VISIT (OUTPATIENT)
Dept: RADIATION ONCOLOGY | Facility: CLINIC | Age: 49
End: 2024-07-17
Payer: COMMERCIAL

## 2024-07-17 ENCOUNTER — LAB VISIT (OUTPATIENT)
Dept: LAB | Facility: HOSPITAL | Age: 49
End: 2024-07-17
Attending: INTERNAL MEDICINE
Payer: COMMERCIAL

## 2024-07-17 VITALS
DIASTOLIC BLOOD PRESSURE: 76 MMHG | SYSTOLIC BLOOD PRESSURE: 96 MMHG | BODY MASS INDEX: 24.91 KG/M2 | HEART RATE: 76 BPM | OXYGEN SATURATION: 99 % | RESPIRATION RATE: 16 BRPM | WEIGHT: 136.19 LBS

## 2024-07-17 DIAGNOSIS — T45.1X5A CHEMOTHERAPY-INDUCED NEUTROPENIA: ICD-10-CM

## 2024-07-17 DIAGNOSIS — D69.6 THROMBOCYTOPENIA: ICD-10-CM

## 2024-07-17 DIAGNOSIS — C50.412 MALIGNANT NEOPLASM OF UPPER-OUTER QUADRANT OF LEFT BREAST IN FEMALE, ESTROGEN RECEPTOR NEGATIVE: Primary | ICD-10-CM

## 2024-07-17 DIAGNOSIS — D70.1 CHEMOTHERAPY-INDUCED NEUTROPENIA: ICD-10-CM

## 2024-07-17 DIAGNOSIS — C50.412 MALIGNANT NEOPLASM OF UPPER-OUTER QUADRANT OF LEFT BREAST IN FEMALE, ESTROGEN RECEPTOR NEGATIVE: ICD-10-CM

## 2024-07-17 DIAGNOSIS — Z17.1 MALIGNANT NEOPLASM OF UPPER-OUTER QUADRANT OF LEFT BREAST IN FEMALE, ESTROGEN RECEPTOR NEGATIVE: ICD-10-CM

## 2024-07-17 DIAGNOSIS — Z17.1 MALIGNANT NEOPLASM OF UPPER-OUTER QUADRANT OF LEFT BREAST IN FEMALE, ESTROGEN RECEPTOR NEGATIVE: Primary | ICD-10-CM

## 2024-07-17 LAB
ALBUMIN SERPL BCP-MCNC: 3.6 G/DL (ref 3.5–5.2)
ALP SERPL-CCNC: 65 U/L (ref 55–135)
ALT SERPL W/O P-5'-P-CCNC: 14 U/L (ref 10–44)
ANION GAP SERPL CALC-SCNC: 6 MMOL/L (ref 8–16)
AST SERPL-CCNC: 23 U/L (ref 10–40)
BASOPHILS # BLD AUTO: 0.04 K/UL (ref 0–0.2)
BASOPHILS NFR BLD: 1 % (ref 0–1.9)
BILIRUB SERPL-MCNC: 0.3 MG/DL (ref 0.1–1)
BUN SERPL-MCNC: 7 MG/DL (ref 6–20)
CALCIUM SERPL-MCNC: 9 MG/DL (ref 8.7–10.5)
CEA SERPL-MCNC: 0.7 NG/ML (ref 0–5)
CHLORIDE SERPL-SCNC: 107 MMOL/L (ref 95–110)
CO2 SERPL-SCNC: 27 MMOL/L (ref 23–29)
CREAT SERPL-MCNC: 0.8 MG/DL (ref 0.5–1.4)
DIFFERENTIAL METHOD BLD: ABNORMAL
EOSINOPHIL # BLD AUTO: 0.1 K/UL (ref 0–0.5)
EOSINOPHIL NFR BLD: 2.7 % (ref 0–8)
ERYTHROCYTE [DISTWIDTH] IN BLOOD BY AUTOMATED COUNT: 12.7 % (ref 11.5–14.5)
EST. GFR  (NO RACE VARIABLE): >60 ML/MIN/1.73 M^2
GLUCOSE SERPL-MCNC: 91 MG/DL (ref 70–110)
HCT VFR BLD AUTO: 33.2 % (ref 37–48.5)
HGB BLD-MCNC: 11.3 G/DL (ref 12–16)
IMM GRANULOCYTES # BLD AUTO: 0.01 K/UL (ref 0–0.04)
IMM GRANULOCYTES NFR BLD AUTO: 0.2 % (ref 0–0.5)
LYMPHOCYTES # BLD AUTO: 1.8 K/UL (ref 1–4.8)
LYMPHOCYTES NFR BLD: 43.6 % (ref 18–48)
MCH RBC QN AUTO: 31.1 PG (ref 27–31)
MCHC RBC AUTO-ENTMCNC: 34 G/DL (ref 32–36)
MCV RBC AUTO: 92 FL (ref 82–98)
MONOCYTES # BLD AUTO: 0.3 K/UL (ref 0.3–1)
MONOCYTES NFR BLD: 8.1 % (ref 4–15)
NEUTROPHILS # BLD AUTO: 1.8 K/UL (ref 1.8–7.7)
NEUTROPHILS NFR BLD: 44.4 % (ref 38–73)
NRBC BLD-RTO: 0 /100 WBC
PLATELET # BLD AUTO: 152 K/UL (ref 150–450)
PMV BLD AUTO: 10.6 FL (ref 9.2–12.9)
POTASSIUM SERPL-SCNC: 4.2 MMOL/L (ref 3.5–5.1)
PROT SERPL-MCNC: 5.9 G/DL (ref 6–8.4)
RBC # BLD AUTO: 3.63 M/UL (ref 4–5.4)
SODIUM SERPL-SCNC: 140 MMOL/L (ref 136–145)
WBC # BLD AUTO: 4.08 K/UL (ref 3.9–12.7)

## 2024-07-17 PROCEDURE — 86300 IMMUNOASSAY TUMOR CA 15-3: CPT | Performed by: INTERNAL MEDICINE

## 2024-07-17 PROCEDURE — 3078F DIAST BP <80 MM HG: CPT | Mod: CPTII,S$GLB,, | Performed by: RADIOLOGY

## 2024-07-17 PROCEDURE — 1159F MED LIST DOCD IN RCRD: CPT | Mod: CPTII,S$GLB,, | Performed by: RADIOLOGY

## 2024-07-17 PROCEDURE — 3074F SYST BP LT 130 MM HG: CPT | Mod: CPTII,S$GLB,, | Performed by: RADIOLOGY

## 2024-07-17 PROCEDURE — 86300 IMMUNOASSAY TUMOR CA 15-3: CPT | Mod: 91 | Performed by: INTERNAL MEDICINE

## 2024-07-17 PROCEDURE — 3008F BODY MASS INDEX DOCD: CPT | Mod: CPTII,S$GLB,, | Performed by: RADIOLOGY

## 2024-07-17 PROCEDURE — 82378 CARCINOEMBRYONIC ANTIGEN: CPT | Performed by: INTERNAL MEDICINE

## 2024-07-17 PROCEDURE — 80053 COMPREHEN METABOLIC PANEL: CPT | Performed by: INTERNAL MEDICINE

## 2024-07-17 PROCEDURE — 85025 COMPLETE CBC W/AUTO DIFF WBC: CPT | Performed by: INTERNAL MEDICINE

## 2024-07-17 PROCEDURE — 99214 OFFICE O/P EST MOD 30 MIN: CPT | Mod: S$GLB,,, | Performed by: RADIOLOGY

## 2024-07-17 PROCEDURE — 36415 COLL VENOUS BLD VENIPUNCTURE: CPT | Performed by: INTERNAL MEDICINE

## 2024-07-17 NOTE — PROGRESS NOTES
Karyna Escoto  [id number]  1975  7/17/2024    DIAGNOSIS: Stage IIA, [id number] g2 IDC of UOQ L breast, ER(-)/IN(+)/HER2(-) converted to ypT0N0(sn)     REASON FOR VISIT: Routine scheduled follow-up.    HISTORY OF PRESENT ILLNESS:   Karyna Escoto is a 49 y.o. female who presented with left axillary bruising with diagnostic mammogram and ultrasound noting 1.8cm hypoechoic mass in the axilla with extension to the skin and adjacent lymph nodes with small fatty rena.  Biopsy from the left breast returned grade 2 IDC, LVSI(-), PNI(-); ER(-),IN+ 8.9%, HER2(-) and from the L axilla: grade 2 IDC ER(-),IN+ 40%, HER2(-).  She was seen by Lolis Arevalo and Norma and expressed preference for bilateral mastectomy with reconstruction, recommended for tissue expanders.     Patient was seen by Dr. Patterson and is planned for neoadjuvant chemo/immunotherapy.  She was referred to Dr. Murphy for port placement and presented to discuss radiotherapeutic options 8/5/22:     Because of her clinical lymph node positive disease and several high risk features including young age and ER(-), I do recommend adjuvant treatment of the left chest wall and draining lymphatics to include axilla 1-3, supraclavicular fossa, internal mammary lymph node chain with demonstrated survival benefit per EBCTCG meta-analysis and EORTC 57755.     BRCA: (-) with VUS detected  MRI:  1.2 cm mass in left axillary tail with abnormal left level 1 lymphadenopathy with questionable marrow changes in sternum, ultimately negative on PET-CT.      She was placed on chemo regimen and completed keytruda + carbotaxol x 4c followed by AC x 4c with post treatment MRI consistent with CR.  She was recently hospitalized for nausea and vomiting with failure to thrive and was placed on TPN briefly.     MRI breast interpretation at [place] noted CR with resolution of left level 1 axillary lymph node.     She underwent bilateral mastectomy with left sentinel lymph node biopsy  with Dr. Raza at [place], 03/16/2023:              - L breast: ALH              - R breast: ALH              - 0/5 LNs              - TE's placed by Dr. Barrios    She returned to discuss adjuvant radiotherapy with concurrent Keytruda planned on April 3, 2023.  I recommended continued serial saline expansions followed by adjuvant radiotherapy encompassing the lymphatic distribution due to her clinical N+ disease.  We discussed DIBH.  She continues to follow with Dr. Patterson and recently was placed on Decadron for poor appetite and energy.     Remained on adjuvant Keytruda and completed adjuvant radiotherapy, 5040 cGy to left chest wall and draining lymphatics ending July 14, 2023.  Treatment well tolerated with expected radiation dermatitis and fatigue.     Completed adjuvant Keytruda 12/6/2023.    INTERVAL HISTORY:   She denies fever, chills, chest pain, shortness of breath, cough or hemoptysis. She denies pain or discomfort of the bilateral chest wall. She denies swelling of the left upper extremity.     Review of systems otherwise negative unless indicated in HPI/interval history.    Past Medical History:   Diagnosis Date    Anxiety     Breast cancer 07/2022    GERD (gastroesophageal reflux disease)      Past Surgical History:   Procedure Laterality Date    BREAST BIOPSY Left 07/2022    eye lid surgery Bilateral 1990    INSERTION OF TUNNELED CENTRAL VENOUS CATHETER (CVC) WITH SUBCUTANEOUS PORT Left 11/21/2022    Procedure: [id number];  Surgeon: Oscar Murphy III, MD;  Location: [facility];  Service: General;  Laterality: Left;    NOSE SURGERY  1990    PORTACATH [facility]AC[facility]  08/2022    [facility]    TONSILLECTOMY  1990    TUBAL LIGATION  2013     Social History     Socioeconomic History    Marital status:    Tobacco Use    Smoking status: Former    Tobacco comments:     Quit 2005-13 year history -1 daily   Substance and Sexual Activity    Alcohol use: Not Currently     Comment: occasional     Sexual activity: Yes     Social Determinants of Health     Financial Resource Strain: Low Risk  (12/1/2023)    Overall Financial Resource Strain (CARDIA)     Difficulty of Paying Living Expenses: Not hard at all   Food Insecurity: No Food Insecurity (1/2/2024)    Received from [facility]    Hunger Vital Sign     Worried About Running Out of Food in the Last Year: Never true     Ran Out of Food in the Last Year: Never true   Transportation Needs: No Transportation Needs (1/2/2024)    Received from [facility]    PRAPARE - Transportation     Lack of Transportation (Medical): No     Lack of Transportation (Non-Medical): No   Physical Activity: Patient Declined (1/2/2024)    Received from [facility]    Exercise Vital Sign     Days of Exercise per Week: Patient declined     Minutes of Exercise per Session: Patient declined   Recent Concern: Physical Activity - Inactive (12/1/2023)    Exercise Vital Sign     Days of Exercise per Week: 0 days     Minutes of Exercise per Session: 0 min   Stress: Stress Concern Present (12/1/2023)    [employer] [place] [employer] - Occupational Stress Questionnaire     Feeling of Stress : Rather much   Housing Stability: Low Risk  (1/2/2024)    Received from [facility]    Housing Stability     What is your living situation today?: I have a steady place to live     Family History   Problem Relation Name Age of Onset    Breast cancer Maternal Grandmother      Prostate cancer Maternal Grandfather      Colon cancer Neg Hx       Medication List with Changes/Refills   Current Medications    BISACODYL (DULCOLAX) 5 MG EC TABLET    Take 5 mg by mouth daily as needed for Constipation.    BUSPIRONE (BUSPAR) 5 MG TAB    Take 5 mg by mouth 2 (two) times daily.    FAMOTIDINE (PEPCID) 10 MG TABLET    Take 10 mg by mouth 2 (two) times daily.    FLUTICASONE PROPIONATE (FLONASE) 50 MCG/ACTUATION NASAL SPRAY    1 spray (50 mcg total) by Each  Nostril route once daily.    HYDROXYZINE HCL (ATARAX) 10 MG TAB    Take 10 mg by mouth 3 (three) times daily as needed.    LACTOBACILLUS RHAMNOSUS GG (CULTURELLE) 10 BILLION CELL CAPSULE    Take 1 capsule by mouth once daily.    LIDOCAINE-PRILOCAINE (EMLA) CREAM    Apply topically as needed. Apply 30 mins prior to port access    LOPERAMIDE HCL (IMODIUM A-D ORAL)    Take 1 tablet by mouth daily as needed.    MUPIROCIN (BACTROBAN) 2 % OINTMENT    SMARTSI Application Topical 2-3 Times Daily    OXYCODONE-ACETAMINOPHEN (PERCOCET)  MG PER TABLET    Take 1 tablet by mouth every 6 (six) hours as needed for Pain.    PANTOPRAZOLE (PROTONIX) 40 MG TABLET    Take 1 tablet (40 mg total) by mouth once daily.    PROMETHAZINE (PHENERGAN) 25 MG TABLET    Take 1 tablet (25 mg total) by mouth every 4 to 6 hours as needed.    SILVER SULFADIAZINE 1% (SILVADENE) 1 % CREAM    Apply topically 2 (two) times daily.    TRAZODONE (DESYREL) 50 MG TABLET    Take 1 tablet (50 mg total) by mouth every evening.    YUVAFEM 10 MCG TAB    Place 1 tablet vaginally 2 (two) times a day.     Review of patient's allergies indicates:   Allergen Reactions    Taxol [paclitaxel] Rash       QUALITY OF LIFE: 90%- Able to Carry on Normal Activity: Minor Symptoms of Disease    There were no vitals filed for this visit.  There is no height or weight on file to calculate BMI.    PHYSICAL EXAM:  GENERAL: alert; in no apparent distress.   HEAD: normocephalic, atraumatic.  EYES: pupils are equal, round, reactive to light and accommodation. Sclera anicteric. Conjunctiva not injected.   NOSE/THROAT: no nasal erythema or rhinorrhea. Oropharynx pink, without erythema, ulcerations or thrush.   NECK: no cervical motion rigidity; supple with no masses.  CHEST: Patient is speaking comfortably on room air with normal work of breathing without using accessory muscles of respiration.  CARDIOVASCULAR: regular rate and rhythm  ABDOMEN: soft, nontender, nondistended.    MUSCULOSKELETAL: no tenderness to palpation along the spine or scapulae. Normal range of motion.  NEUROLOGIC: cranial nerves II-XII intact bilaterally. Strength 5/5 in bilateral upper and lower extremities. No sensory deficits appreciated. Normal gait.  LYMPHATIC: no cervical, supraclavicular or axillary adenopathy appreciated.   EXTREMITIES: no clubbing, cyanosis, edema.  SKIN: no erythema, rashes, ulcerations noted.   CW:  Bilateral silicone implants in place, incisions clean dry and intact. No cellulitis, fluctuance or discharge.  Nontender exam.  No masses, erythema, pallor, or LAD.      ASSESSMENT: Karyna Escoto is a female with stage  IIA, [id number] g2 IDC of UOQ L breast, ER(-)/[place](+)/HER2(-) converted to ypT0N0(sn)     PLAN:   - RT well tolerated and recovered from.  - KATHE per exam  - Follow up with Dr. Peralta, Plastics ([facility])  - Return to clinic 12mos.    All questions answered and contact information provided. Patient understands free to call us anytime with any questions or concerns regarding radiation therapy.    I have personally seen and evaluated this patient with a moderate to high complexity diagnosis.     PHYSICIAN: Giacomo Espinosa III, MD

## 2024-07-19 LAB — CANCER AG15-3 SERPL-ACNC: 11 U/ML (ref 0–25)

## 2024-07-20 LAB — CANCER AG27-29 SERPL-ACNC: <9 U/ML (ref 0–38.6)

## 2024-07-24 ENCOUNTER — PATIENT MESSAGE (OUTPATIENT)
Facility: CLINIC | Age: 49
End: 2024-07-24

## 2024-07-24 ENCOUNTER — OFFICE VISIT (OUTPATIENT)
Facility: CLINIC | Age: 49
End: 2024-07-24
Payer: COMMERCIAL

## 2024-07-24 ENCOUNTER — TELEPHONE (OUTPATIENT)
Facility: CLINIC | Age: 49
End: 2024-07-24

## 2024-07-24 VITALS — SYSTOLIC BLOOD PRESSURE: 114 MMHG | HEART RATE: 90 BPM | DIASTOLIC BLOOD PRESSURE: 64 MMHG | TEMPERATURE: 97 F

## 2024-07-24 DIAGNOSIS — F43.21 SITUATIONAL DEPRESSION: Primary | ICD-10-CM

## 2024-07-24 DIAGNOSIS — C50.412 MALIGNANT NEOPLASM OF UPPER-OUTER QUADRANT OF LEFT BREAST IN FEMALE, ESTROGEN RECEPTOR NEGATIVE: ICD-10-CM

## 2024-07-24 DIAGNOSIS — R63.0 LOSS OF APPETITE: ICD-10-CM

## 2024-07-24 DIAGNOSIS — Z17.1 MALIGNANT NEOPLASM OF UPPER-OUTER QUADRANT OF LEFT BREAST IN FEMALE, ESTROGEN RECEPTOR NEGATIVE: ICD-10-CM

## 2024-07-24 PROCEDURE — 3078F DIAST BP <80 MM HG: CPT | Mod: CPTII,S$GLB,, | Performed by: INTERNAL MEDICINE

## 2024-07-24 PROCEDURE — 1159F MED LIST DOCD IN RCRD: CPT | Mod: CPTII,S$GLB,, | Performed by: INTERNAL MEDICINE

## 2024-07-24 PROCEDURE — 3074F SYST BP LT 130 MM HG: CPT | Mod: CPTII,S$GLB,, | Performed by: INTERNAL MEDICINE

## 2024-07-24 PROCEDURE — 99215 OFFICE O/P EST HI 40 MIN: CPT | Mod: S$GLB,,, | Performed by: INTERNAL MEDICINE

## 2024-07-24 PROCEDURE — G2211 COMPLEX E/M VISIT ADD ON: HCPCS | Mod: S$GLB,,, | Performed by: INTERNAL MEDICINE

## 2024-07-24 PROCEDURE — 99999 PR PBB SHADOW E&M-EST. PATIENT-LVL IV: CPT | Mod: PBBFAC,,, | Performed by: INTERNAL MEDICINE

## 2024-07-24 RX ORDER — MEGESTROL ACETATE 125 MG/ML
625 SUSPENSION ORAL DAILY
Qty: 150 ML | Refills: 11 | Status: SHIPPED | OUTPATIENT
Start: 2024-07-24 | End: 2024-07-26

## 2024-07-24 RX ORDER — VENLAFAXINE HYDROCHLORIDE 37.5 MG/1
37.5 CAPSULE, EXTENDED RELEASE ORAL DAILY
Qty: 30 CAPSULE | Refills: 11 | Status: SHIPPED | OUTPATIENT
Start: 2024-07-24 | End: 2025-07-24

## 2024-07-24 NOTE — ASSESSMENT & PLAN NOTE
She has very little appetite.  May be due to depression?  Will try megace with her. Also instructed her on increasing her physical activity with daily walking regimen.

## 2024-07-24 NOTE — PROGRESS NOTES
PROGRESS NOTE    Subjective:       Patient ID: Karyna Escoto is a 49 y.o. female.    6/9/2022:  Dx Mammo:  1.8cm mass in the left breast towards the left axilla.   Deep to the mass 2 lymph nodes are identified.     7/5/2022:  Left breast core biopsy:  Grade 2 IDCA with DCIS  ER: 0.03%, NV: 8.8%, HER2: 0  TRIPLE NEGATIVE    7/20/2022:  Left axilla LN needle biopsy:  Invasive ductal carcinoma  ER: 0%, NV: 40%, HER2 negative(0)    [facility] 522  8/11/2022-2/2/2023    3/17/2023  Bilateral Nipple sparing mastectomy  Complete Remission    5/30/2023-12/6/2023  Pembro complete    6/6/2023-7/14/2023  Radiation therapy    Chief Complaint:  No chief complaint on file.  Triple negative breast cancer follow up.     History of Present Illness:   Karyna Escoto is a 49 y.o. female who presents for follow up of breast cancer.      Patient is not doing much.  She is not active, in chair most of the day.   is worried about depression and her lack of progression.       Family and Social history reviewed and is unchanged from 8/3/2022      ROS:  Review of Systems   Constitutional:  Negative for appetite change, fever and unexpected weight change.   HENT:  Negative for mouth sores.    Eyes:  Negative for visual disturbance.   Respiratory:  Negative for cough and shortness of breath.    Cardiovascular:  Negative for chest pain and leg swelling.   Gastrointestinal:  Positive for diarrhea. Negative for abdominal pain and blood in stool.   Genitourinary:  Positive for frequency. Negative for hematuria.   Musculoskeletal:  Negative for back pain.   Skin:  Negative for rash.   Neurological:  Positive for headaches.   Hematological:  Positive for adenopathy.   Psychiatric/Behavioral:  The patient is not nervous/anxious.           Current Outpatient Medications:     bisacodyL (DULCOLAX) 5 mg EC tablet, Take 5 mg by mouth daily as needed for Constipation., Disp: , Rfl:      "busPIRone (BUSPAR) 5 MG Tab, Take 5 mg by mouth 2 (two) times daily., Disp: , Rfl:     famotidine (PEPCID) 10 MG tablet, Take 10 mg by mouth 2 (two) times daily., Disp: , Rfl:     fluticasone propionate (FLONASE) 50 mcg/actuation nasal spray, 1 spray (50 mcg total) by Each Nostril route once daily., Disp: 15.8 mL, Rfl: 5    hydrOXYzine HCL (ATARAX) 10 MG Tab, Take 10 mg by mouth 3 (three) times daily as needed., Disp: , Rfl:     Lactobacillus rhamnosus GG (CULTURELLE) 10 billion cell capsule, Take 1 capsule by mouth once daily., Disp: , Rfl:     LIDOcaine-prilocaine (EMLA) cream, Apply topically as needed. Apply 30 mins prior to port access, Disp: 30 g, Rfl: 5    loperamide HCl (IMODIUM A-D ORAL), Take 1 tablet by mouth daily as needed., Disp: , Rfl:     mupirocin (BACTROBAN) 2 % ointment, SMARTSI Application Topical 2-3 Times Daily, Disp: , Rfl:     oxyCODONE-acetaminophen (PERCOCET)  mg per tablet, Take 1 tablet by mouth every 6 (six) hours as needed for Pain., Disp: 40 tablet, Rfl: 0    pantoprazole (PROTONIX) 40 MG tablet, Take 1 tablet (40 mg total) by mouth once daily., Disp: 90 tablet, Rfl: 3    promethazine (PHENERGAN) 25 MG tablet, Take 1 tablet (25 mg total) by mouth every 4 to 6 hours as needed., Disp: 30 tablet, Rfl: 5    silver sulfADIAZINE 1% (SILVADENE) 1 % cream, Apply topically 2 (two) times daily., Disp: 400 g, Rfl: 2    traZODone (DESYREL) 50 MG tablet, Take 1 tablet (50 mg total) by mouth every evening., Disp: 30 tablet, Rfl: 11    YUVAFEM 10 mcg Tab, Place 1 tablet vaginally 2 (two) times a day., Disp: , Rfl:     megestroL (MEGACE ES) 625 mg/5 mL (125 mg/mL) Susp, Take 5 mLs (625 mg total) by mouth once daily., Disp: 150 mL, Rfl: 11    venlafaxine (EFFEXOR-XR) 37.5 MG 24 hr capsule, Take 1 capsule (37.5 mg total) by mouth once daily., Disp: 30 capsule, Rfl: 11        Objective:       Physical Examination:     /64   Pulse 90   Temp 97.2 °F (36.2 °C)   Resp (P) 16   Ht (P) 5' 2" " (1.575 m)   BMI (P) 24.91 kg/m²     Physical Exam  Constitutional:       Appearance: She is well-developed.   HENT:      Head: Normocephalic and atraumatic.      Right Ear: External ear normal.      Left Ear: External ear normal.   Eyes:      Conjunctiva/sclera: Conjunctivae normal.      Pupils: Pupils are equal, round, and reactive to light.   Neck:      Thyroid: No thyromegaly.      Trachea: No tracheal deviation.   Cardiovascular:      Rate and Rhythm: Normal rate and regular rhythm.      Heart sounds: Normal heart sounds.   Pulmonary:      Effort: Pulmonary effort is normal.      Breath sounds: Normal breath sounds.   Abdominal:      General: Bowel sounds are normal. There is no distension.      Palpations: Abdomen is soft. There is no mass.      Tenderness: There is no abdominal tenderness.   Skin:     Findings: No rash.   Neurological:      Comments: Neuro intact througout   Psychiatric:         Behavior: Behavior normal.         Thought Content: Thought content normal.         Judgment: Judgment normal.         Labs:   Recent Results (from the past 336 hour(s))   CBC Auto Differential    Collection Time: 07/17/24 11:06 AM   Result Value Ref Range    WBC 4.08 3.90 - 12.70 K/uL    Hemoglobin 11.3 (L) 12.0 - 16.0 g/dL    Hematocrit 33.2 (L) 37.0 - 48.5 %    Platelets 152 150 - 450 K/uL         CMP  Sodium   Date Value Ref Range Status   07/17/2024 140 136 - 145 mmol/L Final     Potassium   Date Value Ref Range Status   07/17/2024 4.2 3.5 - 5.1 mmol/L Final     Chloride   Date Value Ref Range Status   07/17/2024 107 95 - 110 mmol/L Final     CO2   Date Value Ref Range Status   07/17/2024 27 23 - 29 mmol/L Final     Glucose   Date Value Ref Range Status   07/17/2024 91 70 - 110 mg/dL Final     BUN   Date Value Ref Range Status   07/17/2024 7 6 - 20 mg/dL Final     Creatinine   Date Value Ref Range Status   07/17/2024 0.8 0.5 - 1.4 mg/dL Final     Calcium   Date Value Ref Range Status   07/17/2024 9.0 8.7 - 10.5  "mg/dL Final     Total Protein   Date Value Ref Range Status   07/17/2024 5.9 (L) 6.0 - 8.4 g/dL Final     Albumin   Date Value Ref Range Status   07/17/2024 3.6 3.5 - 5.2 g/dL Final     Total Bilirubin   Date Value Ref Range Status   07/17/2024 0.3 0.1 - 1.0 mg/dL Final     Comment:     For infants and newborns, interpretation of results should be based  on gestational age, weight and in agreement with clinical  observations.    Premature Infant recommended reference ranges:  Up to 24 hours.............<8.0 mg/dL  Up to 48 hours............<12.0 mg/dL  3-5 days..................<15.0 mg/dL  6-29 days.................<15.0 mg/dL       Alkaline Phosphatase   Date Value Ref Range Status   07/17/2024 65 55 - 135 U/L Final     AST   Date Value Ref Range Status   07/17/2024 23 10 - 40 U/L Final     ALT   Date Value Ref Range Status   07/17/2024 14 10 - 44 U/L Final     Anion Gap   Date Value Ref Range Status   07/17/2024 6 (L) 8 - 16 mmol/L Final     Lab Results   Component Value Date    CEA 0.7 07/17/2024     No results found for: "PSA"        Assessment/Plan:     Problem List Items Addressed This Visit       Situational depression - Primary     We discussed this today.  She is not suicidal or tearful currently but has no motivation or interest like she used to have.  I will try her on Effexor 37 for now and see how she does.  I will have her back with me in three months to see how she is doing.  I did discuss with her  to watch her mood closely while starting the medication and made him aware of the rare event of worsening depression that could be seen.  They are instructed to stop the med and call me if this happens.           Relevant Medications    venlafaxine (EFFEXOR-XR) 37.5 MG 24 hr capsule    megestroL (MEGACE ES) 625 mg/5 mL (125 mg/mL) Susp    Loss of appetite     She has very little appetite.  May be due to depression?  Will try megace with her. Also instructed her on increasing her physical activity " with daily walking regimen.           Left Breast Cancer-TRIPLE NEGATIVE     Flori is doing better from this standpoint and has no sign of recurrence of disease.  Her exam is negative and her tumor markers and labs are unremarkable.  Will continue to see her every three months for now and continue to watch labs.              Discussion:     Follow up in about 3 months (around 10/24/2024).      Electronically signed by Derek Patterson

## 2024-07-24 NOTE — TELEPHONE ENCOUNTER
Called patient regarding Endoscopy, patient stated she does not need an upper GI endoscopy she is not having any GI symptoms at the moment. I recommended to call  Dr. Castañeda office regarding her interest in cancel appointment, patient scheduled with Endocrinologist on 08/21/2024.

## 2024-07-24 NOTE — ASSESSMENT & PLAN NOTE
Flori is doing better from this standpoint and has no sign of recurrence of disease.  Her exam is negative and her tumor markers and labs are unremarkable.  Will continue to see her every three months for now and continue to watch labs.

## 2024-07-24 NOTE — ASSESSMENT & PLAN NOTE
We discussed this today.  She is not suicidal or tearful currently but has no motivation or interest like she used to have.  I will try her on Effexor 37 for now and see how she does.  I will have her back with me in three months to see how she is doing.  I did discuss with her  to watch her mood closely while starting the medication and made him aware of the rare event of worsening depression that could be seen.  They are instructed to stop the med and call me if this happens.

## 2024-07-25 ENCOUNTER — TELEPHONE (OUTPATIENT)
Facility: CLINIC | Age: 49
End: 2024-07-25
Payer: COMMERCIAL

## 2024-07-25 NOTE — TELEPHONE ENCOUNTER
----- Message from Ann Colvin sent at 7/24/2024 10:52 AM CDT -----  [facility] calling to be sure that Dr. Peralta was aware that the pt is also on Trazadone for another physician. Would it be ok to fill it.     [phone]    Regarding above message per Ana Quigley NP patient can receive Trazodone and Effexor, and make patient aware to call the office if presence of serotoninergic symptoms. Malinda pharmacist at [facility] stated understanding. I will call patient regarding prescription.

## 2024-07-26 DIAGNOSIS — R63.0 LOSS OF APPETITE: Primary | ICD-10-CM

## 2024-07-26 RX ORDER — CYPROHEPTADINE HYDROCHLORIDE 4 MG/1
4 TABLET ORAL 3 TIMES DAILY
Qty: 90 TABLET | Refills: 5 | Status: SHIPPED | OUTPATIENT
Start: 2024-07-26

## 2024-08-01 ENCOUNTER — ANESTHESIA EVENT (OUTPATIENT)
Dept: ENDOSCOPY | Facility: HOSPITAL | Age: 49
End: 2024-08-01
Payer: COMMERCIAL

## 2024-08-01 ENCOUNTER — ANESTHESIA (OUTPATIENT)
Dept: ENDOSCOPY | Facility: HOSPITAL | Age: 49
End: 2024-08-01
Payer: COMMERCIAL

## 2024-08-01 ENCOUNTER — HOSPITAL ENCOUNTER (OUTPATIENT)
Facility: HOSPITAL | Age: 49
Discharge: HOME | End: 2024-08-01
Attending: INTERNAL MEDICINE | Admitting: INTERNAL MEDICINE
Payer: COMMERCIAL

## 2024-08-01 DIAGNOSIS — K29.70 GASTRITIS, PRESENCE OF BLEEDING UNSPECIFIED, UNSPECIFIED CHRONICITY, UNSPECIFIED GASTRITIS TYPE: Primary | ICD-10-CM

## 2024-08-01 DIAGNOSIS — R11.2 NAUSEA AND VOMITING: ICD-10-CM

## 2024-08-01 DIAGNOSIS — K44.9 HIATAL HERNIA: ICD-10-CM

## 2024-08-01 PROCEDURE — 27201012 HC FORCEPS, HOT/COLD, DISP: Performed by: INTERNAL MEDICINE

## 2024-08-01 PROCEDURE — 63600175 PHARM REV CODE 636 W HCPCS: Performed by: NURSE ANESTHETIST, CERTIFIED REGISTERED

## 2024-08-01 PROCEDURE — 43239 EGD BIOPSY SINGLE/MULTIPLE: CPT | Mod: ,,, | Performed by: INTERNAL MEDICINE

## 2024-08-01 PROCEDURE — 43239 EGD BIOPSY SINGLE/MULTIPLE: CPT | Performed by: INTERNAL MEDICINE

## 2024-08-01 PROCEDURE — 25000003 PHARM REV CODE 250: Performed by: NURSE ANESTHETIST, CERTIFIED REGISTERED

## 2024-08-01 PROCEDURE — 37000008 HC ANESTHESIA 1ST 15 MINUTES: Performed by: INTERNAL MEDICINE

## 2024-08-01 RX ORDER — BROMPHENIRAMINE MALEATE, DEXTROMETHORPHAN HBR, PHENYLEPHRINE HCL, DIPHENHYDRAMINE HCL, PHENYLEPHRINE HCL 0.52G
0.52 KIT ORAL DAILY
COMMUNITY

## 2024-08-01 RX ORDER — SODIUM CHLORIDE 9 MG/ML
INJECTION, SOLUTION INTRAVENOUS CONTINUOUS
Status: DISCONTINUED | OUTPATIENT
Start: 2024-08-01 | End: 2024-08-01 | Stop reason: HOSPADM

## 2024-08-01 RX ORDER — POLYETHYLENE GLYCOL 3350 17 G/17G
17 POWDER, FOR SOLUTION ORAL DAILY
COMMUNITY

## 2024-08-01 RX ORDER — PROPOFOL 10 MG/ML
VIAL (ML) INTRAVENOUS
Status: DISCONTINUED | OUTPATIENT
Start: 2024-08-01 | End: 2024-08-01

## 2024-08-01 RX ORDER — LIDOCAINE HYDROCHLORIDE 10 MG/ML
INJECTION, SOLUTION EPIDURAL; INFILTRATION; INTRACAUDAL; PERINEURAL
Status: DISCONTINUED | OUTPATIENT
Start: 2024-08-01 | End: 2024-08-01

## 2024-08-01 RX ADMIN — LIDOCAINE HYDROCHLORIDE 50 MG: 10 INJECTION, SOLUTION EPIDURAL; INFILTRATION; INTRACAUDAL; PERINEURAL at 09:08

## 2024-08-01 RX ADMIN — PROPOFOL 150 MG: 10 INJECTION, EMULSION INTRAVENOUS at 09:08

## 2024-08-01 NOTE — ANESTHESIA POSTPROCEDURE EVALUATION
Anesthesia Post Evaluation    Patient: Karyna Escoto    Procedure(s) Performed: Procedure(s) (LRB):  EGD (ESOPHAGOGASTRODUODENOSCOPY) (N/A)    Final Anesthesia Type: general      Patient location during evaluation: PACU  Patient participation: Yes- Able to Participate  Level of consciousness: awake and alert and oriented  Post-procedure vital signs: reviewed and stable  Pain management: adequate  Airway patency: patent    PONV status at discharge: No PONV  Anesthetic complications: no      Cardiovascular status: blood pressure returned to baseline  Respiratory status: unassisted, spontaneous ventilation and room air  Hydration status: euvolemic  Follow-up not needed.              Vitals Value Taken Time   /66 08/01/24 1005   Temp 37.1 °C (98.8 °F) 08/01/24 1005   Pulse 72 08/01/24 1005   Resp 16 08/01/24 1005   SpO2 100 % 08/01/24 1005   Vitals shown include unfiled device data.      Event Time   Out of Recovery 10:09:44         Pain/Guy Score: Guy Score: 10 (8/1/2024  9:50 AM)

## 2024-08-01 NOTE — TRANSFER OF CARE
"Anesthesia Transfer of Care Note    Patient: Karyna Escoto    Procedure(s) Performed: Procedure(s) (LRB):  EGD (ESOPHAGOGASTRODUODENOSCOPY) (N/A)    Patient location: GI    Anesthesia Type: general    Transport from OR: Transported from OR on 2-3 L/min O2 by NC with adequate spontaneous ventilation    Post pain: adequate analgesia    Post assessment: no apparent anesthetic complications    Post vital signs: stable    Level of consciousness: awake    Nausea/Vomiting: no nausea/vomiting    Complications: none    Transfer of care protocol was followed      Last vitals: Visit Vitals  /63 (BP Location: Left arm, Patient Position: Lying)   Pulse 79   Temp 37.1 °C (98.8 °F) (Skin)   Resp 18   Ht 5' 2" (1.575 m)   Wt 61.7 kg (136 lb)   LMP 08/11/2022   SpO2 99%   Breastfeeding No   BMI 24.87 kg/m²     "

## 2024-08-01 NOTE — ANESTHESIA PREPROCEDURE EVALUATION
08/01/2024  Karyna Ecsoto is a 49 y.o., female.      Patient Active Problem List   Diagnosis    Left Breast Cancer-TRIPLE NEGATIVE    Transaminitis    Chemotherapy-induced neutropenia    Dehydration    Cancer associated pain    Infection of venous access port    Intractable nausea and vomiting    Primary malignant neoplasm of breast    Hypokalemia    Anemia due to and not concurrent with chemotherapy    Loss of appetite    Neuropathy    Debility from chemotherapy/cancer    Thrombocytopenia    Low serum cortisol level    Situational depression       Past Surgical History:   Procedure Laterality Date    BREAST BIOPSY Left 07/2022    eye lid surgery Bilateral 1990    INSERTION OF TUNNELED CENTRAL VENOUS CATHETER (CVC) WITH SUBCUTANEOUS PORT Left 11/21/2022    Procedure: [id number];  Surgeon: Oscar Murphy III, MD;  Location: [facility] OR;  Service: General;  Laterality: Left;    NOSE SURGERY  1990    PORTACATH PLACEMENT  08/2022    [facility]    TONSILLECTOMY  1990    TUBAL LIGATION  2013        Tobacco Use:  The patient  reports that she has quit smoking. She does not have any smokeless tobacco history on file.     Results for orders placed or performed during the hospital encounter of 03/02/23   EKG 12-lead    Collection Time: 03/02/23  2:45 PM    Narrative    Test Reason : R11.10,    Vent. Rate : 118 BPM     Atrial Rate : 118 BPM     P-R Int : 112 ms          QRS Dur : 072 ms      QT Int : 382 ms       P-R-T Axes : 058 057 049 degrees     QTc Int : 535 ms    Sinus tachycardia  Prolonged QT  Abnormal ECG  When compared with ECG of 17-NOV-2022 08:51,  Vent. rate has increased BY  59 BPM  Confirmed by Sukhi Pro MD ([id number]) on 3/2/2023 5:07:07 PM    Referred By: BJORN   SELF           Confirmed By:Sukhi Pro MD             Lab Results   Component Value Date    WBC 4.08  07/17/2024    HGB 11.3 (L) 07/17/2024    HCT 33.2 (L) 07/17/2024    MCV 92 07/17/2024     07/17/2024     BMP  Lab Results   Component Value Date     07/17/2024    K 4.2 07/17/2024     07/17/2024    CO2 27 07/17/2024    BUN 7 07/17/2024    CREATININE 0.8 07/17/2024    CALCIUM 9.0 07/17/2024    ANIONGAP 6 (L) 07/17/2024    GLU 91 07/17/2024    GLU 77 04/03/2024     (H) 12/11/2023       No results found for this or any previous visit.              Pre-op Assessment    I have reviewed the Patient Summary Reports.     I have reviewed the Nursing Notes. I have reviewed the NPO Status.   I have reviewed the Medications.     Review of Systems  Anesthesia Hx:  No problems with previous Anesthesia             Denies Family Hx of Anesthesia complications.    Denies Personal Hx of Anesthesia complications.                    Social:  Former Smoker       Hematology/Oncology:  Hematology Normal                     Current/Recent Cancer. (left breast CA)  Breast    left          Cardiovascular:  Cardiovascular Normal                                            Pulmonary:  Pulmonary Normal                       Hepatic/GI:     GERD, well controlled             Musculoskeletal:  Musculoskeletal Normal                Neurological:  Neurology Normal                                      Endocrine:  Endocrine Normal            Psych:   anxiety                 Physical Exam  General: Well nourished, Cooperative, Alert and Oriented    Airway:  Mallampati: III / II  Mouth Opening: Normal  TM Distance: Normal  Tongue: Normal  Neck ROM: Normal ROM    Dental:  Intact    Chest/Lungs:  Clear to auscultation    Heart:  Rate: Normal  Rhythm: Regular Rhythm  Sounds: Normal    Abdomen:  Normal, Soft, Nontender        Anesthesia Plan  Type of Anesthesia, risks & benefits discussed:    Anesthesia Type: Gen Natural Airway  Intra-op Monitoring Plan: Standard ASA Monitors  Post Op Pain Control Plan: multimodal analgesia and  IV/PO Opioids PRN  Induction:  IV  Airway Plan: Video, Post-Induction  Informed Consent: Informed consent signed with the Patient and all parties understand the risks and agree with anesthesia plan.  All questions answered.   ASA Score: 3  Anesthesia Plan Notes: General LMA OK (pt prefers General)  IV tylenol  Zofran Decadron Pepcid    Ready For Surgery From Anesthesia Perspective.     .

## 2024-08-02 VITALS
BODY MASS INDEX: 25.03 KG/M2 | HEIGHT: 62 IN | OXYGEN SATURATION: 100 % | RESPIRATION RATE: 16 BRPM | WEIGHT: 136 LBS | HEART RATE: 76 BPM | DIASTOLIC BLOOD PRESSURE: 66 MMHG | SYSTOLIC BLOOD PRESSURE: 103 MMHG | TEMPERATURE: 99 F

## 2024-08-15 DIAGNOSIS — G89.3 CANCER ASSOCIATED PAIN: ICD-10-CM

## 2024-08-15 DIAGNOSIS — Z17.1 MALIGNANT NEOPLASM OF UPPER-OUTER QUADRANT OF LEFT BREAST IN FEMALE, ESTROGEN RECEPTOR NEGATIVE: ICD-10-CM

## 2024-08-15 DIAGNOSIS — C50.412 MALIGNANT NEOPLASM OF UPPER-OUTER QUADRANT OF LEFT BREAST IN FEMALE, ESTROGEN RECEPTOR NEGATIVE: ICD-10-CM

## 2024-08-16 RX ORDER — OXYCODONE AND ACETAMINOPHEN 10; 325 MG/1; MG/1
1 TABLET ORAL EVERY 6 HOURS PRN
Qty: 40 TABLET | Refills: 0 | Status: SHIPPED | OUTPATIENT
Start: 2024-08-16 | End: 2025-08-16

## 2024-08-22 ENCOUNTER — INFUSION (OUTPATIENT)
Dept: INFUSION THERAPY | Facility: HOSPITAL | Age: 49
End: 2024-08-22
Attending: INTERNAL MEDICINE
Payer: COMMERCIAL

## 2024-08-22 VITALS
TEMPERATURE: 98 F | OXYGEN SATURATION: 99 % | WEIGHT: 135.13 LBS | HEIGHT: 62 IN | SYSTOLIC BLOOD PRESSURE: 118 MMHG | RESPIRATION RATE: 17 BRPM | BODY MASS INDEX: 24.87 KG/M2 | HEART RATE: 71 BPM | DIASTOLIC BLOOD PRESSURE: 79 MMHG

## 2024-08-22 DIAGNOSIS — Z17.1 MALIGNANT NEOPLASM OF UPPER-OUTER QUADRANT OF LEFT BREAST IN FEMALE, ESTROGEN RECEPTOR NEGATIVE: Primary | ICD-10-CM

## 2024-08-22 DIAGNOSIS — C50.412 MALIGNANT NEOPLASM OF UPPER-OUTER QUADRANT OF LEFT BREAST IN FEMALE, ESTROGEN RECEPTOR NEGATIVE: Primary | ICD-10-CM

## 2024-08-22 PROCEDURE — 96523 IRRIG DRUG DELIVERY DEVICE: CPT

## 2024-08-22 PROCEDURE — 63600175 PHARM REV CODE 636 W HCPCS: Performed by: NURSE PRACTITIONER

## 2024-08-22 PROCEDURE — A4216 STERILE WATER/SALINE, 10 ML: HCPCS | Performed by: NURSE PRACTITIONER

## 2024-08-22 PROCEDURE — 25000003 PHARM REV CODE 250: Performed by: NURSE PRACTITIONER

## 2024-08-22 RX ORDER — SODIUM CHLORIDE 0.9 % (FLUSH) 0.9 %
10 SYRINGE (ML) INJECTION
Status: DISCONTINUED | OUTPATIENT
Start: 2024-08-22 | End: 2024-08-22 | Stop reason: HOSPADM

## 2024-08-22 RX ORDER — SODIUM CHLORIDE 0.9 % (FLUSH) 0.9 %
10 SYRINGE (ML) INJECTION
OUTPATIENT
Start: 2024-08-22

## 2024-08-22 RX ORDER — HEPARIN 100 UNIT/ML
500 SYRINGE INTRAVENOUS
OUTPATIENT
Start: 2024-08-22

## 2024-08-22 RX ORDER — HEPARIN 100 UNIT/ML
500 SYRINGE INTRAVENOUS
Status: DISCONTINUED | OUTPATIENT
Start: 2024-08-22 | End: 2024-08-22 | Stop reason: HOSPADM

## 2024-08-22 RX ADMIN — HEPARIN 500 UNITS: 100 SYRINGE at 11:08

## 2024-08-22 RX ADMIN — SODIUM CHLORIDE, PRESERVATIVE FREE 10 ML: 5 INJECTION INTRAVENOUS at 11:08

## 2024-08-22 NOTE — PLAN OF CARE
Problem: Infection  Goal: Absence of Infection Signs and Symptoms  Reactivated  Intervention: Prevent or Manage Infection  Flowsheets (Taken 8/22/2024 1136)  Infection Management: aseptic technique maintained  Isolation Precautions:   precautions initiated   precautions maintained

## 2024-09-27 DIAGNOSIS — Z17.1 MALIGNANT NEOPLASM OF UPPER-OUTER QUADRANT OF LEFT BREAST IN FEMALE, ESTROGEN RECEPTOR NEGATIVE: ICD-10-CM

## 2024-09-27 DIAGNOSIS — C50.412 MALIGNANT NEOPLASM OF UPPER-OUTER QUADRANT OF LEFT BREAST IN FEMALE, ESTROGEN RECEPTOR NEGATIVE: ICD-10-CM

## 2024-09-27 DIAGNOSIS — G89.3 CANCER ASSOCIATED PAIN: ICD-10-CM

## 2024-09-27 RX ORDER — OXYCODONE AND ACETAMINOPHEN 10; 325 MG/1; MG/1
1 TABLET ORAL EVERY 6 HOURS PRN
Qty: 40 TABLET | Refills: 0 | Status: SHIPPED | OUTPATIENT
Start: 2024-09-27 | End: 2025-09-27

## 2024-10-03 ENCOUNTER — INFUSION (OUTPATIENT)
Dept: INFUSION THERAPY | Facility: HOSPITAL | Age: 49
End: 2024-10-03
Attending: INTERNAL MEDICINE
Payer: COMMERCIAL

## 2024-10-03 ENCOUNTER — TELEPHONE (OUTPATIENT)
Facility: CLINIC | Age: 49
End: 2024-10-03
Payer: COMMERCIAL

## 2024-10-03 VITALS
HEIGHT: 62 IN | WEIGHT: 136.13 LBS | HEART RATE: 83 BPM | OXYGEN SATURATION: 100 % | DIASTOLIC BLOOD PRESSURE: 72 MMHG | TEMPERATURE: 98 F | BODY MASS INDEX: 25.05 KG/M2 | RESPIRATION RATE: 18 BRPM | SYSTOLIC BLOOD PRESSURE: 105 MMHG

## 2024-10-03 DIAGNOSIS — C50.412 MALIGNANT NEOPLASM OF UPPER-OUTER QUADRANT OF LEFT BREAST IN FEMALE, ESTROGEN RECEPTOR NEGATIVE: Primary | ICD-10-CM

## 2024-10-03 DIAGNOSIS — Z17.1 MALIGNANT NEOPLASM OF UPPER-OUTER QUADRANT OF LEFT BREAST IN FEMALE, ESTROGEN RECEPTOR NEGATIVE: Primary | ICD-10-CM

## 2024-10-03 PROCEDURE — 25000003 PHARM REV CODE 250: Performed by: NURSE PRACTITIONER

## 2024-10-03 PROCEDURE — 96523 IRRIG DRUG DELIVERY DEVICE: CPT

## 2024-10-03 PROCEDURE — 63600175 PHARM REV CODE 636 W HCPCS: Performed by: NURSE PRACTITIONER

## 2024-10-03 PROCEDURE — A4216 STERILE WATER/SALINE, 10 ML: HCPCS | Performed by: NURSE PRACTITIONER

## 2024-10-03 RX ORDER — HEPARIN 100 UNIT/ML
500 SYRINGE INTRAVENOUS
Status: DISCONTINUED | OUTPATIENT
Start: 2024-10-03 | End: 2024-10-03 | Stop reason: HOSPADM

## 2024-10-03 RX ORDER — SODIUM CHLORIDE 0.9 % (FLUSH) 0.9 %
10 SYRINGE (ML) INJECTION
Status: DISCONTINUED | OUTPATIENT
Start: 2024-10-03 | End: 2024-10-03 | Stop reason: HOSPADM

## 2024-10-03 RX ORDER — SODIUM CHLORIDE 0.9 % (FLUSH) 0.9 %
10 SYRINGE (ML) INJECTION
OUTPATIENT
Start: 2024-10-03

## 2024-10-03 RX ORDER — HEPARIN 100 UNIT/ML
500 SYRINGE INTRAVENOUS
OUTPATIENT
Start: 2024-10-03

## 2024-10-03 RX ADMIN — SODIUM CHLORIDE, PRESERVATIVE FREE 10 ML: 5 INJECTION INTRAVENOUS at 10:10

## 2024-10-03 RX ADMIN — HEPARIN 500 UNITS: 100 SYRINGE at 10:10

## 2024-10-08 DIAGNOSIS — G47.00 INSOMNIA, UNSPECIFIED TYPE: ICD-10-CM

## 2024-10-08 RX ORDER — TRAZODONE HYDROCHLORIDE 50 MG/1
50 TABLET ORAL NIGHTLY
Qty: 30 TABLET | Refills: 11 | Status: SHIPPED | OUTPATIENT
Start: 2024-10-08 | End: 2025-10-08

## 2024-10-17 ENCOUNTER — LAB VISIT (OUTPATIENT)
Dept: LAB | Facility: HOSPITAL | Age: 49
End: 2024-10-17
Attending: INTERNAL MEDICINE
Payer: COMMERCIAL

## 2024-10-17 DIAGNOSIS — Z17.1 MALIGNANT NEOPLASM OF UPPER-OUTER QUADRANT OF LEFT BREAST IN FEMALE, ESTROGEN RECEPTOR NEGATIVE: ICD-10-CM

## 2024-10-17 DIAGNOSIS — C50.412 MALIGNANT NEOPLASM OF UPPER-OUTER QUADRANT OF LEFT BREAST IN FEMALE, ESTROGEN RECEPTOR NEGATIVE: ICD-10-CM

## 2024-10-17 LAB
ALBUMIN SERPL BCP-MCNC: 3.8 G/DL (ref 3.5–5.2)
ALP SERPL-CCNC: 64 U/L (ref 55–135)
ALT SERPL W/O P-5'-P-CCNC: 15 U/L (ref 10–44)
ANION GAP SERPL CALC-SCNC: 3 MMOL/L (ref 8–16)
AST SERPL-CCNC: 23 U/L (ref 10–40)
BASOPHILS # BLD AUTO: 0.04 K/UL (ref 0–0.2)
BASOPHILS NFR BLD: 1 % (ref 0–1.9)
BILIRUB SERPL-MCNC: 0.5 MG/DL (ref 0.1–1)
BUN SERPL-MCNC: 8 MG/DL (ref 6–20)
CALCIUM SERPL-MCNC: 9.1 MG/DL (ref 8.7–10.5)
CEA SERPL-MCNC: 1 NG/ML (ref 0–5)
CHLORIDE SERPL-SCNC: 105 MMOL/L (ref 95–110)
CO2 SERPL-SCNC: 29 MMOL/L (ref 23–29)
CREAT SERPL-MCNC: 0.7 MG/DL (ref 0.5–1.4)
DIFFERENTIAL METHOD BLD: ABNORMAL
EOSINOPHIL # BLD AUTO: 0.1 K/UL (ref 0–0.5)
EOSINOPHIL NFR BLD: 3.6 % (ref 0–8)
ERYTHROCYTE [DISTWIDTH] IN BLOOD BY AUTOMATED COUNT: 12.6 % (ref 11.5–14.5)
EST. GFR  (NO RACE VARIABLE): >60 ML/MIN/1.73 M^2
GLUCOSE SERPL-MCNC: 94 MG/DL (ref 70–110)
HCT VFR BLD AUTO: 34.2 % (ref 37–48.5)
HGB BLD-MCNC: 11.5 G/DL (ref 12–16)
IMM GRANULOCYTES # BLD AUTO: 0 K/UL (ref 0–0.04)
IMM GRANULOCYTES NFR BLD AUTO: 0 % (ref 0–0.5)
LYMPHOCYTES # BLD AUTO: 2 K/UL (ref 1–4.8)
LYMPHOCYTES NFR BLD: 49.9 % (ref 18–48)
MCH RBC QN AUTO: 31.1 PG (ref 27–31)
MCHC RBC AUTO-ENTMCNC: 33.6 G/DL (ref 32–36)
MCV RBC AUTO: 92 FL (ref 82–98)
MONOCYTES # BLD AUTO: 0.3 K/UL (ref 0.3–1)
MONOCYTES NFR BLD: 7.4 % (ref 4–15)
NEUTROPHILS # BLD AUTO: 1.5 K/UL (ref 1.8–7.7)
NEUTROPHILS NFR BLD: 38.1 % (ref 38–73)
NRBC BLD-RTO: 0 /100 WBC
PLATELET # BLD AUTO: 127 K/UL (ref 150–450)
PMV BLD AUTO: 10 FL (ref 9.2–12.9)
POTASSIUM SERPL-SCNC: 4.3 MMOL/L (ref 3.5–5.1)
PROT SERPL-MCNC: 6.3 G/DL (ref 6–8.4)
RBC # BLD AUTO: 3.7 M/UL (ref 4–5.4)
SODIUM SERPL-SCNC: 137 MMOL/L (ref 136–145)
WBC # BLD AUTO: 3.91 K/UL (ref 3.9–12.7)

## 2024-10-17 PROCEDURE — 85025 COMPLETE CBC W/AUTO DIFF WBC: CPT | Performed by: INTERNAL MEDICINE

## 2024-10-17 PROCEDURE — 82378 CARCINOEMBRYONIC ANTIGEN: CPT | Performed by: INTERNAL MEDICINE

## 2024-10-17 PROCEDURE — 86300 IMMUNOASSAY TUMOR CA 15-3: CPT | Mod: 91 | Performed by: INTERNAL MEDICINE

## 2024-10-17 PROCEDURE — 86300 IMMUNOASSAY TUMOR CA 15-3: CPT | Performed by: INTERNAL MEDICINE

## 2024-10-17 PROCEDURE — 80053 COMPREHEN METABOLIC PANEL: CPT | Performed by: INTERNAL MEDICINE

## 2024-10-17 PROCEDURE — 36415 COLL VENOUS BLD VENIPUNCTURE: CPT | Performed by: INTERNAL MEDICINE

## 2024-10-19 LAB
CANCER AG15-3 SERPL-ACNC: 9.9 U/ML (ref 0–25)
CANCER AG27-29 SERPL-ACNC: 15.1 U/ML (ref 0–38.6)

## 2024-10-24 ENCOUNTER — OFFICE VISIT (OUTPATIENT)
Facility: CLINIC | Age: 49
End: 2024-10-24
Payer: COMMERCIAL

## 2024-10-24 VITALS
WEIGHT: 132.38 LBS | SYSTOLIC BLOOD PRESSURE: 115 MMHG | HEART RATE: 87 BPM | RESPIRATION RATE: 18 BRPM | TEMPERATURE: 98 F | DIASTOLIC BLOOD PRESSURE: 72 MMHG | BODY MASS INDEX: 24.22 KG/M2

## 2024-10-24 DIAGNOSIS — F43.21 SITUATIONAL DEPRESSION: ICD-10-CM

## 2024-10-24 DIAGNOSIS — Z17.1 MALIGNANT NEOPLASM OF UPPER-OUTER QUADRANT OF LEFT BREAST IN FEMALE, ESTROGEN RECEPTOR NEGATIVE: Primary | ICD-10-CM

## 2024-10-24 DIAGNOSIS — C50.412 MALIGNANT NEOPLASM OF UPPER-OUTER QUADRANT OF LEFT BREAST IN FEMALE, ESTROGEN RECEPTOR NEGATIVE: Primary | ICD-10-CM

## 2024-10-24 PROCEDURE — 3074F SYST BP LT 130 MM HG: CPT | Mod: CPTII,S$GLB,, | Performed by: INTERNAL MEDICINE

## 2024-10-24 PROCEDURE — 99214 OFFICE O/P EST MOD 30 MIN: CPT | Mod: S$GLB,,, | Performed by: INTERNAL MEDICINE

## 2024-10-24 PROCEDURE — 3078F DIAST BP <80 MM HG: CPT | Mod: CPTII,S$GLB,, | Performed by: INTERNAL MEDICINE

## 2024-10-24 PROCEDURE — 99999 PR PBB SHADOW E&M-EST. PATIENT-LVL IV: CPT | Mod: PBBFAC,,, | Performed by: INTERNAL MEDICINE

## 2024-10-24 PROCEDURE — G2211 COMPLEX E/M VISIT ADD ON: HCPCS | Mod: S$GLB,,, | Performed by: INTERNAL MEDICINE

## 2024-10-24 PROCEDURE — 1159F MED LIST DOCD IN RCRD: CPT | Mod: CPTII,S$GLB,, | Performed by: INTERNAL MEDICINE

## 2024-10-24 PROCEDURE — 3008F BODY MASS INDEX DOCD: CPT | Mod: CPTII,S$GLB,, | Performed by: INTERNAL MEDICINE

## 2024-10-24 RX ORDER — LEVOCETIRIZINE DIHYDROCHLORIDE 5 MG/1
5 TABLET, FILM COATED ORAL DAILY
COMMUNITY
Start: 2024-10-23 | End: 2025-10-23

## 2024-10-24 NOTE — ASSESSMENT & PLAN NOTE
Patient seems to be doing well from this standpoint and her exam and tumor markers are negative.  Appears KATHE.  Will continue 3 month follow up with labs.

## 2024-10-24 NOTE — PROGRESS NOTES
PROGRESS NOTE    Subjective:       Patient ID: Karyna Escoto is a 49 y.o. female.    6/9/2022:  Dx Mammo:  1.8cm mass in the left breast towards the left axilla.   Deep to the mass 2 lymph nodes are identified.     7/5/2022:  Left breast core biopsy:  Grade 2 IDCA with DCIS  ER: 0.03%, [place]: 8.8%, HER2: 0  TRIPLE NEGATIVE    7/20/2022:  Left axilla LN needle biopsy:  Invasive ductal carcinoma  ER: 0%, MT: 40%, HER2 negative(0)    Keynote 522  8/11/2022-2/2/2023    3/17/2023  Bilateral Nipple sparing mastectomy  Complete Remission    5/30/2023-12/6/2023  Pembro complete    6/6/2023-7/14/2023  Radiation therapy    Chief Complaint:  No chief complaint on file.  Triple negative breast cancer follow up.     History of Present Illness:   Karyna Escoto is a 49 y.o. female who presents for follow up of breast cancer.      Patient complains of mid-day fatigue.      Family and Social history reviewed and is unchanged from 8/3/2022           Current Outpatient Medications:     bisacodyL (DULCOLAX) 5 mg EC tablet, Take 5 mg by mouth daily as needed for Constipation., Disp: , Rfl:     busPIRone (BUSPAR) 5 MG Tab, Take 5 mg by mouth 2 (two) times daily., Disp: , Rfl:     fluticasone propionate (FLONASE) 50 mcg/actuation nasal spray, 1 spray (50 mcg total) by Each Nostril route once daily., Disp: 15.8 mL, Rfl: 5    Lactobacillus rhamnosus GG (CULTURELLE) 10 billion cell capsule, Take 1 capsule by mouth once daily., Disp: , Rfl:     levocetirizine (XYZAL) 5 MG tablet, Take 5 mg by mouth once daily., Disp: , Rfl:     LIDOcaine-prilocaine (EMLA) cream, Apply topically as needed. Apply 30 mins prior to port access, Disp: 30 g, Rfl: 5    loperamide HCl (IMODIUM A-D ORAL), Take 1 tablet by mouth daily as needed., Disp: , Rfl:     oxyCODONE-acetaminophen (PERCOCET)  mg per tablet, Take 1 tablet by mouth every 6 (six) hours as needed for Pain., Disp: 40 tablet, Rfl:  0    pantoprazole (PROTONIX) 40 MG tablet, Take 1 tablet (40 mg total) by mouth once daily., Disp: 90 tablet, Rfl: 3    polyethylene glycol (GLYCOLAX) 17 gram/dose powder, Take 17 g by mouth once daily., Disp: , Rfl:     promethazine (PHENERGAN) 25 MG tablet, Take 1 tablet (25 mg total) by mouth every 4 to 6 hours as needed., Disp: 30 tablet, Rfl: 5    psyllium 0.52 gram capsule, Take 0.52 g by mouth once daily., Disp: , Rfl:     silver sulfADIAZINE 1% (SILVADENE) 1 % cream, Apply topically 2 (two) times daily., Disp: 400 g, Rfl: 2    traZODone (DESYREL) 50 MG tablet, Take 1 tablet (50 mg total) by mouth every evening., Disp: 30 tablet, Rfl: 11    venlafaxine (EFFEXOR-XR) 37.5 MG 24 hr capsule, Take 1 capsule (37.5 mg total) by mouth once daily., Disp: 30 capsule, Rfl: 11    cyproheptadine (PERIACTIN) 4 mg tablet, Take 1 tablet (4 mg total) by mouth 3 (three) times daily. (Patient not taking: Reported on 10/24/2024), Disp: 90 tablet, Rfl: 5    famotidine (PEPCID) 10 MG tablet, Take 10 mg by mouth 2 (two) times daily. (Patient not taking: Reported on 10/24/2024), Disp: , Rfl:     hydrOXYzine HCL (ATARAX) 10 MG Tab, Take 10 mg by mouth 3 (three) times daily as needed. (Patient not taking: Reported on 10/24/2024), Disp: , Rfl:     mupirocin (BACTROBAN) 2 % ointment, SMARTSI Application Topical 2-3 Times Daily (Patient not taking: Reported on 10/24/2024), Disp: , Rfl:     YUVAFEM 10 mcg Tab, Place 1 tablet vaginally 2 (two) times a day. (Patient not taking: Reported on 10/24/2024), Disp: , Rfl:         Objective:       Physical Examination:     /72   Pulse 87   Temp 98.3 °F (36.8 °C)   Resp 18   Wt 60.1 kg (132 lb 6.4 oz)   LMP 2022 Comment: tubal  BMI 24.22 kg/m²     Physical Exam  Constitutional:       Appearance: She is well-developed.   HENT:      Head: Normocephalic and atraumatic.      Right Ear: External ear normal.      Left Ear: External ear normal.   Eyes:      Conjunctiva/sclera:  Conjunctivae normal.      Pupils: Pupils are equal, round, and reactive to light.   Neck:      Thyroid: No thyromegaly.      Trachea: No tracheal deviation.   Cardiovascular:      Rate and Rhythm: Normal rate and regular rhythm.      Heart sounds: Normal heart sounds.   Pulmonary:      Effort: Pulmonary effort is normal.      Breath sounds: Normal breath sounds.   Chest:       Abdominal:      General: Bowel sounds are normal. There is no distension.      Palpations: Abdomen is soft. There is no mass.      Tenderness: There is no abdominal tenderness.   Skin:     Findings: No rash.   Neurological:      Comments: Neuro intact througout   Psychiatric:         Behavior: Behavior normal.         Thought Content: Thought content normal.         Judgment: Judgment normal.         Labs:   Recent Results (from the past 2 weeks)   CBC W/ AUTO DIFFERENTIAL    Collection Time: 10/17/24 10:30 AM   Result Value Ref Range    WBC 3.91 3.90 - 12.70 K/uL    Hemoglobin 11.5 (L) 12.0 - 16.0 g/dL    Hematocrit 34.2 (L) 37.0 - 48.5 %    Platelets 127 (L) 150 - 450 K/uL         CMP  Sodium   Date Value Ref Range Status   10/17/2024 137 136 - 145 mmol/L Final     Potassium   Date Value Ref Range Status   10/17/2024 4.3 3.5 - 5.1 mmol/L Final     Chloride   Date Value Ref Range Status   10/17/2024 105 95 - 110 mmol/L Final     CO2   Date Value Ref Range Status   10/17/2024 29 23 - 29 mmol/L Final     Glucose   Date Value Ref Range Status   10/17/2024 94 70 - 110 mg/dL Final     BUN   Date Value Ref Range Status   10/17/2024 8 6 - 20 mg/dL Final     Creatinine   Date Value Ref Range Status   10/17/2024 0.7 0.5 - 1.4 mg/dL Final     Calcium   Date Value Ref Range Status   10/17/2024 9.1 8.7 - 10.5 mg/dL Final     Total Protein   Date Value Ref Range Status   10/17/2024 6.3 6.0 - 8.4 g/dL Final     Albumin   Date Value Ref Range Status   10/17/2024 3.8 3.5 - 5.2 g/dL Final     Total Bilirubin   Date Value Ref Range Status   10/17/2024 0.5 0.1  "- 1.0 mg/dL Final     Comment:     For infants and newborns, interpretation of results should be based  on gestational age, weight and in agreement with clinical  observations.    Premature Infant recommended reference ranges:  Up to 24 hours.............<8.0 mg/dL  Up to 48 hours............<12.0 mg/dL  3-5 days..................<15.0 mg/dL  6-29 days.................<15.0 mg/dL       Alkaline Phosphatase   Date Value Ref Range Status   10/17/2024 64 55 - 135 U/L Final     AST   Date Value Ref Range Status   10/17/2024 23 10 - 40 U/L Final     ALT   Date Value Ref Range Status   10/17/2024 15 10 - 44 U/L Final     Anion Gap   Date Value Ref Range Status   10/17/2024 3 (L) 8 - 16 mmol/L Final     Lab Results   Component Value Date    CEA 1.0 10/17/2024     No results found for: "PSA"        Assessment/Plan:     Problem List Items Addressed This Visit       Situational depression     Patient continues on Effexor and seems to be doing a bit better now.  Will continue therapy and continue to monitor.          Left Breast Cancer-TRIPLE NEGATIVE - Primary     Patient seems to be doing well from this standpoint and her exam and tumor markers are negative.  Appears KATHE.  Will continue 3 month follow up with labs.          Relevant Orders    CBC Auto Differential    Comprehensive Metabolic Panel    Cancer Antigen 15-3    Cancer Antigen 27-29    CEA         Discussion:     No follow-ups on file.      Electronically signed by Derek Patterson              "

## 2024-10-24 NOTE — ASSESSMENT & PLAN NOTE
Patient continues on Effexor and seems to be doing a bit better now.  Will continue therapy and continue to monitor.

## 2024-10-27 DIAGNOSIS — C50.412 MALIGNANT NEOPLASM OF UPPER-OUTER QUADRANT OF LEFT BREAST IN FEMALE, ESTROGEN RECEPTOR NEGATIVE: ICD-10-CM

## 2024-10-27 DIAGNOSIS — R53.83 FATIGUE, UNSPECIFIED TYPE: ICD-10-CM

## 2024-10-27 DIAGNOSIS — Z17.1 MALIGNANT NEOPLASM OF UPPER-OUTER QUADRANT OF LEFT BREAST IN FEMALE, ESTROGEN RECEPTOR NEGATIVE: ICD-10-CM

## 2024-10-28 RX ORDER — PROMETHAZINE HYDROCHLORIDE 25 MG/1
25 TABLET ORAL
Qty: 30 TABLET | Refills: 5 | Status: SHIPPED | OUTPATIENT
Start: 2024-10-28

## 2024-11-09 DIAGNOSIS — G89.3 CANCER ASSOCIATED PAIN: ICD-10-CM

## 2024-11-09 DIAGNOSIS — C50.412 MALIGNANT NEOPLASM OF UPPER-OUTER QUADRANT OF LEFT BREAST IN FEMALE, ESTROGEN RECEPTOR NEGATIVE: ICD-10-CM

## 2024-11-09 DIAGNOSIS — Z17.1 MALIGNANT NEOPLASM OF UPPER-OUTER QUADRANT OF LEFT BREAST IN FEMALE, ESTROGEN RECEPTOR NEGATIVE: ICD-10-CM

## 2024-11-11 RX ORDER — OXYCODONE AND ACETAMINOPHEN 10; 325 MG/1; MG/1
1 TABLET ORAL EVERY 6 HOURS PRN
Qty: 40 TABLET | Refills: 0 | Status: SHIPPED | OUTPATIENT
Start: 2024-11-11 | End: 2025-11-11

## 2024-11-20 DIAGNOSIS — R94.7 NONSPECIFIC ABNORMAL RESULTS OF ENDOCRINE FUNCTION STUDY: ICD-10-CM

## 2024-11-20 DIAGNOSIS — E27.49 CORTICOSTERONE 18-MONOOXYGENASE DEFICIENCY: Primary | ICD-10-CM

## 2024-11-21 ENCOUNTER — INFUSION (OUTPATIENT)
Dept: INFUSION THERAPY | Facility: HOSPITAL | Age: 49
End: 2024-11-21
Attending: INTERNAL MEDICINE
Payer: COMMERCIAL

## 2024-11-21 VITALS
HEIGHT: 62 IN | HEART RATE: 74 BPM | DIASTOLIC BLOOD PRESSURE: 78 MMHG | WEIGHT: 136.38 LBS | TEMPERATURE: 98 F | OXYGEN SATURATION: 100 % | BODY MASS INDEX: 25.1 KG/M2 | SYSTOLIC BLOOD PRESSURE: 110 MMHG | RESPIRATION RATE: 18 BRPM

## 2024-11-21 DIAGNOSIS — Z17.1 MALIGNANT NEOPLASM OF UPPER-OUTER QUADRANT OF LEFT BREAST IN FEMALE, ESTROGEN RECEPTOR NEGATIVE: Primary | ICD-10-CM

## 2024-11-21 DIAGNOSIS — C50.412 MALIGNANT NEOPLASM OF UPPER-OUTER QUADRANT OF LEFT BREAST IN FEMALE, ESTROGEN RECEPTOR NEGATIVE: Primary | ICD-10-CM

## 2024-11-21 PROCEDURE — 96523 IRRIG DRUG DELIVERY DEVICE: CPT

## 2024-11-21 PROCEDURE — 63600175 PHARM REV CODE 636 W HCPCS: Performed by: NURSE PRACTITIONER

## 2024-11-21 RX ORDER — HEPARIN 100 UNIT/ML
500 SYRINGE INTRAVENOUS
Status: DISCONTINUED | OUTPATIENT
Start: 2024-11-21 | End: 2024-11-21 | Stop reason: HOSPADM

## 2024-11-21 RX ORDER — SODIUM CHLORIDE 0.9 % (FLUSH) 0.9 %
10 SYRINGE (ML) INJECTION
OUTPATIENT
Start: 2024-11-21

## 2024-11-21 RX ORDER — SODIUM CHLORIDE 0.9 % (FLUSH) 0.9 %
10 SYRINGE (ML) INJECTION
Status: DISCONTINUED | OUTPATIENT
Start: 2024-11-21 | End: 2024-11-21 | Stop reason: HOSPADM

## 2024-11-21 RX ORDER — HEPARIN 100 UNIT/ML
500 SYRINGE INTRAVENOUS
OUTPATIENT
Start: 2024-11-21

## 2024-11-21 RX ADMIN — HEPARIN 500 UNITS: 100 SYRINGE at 10:11

## 2024-12-18 DIAGNOSIS — Z17.1 MALIGNANT NEOPLASM OF UPPER-OUTER QUADRANT OF LEFT BREAST IN FEMALE, ESTROGEN RECEPTOR NEGATIVE: ICD-10-CM

## 2024-12-18 DIAGNOSIS — G89.3 CANCER ASSOCIATED PAIN: ICD-10-CM

## 2024-12-18 DIAGNOSIS — C50.412 MALIGNANT NEOPLASM OF UPPER-OUTER QUADRANT OF LEFT BREAST IN FEMALE, ESTROGEN RECEPTOR NEGATIVE: ICD-10-CM

## 2024-12-18 RX ORDER — OXYCODONE AND ACETAMINOPHEN 10; 325 MG/1; MG/1
1 TABLET ORAL EVERY 6 HOURS PRN
Qty: 40 TABLET | Refills: 0 | Status: SHIPPED | OUTPATIENT
Start: 2024-12-18 | End: 2025-12-18

## 2025-01-02 ENCOUNTER — HOSPITAL ENCOUNTER (OUTPATIENT)
Dept: RADIOLOGY | Facility: HOSPITAL | Age: 50
Discharge: HOME | End: 2025-01-02
Attending: INTERNAL MEDICINE
Payer: COMMERCIAL

## 2025-01-02 DIAGNOSIS — E27.49 CORTICOSTERONE 18-MONOOXYGENASE DEFICIENCY: ICD-10-CM

## 2025-01-02 DIAGNOSIS — R94.7 NONSPECIFIC ABNORMAL RESULTS OF ENDOCRINE FUNCTION STUDY: ICD-10-CM

## 2025-01-02 PROCEDURE — A9585 GADOBUTROL INJECTION: HCPCS | Performed by: INTERNAL MEDICINE

## 2025-01-02 PROCEDURE — 70553 MRI BRAIN STEM W/O & W/DYE: CPT | Mod: TC

## 2025-01-02 PROCEDURE — 70553 MRI BRAIN STEM W/O & W/DYE: CPT | Mod: 26,,, | Performed by: RADIOLOGY

## 2025-01-02 PROCEDURE — 25500020 PHARM REV CODE 255: Performed by: INTERNAL MEDICINE

## 2025-01-02 RX ORDER — GADOBUTROL 604.72 MG/ML
3 INJECTION INTRAVENOUS
Status: COMPLETED | OUTPATIENT
Start: 2025-01-02 | End: 2025-01-02

## 2025-01-02 RX ADMIN — GADOBUTROL 3 ML: 604.72 INJECTION INTRAVENOUS at 11:01

## 2025-01-28 ENCOUNTER — INFUSION (OUTPATIENT)
Dept: INFUSION THERAPY | Facility: HOSPITAL | Age: 50
End: 2025-01-28
Attending: INTERNAL MEDICINE
Payer: COMMERCIAL

## 2025-01-28 ENCOUNTER — LAB VISIT (OUTPATIENT)
Dept: LAB | Facility: HOSPITAL | Age: 50
End: 2025-01-28
Attending: INTERNAL MEDICINE
Payer: COMMERCIAL

## 2025-01-28 VITALS
BODY MASS INDEX: 26.22 KG/M2 | HEART RATE: 76 BPM | HEIGHT: 62 IN | WEIGHT: 142.5 LBS | DIASTOLIC BLOOD PRESSURE: 74 MMHG | RESPIRATION RATE: 18 BRPM | SYSTOLIC BLOOD PRESSURE: 112 MMHG | TEMPERATURE: 98 F

## 2025-01-28 DIAGNOSIS — C50.412 MALIGNANT NEOPLASM OF UPPER-OUTER QUADRANT OF LEFT BREAST IN FEMALE, ESTROGEN RECEPTOR NEGATIVE: Primary | ICD-10-CM

## 2025-01-28 DIAGNOSIS — Z17.1 MALIGNANT NEOPLASM OF UPPER-OUTER QUADRANT OF LEFT BREAST IN FEMALE, ESTROGEN RECEPTOR NEGATIVE: ICD-10-CM

## 2025-01-28 DIAGNOSIS — Z17.1 MALIGNANT NEOPLASM OF UPPER-OUTER QUADRANT OF LEFT BREAST IN FEMALE, ESTROGEN RECEPTOR NEGATIVE: Primary | ICD-10-CM

## 2025-01-28 DIAGNOSIS — C50.412 MALIGNANT NEOPLASM OF UPPER-OUTER QUADRANT OF LEFT BREAST IN FEMALE, ESTROGEN RECEPTOR NEGATIVE: ICD-10-CM

## 2025-01-28 LAB
ALBUMIN SERPL BCP-MCNC: 3.8 G/DL (ref 3.5–5.2)
ALP SERPL-CCNC: 66 U/L (ref 55–135)
ALT SERPL W/O P-5'-P-CCNC: 15 U/L (ref 10–44)
ANION GAP SERPL CALC-SCNC: 2 MMOL/L (ref 8–16)
AST SERPL-CCNC: 22 U/L (ref 10–40)
BASOPHILS # BLD AUTO: 0.05 K/UL (ref 0–0.2)
BASOPHILS NFR BLD: 1 % (ref 0–1.9)
BILIRUB SERPL-MCNC: 0.4 MG/DL (ref 0.1–1)
BUN SERPL-MCNC: 10 MG/DL (ref 6–20)
CALCIUM SERPL-MCNC: 9.4 MG/DL (ref 8.7–10.5)
CEA SERPL-MCNC: 1 NG/ML (ref 0–5)
CHLORIDE SERPL-SCNC: 104 MMOL/L (ref 95–110)
CO2 SERPL-SCNC: 30 MMOL/L (ref 23–29)
CREAT SERPL-MCNC: 0.8 MG/DL (ref 0.5–1.4)
DIFFERENTIAL METHOD BLD: ABNORMAL
EOSINOPHIL # BLD AUTO: 0.3 K/UL (ref 0–0.5)
EOSINOPHIL NFR BLD: 6.7 % (ref 0–8)
ERYTHROCYTE [DISTWIDTH] IN BLOOD BY AUTOMATED COUNT: 12.7 % (ref 11.5–14.5)
EST. GFR  (NO RACE VARIABLE): >60 ML/MIN/1.73 M^2
GLUCOSE SERPL-MCNC: 95 MG/DL (ref 70–110)
HCT VFR BLD AUTO: 34.8 % (ref 37–48.5)
HGB BLD-MCNC: 11.8 G/DL (ref 12–16)
IMM GRANULOCYTES # BLD AUTO: 0.01 K/UL (ref 0–0.04)
IMM GRANULOCYTES NFR BLD AUTO: 0.2 % (ref 0–0.5)
LYMPHOCYTES # BLD AUTO: 1.8 K/UL (ref 1–4.8)
LYMPHOCYTES NFR BLD: 35.2 % (ref 18–48)
MCH RBC QN AUTO: 31.1 PG (ref 27–31)
MCHC RBC AUTO-ENTMCNC: 33.9 G/DL (ref 32–36)
MCV RBC AUTO: 92 FL (ref 82–98)
MONOCYTES # BLD AUTO: 0.4 K/UL (ref 0.3–1)
MONOCYTES NFR BLD: 7.1 % (ref 4–15)
NEUTROPHILS # BLD AUTO: 2.5 K/UL (ref 1.8–7.7)
NEUTROPHILS NFR BLD: 49.8 % (ref 38–73)
NRBC BLD-RTO: 0 /100 WBC
PLATELET # BLD AUTO: 146 K/UL (ref 150–450)
PMV BLD AUTO: 9.3 FL (ref 9.2–12.9)
POTASSIUM SERPL-SCNC: 4.1 MMOL/L (ref 3.5–5.1)
PROT SERPL-MCNC: 6 G/DL (ref 6–8.4)
RBC # BLD AUTO: 3.8 M/UL (ref 4–5.4)
SODIUM SERPL-SCNC: 136 MMOL/L (ref 136–145)
WBC # BLD AUTO: 5.06 K/UL (ref 3.9–12.7)

## 2025-01-28 PROCEDURE — A4216 STERILE WATER/SALINE, 10 ML: HCPCS | Performed by: NURSE PRACTITIONER

## 2025-01-28 PROCEDURE — 86300 IMMUNOASSAY TUMOR CA 15-3: CPT | Performed by: INTERNAL MEDICINE

## 2025-01-28 PROCEDURE — 82378 CARCINOEMBRYONIC ANTIGEN: CPT | Performed by: INTERNAL MEDICINE

## 2025-01-28 PROCEDURE — 80053 COMPREHEN METABOLIC PANEL: CPT | Performed by: INTERNAL MEDICINE

## 2025-01-28 PROCEDURE — 96523 IRRIG DRUG DELIVERY DEVICE: CPT

## 2025-01-28 PROCEDURE — 85025 COMPLETE CBC W/AUTO DIFF WBC: CPT | Performed by: INTERNAL MEDICINE

## 2025-01-28 PROCEDURE — 63600175 PHARM REV CODE 636 W HCPCS: Performed by: NURSE PRACTITIONER

## 2025-01-28 PROCEDURE — 36415 COLL VENOUS BLD VENIPUNCTURE: CPT | Performed by: INTERNAL MEDICINE

## 2025-01-28 PROCEDURE — 25000003 PHARM REV CODE 250: Performed by: NURSE PRACTITIONER

## 2025-01-28 PROCEDURE — 86300 IMMUNOASSAY TUMOR CA 15-3: CPT | Mod: 91 | Performed by: INTERNAL MEDICINE

## 2025-01-28 RX ORDER — HEPARIN 100 UNIT/ML
500 SYRINGE INTRAVENOUS
Status: DISCONTINUED | OUTPATIENT
Start: 2025-01-28 | End: 2025-01-28 | Stop reason: HOSPADM

## 2025-01-28 RX ORDER — SODIUM CHLORIDE 0.9 % (FLUSH) 0.9 %
10 SYRINGE (ML) INJECTION
OUTPATIENT
Start: 2025-01-28

## 2025-01-28 RX ORDER — HEPARIN 100 UNIT/ML
500 SYRINGE INTRAVENOUS
OUTPATIENT
Start: 2025-01-28

## 2025-01-28 RX ORDER — SODIUM CHLORIDE 0.9 % (FLUSH) 0.9 %
10 SYRINGE (ML) INJECTION
Status: DISCONTINUED | OUTPATIENT
Start: 2025-01-28 | End: 2025-01-28 | Stop reason: HOSPADM

## 2025-01-28 RX ADMIN — SODIUM CHLORIDE, PRESERVATIVE FREE 10 ML: 5 INJECTION INTRAVENOUS at 02:01

## 2025-01-28 RX ADMIN — HEPARIN 500 UNITS: 100 SYRINGE at 02:01

## 2025-01-28 NOTE — PLAN OF CARE
Problem: Fatigue  Goal: Improved Activity Tolerance  1/28/2025 1404 by Lela Garzon, RN  Outcome: Met  1/28/2025 1404 by Lela Garzon, RN  Outcome: Progressing

## 2025-01-30 LAB
CANCER AG15-3 SERPL-ACNC: 11.7 U/ML (ref 0–25)
CANCER AG27-29 SERPL-ACNC: 18.7 U/ML (ref 0–38.6)

## 2025-02-05 ENCOUNTER — OFFICE VISIT (OUTPATIENT)
Facility: CLINIC | Age: 50
End: 2025-02-05
Payer: COMMERCIAL

## 2025-02-05 VITALS
HEART RATE: 81 BPM | TEMPERATURE: 98 F | WEIGHT: 142.69 LBS | RESPIRATION RATE: 18 BRPM | SYSTOLIC BLOOD PRESSURE: 109 MMHG | BODY MASS INDEX: 26.26 KG/M2 | DIASTOLIC BLOOD PRESSURE: 76 MMHG | HEIGHT: 62 IN

## 2025-02-05 DIAGNOSIS — G89.3 CANCER ASSOCIATED PAIN: ICD-10-CM

## 2025-02-05 DIAGNOSIS — C50.919 PRIMARY MALIGNANT NEOPLASM OF BREAST, UNSPECIFIED LATERALITY: Primary | ICD-10-CM

## 2025-02-05 PROCEDURE — 99213 OFFICE O/P EST LOW 20 MIN: CPT | Mod: S$GLB,,, | Performed by: INTERNAL MEDICINE

## 2025-02-05 PROCEDURE — 1159F MED LIST DOCD IN RCRD: CPT | Mod: CPTII,S$GLB,, | Performed by: INTERNAL MEDICINE

## 2025-02-05 PROCEDURE — 99999 PR PBB SHADOW E&M-EST. PATIENT-LVL III: CPT | Mod: PBBFAC,,, | Performed by: INTERNAL MEDICINE

## 2025-02-05 PROCEDURE — G2211 COMPLEX E/M VISIT ADD ON: HCPCS | Mod: S$GLB,,, | Performed by: INTERNAL MEDICINE

## 2025-02-05 PROCEDURE — 3008F BODY MASS INDEX DOCD: CPT | Mod: CPTII,S$GLB,, | Performed by: INTERNAL MEDICINE

## 2025-02-05 PROCEDURE — 3078F DIAST BP <80 MM HG: CPT | Mod: CPTII,S$GLB,, | Performed by: INTERNAL MEDICINE

## 2025-02-05 PROCEDURE — 3074F SYST BP LT 130 MM HG: CPT | Mod: CPTII,S$GLB,, | Performed by: INTERNAL MEDICINE

## 2025-02-05 RX ORDER — TRAMADOL HYDROCHLORIDE 50 MG/1
50 TABLET ORAL EVERY 6 HOURS
Qty: 30 TABLET | Refills: 2 | Status: SHIPPED | OUTPATIENT
Start: 2025-02-05 | End: 2025-02-11 | Stop reason: SDUPTHER

## 2025-02-05 NOTE — ASSESSMENT & PLAN NOTE
Patient continues to have generalized pain.  Her oxy was taken by her  as he thought she may have addiction issues.  Discussed this today and this would be a good idea to stop this med.  Can try tramadol to see if this will help.

## 2025-02-05 NOTE — PROGRESS NOTES
PROGRESS NOTE    Subjective:       Patient ID: Karyna Escoto is a 49 y.o. female.    6/9/2022:  Dx Mammo:  1.8cm mass in the left breast towards the left axilla.   Deep to the mass 2 lymph nodes are identified.     7/5/2022:  Left breast core biopsy:  Grade 2 IDCA with DCIS  ER: 0.03%, [place]: 8.8%, HER2: 0  TRIPLE NEGATIVE    7/20/2022:  Left axilla LN needle biopsy:  Invasive ductal carcinoma  ER: 0%, [place]: 40%, HER2 negative(0)    Keynote 522  8/11/2022-2/2/2023    3/17/2023  Bilateral Nipple sparing mastectomy  Complete Remission    5/30/2023-12/6/2023  Pembro complete    6/6/2023-7/14/2023  Radiation therapy    Chief Complaint:  No chief complaint on file.  Triple negative breast cancer follow up.     History of Present Illness:   Karyna Escoto is a 49 y.o. female who presents for follow up of breast cancer.      Patient complains generalized pain.  Her  took her percacet away as he feels she may be addicted.      Family and Social history reviewed and is unchanged from 8/3/2022           Current Outpatient Medications:     busPIRone (BUSPAR) 5 MG Tab, Take 5 mg by mouth 2 (two) times daily., Disp: , Rfl:     fluticasone propionate (FLONASE) 50 mcg/actuation nasal spray, 1 spray (50 mcg total) by Each Nostril route once daily., Disp: 15.8 mL, Rfl: 5    levocetirizine (XYZAL) 5 MG tablet, Take 5 mg by mouth once daily., Disp: , Rfl:     LIDOcaine-prilocaine (EMLA) cream, Apply topically as needed. Apply 30 mins prior to port access, Disp: 30 g, Rfl: 5    pantoprazole (PROTONIX) 40 MG tablet, Take 1 tablet (40 mg total) by mouth once daily., Disp: 90 tablet, Rfl: 3    promethazine (PHENERGAN) 25 MG tablet, Take 1 tablet (25 mg total) by mouth every 4 to 6 hours as needed for Nausea., Disp: 30 tablet, Rfl: 5    silver sulfADIAZINE 1% (SILVADENE) 1 % cream, Apply topically 2 (two) times daily., Disp: 400 g, Rfl: 2    traZODone (DESYREL) 50 MG  "tablet, Take 1 tablet (50 mg total) by mouth every evening., Disp: 30 tablet, Rfl: 11    venlafaxine (EFFEXOR-XR) 37.5 MG 24 hr capsule, Take 1 capsule (37.5 mg total) by mouth once daily., Disp: 30 capsule, Rfl: 11    traMADoL (ULTRAM) 50 mg tablet, Take 1 tablet (50 mg total) by mouth every 6 (six) hours., Disp: 30 tablet, Rfl: 2        Objective:       Physical Examination:     /76   Pulse 81   Temp 97.8 °F (36.6 °C)   Resp 18   Ht 5' 2" (1.575 m)   Wt 64.7 kg (142 lb 11.2 oz)   LMP 08/11/2022 Comment: tubal  BMI 26.10 kg/m²     Physical Exam  Constitutional:       Appearance: Normal appearance. She is well-developed.   HENT:      Head: Normocephalic and atraumatic.      Right Ear: External ear normal.      Left Ear: External ear normal.   Eyes:      Conjunctiva/sclera: Conjunctivae normal.      Pupils: Pupils are equal, round, and reactive to light.   Neck:      Thyroid: No thyromegaly.      Trachea: No tracheal deviation.   Cardiovascular:      Rate and Rhythm: Normal rate.   Pulmonary:      Effort: Pulmonary effort is normal.   Chest:       Abdominal:      General: Abdomen is flat.   Neurological:      General: No focal deficit present.      Mental Status: She is alert and oriented to person, place, and time.      Comments: Neuro intact througout   Psychiatric:         Mood and Affect: Mood normal.         Behavior: Behavior normal.         Thought Content: Thought content normal.         Judgment: Judgment normal.         Labs:   Recent Results (from the past 2 weeks)   CBC Auto Differential    Collection Time: 01/28/25  1:54 PM   Result Value Ref Range    WBC 5.06 3.90 - 12.70 K/uL    Hemoglobin 11.8 (L) 12.0 - 16.0 g/dL    Hematocrit 34.8 (L) 37.0 - 48.5 %    Platelets 146 (L) 150 - 450 K/uL         CMP  Sodium   Date Value Ref Range Status   01/28/2025 136 136 - 145 mmol/L Final     Potassium   Date Value Ref Range Status   01/28/2025 4.1 3.5 - 5.1 mmol/L Final     Chloride   Date Value Ref " "Range Status   01/28/2025 104 95 - 110 mmol/L Final     CO2   Date Value Ref Range Status   01/28/2025 30 (H) 23 - 29 mmol/L Final     Glucose   Date Value Ref Range Status   01/28/2025 95 70 - 110 mg/dL Final     BUN   Date Value Ref Range Status   01/28/2025 10 6 - 20 mg/dL Final     Creatinine   Date Value Ref Range Status   01/28/2025 0.8 0.5 - 1.4 mg/dL Final     Calcium   Date Value Ref Range Status   01/28/2025 9.4 8.7 - 10.5 mg/dL Final     Total Protein   Date Value Ref Range Status   01/28/2025 6.0 6.0 - 8.4 g/dL Final     Albumin   Date Value Ref Range Status   01/28/2025 3.8 3.5 - 5.2 g/dL Final     Total Bilirubin   Date Value Ref Range Status   01/28/2025 0.4 0.1 - 1.0 mg/dL Final     Comment:     For infants and newborns, interpretation of results should be based  on gestational age, weight and in agreement with clinical  observations.    Premature Infant recommended reference ranges:  Up to 24 hours.............<8.0 mg/dL  Up to 48 hours............<12.0 mg/dL  3-5 days..................<15.0 mg/dL  6-29 days.................<15.0 mg/dL       Alkaline Phosphatase   Date Value Ref Range Status   01/28/2025 66 55 - 135 U/L Final     AST   Date Value Ref Range Status   01/28/2025 22 10 - 40 U/L Final     ALT   Date Value Ref Range Status   01/28/2025 15 10 - 44 U/L Final     Anion Gap   Date Value Ref Range Status   01/28/2025 2 (L) 8 - 16 mmol/L Final     Lab Results   Component Value Date    CEA 1.0 01/28/2025     No results found for: "PSA"        Assessment/Plan:     Problem List Items Addressed This Visit       Cancer associated pain     Patient continues to have generalized pain.  Her oxy was taken by her  as he thought she may have addiction issues.  Discussed this today and this would be a good idea to stop this med.  Can try tramadol to see if this will help.           Relevant Medications    traMADoL (ULTRAM) 50 mg tablet    Primary malignant neoplasm of breast - Primary     Patient is " doing well from this standpoint.  Her tumor markers are negative and labs are good.  Will continue to see her on a six month schedule and discussed this today.           Relevant Orders    CBC Auto Differential    Comprehensive Metabolic Panel    Cancer Antigen 15-3    Cancer Antigen 27-29    CEA           Discussion:     Follow up in about 6 months (around 8/5/2025).      Electronically signed by Derek Patterson

## 2025-02-05 NOTE — ASSESSMENT & PLAN NOTE
Patient is doing well from this standpoint.  Her tumor markers are negative and labs are good.  Will continue to see her on a six month schedule and discussed this today.

## 2025-02-11 DIAGNOSIS — C50.919 PRIMARY MALIGNANT NEOPLASM OF BREAST, UNSPECIFIED LATERALITY: Primary | ICD-10-CM

## 2025-02-11 DIAGNOSIS — G89.3 CANCER ASSOCIATED PAIN: ICD-10-CM

## 2025-02-11 RX ORDER — TRAMADOL HYDROCHLORIDE 50 MG/1
50 TABLET ORAL EVERY 6 HOURS
Qty: 30 TABLET | Refills: 2 | Status: SHIPPED | OUTPATIENT
Start: 2025-02-11

## 2025-03-11 DIAGNOSIS — J30.2 SEASONAL ALLERGIC RHINITIS, UNSPECIFIED TRIGGER: ICD-10-CM

## 2025-03-11 RX ORDER — FLUTICASONE PROPIONATE 50 MCG
1 SPRAY, SUSPENSION (ML) NASAL DAILY
Qty: 15.8 ML | Refills: 5 | Status: SHIPPED | OUTPATIENT
Start: 2025-03-11

## 2025-03-12 RX ORDER — SODIUM CHLORIDE 0.9 % (FLUSH) 0.9 %
10 SYRINGE (ML) INJECTION
Status: CANCELLED | OUTPATIENT
Start: 2025-03-12

## 2025-03-12 RX ORDER — HEPARIN 100 UNIT/ML
500 SYRINGE INTRAVENOUS
Status: CANCELLED | OUTPATIENT
Start: 2025-03-12

## 2025-03-13 ENCOUNTER — INFUSION (OUTPATIENT)
Dept: INFUSION THERAPY | Facility: HOSPITAL | Age: 50
End: 2025-03-13
Attending: INTERNAL MEDICINE
Payer: COMMERCIAL

## 2025-03-13 VITALS
RESPIRATION RATE: 18 BRPM | HEART RATE: 86 BPM | WEIGHT: 142.63 LBS | OXYGEN SATURATION: 100 % | DIASTOLIC BLOOD PRESSURE: 77 MMHG | BODY MASS INDEX: 26.25 KG/M2 | SYSTOLIC BLOOD PRESSURE: 123 MMHG | HEIGHT: 62 IN | TEMPERATURE: 98 F

## 2025-03-13 DIAGNOSIS — C50.412 MALIGNANT NEOPLASM OF UPPER-OUTER QUADRANT OF LEFT BREAST IN FEMALE, ESTROGEN RECEPTOR NEGATIVE: Primary | ICD-10-CM

## 2025-03-13 DIAGNOSIS — Z17.1 MALIGNANT NEOPLASM OF UPPER-OUTER QUADRANT OF LEFT BREAST IN FEMALE, ESTROGEN RECEPTOR NEGATIVE: Primary | ICD-10-CM

## 2025-03-13 PROCEDURE — 25000003 PHARM REV CODE 250: Performed by: INTERNAL MEDICINE

## 2025-03-13 PROCEDURE — A4216 STERILE WATER/SALINE, 10 ML: HCPCS | Performed by: INTERNAL MEDICINE

## 2025-03-13 PROCEDURE — 96523 IRRIG DRUG DELIVERY DEVICE: CPT

## 2025-03-13 PROCEDURE — 63600175 PHARM REV CODE 636 W HCPCS: Performed by: INTERNAL MEDICINE

## 2025-03-13 RX ORDER — SODIUM CHLORIDE 0.9 % (FLUSH) 0.9 %
10 SYRINGE (ML) INJECTION
OUTPATIENT
Start: 2025-03-13

## 2025-03-13 RX ORDER — SODIUM CHLORIDE 0.9 % (FLUSH) 0.9 %
10 SYRINGE (ML) INJECTION
Status: DISCONTINUED | OUTPATIENT
Start: 2025-03-13 | End: 2025-03-13 | Stop reason: HOSPADM

## 2025-03-13 RX ORDER — HEPARIN 100 UNIT/ML
500 SYRINGE INTRAVENOUS
Status: DISCONTINUED | OUTPATIENT
Start: 2025-03-13 | End: 2025-03-13 | Stop reason: HOSPADM

## 2025-03-13 RX ORDER — HEPARIN 100 UNIT/ML
500 SYRINGE INTRAVENOUS
OUTPATIENT
Start: 2025-03-13

## 2025-03-13 RX ADMIN — HEPARIN 500 UNITS: 100 SYRINGE at 10:03

## 2025-03-13 RX ADMIN — SODIUM CHLORIDE, PRESERVATIVE FREE 10 ML: 5 INJECTION INTRAVENOUS at 10:03

## 2025-03-13 NOTE — PLAN OF CARE
Problem: Pain Chronic (Persistent)  Goal: Optimal Pain Control and Function  Intervention: Develop Pain Management Plan  Flowsheets (Taken 3/13/2025 1038)  Pain Management Interventions:   breathing exercises utilized   care clustered   diversional activity provided   quiet environment facilitated   relaxation techniques promoted  Intervention: Manage Persistent Pain  Flowsheets (Taken 3/13/2025 1038)  Sleep/Rest Enhancement: regular sleep/rest pattern promoted  Bowel Elimination Promotion:   adequate fluid intake promoted   ambulation promoted  Medication Review/Management: medications reviewed  Intervention: Optimize Psychosocial Wellbeing  Flowsheets (Taken 3/13/2025 1038)  Spiritual Activities Assistance: affirmation provided  Supportive Measures: active listening utilized  Diversional Activities: smartphone

## 2025-04-24 ENCOUNTER — INFUSION (OUTPATIENT)
Dept: INFUSION THERAPY | Facility: HOSPITAL | Age: 50
End: 2025-04-24
Attending: INTERNAL MEDICINE
Payer: COMMERCIAL

## 2025-04-24 VITALS
HEIGHT: 62 IN | WEIGHT: 147.31 LBS | BODY MASS INDEX: 27.11 KG/M2 | HEART RATE: 75 BPM | DIASTOLIC BLOOD PRESSURE: 75 MMHG | RESPIRATION RATE: 18 BRPM | OXYGEN SATURATION: 98 % | TEMPERATURE: 98 F | SYSTOLIC BLOOD PRESSURE: 108 MMHG

## 2025-04-24 DIAGNOSIS — C50.412 MALIGNANT NEOPLASM OF UPPER-OUTER QUADRANT OF LEFT BREAST IN FEMALE, ESTROGEN RECEPTOR NEGATIVE: Primary | ICD-10-CM

## 2025-04-24 DIAGNOSIS — Z17.1 MALIGNANT NEOPLASM OF UPPER-OUTER QUADRANT OF LEFT BREAST IN FEMALE, ESTROGEN RECEPTOR NEGATIVE: Primary | ICD-10-CM

## 2025-04-24 PROCEDURE — A4216 STERILE WATER/SALINE, 10 ML: HCPCS | Performed by: INTERNAL MEDICINE

## 2025-04-24 PROCEDURE — 25000003 PHARM REV CODE 250: Performed by: INTERNAL MEDICINE

## 2025-04-24 PROCEDURE — 96523 IRRIG DRUG DELIVERY DEVICE: CPT

## 2025-04-24 PROCEDURE — 63600175 PHARM REV CODE 636 W HCPCS: Performed by: INTERNAL MEDICINE

## 2025-04-24 RX ORDER — HEPARIN 100 UNIT/ML
500 SYRINGE INTRAVENOUS
Status: DISCONTINUED | OUTPATIENT
Start: 2025-04-24 | End: 2025-04-24 | Stop reason: HOSPADM

## 2025-04-24 RX ORDER — SODIUM CHLORIDE 0.9 % (FLUSH) 0.9 %
10 SYRINGE (ML) INJECTION
Status: DISCONTINUED | OUTPATIENT
Start: 2025-04-24 | End: 2025-04-24 | Stop reason: HOSPADM

## 2025-04-24 RX ORDER — HEPARIN 100 UNIT/ML
500 SYRINGE INTRAVENOUS
OUTPATIENT
Start: 2025-04-24

## 2025-04-24 RX ORDER — SODIUM CHLORIDE 0.9 % (FLUSH) 0.9 %
10 SYRINGE (ML) INJECTION
OUTPATIENT
Start: 2025-04-24

## 2025-04-24 RX ADMIN — HEPARIN 500 UNITS: 100 SYRINGE at 08:04

## 2025-04-24 RX ADMIN — SODIUM CHLORIDE, PRESERVATIVE FREE 10 ML: 5 INJECTION INTRAVENOUS at 08:04

## 2025-04-24 NOTE — PLAN OF CARE
Problem: Fatigue  Goal: Improved Activity Tolerance  Outcome: Progressing  Intervention: Promote Improved Energy  Flowsheets (Taken 4/24/2025 2687)  Fatigue Management: frequent rest breaks encouraged

## 2025-05-01 ENCOUNTER — HOSPITAL ENCOUNTER (EMERGENCY)
Facility: HOSPITAL | Age: 50
Discharge: HOME | End: 2025-05-01
Attending: EMERGENCY MEDICINE
Payer: COMMERCIAL

## 2025-05-01 VITALS
SYSTOLIC BLOOD PRESSURE: 124 MMHG | BODY MASS INDEX: 27.05 KG/M2 | HEIGHT: 62 IN | DIASTOLIC BLOOD PRESSURE: 62 MMHG | WEIGHT: 147 LBS | HEART RATE: 103 BPM | OXYGEN SATURATION: 100 % | RESPIRATION RATE: 15 BRPM | TEMPERATURE: 98 F

## 2025-05-01 DIAGNOSIS — K52.9 GASTROENTERITIS: Primary | ICD-10-CM

## 2025-05-01 DIAGNOSIS — E86.0 DEHYDRATION: ICD-10-CM

## 2025-05-01 DIAGNOSIS — A08.4 VIRAL GASTROENTERITIS: ICD-10-CM

## 2025-05-01 LAB
ABSOLUTE EOSINOPHIL (SMH): 0.08 K/UL
ABSOLUTE MONOCYTE (SMH): 1.48 K/UL (ref 0.3–1)
ABSOLUTE NEUTROPHIL COUNT (SMH): 5 K/UL (ref 1.8–7.7)
ALBUMIN SERPL-MCNC: 4.4 G/DL (ref 3.5–5.2)
ALLENS TEST: ABNORMAL
ALP SERPL-CCNC: 94 UNIT/L (ref 55–135)
ALT SERPL-CCNC: 17 UNIT/L (ref 10–44)
ANION GAP (SMH): 22 MMOL/L (ref 8–16)
AST SERPL-CCNC: 29 UNIT/L (ref 10–40)
BASOPHILS # BLD AUTO: 0.06 K/UL
BASOPHILS NFR BLD AUTO: 0.7 %
BILIRUB SERPL-MCNC: 1.2 MG/DL (ref 0.1–1)
BILIRUB UR QL STRIP.AUTO: NEGATIVE
BUN SERPL-MCNC: 19 MG/DL (ref 6–20)
CALCIUM SERPL-MCNC: 10.1 MG/DL (ref 8.7–10.5)
CHLORIDE SERPL-SCNC: 98 MMOL/L (ref 95–110)
CLARITY UR: CLEAR
CO2 SERPL-SCNC: 11 MMOL/L (ref 23–29)
COLOR UR AUTO: YELLOW
CREAT SERPL-MCNC: 0.8 MG/DL (ref 0.5–1.4)
DELSYS: ABNORMAL
ERYTHROCYTE [DISTWIDTH] IN BLOOD BY AUTOMATED COUNT: 12.3 % (ref 11.5–14.5)
GFR SERPLBLD CREATININE-BSD FMLA CKD-EPI: >60 ML/MIN/1.73/M2
GLUCOSE SERPL-MCNC: 100 MG/DL (ref 70–110)
GLUCOSE SERPL-MCNC: 48 MG/DL (ref 70–110)
GLUCOSE UR QL STRIP: ABNORMAL
HCO3 UR-SCNC: 19.8 MMOL/L (ref 24–28)
HCT VFR BLD AUTO: 40.7 % (ref 37–48.5)
HCT VFR BLD CALC: 35 %PCV (ref 36–54)
HCV AB SERPL QL IA: NORMAL
HGB BLD-MCNC: 15 GM/DL (ref 12–16)
HGB UR QL STRIP: ABNORMAL
HIV 1+2 AB+HIV1 P24 AG SERPL QL IA: NORMAL
HYALINE CASTS UR QL AUTO: 73 /LPF (ref 0–1)
IMM GRANULOCYTES # BLD AUTO: 0.03 K/UL (ref 0–0.04)
IMM GRANULOCYTES NFR BLD AUTO: 0.3 % (ref 0–0.5)
INFLUENZA A MOLECULAR (OHS): NEGATIVE
INFLUENZA B MOLECULAR (OHS): NEGATIVE
KETONES UR QL STRIP: ABNORMAL
LEUKOCYTE ESTERASE UR QL STRIP: NEGATIVE
LIPASE SERPL-CCNC: 32 U/L (ref 4–60)
LYMPHOCYTES # BLD AUTO: 1.91 K/UL (ref 1–4.8)
MCH RBC QN AUTO: 31.3 PG (ref 27–31)
MCHC RBC AUTO-ENTMCNC: 36.9 G/DL (ref 32–36)
MCV RBC AUTO: 85 FL (ref 82–98)
MICROSCOPIC COMMENT: ABNORMAL
NITRITE UR QL STRIP: NEGATIVE
NUCLEATED RBC (/100WBC) (SMH): 0 /100 WBC
OHS QRS DURATION: 70 MS
OHS QTC CALCULATION: 458 MS
PCO2 BLDA: 32.5 MMHG (ref 35–45)
PH SMN: 7.39 [PH] (ref 7.35–7.45)
PH UR STRIP: 6 [PH]
PLATELET # BLD AUTO: 215 K/UL (ref 150–450)
PMV BLD AUTO: 10.3 FL (ref 9.2–12.9)
PO2 BLDA: 24 MMHG (ref 40–60)
POC BE: -5 MMOL/L (ref -2–2)
POC IONIZED CALCIUM: 1.18 MMOL/L (ref 1.06–1.42)
POC SATURATED O2: 42 % (ref 95–100)
POC TCO2: 21 MMOL/L (ref 24–29)
POCT GLUCOSE: 48 MG/DL (ref 70–110)
POTASSIUM BLD-SCNC: 4 MMOL/L (ref 3.5–5.1)
POTASSIUM SERPL-SCNC: 3.7 MMOL/L (ref 3.5–5.1)
PROT SERPL-MCNC: 7.8 GM/DL (ref 6–8.4)
PROT UR QL STRIP: ABNORMAL
RBC # BLD AUTO: 4.79 M/UL (ref 4–5.4)
RBC #/AREA URNS AUTO: 35 /HPF
RELATIVE EOSINOPHIL (SMH): 0.9 % (ref 0–8)
RELATIVE LYMPHOCYTE (SMH): 22.2 % (ref 18–48)
RELATIVE MONOCYTE (SMH): 17.2 % (ref 4–15)
RELATIVE NEUTROPHIL (SMH): 58.7 % (ref 38–73)
SAMPLE: ABNORMAL
SARS-COV-2 RDRP RESP QL NAA+PROBE: NEGATIVE
SITE: ABNORMAL
SODIUM BLD-SCNC: 133 MMOL/L (ref 136–145)
SODIUM SERPL-SCNC: 131 MMOL/L (ref 136–145)
SP GR UR STRIP: >=1.03
UROBILINOGEN UR STRIP-ACNC: NEGATIVE EU/DL
WBC # BLD AUTO: 8.59 K/UL (ref 3.9–12.7)
WBC #/AREA URNS AUTO: 1 /HPF

## 2025-05-01 PROCEDURE — 63600175 PHARM REV CODE 636 W HCPCS: Performed by: NURSE PRACTITIONER

## 2025-05-01 PROCEDURE — 25000003 PHARM REV CODE 250

## 2025-05-01 PROCEDURE — 82330 ASSAY OF CALCIUM: CPT

## 2025-05-01 PROCEDURE — 81003 URINALYSIS AUTO W/O SCOPE: CPT | Performed by: NURSE PRACTITIONER

## 2025-05-01 PROCEDURE — 36415 COLL VENOUS BLD VENIPUNCTURE: CPT | Performed by: NURSE PRACTITIONER

## 2025-05-01 PROCEDURE — 85014 HEMATOCRIT: CPT

## 2025-05-01 PROCEDURE — 96374 THER/PROPH/DIAG INJ IV PUSH: CPT

## 2025-05-01 PROCEDURE — 93010 ELECTROCARDIOGRAM REPORT: CPT | Mod: ,,, | Performed by: GENERAL PRACTICE

## 2025-05-01 PROCEDURE — 93005 ELECTROCARDIOGRAM TRACING: CPT | Performed by: GENERAL PRACTICE

## 2025-05-01 PROCEDURE — 99900035 HC TECH TIME PER 15 MIN (STAT)

## 2025-05-01 PROCEDURE — 84132 ASSAY OF SERUM POTASSIUM: CPT

## 2025-05-01 PROCEDURE — 96361 HYDRATE IV INFUSION ADD-ON: CPT

## 2025-05-01 PROCEDURE — U0002 COVID-19 LAB TEST NON-CDC: HCPCS | Performed by: NURSE PRACTITIONER

## 2025-05-01 PROCEDURE — 84295 ASSAY OF SERUM SODIUM: CPT | Mod: 91

## 2025-05-01 PROCEDURE — 87502 INFLUENZA DNA AMP PROBE: CPT | Performed by: EMERGENCY MEDICINE

## 2025-05-01 PROCEDURE — 99285 EMERGENCY DEPT VISIT HI MDM: CPT | Mod: 25

## 2025-05-01 PROCEDURE — 87389 HIV-1 AG W/HIV-1&-2 AB AG IA: CPT | Performed by: EMERGENCY MEDICINE

## 2025-05-01 PROCEDURE — 84295 ASSAY OF SERUM SODIUM: CPT

## 2025-05-01 PROCEDURE — 82803 BLOOD GASES ANY COMBINATION: CPT

## 2025-05-01 PROCEDURE — 86803 HEPATITIS C AB TEST: CPT | Performed by: EMERGENCY MEDICINE

## 2025-05-01 PROCEDURE — 25500020 PHARM REV CODE 255: Performed by: EMERGENCY MEDICINE

## 2025-05-01 PROCEDURE — 87040 BLOOD CULTURE FOR BACTERIA: CPT | Performed by: NURSE PRACTITIONER

## 2025-05-01 PROCEDURE — 80053 COMPREHEN METABOLIC PANEL: CPT | Performed by: NURSE PRACTITIONER

## 2025-05-01 PROCEDURE — 85025 COMPLETE CBC W/AUTO DIFF WBC: CPT | Performed by: NURSE PRACTITIONER

## 2025-05-01 PROCEDURE — 96375 TX/PRO/DX INJ NEW DRUG ADDON: CPT

## 2025-05-01 PROCEDURE — 63600175 PHARM REV CODE 636 W HCPCS: Performed by: EMERGENCY MEDICINE

## 2025-05-01 PROCEDURE — 82962 GLUCOSE BLOOD TEST: CPT

## 2025-05-01 PROCEDURE — 83690 ASSAY OF LIPASE: CPT | Performed by: NURSE PRACTITIONER

## 2025-05-01 RX ORDER — ONDANSETRON 4 MG/1
4 TABLET, FILM COATED ORAL EVERY 6 HOURS
Qty: 12 TABLET | Refills: 0 | Status: SHIPPED | OUTPATIENT
Start: 2025-05-01

## 2025-05-01 RX ORDER — ONDANSETRON HYDROCHLORIDE 2 MG/ML
4 INJECTION, SOLUTION INTRAVENOUS
Status: COMPLETED | OUTPATIENT
Start: 2025-05-01 | End: 2025-05-01

## 2025-05-01 RX ORDER — DEXTROSE 50 % IN WATER (D50W) INTRAVENOUS SYRINGE
25
Status: COMPLETED | OUTPATIENT
Start: 2025-05-01 | End: 2025-05-01

## 2025-05-01 RX ORDER — SODIUM CHLORIDE, SODIUM LACTATE, POTASSIUM CHLORIDE, CALCIUM CHLORIDE 600; 310; 30; 20 MG/100ML; MG/100ML; MG/100ML; MG/100ML
1000 INJECTION, SOLUTION INTRAVENOUS
Status: COMPLETED | OUTPATIENT
Start: 2025-05-01 | End: 2025-05-01

## 2025-05-01 RX ORDER — PANTOPRAZOLE SODIUM 40 MG/10ML
40 INJECTION, POWDER, LYOPHILIZED, FOR SOLUTION INTRAVENOUS
Status: COMPLETED | OUTPATIENT
Start: 2025-05-01 | End: 2025-05-01

## 2025-05-01 RX ADMIN — SODIUM CHLORIDE, POTASSIUM CHLORIDE, SODIUM LACTATE AND CALCIUM CHLORIDE 1000 ML: 600; 310; 30; 20 INJECTION, SOLUTION INTRAVENOUS at 06:05

## 2025-05-01 RX ADMIN — SODIUM CHLORIDE, POTASSIUM CHLORIDE, SODIUM LACTATE AND CALCIUM CHLORIDE 1000 ML: 600; 310; 30; 20 INJECTION, SOLUTION INTRAVENOUS at 05:05

## 2025-05-01 RX ADMIN — DEXTROSE 50 % IN WATER (D50W) INTRAVENOUS SYRINGE 25 G: at 05:05

## 2025-05-01 RX ADMIN — PANTOPRAZOLE SODIUM 40 MG: 40 INJECTION, POWDER, FOR SOLUTION INTRAVENOUS at 06:05

## 2025-05-01 RX ADMIN — IOHEXOL 100 ML: 350 INJECTION, SOLUTION INTRAVENOUS at 06:05

## 2025-05-01 RX ADMIN — ONDANSETRON 4 MG: 2 INJECTION INTRAMUSCULAR; INTRAVENOUS at 05:05

## 2025-05-01 RX ADMIN — DEXTROSE MONOHYDRATE 25 G: 25 INJECTION, SOLUTION INTRAVENOUS at 05:05

## 2025-05-01 NOTE — FIRST PROVIDER EVALUATION
Emergency Department TeleTriage Encounter Note      CHIEF COMPLAINT    Chief Complaint   Patient presents with    Vomiting    Diarrhea     Pt c/o vomiting and diarrhea x 2 days. Cough and headache x 3 days.        VITAL SIGNS   Initial Vitals [05/01/25 1605]   BP Pulse Resp Temp SpO2   103/73 (!) 119 18 98.1 °F (36.7 °C) 100 %      MAP       --            ALLERGIES    Review of patient's allergies indicates:   Allergen Reactions    Taxol [paclitaxel] Rash       PROVIDER TRIAGE NOTE  This is a teletriage evaluation of a 50 y.o. female presenting to the ED complaining of n/v/d with abd pain for two days. Associated cough and headache. No hematemesis or rectal bleeding.    Alert, sitting upright, appears ill.     Initial orders will be placed and care will be transferred to an alternate provider when patient is roomed for a full evaluation. Any additional orders and the final disposition will be determined by that provider.         ORDERS  Labs Reviewed   HEPATITIS C ANTIBODY   HIV 1 / 2 ANTIBODY   CBC W/ AUTO DIFFERENTIAL    Narrative:     The following orders were created for panel order CBC W/ AUTO DIFFERENTIAL.  Procedure                               Abnormality         Status                     ---------                               -----------         ------                     CBC with Differential[[id number]]                                                        Please view results for these tests on the individual orders.   COMPREHENSIVE METABOLIC PANEL   LIPASE   URINALYSIS, REFLEX TO URINE CULTURE   SARS-COV-2 RNA AMPLIFICATION, QUAL   CBC WITH DIFFERENTIAL   POCT INFLUENZA A/B MOLECULAR       ED Orders (720h ago, onward)      Start Ordered     Status Ordering Provider    05/01/25 1645 05/01/25 1639  ondansetron injection 4 mg  ED 1 Time         Ordered SUMA AGUILAR NNydia    05/01/25 1645 05/01/25 1639  lactated ringers infusion  ED 1 Time         Ordered SUMA AGUILAR NNydia    05/01/25  1640 05/01/25 1639  Vital signs  Every 2 hours         Ordered YARELIE, SUMA N.    05/01/25 1640 05/01/25 1639  POCT Influenza A/B Molecular  Once         Ordered YARELIE, SUMA N.    05/01/25 1640 05/01/25 1639  COVID-19 Rapid Screening  STAT         Ordered YARELIKRIS, SUMA N.    05/01/25 1639 05/01/25 1639  Diet NPO  Diet effective now         Ordered SCHMIGUELTTE, SUMA N.    05/01/25 1639 05/01/25 1639  Insert peripheral IV  Once         Ordered YARELIE, SUMA N.    05/01/25 1639 05/01/25 1639  CBC W/ AUTO DIFFERENTIAL  STAT         Ordered YARELIKRIS SUMA N.    05/01/25 1639 05/01/25 1639  Comp. Metabolic Panel  STAT         Ordered EANJOHN SUMA N.    05/01/25 1639 05/01/25 1639  Lipase  STAT         Ordered YARELIE, SUMA N.    05/01/25 1639 05/01/25 1639  Urinalysis, Reflex to Urine Culture Urine, Clean Catch  STAT         Ordered YARELIKRIS SUMA N.    05/01/25 1639 05/01/25 1639  CBC with Differential  PROCEDURE ONCE         Ordered YARELIKRIS SUMA N.    05/01/25 1607 05/01/25 1606  Hepatitis C Antibody  STAT         Ordered AUREA ESCUDERO    05/01/25 1607 05/01/25 1606  HIV 1/2 Ag/Ab (4th Gen)  STAT         Ordered AUREA ESCUDERO              Virtual Visit Note: The provider triage portion of this emergency department evaluation and documentation was performed via [employer], a HIPAA-compliant telemedicine application, in concert with a tele-presenter in the room. A face to face patient evaluation with one of my colleagues will occur once the patient is placed in an emergency department room.      DISCLAIMER: This note was prepared with flux - [employer] voice recognition transcription software. Garbled syntax, mangled pronouns, and other bizarre constructions may be attributed to that software system.

## 2025-05-01 NOTE — ED PROVIDER NOTES
Encounter Date: 5/1/2025       History     Chief Complaint   Patient presents with    Vomiting    Diarrhea     Pt c/o vomiting and diarrhea x 2 days. Cough and headache x 3 days.      50-year-old female with previous history of breast cancer status post bilateral mastectomy and reconstruction with tissue expander placement on 03/17/2023.  Currently completed chemo and radiation therapy.  History of anxiety, gastroesophageal reflux.  Patient presents emergency department with 2 day history of nausea vomiting diarrhea generalized abdominal pain.  Patient states has not been able to keep anything down.  Has had generalized weakness as well.  Positive ill contact  with similar symptoms.  In emergency department patient found to be weak slightly diaphoretic.  Found with blood glucose of 66.  Was given half amp D50 in emergency department.  Denies fever, no chest pain, denies any other constitutional symptoms.  Last emesis was just prior to arriving to the emergency department.      Review of patient's allergies indicates:   Allergen Reactions    Taxol [paclitaxel] Rash     Past Medical History:   Diagnosis Date    Anxiety     Breast cancer 07/2022    GERD (gastroesophageal reflux disease)      Past Surgical History:   Procedure Laterality Date    BILATERAL MASTECTOMY Bilateral 03/17/2023    lymph node dissection left    BREAST BIOPSY Left 07/2022    ESOPHAGOGASTRODUODENOSCOPY N/A 8/1/2024    Procedure: EGD (ESOPHAGOGASTRODUODENOSCOPY);  Surgeon: Freddy Castañeda MD;  Location: [facility];  Service: Endoscopy;  Laterality: N/A;    eye lid surgery Bilateral 1990    INSERTION OF TUNNELED CENTRAL VENOUS CATHETER (CVC) WITH SUBCUTANEOUS PORT Left 11/21/2022    Procedure: [id number];  Surgeon: Oscar Murphy III, MD;  Location: [facility];  Service: General;  Laterality: Left;    NOSE SURGERY  1990    PORTACATH PLACEMENT  08/2022    [facility]    TONSILLECTOMY  1990    TUBAL LIGATION  2013     Family  History   Problem Relation Name Age of Onset    Breast cancer Maternal Grandmother      Prostate cancer Maternal Grandfather      Colon cancer Neg Hx       Social History[1]  Review of Systems   Constitutional:  Positive for fatigue. Negative for fever.   HENT:  Negative for sore throat.    Respiratory:  Negative for shortness of breath.    Cardiovascular:  Negative for chest pain.   Gastrointestinal:  Positive for diarrhea, nausea and vomiting.   Genitourinary:  Negative for dysuria.   Musculoskeletal:  Negative for back pain.   Skin:  Negative for rash.   Neurological:  Negative for weakness.   Hematological:  Does not bruise/bleed easily.       Physical Exam     Initial Vitals [05/01/25 1605]   BP Pulse Resp Temp SpO2   103/73 (!) 119 18 98.1 °F (36.7 °C) 100 %      MAP       --         Physical Exam    Nursing note and vitals reviewed.  Constitutional: She appears well-developed and well-nourished.   HENT:   Head: Normocephalic and atraumatic.   Nose: Nose normal. Mouth/Throat: Oropharynx is clear and moist.   Eyes: Conjunctivae and EOM are normal. Pupils are equal, round, and reactive to light.   Neck: Neck supple. No thyromegaly present. No tracheal deviation present.   Normal range of motion.  Cardiovascular:  Normal rate, regular rhythm, normal heart sounds and intact distal pulses.     Exam reveals no gallop and no friction rub.       No murmur heard.  Pulmonary/Chest: No stridor. No respiratory distress.   Course bilateral breath sounds no adventitious sounds   Abdominal: Abdomen is soft. Bowel sounds are normal. She exhibits no mass.   Mild diffuse abdominal tenderness more localized midepigastric region. There is no rebound and no guarding.   Musculoskeletal:         General: No edema. Normal range of motion.      Cervical back: Normal range of motion and neck supple.     Lymphadenopathy:     She has no cervical adenopathy.   Neurological: She is alert and oriented to person, place, and time. She has  normal strength and normal reflexes. GCS score is 15. GCS eye subscore is 4. GCS verbal subscore is 5. GCS motor subscore is 6.   Skin: Skin is warm and dry. Capillary refill takes less than 2 seconds.   Psychiatric: She has a normal mood and affect.         ED Course   Procedures  Labs Reviewed   COMPREHENSIVE METABOLIC PANEL - Abnormal       Result Value    Sodium 131 (*)     Potassium 3.7      Chloride 98      CO2 11 (*)     Glucose 48 (*)     BUN 19      Creatinine 0.8      Calcium 10.1      Protein Total 7.8      Albumin 4.4      Bilirubin Total 1.2 (*)     ALP 94      AST 29      ALT 17      Anion Gap 22 (*)     eGFR >60     URINALYSIS, REFLEX TO URINE CULTURE - Abnormal    Color, UA Yellow      Appearance, UA Clear      Spec Grav UA >=1.030 (*)     pH, UA 6.0      Protein, UA Trace (*)     Glucose, UA Trace (*)     Ketones, UA 3+ (*)     Blood, UA 2+ (*)     Bilirubin, UA Negative      Urobilinogen, UA Negative      Nitrites, UA Negative      Leukocyte Esterase, UA Negative     CBC WITH DIFFERENTIAL - Abnormal    WBC 8.59      RBC 4.79      Hgb 15.0      Hct 40.7      MCV 85      MCH 31.3 (*)     MCHC 36.9 (*)     RDW 12.3      Platelet Count 215      MPV 10.3      Nucleated RBC 0      Neut % 58.7      Lymph % 22.2      Mono % 17.2 (*)     Eos % 0.9      Basophil % 0.7      Imm Grans % 0.3      Neut # 5.0      Lymph # 1.91      Mono # 1.48 (*)     Eos # 0.08      Baso # 0.06      Imm Grans # 0.03     URINALYSIS MICROSCOPIC - Abnormal    RBC, UA 35 (*)     WBC, UA 1      Hyaline Casts, UA 73 (*)     Microscopic Comment       POCT GLUCOSE - Abnormal    POCT Glucose 48 (*)    ISTAT PROCEDURE - Abnormal    POC PH 7.394      POC PCO2 32.5 (*)     POC PO2 24 (*)     POC HCO3 19.8 (*)     POC BE -5 (*)     POC SATURATED O2 42      POC Glucose 100      POC Sodium 133 (*)     POC Potassium 4.0      POC TCO2 21 (*)     POC Ionized Calcium 1.18      POC Hematocrit 35 (*)     Sample VENOUS      Site [place]  Test N/A      DelSys Room Air     INFLUENZA A & B BY MOLECULAR - Normal    INFLUENZA A MOLECULAR Negative      INFLUENZA B MOLECULAR  Negative     HEPATITIS C ANTIBODY - Normal    Hep C Ab Interp Non-Reactive     HIV 1 / 2 ANTIBODY - Normal    HIV 1/2 Ag/Ab Non-Reactive     LIPASE - Normal    Lipase Level 32     SARS-COV-2 RNA AMPLIFICATION, QUAL - Normal    SARS COV-2 Molecular Negative     CULTURE, BLOOD   CULTURE, BLOOD   CBC W/ AUTO DIFFERENTIAL    Narrative:     The following orders were created for panel order CBC W/ AUTO DIFFERENTIAL.  Procedure                               Abnormality         Status                     ---------                               -----------         ------                     CBC with Differential[[id number]]       Abnormal            Final result                 Please view results for these tests on the individual orders.   POCT GLUCOSE MONITORING CONTINUOUS        ECG Results              EKG 12-lead (Final result)        Collection Time Result Time QRS Duration OHS QTC Calculation    05/01/25 16:10:46 05/01/25 21:44:48 70 458                     Final result by Interface, Lab In [facility] (05/01/25 21:44:57)                   Narrative:    Test Reason : R00.0,    Vent. Rate : 112 BPM     Atrial Rate : 112 BPM     P-R Int : 116 ms          QRS Dur :  70 ms      QT Int : 336 ms       P-R-T Axes :  70  67  44 degrees    QTcB Int : 458 ms    Sinus tachycardia  T wave abnormality, consider anterolateral ischemia  Abnormal ECG  When compared with ECG of 02-Mar-2023 14:45,  T wave inversion now evident in Anterior-lateral leads  Confirmed by Jeronimo Varner ([id number]) on 5/1/2025 9:44:47 PM    Referred By: EVERT MARTINEZ           Confirmed By: Jeronimo Varner                                  Imaging Results              CT Abdomen Pelvis With IV Contrast NO Oral Contrast (Final result)  Result time 05/01/25 19:38:24      Final result by Randell Arroyo MD (05/01/25 19:38:24)                    Impression:      Mild wall thickening of the distal aspect of the stomach.  The findings may represent gastritis.    Colonic diverticula without evidence of acute diverticulitis.    Bronchiectasis in the lingula along with ill-defined opacities in the lingula.    Additional findings as above.      Electronically signed by: Randell Arroyo MD  Date:    05/01/2025  Time:    19:38               Narrative:    EXAMINATION:  CT ABDOMEN PELVIS WITH IV CONTRAST    CLINICAL HISTORY:  Abdominal pain;    TECHNIQUE:  Low dose axial images, sagittal and coronal reformations were obtained from the lung bases to the pubic symphysis following the IV administration of 100 mL of Omnipaque 350 .  Oral contrast was not given.    COMPARISON:  CT dated 03/02/2023.    FINDINGS:  There are no pleural effusions.  There is no evidence of a pneumothorax.  There is no evidence of pneumomediastinum.  There is bronchiectasis in the lingula.  There also ill-defined opacities in the lingula.    The heart is unremarkable.  There is normal tapering of the abdominal aorta.  There is mild wall thickening of the abdominal aorta.  The celiac trunk, SMA, and MARIANO are within normal limits.  The portal veins and mesenteric veins are within normal limits.  The IVC and the remainder of the venous structures are within normal limits.    The esophagus is unremarkable.  There is mild wall thickening of the distal portions of the stomach.  The duodenum is within normal limits.  The small bowel loops are unremarkable.  The appendix is unremarkable.  The large bowel is within normal limits.    There is an unchanged 1.8 cm hypodensity in the left hepatic lobe.  The remainder of the liver is unremarkable.  The gallbladder is within normal limits.  The biliary tree is unremarkable.  The spleen is unremarkable.  The pancreas is within normal limits.  The adrenal glands are unremarkable.    The kidneys are within normal limits.  The ureters and urinary bladder are  unremarkable.  The uterus is within normal limits.    There is no evidence of free fluid in the abdomen or pelvis.  There is no evidence of free air.  There is no evidence of pneumatosis.  No portal venous air is identified.    The changes of bilateral breast augmentation.  The remainder the abdominal wall is unremarkable.    The osseous structures are stable.  There is no destructive osseous lesion.                                       Medications   ondansetron injection 4 mg (4 mg Intravenous Given 5/1/25 1717)   lactated ringers infusion (0 mLs Intravenous Stopped 5/1/25 2133)   dextrose 50 % in water (D50W) injection 25 g (25 g Intravenous Given 5/1/25 1750)   lactated ringers bolus 1,000 mL (0 mLs Intravenous Stopped 5/1/25 1920)   pantoprazole injection 40 mg (40 mg Intravenous Given 5/1/25 1824)   iohexoL (OMNIPAQUE 350) injection 100 mL (100 mLs Intravenous Given 5/1/25 1854)     Medical Decision Making  Amount and/or Complexity of Data Reviewed  Radiology: ordered.    Risk  Prescription drug management.               ED Course as of 05/01/25 2223   Thu May 01, 2025   2052 Patient seen evaluated emergency department.  Currently at this time patient did undergo IV hydration emergency department.  Patient did have marked improvement in overall clinical status.  Repeat examination of her abdomen was found to be soft, no rebound or guarding was noted.  Patient was able to tolerate p.o..  Initially had low blood sugar of 48 given half amp D50.  Patient repeat Accu-Chek found to be 135.  Was found to be COVID flu negative.  Urinalysis shows specific gravity of 10 30 with 3+ ketone urea.  Otherwise no evidence of acute infection noted.  White count 8000, H and H was 15 and 40 platelets of 215.  CMP initially had bicarb of 11 with anion gap of 22.  Patient after IV hydration did have improvement in bicarb to 19.8 with base excess -5.  PH found to be at 7.394.  Currently this time patient will be discharged with  Zofran to take every 4-6 hours as needed for nausea vomiting.  Given detailed return precautions.  To follow with the primary care provider this week. [RM]      ED Course User Index  [RM] Trace Wlil MD               Medical Decision Making:   Initial Assessment:   50-year-old female with previous history of breast cancer status post bilateral mastectomy and reconstruction with tissue expander placement on 03/17/2023.  Currently completed chemo and radiation therapy.  History of anxiety, gastroesophageal reflux.  Patient presents emergency department with 2 day history of nausea vomiting diarrhea generalized abdominal pain.  Patient states has not been able to keep anything down.  Has had generalized weakness as well.  Positive ill contact  with similar symptoms.  In emergency department patient found to be weak slightly diaphoretic.  Found with blood glucose of 66.  Was given half amp D50 in emergency department.  Denies fever, no chest pain, denies any other constitutional symptoms.  Last emesis was just prior to arriving to the emergency department.    Clinical Tests:   Lab Tests: Ordered and Reviewed  Radiological Study: Ordered and Reviewed  Medical Tests: Ordered and Reviewed             Clinical Impression:  Final diagnoses:  [K52.9] Gastroenteritis (Primary)  [E86.0] Dehydration  [A08.4] Viral gastroenteritis          ED Disposition Condition    Discharge Stable          ED Prescriptions       Medication Sig Dispense Start Date End Date Auth. Provider    ondansetron (ZOFRAN) 4 MG tablet Take 1 tablet (4 mg total) by mouth every 6 (six) hours. 12 tablet 5/1/2025 -- Trace Will MD          Follow-up Information       Follow up With Specialties Details Why Contact Info    Valeria Newton, P [facility] Schedule an appointment as soon as possible for a visit in 1 week For recheck/continuing care [address] [place] [id number]  [phone]                 [1]   Social  History  Tobacco Use    Smoking status: Former    Tobacco comments:     Quit 2005-13 year history -1 daily   Substance Use Topics    Alcohol use: Not Currently     Comment: occasional    Drug use: Never        Trace Will MD  05/01/25 [id number]

## 2025-05-02 LAB — POCT GLUCOSE: 132 MG/DL (ref 70–110)

## 2025-05-02 NOTE — DISCHARGE INSTRUCTIONS
Drink plenty of fluids.  Return to emergency department if fever, problems persist worsens additional concerns.

## 2025-05-03 ENCOUNTER — HOSPITAL ENCOUNTER (OUTPATIENT)
Facility: HOSPITAL | Age: 50
Discharge: HOME | End: 2025-05-05
Attending: STUDENT IN AN ORGANIZED HEALTH CARE EDUCATION/TRAINING PROGRAM
Payer: COMMERCIAL

## 2025-05-03 DIAGNOSIS — J98.8 RESPIRATORY INFECTION: ICD-10-CM

## 2025-05-03 DIAGNOSIS — R79.89 LOW SERUM BICARBONATE: ICD-10-CM

## 2025-05-03 DIAGNOSIS — R79.89 ELEVATED D-DIMER: ICD-10-CM

## 2025-05-03 DIAGNOSIS — R07.9 CHEST PAIN: ICD-10-CM

## 2025-05-03 DIAGNOSIS — E83.42 HYPOMAGNESEMIA: Primary | ICD-10-CM

## 2025-05-03 DIAGNOSIS — J18.9 PNEUMONIA OF LEFT UPPER LOBE DUE TO INFECTIOUS ORGANISM: ICD-10-CM

## 2025-05-03 DIAGNOSIS — E87.1 HYPONATREMIA: ICD-10-CM

## 2025-05-03 DIAGNOSIS — E87.6 HYPOKALEMIA: ICD-10-CM

## 2025-05-03 PROBLEM — D64.9 ANEMIA: Status: ACTIVE | Noted: 2025-05-03

## 2025-05-03 LAB
ABSOLUTE EOSINOPHIL (SMH): 0.18 K/UL
ABSOLUTE MONOCYTE (SMH): 0.67 K/UL (ref 0.3–1)
ABSOLUTE NEUTROPHIL COUNT (SMH): 1.6 K/UL (ref 1.8–7.7)
ADENOVIRUS (SMH): NOT DETECTED
ALBUMIN SERPL-MCNC: 3.8 G/DL (ref 3.5–5.2)
ALLENS TEST: ABNORMAL
ALP SERPL-CCNC: 67 UNIT/L (ref 55–135)
ALT SERPL-CCNC: 22 UNIT/L (ref 10–44)
ANION GAP (SMH): 16 MMOL/L (ref 8–16)
AST SERPL-CCNC: 38 UNIT/L (ref 10–40)
BACTERIA #/AREA URNS AUTO: ABNORMAL /HPF
BASOPHILS # BLD AUTO: 0.02 K/UL
BASOPHILS NFR BLD AUTO: 0.5 %
BILIRUB SERPL-MCNC: 1.3 MG/DL (ref 0.1–1)
BILIRUB UR QL STRIP.AUTO: NEGATIVE
BNP SERPL-MCNC: 36 PG/ML
BORDETELLA PARAPERTUSSIS (IS1001) (SMH): NOT DETECTED
BORDETELLA PERTUSSIS (PTXP) (SMH): NOT DETECTED
BUN SERPL-MCNC: 6 MG/DL (ref 6–20)
CALCIUM SERPL-MCNC: 8.8 MG/DL (ref 8.7–10.5)
CHLAMYDIA PNEUMONIAE (SMH): NOT DETECTED
CHLORIDE SERPL-SCNC: 97 MMOL/L (ref 95–110)
CK SERPL-CCNC: 247 U/L (ref 20–180)
CLARITY UR: CLEAR
CO2 SERPL-SCNC: 16 MMOL/L (ref 23–29)
COLOR UR AUTO: YELLOW
CORONAVIRUS 229E, COMMON COLD VIRUS (SMH): NOT DETECTED
CORONAVIRUS HKU1, COMMON COLD VIRUS (SMH): NOT DETECTED
CORONAVIRUS NL63, COMMON COLD VIRUS (SMH): NOT DETECTED
CORONAVIRUS OC43, COMMON COLD VIRUS (SMH): DETECTED
CREAT SERPL-MCNC: 0.6 MG/DL (ref 0.5–1.4)
D DIMER PPP IA.FEU-MCNC: 0.53 MG/L FEU
DELSYS: ABNORMAL
ERYTHROCYTE [DISTWIDTH] IN BLOOD BY AUTOMATED COUNT: 12.1 % (ref 11.5–14.5)
FLUBV RNA NPH QL NAA+NON-PROBE: NOT DETECTED
GFR SERPLBLD CREATININE-BSD FMLA CKD-EPI: >60 ML/MIN/1.73/M2
GLUCOSE SERPL-MCNC: 74 MG/DL (ref 70–110)
GLUCOSE UR QL STRIP: NEGATIVE
HCO3 UR-SCNC: 17.1 MMOL/L (ref 24–28)
HCT VFR BLD AUTO: 31.6 % (ref 37–48.5)
HGB BLD-MCNC: 11.5 GM/DL (ref 12–16)
HGB UR QL STRIP: ABNORMAL
HPIV1 RNA NPH QL NAA+NON-PROBE: NOT DETECTED
HPIV2 RNA NPH QL NAA+NON-PROBE: NOT DETECTED
HPIV3 RNA NPH QL NAA+NON-PROBE: NOT DETECTED
HPIV4 RNA NPH QL NAA+NON-PROBE: NOT DETECTED
HUMAN METAPNEUMOVIRUS (SMH): NOT DETECTED
HYALINE CASTS UR QL AUTO: 3 /LPF (ref 0–1)
IMM GRANULOCYTES # BLD AUTO: 0.01 K/UL (ref 0–0.04)
IMM GRANULOCYTES NFR BLD AUTO: 0.3 % (ref 0–0.5)
INFLUENZA A (SUBTYPES H1,H1-2009,H3) (SMH): NOT DETECTED
INFLUENZA A MOLECULAR (OHS): NEGATIVE
INFLUENZA B MOLECULAR (OHS): NEGATIVE
KETONES UR QL STRIP: ABNORMAL
LDH SERPL L TO P-CCNC: 1.24 MMOL/L (ref 0.5–2.2)
LEUKOCYTE ESTERASE UR QL STRIP: NEGATIVE
LYMPHOCYTES # BLD AUTO: 1.37 K/UL (ref 1–4.8)
MAGNESIUM SERPL-MCNC: 1.5 MG/DL (ref 1.6–2.6)
MCH RBC QN AUTO: 30.8 PG (ref 27–31)
MCHC RBC AUTO-ENTMCNC: 36.4 G/DL (ref 32–36)
MCV RBC AUTO: 85 FL (ref 82–98)
MICROSCOPIC COMMENT: ABNORMAL
MODE: ABNORMAL
MYCOPLASMA PNEUMONIAE (SMH): NOT DETECTED
NITRITE UR QL STRIP: NEGATIVE
NUCLEATED RBC (/100WBC) (SMH): 0 /100 WBC
OHS QRS DURATION: 76 MS
OHS QRS DURATION: 78 MS
OHS QTC CALCULATION: 495 MS
OHS QTC CALCULATION: 500 MS
PCO2 BLDA: 20.7 MMHG (ref 35–45)
PH SMN: 7.52 [PH] (ref 7.35–7.45)
PH UR STRIP: 6 [PH]
PLATELET # BLD AUTO: 170 K/UL (ref 150–450)
PMV BLD AUTO: 10.2 FL (ref 9.2–12.9)
PO2 BLDA: 18 MMHG (ref 40–60)
POC BE: -6 MMOL/L (ref -2–2)
POC SATURATED O2: 36 % (ref 95–100)
POC TCO2: 18 MMOL/L (ref 24–29)
POCT GLUCOSE: 68 MG/DL (ref 70–110)
POTASSIUM SERPL-SCNC: 3 MMOL/L (ref 3.5–5.1)
PROT SERPL-MCNC: 6.4 GM/DL (ref 6–8.4)
PROT UR QL STRIP: NEGATIVE
RBC # BLD AUTO: 3.73 M/UL (ref 4–5.4)
RBC #/AREA URNS AUTO: 3 /HPF
RELATIVE EOSINOPHIL (SMH): 4.7 % (ref 0–8)
RELATIVE LYMPHOCYTE (SMH): 36.1 % (ref 18–48)
RELATIVE MONOCYTE (SMH): 17.6 % (ref 4–15)
RELATIVE NEUTROPHIL (SMH): 40.8 % (ref 38–73)
RESPIRATORY INFECTION PANEL SOURCE (SMH): ABNORMAL
RSV RNA NPH QL NAA+NON-PROBE: NOT DETECTED
RV+EV RNA NPH QL NAA+NON-PROBE: NOT DETECTED
SALICYLATES SERPL-MCNC: <1.5 MG/DL (ref 15–30)
SAMPLE: ABNORMAL
SAMPLE: NORMAL
SARS-COV-2 RDRP RESP QL NAA+PROBE: NEGATIVE
SARS-COV-2 RNA RESP QL NAA+PROBE: NOT DETECTED
SITE: ABNORMAL
SODIUM SERPL-SCNC: 129 MMOL/L (ref 136–145)
SP GR UR STRIP: >=1.03
SP02: 100
TROPONIN HIGH SENSITIVE (SMH): 6.4 PG/ML
TROPONIN HIGH SENSITIVE (SMH): 6.6 PG/ML
UROBILINOGEN UR STRIP-ACNC: NEGATIVE EU/DL
WBC # BLD AUTO: 3.8 K/UL (ref 3.9–12.7)
WBC #/AREA URNS AUTO: 1 /HPF

## 2025-05-03 PROCEDURE — 82550 ASSAY OF CK (CPK): CPT | Performed by: STUDENT IN AN ORGANIZED HEALTH CARE EDUCATION/TRAINING PROGRAM

## 2025-05-03 PROCEDURE — G0378 HOSPITAL OBSERVATION PER HR: HCPCS

## 2025-05-03 PROCEDURE — 99900035 HC TECH TIME PER 15 MIN (STAT)

## 2025-05-03 PROCEDURE — 96376 TX/PRO/DX INJ SAME DRUG ADON: CPT

## 2025-05-03 PROCEDURE — 25000003 PHARM REV CODE 250: Performed by: STUDENT IN AN ORGANIZED HEALTH CARE EDUCATION/TRAINING PROGRAM

## 2025-05-03 PROCEDURE — 25500020 PHARM REV CODE 255: Performed by: STUDENT IN AN ORGANIZED HEALTH CARE EDUCATION/TRAINING PROGRAM

## 2025-05-03 PROCEDURE — 25000242 PHARM REV CODE 250 ALT 637 W/ HCPCS: Performed by: NURSE PRACTITIONER

## 2025-05-03 PROCEDURE — 80179 DRUG ASSAY SALICYLATE: CPT | Performed by: STUDENT IN AN ORGANIZED HEALTH CARE EDUCATION/TRAINING PROGRAM

## 2025-05-03 PROCEDURE — 63600175 PHARM REV CODE 636 W HCPCS: Performed by: STUDENT IN AN ORGANIZED HEALTH CARE EDUCATION/TRAINING PROGRAM

## 2025-05-03 PROCEDURE — 80053 COMPREHEN METABOLIC PANEL: CPT | Performed by: STUDENT IN AN ORGANIZED HEALTH CARE EDUCATION/TRAINING PROGRAM

## 2025-05-03 PROCEDURE — 63600175 PHARM REV CODE 636 W HCPCS: Performed by: NURSE PRACTITIONER

## 2025-05-03 PROCEDURE — 96365 THER/PROPH/DIAG IV INF INIT: CPT

## 2025-05-03 PROCEDURE — 83880 ASSAY OF NATRIURETIC PEPTIDE: CPT | Performed by: STUDENT IN AN ORGANIZED HEALTH CARE EDUCATION/TRAINING PROGRAM

## 2025-05-03 PROCEDURE — 85025 COMPLETE CBC W/AUTO DIFF WBC: CPT | Performed by: STUDENT IN AN ORGANIZED HEALTH CARE EDUCATION/TRAINING PROGRAM

## 2025-05-03 PROCEDURE — 82803 BLOOD GASES ANY COMBINATION: CPT

## 2025-05-03 PROCEDURE — 94640 AIRWAY INHALATION TREATMENT: CPT

## 2025-05-03 PROCEDURE — 25000003 PHARM REV CODE 250: Performed by: NURSE PRACTITIONER

## 2025-05-03 PROCEDURE — 87502 INFLUENZA DNA AMP PROBE: CPT | Performed by: STUDENT IN AN ORGANIZED HEALTH CARE EDUCATION/TRAINING PROGRAM

## 2025-05-03 PROCEDURE — 81003 URINALYSIS AUTO W/O SCOPE: CPT | Performed by: NURSE PRACTITIONER

## 2025-05-03 PROCEDURE — 99285 EMERGENCY DEPT VISIT HI MDM: CPT | Mod: 25

## 2025-05-03 PROCEDURE — 96367 TX/PROPH/DG ADDL SEQ IV INF: CPT

## 2025-05-03 PROCEDURE — 0202U NFCT DS 22 TRGT SARS-COV-2: CPT | Performed by: NURSE PRACTITIONER

## 2025-05-03 PROCEDURE — 96361 HYDRATE IV INFUSION ADD-ON: CPT

## 2025-05-03 PROCEDURE — 85379 FIBRIN DEGRADATION QUANT: CPT | Performed by: STUDENT IN AN ORGANIZED HEALTH CARE EDUCATION/TRAINING PROGRAM

## 2025-05-03 PROCEDURE — 93005 ELECTROCARDIOGRAM TRACING: CPT | Performed by: GENERAL PRACTICE

## 2025-05-03 PROCEDURE — 84484 ASSAY OF TROPONIN QUANT: CPT | Performed by: STUDENT IN AN ORGANIZED HEALTH CARE EDUCATION/TRAINING PROGRAM

## 2025-05-03 PROCEDURE — 93010 ELECTROCARDIOGRAM REPORT: CPT | Mod: ,,, | Performed by: GENERAL PRACTICE

## 2025-05-03 PROCEDURE — 94799 UNLISTED PULMONARY SVC/PX: CPT

## 2025-05-03 PROCEDURE — U0002 COVID-19 LAB TEST NON-CDC: HCPCS | Performed by: STUDENT IN AN ORGANIZED HEALTH CARE EDUCATION/TRAINING PROGRAM

## 2025-05-03 PROCEDURE — 83735 ASSAY OF MAGNESIUM: CPT | Performed by: STUDENT IN AN ORGANIZED HEALTH CARE EDUCATION/TRAINING PROGRAM

## 2025-05-03 PROCEDURE — 82962 GLUCOSE BLOOD TEST: CPT

## 2025-05-03 PROCEDURE — 99900031 HC PATIENT EDUCATION (STAT)

## 2025-05-03 PROCEDURE — 96375 TX/PRO/DX INJ NEW DRUG ADDON: CPT

## 2025-05-03 PROCEDURE — 94640 AIRWAY INHALATION TREATMENT: CPT | Mod: XB

## 2025-05-03 PROCEDURE — 94761 N-INVAS EAR/PLS OXIMETRY MLT: CPT | Mod: XB

## 2025-05-03 PROCEDURE — 96372 THER/PROPH/DIAG INJ SC/IM: CPT | Performed by: NURSE PRACTITIONER

## 2025-05-03 RX ORDER — CEFEPIME HYDROCHLORIDE 2 G/1
2 INJECTION, POWDER, FOR SOLUTION INTRAVENOUS
Status: DISCONTINUED | OUTPATIENT
Start: 2025-05-03 | End: 2025-05-05 | Stop reason: HOSPADM

## 2025-05-03 RX ORDER — MAGNESIUM SULFATE 1 G/100ML
1 INJECTION INTRAVENOUS ONCE
Status: COMPLETED | OUTPATIENT
Start: 2025-05-03 | End: 2025-05-03

## 2025-05-03 RX ORDER — LANOLIN ALCOHOL/MO/W.PET/CERES
800 CREAM (GRAM) TOPICAL
Status: DISCONTINUED | OUTPATIENT
Start: 2025-05-03 | End: 2025-05-05 | Stop reason: HOSPADM

## 2025-05-03 RX ORDER — SODIUM CHLORIDE 9 MG/ML
INJECTION, SOLUTION INTRAVENOUS CONTINUOUS
Status: ACTIVE | OUTPATIENT
Start: 2025-05-03 | End: 2025-05-03

## 2025-05-03 RX ORDER — GLUCAGON 1 MG
1 KIT INJECTION
Status: DISCONTINUED | OUTPATIENT
Start: 2025-05-03 | End: 2025-05-05 | Stop reason: HOSPADM

## 2025-05-03 RX ORDER — IPRATROPIUM BROMIDE AND ALBUTEROL SULFATE 2.5; .5 MG/3ML; MG/3ML
3 SOLUTION RESPIRATORY (INHALATION) EVERY 6 HOURS PRN
Status: DISCONTINUED | OUTPATIENT
Start: 2025-05-03 | End: 2025-05-05 | Stop reason: HOSPADM

## 2025-05-03 RX ORDER — NALOXONE HCL 0.4 MG/ML
0.02 VIAL (ML) INJECTION
Status: DISCONTINUED | OUTPATIENT
Start: 2025-05-03 | End: 2025-05-05 | Stop reason: HOSPADM

## 2025-05-03 RX ORDER — SODIUM,POTASSIUM PHOSPHATES 280-250MG
2 POWDER IN PACKET (EA) ORAL
Status: DISCONTINUED | OUTPATIENT
Start: 2025-05-03 | End: 2025-05-05 | Stop reason: HOSPADM

## 2025-05-03 RX ORDER — TRAMADOL HYDROCHLORIDE 50 MG/1
50 TABLET ORAL EVERY 6 HOURS PRN
Status: DISCONTINUED | OUTPATIENT
Start: 2025-05-03 | End: 2025-05-05 | Stop reason: HOSPADM

## 2025-05-03 RX ORDER — CEFEPIME HYDROCHLORIDE 1 G/1
1 INJECTION, POWDER, FOR SOLUTION INTRAMUSCULAR; INTRAVENOUS
Status: DISCONTINUED | OUTPATIENT
Start: 2025-05-03 | End: 2025-05-03

## 2025-05-03 RX ORDER — HYDROCODONE BITARTRATE AND ACETAMINOPHEN 5; 325 MG/1; MG/1
1 TABLET ORAL EVERY 6 HOURS PRN
Refills: 0 | Status: DISCONTINUED | OUTPATIENT
Start: 2025-05-03 | End: 2025-05-03

## 2025-05-03 RX ORDER — IBUPROFEN 200 MG
24 TABLET ORAL
Status: DISCONTINUED | OUTPATIENT
Start: 2025-05-03 | End: 2025-05-05 | Stop reason: HOSPADM

## 2025-05-03 RX ORDER — TALC
6 POWDER (GRAM) TOPICAL NIGHTLY PRN
Status: DISCONTINUED | OUTPATIENT
Start: 2025-05-03 | End: 2025-05-05 | Stop reason: HOSPADM

## 2025-05-03 RX ORDER — IBUPROFEN 200 MG
16 TABLET ORAL
Status: DISCONTINUED | OUTPATIENT
Start: 2025-05-03 | End: 2025-05-05 | Stop reason: HOSPADM

## 2025-05-03 RX ORDER — SODIUM CHLORIDE 0.9 % (FLUSH) 0.9 %
2 SYRINGE (ML) INJECTION
Status: DISCONTINUED | OUTPATIENT
Start: 2025-05-03 | End: 2025-05-05 | Stop reason: HOSPADM

## 2025-05-03 RX ORDER — CEFEPIME HYDROCHLORIDE 2 G/1
2 INJECTION, POWDER, FOR SOLUTION INTRAVENOUS
Status: COMPLETED | OUTPATIENT
Start: 2025-05-03 | End: 2025-05-03

## 2025-05-03 RX ORDER — ONDANSETRON HYDROCHLORIDE 2 MG/ML
4 INJECTION, SOLUTION INTRAVENOUS EVERY 6 HOURS PRN
Status: DISCONTINUED | OUTPATIENT
Start: 2025-05-03 | End: 2025-05-05 | Stop reason: HOSPADM

## 2025-05-03 RX ORDER — BUSPIRONE HYDROCHLORIDE 5 MG/1
5 TABLET ORAL EVERY MORNING
Status: DISCONTINUED | OUTPATIENT
Start: 2025-05-03 | End: 2025-05-05 | Stop reason: HOSPADM

## 2025-05-03 RX ORDER — ALUMINUM HYDROXIDE, MAGNESIUM HYDROXIDE, AND SIMETHICONE 1200; 120; 1200 MG/30ML; MG/30ML; MG/30ML
30 SUSPENSION ORAL 4 TIMES DAILY PRN
Status: DISCONTINUED | OUTPATIENT
Start: 2025-05-03 | End: 2025-05-05 | Stop reason: HOSPADM

## 2025-05-03 RX ORDER — ENOXAPARIN SODIUM 100 MG/ML
40 INJECTION SUBCUTANEOUS EVERY 24 HOURS
Status: DISCONTINUED | OUTPATIENT
Start: 2025-05-03 | End: 2025-05-05 | Stop reason: HOSPADM

## 2025-05-03 RX ORDER — FLUTICASONE PROPIONATE 50 MCG
1 SPRAY, SUSPENSION (ML) NASAL DAILY
Status: DISCONTINUED | OUTPATIENT
Start: 2025-05-03 | End: 2025-05-05 | Stop reason: HOSPADM

## 2025-05-03 RX ORDER — GUAIFENESIN 600 MG/1
600 TABLET, EXTENDED RELEASE ORAL 2 TIMES DAILY
Status: DISCONTINUED | OUTPATIENT
Start: 2025-05-03 | End: 2025-05-05 | Stop reason: HOSPADM

## 2025-05-03 RX ORDER — ACETAMINOPHEN 325 MG/1
650 TABLET ORAL EVERY 4 HOURS PRN
Status: DISCONTINUED | OUTPATIENT
Start: 2025-05-03 | End: 2025-05-05 | Stop reason: HOSPADM

## 2025-05-03 RX ADMIN — POTASSIUM BICARBONATE 50 MEQ: 978 TABLET, EFFERVESCENT ORAL at 12:05

## 2025-05-03 RX ADMIN — IPRATROPIUM BROMIDE AND ALBUTEROL SULFATE 3 ML: 2.5; .5 SOLUTION RESPIRATORY (INHALATION) at 01:05

## 2025-05-03 RX ADMIN — MAGNESIUM SULFATE IN DEXTROSE 1 G: 10 INJECTION, SOLUTION INTRAVENOUS at 10:05

## 2025-05-03 RX ADMIN — CEFEPIME 2 G: 2 INJECTION, POWDER, FOR SOLUTION INTRAVENOUS at 01:05

## 2025-05-03 RX ADMIN — FLUTICASONE PROPIONATE 50 MCG: 50 SPRAY, METERED NASAL at 03:05

## 2025-05-03 RX ADMIN — GUAIFENESIN 600 MG: 600 TABLET, EXTENDED RELEASE ORAL at 09:05

## 2025-05-03 RX ADMIN — SODIUM CHLORIDE: 9 INJECTION, SOLUTION INTRAVENOUS at 01:05

## 2025-05-03 RX ADMIN — CEFEPIME 2 G: 2 INJECTION, POWDER, FOR SOLUTION INTRAVENOUS at 09:05

## 2025-05-03 RX ADMIN — DOXYCYCLINE 100 MG: 100 INJECTION, POWDER, LYOPHILIZED, FOR SOLUTION INTRAVENOUS at 01:05

## 2025-05-03 RX ADMIN — IPRATROPIUM BROMIDE AND ALBUTEROL SULFATE 3 ML: 2.5; .5 SOLUTION RESPIRATORY (INHALATION) at 09:05

## 2025-05-03 RX ADMIN — SODIUM CHLORIDE 500 ML: 9 INJECTION, SOLUTION INTRAVENOUS at 10:05

## 2025-05-03 RX ADMIN — PHENYLEPHRINE HYDROCHLORIDE 1 SPRAY: 0.5 SPRAY NASAL at 09:05

## 2025-05-03 RX ADMIN — ENOXAPARIN SODIUM 40 MG: 40 INJECTION SUBCUTANEOUS at 05:05

## 2025-05-03 RX ADMIN — BUSPIRONE HYDROCHLORIDE 5 MG: 5 TABLET ORAL at 03:05

## 2025-05-03 RX ADMIN — IOHEXOL 100 ML: 350 INJECTION, SOLUTION INTRAVENOUS at 11:05

## 2025-05-03 NOTE — ASSESSMENT & PLAN NOTE
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  Patient also reports she has been drinking a lot of water at home The patient's most recent sodium results are listed below.  Recent Labs     05/01/25  1658 05/03/25  0927   * 129*     Plan  - Correct the sodium by 4-6mEq in 24 hours.   - Obtain the following studies: cmp.  - Will treat the hyponatremia with IV fluids as follows:  Normal saline at 100 cc an hour times 5 hours and Fluid restriction of:  1.5 liter per day  - Monitor sodium Daily.   - Patient hyponatremia is initial assessment

## 2025-05-03 NOTE — ASSESSMENT & PLAN NOTE
Anemia is likely due to iron-deficiency anemia. Most recent hemoglobin and hematocrit are listed below.  Recent Labs     05/01/25  1658 05/01/25  2102 05/03/25  0927   HGB 15.0  --  11.5*   HCT 40.7 35* 31.6*     Plan  - Monitor serial CBC: Daily  - Transfuse PRBC if patient becomes hemodynamically unstable, symptomatic or H/H drops below 7/21.  - Patient has not received any PRBC transfusions to date  - Patient's anemia is currently worsening. Will continue current treatment

## 2025-05-03 NOTE — HPI
Karyna Escoto is a 50 y.o. year-old patient with history of  has a past medical history of Anxiety, Breast cancer, and GERD (gastroesophageal reflux disease). who presented to the ED complaints of difficulty breathing.  He reports as though her chest was tight and it felt as though there was a pressure there and she can not breathe.  The patient has been with sinus congestion and drainage since Tuesday.  The patient came to the ED on Thursday with nausea and vomiting she states from the sinus drainage.  Patient is also with a dry nonproductive cough.  Patient's lungs clear to auscultation posterior and anterior.    Triage vitals with pulse 91, /66, temp 98.3°, SpO2 100% on room air.  Blood work performed in the ED with WBC 3.8, RBC 3.7, hemoglobin 11.5, hematocrit 31.6, , sodium 129, potassium 3.0, CO2 16, total bilirubin 1.3, Mag 1.5, D-dimer 0.53, salicylates level less than 1.5.  PH 7.5, PO CO2 20.7, PO2 18, HC03 17.1, base excess -6.  Chest x-ray with no acute cardiopulmonary abnormality.  CTA chest with no evidence of pulmonary embolism to the segmental arterial level.  Patchy ground glass alveolar opacity in the anterior left upper lobe suggestive of infectious or inflammatory process.

## 2025-05-03 NOTE — ASSESSMENT & PLAN NOTE
Patient has a diagnosis of pneumonia. The cause of the pneumonia is suspected to be bacterial in etiology but organism is not known. The pneumonia is worsening due to difficulty breathing. The patient has the following signs/symptoms of pneumonia: persistent hypoxia , cough, and chest tightness. The patient does not have a current oxygen requirement and the patient does not have a home oxygen requirement. I have reviewed the pertinent imaging. The following cultures have been collected: Sputum culture The culture results are listed below.   Add guaifenesin  Resume home Flonase  Start Afrin x3 days    Current antimicrobial regimen consists of the antibiotics listed below. Will monitor patient closely and continue current treatment plan unchanged.    Antibiotics (From admission, onward)      Start     Stop Route Frequency Ordered    05/04/25 0000  doxycycline 100 mg in D5W 100 mL IVPB (MB+)  (Community Acquired Pneumonia (CAP) - Low MDR Risk)         05/08/25 2359 IV Every 12 hours (non-standard times) 05/03/25 1256    05/03/25 1800  ceFEPIme injection 1 g         -- IV Every 6 hours (non-standard times) 05/03/25 1256    05/03/25 1215  doxycycline 100 mg in D5W 100 mL IVPB (MB+)         05/04/25 0014 IV ED 1 Time 05/03/25 1204    05/03/25 1215  ceFEPIme injection 2 g         05/04/25 0014 IV ED 1 Time 05/03/25 1208            Microbiology Results (last 7 days)       Procedure Component Value Units Date/Time    Respiratory Infection Panel (PCR), Nasopharyngeal [[id number]]     Order Status: Sent Specimen: Nasopharyngeal Swab     Culture, Respiratory with Gram Stain [[id number]]     Order Status: Sent Specimen: Respiratory     Influenza A & B by Molecular [[id number]]  (Normal) Collected: 05/03/25 0927    Order Status: Completed Specimen: Nasopharyngeal Swab Updated: 05/03/25 1001     INFLUENZA A MOLECULAR Negative     INFLUENZA B MOLECULAR  Negative

## 2025-05-03 NOTE — H&P
[facility] - Emergency Dept  Hospital Medicine  History & Physical    Patient Name: Karyna Escoto  MRN: [id number]  Patient Class: OP- Observation  Admission Date: 5/3/2025  Attending Physician: Jessica Llamas   Primary Care Provider: Valeria Newton FNP         Patient information was obtained from patient and ER records.     Subjective:     Principal Problem:Pneumonia of left upper lobe due to infectious organism    Chief Complaint:   Chief Complaint   Patient presents with    Chest Pain     Started today        HPI: Karyna Escoto is a 50 y.o. year-old patient with history of  has a past medical history of Anxiety, Breast cancer, and GERD (gastroesophageal reflux disease). who presented to the ED complaints of difficulty breathing.  He reports as though her chest was tight and it felt as though there was a pressure there and she can not breathe.  The patient has been with sinus congestion and drainage since Tuesday.  The patient came to the ED on Thursday with nausea and vomiting she states from the sinus drainage.  Patient is also with a dry nonproductive cough.  Patient's lungs clear to auscultation posterior and anterior.    Triage vitals with pulse 91, /66, temp 98.3°, SpO2 100% on room air.  Blood work performed in the ED with WBC 3.8, RBC 3.7, hemoglobin 11.5, hematocrit 31.6, , sodium 129, potassium 3.0, CO2 16, total bilirubin 1.3, Mag 1.5, D-dimer 0.53, salicylates level less than 1.5.  PH 7.5, PO CO2 20.7, PO2 18, HC03 17.1, base excess -6.  Chest x-ray with no acute cardiopulmonary abnormality.  CTA chest with no evidence of pulmonary embolism to the segmental arterial level.  Patchy ground glass alveolar opacity in the anterior left upper lobe suggestive of infectious or inflammatory process.      Past Medical History:   Diagnosis Date    Anxiety     Breast cancer 07/2022    GERD (gastroesophageal reflux disease)        Past Surgical History:    Procedure Laterality Date    BILATERAL MASTECTOMY Bilateral 03/17/2023    lymph node dissection left    BREAST BIOPSY Left 07/2022    ESOPHAGOGASTRODUODENOSCOPY N/A 8/1/2024    Procedure: EGD (ESOPHAGOGASTRODUODENOSCOPY);  Surgeon: Freddy Castañeda MD;  Location: [facility] ENDO;  Service: Endoscopy;  Laterality: N/A;    eye lid surgery Bilateral 1990    INSERTION OF TUNNELED CENTRAL VENOUS CATHETER (CVC) WITH SUBCUTANEOUS PORT Left 11/21/2022    Procedure: [id number]-CATH;  Surgeon: Oscar Murphy III, MD;  Location: [facility] OR;  Service: General;  Laterality: Left;    NOSE SURGERY  1990    PORTACATH PLACEMENT  08/2022    [facility]    TONSILLECTOMY  1990    TUBAL LIGATION  2013       Review of patient's allergies indicates:   Allergen Reactions    Taxol [paclitaxel] Rash       No current facility-administered medications on file prior to encounter.     Current Outpatient Medications on File Prior to Encounter   Medication Sig    busPIRone (BUSPAR) 5 MG Tab Take 5 mg by mouth every morning.    fluticasone propionate (FLONASE) 50 mcg/actuation nasal spray 1 spray (50 mcg total) by Each Nostril route once daily.    levocetirizine (XYZAL) 5 MG tablet Take 5 mg by mouth once daily.    LIDOcaine-prilocaine (EMLA) cream Apply topically as needed. Apply 30 mins prior to port access    pantoprazole (PROTONIX) 40 MG tablet Take 1 tablet (40 mg total) by mouth once daily.    promethazine (PHENERGAN) 25 MG tablet Take 1 tablet (25 mg total) by mouth every 4 to 6 hours as needed for Nausea.    traMADoL (ULTRAM) 50 mg tablet Take 1 tablet (50 mg total) by mouth every 6 (six) hours. (Patient taking differently: Take 50 mg by mouth every 6 (six) hours as needed for Pain.)    traZODone (DESYREL) 50 MG tablet Take 1 tablet (50 mg total) by mouth every evening.    venlafaxine (EFFEXOR-XR) 37.5 MG 24 hr capsule Take 1 capsule (37.5 mg total) by mouth once daily. (Patient taking differently: Take 37.5 mg by mouth nightly.)     ondansetron (ZOFRAN) 4 MG tablet Take 1 tablet (4 mg total) by mouth every 6 (six) hours.    [DISCONTINUED] silver sulfADIAZINE 1% (SILVADENE) 1 % cream Apply topically 2 (two) times daily.     Family History       Problem Relation (Age of Onset)    Breast cancer Maternal Grandmother    Prostate cancer Maternal Grandfather          Tobacco Use    Smoking status: Former    Smokeless tobacco: Not on file    Tobacco comments:     Quit 2005-13 year history -1 daily   Substance and Sexual Activity    Alcohol use: Not Currently     Comment: occasional    Drug use: Never    Sexual activity: Yes     Partners: Male     Review of Systems   HENT:  Positive for postnasal drip, sinus pressure and sneezing.    Respiratory:  Positive for cough and shortness of breath.      Objective:     Vital Signs (Most Recent):  Temp: 98.3 °F (36.8 °C) (05/03/25 0907)  Pulse: 76 (05/03/25 0953)  Resp: 18 (05/03/25 0907)  BP: (!) 143/67 (05/03/25 0953)  SpO2: 100 % (05/03/25 0953) Vital Signs (24h Range):  Temp:  [98.3 °F (36.8 °C)] 98.3 °F (36.8 °C)  Pulse:  [76-91] 76  Resp:  [18] 18  SpO2:  [100 %] 100 %  BP: (130-143)/(66-67) 143/67     Weight: 66.7 kg (147 lb)  Body mass index is 26.89 kg/m².     Physical Exam  Vitals reviewed.   Constitutional:       General: She is not in acute distress.     Appearance: Normal appearance. She is not toxic-appearing.   HENT:      Head: Normocephalic and atraumatic.      Nose: Congestion present.      Mouth/Throat:      Mouth: Mucous membranes are moist.      Pharynx: Oropharynx is clear. Postnasal drip present.   Eyes:      Extraocular Movements: Extraocular movements intact.      Pupils: Pupils are equal, round, and reactive to light.   Cardiovascular:      Rate and Rhythm: Normal rate and regular rhythm.      Pulses: Normal pulses.      Heart sounds: Normal heart sounds.   Pulmonary:      Effort: Pulmonary effort is normal.      Breath sounds: Normal breath sounds.   Abdominal:      General: Bowel  sounds are normal. There is no distension.      Palpations: Abdomen is soft.      Tenderness: There is no abdominal tenderness.   Musculoskeletal:         General: No swelling. Normal range of motion.      Cervical back: Normal range of motion and neck supple.   Skin:     General: Skin is warm and dry.      Capillary Refill: Capillary refill takes less than 2 seconds.   Neurological:      General: No focal deficit present.      Mental Status: She is alert and oriented to person, place, and time. Mental status is at baseline.   Psychiatric:         Mood and Affect: Mood normal.         Behavior: Behavior normal.         Judgment: Judgment normal.              CRANIAL NERVES     CN III, IV, VI   Pupils are equal, round, and reactive to light.       Significant Labs: All pertinent labs within the past 24 hours have been reviewed.  Recent Lab Results  (Last 5 results in the past 24 hours)        05/03/25  1159   05/03/25  1017   05/03/25  0953   05/03/25  0930   05/03/25  0927        Influenza A, Molecular         Negative       Influenza B, Molecular         Negative       Albumin         3.8       ALP         67       Allens Test     N/A           ALT         22       Anion Gap         16       AST         38       Baso #         0.02       Basophil %         0.5       BILIRUBIN TOTAL         1.3  Comment: For infants and newborns, interpretation of results should be based   on gestational age, weight and in agreement with clinical   observations.    Premature Infant recommended reference ranges:   0-24 hours:  <8.0 mg/dL   24-48 hours: <12.0 mg/dL   3-5 days:    <15.0 mg/dL   6-29 days:   <15.0 mg/dL       BNP         36  Comment: Values of less than 100 pg/ml are consistent with non-CHF populations.       Site     Other           BUN         6       Calcium         8.8       Chloride         97       CO2         16       SARS COV-2 MOLECULAR         Negative  Comment: This test utilizes isothermal nucleic acid  amplification technology to detect the SARS-CoV-2 RdRp nucleic acid segment. The analytical sensitivity (limit of detection) is 500 copies/swab.     A POSITIVE result is indicative of the presence of SARS-CoV-2 RNA; clinical correlation with patient history and other diagnostic information is necessary to determine patient infection status.     A NEGATIVE result means that SARS-CoV-2 nucleic acids are not present above the limit of detection. A NEGATIVE result should be treated as presumptive. It does not rule out the possibility of COVID-19 and should not be the sole basis for treatment decisions. If COVID-19 is strongly suspected based on clinical and exposure history, re-testing using an alternate molecular assay should be considered.     This test is FDA approved.     Performance characteristics of this test has been independently verified by [facility] in conjunction with [facility] Department of Pathology and Laboratory Medicine.          CPK         247       Creatinine         0.6       D-Dimer         0.53  Comment: The quantitative D-dimer assay should be used as an aid in the diagnosis of deep vein thrombosis and pulmonary embolism in patients with the appropriate presentation and clinical history. The upper limit of the reference interval and the clinical cut off point are identical. Causes of a positive (>0.50 mg/L FEU) D-Dimer test include, but are not limited to: DVT, PE, DIC, thrombolytic therapy, anticoagulant therapy, recent surgery, trauma, or pregnancy, disseminated malignancy, aortic aneurysm, cirrhosis, and severe infection. False negative results may occur in patients with distal DVT.       DelSys     Room Air           eGFR         >60       Eos #         0.18       Eos %         4.7       Glucose         74       Hematocrit         31.6       Hemoglobin         11.5       Immature Grans (Abs)         0.01  Comment: Mild elevation in immature granulocytes is non  specific and can be seen in a variety of conditions including stress response, acute inflammation, trauma and pregnancy. Correlation with other laboratory and clinical findings is essential.       Immature Granulocytes         0.3       Lymph #         1.37       LYMPH %         36.1       Magnesium          1.5       MCH         30.8       MCHC         36.4       MCV         85       Mode     SPONT           Mono #         0.67       Mono %         17.6       MPV         10.2       Neut #         1.6       Neut %         40.8       nRBC         0       QRS Duration   78             [facility] QTC Calculation   495             Platelet Count         170       POC BE     -6           POC HCO3     17.1           POC Lactate       1.24         POC PCO2     20.7           POC PH     7.524           POC PO2     18           POC SATURATED O2     36           POC TCO2     18           Potassium         3.0       PROTEIN TOTAL         6.4       RBC         3.73       RDW         12.1       Salicylate Level         <1.5  Comment: Toxic:  30.0 - 70.0 mg/dl  Lethal: >70.0 mg/dl       Sample     VENOUS   VENOUS         Sodium         129       Sp02     100           Troponin I High Sensitivity 6.4  Comment: Troponin results differ between methods. Do not use   results between Troponin methods interchangeably.    Alkaline Phospatase levels above 400 U/L may   cause false positive results.    Access hsTnI should not be used for patients taking   Asfotase chencho (Strensiq).         6.6  Comment: Troponin results differ between methods. Do not use   results between Troponin methods interchangeably.    Alkaline Phospatase levels above 400 U/L may   cause false positive results.    Access hsTnI should not be used for patients taking   Asfotase chencho (Strensiq).       WBC         3.80                              Significant Imaging: I have reviewed all pertinent imaging results/findings within the past 24 hours.    CTA Chest Non-Coronary (PE  Studies)  Narrative: CMS MANDATED QUALITY DATA - CT RADIATION - 436    All CT scans at this facility utilize dose modulation, iterative reconstruction, and/or weight based dosing when appropriate to reduce radiation dose to as low as reasonably achievable.    CLINICAL HISTORY:  ([id number])49 y/o  (1975) F    Pulmonary embolism (PE) suspected, positive D-dimer;    TECHNIQUE:  (A#[id number], exam time 5/3/2025 11:05)    CTA CHEST NON CORONARY (PE STUDIES) [id number]    Axial CT examination of the chest with attention to the pulmonary arteries was performed using contiguous axial images from the diaphragms to the lung apices following the intravenous administration of 100 cc Omnipaque 350 non-ionic contrast material. Images were reviewed using lung, mediastinal, and bone windows. The study was performed with thin sections with subsequent 3-D reformats/MIP/reconstructions in multiple planes.    COMPARISON:  None.    FINDINGS:  CTA PE evaluation: This is a high quality study for the evaluation of pulmonary embolism . The pulmonary arteries are normal in appearance without pulmonary emboli noted up to the segmental level, noting the limitations of CT technique for identifying small or isolated subsegmental emboli.    CT Chest:    Visualized neck: normal there is a left subclavian vein Port-A-Cath with the tip at the atrial caval junction    Lungs: Patchy ground-glass alveolar opacity within the anterior left upper lobe which may be secondary to infectious or inflammatory process.  The remainder of the lungs are clear.  There is calcified granuloma in the inferior lingula.    Airway: The trachea and central bronchial tree appear normal.    Pleura: There is no pleural effusion. There is no pneumothorax.    Cardiovascular: The heart is normal in size. There are no findings of right heart failure. The pulmonary artery is normal in size. There is no pericardial effusion. The thoracic aorta and great vessels are normal in  course and caliber.    Mediastinum: There is no supraclavicular  axillary  mediastinal  or hilar lymphadenopathy.    Soft tissues: Bilateral mastectomies with breast implants    Musculoskeletal: There are mild degenerative changes of the thoracic spine.  There are no suspicious osseous lesions.    Esophagus: normal    Upper Abdomen: The visualized upper abdominal organs appear normal.  There is a 14 mm cyst within the left lobe of the liver.  Impression: 1. No evidence of pulmonary embolism to the segmental arterial level. If there is continued clinical concern, consider further evaluation with lower extremity doppler.    2. Patchy ground-glass alveolar opacity in the anterior left upper lobe suggestive of infectious or inflammatory process    Electronically signed by: Vaishali Ohara  Date:    05/03/2025  Time:    11:42  X-Ray Chest AP Portable  Narrative: EXAMINATION:  XR CHEST AP PORTABLE    CLINICAL HISTORY:  Chest Pain;    FINDINGS:  Portable chest at 09:21 is compared to 03/02/2023 shows normal cardiomediastinal silhouette. There is a left subclavian vein Port-A-Cath in stable position.    Lungs are clear. Pulmonary vasculature is normal. No acute osseous abnormality.  Impression: No acute cardiopulmonary abnormality.    Electronically signed by: Vaishali Ohara  Date:    05/03/2025  Time:    09:47      Assessment/Plan:     Assessment & Plan  Pneumonia of left upper lobe due to infectious organism  Patient has a diagnosis of pneumonia. The cause of the pneumonia is suspected to be bacterial in etiology but organism is not known. The pneumonia is worsening due to difficulty breathing. The patient has the following signs/symptoms of pneumonia: persistent hypoxia , cough, and chest tightness. The patient does not have a current oxygen requirement and the patient does not have a home oxygen requirement. I have reviewed the pertinent imaging. The following cultures have been collected: Sputum culture The culture  results are listed below.   Add guaifenesin  Resume home Flonase  Start Afrin x3 days    Current antimicrobial regimen consists of the antibiotics listed below. Will monitor patient closely and continue current treatment plan unchanged.    Antibiotics (From admission, onward)      Start     Stop Route Frequency Ordered    05/04/25 0000  doxycycline 100 mg in D5W 100 mL IVPB (MB+)  (Community Acquired Pneumonia (CAP) - Low MDR Risk)         05/08/25 2359 IV Every 12 hours (non-standard times) 05/03/25 1256    05/03/25 1800  ceFEPIme injection 1 g         -- IV Every 6 hours (non-standard times) 05/03/25 1256    05/03/25 1215  doxycycline 100 mg in D5W 100 mL IVPB (MB+)         05/04/25 0014 IV ED 1 Time 05/03/25 1204    05/03/25 1215  ceFEPIme injection 2 g         05/04/25 0014 IV ED 1 Time 05/03/25 1208            Microbiology Results (last 7 days)       Procedure Component Value Units Date/Time    Respiratory Infection Panel (PCR), Nasopharyngeal [[id number]]     Order Status: Sent Specimen: Nasopharyngeal Swab     Culture, Respiratory with Gram Stain [[id number]]     Order Status: Sent Specimen: Respiratory     Influenza A & B by Molecular [[id number]]  (Normal) Collected: 05/03/25 0927    Order Status: Completed Specimen: Nasopharyngeal Swab Updated: 05/03/25 1001     INFLUENZA A MOLECULAR Negative     INFLUENZA B MOLECULAR  Negative          Anemia  Anemia is likely due to iron-deficiency anemia. Most recent hemoglobin and hematocrit are listed below.  Recent Labs     05/01/25  1658 05/01/25  2102 05/03/25  0927   HGB 15.0  --  11.5*   HCT 40.7 35* 31.6*     Plan  - Monitor serial CBC: Daily  - Transfuse PRBC if patient becomes hemodynamically unstable, symptomatic or H/H drops below 7/21.  - Patient has not received any PRBC transfusions to date  - Patient's anemia is currently worsening. Will continue current treatment  Hypomagnesemia  Patient has Abnormal Magnesium: hypomagnesemia. Will continue to monitor  electrolytes closely. Will replace the affected electrolytes and repeat labs to be done after interventions completed. The patient's magnesium results have been reviewed and are listed below.  Patient given magnesium in the ED.  Recent Labs   Lab 05/03/25 0927   MG 1.5*      Hyponatremia  Hyponatremia is likely due to Medications: SSRIs and tea and toast syndrome.  Patient also reports she has been drinking a lot of water at home The patient's most recent sodium results are listed below.  Recent Labs     05/01/25 1658 05/03/25 0927   * 129*     Plan  - Correct the sodium by 4-6mEq in 24 hours.   - Obtain the following studies: cmp.  - Will treat the hyponatremia with IV fluids as follows:  Normal saline at 100 cc an hour times 5 hours and Fluid restriction of:  1.5 liter per day  - Monitor sodium Daily.   - Patient hyponatremia is initial assessment    VTE Risk Mitigation (From admission, onward)           Ordered     enoxaparin injection 40 mg  Daily         05/03/25 1248     IP VTE HIGH RISK PATIENT  Once         05/03/25 1248     Place sequential compression device  Until discontinued         05/03/25 1248                         On 05/03/2025, patient should be placed in hospital observation services under my care in collaboration with Far[facility].      Pharmacist Renal Dose Adjustment Note    Karyna Escoto is a 50 y.o. female being treated with the medication Cefepime.    Patient Data:    Vital Signs (Most Recent):  Temp: 98.3 °F (36.8 °C) (05/03/25 0907)  Pulse: 76 (05/03/25 0953)  Resp: 18 (05/03/25 0907)  BP: (!) 143/67 (05/03/25 0953)  SpO2: 100 % (05/03/25 0953) Vital Signs (72h Range):  Temp:  [98.3 °F (36.8 °C)]   Pulse:  [76-91]   Resp:  [18]   BP: (130-143)/(66-67)   SpO2:  [100 %]      Recent Labs   Lab 05/01/25 1658 05/03/25 0927   CREATININE 0.8 0.6     Serum creatinine: 0.6 mg/dL 05/03/25 0927  Estimated creatinine clearance: 99.3 mL/min    Cefepime 1 gram IV x 1 ED dose will be changed  to Cefepime 2 grams IV x 1 ED dose due to renal protocol.    Pharmacist's Name: Clair Gregory  Pharmacist's Extension: [phone]      Alyssa Knight NP  Department of Hospital Medicine  [facility] - Emergency Dept

## 2025-05-03 NOTE — PROGRESS NOTES
Pharmacist Renal Dose Adjustment Note    Karyna Escoto is a 50 y.o. female being treated with the medication Cefepime.    Patient Data:    Vital Signs (Most Recent):  Temp: 98.3 °F (36.8 °C) (05/03/25 0907)  Pulse: 91 (05/03/25 1341)  Resp: 16 (05/03/25 1341)  BP: (!) 143/67 (05/03/25 0953)  SpO2: 100 % (05/03/25 1341) Vital Signs (72h Range):  Temp:  [98.3 °F (36.8 °C)]   Pulse:  [76-91]   Resp:  [16-18]   BP: (130-143)/(66-67)   SpO2:  [100 %]      Recent Labs   Lab 05/01/25  1658 05/03/25  0927   CREATININE 0.8 0.6     Serum creatinine: 0.6 mg/dL 05/03/25 0927  Estimated creatinine clearance: 99.3 mL/min    Cefepime 1 gram IV every 6 hours will be changed to Cefepime 2 grams IV every 8 hours due renal protocol.    Pharmacist's Name: Clair Gregory  Pharmacist's Extension: [phone]

## 2025-05-03 NOTE — CARE UPDATE
05/03/25 0953   Patient Assessment/Suction   Level of Consciousness (AVPU) alert   Respiratory Effort Unlabored   PRE-TX-O2   Device (Oxygen Therapy) room air   SpO2 100 %   Pulse Oximetry Type Continuous   $ Pulse Oximetry - Multiple Charge Pulse Oximetry - Multiple   Pulse 76   BP (!) 143/67   Labs   $ Was an ABG obtained? Venous Line;POCT - Blood gas   $ Labs Tech Time 15 min   Critical Value Communication   Date Result Received 05/03/25   Time Result Received 0953   Resulting Department of Critical Value RESP   Who communicated critical value from resulting department? K NICK CRT   Critical Test #1 PCO2   Critical Test #1 Result 20.7   Name of Notified Physician/Designee DR CABALLERO   Date Notified 05/03/25   Time Notified 0954   Read Back Verification Yes

## 2025-05-03 NOTE — PROGRESS NOTES
Pharmacist Renal Dose Adjustment Note    Karyna Escoto is a 50 y.o. female being treated with the medication Cefepime.    Patient Data:    Vital Signs (Most Recent):  Temp: 98.3 °F (36.8 °C) (05/03/25 0907)  Pulse: 76 (05/03/25 0953)  Resp: 18 (05/03/25 0907)  BP: (!) 143/67 (05/03/25 0953)  SpO2: 100 % (05/03/25 0953) Vital Signs (72h Range):  Temp:  [98.3 °F (36.8 °C)]   Pulse:  [76-91]   Resp:  [18]   BP: (130-143)/(66-67)   SpO2:  [100 %]      Recent Labs   Lab 05/01/25  1658 05/03/25 0927   CREATININE 0.8 0.6     Serum creatinine: 0.6 mg/dL 05/03/25 0927  Estimated creatinine clearance: 99.3 mL/min    Cefepime 1 gram IV x 1 ED dose will be changed to Cefepime 2 grams IV x 1 ED dose due to renal protocol.    Pharmacist's Name: Clair Gregory  Pharmacist's Extension: [phone]

## 2025-05-03 NOTE — ASSESSMENT & PLAN NOTE
Patient has Abnormal Magnesium: hypomagnesemia. Will continue to monitor electrolytes closely. Will replace the affected electrolytes and repeat labs to be done after interventions completed. The patient's magnesium results have been reviewed and are listed below.  Patient given magnesium in the ED.  Recent Labs   Lab 05/03/25  0927   MG 1.5*

## 2025-05-03 NOTE — SUBJECTIVE & OBJECTIVE
Past Medical History:   Diagnosis Date    Anxiety     Breast cancer 07/2022    GERD (gastroesophageal reflux disease)        Past Surgical History:   Procedure Laterality Date    BILATERAL MASTECTOMY Bilateral 03/17/2023    lymph node dissection left    BREAST BIOPSY Left 07/2022    ESOPHAGOGASTRODUODENOSCOPY N/A 8/1/2024    Procedure: EGD (ESOPHAGOGASTRODUODENOSCOPY);  Surgeon: Freddy Castañeda MD;  Location: [facility] ENDO;  Service: Endoscopy;  Laterality: N/A;    eye lid surgery Bilateral 1990    INSERTION OF TUNNELED CENTRAL VENOUS CATHETER (CVC) WITH SUBCUTANEOUS PORT Left 11/21/2022    Procedure: SXETYVVEZ-WETF-O-CATH;  Surgeon: Oscar Murphy III, MD;  Location: [facility] OR;  Service: General;  Laterality: Left;    NOSE SURGERY  1990    PORTACATH PLACEMENT  08/2022    [facility]    TONSILLECTOMY  1990    TUBAL LIGATION  2013       Review of patient's allergies indicates:   Allergen Reactions    Taxol [paclitaxel] Rash       No current facility-administered medications on file prior to encounter.     Current Outpatient Medications on File Prior to Encounter   Medication Sig    busPIRone (BUSPAR) 5 MG Tab Take 5 mg by mouth every morning.    fluticasone propionate (FLONASE) 50 mcg/actuation nasal spray 1 spray (50 mcg total) by Each Nostril route once daily.    levocetirizine (XYZAL) 5 MG tablet Take 5 mg by mouth once daily.    LIDOcaine-prilocaine (EMLA) cream Apply topically as needed. Apply 30 mins prior to port access    pantoprazole (PROTONIX) 40 MG tablet Take 1 tablet (40 mg total) by mouth once daily.    promethazine (PHENERGAN) 25 MG tablet Take 1 tablet (25 mg total) by mouth every 4 to 6 hours as needed for Nausea.    traMADoL (ULTRAM) 50 mg tablet Take 1 tablet (50 mg total) by mouth every 6 (six) hours. (Patient taking differently: Take 50 mg by mouth every 6 (six) hours as needed for Pain.)    traZODone (DESYREL) 50 MG tablet Take 1 tablet (50 mg total) by mouth every evening.    venlafaxine  (EFFEXOR-XR) 37.5 MG 24 hr capsule Take 1 capsule (37.5 mg total) by mouth once daily. (Patient taking differently: Take 37.5 mg by mouth nightly.)    ondansetron (ZOFRAN) 4 MG tablet Take 1 tablet (4 mg total) by mouth every 6 (six) hours.    [DISCONTINUED] silver sulfADIAZINE 1% (SILVADENE) 1 % cream Apply topically 2 (two) times daily.     Family History       Problem Relation (Age of Onset)    Breast cancer Maternal Grandmother    Prostate cancer Maternal Grandfather          Tobacco Use    Smoking status: Former    Smokeless tobacco: Not on file    Tobacco comments:     Quit 2005-13 year history -1 daily   Substance and Sexual Activity    Alcohol use: Not Currently     Comment: occasional    Drug use: Never    Sexual activity: Yes     Partners: Male     Review of Systems   HENT:  Positive for postnasal drip, sinus pressure and sneezing.    Respiratory:  Positive for cough and shortness of breath.      Objective:     Vital Signs (Most Recent):  Temp: 98.3 °F (36.8 °C) (05/03/25 0907)  Pulse: 76 (05/03/25 0953)  Resp: 18 (05/03/25 0907)  BP: (!) 143/67 (05/03/25 0953)  SpO2: 100 % (05/03/25 0953) Vital Signs (24h Range):  Temp:  [98.3 °F (36.8 °C)] 98.3 °F (36.8 °C)  Pulse:  [76-91] 76  Resp:  [18] 18  SpO2:  [100 %] 100 %  BP: (130-143)/(66-67) 143/67     Weight: 66.7 kg (147 lb)  Body mass index is 26.89 kg/m².     Physical Exam  Vitals reviewed.   Constitutional:       General: She is not in acute distress.     Appearance: Normal appearance. She is not toxic-appearing.   HENT:      Head: Normocephalic and atraumatic.      Nose: Congestion present.      Mouth/Throat:      Mouth: Mucous membranes are moist.      Pharynx: Oropharynx is clear. Postnasal drip present.   Eyes:      Extraocular Movements: Extraocular movements intact.      Pupils: Pupils are equal, round, and reactive to light.   Cardiovascular:      Rate and Rhythm: Normal rate and regular rhythm.      Pulses: Normal pulses.      Heart sounds:  Normal heart sounds.   Pulmonary:      Effort: Pulmonary effort is normal.      Breath sounds: Normal breath sounds.   Abdominal:      General: Bowel sounds are normal. There is no distension.      Palpations: Abdomen is soft.      Tenderness: There is no abdominal tenderness.   Musculoskeletal:         General: No swelling. Normal range of motion.      Cervical back: Normal range of motion and neck supple.   Skin:     General: Skin is warm and dry.      Capillary Refill: Capillary refill takes less than 2 seconds.   Neurological:      General: No focal deficit present.      Mental Status: She is alert and oriented to person, place, and time. Mental status is at baseline.   Psychiatric:         Mood and Affect: Mood normal.         Behavior: Behavior normal.         Judgment: Judgment normal.              CRANIAL NERVES     CN III, IV, VI   Pupils are equal, round, and reactive to light.       Significant Labs: All pertinent labs within the past 24 hours have been reviewed.  Recent Lab Results  (Last 5 results in the past 24 hours)        05/03/25  1159   05/03/25  1017   05/03/25  0953   05/03/25  0930   05/03/25  0927        Influenza A, Molecular         Negative       Influenza B, Molecular         Negative       Albumin         3.8       ALP         67       Allens Test     N/A           ALT         22       Anion Gap         16       AST         38       Baso #         0.02       Basophil %         0.5       BILIRUBIN TOTAL         1.3  Comment: For infants and newborns, interpretation of results should be based   on gestational age, weight and in agreement with clinical   observations.    Premature Infant recommended reference ranges:   0-24 hours:  <8.0 mg/dL   24-48 hours: <12.0 mg/dL   3-5 days:    <15.0 mg/dL   6-29 days:   <15.0 mg/dL       BNP         36  Comment: Values of less than 100 pg/ml are consistent with non-CHF populations.       Site     Other           BUN         6       Calcium          8.8       Chloride         97       CO2         16       SARS COV-2 MOLECULAR         Negative  Comment: This test utilizes isothermal nucleic acid amplification technology to detect the SARS-CoV-2 RdRp nucleic acid segment. The analytical sensitivity (limit of detection) is 500 copies/swab.     A POSITIVE result is indicative of the presence of SARS-CoV-2 RNA; clinical correlation with patient history and other diagnostic information is necessary to determine patient infection status.     A NEGATIVE result means that SARS-CoV-2 nucleic acids are not present above the limit of detection. A NEGATIVE result should be treated as presumptive. It does not rule out the possibility of COVID-19 and should not be the sole basis for treatment decisions. If COVID-19 is strongly suspected based on clinical and exposure history, re-testing using an alternate molecular assay should be considered.     This test is FDA approved.     Performance characteristics of this test has been independently verified by [facility] in conjunction with [facility] Department of Pathology and Laboratory Medicine.          CPK         247       Creatinine         0.6       D-Dimer         0.53  Comment: The quantitative D-dimer assay should be used as an aid in the diagnosis of deep vein thrombosis and pulmonary embolism in patients with the appropriate presentation and clinical history. The upper limit of the reference interval and the clinical cut off point are identical. Causes of a positive (>0.50 mg/L FEU) D-Dimer test include, but are not limited to: DVT, PE, DIC, thrombolytic therapy, anticoagulant therapy, recent surgery, trauma, or pregnancy, disseminated malignancy, aortic aneurysm, cirrhosis, and severe infection. False negative results may occur in patients with distal DVT.       DelSys     Room Air           eGFR         >60       Eos #         0.18       Eos %         4.7       Glucose         74        Hematocrit         31.6       Hemoglobin         11.5       Immature Grans (Abs)         0.01  Comment: Mild elevation in immature granulocytes is non specific and can be seen in a variety of conditions including stress response, acute inflammation, trauma and pregnancy. Correlation with other laboratory and clinical findings is essential.       Immature Granulocytes         0.3       Lymph #         1.37       LYMPH %         36.1       Magnesium          1.5       MCH         30.8       MCHC         36.4       MCV         85       Mode     SPONT           Mono #         0.67       Mono %         17.6       MPV         10.2       Neut #         1.6       Neut %         40.8       nRBC         0       QRS Duration   78             [facility] QTC Calculation   495             Platelet Count         170       POC BE     -6           POC HCO3     17.1           POC Lactate       1.24         POC PCO2     20.7           POC PH     7.524           POC PO2     18           POC SATURATED O2     36           POC TCO2     18           Potassium         3.0       PROTEIN TOTAL         6.4       RBC         3.73       RDW         12.1       Salicylate Level         <1.5  Comment: Toxic:  30.0 - 70.0 mg/dl  Lethal: >70.0 mg/dl       Sample     VENOUS   VENOUS         Sodium         129       Sp02     100           Troponin I High Sensitivity 6.4  Comment: Troponin results differ between methods. Do not use   results between Troponin methods interchangeably.    Alkaline Phospatase levels above 400 U/L may   cause false positive results.    Access hsTnI should not be used for patients taking   Asfotase chencho (Strensiq).         6.6  Comment: Troponin results differ between methods. Do not use   results between Troponin methods interchangeably.    Alkaline Phospatase levels above 400 U/L may   cause false positive results.    Access hsTnI should not be used for patients taking   Asfotase chencho (Strensiq).       WBC         3.80                               Significant Imaging: I have reviewed all pertinent imaging results/findings within the past 24 hours.    CTA Chest Non-Coronary (PE Studies)  Narrative: CMS MANDATED QUALITY DATA - CT RADIATION - 436    All CT scans at this facility utilize dose modulation, iterative reconstruction, and/or weight based dosing when appropriate to reduce radiation dose to as low as reasonably achievable.    CLINICAL HISTORY:  ([id number])49 y/o  (1975) F    Pulmonary embolism (PE) suspected, positive D-dimer;    TECHNIQUE:  (A#[id number], exam time 5/3/2025 11:05)    CTA CHEST NON CORONARY (PE STUDIES) [id number]    Axial CT examination of the chest with attention to the pulmonary arteries was performed using contiguous axial images from the diaphragms to the lung apices following the intravenous administration of 100 cc Omnipaque 350 non-ionic contrast material. Images were reviewed using lung, mediastinal, and bone windows. The study was performed with thin sections with subsequent 3-D reformats/MIP/reconstructions in multiple planes.    COMPARISON:  None.    FINDINGS:  CTA PE evaluation: This is a high quality study for the evaluation of pulmonary embolism . The pulmonary arteries are normal in appearance without pulmonary emboli noted up to the segmental level, noting the limitations of CT technique for identifying small or isolated subsegmental emboli.    CT Chest:    Visualized neck: normal there is a left subclavian vein Port-A-Cath with the tip at the atrial caval junction    Lungs: Patchy ground-glass alveolar opacity within the anterior left upper lobe which may be secondary to infectious or inflammatory process.  The remainder of the lungs are clear.  There is calcified granuloma in the inferior lingula.    Airway: The trachea and central bronchial tree appear normal.    Pleura: There is no pleural effusion. There is no pneumothorax.    Cardiovascular: The heart is normal in size. There are no findings of  right heart failure. The pulmonary artery is normal in size. There is no pericardial effusion. The thoracic aorta and great vessels are normal in course and caliber.    Mediastinum: There is no supraclavicular  axillary  mediastinal  or hilar lymphadenopathy.    Soft tissues: Bilateral mastectomies with breast implants    Musculoskeletal: There are mild degenerative changes of the thoracic spine.  There are no suspicious osseous lesions.    Esophagus: normal    Upper Abdomen: The visualized upper abdominal organs appear normal.  There is a 14 mm cyst within the left lobe of the liver.  Impression: 1. No evidence of pulmonary embolism to the segmental arterial level. If there is continued clinical concern, consider further evaluation with lower extremity doppler.    2. Patchy ground-glass alveolar opacity in the anterior left upper lobe suggestive of infectious or inflammatory process    Electronically signed by: Vaishali Ohara  Date:    05/03/2025  Time:    11:42  X-Ray Chest AP Portable  Narrative: EXAMINATION:  XR CHEST AP PORTABLE    CLINICAL HISTORY:  Chest Pain;    FINDINGS:  Portable chest at 09:21 is compared to 03/02/2023 shows normal cardiomediastinal silhouette. There is a left subclavian vein Port-A-Cath in stable position.    Lungs are clear. Pulmonary vasculature is normal. No acute osseous abnormality.  Impression: No acute cardiopulmonary abnormality.    Electronically signed by: Vaishali Ohara  Date:    05/03/2025  Time:    09:47

## 2025-05-03 NOTE — ED PROVIDER NOTES
Encounter Date: 5/3/2025       History     Chief Complaint   Patient presents with    Chest Pain     Started today     50-year-old female presents for evaluation of chest pain and chest tightness, onset today.  Close viral disease exposure within household, no trauma, fever or chills.  No history of previous MI    The history is provided by the patient.     Review of patient's allergies indicates:   Allergen Reactions    Taxol [paclitaxel] Rash     Past Medical History:   Diagnosis Date    Anxiety     Breast cancer 07/2022    GERD (gastroesophageal reflux disease)      Past Surgical History:   Procedure Laterality Date    BILATERAL MASTECTOMY Bilateral 03/17/2023    lymph node dissection left    BREAST BIOPSY Left 07/2022    ESOPHAGOGASTRODUODENOSCOPY N/A 8/1/2024    Procedure: EGD (ESOPHAGOGASTRODUODENOSCOPY);  Surgeon: Freddy Castañeda MD;  Location: [facility];  Service: Endoscopy;  Laterality: N/A;    eye lid surgery Bilateral 1990    INSERTION OF TUNNELED CENTRAL VENOUS CATHETER (CVC) WITH SUBCUTANEOUS PORT Left 11/21/2022    Procedure: [id number];  Surgeon: Oscar Murphy III, MD;  Location: [facility] OR;  Service: General;  Laterality: Left;    NOSE SURGERY  1990    PORTACATH PLACEMENT  08/2022    [facility]    TONSILLECTOMY  1990    TUBAL LIGATION  2013     Family History   Problem Relation Name Age of Onset    Breast cancer Maternal Grandmother      Prostate cancer Maternal Grandfather      Colon cancer Neg Hx       Social History[1]  Review of Systems   All other systems reviewed and are negative.      Physical Exam     Initial Vitals [05/03/25 0907]   BP Pulse Resp Temp SpO2   130/66 91 18 98.3 °F (36.8 °C) 100 %      MAP       --         Physical Exam    Nursing note and vitals reviewed.  Constitutional:   Patient appears uncomfortable, lying in bed with eyes closed   HENT:   Head: Normocephalic.   Eyes: No scleral icterus.   Cardiovascular:  Normal rate.           Pulmonary/Chest:  Effort normal. No stridor. No respiratory distress.   Abdominal: There is no guarding.     Neurological: She is alert.   Skin: No rash noted. No erythema.         ED Course   Procedures  Labs Reviewed   MAGNESIUM - Abnormal       Result Value    Magnesium 1.5 (*)    COMPREHENSIVE METABOLIC PANEL - Abnormal    Sodium 129 (*)     Potassium 3.0 (*)     Chloride 97      CO2 16 (*)     Glucose 74      BUN 6      Creatinine 0.6      Calcium 8.8      Protein Total 6.4      Albumin 3.8      Bilirubin Total 1.3 (*)     ALP 67      AST 38      ALT 22      Anion Gap 16      eGFR >60     CK - Abnormal     (*)    D DIMER, QUANTITATIVE - Abnormal    D-Dimer 0.53 (*)    CBC WITH DIFFERENTIAL - Abnormal    WBC 3.80 (*)     RBC 3.73 (*)     Hgb 11.5 (*)     Hct 31.6 (*)     MCV 85      MCH 30.8      MCHC 36.4 (*)     RDW 12.1      Platelet Count 170      MPV 10.2      Nucleated RBC 0      Neut % 40.8      Lymph % 36.1      Mono % 17.6 (*)     Eos % 4.7      Basophil % 0.5      Imm Grans % 0.3      Neut # 1.6 (*)     Lymph # 1.37      Mono # 0.67      Eos # 0.18      Baso # 0.02      Imm Grans # 0.01     SALICYLATE LEVEL - Abnormal    Salicylate Level <1.5 (*)    URINALYSIS, REFLEX TO URINE CULTURE - Abnormal    Color, UA Yellow      Appearance, UA Clear      Spec Grav UA >=1.030 (*)     pH, UA 6.0      Protein, UA Negative      Glucose, UA Negative      Ketones, UA 3+ (*)     Blood, UA 2+ (*)     Bilirubin, UA Negative      Urobilinogen, UA Negative      Nitrites, UA Negative      Leukocyte Esterase, UA Negative     URINALYSIS MICROSCOPIC - Abnormal    RBC, UA 3      WBC, UA 1      Bacteria, UA Rare      Hyaline Casts, UA 3 (*)     Microscopic Comment       ISTAT PROCEDURE - Abnormal    POC PH 7.524 (*)     POC PCO2 20.7 (*)     POC PO2 18 (*)     POC HCO3 17.1 (*)     POC BE -6 (*)     POC SATURATED O2 36      POC TCO2 18 (*)     Sample VENOUS      Site Other      Allens Test N/A      DelSys Room Air      Mode SPONT       Sp02 100     POCT GLUCOSE - Abnormal    POCT Glucose 68 (*)    INFLUENZA A & B BY MOLECULAR - Normal    INFLUENZA A MOLECULAR Negative      INFLUENZA B MOLECULAR  Negative     TROPONIN I HIGH SENSITIVITY - Normal    Troponin High Sensitive 6.6     B-TYPE NATRIURETIC PEPTIDE - Normal    BNP 36     SARS-COV-2 RNA AMPLIFICATION, QUAL - Normal    SARS COV-2 Molecular Negative     TROPONIN I HIGH SENSITIVITY - Normal    Troponin High Sensitive 6.4     CULTURE, RESPIRATORY   RESPIRATORY INFECTION PANEL (PCR), NASOPHARYNGEAL   CBC W/ AUTO DIFFERENTIAL    Narrative:     The following orders were created for panel order CBC auto differential.  Procedure                               Abnormality         Status                     ---------                               -----------         ------                     CBC with Differential[[id number]]       Abnormal            Final result                 Please view results for these tests on the individual orders.   ISTAT LACTATE    POC Lactate 1.24      Sample VENOUS     POCT LACTATE        ECG Results              EKG 12-lead (Final result)        Collection Time Result Time QRS Duration OHS QTC Calculation    05/03/25 10:17:50 05/03/25 12:39:49 78 495                     Final result by Interface, Lab In [facility] (05/03/25 12:39:55)                   Narrative:    Test Reason : R07.9,    Vent. Rate :  80 BPM     Atrial Rate :  80 BPM     P-R Int : 122 ms          QRS Dur :  78 ms      QT Int : 430 ms       P-R-T Axes :  40  44  14 degrees    QTcB Int : 495 ms    Normal sinus rhythm  Low voltage QRS  T wave abnormality, consider anterior ischemia  Prolonged QT  Abnormal ECG  No previous ECGs available  Confirmed by Jeronimo Varner ([id number]) on 5/3/2025 12:39:44 PM    Referred By: AAAREFERRAL SELF           Confirmed By: Jeronimo Varner                                     EKG 12-lead (Final result)        Collection Time Result Time QRS Duration OHS QTC Calculation    05/03/25  09:07:05 05/03/25 12:39:27 76 500                     Final result by Interface, Lab In [facility] (05/03/25 12:39:35)                   Narrative:    Test Reason : R07.9,    Vent. Rate :  86 BPM     Atrial Rate :  86 BPM     P-R Int : 118 ms          QRS Dur :  76 ms      QT Int : 418 ms       P-R-T Axes :  40  14 -10 degrees    QTcB Int : 500 ms    Normal sinus rhythm  Low voltage QRS  T wave abnormality, consider inferior ischemia  T wave abnormality, consider anterior ischemia  Abnormal ECG  No previous ECGs available  Confirmed by Jeronimo Varner ([id number]) on 5/3/2025 12:39:23 PM    Referred By:            Confirmed By: Jeronimo Vraner                                  Imaging Results              CTA Chest Non-Coronary (PE Studies) (Final result)  Result time 05/03/25 11:42:30      Final result by Vaishali Ohara MD (05/03/25 11:42:30)                   Impression:      1. No evidence of pulmonary embolism to the segmental arterial level. If there is continued clinical concern, consider further evaluation with lower extremity doppler.    2. Patchy ground-glass alveolar opacity in the anterior left upper lobe suggestive of infectious or inflammatory process      Electronically signed by: Vaishali Ohara  Date:    05/03/2025  Time:    11:42               Narrative:      CMS MANDATED QUALITY DATA - CT RADIATION - 436    All CT scans at this facility utilize dose modulation, iterative reconstruction, and/or weight based dosing when appropriate to reduce radiation dose to as low as reasonably achievable.    CLINICAL HISTORY:  ([id number])49 y/o  (1975) F    Pulmonary embolism (PE) suspected, positive D-dimer;    TECHNIQUE:  (A#[id number], exam time 5/3/2025 11:05)    CTA CHEST NON CORONARY (PE STUDIES) [id number]    Axial CT examination of the chest with attention to the pulmonary arteries was performed using contiguous axial images from the diaphragms to the lung apices following the intravenous administration of 100  cc Omnipaque 350 non-ionic contrast material. Images were reviewed using lung, mediastinal, and bone windows. The study was performed with thin sections with subsequent 3-D reformats/MIP/reconstructions in multiple planes.    COMPARISON:  None.    FINDINGS:  CTA PE evaluation: This is a high quality study for the evaluation of pulmonary embolism . The pulmonary arteries are normal in appearance without pulmonary emboli noted up to the segmental level, noting the limitations of CT technique for identifying small or isolated subsegmental emboli.    CT Chest:    Visualized neck: normal there is a left subclavian vein Port-A-Cath with the tip at the atrial caval junction    Lungs: Patchy ground-glass alveolar opacity within the anterior left upper lobe which may be secondary to infectious or inflammatory process.  The remainder of the lungs are clear.  There is calcified granuloma in the inferior lingula.    Airway: The trachea and central bronchial tree appear normal.    Pleura: There is no pleural effusion. There is no pneumothorax.    Cardiovascular: The heart is normal in size. There are no findings of right heart failure. The pulmonary artery is normal in size. There is no pericardial effusion. The thoracic aorta and great vessels are normal in course and caliber.    Mediastinum: There is no supraclavicular  axillary  mediastinal  or hilar lymphadenopathy.    Soft tissues: Bilateral mastectomies with breast implants    Musculoskeletal: There are mild degenerative changes of the thoracic spine.  There are no suspicious osseous lesions.    Esophagus: normal    Upper Abdomen: The visualized upper abdominal organs appear normal.  There is a 14 mm cyst within the left lobe of the liver.                                       X-Ray Chest AP Portable (Final result)  Result time 05/03/25 09:47:17      Final result by Vaishali Ohara MD (05/03/25 09:47:17)                   Impression:      No acute cardiopulmonary  abnormality.      Electronically signed by: Vaishali Ohara  Date:    05/03/2025  Time:    09:47               Narrative:    EXAMINATION:  XR CHEST AP PORTABLE    CLINICAL HISTORY:  Chest Pain;    FINDINGS:  Portable chest at 09:21 is compared to 03/02/2023 shows normal cardiomediastinal silhouette. There is a left subclavian vein Port-A-Cath in stable position.    Lungs are clear. Pulmonary vasculature is normal. No acute osseous abnormality.                                       Medications   sodium chloride 0.9% flush 2 mL (has no administration in time range)   melatonin tablet 6 mg (has no administration in time range)   ondansetron injection 4 mg (has no administration in time range)   aluminum-magnesium hydroxide-simethicone 200-200-20 mg/5 mL suspension 30 mL (has no administration in time range)   acetaminophen tablet 650 mg (has no administration in time range)   naloxone 0.4 mg/mL injection 0.02 mg (has no administration in time range)   potassium bicarbonate disintegrating tablet 50 mEq (has no administration in time range)   potassium bicarbonate disintegrating tablet 35 mEq (has no administration in time range)   potassium bicarbonate disintegrating tablet 60 mEq (has no administration in time range)   magnesium oxide tablet 800 mg (has no administration in time range)   magnesium oxide tablet 800 mg (has no administration in time range)   potassium, sodium phosphates 280-160-250 mg packet 2 packet (has no administration in time range)   potassium, sodium phosphates 280-160-250 mg packet 2 packet (has no administration in time range)   potassium, sodium phosphates 280-160-250 mg packet 2 packet (has no administration in time range)   glucose chewable tablet 16 g (has no administration in time range)   glucose chewable tablet 24 g (has no administration in time range)   dextrose 50% injection 12.5 g (has no administration in time range)   dextrose 50% injection 25 g (has no administration in time range)    glucagon (human recombinant) injection 1 mg (has no administration in time range)   enoxaparin injection 40 mg (has no administration in time range)   ceFEPIme injection 1 g (has no administration in time range)   doxycycline 100 mg in D5W 100 mL IVPB (MB+) (has no administration in time range)   albuterol-ipratropium 2.5 mg-0.5 mg/3 mL nebulizer solution 3 mL (3 mLs Nebulization Given 5/3/25 1341)   0.9% NaCl infusion ( Intravenous New Bag 5/3/25 1349)   phenylephrine HCL 0.5% nasal spray 1 spray (has no administration in time range)   guaiFENesin 12 hr tablet 600 mg (has no administration in time range)   busPIRone tablet 5 mg (has no administration in time range)   fluticasone propionate 50 mcg/actuation nasal spray 50 mcg (has no administration in time range)   traMADoL tablet 50 mg (has no administration in time range)   sodium chloride 0.9% bolus 500 mL 500 mL (0 mLs Intravenous Stopped 5/3/25 1131)   magnesium sulfate in dextrose IVPB (premix) 1 g (0 g Intravenous Stopped 5/3/25 1148)   iohexoL (OMNIPAQUE 350) injection 100 mL (100 mLs Intravenous Given 5/3/25 1100)   potassium bicarbonate disintegrating tablet 50 mEq (50 mEq Oral Given 5/3/25 1203)   doxycycline 100 mg in D5W 100 mL IVPB (MB+) (100 mg Intravenous New Bag 5/3/25 1314)   ceFEPIme injection 2 g (2 g Intravenous Given 5/3/25 1312)     Medical Decision Making  50-year-old female presents with chest tightness today.  EKG with some ST changes lateral leads but no STEMI.  Repeat troponin within normal limits.  PE protocol with no evidence of PE but concern for upper lung infection, initial viral tested negative, per hospitalist team they will do more thorough viral testing however we will treat empirically for bacterial pneumonia especially given sodium of 129.  Bicarb 16, potassium 3.0 and magnesium 1.5.  Electrolytes replaced orally and will be admitted to hospitalist team for further supportive care.  Patient and family updated and agree with  plan    Amount and/or Complexity of Data Reviewed  Labs: ordered. Decision-making details documented in ED Course.     Details: History of low bicarb within the last week  Radiology: ordered.  ECG/medicine tests: ordered and independent interpretation performed.     Details: EKG rate 86, QRS 76, , normal sinus rhythm, no STEMI, T-wave abnormality V2 V3 V4  Discussion of management or test interpretation with external provider(s): Spoke with Alyssa Knight NP with hospitalist team will admit for further management evaluation      Risk  Prescription drug management.  Decision regarding hospitalization.               ED Course as of 05/03/25 1436   Sat May 03, 2025   0949 WBC(!): 3.80 [KB]   0949 Hemoglobin(!): 11.5 [KB]   0949 Hematocrit(!): 31.6 [KB]   0949 No large pleural effusion per interpretation of chest x-ray Chalo Espinosa DO [KB]   1012 Patient alkalotic, low bicarb we will add on salicylate level [KB]   1012 Sodium(!): 129 [KB]   1012 Potassium(!): 3.0 [KB]   1012 CO2(!): 16 [KB]   1012 Magnesium (!): 1.5 [KB]   1016 Troponin I High Sensitivity: 6.6 [KB]   1016 D-Dimer(!!): 0.53 [KB]   1016 BNP: 36 [KB]   1021 D-dimer elevated we will obtain PE protocol and repeat troponin, patient updated and agrees with plan [KB]   1027 Repeat EKG at 10:17 a.m., rate 80, QRS 78, , no STEMI inverted T-waves lead 3 V2 V3 V4 EKG interpreted by myself Chalo Espinosa DO   [KB]   1036 Salicylate Level(!): <1.5  Salicylate within normal limits [KB]   1207 Patient and family updated and agree with admission [KB]   1244 Spoke with Alyssa Knight NP with hospitalist team will admit for further management evaluation   [KB]      ED Course User Index  [KB] Chalo Espinosa Jr., DO                           Clinical Impression:  Final diagnoses:  [R07.9] Chest pain  [E83.42] Hypomagnesemia (Primary)  [E87.1] Hyponatremia  [E87.6] Hypokalemia  [R79.89] Low serum bicarbonate  [R79.89] Elevated d-dimer  [J98.8] Respiratory  infection          ED Disposition Condition    Observation                   [1]   Social History  Tobacco Use    Smoking status: Former    Tobacco comments:     Quit 2005-13 year history -1 daily   Substance Use Topics    Alcohol use: Not Currently     Comment: occasional    Drug use: Never        Chalo Espinosa Jr., DO  05/03/25 1435

## 2025-05-03 NOTE — CARE UPDATE
"   05/03/25 1341   Patient Assessment/Suction   Level of Consciousness (AVPU) alert   Respiratory Effort Unlabored   Expansion/Accessory Muscles/Retractions no use of accessory muscles   All Lung Fields Breath Sounds clear;diminished   Rhythm/Pattern, Respiratory unlabored   Cough Frequency no cough   PRE-TX-O2   Device (Oxygen Therapy) room air   SpO2 100 %   Pulse Oximetry Type Continuous   $ Pulse Oximetry - Multiple Charge Pulse Oximetry - Multiple   Pulse 91   Resp 16   Aerosol Therapy   $ Aerosol Therapy Charges Aerosol Treatment   Daily Review of Necessity (SVN) completed   Respiratory Treatment Status (SVN) given   Treatment Route (SVN) mask;air   Patient Position HOB elevated   Post Treatment Assessment (SVN) breath sounds unchanged   Signs of Intolerance (SVN) none   Breath Sounds Post-Respiratory Treatment   Throughout All Fields Post-Treatment All Fields   Throughout All Fields Post-Treatment no change   Post-treatment Heart Rate (beats/min) 100   Post-treatment Resp Rate (breaths/min) 16   Education   $ Education Bronchodilator;15 min   Tobacco Cessation Intervention   Do you use any type of tobacco product? No   Respiratory Evaluation   $ Care Plan Tech Time 15 min   $ Respiratory Evaluation Complete   Evaluation For New Orders   Admitting Diagnosis pneumonia of SOFI   Cardiac Diagnosis na   Pulmonary Diagnosis pna   Current Surgeries na   Home Oxygen   Has Home Oxygen? No   Home Aerosol, MDI, DPI, and Other Treatments/Therapies   Home Respiratory Therapy Per Patient/Review of Chart No   Oxygen Care Plan   Rationale No rational found   Bronchodilator Care Plan   Bronchodilator Care Plan Aerosol   Aerosol Meds w/ frequency Albuterol - Ipratropium (DUO-NEB) 0.5mg-3mg(2.5ml) 3ml Nebulizer Solution2.5mg PRN   Rationale Chest "tightness" with breathing   Atelectasis Care Plan   Rationale No Rational Found   Airway Clearance Care Plan   Rationale No rationale found       "

## 2025-05-04 LAB
ABSOLUTE EOSINOPHIL (SMH): 0.19 K/UL
ABSOLUTE MONOCYTE (SMH): 0.44 K/UL (ref 0.3–1)
ABSOLUTE NEUTROPHIL COUNT (SMH): 1.1 K/UL (ref 1.8–7.7)
ALBUMIN SERPL-MCNC: 3.5 G/DL (ref 3.5–5.2)
ALP SERPL-CCNC: 59 UNIT/L (ref 55–135)
ALT SERPL-CCNC: 19 UNIT/L (ref 10–44)
ANION GAP (SMH): 9 MMOL/L (ref 8–16)
AST SERPL-CCNC: 33 UNIT/L (ref 10–40)
BASOPHILS # BLD AUTO: 0.03 K/UL
BASOPHILS NFR BLD AUTO: 0.8 %
BILIRUB SERPL-MCNC: 0.7 MG/DL (ref 0.1–1)
BUN SERPL-MCNC: 4 MG/DL (ref 6–20)
CALCIUM SERPL-MCNC: 8.5 MG/DL (ref 8.7–10.5)
CHLORIDE SERPL-SCNC: 101 MMOL/L (ref 95–110)
CO2 SERPL-SCNC: 21 MMOL/L (ref 23–29)
CREAT SERPL-MCNC: 0.6 MG/DL (ref 0.5–1.4)
ERYTHROCYTE [DISTWIDTH] IN BLOOD BY AUTOMATED COUNT: 12.1 % (ref 11.5–14.5)
GFR SERPLBLD CREATININE-BSD FMLA CKD-EPI: >60 ML/MIN/1.73/M2
GLUCOSE SERPL-MCNC: 73 MG/DL (ref 70–110)
HCT VFR BLD AUTO: 29.4 % (ref 37–48.5)
HGB BLD-MCNC: 10.6 GM/DL (ref 12–16)
IMM GRANULOCYTES # BLD AUTO: 0 K/UL (ref 0–0.04)
IMM GRANULOCYTES NFR BLD AUTO: 0 % (ref 0–0.5)
LYMPHOCYTES # BLD AUTO: 1.89 K/UL (ref 1–4.8)
MAGNESIUM SERPL-MCNC: 1.8 MG/DL (ref 1.6–2.6)
MCH RBC QN AUTO: 30.8 PG (ref 27–31)
MCHC RBC AUTO-ENTMCNC: 36.1 G/DL (ref 32–36)
MCV RBC AUTO: 86 FL (ref 82–98)
NUCLEATED RBC (/100WBC) (SMH): 0 /100 WBC
PLATELET # BLD AUTO: 154 K/UL (ref 150–450)
PMV BLD AUTO: 10.5 FL (ref 9.2–12.9)
POTASSIUM SERPL-SCNC: 4.1 MMOL/L (ref 3.5–5.1)
PROT SERPL-MCNC: 5.9 GM/DL (ref 6–8.4)
RBC # BLD AUTO: 3.44 M/UL (ref 4–5.4)
RELATIVE EOSINOPHIL (SMH): 5.2 % (ref 0–8)
RELATIVE LYMPHOCYTE (SMH): 51.5 % (ref 18–48)
RELATIVE MONOCYTE (SMH): 12 % (ref 4–15)
RELATIVE NEUTROPHIL (SMH): 30.5 % (ref 38–73)
SODIUM SERPL-SCNC: 131 MMOL/L (ref 136–145)
WBC # BLD AUTO: 3.67 K/UL (ref 3.9–12.7)

## 2025-05-04 PROCEDURE — 85025 COMPLETE CBC W/AUTO DIFF WBC: CPT | Performed by: NURSE PRACTITIONER

## 2025-05-04 PROCEDURE — 96366 THER/PROPH/DIAG IV INF ADDON: CPT

## 2025-05-04 PROCEDURE — 99900035 HC TECH TIME PER 15 MIN (STAT)

## 2025-05-04 PROCEDURE — 25000003 PHARM REV CODE 250: Performed by: INTERNAL MEDICINE

## 2025-05-04 PROCEDURE — 80053 COMPREHEN METABOLIC PANEL: CPT | Performed by: NURSE PRACTITIONER

## 2025-05-04 PROCEDURE — 36415 COLL VENOUS BLD VENIPUNCTURE: CPT | Performed by: NURSE PRACTITIONER

## 2025-05-04 PROCEDURE — 96376 TX/PRO/DX INJ SAME DRUG ADON: CPT

## 2025-05-04 PROCEDURE — 96375 TX/PRO/DX INJ NEW DRUG ADDON: CPT

## 2025-05-04 PROCEDURE — 25000003 PHARM REV CODE 250: Performed by: NURSE PRACTITIONER

## 2025-05-04 PROCEDURE — 83735 ASSAY OF MAGNESIUM: CPT | Performed by: NURSE PRACTITIONER

## 2025-05-04 PROCEDURE — 94761 N-INVAS EAR/PLS OXIMETRY MLT: CPT

## 2025-05-04 PROCEDURE — 63600175 PHARM REV CODE 636 W HCPCS: Performed by: NURSE PRACTITIONER

## 2025-05-04 PROCEDURE — 99900031 HC PATIENT EDUCATION (STAT)

## 2025-05-04 PROCEDURE — G0378 HOSPITAL OBSERVATION PER HR: HCPCS

## 2025-05-04 PROCEDURE — 96372 THER/PROPH/DIAG INJ SC/IM: CPT | Performed by: NURSE PRACTITIONER

## 2025-05-04 RX ORDER — PANTOPRAZOLE SODIUM 40 MG/1
40 TABLET, DELAYED RELEASE ORAL DAILY
Status: DISCONTINUED | OUTPATIENT
Start: 2025-05-04 | End: 2025-05-05 | Stop reason: HOSPADM

## 2025-05-04 RX ORDER — ONDANSETRON 4 MG/1
8 TABLET, ORALLY DISINTEGRATING ORAL ONCE
Status: COMPLETED | OUTPATIENT
Start: 2025-05-04 | End: 2025-05-04

## 2025-05-04 RX ORDER — CALCIUM CARBONATE 200(500)MG
500 TABLET,CHEWABLE ORAL 3 TIMES DAILY PRN
Status: DISCONTINUED | OUTPATIENT
Start: 2025-05-04 | End: 2025-05-05 | Stop reason: HOSPADM

## 2025-05-04 RX ADMIN — TRAMADOL HYDROCHLORIDE 50 MG: 50 TABLET, COATED ORAL at 12:05

## 2025-05-04 RX ADMIN — CALCIUM CARBONATE (ANTACID) CHEW TAB 500 MG 500 MG: 500 CHEW TAB at 09:05

## 2025-05-04 RX ADMIN — CEFEPIME 2 G: 2 INJECTION, POWDER, FOR SOLUTION INTRAVENOUS at 05:05

## 2025-05-04 RX ADMIN — ONDANSETRON 8 MG: 4 TABLET, ORALLY DISINTEGRATING ORAL at 04:05

## 2025-05-04 RX ADMIN — GUAIFENESIN 600 MG: 600 TABLET, EXTENDED RELEASE ORAL at 10:05

## 2025-05-04 RX ADMIN — CEFEPIME 2 G: 2 INJECTION, POWDER, FOR SOLUTION INTRAVENOUS at 10:05

## 2025-05-04 RX ADMIN — FLUTICASONE PROPIONATE 50 MCG: 50 SPRAY, METERED NASAL at 09:05

## 2025-05-04 RX ADMIN — GUAIFENESIN 600 MG: 600 TABLET, EXTENDED RELEASE ORAL at 08:05

## 2025-05-04 RX ADMIN — DOXYCYCLINE 100 MG: 100 INJECTION, POWDER, LYOPHILIZED, FOR SOLUTION INTRAVENOUS at 12:05

## 2025-05-04 RX ADMIN — ONDANSETRON 4 MG: 2 INJECTION INTRAMUSCULAR; INTRAVENOUS at 11:05

## 2025-05-04 RX ADMIN — DOXYCYCLINE 100 MG: 100 INJECTION, POWDER, LYOPHILIZED, FOR SOLUTION INTRAVENOUS at 10:05

## 2025-05-04 RX ADMIN — BUSPIRONE HYDROCHLORIDE 5 MG: 5 TABLET ORAL at 05:05

## 2025-05-04 RX ADMIN — ENOXAPARIN SODIUM 40 MG: 40 INJECTION SUBCUTANEOUS at 04:05

## 2025-05-04 RX ADMIN — PANTOPRAZOLE SODIUM 40 MG: 40 TABLET, DELAYED RELEASE ORAL at 09:05

## 2025-05-04 NOTE — ASSESSMENT & PLAN NOTE
Patient has Abnormal Magnesium: hypomagnesemia. Will continue to monitor electrolytes closely. Will replace the affected electrolytes and repeat labs to be done after interventions completed. The patient's magnesium results have been reviewed and are listed below.  Patient given magnesium in the ED.  Recent Labs   Lab 05/04/25  0516   MG 1.8

## 2025-05-04 NOTE — SUBJECTIVE & OBJECTIVE
Interval History: Patient states she feels better, less shortness of breath.    Review of Systems   Constitutional:  Negative for activity change, appetite change, chills and fever.   HENT:  Positive for postnasal drip, sinus pressure and sneezing. Negative for congestion, ear pain, nosebleeds and sinus pain.    Eyes:  Negative for discharge and itching.   Respiratory:  Positive for cough and shortness of breath. Negative for apnea and chest tightness.    Cardiovascular:  Negative for chest pain, palpitations and leg swelling.   Gastrointestinal:  Negative for abdominal distention, abdominal pain and vomiting.   Genitourinary:  Negative for difficulty urinating, dysuria, flank pain and frequency.   Musculoskeletal:  Negative for arthralgias, back pain, joint swelling and myalgias.   Skin:  Negative for color change, pallor and rash.   Neurological:  Negative for dizziness, weakness, light-headedness and headaches.   Psychiatric/Behavioral:  Negative for agitation, behavioral problems, confusion and suicidal ideas.      Objective:     Vital Signs (Most Recent):  Temp: 98.2 °F (36.8 °C) (05/04/25 0804)  Pulse: 81 (05/04/25 0804)  Resp: 18 (05/04/25 0804)  BP: 125/68 (05/04/25 0804)  SpO2: 98 % (05/04/25 0804) Vital Signs (24h Range):  Temp:  [97.6 °F (36.4 °C)-98.5 °F (36.9 °C)] 98.2 °F (36.8 °C)  Pulse:  [72-91] 81  Resp:  [16-20] 18  SpO2:  [98 %-100 %] 98 %  BP: (115-138)/(66-78) 125/68     Weight: 65.7 kg (144 lb 13.5 oz)  Body mass index is 26.49 kg/m².    Intake/Output Summary (Last 24 hours) at 5/4/2025 1039  Last data filed at 5/4/2025 0934  Gross per 24 hour   Intake 1430.45 ml   Output --   Net 1430.45 ml         Physical Exam  Vitals reviewed.   Constitutional:       General: She is not in acute distress.     Appearance: Normal appearance. She is normal weight. She is not ill-appearing.   HENT:      Head: Normocephalic and atraumatic.      Nose: Congestion present.      Mouth/Throat:      Mouth: Mucous  membranes are moist.      Pharynx: Oropharynx is clear.   Eyes:      General: No scleral icterus.     Extraocular Movements: Extraocular movements intact.      Conjunctiva/sclera: Conjunctivae normal.      Pupils: Pupils are equal, round, and reactive to light.   Cardiovascular:      Rate and Rhythm: Normal rate and regular rhythm.      Pulses: Normal pulses.      Heart sounds: Normal heart sounds. No murmur heard.     No gallop.   Pulmonary:      Effort: Pulmonary effort is normal. No respiratory distress.      Breath sounds: Normal breath sounds. No wheezing, rhonchi or rales.   Chest:      Chest wall: No tenderness.   Abdominal:      General: Abdomen is flat. There is no distension.      Palpations: Abdomen is soft.      Tenderness: There is no abdominal tenderness.   Musculoskeletal:         General: No swelling or tenderness.      Cervical back: Normal range of motion and neck supple. No rigidity or tenderness.      Right lower leg: No edema.      Left lower leg: No edema.   Skin:     General: Skin is warm and dry.   Neurological:      General: No focal deficit present.      Mental Status: She is alert and oriented to person, place, and time. Mental status is at baseline.      Motor: No weakness.   Psychiatric:         Mood and Affect: Mood normal.         Behavior: Behavior normal.         Thought Content: Thought content normal.               Significant Labs: All pertinent labs within the past 24 hours have been reviewed.    Significant Imaging: I have reviewed all pertinent imaging results/findings within the past 24 hours.

## 2025-05-04 NOTE — ASSESSMENT & PLAN NOTE
Anemia is likely due to iron-deficiency anemia. Most recent hemoglobin and hematocrit are listed below.  Recent Labs     05/01/25  1658 05/01/25  2102 05/03/25  0927 05/04/25  0516   HGB 15.0  --  11.5* 10.6*   HCT 40.7 35* 31.6* 29.4*     Plan  - Monitor serial CBC: Daily  - Transfuse PRBC if patient becomes hemodynamically unstable, symptomatic or H/H drops below 7/21.  - Patient has not received any PRBC transfusions to date  - Patient's anemia is currently worsening. Will continue current treatment

## 2025-05-04 NOTE — CARE UPDATE
05/03/25 2013   Patient Assessment/Suction   Level of Consciousness (AVPU) alert   Respiratory Effort Normal;Unlabored   All Lung Fields Breath Sounds clear   PRE-TX-O2   Device (Oxygen Therapy) room air   SpO2 100 %   Pulse Oximetry Type Intermittent   $ Pulse Oximetry - Multiple Charge Pulse Oximetry - Multiple   Pulse 87   Resp 18   Positioning HOB elevated 30 degrees   Aerosol Therapy   $ Aerosol Therapy Charges PRN treatment not required

## 2025-05-04 NOTE — ASSESSMENT & PLAN NOTE
Patient has a diagnosis of pneumonia. The cause of the pneumonia is suspected to be bacterial in etiology but organism is not known. The pneumonia is worsening due to difficulty breathing. The patient has the following signs/symptoms of pneumonia: persistent hypoxia , cough, and chest tightness. The patient does not have a current oxygen requirement and the patient does not have a home oxygen requirement. I have reviewed the pertinent imaging. The following cultures have been collected: Sputum culture The culture results are listed below.   Add guaifenesin  Resume home Flonase  Start Afrin x3 days    Current antimicrobial regimen consists of the antibiotics listed below. Will monitor patient closely and continue current treatment plan unchanged.    Antibiotics (From admission, onward)      Start     Stop Route Frequency Ordered    05/04/25 0100  doxycycline 100 mg in D5W 100 mL IVPB (MB+)  (Community Acquired Pneumonia (CAP) - Low MDR Risk)         05/09/25 0059 IV Every 12 hours (non-standard times) 05/03/25 1256    05/03/25 2100  ceFEPIme injection 2 g         -- IV Every 8 hours (non-standard times) 05/03/25 1452            Microbiology Results (last 7 days)       Procedure Component Value Units Date/Time    Respiratory Infection Panel (PCR), Nasopharyngeal [[id number]]  (Abnormal) Collected: 05/03/25 1357    Order Status: Completed Specimen: Nasopharyngeal Swab Updated: 05/03/25 1505     Respiratory Infection Panel Source Nasopharyngeal Swab     Adenovirus Not Detected     Coronavirus 229E, Common Cold Virus Not Detected     Coronavirus HKU1, Common Cold Virus Not Detected     Coronavirus NL63, Common Cold Virus Not Detected     Coronavirus OC43, Common Cold Virus Detected     SARS-CoV2 (COVID-19) Qualitative PCR Not Detected     Human Metapneumovirus Not Detected     Human Rhinovirus/Enterovirus Not Detected     Influenza A (subtypes H1, H1-2009,H3) Not Detected     Influenza B Not Detected     Parainfluenza  Virus 1 Not Detected     Parainfluenza Virus 2 Not Detected     Parainfluenza Virus 3 Not Detected     Parainfluenza Virus 4 Not Detected     Respiratory Syncytial Virus Not Detected     Bordetella Parapertussis (TD6957) Not Detected     Bordetella pertussis (ptxP) Not Detected     Chlamydia pneumoniae Not Detected     Mycoplasma pneumoniae Not Detected    Culture, Respiratory with Gram Stain [[id number]]     Order Status: Sent Specimen: Respiratory     Influenza A & B by Molecular [[id number]]  (Normal) Collected: 05/03/25 0927    Order Status: Completed Specimen: Nasopharyngeal Swab Updated: 05/03/25 1001     INFLUENZA A MOLECULAR Negative     INFLUENZA B MOLECULAR  Negative

## 2025-05-04 NOTE — HOSPITAL COURSE
The patient was continued on IV abx and prn nebulizer treatments. Her shortness of breath improved. She continued with sinus congestion. Her symptoms improved, she remained on room air, and she was discharged home in stable condition.

## 2025-05-04 NOTE — CARE UPDATE
05/04/25 0711   Patient Assessment/Suction   Level of Consciousness (AVPU) alert   Respiratory Effort Unlabored;Normal   Expansion/Accessory Muscles/Retractions no use of accessory muscles;no retractions;expansion symmetric   All Lung Fields Breath Sounds clear   Rhythm/Pattern, Respiratory unlabored;pattern regular;depth regular;chest wiggle adequate;no shortness of breath reported   Cough Frequency no cough   PRE-TX-O2   Device (Oxygen Therapy) room air   SpO2 98 %   Pulse Oximetry Type Intermittent   $ Pulse Oximetry - Multiple Charge Pulse Oximetry - Multiple   Pulse 75   Resp 17   Aerosol Therapy   $ Aerosol Therapy Charges PRN treatment not required   Education   $ Education 15 min;Bronchodilator

## 2025-05-04 NOTE — PROGRESS NOTES
[facility]  Progress Note    Patient Name: Karyna Escoto  MRN: [id number]  Patient Class: OP- Observation   Admission Date: 5/3/2025  Length of Stay: 0 days  Attending Physician: Kimberly Cortes MD  Primary Care Provider: Valeria Newton FNP        Subjective     Principal Problem:Pneumonia of left upper lobe due to infectious organism        HPI:  Karyna Escoto is a 50 y.o. year-old patient with history of  has a past medical history of Anxiety, Breast cancer, and GERD (gastroesophageal reflux disease). who presented to the ED complaints of difficulty breathing.  He reports as though her chest was tight and it felt as though there was a pressure there and she can not breathe.  The patient has been with sinus congestion and drainage since Tuesday.  The patient came to the ED on Thursday with nausea and vomiting she states from the sinus drainage.  Patient is also with a dry nonproductive cough.  Patient's lungs clear to auscultation posterior and anterior.    Triage vitals with pulse 91, /66, temp 98.3°, SpO2 100% on room air.  Blood work performed in the ED with WBC 3.8, RBC 3.7, hemoglobin 11.5, hematocrit 31.6, , sodium 129, potassium 3.0, CO2 16, total bilirubin 1.3, Mag 1.5, D-dimer 0.53, salicylates level less than 1.5.  PH 7.5, PO CO2 20.7, PO2 18, HC03 17.1, base excess -6.  Chest x-ray with no acute cardiopulmonary abnormality.  CTA chest with no evidence of pulmonary embolism to the segmental arterial level.  Patchy ground glass alveolar opacity in the anterior left upper lobe suggestive of infectious or inflammatory process.      Overview/Hospital Course:  The patient was continued on IB abx and prn nebulizer treatments. Her shortness of breath improved. She continued with sinus congestion.    Interval History: Patient states she feels better, less shortness of breath.    Review of Systems   Constitutional:  Negative for activity change,  appetite change, chills and fever.   HENT:  Positive for postnasal drip, sinus pressure and sneezing. Negative for congestion, ear pain, nosebleeds and sinus pain.    Eyes:  Negative for discharge and itching.   Respiratory:  Positive for cough and shortness of breath. Negative for apnea and chest tightness.    Cardiovascular:  Negative for chest pain, palpitations and leg swelling.   Gastrointestinal:  Negative for abdominal distention, abdominal pain and vomiting.   Genitourinary:  Negative for difficulty urinating, dysuria, flank pain and frequency.   Musculoskeletal:  Negative for arthralgias, back pain, joint swelling and myalgias.   Skin:  Negative for color change, pallor and rash.   Neurological:  Negative for dizziness, weakness, light-headedness and headaches.   Psychiatric/Behavioral:  Negative for agitation, behavioral problems, confusion and suicidal ideas.      Objective:     Vital Signs (Most Recent):  Temp: 98.2 °F (36.8 °C) (05/04/25 0804)  Pulse: 81 (05/04/25 0804)  Resp: 18 (05/04/25 0804)  BP: 125/68 (05/04/25 0804)  SpO2: 98 % (05/04/25 0804) Vital Signs (24h Range):  Temp:  [97.6 °F (36.4 °C)-98.5 °F (36.9 °C)] 98.2 °F (36.8 °C)  Pulse:  [72-91] 81  Resp:  [16-20] 18  SpO2:  [98 %-100 %] 98 %  BP: (115-138)/(66-78) 125/68     Weight: 65.7 kg (144 lb 13.5 oz)  Body mass index is 26.49 kg/m².    Intake/Output Summary (Last 24 hours) at 5/4/2025 1039  Last data filed at 5/4/2025 0934  Gross per 24 hour   Intake 1430.45 ml   Output --   Net 1430.45 ml         Physical Exam  Vitals reviewed.   Constitutional:       General: She is not in acute distress.     Appearance: Normal appearance. She is normal weight. She is not ill-appearing.   HENT:      Head: Normocephalic and atraumatic.      Nose: Congestion present.      Mouth/Throat:      Mouth: Mucous membranes are moist.      Pharynx: Oropharynx is clear.   Eyes:      General: No scleral icterus.     Extraocular Movements: Extraocular movements  intact.      Conjunctiva/sclera: Conjunctivae normal.      Pupils: Pupils are equal, round, and reactive to light.   Cardiovascular:      Rate and Rhythm: Normal rate and regular rhythm.      Pulses: Normal pulses.      Heart sounds: Normal heart sounds. No murmur heard.     No gallop.   Pulmonary:      Effort: Pulmonary effort is normal. No respiratory distress.      Breath sounds: Normal breath sounds. No wheezing, rhonchi or rales.   Chest:      Chest wall: No tenderness.   Abdominal:      General: Abdomen is flat. There is no distension.      Palpations: Abdomen is soft.      Tenderness: There is no abdominal tenderness.   Musculoskeletal:         General: No swelling or tenderness.      Cervical back: Normal range of motion and neck supple. No rigidity or tenderness.      Right lower leg: No edema.      Left lower leg: No edema.   Skin:     General: Skin is warm and dry.   Neurological:      General: No focal deficit present.      Mental Status: She is alert and oriented to person, place, and time. Mental status is at baseline.      Motor: No weakness.   Psychiatric:         Mood and Affect: Mood normal.         Behavior: Behavior normal.         Thought Content: Thought content normal.               Significant Labs: All pertinent labs within the past 24 hours have been reviewed.    Significant Imaging: I have reviewed all pertinent imaging results/findings within the past 24 hours.      Assessment & Plan  Pneumonia of left upper lobe due to infectious organism  Patient has a diagnosis of pneumonia. The cause of the pneumonia is suspected to be bacterial in etiology but organism is not known. The pneumonia is worsening due to difficulty breathing. The patient has the following signs/symptoms of pneumonia: persistent hypoxia , cough, and chest tightness. The patient does not have a current oxygen requirement and the patient does not have a home oxygen requirement. I have reviewed the pertinent imaging. The  following cultures have been collected: Sputum culture The culture results are listed below.   Add guaifenesin  Resume home Flonase  Start Afrin x3 days    Current antimicrobial regimen consists of the antibiotics listed below. Will monitor patient closely and continue current treatment plan unchanged.    Antibiotics (From admission, onward)      Start     Stop Route Frequency Ordered    05/04/25 0100  doxycycline 100 mg in D5W 100 mL IVPB (MB+)  (Community Acquired Pneumonia (CAP) - Low MDR Risk)         05/09/25 0059 IV Every 12 hours (non-standard times) 05/03/25 1256    05/03/25 2100  ceFEPIme injection 2 g         -- IV Every 8 hours (non-standard times) 05/03/25 1452            Microbiology Results (last 7 days)       Procedure Component Value Units Date/Time    Respiratory Infection Panel (PCR), Nasopharyngeal [[id number]]  (Abnormal) Collected: 05/03/25 1357    Order Status: Completed Specimen: Nasopharyngeal Swab Updated: 05/03/25 1505     Respiratory Infection Panel Source Nasopharyngeal Swab     Adenovirus Not Detected     Coronavirus 229E, Common Cold Virus Not Detected     Coronavirus HKU1, Common Cold Virus Not Detected     Coronavirus NL63, Common Cold Virus Not Detected     Coronavirus OC43, Common Cold Virus Detected     SARS-CoV2 (COVID-19) Qualitative PCR Not Detected     Human Metapneumovirus Not Detected     Human Rhinovirus/Enterovirus Not Detected     Influenza A (subtypes H1, H1-2009,H3) Not Detected     Influenza B Not Detected     Parainfluenza Virus 1 Not Detected     Parainfluenza Virus 2 Not Detected     Parainfluenza Virus 3 Not Detected     Parainfluenza Virus 4 Not Detected     Respiratory Syncytial Virus Not Detected     Bordetella Parapertussis (GV6971) Not Detected     Bordetella pertussis (ptxP) Not Detected     Chlamydia pneumoniae Not Detected     Mycoplasma pneumoniae Not Detected    Culture, Respiratory with Gram Stain [[id number]]     Order Status: Sent Specimen: Respiratory      Influenza A & B by Molecular [[id number]]  (Normal) Collected: 05/03/25 0927    Order Status: Completed Specimen: Nasopharyngeal Swab Updated: 05/03/25 1001     INFLUENZA A MOLECULAR Negative     INFLUENZA B MOLECULAR  Negative          Anemia  Anemia is likely due to iron-deficiency anemia. Most recent hemoglobin and hematocrit are listed below.  Recent Labs     05/01/25  1658 05/01/25  2102 05/03/25  0927 05/04/25  0516   HGB 15.0  --  11.5* 10.6*   HCT 40.7 35* 31.6* 29.4*     Plan  - Monitor serial CBC: Daily  - Transfuse PRBC if patient becomes hemodynamically unstable, symptomatic or H/H drops below 7/21.  - Patient has not received any PRBC transfusions to date  - Patient's anemia is currently worsening. Will continue current treatment  Hypomagnesemia  Patient has Abnormal Magnesium: hypomagnesemia. Will continue to monitor electrolytes closely. Will replace the affected electrolytes and repeat labs to be done after interventions completed. The patient's magnesium results have been reviewed and are listed below.  Patient given magnesium in the ED.  Recent Labs   Lab 05/04/25  0516   MG 1.8      Hyponatremia  Hyponatremia is likely due to Medications: SSRIs and tea and toast syndrome.  Patient also reports she has been drinking a lot of water at home The patient's most recent sodium results are listed below.  Recent Labs     05/01/25 1658 05/03/25 0927 05/04/25  0516   * 129* 131*     Plan  - Correct the sodium by 4-6mEq in 24 hours.   - Obtain the following studies: cmp.  - Will treat the hyponatremia with IV fluids as follows:  Normal saline at 100 cc an hour times 5 hours and Fluid restriction of:  1.5 liter per day  - Monitor sodium Daily.   - Patient hyponatremia is initial assessment    VTE Risk Mitigation (From admission, onward)           Ordered     enoxaparin injection 40 mg  Daily         05/03/25 1248     IP VTE HIGH RISK PATIENT  Once         05/03/25 1248     Place sequential  compression device  Until discontinued         05/03/25 1248                    Discharge Planning   PAUL:      Code Status: Full Code   Medical Readiness for Discharge Date:                            Kimberly Cortes MD, MD  Department of Hospital Medicine   [facility]

## 2025-05-04 NOTE — PLAN OF CARE
[facility]  Initial Discharge Assessment    Assessment completed at bedside with Pt, and all information on FaceSheet confirmed, including demographics, PCP, pharmacy and insurance. Pt has not addressed advance directives. She currently lives with her spouse in [place]. Her PCP is Valeria Newton MD, and last appointment was six months ago. Her preferred pharmacy is [facility] in [place]. She denies any DME/HH/HD/Blood Thinners. Sandoval King (spouse) [phone] will transport her back home at discharge. She denies any recent hospitalizations. Plan for discharge is to return home. Case Management to continue to follow for discharge planning needs.        Primary Care Provider: Valeria Newton FNP    Admission Diagnosis: Hypomagnesemia [E83.42]    Admission Date: 5/3/2025  Expected Discharge Date:     Transition of Care Barriers: None    Payor: BLUE CR[employer] BLUE [employer]IELD / Plan: BCBS ALL OUT OF STATE / Product Type: PPO /     Extended Emergency Contact Information  Primary Emergency Contact: Earnestine King  Mobile Phone: [phone]  Relation: Daughter   needed? No  Secondary Emergency Contact: SANDOVAL KING  Address: [address]           [place] [address] [facility]  Home Phone: [phone]  Mobile Phone: [phone]  Relation: Spouse    Discharge Plan A: Home with family  Discharge Plan B: Home with family      [id number] - [place] - [address]  [address]  [place] [address]  Phone: [phone] Fax: [phone]    [place] [facility] - [place] - [address]  [address] [place]  [place] [address]  Phone: [phone] Fax: [phone]    [facility]  [address]  [facility] [address]  Phone: [phone] Fax: [phone]    [id number] - [place] - [address] [facility]  [address]  [address]  [place] [address]  Phone:  [phone] Fax: [phone]      Initial Assessment (most recent)       Adult Discharge Assessment - 05/04/25 1213          Discharge Assessment    Assessment Type Discharge Planning Assessment     Confirmed/corrected address, phone number and insurance Yes     Confirmed Demographics Correct on Facesheet     Source of Information patient     When was your last doctors appointment? --   six months ago    Reason For Admission Pneumonia of left upper lobe due to infectious organism     People in Home spouse     Do you expect to return to your current living situation? Yes     Do you have help at home or someone to help you manage your care at home? Yes     Who are your caregiver(s) and their phone number(s)? Chi Ecsoto [phone]     Prior to hospitilization cognitive status: Alert/Oriented     Current cognitive status: Alert/Oriented     Walking or Climbing Stairs Difficulty no     Dressing/Bathing Difficulty no     Home Accessibility wheelchair accessible     Home Layout Able to live on 1st floor     Equipment Currently Used at Home none     Readmission within 30 days? No     Patient currently being followed by outpatient case management? No     Do you currently have service(s) that help you manage your care at home? No     Do you take prescription medications? Yes     Do you have prescription coverage? Yes     Coverage Payor: [facility] ALL OUT OF STATE     Do you have any problems affording any of your prescribed medications? No     Is the patient taking medications as prescribed? yes     Who is going to help you get home at discharge? Chi Escoto [phone]     How do you get to doctors appointments? car, drives self     Are you on dialysis? No     Do you take coumadin? No     Discharge Plan A Home with family     Discharge Plan B Home with family     DME Needed Upon Discharge  none     Discharge Plan discussed with: Patient     Transition of Care Barriers None

## 2025-05-04 NOTE — ASSESSMENT & PLAN NOTE
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  Patient also reports she has been drinking a lot of water at home The patient's most recent sodium results are listed below.  Recent Labs     05/01/25  1658 05/03/25  0927 05/04/25  0516   * 129* 131*     Plan  - Correct the sodium by 4-6mEq in 24 hours.   - Obtain the following studies: cmp.  - Will treat the hyponatremia with IV fluids as follows:  Normal saline at 100 cc an hour times 5 hours and Fluid restriction of:  1.5 liter per day  - Monitor sodium Daily.   - Patient hyponatremia is initial assessment

## 2025-05-05 VITALS
HEIGHT: 62 IN | HEART RATE: 69 BPM | WEIGHT: 144.81 LBS | DIASTOLIC BLOOD PRESSURE: 86 MMHG | OXYGEN SATURATION: 100 % | TEMPERATURE: 98 F | RESPIRATION RATE: 18 BRPM | SYSTOLIC BLOOD PRESSURE: 129 MMHG | BODY MASS INDEX: 26.65 KG/M2

## 2025-05-05 LAB
ABSOLUTE EOSINOPHIL (SMH): 0.24 K/UL
ABSOLUTE MONOCYTE (SMH): 0.36 K/UL (ref 0.3–1)
ABSOLUTE NEUTROPHIL COUNT (SMH): 1.3 K/UL (ref 1.8–7.7)
ALBUMIN SERPL-MCNC: 3.6 G/DL (ref 3.5–5.2)
ALP SERPL-CCNC: 59 UNIT/L (ref 55–135)
ALT SERPL-CCNC: 22 UNIT/L (ref 10–44)
ANION GAP (SMH): 8 MMOL/L (ref 8–16)
AST SERPL-CCNC: 36 UNIT/L (ref 10–40)
BASOPHILS # BLD AUTO: 0.02 K/UL
BASOPHILS NFR BLD AUTO: 0.5 %
BILIRUB SERPL-MCNC: 0.5 MG/DL (ref 0.1–1)
BUN SERPL-MCNC: 5 MG/DL (ref 6–20)
CALCIUM SERPL-MCNC: 8.8 MG/DL (ref 8.7–10.5)
CHLORIDE SERPL-SCNC: 97 MMOL/L (ref 95–110)
CO2 SERPL-SCNC: 23 MMOL/L (ref 23–29)
CREAT SERPL-MCNC: 0.6 MG/DL (ref 0.5–1.4)
ERYTHROCYTE [DISTWIDTH] IN BLOOD BY AUTOMATED COUNT: 11.9 % (ref 11.5–14.5)
GFR SERPLBLD CREATININE-BSD FMLA CKD-EPI: >60 ML/MIN/1.73/M2
GLUCOSE SERPL-MCNC: 96 MG/DL (ref 70–110)
HCT VFR BLD AUTO: 30.9 % (ref 37–48.5)
HGB BLD-MCNC: 11.1 GM/DL (ref 12–16)
IMM GRANULOCYTES # BLD AUTO: 0.01 K/UL (ref 0–0.04)
IMM GRANULOCYTES NFR BLD AUTO: 0.2 % (ref 0–0.5)
LYMPHOCYTES # BLD AUTO: 2.05 K/UL (ref 1–4.8)
MAGNESIUM SERPL-MCNC: 1.6 MG/DL (ref 1.6–2.6)
MCH RBC QN AUTO: 31 PG (ref 27–31)
MCHC RBC AUTO-ENTMCNC: 35.9 G/DL (ref 32–36)
MCV RBC AUTO: 86 FL (ref 82–98)
NUCLEATED RBC (/100WBC) (SMH): 0 /100 WBC
PLATELET # BLD AUTO: 180 K/UL (ref 150–450)
PMV BLD AUTO: 10.6 FL (ref 9.2–12.9)
POTASSIUM SERPL-SCNC: 3.9 MMOL/L (ref 3.5–5.1)
PROT SERPL-MCNC: 6.1 GM/DL (ref 6–8.4)
RBC # BLD AUTO: 3.58 M/UL (ref 4–5.4)
RELATIVE EOSINOPHIL (SMH): 6 % (ref 0–8)
RELATIVE LYMPHOCYTE (SMH): 51.1 % (ref 18–48)
RELATIVE MONOCYTE (SMH): 9 % (ref 4–15)
RELATIVE NEUTROPHIL (SMH): 33.2 % (ref 38–73)
SODIUM SERPL-SCNC: 128 MMOL/L (ref 136–145)
WBC # BLD AUTO: 4.01 K/UL (ref 3.9–12.7)

## 2025-05-05 PROCEDURE — 96375 TX/PRO/DX INJ NEW DRUG ADDON: CPT

## 2025-05-05 PROCEDURE — 85025 COMPLETE CBC W/AUTO DIFF WBC: CPT | Performed by: NURSE PRACTITIONER

## 2025-05-05 PROCEDURE — 94761 N-INVAS EAR/PLS OXIMETRY MLT: CPT

## 2025-05-05 PROCEDURE — 99900035 HC TECH TIME PER 15 MIN (STAT)

## 2025-05-05 PROCEDURE — 25000003 PHARM REV CODE 250: Performed by: INTERNAL MEDICINE

## 2025-05-05 PROCEDURE — 25000003 PHARM REV CODE 250: Performed by: NURSE PRACTITIONER

## 2025-05-05 PROCEDURE — 63600175 PHARM REV CODE 636 W HCPCS: Performed by: NURSE PRACTITIONER

## 2025-05-05 PROCEDURE — 96365 THER/PROPH/DIAG IV INF INIT: CPT | Mod: 59

## 2025-05-05 PROCEDURE — 83735 ASSAY OF MAGNESIUM: CPT | Performed by: NURSE PRACTITIONER

## 2025-05-05 PROCEDURE — 36415 COLL VENOUS BLD VENIPUNCTURE: CPT | Performed by: NURSE PRACTITIONER

## 2025-05-05 PROCEDURE — 80053 COMPREHEN METABOLIC PANEL: CPT | Performed by: NURSE PRACTITIONER

## 2025-05-05 PROCEDURE — 96376 TX/PRO/DX INJ SAME DRUG ADON: CPT

## 2025-05-05 PROCEDURE — G0378 HOSPITAL OBSERVATION PER HR: HCPCS

## 2025-05-05 RX ORDER — CEFUROXIME AXETIL 500 MG/1
500 TABLET ORAL EVERY 12 HOURS
Qty: 10 TABLET | Refills: 0 | Status: SHIPPED | OUTPATIENT
Start: 2025-05-05 | End: 2025-05-10

## 2025-05-05 RX ORDER — DOXYCYCLINE HYCLATE 100 MG
100 TABLET ORAL EVERY 12 HOURS
Qty: 10 TABLET | Refills: 0 | Status: SHIPPED | OUTPATIENT
Start: 2025-05-05 | End: 2025-05-10

## 2025-05-05 RX ADMIN — FLUTICASONE PROPIONATE 50 MCG: 50 SPRAY, METERED NASAL at 08:05

## 2025-05-05 RX ADMIN — GUAIFENESIN 600 MG: 600 TABLET, EXTENDED RELEASE ORAL at 08:05

## 2025-05-05 RX ADMIN — CEFEPIME 2 G: 2 INJECTION, POWDER, FOR SOLUTION INTRAVENOUS at 05:05

## 2025-05-05 RX ADMIN — PANTOPRAZOLE SODIUM 40 MG: 40 TABLET, DELAYED RELEASE ORAL at 05:05

## 2025-05-05 RX ADMIN — BUSPIRONE HYDROCHLORIDE 5 MG: 5 TABLET ORAL at 05:05

## 2025-05-05 RX ADMIN — DOXYCYCLINE 100 MG: 100 INJECTION, POWDER, LYOPHILIZED, FOR SOLUTION INTRAVENOUS at 12:05

## 2025-05-05 RX ADMIN — CEFEPIME 2 G: 2 INJECTION, POWDER, FOR SOLUTION INTRAVENOUS at 01:05

## 2025-05-05 NOTE — PLAN OF CARE
Pt cleared from cm-  Chi Escoto (spouse) [phone] to transport home at dc  Appts added to AVS, referrals added to AVS      05/05/25 1008   Final Note   Assessment Type Final Discharge Note   Anticipated Discharge Disposition Home   Hospital Resources/Appts/Education Provided Appointments scheduled and added to AVS   Post-Acute Status   Discharge Delays None known at this time

## 2025-05-05 NOTE — CARE UPDATE
05/05/25 0949   Patient Assessment/Suction   Level of Consciousness (AVPU) alert   All Lung Fields Breath Sounds clear   PRE-TX-O2   Device (Oxygen Therapy) room air   SpO2 100 %   Pulse Oximetry Type Intermittent   $ Pulse Oximetry - Multiple Charge Pulse Oximetry - Multiple   Pulse 66   Resp 18   Aerosol Therapy   $ Aerosol Therapy Charges PRN treatment not required

## 2025-05-05 NOTE — DISCHARGE SUMMARY
[facility]  Discharge Summary      Patient Name: Karyna Escoto  MRN: [id number]  [facility]: [id number]  Patient Class: OP- Observation  Admission Date: 5/3/2025  Hospital Length of Stay: 0 days  Discharge Date and Time: 05/05/2025 10:33 AM  Attending Physician: Kimberly Cortes MD   Discharging Provider: Kimberly Cortes MD, MD  Primary Care Provider: Valeria Newton FNP    Primary Care Team: Networked reference to record PCT     HPI:   Karyna Escoto is a 50 y.o. year-old patient with history of  has a past medical history of Anxiety, Breast cancer, and GERD (gastroesophageal reflux disease). who presented to the ED complaints of difficulty breathing.  He reports as though her chest was tight and it felt as though there was a pressure there and she can not breathe.  The patient has been with sinus congestion and drainage since Tuesday.  The patient came to the ED on Thursday with nausea and vomiting she states from the sinus drainage.  Patient is also with a dry nonproductive cough.  Patient's lungs clear to auscultation posterior and anterior.    Triage vitals with pulse 91, /66, temp 98.3°, SpO2 100% on room air.  Blood work performed in the ED with WBC 3.8, RBC 3.7, hemoglobin 11.5, hematocrit 31.6, , sodium 129, potassium 3.0, CO2 16, total bilirubin 1.3, Mag 1.5, D-dimer 0.53, salicylates level less than 1.5.  PH 7.5, PO CO2 20.7, PO2 18, HC03 17.1, base excess -6.  Chest x-ray with no acute cardiopulmonary abnormality.  CTA chest with no evidence of pulmonary embolism to the segmental arterial level.  Patchy ground glass alveolar opacity in the anterior left upper lobe suggestive of infectious or inflammatory process.      * No surgery found *      Hospital Course:   The patient was continued on IV abx and prn nebulizer treatments. Her shortness of breath improved. She continued with sinus congestion. Her symptoms improved, she remained on room air,  and she was discharged home in stable condition.     Goals of Care Treatment Preferences:  Code Status: Full Code         Consults:   Consults (From admission, onward)          Status Ordering Provider     Inpatient consult to Midline team  Once        Provider:  (Not yet assigned)    Completed DESTINY TESFAYE            Assessment & Plan  Pneumonia of left upper lobe due to infectious organism  F/U outpatient. D/C on oral abx.    Anemia  F/U outpatient  Hypomagnesemia  F/U outpatient  Hyponatremia  Hyponatremia is likely due to Medications: SSRIs and tea and toast syndrome.  Patient also reports she has been drinking a lot of water at home The patient's most recent sodium results are listed below.  Recent Labs     05/03/25  0927 05/04/25  0516 05/05/25  0414   * 131* 128*   F/U outpatient    Final Active Diagnoses:    Diagnosis Date Noted POA    PRINCIPAL PROBLEM:  Pneumonia of left upper lobe due to infectious organism [J18.9] 05/03/2025 Yes    Anemia [D64.9] 05/03/2025 Yes    Hypomagnesemia [E83.42] 05/03/2025 Yes    Hyponatremia [E87.1] 05/03/2025 Yes      Problems Resolved During this Admission:       Discharged Condition: stable    Disposition: Home or Self Care    Follow Up:   Follow-up Information       Bipin Ware NP. Go on 5/13/2025.    Specialty: Internal Medicine  Why: Hospital follow up scheduled at 10:00 AM on 5/13  Contact information:  [address]  [place] [address]  [phone]                           Patient Instructions:      Diet Adult Regular     Activity as tolerated       Significant Diagnostic Studies: N/A    Pending Diagnostic Studies:       None           Medications:  Reconciled Home Medications:      Medication List        START taking these medications      cefUROXime 500 MG tablet  Commonly known as: CEFTIN  Take 1 tablet (500 mg total) by mouth every 12 (twelve) hours. for 5 days     doxycycline 100 MG tablet  Commonly known as: VIBRA-TABS  Take 1 tablet  (100 mg total) by mouth every 12 (twelve) hours. for 5 days            CHANGE how you take these medications      venlafaxine 37.5 MG 24 hr capsule  Commonly known as: EFFEXOR-XR  Take 1 capsule (37.5 mg total) by mouth once daily.  What changed: when to take this            CONTINUE taking these medications      busPIRone 5 MG Tab  Commonly known as: BUSPAR  Take 5 mg by mouth every morning.     fluticasone propionate 50 mcg/actuation nasal spray  Commonly known as: FLONASE  1 spray (50 mcg total) by Each Nostril route once daily.     levocetirizine 5 MG tablet  Commonly known as: XYZAL  Take 5 mg by mouth once daily.     LIDOcaine-prilocaine cream  Commonly known as: EMLA  Apply topically as needed. Apply 30 mins prior to port access     ondansetron 4 MG tablet  Commonly known as: ZOFRAN  Take 1 tablet (4 mg total) by mouth every 6 (six) hours.     pantoprazole 40 MG tablet  Commonly known as: PROTONIX  Take 1 tablet (40 mg total) by mouth once daily.     promethazine 25 MG tablet  Commonly known as: PHENERGAN  Take 1 tablet (25 mg total) by mouth every 4 to 6 hours as needed for Nausea.     traMADoL 50 mg tablet  Commonly known as: ULTRAM  Take 1 tablet (50 mg total) by mouth every 6 (six) hours.     traZODone 50 MG tablet  Commonly known as: DESYREL  Take 1 tablet (50 mg total) by mouth every evening.              Indwelling Lines/Drains at time of discharge:   Lines/Drains/Airways       Central Venous Catheter Line  Duration             Port A Cath Single Lumen 03/04/23 1049 Atrial Left 792 days                    Time spent on the discharge of patient: 35 minutes         Kimberly Cortes MD, MD  Department of Hospital Medicine  [facility]

## 2025-05-05 NOTE — NURSING
Pt discharge information given. No questions or concerns. IV removed catheter intact. All pt belongings gathered and sent with patient. Sent off unit via wheelchair to personal vehicle home with .

## 2025-05-05 NOTE — ASSESSMENT & PLAN NOTE
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  Patient also reports she has been drinking a lot of water at home The patient's most recent sodium results are listed below.  Recent Labs     05/03/25  0927 05/04/25  0516 05/05/25  0414   * 131* 128*   F/U outpatient

## 2025-05-06 LAB
BACTERIA BLD CULT: NORMAL
BACTERIA BLD CULT: NORMAL

## 2025-05-13 ENCOUNTER — LAB VISIT (OUTPATIENT)
Dept: LAB | Facility: HOSPITAL | Age: 50
End: 2025-05-13
Payer: COMMERCIAL

## 2025-05-13 ENCOUNTER — PATIENT MESSAGE (OUTPATIENT)
Dept: GASTROENTEROLOGY | Facility: CLINIC | Age: 50
End: 2025-05-13
Payer: COMMERCIAL

## 2025-05-13 ENCOUNTER — OFFICE VISIT (OUTPATIENT)
Dept: FAMILY MEDICINE | Facility: CLINIC | Age: 50
End: 2025-05-13
Payer: COMMERCIAL

## 2025-05-13 VITALS
DIASTOLIC BLOOD PRESSURE: 66 MMHG | SYSTOLIC BLOOD PRESSURE: 94 MMHG | TEMPERATURE: 99 F | WEIGHT: 141.13 LBS | BODY MASS INDEX: 25.81 KG/M2 | OXYGEN SATURATION: 99 % | HEART RATE: 74 BPM

## 2025-05-13 DIAGNOSIS — E87.1 LOW SODIUM LEVELS: ICD-10-CM

## 2025-05-13 DIAGNOSIS — J18.9 PNEUMONIA OF LEFT UPPER LOBE DUE TO INFECTIOUS ORGANISM: Primary | ICD-10-CM

## 2025-05-13 DIAGNOSIS — E83.42 HYPOMAGNESEMIA: ICD-10-CM

## 2025-05-13 DIAGNOSIS — Z12.11 COLON CANCER SCREENING: ICD-10-CM

## 2025-05-13 DIAGNOSIS — J18.9 PNEUMONIA OF LEFT UPPER LOBE DUE TO INFECTIOUS ORGANISM: ICD-10-CM

## 2025-05-13 DIAGNOSIS — D64.9 ANEMIA, UNSPECIFIED TYPE: ICD-10-CM

## 2025-05-13 DIAGNOSIS — C50.919 PRIMARY MALIGNANT NEOPLASM OF BREAST, UNSPECIFIED LATERALITY: ICD-10-CM

## 2025-05-13 LAB
ALBUMIN SERPL BCP-MCNC: 3.5 G/DL (ref 3.5–5.2)
ALP SERPL-CCNC: 67 UNIT/L (ref 40–150)
ALT SERPL W/O P-5'-P-CCNC: 20 UNIT/L (ref 10–44)
ANION GAP (OHS): 6 MMOL/L (ref 8–16)
AST SERPL-CCNC: 34 UNIT/L (ref 11–45)
BILIRUB SERPL-MCNC: 0.3 MG/DL (ref 0.1–1)
BUN SERPL-MCNC: 10 MG/DL (ref 6–20)
CALCIUM SERPL-MCNC: 9.2 MG/DL (ref 8.7–10.5)
CHLORIDE SERPL-SCNC: 103 MMOL/L (ref 95–110)
CO2 SERPL-SCNC: 28 MMOL/L (ref 23–29)
CREAT SERPL-MCNC: 0.7 MG/DL (ref 0.5–1.4)
GFR SERPLBLD CREATININE-BSD FMLA CKD-EPI: >60 ML/MIN/1.73/M2
GLUCOSE SERPL-MCNC: 69 MG/DL (ref 70–110)
IRON SATN MFR SERPL: 28 % (ref 20–50)
IRON SERPL-MCNC: 77 UG/DL (ref 30–160)
MAGNESIUM SERPL-MCNC: 1.9 MG/DL (ref 1.6–2.6)
POTASSIUM SERPL-SCNC: 4.9 MMOL/L (ref 3.5–5.1)
PROT SERPL-MCNC: 6.8 GM/DL (ref 6–8.4)
SODIUM SERPL-SCNC: 137 MMOL/L (ref 136–145)
TIBC SERPL-MCNC: 274 UG/DL (ref 250–450)
TRANSFERRIN SERPL-MCNC: 185 MG/DL (ref 200–375)

## 2025-05-13 PROCEDURE — 83735 ASSAY OF MAGNESIUM: CPT

## 2025-05-13 PROCEDURE — 80053 COMPREHEN METABOLIC PANEL: CPT

## 2025-05-13 PROCEDURE — 99999 PR PBB SHADOW E&M-EST. PATIENT-LVL III: CPT | Mod: PBBFAC,,, | Performed by: NURSE PRACTITIONER

## 2025-05-13 PROCEDURE — 84466 ASSAY OF TRANSFERRIN: CPT

## 2025-05-13 PROCEDURE — 36415 COLL VENOUS BLD VENIPUNCTURE: CPT | Mod: PN

## 2025-05-13 NOTE — PROGRESS NOTES
This dictation has been generated using Modal Fluency Dictation some phonetic errors may occur. Please contact author for clarification if needed.     1. Pneumonia of left upper lobe due to infectious organism  -     Basic Metabolic Panel; Future; Expected date: 05/13/2025  -     Magnesium; Future; Expected date: 05/13/2025    2. Hypomagnesemia  -     Magnesium; Future; Expected date: 05/13/2025    3. Anemia, unspecified type  -     Iron and TIBC; Future; Expected date: 05/13/2025    4. Low sodium levels  -     Basic Metabolic Panel; Future; Expected date: 05/13/2025    5. Colon cancer screening  -     Cancel: Case Request Endoscopy: COLONOSCOPY  -     Cologuard Screening (Multitarget Stool DNA); Future; Expected date: 05/13/2025         Pneumonia left upper lobe incidental note made on CT.  Chest x-ray did not reveal pneumonia.  Patient completed therapy and feeling better.  No need for repeat imaging.    Hypo magnesium update lab   Anemia reviewed last couple of years of hemoglobin and hematocrit with patient.  Note trending anemia check iron level.  Encourage patient is due colonoscopy she requested Cologuard.  Discussed with her that if positive reflex to  diagnostic colonoscopy.  Low-sodium level during admit secondary to pneumonia.   Update BMP.    I will review all results and address accordingly.   No follow-ups on file.    ________________________________________________________________  ________________________________________________________________      Chief Complaint   Patient presents with    Generalized Body Aches    Follow-up     History of present illness    This 50 y.o. presents today for complaint of   Hospital follow-up for pneumonia.  Patient went into the hospital for shortness a breath.  Her vital signs are stable.  Chest x-ray was unrevealing.  CBC was unremarkable no elevation of white count.  Given her ongoing shortness a breath CTA was done to rule out pulmonary emboli and incidental  finding of pneumonia left upper lobe noted.  Atypical versus inflammatory responded well to IV antibiotics and nebulizer treatments.  Patient noting improvement and discharged home.  Completed 5 days of cephalosporin and doxycycline. Small liver cyst present in left lobe since 2023.   Transitional Care Note    Family and/or Caretaker present at visit?  No.  Diagnostic tests reviewed/disposition: No diagnosic tests pending after this hospitalization.  Disease/illness education:   Abnormal labs and follow-up labs.  Need for colonoscopy given ongoing anemia and patient has never had colonoscopy.  She requests Cologuard.  Home health/community services discussion/referrals: Patient does not have home health established from hospital visit.  They do not need home health.  If needed, we will set up home health for the patient.   Establishment or re-establishment of referral orders for community resources: No other necessary community resources.   Discussion with other health care providers: No discussion with other health care providers necessary.      Past Medical History:   Diagnosis Date    Anxiety     Breast cancer 07/2022    GERD (gastroesophageal reflux disease)        Past Surgical History:   Procedure Laterality Date    BILATERAL MASTECTOMY Bilateral 03/17/2023    lymph node dissection left    BREAST BIOPSY Left 07/2022    ESOPHAGOGASTRODUODENOSCOPY N/A 8/1/2024    Procedure: EGD (ESOPHAGOGASTRODUODENOSCOPY);  Surgeon: Freddy Castañeda MD;  Location: [facility];  Service: Endoscopy;  Laterality: N/A;    eye lid surgery Bilateral 1990    INSERTION OF TUNNELED CENTRAL VENOUS CATHETER (CVC) WITH SUBCUTANEOUS PORT Left 11/21/2022    Procedure: MWLMROAAT-PEOC-N-CATH;  Surgeon: Oscar Murphy III, MD;  Location: [facility] OR;  Service: General;  Laterality: Left;    NOSE SURGERY  1990    PORTACATH PLACEMENT  08/2022    [facility]    TONSILLECTOMY  1990    TUBAL LIGATION  2013       Family History   Problem  Relation Name Age of Onset    Breast cancer Maternal Grandmother      Prostate cancer Maternal Grandfather      Colon cancer Neg Hx         Social History     Socioeconomic History    Marital status:    Tobacco Use    Smoking status: Former    Tobacco comments:     Quit 2005-13 year history -1 daily   Substance and Sexual Activity    Alcohol use: Not Currently     Comment: occasional    Drug use: Never    Sexual activity: Yes     Partners: Male     Social Drivers of Health     Financial Resource Strain: Low Risk  (5/7/2025)    Overall Financial Resource Strain (CARDIA)     Difficulty of Paying Living Expenses: Not very hard   Food Insecurity: No Food Insecurity (5/7/2025)    Hunger Vital Sign     Worried About Running Out of Food in the Last Year: Never true     Ran Out of Food in the Last Year: Never true   Transportation Needs: No Transportation Needs (5/7/2025)    PRAPARE - Transportation     Lack of Transportation (Medical): No     Lack of Transportation (Non-Medical): No   Physical Activity: Insufficiently Active (5/7/2025)    Exercise Vital Sign     Days of Exercise per Week: 3 days     Minutes of Exercise per Session: 30 min   Stress: Stress Concern Present (5/7/2025)    [employer] - Occupational Stress Questionnaire     Feeling of Stress : To some extent   Housing Stability: Low Risk  (5/7/2025)    Housing Stability Vital Sign     Unable to Pay for Housing in the Last Year: No     Number of Times Moved in the Last Year: 0     Homeless in the Last Year: No       Current Medications[1]    Review of patient's allergies indicates:   Allergen Reactions    Taxol [paclitaxel] Rash       Physical examination  Vitals Reviewed  BP 94/66 (Patient Position: Sitting)   Pulse 74   Temp 98.5 °F (36.9 °C)   Wt 64 kg (141 lb 1.5 oz)   LMP 08/11/2022 Comment: tubal  SpO2 99%   BMI 25.81 kg/m²  Body mass index is 25.81 kg/m².     BP Readings from Last 3 Encounters:   05/13/25 94/66    05/05/25 129/86   05/01/25 124/62       Wt Readings from Last 3 Encounters:   05/13/25 64 kg (141 lb 1.5 oz)   05/03/25 65.7 kg (144 lb 13.5 oz)   05/01/25 66.7 kg (147 lb)     Gen. Well-dressed well-nourished   Skin warm dry and intact.  No rashes noted.  Chest.  Respirations are even unlabored.  Lungs are clear to auscultation.  Cardiac regular rate and rhythm.  No chest wall adenopathy noted.  Abdomen is soft and not distended.  Bowel sounds are present.  No tenderness during palpation of the abdomen.    Neuro. Awake alert oriented x4.  Normal judgment and cognition noted.  Extremities no clubbing cyanosis or edema noted.     Call or return to clinic prn if these symptoms worsen or fail to improve as anticipated.           [1]   Current Outpatient Medications   Medication Sig Dispense Refill    busPIRone (BUSPAR) 5 MG Tab Take 5 mg by mouth every morning.      fluticasone propionate (FLONASE) 50 mcg/actuation nasal spray 1 spray (50 mcg total) by Each Nostril route once daily. 15.8 mL 5    levocetirizine (XYZAL) 5 MG tablet Take 5 mg by mouth once daily.      LIDOcaine-prilocaine (EMLA) cream Apply topically as needed. Apply 30 mins prior to port access 30 g 5    ondansetron (ZOFRAN) 4 MG tablet Take 1 tablet (4 mg total) by mouth every 6 (six) hours. 12 tablet 0    pantoprazole (PROTONIX) 40 MG tablet Take 1 tablet (40 mg total) by mouth once daily. 90 tablet 3    promethazine (PHENERGAN) 25 MG tablet Take 1 tablet (25 mg total) by mouth every 4 to 6 hours as needed for Nausea. 30 tablet 5    traMADoL (ULTRAM) 50 mg tablet Take 1 tablet (50 mg total) by mouth every 6 (six) hours. 30 tablet 2    traZODone (DESYREL) 50 MG tablet Take 1 tablet (50 mg total) by mouth every evening. 30 tablet 11    venlafaxine (EFFEXOR-XR) 37.5 MG 24 hr capsule Take 1 capsule (37.5 mg total) by mouth once daily. 30 capsule 11     No current facility-administered medications for this visit.

## 2025-05-15 ENCOUNTER — RESULTS FOLLOW-UP (OUTPATIENT)
Dept: FAMILY MEDICINE | Facility: CLINIC | Age: 50
End: 2025-05-15
Payer: COMMERCIAL

## 2025-05-15 DIAGNOSIS — R19.5 POSITIVE COLORECTAL CANCER SCREENING USING COLOGUARD TEST: Primary | ICD-10-CM

## 2025-05-25 DIAGNOSIS — G89.3 CANCER ASSOCIATED PAIN: ICD-10-CM

## 2025-05-25 DIAGNOSIS — C50.919 PRIMARY MALIGNANT NEOPLASM OF BREAST, UNSPECIFIED LATERALITY: ICD-10-CM

## 2025-05-26 RX ORDER — TRAMADOL HYDROCHLORIDE 50 MG/1
50 TABLET, FILM COATED ORAL EVERY 6 HOURS
Qty: 30 TABLET | Refills: 2 | Status: SHIPPED | OUTPATIENT
Start: 2025-05-26

## 2025-05-29 DIAGNOSIS — Z17.1 MALIGNANT NEOPLASM OF UPPER-OUTER QUADRANT OF LEFT BREAST IN FEMALE, ESTROGEN RECEPTOR NEGATIVE: ICD-10-CM

## 2025-05-29 DIAGNOSIS — C50.412 MALIGNANT NEOPLASM OF UPPER-OUTER QUADRANT OF LEFT BREAST IN FEMALE, ESTROGEN RECEPTOR NEGATIVE: ICD-10-CM

## 2025-05-29 RX ORDER — LIDOCAINE AND PRILOCAINE 25; 25 MG/G; MG/G
CREAM TOPICAL
Qty: 30 G | Refills: 5 | Status: SHIPPED | OUTPATIENT
Start: 2025-05-29

## 2025-06-05 ENCOUNTER — INFUSION (OUTPATIENT)
Dept: INFUSION THERAPY | Facility: HOSPITAL | Age: 50
End: 2025-06-05
Attending: INTERNAL MEDICINE
Payer: COMMERCIAL

## 2025-06-05 VITALS
HEIGHT: 62 IN | WEIGHT: 142.38 LBS | OXYGEN SATURATION: 100 % | TEMPERATURE: 98 F | DIASTOLIC BLOOD PRESSURE: 81 MMHG | HEART RATE: 67 BPM | RESPIRATION RATE: 18 BRPM | SYSTOLIC BLOOD PRESSURE: 125 MMHG | BODY MASS INDEX: 26.2 KG/M2

## 2025-06-05 DIAGNOSIS — Z17.1 MALIGNANT NEOPLASM OF UPPER-OUTER QUADRANT OF LEFT BREAST IN FEMALE, ESTROGEN RECEPTOR NEGATIVE: Primary | ICD-10-CM

## 2025-06-05 DIAGNOSIS — C50.412 MALIGNANT NEOPLASM OF UPPER-OUTER QUADRANT OF LEFT BREAST IN FEMALE, ESTROGEN RECEPTOR NEGATIVE: Primary | ICD-10-CM

## 2025-06-05 PROCEDURE — A4216 STERILE WATER/SALINE, 10 ML: HCPCS | Performed by: INTERNAL MEDICINE

## 2025-06-05 PROCEDURE — 25000003 PHARM REV CODE 250: Performed by: INTERNAL MEDICINE

## 2025-06-05 PROCEDURE — 96523 IRRIG DRUG DELIVERY DEVICE: CPT

## 2025-06-05 PROCEDURE — 63600175 PHARM REV CODE 636 W HCPCS: Performed by: INTERNAL MEDICINE

## 2025-06-05 RX ORDER — SODIUM CHLORIDE 0.9 % (FLUSH) 0.9 %
10 SYRINGE (ML) INJECTION
Status: DISCONTINUED | OUTPATIENT
Start: 2025-06-05 | End: 2025-06-05 | Stop reason: HOSPADM

## 2025-06-05 RX ORDER — SODIUM CHLORIDE 0.9 % (FLUSH) 0.9 %
10 SYRINGE (ML) INJECTION
OUTPATIENT
Start: 2025-06-05

## 2025-06-05 RX ORDER — HEPARIN 100 UNIT/ML
500 SYRINGE INTRAVENOUS
OUTPATIENT
Start: 2025-06-05

## 2025-06-05 RX ORDER — HEPARIN 100 UNIT/ML
500 SYRINGE INTRAVENOUS
Status: DISCONTINUED | OUTPATIENT
Start: 2025-06-05 | End: 2025-06-05 | Stop reason: HOSPADM

## 2025-06-05 RX ADMIN — HEPARIN 500 UNITS: 100 SYRINGE at 09:06

## 2025-06-05 RX ADMIN — SODIUM CHLORIDE, PRESERVATIVE FREE 10 ML: 5 INJECTION INTRAVENOUS at 09:06

## 2025-06-11 ENCOUNTER — PATIENT MESSAGE (OUTPATIENT)
Dept: GASTROENTEROLOGY | Facility: CLINIC | Age: 50
End: 2025-06-11
Payer: COMMERCIAL

## 2025-06-17 ENCOUNTER — TELEPHONE (OUTPATIENT)
Dept: GASTROENTEROLOGY | Facility: CLINIC | Age: 50
End: 2025-06-17
Payer: COMMERCIAL

## 2025-06-22 DIAGNOSIS — F43.21 SITUATIONAL DEPRESSION: ICD-10-CM

## 2025-06-23 RX ORDER — VENLAFAXINE HYDROCHLORIDE 37.5 MG/1
37.5 CAPSULE, EXTENDED RELEASE ORAL
Qty: 30 CAPSULE | Refills: 11 | Status: SHIPPED | OUTPATIENT
Start: 2025-06-23

## 2025-07-15 ENCOUNTER — HOSPITAL ENCOUNTER (OUTPATIENT)
Facility: HOSPITAL | Age: 50
Discharge: HOME | End: 2025-07-15
Attending: INTERNAL MEDICINE | Admitting: NURSE PRACTITIONER
Payer: COMMERCIAL

## 2025-07-15 ENCOUNTER — ANESTHESIA EVENT (OUTPATIENT)
Dept: ENDOSCOPY | Facility: HOSPITAL | Age: 50
End: 2025-07-15
Payer: COMMERCIAL

## 2025-07-15 ENCOUNTER — ANESTHESIA (OUTPATIENT)
Dept: ENDOSCOPY | Facility: HOSPITAL | Age: 50
End: 2025-07-15
Payer: COMMERCIAL

## 2025-07-15 DIAGNOSIS — R19.5 POSITIVE COLORECTAL CANCER SCREENING USING COLOGUARD TEST: ICD-10-CM

## 2025-07-15 PROCEDURE — G0121 COLON CA SCRN NOT HI RSK IND: HCPCS | Mod: ,,, | Performed by: INTERNAL MEDICINE

## 2025-07-15 PROCEDURE — 25000003 PHARM REV CODE 250: Performed by: INTERNAL MEDICINE

## 2025-07-15 PROCEDURE — G0121 COLON CA SCRN NOT HI RSK IND: HCPCS | Performed by: INTERNAL MEDICINE

## 2025-07-15 PROCEDURE — 37000008 HC ANESTHESIA 1ST 15 MINUTES: Performed by: INTERNAL MEDICINE

## 2025-07-15 PROCEDURE — 63600175 PHARM REV CODE 636 W HCPCS: Performed by: NURSE ANESTHETIST, CERTIFIED REGISTERED

## 2025-07-15 PROCEDURE — 37000009 HC ANESTHESIA EA ADD 15 MINS: Performed by: INTERNAL MEDICINE

## 2025-07-15 RX ORDER — PROPOFOL 10 MG/ML
VIAL (ML) INTRAVENOUS
Status: DISCONTINUED | OUTPATIENT
Start: 2025-07-15 | End: 2025-07-15

## 2025-07-15 RX ORDER — LIDOCAINE HYDROCHLORIDE 20 MG/ML
INJECTION INTRAVENOUS
Status: DISCONTINUED | OUTPATIENT
Start: 2025-07-15 | End: 2025-07-15

## 2025-07-15 RX ORDER — SODIUM CHLORIDE 9 MG/ML
INJECTION, SOLUTION INTRAVENOUS CONTINUOUS
Status: DISCONTINUED | OUTPATIENT
Start: 2025-07-15 | End: 2025-07-15 | Stop reason: HOSPADM

## 2025-07-15 RX ADMIN — SODIUM CHLORIDE: 9 INJECTION, SOLUTION INTRAVENOUS at 10:07

## 2025-07-15 RX ADMIN — PROPOFOL 30 MG: 10 INJECTION, EMULSION INTRAVENOUS at 12:07

## 2025-07-15 RX ADMIN — PROPOFOL 80 MG: 10 INJECTION, EMULSION INTRAVENOUS at 12:07

## 2025-07-15 RX ADMIN — LIDOCAINE HYDROCHLORIDE 60 MG: 20 INJECTION, SOLUTION INTRAVENOUS at 12:07

## 2025-07-15 NOTE — ANESTHESIA PREPROCEDURE EVALUATION
07/15/2025  Karyna Escoto is a 50 y.o., female.            Pre-op Assessment    I have reviewed the Patient Summary Reports.    I have reviewed the NPO Status.   I have reviewed the Medications.     Review of Systems  Anesthesia Hx:  No problems with previous Anesthesia             Denies Family Hx of Anesthesia complications.    Denies Personal Hx of Anesthesia complications.                    Social:  Former Smoker       Hematology/Oncology:       -- Anemia:                  Oncology: left breast CA.  --  Cancer in past history:       Breast              Cardiovascular:  Cardiovascular Normal                                              Pulmonary:  Denies Pneumonia                  Pulmonary Infection:  Pneumonia.     Hepatic/GI:     GERD, well controlled         Gerd          Musculoskeletal:  Musculoskeletal Normal                Neurological:  Neurology Normal                                      Endocrine:  Endocrine Normal            Psych:  Psychiatric History anxiety depression                Physical Exam  General: Well nourished, Cooperative, Alert and Oriented    Airway:  Mallampati: III / II  Mouth Opening: Normal  TM Distance: Normal  Neck ROM: Normal ROM        Anesthesia Plan  Type of Anesthesia, risks & benefits discussed:    Anesthesia Type: Gen Natural Airway  Intra-op Monitoring Plan: Standard ASA Monitors  Induction:  IV  Informed Consent: Informed consent signed with the Patient and all parties understand the risks and agree with anesthesia plan.  All questions answered.   ASA Score: 3    Ready For Surgery From Anesthesia Perspective.     .

## 2025-07-15 NOTE — PROVATION PATIENT INSTRUCTIONS
Discharge Summary/Instructions after an Endoscopic Procedure  Patient Name: Karyna Escoto  Patient MRN: [id number]  Patient YOB: 1975  Tuesday, July 15, 2025  Stephanie Chi MD  Dear patient,  As a result of recent federal legislation (The Federal Cures Act), you may   receive lab or pathology results from your procedure in your [facility]   account before your physician is able to contact you. Your physician or   their representative will relay the results to you with their   recommendations at their soonest availability.  Thank you,  RESTRICTIONS:  During your procedure today, you received medications for sedation.  These   medications may affect your judgment, balance and coordination.  Therefore,   for 24 hours, you have the following restrictions:   - DO NOT drive a car, operate machinery, make legal/financial decisions,   sign important papers or drink alcohol.    ACTIVITY:  Today: no heavy lifting, straining or running due to procedural   sedation/anesthesia.  The following day: return to full activity including work.  DIET:  Eat and drink normally unless instructed otherwise.     TREATMENT FOR COMMON SIDE EFFECTS:  - Mild abdominal pain, nausea, belching, bloating or excessive gas:  rest,   eat lightly and use a heating pad.  - Sore Throat: treat with throat lozenges and/or gargle with warm salt   water.  - Because air was used during the procedure, expelling large amounts of air   from your rectum or belching is normal.  - If a bowel prep was taken, you may not have a bowel movement for 1-3 days.    This is normal.  SYMPTOMS TO WATCH FOR AND REPORT TO YOUR PHYSICIAN:  1. Abdominal pain or bloating, other than gas cramps.  2. Chest pain.  3. Back pain.  4. Signs of infection such as: chills or fever occurring within 24 hours   after the procedure.  5. Rectal bleeding, which would show as bright red, maroon, or black stools.   (A tablespoon of blood from the rectum is not serious,  especially if   hemorrhoids are present.)  6. Vomiting.  7. Weakness or dizziness.  GO DIRECTLY TO THE NEAREST EMERGENCY ROOM IF YOU HAVE ANY OF THE FOLLOWING:      Difficulty breathing              Chills and/or fever over 101 F   Persistent vomiting and/or vomiting blood   Severe abdominal pain   Severe chest pain   Black, tarry stools   Bleeding- more than one tablespoon   Any other symptom or condition that you feel may need urgent attention  Your doctor recommends these additional instructions:  If any biopsies were taken, your doctors clinic will contact you in 1 to 2   weeks with any results.  - Discharge patient to home (with escort).   - Patient has a contact number available for emergencies.  The signs and   symptoms of potential delayed complications were discussed with the   patient.  Return to normal activities tomorrow.  Written discharge   instructions were provided to the patient.   - Resume previous diet.   - Continue present medications.   - Repeat colonoscopy in 10 years for screening purposes.   - Return to my office PRN.  For questions, problems or results please call your physician - Stephanie Chi MD at Work:  [phone].  [facility], EMERGENCY ROOM PHONE NUMBER: [phone]  IF A COMPLICATION OR EMERGENCY SITUATION ARISES AND YOU ARE UNABLE TO REACH   YOUR PHYSICIAN - GO DIRECTLY TO THE EMERGENCY ROOM.  Stephanie Chi MD  7/15/2025 1:02:13 PM  This report has been verified and signed electronically.  Dear patient,  As a result of recent federal legislation (The Federal Cures Act), you may   receive lab or pathology results from your procedure in your [facility]   account before your physician is able to contact you. Your physician or   their representative will relay the results to you with their   recommendations at their soonest availability.  Thank you,  PROVATION

## 2025-07-15 NOTE — TRANSFER OF CARE
"Anesthesia Transfer of Care Note    Patient: Karyna Escoto    Procedure(s) Performed: Procedure(s) (LRB):  COLONOSCOPY, SCREENING, HIGH RISK PATIENT (N/A)    Patient location: GI    Anesthesia Type: general    Transport from OR: Transported from OR on room air with adequate spontaneous ventilation    Post pain: adequate analgesia    Post assessment: no apparent anesthetic complications and tolerated procedure well    Post vital signs: stable    Level of consciousness: sedated and responds to stimulation    Nausea/Vomiting: no nausea/vomiting    Complications: none    Transfer of care protocol was followed      Last vitals: Visit Vitals  /69 (BP Location: Left arm, Patient Position: Lying)   Pulse 70   Temp 36.8 °C (98.2 °F) (Skin)   Resp 20   Ht 5' 2" (1.575 m)   Wt 64.4 kg (142 lb)   LMP 08/11/2022   SpO2 100%   Breastfeeding No   BMI 25.97 kg/m²     "

## 2025-07-15 NOTE — PLAN OF CARE
Vss, trevor po fluids, denies pain, ambulates easily. IV removed, catheter intact. Discharge instructions provided and states understanding. States ready to go home.  Discharged from facility with family per wheelchair.

## 2025-07-15 NOTE — H&P
[facility] Note    CC: + Cologuard    HPI 50 y.o. female presents for colonoscopy    Past Medical History:   Diagnosis Date    Anxiety     Breast cancer 07/2022    GERD (gastroesophageal reflux disease)        Allergies and Medications reviewed     Review of Systems  General ROS: negative for - chills, fever or weight loss  Cardiovascular ROS: no chest pain or dyspnea on exertion  Gastrointestinal ROS: no vomiting    Physical Examination  [facility] 08/11/2022 Comment: tubal  General appearance: alert, cooperative, no distress  HENT: Normocephalic, atraumatic, neck symmetrical, no nasal discharge, sclera anicteric   Lungs: clear to auscultation bilaterally, symmetric chest wall expansion bilaterally  Heart: regular rate and rhythm without rub; no displacement of the PMI   Abdomen: soft  Extremities: extremities symmetric; no clubbing, cyanosis, or edema        Labs:  Lab Results   Component Value Date    WBC 4.01 05/05/2025    HGB 11.1 (L) 05/05/2025    HCT 30.9 (L) 05/05/2025    MCV 86 05/05/2025     05/05/2025             Assessment:   50 y.o. female presents for colonsocopy    Plan:  Proceed to colonoscopy     Stephanie Chi MD  [facility]  [address], [address]  [place] [address]  Office: [phone]  Fax: [phone]

## 2025-07-16 ENCOUNTER — OFFICE VISIT (OUTPATIENT)
Dept: RADIATION ONCOLOGY | Facility: CLINIC | Age: 50
End: 2025-07-16
Payer: COMMERCIAL

## 2025-07-16 VITALS
RESPIRATION RATE: 17 BRPM | SYSTOLIC BLOOD PRESSURE: 111 MMHG | HEIGHT: 62 IN | WEIGHT: 142 LBS | OXYGEN SATURATION: 100 % | BODY MASS INDEX: 26.13 KG/M2 | TEMPERATURE: 97 F | DIASTOLIC BLOOD PRESSURE: 59 MMHG | HEART RATE: 71 BPM

## 2025-07-16 VITALS
TEMPERATURE: 98 F | SYSTOLIC BLOOD PRESSURE: 119 MMHG | WEIGHT: 147 LBS | HEART RATE: 74 BPM | BODY MASS INDEX: 26.89 KG/M2 | DIASTOLIC BLOOD PRESSURE: 82 MMHG

## 2025-07-16 DIAGNOSIS — C50.412 MALIGNANT NEOPLASM OF UPPER-OUTER QUADRANT OF LEFT BREAST IN FEMALE, ESTROGEN RECEPTOR NEGATIVE: Primary | ICD-10-CM

## 2025-07-16 DIAGNOSIS — Z17.1 MALIGNANT NEOPLASM OF UPPER-OUTER QUADRANT OF LEFT BREAST IN FEMALE, ESTROGEN RECEPTOR NEGATIVE: Primary | ICD-10-CM

## 2025-07-16 PROCEDURE — G2211 COMPLEX E/M VISIT ADD ON: HCPCS | Mod: S$GLB,,, | Performed by: RADIOLOGY

## 2025-07-16 PROCEDURE — 99214 OFFICE O/P EST MOD 30 MIN: CPT | Mod: S$GLB,,, | Performed by: RADIOLOGY

## 2025-07-16 PROCEDURE — 3074F SYST BP LT 130 MM HG: CPT | Mod: CPTII,S$GLB,, | Performed by: RADIOLOGY

## 2025-07-16 PROCEDURE — 3079F DIAST BP 80-89 MM HG: CPT | Mod: CPTII,S$GLB,, | Performed by: RADIOLOGY

## 2025-07-16 PROCEDURE — 3008F BODY MASS INDEX DOCD: CPT | Mod: CPTII,S$GLB,, | Performed by: RADIOLOGY

## 2025-07-16 PROCEDURE — 1160F RVW MEDS BY RX/DR IN RCRD: CPT | Mod: CPTII,S$GLB,, | Performed by: RADIOLOGY

## 2025-07-16 PROCEDURE — 1159F MED LIST DOCD IN RCRD: CPT | Mod: CPTII,S$GLB,, | Performed by: RADIOLOGY

## 2025-07-16 NOTE — ANESTHESIA POSTPROCEDURE EVALUATION
Anesthesia Post Evaluation    Patient: Karyna Escoto    Procedure(s) Performed: Procedure(s) (LRB):  COLONOSCOPY, SCREENING, HIGH RISK PATIENT (N/A)    Final Anesthesia Type: general      Patient location during evaluation: PACU  Patient participation: Yes- Able to Participate  Level of consciousness: awake and alert  Post-procedure vital signs: reviewed and stable  Pain management: adequate  Airway patency: patent    PONV status at discharge: No PONV  Anesthetic complications: no      Cardiovascular status: hemodynamically stable  Respiratory status: unassisted and room air  Hydration status: euvolemic  Follow-up not needed.              Vitals Value Taken Time   /59 07/15/25 13:20   Temp 36.3 °C (97.3 °F) 07/15/25 13:13   Pulse 71 07/15/25 13:20   Resp 17 07/15/25 13:20   SpO2 100 % 07/15/25 13:20         Event Time   Out of Recovery 13:26:20         Pain/Guy Score: Guy Score: 10 (7/15/2025  1:20 PM)

## 2025-07-16 NOTE — PROGRESS NOTES
Karyna Escoto  [id number]  1975  7/16/2025  No referring provider defined for this encounter.    DIAGNOSIS:  Cancer Staging   Left Breast Cancer-TRIPLE NEGATIVE  Staging form: Breast, AJCC 8th Edition  - Clinical: Stage IIA (cT1c, cN1(f), cM0, G2, ER-, GA+, HER2-) - Signed by John Bass Jr., MD on 8/5/2022  - Pathologic: No Stage Recommended (ypT0, pN0(sn), cM0) - Signed by John Bass Jr., MD on 4/3/2023    REASON FOR VISIT: Routine scheduled follow-up.    HISTORY OF PRESENT ILLNESS:   Karyna Escoto is a 50 y.o. female who presented with left axillary bruising with diagnostic mammogram and ultrasound noting 1.8cm hypoechoic mass in the axilla with extension to the skin and adjacent lymph nodes with small fatty rena.  Biopsy from the left breast returned grade 2 IDC, LVSI(-), PNI(-); ER(-),GA+ 8.9%, HER2(-) and from the L axilla: grade 2 IDC ER(-),GA+ 40%, HER2(-).  She was seen by Lolis Arevalo and Norma and expressed preference for bilateral mastectomy with reconstruction, recommended for tissue expanders.    Patient was seen by Dr. Patterson and is planned for neoadjuvant chemo/immunotherapy.  She was referred to Dr. Murphy for port placement and presented to discuss radiotherapeutic options 8/5/22:    Because of her clinical lymph node positive disease and several high risk features including young age and ER(-), I do recommend adjuvant treatment of the left chest wall and draining lymphatics to include axilla 1-3, supraclavicular fossa, internal mammary lymph node chain with demonstrated survival benefit per EBCTCG meta-analysis and EORTC 67418.    BRCA: (-) with VUS detected  MRI:  1.2 cm mass in left axillary tail with abnormal left level 1 lymphadenopathy with questionable marrow changes in sternum, ultimately negative on PET-CT.     She was placed on chemo regimen and completed keytruda + carbotaxol x 4c followed by AC x 4c with post treatment MRI consistent with CR.  She was recently  hospitalized for nausea and vomiting with failure to thrive and was placed on TPN briefly.    MRI breast interpretation at [facility] noted CR with resolution of left level 1 axillary lymph node.    She underwent bilateral mastectomy with left sentinel lymph node biopsy with Dr. Raza at [facility], 03/16/2023:   - L breast: ALH   - R breast: ALH   - 0/5 LNs   - TE's placed by Dr. Barrios    She returned to discuss adjuvant radiotherapy with concurrent Keytruda planned on April 3, 2023.  I recommended continued serial saline expansions followed by adjuvant radiotherapy encompassing the lymphatic distribution due to her clinical N+ disease.  We discussed DIBH.  She continues to follow with Dr. Patterson and recently was placed on Decadron for poor appetite and energy.    Remained on adjuvant Keytruda and completed adjuvant radiotherapy, 5040 cGy to left chest wall and draining lymphatics ending July 14, 2023.  Treatment well tolerated with expected radiation dermatitis and fatigue.    Completed adjuvant Keytruda 12/6/2023.    INTERVAL HISTORY:   Patient reports chronic aches and pains since completion of all of her therapy causing difficulty with ADLs.  She is no longer working.  She recently underwent final plastics revisions via fat grafting.  Denies fever, chills, chest pain, shortness of breath, cough or hemoptysis.    Review of systems otherwise negative unless indicated in HPI/interval history.    Past Medical History:   Diagnosis Date    Anxiety     Breast cancer 07/2022    GERD (gastroesophageal reflux disease)      Past Surgical History:   Procedure Laterality Date    BILATERAL MASTECTOMY Bilateral 03/17/2023    lymph node dissection left    BREAST BIOPSY Left 07/2022    COLONOSCOPY, SCREENING, HIGH RISK PATIENT N/A 7/15/2025    Procedure: COLONOSCOPY, SCREENING, HIGH RISK PATIENT;  Surgeon: Stephanie Chi MD;  Location: [facility];  Service: Endoscopy;  Laterality: N/A;  ppm/lm/+cologuard     ESOPHAGOGASTRODUODENOSCOPY N/A 8/1/2024    Procedure: EGD (ESOPHAGOGASTRODUODENOSCOPY);  Surgeon: Freddy Castañeda MD;  Location: [facility];  Service: Endoscopy;  Laterality: N/A;    eye lid surgery Bilateral 1990    INSERTION OF TUNNELED CENTRAL VENOUS CATHETER (CVC) WITH SUBCUTANEOUS PORT Left 11/21/2022    Procedure: [id number]-[id number];  Surgeon: Oscar Murphy III, MD;  Location: [facility];  Service: General;  Laterality: Left;    NOSE SURGERY  1990    PORTACATH PLACEMENT  08/2022    [facility]    TONSILLECTOMY  1990    TUBAL LIGATION  2013     Social History     Socioeconomic History    Marital status:    Tobacco Use    Smoking status: Former    Tobacco comments:     Quit 2005-13 year history -1 daily   Substance and Sexual Activity    Alcohol use: Not Currently     Comment: occasional    Drug use: Never    Sexual activity: Yes     Partners: Male     Social Drivers of Health     Financial Resource Strain: Low Risk  (5/7/2025)    Overall Financial Resource Strain (CARDIA)     Difficulty of Paying Living Expenses: Not very hard   Food Insecurity: No Food Insecurity (6/6/2025)    Received from [facility]    Hunger Vital Sign     Worried About Running Out of Food in the Last Year: Never true     Ran Out of Food in the Last Year: Never true   Transportation Needs: No Transportation Needs (6/6/2025)    Received from [facility]    PRAPARE - Transportation     Lack of Transportation (Medical): No     Lack of Transportation (Non-Medical): No   Physical Activity: Sufficiently Active (6/1/2025)    Received from [facility]    Exercise Vital Sign     Days of Exercise per Week: 7 days     Minutes of Exercise per Session: 30 min   Recent Concern: Physical Activity - Insufficiently Active (5/7/2025)    Exercise Vital Sign     Days of Exercise per Week: 3 days     Minutes of Exercise per Session: 30 min   Stress: Stress Concern Present (5/7/2025)    [employer] - Occupational  Stress Questionnaire     Feeling of Stress : To some extent   Housing Stability: Low Risk  (6/6/2025)    Received from [facility]    Housing Stability     What is your living situation today?: I have a steady place to live     Family History   Problem Relation Name Age of Onset    Breast cancer Maternal Grandmother      Prostate cancer Maternal Grandfather      Colon cancer Neg Hx       Medication List with Changes/Refills   Current Medications    BUSPIRONE (BUSPAR) 5 MG TAB    Take 5 mg by mouth every morning.    FLUTICASONE PROPIONATE (FLONASE) 50 MCG/ACTUATION NASAL SPRAY    1 spray (50 mcg total) by Each Nostril route once daily.    LEVOCETIRIZINE (XYZAL) 5 MG TABLET    Take 5 mg by mouth once daily.    LIDOCAINE-PRILOCAINE (EMLA) CREAM    Apply topically as needed. Apply 30 mins prior to port access    ONDANSETRON (ZOFRAN) 4 MG TABLET    Take 1 tablet (4 mg total) by mouth every 6 (six) hours.    PANTOPRAZOLE (PROTONIX) 40 MG TABLET    Take 1 tablet (40 mg total) by mouth once daily.    PROMETHAZINE (PHENERGAN) 25 MG TABLET    Take 1 tablet (25 mg total) by mouth every 4 to 6 hours as needed for Nausea.    TRAMADOL (ULTRAM) 50 MG TABLET    Take 1 tablet (50 mg total) by mouth every 6 (six) hours.    TRAZODONE (DESYREL) 50 MG TABLET    Take 1 tablet (50 mg total) by mouth every evening.    VENLAFAXINE (EFFEXOR-XR) 37.5 MG 24 HR CAPSULE    Take 1 capsule by mouth once daily     Review of patient's allergies indicates:   Allergen Reactions    Taxol [paclitaxel] Rash       QUALITY OF LIFE: 90%- Able to Carry on Normal Activity: Minor Symptoms of Disease    Vitals:    07/16/25 1300   BP: 119/82   Pulse: 74   Temp: 97.9 °F (36.6 °C)   Weight: 66.7 kg (147 lb)   PainSc:   2   PainLoc: Rectum     Body mass index is 26.89 kg/m².    PHYSICAL EXAM:   GENERAL: alert; in no apparent distress.   HEAD: normocephalic, atraumatic.  Alopecia recovery  EYES: pupils are equal, round, reactive to light and accommodation. Sclera  anicteric. Conjunctiva not injected.   NOSE/THROAT: no nasal erythema or rhinorrhea. Oropharynx pink, without erythema, ulcerations or thrush.   NECK: no cervical motion rigidity; supple with no masses.  CHEST: Patient is speaking comfortably on room air with normal work of breathing without using accessory muscles of respiration.  CARDIOVASCULAR: regular rate and rhythm  ABDOMEN: soft, nontender, nondistended.   MUSCULOSKELETAL: no tenderness to palpation along the spine or scapulae. Normal range of motion.  NEUROLOGIC: cranial nerves II-XII intact bilaterally. Strength 5/5 in bilateral upper and lower extremities. No sensory deficits appreciated. Normal gait.  LYMPHATIC: no left axillary adenopathy appreciated.   EXTREMITIES: no clubbing, cyanosis, edema.  SKIN: no erythema, rashes, ulcerations noted.   CW:  deferred per pt    ANCILLARY DATA:   none    ASSESSMENT: Karyna Escoto is a female with stage IIA, [id number] g2 IDC of UOQ L breast, ER(-)/PA(+)/HER2(-) converted to ypT0N0(sn)  PLAN:   - patient continues with chronic aches and pains.  She has completed PT/OT and today I offered referral for acupuncture and she is interested.  She denies pain and discomfort of the left chest wall and reports good range of motion of left upper extremity.  - KATHE   - Follow up with Dr. Peralta for routine laboratory assessment and biannual  physical exams  - Follow up with Plastics at [facility]  - Return to clinic prn.    All questions answered and contact information provided. Patient understands free to call us anytime with any questions or concerns regarding radiation therapy.    I have personally seen and evaluated this patient with a moderate to high complexity diagnosis.      30 minutes were dedicated to reviewing/interpreting pertinent laboratory/imaging/pathology as well as follow-up with concurrent consultants; reviewing and performing history and physical; counseling patient on continuing oncologic  recommendations; documentation in the electronic medical record including ordering of additional tests and/or radiation treatment protocol; and coordination of care with physicians with referrals placed as appropriate.    PHYSICIAN: John Bass Jr, MD

## 2025-07-23 ENCOUNTER — INFUSION (OUTPATIENT)
Dept: INFUSION THERAPY | Facility: HOSPITAL | Age: 50
End: 2025-07-23
Attending: INTERNAL MEDICINE
Payer: COMMERCIAL

## 2025-07-23 VITALS
DIASTOLIC BLOOD PRESSURE: 74 MMHG | WEIGHT: 137.88 LBS | HEIGHT: 62 IN | HEART RATE: 80 BPM | RESPIRATION RATE: 17 BRPM | BODY MASS INDEX: 25.37 KG/M2 | OXYGEN SATURATION: 100 % | SYSTOLIC BLOOD PRESSURE: 115 MMHG

## 2025-07-23 DIAGNOSIS — C50.412 MALIGNANT NEOPLASM OF UPPER-OUTER QUADRANT OF LEFT BREAST IN FEMALE, ESTROGEN RECEPTOR NEGATIVE: Primary | ICD-10-CM

## 2025-07-23 DIAGNOSIS — Z17.1 MALIGNANT NEOPLASM OF UPPER-OUTER QUADRANT OF LEFT BREAST IN FEMALE, ESTROGEN RECEPTOR NEGATIVE: Primary | ICD-10-CM

## 2025-07-23 PROCEDURE — 25000003 PHARM REV CODE 250: Performed by: INTERNAL MEDICINE

## 2025-07-23 PROCEDURE — 63600175 PHARM REV CODE 636 W HCPCS: Performed by: INTERNAL MEDICINE

## 2025-07-23 PROCEDURE — A4216 STERILE WATER/SALINE, 10 ML: HCPCS | Performed by: INTERNAL MEDICINE

## 2025-07-23 PROCEDURE — 96523 IRRIG DRUG DELIVERY DEVICE: CPT

## 2025-07-23 RX ORDER — HEPARIN 100 UNIT/ML
500 SYRINGE INTRAVENOUS
Status: DISCONTINUED | OUTPATIENT
Start: 2025-07-23 | End: 2025-07-23 | Stop reason: HOSPADM

## 2025-07-23 RX ORDER — SODIUM CHLORIDE 0.9 % (FLUSH) 0.9 %
10 SYRINGE (ML) INJECTION
Status: DISCONTINUED | OUTPATIENT
Start: 2025-07-23 | End: 2025-07-23 | Stop reason: HOSPADM

## 2025-07-23 RX ORDER — HEPARIN 100 UNIT/ML
500 SYRINGE INTRAVENOUS
OUTPATIENT
Start: 2025-07-23

## 2025-07-23 RX ORDER — SODIUM CHLORIDE 0.9 % (FLUSH) 0.9 %
10 SYRINGE (ML) INJECTION
OUTPATIENT
Start: 2025-07-23

## 2025-07-23 RX ADMIN — HEPARIN 500 UNITS: 100 SYRINGE at 03:07

## 2025-07-23 RX ADMIN — SODIUM CHLORIDE, PRESERVATIVE FREE 10 ML: 5 INJECTION INTRAVENOUS at 03:07

## 2025-07-30 ENCOUNTER — LAB VISIT (OUTPATIENT)
Dept: LAB | Facility: HOSPITAL | Age: 50
End: 2025-07-30
Attending: INTERNAL MEDICINE
Payer: COMMERCIAL

## 2025-07-30 ENCOUNTER — TELEPHONE (OUTPATIENT)
Facility: CLINIC | Age: 50
End: 2025-07-30
Payer: COMMERCIAL

## 2025-07-30 DIAGNOSIS — R53.83 FATIGUE, UNSPECIFIED TYPE: ICD-10-CM

## 2025-07-30 LAB
ABSOLUTE EOSINOPHIL (SMH): 0.16 K/UL
ABSOLUTE MONOCYTE (SMH): 0.34 K/UL (ref 0.3–1)
ABSOLUTE NEUTROPHIL COUNT (SMH): 1.5 K/UL (ref 1.8–7.7)
ALBUMIN SERPL-MCNC: 3.8 G/DL (ref 3.5–5.2)
ALP SERPL-CCNC: 62 UNIT/L (ref 55–135)
ALT SERPL-CCNC: 10 UNIT/L (ref 10–44)
ANION GAP (SMH): 5 MMOL/L (ref 8–16)
AST SERPL-CCNC: 21 UNIT/L (ref 10–40)
BASOPHILS # BLD AUTO: 0.04 K/UL
BASOPHILS NFR BLD AUTO: 1 %
BILIRUB SERPL-MCNC: 0.4 MG/DL (ref 0.1–1)
BUN SERPL-MCNC: 10 MG/DL (ref 6–20)
CALCIUM SERPL-MCNC: 9.3 MG/DL (ref 8.7–10.5)
CHLORIDE SERPL-SCNC: 108 MMOL/L (ref 95–110)
CO2 SERPL-SCNC: 27 MMOL/L (ref 23–29)
CORTIS SERPL-MCNC: <0.4 UG/DL (ref 4.3–22.4)
CREAT SERPL-MCNC: 0.8 MG/DL (ref 0.5–1.4)
ERYTHROCYTE [DISTWIDTH] IN BLOOD BY AUTOMATED COUNT: 13.2 % (ref 11.5–14.5)
FERRITIN SERPL-MCNC: 131.6 NG/ML (ref 20–300)
FOLATE SERPL-MCNC: 12.6 NG/ML (ref 4–24)
GFR SERPLBLD CREATININE-BSD FMLA CKD-EPI: >60 ML/MIN/1.73/M2
GLUCOSE SERPL-MCNC: 82 MG/DL (ref 70–110)
HCT VFR BLD AUTO: 34.4 % (ref 37–48.5)
HGB BLD-MCNC: 11.9 GM/DL (ref 12–16)
IMM GRANULOCYTES # BLD AUTO: 0.01 K/UL (ref 0–0.04)
IMM GRANULOCYTES NFR BLD AUTO: 0.3 % (ref 0–0.5)
LYMPHOCYTES # BLD AUTO: 1.99 K/UL (ref 1–4.8)
MCH RBC QN AUTO: 30.8 PG (ref 27–31)
MCHC RBC AUTO-ENTMCNC: 34.6 G/DL (ref 32–36)
MCV RBC AUTO: 89 FL (ref 82–98)
NUCLEATED RBC (/100WBC) (SMH): 0 /100 WBC
PLATELET # BLD AUTO: 165 K/UL (ref 150–450)
PMV BLD AUTO: 9 FL (ref 9.2–12.9)
POTASSIUM SERPL-SCNC: 4.6 MMOL/L (ref 3.5–5.1)
PROT SERPL-MCNC: 6.4 GM/DL (ref 6–8.4)
RBC # BLD AUTO: 3.86 M/UL (ref 4–5.4)
RELATIVE EOSINOPHIL (SMH): 4 % (ref 0–8)
RELATIVE LYMPHOCYTE (SMH): 49.8 % (ref 18–48)
RELATIVE MONOCYTE (SMH): 8.5 % (ref 4–15)
RELATIVE NEUTROPHIL (SMH): 36.4 % (ref 38–73)
SODIUM SERPL-SCNC: 140 MMOL/L (ref 136–145)
TSH SERPL-ACNC: 4.33 UIU/ML (ref 0.34–5.6)
VIT B12 SERPL-MCNC: 380 PG/ML (ref 210–950)
WBC # BLD AUTO: 4 K/UL (ref 3.9–12.7)

## 2025-07-30 PROCEDURE — 82746 ASSAY OF FOLIC ACID SERUM: CPT

## 2025-07-30 PROCEDURE — 85025 COMPLETE CBC W/AUTO DIFF WBC: CPT

## 2025-07-30 PROCEDURE — 80053 COMPREHEN METABOLIC PANEL: CPT

## 2025-07-30 PROCEDURE — 82533 TOTAL CORTISOL: CPT

## 2025-07-30 PROCEDURE — 82607 VITAMIN B-12: CPT

## 2025-07-30 PROCEDURE — 36415 COLL VENOUS BLD VENIPUNCTURE: CPT

## 2025-07-30 PROCEDURE — 82728 ASSAY OF FERRITIN: CPT

## 2025-07-30 PROCEDURE — 84443 ASSAY THYROID STIM HORMONE: CPT

## 2025-07-30 NOTE — TELEPHONE ENCOUNTER
Called patient on regard to above information, patient stated she had an appointment with Endocrinology who recommended corticosteroids but patient refused due to side effects and she has not other appointment with Endocrinology, patient was advised to consults with endocrinology for further recordation. Patient stated understanding.

## 2025-07-30 NOTE — TELEPHONE ENCOUNTER
----- Message from JEAN PAUL Adnrea sent at 7/30/2025  4:14 PM CDT -----  Please notify the patient that their cortisol is low. See if she every saw endocrinology.  ----- Message -----  From: Lab, Background User  Sent: 7/30/2025  10:35 AM CDT  To: JEAN PAUL Andrea

## 2025-08-07 ENCOUNTER — LAB VISIT (OUTPATIENT)
Dept: LAB | Facility: HOSPITAL | Age: 50
End: 2025-08-07
Attending: INTERNAL MEDICINE
Payer: COMMERCIAL

## 2025-08-07 ENCOUNTER — OFFICE VISIT (OUTPATIENT)
Facility: CLINIC | Age: 50
End: 2025-08-07
Payer: COMMERCIAL

## 2025-08-07 ENCOUNTER — TELEPHONE (OUTPATIENT)
Facility: CLINIC | Age: 50
End: 2025-08-07

## 2025-08-07 VITALS
TEMPERATURE: 99 F | DIASTOLIC BLOOD PRESSURE: 69 MMHG | WEIGHT: 135.31 LBS | RESPIRATION RATE: 17 BRPM | HEART RATE: 69 BPM | BODY MASS INDEX: 24.75 KG/M2 | SYSTOLIC BLOOD PRESSURE: 105 MMHG

## 2025-08-07 DIAGNOSIS — C50.412 MALIGNANT NEOPLASM OF UPPER-OUTER QUADRANT OF LEFT BREAST IN FEMALE, ESTROGEN RECEPTOR NEGATIVE: Primary | ICD-10-CM

## 2025-08-07 DIAGNOSIS — C50.919 PRIMARY MALIGNANT NEOPLASM OF BREAST, UNSPECIFIED LATERALITY: ICD-10-CM

## 2025-08-07 DIAGNOSIS — Z17.1 MALIGNANT NEOPLASM OF UPPER-OUTER QUADRANT OF LEFT BREAST IN FEMALE, ESTROGEN RECEPTOR NEGATIVE: Primary | ICD-10-CM

## 2025-08-07 DIAGNOSIS — R91.1 LUNG NODULE: ICD-10-CM

## 2025-08-07 LAB — CARCINOEMBRYONIC ANTIGEN (SMH): 1.1 NG/ML

## 2025-08-07 PROCEDURE — G2211 COMPLEX E/M VISIT ADD ON: HCPCS | Mod: S$GLB,,, | Performed by: INTERNAL MEDICINE

## 2025-08-07 PROCEDURE — 99214 OFFICE O/P EST MOD 30 MIN: CPT | Mod: S$GLB,,, | Performed by: INTERNAL MEDICINE

## 2025-08-07 PROCEDURE — 3078F DIAST BP <80 MM HG: CPT | Mod: CPTII,S$GLB,, | Performed by: INTERNAL MEDICINE

## 2025-08-07 PROCEDURE — 82378 CARCINOEMBRYONIC ANTIGEN: CPT

## 2025-08-07 PROCEDURE — 86300 IMMUNOASSAY TUMOR CA 15-3: CPT

## 2025-08-07 PROCEDURE — 3008F BODY MASS INDEX DOCD: CPT | Mod: CPTII,S$GLB,, | Performed by: INTERNAL MEDICINE

## 2025-08-07 PROCEDURE — 1159F MED LIST DOCD IN RCRD: CPT | Mod: CPTII,S$GLB,, | Performed by: INTERNAL MEDICINE

## 2025-08-07 PROCEDURE — 99999 PR PBB SHADOW E&M-EST. PATIENT-LVL III: CPT | Mod: PBBFAC,,, | Performed by: INTERNAL MEDICINE

## 2025-08-07 PROCEDURE — 86300 IMMUNOASSAY TUMOR CA 15-3: CPT | Mod: 59

## 2025-08-07 PROCEDURE — 3074F SYST BP LT 130 MM HG: CPT | Mod: CPTII,S$GLB,, | Performed by: INTERNAL MEDICINE

## 2025-08-07 PROCEDURE — 36415 COLL VENOUS BLD VENIPUNCTURE: CPT

## 2025-08-07 NOTE — ASSESSMENT & PLAN NOTE
Patient has been doing ok but note is made of new nodularity in the upper/outer quadrant of the left breast.  This is somewhat concerning and will arrange for her to have an u/s of this area and biopsy if needed.  If negative then will follow with further imaging as indicated.  Will continue current care approach otherwise.

## 2025-08-07 NOTE — PROGRESS NOTES
PROGRESS NOTE    Subjective:       Patient ID: Karyna Escoto is a 50 y.o. female.    6/9/2022:  Dx Mammo:  1.8cm mass in the left breast towards the left axilla.   Deep to the mass 2 lymph nodes are identified.     7/5/2022:  Left breast core biopsy:  Grade 2 IDCA with DCIS  ER: 0.03%, AL: 8.8%, HER2: 0  TRIPLE NEGATIVE    7/20/2022:  Left axilla LN needle biopsy:  Invasive ductal carcinoma  ER: 0%, AL: 40%, HER2 negative(0)    [facility] 522  8/11/2022-2/2/2023    3/17/2023  Bilateral Nipple sparing mastectomy  Complete Remission    5/30/2023-12/6/2023  Pembro complete    6/6/2023-7/14/2023  Radiation therapy    5/3/2025-CTA chest  No PE  GGO in [facility]    Chief Complaint:  No chief complaint on file.  Triple negative breast cancer follow up.     History of Present Illness:   Karyna Escoto is a 50 y.o. female who presents for follow up of breast cancer.      Flori has no new complaints at this time.  Feeling ok.     Family and Social history reviewed and is unchanged from 8/3/2022           Current Outpatient Medications:     busPIRone (BUSPAR) 5 MG Tab, Take 5 mg by mouth every morning., Disp: , Rfl:     fluticasone propionate (FLONASE) 50 mcg/actuation nasal spray, 1 spray (50 mcg total) by Each Nostril route once daily., Disp: 15.8 mL, Rfl: 5    levocetirizine (XYZAL) 5 MG tablet, Take 5 mg by mouth once daily., Disp: , Rfl:     LIDOcaine-prilocaine (EMLA) cream, Apply topically as needed. Apply 30 mins prior to port access, Disp: 30 g, Rfl: 5    ondansetron (ZOFRAN) 4 MG tablet, Take 1 tablet (4 mg total) by mouth every 6 (six) hours., Disp: 12 tablet, Rfl: 0    pantoprazole (PROTONIX) 40 MG tablet, Take 1 tablet (40 mg total) by mouth once daily., Disp: 90 tablet, Rfl: 3    promethazine (PHENERGAN) 25 MG tablet, Take 1 tablet (25 mg total) by mouth every 4 to 6 hours as needed for Nausea., Disp: 30 tablet, Rfl: 5    traMADoL (ULTRAM) 50 mg tablet,  Take 1 tablet (50 mg total) by mouth every 6 (six) hours., Disp: 30 tablet, Rfl: 2    traZODone (DESYREL) 50 MG tablet, Take 1 tablet (50 mg total) by mouth every evening., Disp: 30 tablet, Rfl: 11    venlafaxine (EFFEXOR-XR) 37.5 MG 24 hr capsule, Take 1 capsule by mouth once daily, Disp: 30 capsule, Rfl: 11        Objective:       Physical Examination:     /69   Pulse 69   Temp 98.5 °F (36.9 °C)   Resp 17   Wt 61.4 kg (135 lb 4.8 oz)   LMP 08/11/2022 Comment: tubal  BMI 24.75 kg/m²     Physical Exam  Constitutional:       Appearance: Normal appearance. She is well-developed.   HENT:      Head: Normocephalic and atraumatic.      Right Ear: External ear normal.      Left Ear: External ear normal.   Eyes:      Conjunctiva/sclera: Conjunctivae normal.      Pupils: Pupils are equal, round, and reactive to light.   Neck:      Thyroid: No thyromegaly.      Trachea: No tracheal deviation.   Cardiovascular:      Rate and Rhythm: Normal rate.   Pulmonary:      Effort: Pulmonary effort is normal.   Chest:       Abdominal:      General: Abdomen is flat.   Neurological:      General: No focal deficit present.      Mental Status: She is alert and oriented to person, place, and time.      Comments: Neuro intact througout   Psychiatric:         Mood and Affect: Mood normal.         Behavior: Behavior normal.         Thought Content: Thought content normal.         Judgment: Judgment normal.         Labs:   Recent Results (from the past 2 weeks)   CBC with Differential    Collection Time: 07/30/25 10:31 AM   Result Value Ref Range    WBC 4.00 3.90 - 12.70 K/uL    Hgb 11.9 (L) 12.0 - 16.0 gm/dL    Hct 34.4 (L) 37.0 - 48.5 %    Platelet Count 165 150 - 450 K/uL         CMP  Sodium   Date Value Ref Range Status   07/30/2025 140 136 - 145 mmol/L Final     Potassium   Date Value Ref Range Status   07/30/2025 4.6 3.5 - 5.1 mmol/L Final     Chloride   Date Value Ref Range Status   07/30/2025 108 95 - 110 mmol/L Final     CO2  "  Date Value Ref Range Status   07/30/2025 27 23 - 29 mmol/L Final     Glucose   Date Value Ref Range Status   07/30/2025 82 70 - 110 mg/dL Final     BUN   Date Value Ref Range Status   07/30/2025 10 6 - 20 mg/dL Final   05/13/2025 10 6 - 20 mg/dL Final     Creatinine   Date Value Ref Range Status   07/30/2025 0.8 0.5 - 1.4 mg/dL Final   05/13/2025 0.7 0.5 - 1.4 mg/dL Final     Calcium   Date Value Ref Range Status   07/30/2025 9.3 8.7 - 10.5 mg/dL Final     Protein Total   Date Value Ref Range Status   07/30/2025 6.4 6.0 - 8.4 gm/dL Final     Albumin   Date Value Ref Range Status   07/30/2025 3.8 3.5 - 5.2 g/dL Final     Bilirubin Total   Date Value Ref Range Status   07/30/2025 0.4 0.1 - 1.0 mg/dL Final     Comment:     For infants and newborns, interpretation of results should be based   on gestational age, weight and in agreement with clinical   observations.    Premature Infant recommended reference ranges:   0-24 hours:  <8.0 mg/dL   24-48 hours: <12.0 mg/dL   3-5 days:    <15.0 mg/dL   6-29 days:   <15.0 mg/dL     ALP   Date Value Ref Range Status   07/30/2025 62 55 - 135 unit/L Final     AST   Date Value Ref Range Status   07/30/2025 21 10 - 40 unit/L Final     ALT   Date Value Ref Range Status   07/30/2025 10 10 - 44 unit/L Final     Anion Gap   Date Value Ref Range Status   07/30/2025 5 (L) 8 - 16 mmol/L Final   05/13/2025 6 (L) 8 - 16 mmol/L Final     Lab Results   Component Value Date    CEA 1.1 08/07/2025     No results found for: "PSA"        Assessment/Plan:     Problem List Items Addressed This Visit       Left Breast Cancer-TRIPLE NEGATIVE - Primary    Patient has been doing ok but note is made of new nodularity in the upper/outer quadrant of the left breast.  This is somewhat concerning and will arrange for her to have an u/s of this area and biopsy if needed.  If negative then will follow with further imaging as indicated.  Will continue current care approach otherwise.           Relevant Orders "    CBC Auto Differential    Comprehensive Metabolic Panel    CT Chest Without Contrast    Cancer Antigen 15-3    Cancer Antigen 27-29    CEA    Lung nodule    Patient actually had GGO on CTA done in may.  Not sure of primary cause, suspect inflammation.  Need CT recheck and will arrange this.  Discussed  this today.          Relevant Orders    CT Chest Without Contrast             Discussion:     Follow up in about 6 months (around 2/7/2026).      Electronically signed by Derek Patterson

## 2025-08-07 NOTE — ASSESSMENT & PLAN NOTE
Patient actually had GGO on CTA done in may.  Not sure of primary cause, suspect inflammation.  Need CT recheck and will arrange this.  Discussed  this today.

## 2025-08-08 ENCOUNTER — TELEPHONE (OUTPATIENT)
Facility: CLINIC | Age: 50
End: 2025-08-08
Payer: COMMERCIAL

## 2025-08-08 LAB
CANCER AG15-3 SERPL-ACNC: 12.9 U/ML (ref 0–25)
CANCER AG27-29 SERPL-ACNC: 16.2 U/ML (ref 0–38.6)

## 2025-08-08 NOTE — TELEPHONE ENCOUNTER
----- Message from Diane sent at 8/8/2025  8:46 AM CDT -----  Regarding: [facility] [place]  Auth on file for member, currently approval, no date range... facility is out of network [facility]    To correct this issues contact Dyllan [phone]  Advise oncology service, re=reviewed medical questions again, so that will diminish the out of network issue.    Pt [phone] [employer]    Ref [id number]

## 2025-08-12 ENCOUNTER — HOSPITAL ENCOUNTER (OUTPATIENT)
Dept: RADIOLOGY | Facility: HOSPITAL | Age: 50
Discharge: HOME | End: 2025-08-12
Attending: INTERNAL MEDICINE
Payer: COMMERCIAL

## 2025-08-12 DIAGNOSIS — Z17.1 MALIGNANT NEOPLASM OF UPPER-OUTER QUADRANT OF LEFT BREAST IN FEMALE, ESTROGEN RECEPTOR NEGATIVE: ICD-10-CM

## 2025-08-12 DIAGNOSIS — R91.1 LUNG NODULE: ICD-10-CM

## 2025-08-12 DIAGNOSIS — C50.412 MALIGNANT NEOPLASM OF UPPER-OUTER QUADRANT OF LEFT BREAST IN FEMALE, ESTROGEN RECEPTOR NEGATIVE: ICD-10-CM

## 2025-08-12 PROCEDURE — 71250 CT THORAX DX C-: CPT | Mod: TC,PO

## 2025-08-12 PROCEDURE — 76642 ULTRASOUND BREAST LIMITED: CPT | Mod: 26,LT,, | Performed by: RADIOLOGY

## 2025-08-12 PROCEDURE — 71250 CT THORAX DX C-: CPT | Mod: 26,,, | Performed by: RADIOLOGY

## 2025-08-12 PROCEDURE — 76642 ULTRASOUND BREAST LIMITED: CPT | Mod: TC,PO,LT

## 2025-08-13 ENCOUNTER — RESULTS FOLLOW-UP (OUTPATIENT)
Facility: CLINIC | Age: 50
End: 2025-08-13
Payer: COMMERCIAL

## 2025-08-28 ENCOUNTER — INFUSION (OUTPATIENT)
Dept: INFUSION THERAPY | Facility: HOSPITAL | Age: 50
End: 2025-08-28
Attending: INTERNAL MEDICINE
Payer: COMMERCIAL

## 2025-08-28 VITALS
SYSTOLIC BLOOD PRESSURE: 109 MMHG | DIASTOLIC BLOOD PRESSURE: 75 MMHG | WEIGHT: 140.81 LBS | HEIGHT: 62 IN | TEMPERATURE: 98 F | HEART RATE: 67 BPM | RESPIRATION RATE: 16 BRPM | BODY MASS INDEX: 25.91 KG/M2 | OXYGEN SATURATION: 100 %

## 2025-08-28 DIAGNOSIS — Z17.1 MALIGNANT NEOPLASM OF UPPER-OUTER QUADRANT OF LEFT BREAST IN FEMALE, ESTROGEN RECEPTOR NEGATIVE: Primary | ICD-10-CM

## 2025-08-28 DIAGNOSIS — C50.412 MALIGNANT NEOPLASM OF UPPER-OUTER QUADRANT OF LEFT BREAST IN FEMALE, ESTROGEN RECEPTOR NEGATIVE: Primary | ICD-10-CM

## 2025-08-28 PROCEDURE — 25000003 PHARM REV CODE 250: Performed by: INTERNAL MEDICINE

## 2025-08-28 PROCEDURE — 63600175 PHARM REV CODE 636 W HCPCS: Performed by: INTERNAL MEDICINE

## 2025-08-28 PROCEDURE — 96523 IRRIG DRUG DELIVERY DEVICE: CPT

## 2025-08-28 PROCEDURE — A4216 STERILE WATER/SALINE, 10 ML: HCPCS | Performed by: INTERNAL MEDICINE

## 2025-08-28 RX ORDER — HEPARIN 100 UNIT/ML
500 SYRINGE INTRAVENOUS
Status: DISCONTINUED | OUTPATIENT
Start: 2025-08-28 | End: 2025-08-28 | Stop reason: HOSPADM

## 2025-08-28 RX ORDER — SODIUM CHLORIDE 0.9 % (FLUSH) 0.9 %
10 SYRINGE (ML) INJECTION
OUTPATIENT
Start: 2025-08-28

## 2025-08-28 RX ORDER — SODIUM CHLORIDE 0.9 % (FLUSH) 0.9 %
10 SYRINGE (ML) INJECTION
Status: DISCONTINUED | OUTPATIENT
Start: 2025-08-28 | End: 2025-08-28 | Stop reason: HOSPADM

## 2025-08-28 RX ORDER — HEPARIN 100 UNIT/ML
500 SYRINGE INTRAVENOUS
OUTPATIENT
Start: 2025-08-28

## 2025-08-28 RX ADMIN — SODIUM CHLORIDE, PRESERVATIVE FREE 10 ML: 5 INJECTION INTRAVENOUS at 09:08

## 2025-08-28 RX ADMIN — HEPARIN 500 UNITS: 100 SYRINGE at 09:08

## (undated) DEVICE — NEEDLE SAFETY ECLIPSE 25G 1-1/2IN 305767

## (undated) DEVICE — DRAPE C-ARM (FITS NEW C-ARM) 07-CA108

## (undated) DEVICE — BLADE SCALPEL #11 371111

## (undated) DEVICE — DERMABOND HVD MINI  DHVM12

## (undated) DEVICE — TRAY GENERAL SURGERY

## (undated) DEVICE — SUTURE MONOCRYL 4-0 PS-2 27 MCP426H

## (undated) DEVICE — SYRINGE 10ML 302995

## (undated) DEVICE — PAD BOVIE ADULT

## (undated) DEVICE — SOLUTION IRRI NS BOTTLE 1000ML R5200-01

## (undated) DEVICE — UNDERGLOVE BIOGEL PI MICRO BLUE SZ 8

## (undated) DEVICE — BAG DECANTER 10-102

## (undated) DEVICE — DRAPE LAPAROTOMY TRANSVERSE 89281

## (undated) DEVICE — GLOVE BIOGEL MICRO SURGEON PINK SZ 7.5

## (undated) DEVICE — SUTURE VICRYL 3-0 18 SH VCP864D

## (undated) DEVICE — SUTURE PROLENE 2-0 SH 36 8523H

## (undated) DEVICE — TIP BOVIE TEFLON E1450X